# Patient Record
Sex: FEMALE | Race: BLACK OR AFRICAN AMERICAN | NOT HISPANIC OR LATINO | Employment: OTHER | URBAN - METROPOLITAN AREA
[De-identification: names, ages, dates, MRNs, and addresses within clinical notes are randomized per-mention and may not be internally consistent; named-entity substitution may affect disease eponyms.]

---

## 2017-01-03 ENCOUNTER — HOSPITAL ENCOUNTER (OUTPATIENT)
Dept: PREADMISSION TESTING | Facility: HOSPITAL | Age: 72
Discharge: HOME | End: 2017-01-03
Attending: SURGERY
Payer: MEDICARE

## 2017-01-03 VITALS
TEMPERATURE: 100 F | RESPIRATION RATE: 18 BRPM | SYSTOLIC BLOOD PRESSURE: 177 MMHG | WEIGHT: 155 LBS | HEART RATE: 71 BPM | DIASTOLIC BLOOD PRESSURE: 86 MMHG | OXYGEN SATURATION: 97 % | HEIGHT: 62 IN | BODY MASS INDEX: 28.52 KG/M2

## 2017-01-03 NOTE — PRE ADMISSION SCREENING
Called Dr. Squires in regards to pt's pre op EKG done at her PCP's office. While he was here I was doing V/S. Pt's temp 100.2. BBS very coarse expiratory and  with inspiratory wheezes auscultated throughout all lung fields. Pt states she has had cough productive of clear sputum x 4 days and states to Dr. Squires that her exercise tolerance is worse right now than 4 days ago.  Dr. Squires called and spoke to Dr. Muhammad. They decided to postpone surgery until respiratory status is improved. Patient understands. She knows to call Dr. Muhammad's office in am to arrange follow up.

## 2017-01-19 DIAGNOSIS — E11.59 HYPERTENSION ASSOCIATED WITH DIABETES: ICD-10-CM

## 2017-01-19 DIAGNOSIS — I15.2 HYPERTENSION ASSOCIATED WITH DIABETES: ICD-10-CM

## 2017-01-19 RX ORDER — METOPROLOL SUCCINATE 25 MG/1
TABLET, EXTENDED RELEASE ORAL
Qty: 90 TABLET | Refills: 0 | Status: SHIPPED | OUTPATIENT
Start: 2017-01-19 | End: 2017-10-11

## 2017-01-20 NOTE — TELEPHONE ENCOUNTER
Patient is currently out of town - per family member. Left message for patient to contact clinic.       Please schedule follow up.

## 2017-01-30 DIAGNOSIS — J45.20 REACTIVE AIRWAY DISEASE, MILD INTERMITTENT, UNCOMPLICATED: ICD-10-CM

## 2017-01-30 RX ORDER — IPRATROPIUM BROMIDE AND ALBUTEROL 20; 100 UG/1; UG/1
SPRAY, METERED RESPIRATORY (INHALATION)
Qty: 4 G | Refills: 1 | Status: SHIPPED | OUTPATIENT
Start: 2017-01-30 | End: 2017-03-31 | Stop reason: SDUPTHER

## 2017-02-26 ENCOUNTER — HOSPITAL ENCOUNTER (EMERGENCY)
Facility: HOSPITAL | Age: 72
Discharge: HOME | End: 2017-02-26
Attending: EMERGENCY MEDICINE
Payer: MEDICARE

## 2017-02-26 VITALS
DIASTOLIC BLOOD PRESSURE: 74 MMHG | OXYGEN SATURATION: 99 % | WEIGHT: 170 LBS | HEIGHT: 65 IN | TEMPERATURE: 99 F | SYSTOLIC BLOOD PRESSURE: 169 MMHG | HEART RATE: 72 BPM | BODY MASS INDEX: 28.32 KG/M2 | RESPIRATION RATE: 20 BRPM

## 2017-02-26 DIAGNOSIS — R51.9 NONINTRACTABLE HEADACHE, UNSPECIFIED CHRONICITY PATTERN, UNSPECIFIED HEADACHE TYPE: Primary | ICD-10-CM

## 2017-02-26 LAB
ABO + RH BLD: NORMAL
ALBUMIN SERPL BCP-MCNC: 3.9 G/DL
ALP SERPL-CCNC: 57 U/L
ALT SERPL W/O P-5'-P-CCNC: 10 U/L
ANION GAP SERPL CALC-SCNC: 14 MMOL/L
AST SERPL-CCNC: 15 U/L
BASOPHILS # BLD AUTO: 0.01 K/UL
BASOPHILS NFR BLD: 0.2 %
BILIRUB SERPL-MCNC: 0.7 MG/DL
BLD GP AB SCN CELLS X3 SERPL QL: NORMAL
BNP SERPL-MCNC: 3789 PG/ML
BUN SERPL-MCNC: 13 MG/DL
CALCIUM SERPL-MCNC: 10.6 MG/DL
CHLORIDE SERPL-SCNC: 98 MMOL/L
CO2 SERPL-SCNC: 28 MMOL/L
CREAT SERPL-MCNC: 6.5 MG/DL
DIFFERENTIAL METHOD: ABNORMAL
EOSINOPHIL # BLD AUTO: 0.1 K/UL
EOSINOPHIL NFR BLD: 1.9 %
ERYTHROCYTE [DISTWIDTH] IN BLOOD BY AUTOMATED COUNT: 16.5 %
EST. GFR  (AFRICAN AMERICAN): 7 ML/MIN/1.73 M^2
EST. GFR  (NON AFRICAN AMERICAN): 6 ML/MIN/1.73 M^2
GLUCOSE SERPL-MCNC: 101 MG/DL
HCT VFR BLD AUTO: 36.7 %
HGB BLD-MCNC: 11.5 G/DL
INR PPP: 1.1
LYMPHOCYTES # BLD AUTO: 0.9 K/UL
LYMPHOCYTES NFR BLD: 22.2 %
MAGNESIUM SERPL-MCNC: 1.9 MG/DL
MCH RBC QN AUTO: 29.6 PG
MCHC RBC AUTO-ENTMCNC: 31.3 %
MCV RBC AUTO: 95 FL
MONOCYTES # BLD AUTO: 0.5 K/UL
MONOCYTES NFR BLD: 10.7 %
NEUTROPHILS # BLD AUTO: 2.7 K/UL
NEUTROPHILS NFR BLD: 65 %
PHOSPHATE SERPL-MCNC: 4.7 MG/DL
PLATELET # BLD AUTO: 200 K/UL
PMV BLD AUTO: 9.5 FL
POCT GLUCOSE: 80 MG/DL (ref 70–110)
POTASSIUM SERPL-SCNC: 3.3 MMOL/L
PROT SERPL-MCNC: 7.5 G/DL
PROTHROMBIN TIME: 12 SEC
RBC # BLD AUTO: 3.88 M/UL
SODIUM SERPL-SCNC: 140 MMOL/L
TROPONIN I SERPL DL<=0.01 NG/ML-MCNC: 0.05 NG/ML
TSH SERPL DL<=0.005 MIU/L-ACNC: 2.39 UIU/ML
WBC # BLD AUTO: 4.19 K/UL

## 2017-02-26 PROCEDURE — 93005 ELECTROCARDIOGRAM TRACING: CPT

## 2017-02-26 PROCEDURE — 84443 ASSAY THYROID STIM HORMONE: CPT

## 2017-02-26 PROCEDURE — 84484 ASSAY OF TROPONIN QUANT: CPT

## 2017-02-26 PROCEDURE — 86900 BLOOD TYPING SEROLOGIC ABO: CPT

## 2017-02-26 PROCEDURE — 99285 EMERGENCY DEPT VISIT HI MDM: CPT | Mod: 25

## 2017-02-26 PROCEDURE — 85610 PROTHROMBIN TIME: CPT

## 2017-02-26 PROCEDURE — 83880 ASSAY OF NATRIURETIC PEPTIDE: CPT

## 2017-02-26 PROCEDURE — 84100 ASSAY OF PHOSPHORUS: CPT

## 2017-02-26 PROCEDURE — 86850 RBC ANTIBODY SCREEN: CPT

## 2017-02-26 PROCEDURE — 85025 COMPLETE CBC W/AUTO DIFF WBC: CPT

## 2017-02-26 PROCEDURE — 82962 GLUCOSE BLOOD TEST: CPT

## 2017-02-26 PROCEDURE — 83735 ASSAY OF MAGNESIUM: CPT

## 2017-02-26 PROCEDURE — 80053 COMPREHEN METABOLIC PANEL: CPT

## 2017-02-26 RX ORDER — OMEPRAZOLE 20 MG/1
20 CAPSULE, DELAYED RELEASE ORAL DAILY
COMMUNITY
End: 2017-10-11

## 2017-02-26 RX ORDER — MONTELUKAST SODIUM 10 MG/1
10 TABLET ORAL NIGHTLY
Status: ON HOLD | COMMUNITY
End: 2017-07-12 | Stop reason: ALTCHOICE

## 2017-02-26 NOTE — ED AVS SNAPSHOT
[facility]  [place] [address]               Eli Vaz   2017  2:26 PM   ED    Description:  Female : 1945   Department:  [facility]           Your Care was Coordinated By:     Provider Role From To    Subhash Landin MD Attending Provider 17 1432 --      Reason for Visit     Aphasia           Diagnoses this Visit        Comments    Nonintractable headache, unspecified chronicity pattern, unspecified headache type    -  Primary       ED Disposition     None           To Do List           Follow-up Information     Follow up with Isrrael Conway MD.    Specialty:  Family Medicine    Contact information:    [address] [facility]  [facility]  [facility] [address] [phone]          Follow up with [facility].    Specialty:  Emergency Medicine    Why:  As needed    Contact information:    [place] [id number] [phone]      [facility]     [facility] Nurse Care [facility] -  Assistance  Registered nurses in the [facility] provide clinical advisement, health education, appointment booking, and other advisory services.  Call for this free service at [phone].             Medications           Message regarding Medications     Verify the changes and/or additions to your medication regime listed below are the same as discussed with your clinician today.  If any of these changes or additions are incorrect, please notify your healthcare provider.             Verify that the below list of medications is an accurate representation of the medications you are currently taking.  If none reported, the list may be blank. If incorrect, please contact your healthcare provider. Carry this list with you in case of emergency.           Current Medications     amlodipine (NORVASC) 5 MG tablet Take by mouth once daily.     aspirin 325 MG tablet Take 1  tablet (325 mg total) by mouth once daily.    COMBIVENT RESPIMAT  mcg/actuation inhaler INHALE 2 PUFF(S) INTO THE LUNGS 3 TIMES A DAY    FOLIC ACID/VITAMIN B COMP W-C (RENAL CAPS ORAL) Take by mouth Daily.    metoprolol succinate (TOPROL-XL) 25 MG 24 hr tablet TAKE 1 TABLET BY MOUTH ONCE DAILY    montelukast (SINGULAIR) 10 mg tablet Take 10 mg by mouth every evening.    omeprazole (PRILOSEC) 20 MG capsule Take 20 mg by mouth once daily.    paroxetine (PAXIL) 20 MG tablet Take 20 mg by mouth once daily.     SENSIPAR 60 mg Tab Take 30 mg by mouth daily with breakfast.     sevelamer carbonate (RENVELA) 800 mg Tab Take 800 mg by mouth 3 (three) times daily with meals.    ADVAIR DISKUS 250-50 mcg/dose diskus inhaler INHALE 1 PUFF INTO THE LUNGS TWICE A DAY    atorvastatin (LIPITOR) 10 MG tablet Take 1 tablet (10 mg total) by mouth once daily.    fluticasone (FLONASE) 50 mcg/actuation nasal spray 1 spray by Each Nare route 2 (two) times daily.    meloxicam (MOBIC) 15 MG tablet Take 15 mg by mouth once daily.     mirtazapine (REMERON) 7.5 MG Tab Take 7.5 mg by mouth nightly.     tramadol (ULTRAM) 50 mg tablet Take 1 tablet (50 mg total) by mouth 2 (two) times daily as needed.           Clinical Reference Information           Your Vitals Were     BP                   181/77           Allergies as of 2/26/2017     No Known Allergies      Immunizations Administered on Date of Encounter - 2/26/2017     None      ED Micro, Lab, POCT     Start Ordered       Status Ordering Provider    02/26/17 1532 02/26/17 1532  POCT glucose  Once      Final result     02/26/17 1435 02/26/17 1434  Magnesium  STAT      Final result     02/26/17 1435 02/26/17 1434  Phosphorus  STAT      Final result     02/26/17 1433 02/26/17 1433  CBC W/ AUTO DIFFERENTIAL  Once      Final result     02/26/17 1433 02/26/17 1433  Comprehensive metabolic panel  Once      Final result     02/26/17 1433 02/26/17 1433  Protime-INR  Once      Final result      02/26/17 1433 02/26/17 1433  TSH  Once      Final result     02/26/17 1433 02/26/17 1433  Troponin I  Once      Final result     02/26/17 1433 02/26/17 1433  B-Type natriuretic peptide  Once      Final result     02/26/17 1433 02/26/17 1433  POCT glucose  Once      Acknowledged       ED Imaging Orders     Start Ordered       Status Ordering Provider    02/26/17 1433 02/26/17 1433  CT Head Without Contrast  1 time imaging      Final result     02/26/17 1433 02/26/17 1433  X-Ray Chest AP Portable  1 time imaging      Final result       Discharge References/Attachments     HEADACHES, SELF-CARE FOR (ENGLISH)    HEADACHE, UNSPECIFIED (ENGLISH)      [facility] Sign-Up     Activating your [facility] account is as easy as 1-2-3!     1) Visit [email or web address], select Sign Up Now, enter this activation code and your date of birth, then select Next.  [email or web address]  Expires: 4/12/2017  4:42 PM      2) Create a username and password to use when you visit [facility] in the future and select a security question in case you lose your password and select Next.    3) Enter your e-mail address and click Sign Up!    Additional Information  If you have questions, please e-mail [email or web address] or call [phone] to talk to our [facility] staff. Remember, [facility] is NOT to be used for urgent needs. For medical emergencies, dial 911.         Smoking Cessation     If you would like to quit smoking:   You may be eligible for free services if you are a [place] resident and started smoking cigarettes before September 1, 1988.  Call the Smoking Cessation Trust ([employer]) toll free at (804) 320-7365 or (144) 458-5612.   Call 4-800-QUIT-NOW if you do not meet the above criteria.             [facility] complies with applicable Federal civil rights laws and does not discriminate on the basis of race, color, national origin, age, disability, or sex.        Language Assistance Services     ATTENTION: Language assistance  services are available, free of charge. Please call [phone].      ATENCIÓN: Si habla español, tiene a nevarez disposición servicios gratuitos de asistencia lingüística. Llame al [phone].     CHÚ Ý: N?u b?n nói Ti?ng Vi?t, có các d?ch v? h? tr? ngôn ng? mi?n phí dành cho b?n. G?i s? [phone].

## 2017-02-26 NOTE — ED PROVIDER NOTES
"Encounter Date: 2/26/2017    SCRIBE #1 NOTE: I, Melissa Houston, am scribing for, and in the presence of,  Subhash Landin MD. I have scribed the following portions of the note - Other sections scribed: HPI and ROS.       History     Chief Complaint   Patient presents with    Aphasia     arrived via [facility], c/o " talking out of her head" EMS reports sudden onset of aphasia, difficulty walking, symptoms since 1pm, c/o HA, pt reported taking 4 tablets of daily HTN meds hydralizine 50mg po at 11am     Review of patient's allergies indicates:  No Known Allergies  HPI Comments: CC: Aphasia    HPI: This 71 y.o. Female who has COPD, ESRD-HD, HTN and DM type 2 presents to the ED for an evaluation for acute onset speech difficulty with associated difficulty walking that began at 13:00.  Pt's family reports pt suddenly began having difficulty speaking as well as calling family members by incorrect names.  Pt's family reports states pt's symptoms began approximately 1 hour after taking her newly prescribed HTN medication.  Pt reports starting her hydralazine this morning.  Pt's family reports her taking the incorrect dosage and reports her taking 4 tablets this morning. Pt reports she can recall having some difficulty speaking, but states she remembered having a generalized headache prior to the incident.  Pt and family members reports her symptoms have resolved since arriving to the ED.  Pt denies any recent illness and reports having a TIA in the past.  Pt denies chest pain, palpitations, abdominal pain, nausea, emesis, diarrhea, back pain, shortness of breath, or any other associated symptoms.  No prior tx.  No alleviating factors.    The history is provided by the patient. No  was used.     Past Medical History:   Diagnosis Date    COPD (chronic obstructive pulmonary disease)     Diabetes mellitus type II     Dialysis patient     ESRD (end stage renal disease)      Past Surgical History:   Procedure " Laterality Date    AV FISTULA PLACEMENT      TUBAL LIGATION       Family History   Problem Relation Age of Onset    Heart attack Sister     Heart attack Sister     Heart attack Mother      Social History   Substance Use Topics    Smoking status: Former Smoker    Smokeless tobacco: None    Alcohol use No     Review of Systems   Constitutional: Negative for chills and fever.   HENT: Negative for ear pain and sore throat.    Eyes: Negative for pain.   Respiratory: Negative for cough and shortness of breath.    Cardiovascular: Negative for chest pain, palpitations and leg swelling.   Gastrointestinal: Negative for abdominal pain, diarrhea, nausea and vomiting.   Genitourinary: Negative for dysuria.   Musculoskeletal: Negative for back pain.   Skin: Negative for rash.   Neurological: Positive for speech difficulty and headaches.       Physical Exam   Initial Vitals   BP Pulse Resp Temp SpO2   02/26/17 1429 02/26/17 1429 02/26/17 1429 -- 02/26/17 1429   177/72 76 20  98 %     Physical Exam    Nursing note and vitals reviewed.  Constitutional: She appears well-developed and well-nourished. No distress.   HENT:   Head: Normocephalic and atraumatic.   Mouth/Throat: Oropharynx is clear and moist.   Eyes: EOM are normal. Pupils are equal, round, and reactive to light.   Neck: Normal range of motion. Neck supple. No thyromegaly present. No JVD present.   Cardiovascular: Normal rate, regular rhythm, normal heart sounds and intact distal pulses. Exam reveals no gallop and no friction rub.    No murmur heard.  Pulmonary/Chest: Breath sounds normal. No respiratory distress.   Abdominal: Soft. Bowel sounds are normal. There is no tenderness.   Musculoskeletal: Normal range of motion. She exhibits no edema or tenderness.   Neurological: She is alert and oriented to person, place, and time. She has normal strength and normal reflexes. She displays normal reflexes. No cranial nerve deficit.   Skin: Skin is warm and dry.          ED Course   Procedures  Labs Reviewed   CBC W/ AUTO DIFFERENTIAL - Abnormal; Notable for the following:        Result Value    RBC 3.88 (*)     Hemoglobin 11.5 (*)     Hematocrit 36.7 (*)     MCHC 31.3 (*)     RDW 16.5 (*)     Lymph # 0.9 (*)     All other components within normal limits   COMPREHENSIVE METABOLIC PANEL - Abnormal; Notable for the following:     Potassium 3.3 (*)     Creatinine 6.5 (*)     Calcium 10.6 (*)     eGFR if  7 (*)     eGFR if non  6 (*)     All other components within normal limits   TROPONIN I - Abnormal; Notable for the following:     Troponin I 0.046 (*)     All other components within normal limits   B-TYPE NATRIURETIC PEPTIDE - Abnormal; Notable for the following:     BNP 3789 (*)     All other components within normal limits   PHOSPHORUS - Abnormal; Notable for the following:     Phosphorus 4.7 (*)     All other components within normal limits   PROTIME-INR   TSH   MAGNESIUM   TYPE & SCREEN   POCT GLUCOSE     EKG Readings: (Independently Interpreted)   Initial Reading: No STEMI. Rhythm: Normal Sinus Rhythm. Heart Rate: 90. Ectopy: No Ectopy. ST Segments: Normal ST Segments. T Waves: Normal.       X-Rays:   Independently Interpreted Readings:   Chest X-Ray: Normal heart size.  No infiltrates.  No acute abnormalities.   Head CT: Right frontal meningioma that is unchanged form prior,  REmote infarcts new from 2014.          Patient presents with slurred speech. Now resolved. Started one hour after taking accidentally more of a new Blood pressure medication then prescribed. Patient's BNP and troponin elevated. Had elevations in 2014 although both higher today. No respiratory symptoms or chest pain. No new EKG findings. Discussed bringing into the hospital for further evaluation of TIA/stroke. Patient refusing. Family wants to take the patient home. Will discharge but recommend close immediate follow up with PCP or [facility]. Also discuss  hydralazine at follow up.        Scribe Attestation:   Scribe #1: I performed the above scribed service and the documentation accurately describes the services I performed. I attest to the accuracy of the note.    Attending Attestation:           Physician Attestation for Scribe:  Physician Attestation Statement for Scribe #1: I, Subhash Landin MD, reviewed documentation, as scribed by Melissa Houston in my presence, and it is both accurate and complete.                 ED Course     Clinical Impression:   The encounter diagnosis was Nonintractable headache, unspecified chronicity pattern, unspecified headache type.  Transient ischemic attack vs side effects from accidental medication overdose.           Subhsah Landin MD  03/27/17 2898

## 2017-02-26 NOTE — ED TRIAGE NOTES
Pt arrives via  EMS. EMS reports sudden onset of aphasia and difficulty walking which started at 1pm. Pt complains of 9/10 headache.  reports pt took 4 hydralazine pills this morning accidentally. She is supposed to take 1 pill 4 x a day.

## 2017-03-07 ENCOUNTER — HOSPITAL ENCOUNTER (EMERGENCY)
Facility: HOSPITAL | Age: 72
Discharge: HOME | End: 2017-03-07
Attending: EMERGENCY MEDICINE
Payer: MEDICARE

## 2017-03-07 VITALS
RESPIRATION RATE: 16 BRPM | TEMPERATURE: 99 F | WEIGHT: 145 LBS | DIASTOLIC BLOOD PRESSURE: 69 MMHG | HEART RATE: 70 BPM | SYSTOLIC BLOOD PRESSURE: 151 MMHG | OXYGEN SATURATION: 95 % | HEIGHT: 64 IN | BODY MASS INDEX: 24.75 KG/M2

## 2017-03-07 DIAGNOSIS — S09.90XA CLOSED HEAD INJURY: ICD-10-CM

## 2017-03-07 DIAGNOSIS — S00.83XA FOREHEAD CONTUSION, INITIAL ENCOUNTER: Primary | ICD-10-CM

## 2017-03-07 PROCEDURE — 99284 EMERGENCY DEPT VISIT MOD MDM: CPT

## 2017-03-07 PROCEDURE — 25000003 PHARM REV CODE 250: Performed by: EMERGENCY MEDICINE

## 2017-03-07 RX ORDER — CLONIDINE HYDROCHLORIDE 0.1 MG/1
0.1 TABLET ORAL
Status: COMPLETED | OUTPATIENT
Start: 2017-03-07 | End: 2017-03-07

## 2017-03-07 RX ADMIN — CLONIDINE HYDROCHLORIDE 0.1 MG: 0.1 TABLET ORAL at 03:03

## 2017-03-07 NOTE — ED AVS SNAPSHOT
[facility]  [address] [place] [address]               Eli Vaz   3/7/2017  2:43 AM   ED    Description:  Female : 1945   Department:  [facility]           Your Care was Coordinated By:     Provider Role From To    Huseyin Valero MD Attending Provider 17 0245 --      Reason for Visit     Head Injury           Diagnoses this Visit        Comments    Forehead contusion, initial encounter    -  Primary     Closed head injury           ED Disposition     None           To Do List           Follow-up Information     Follow up with Isrrael Conway MD In 3 days.    Specialty:  Family Medicine    Contact information:    [address] [facility]  [facility]  [facility] [address]  [phone]        [facility]     [facility] Care [facility] -  Assistance  Registered nurses in the [facility] provide clinical advisement, health education, appointment booking, and other advisory services.  Call for this free service at [phone].             Medications           Message regarding Medications     Verify the changes and/or additions to your medication regime listed below are the same as discussed with your clinician today.  If any of these changes or additions are incorrect, please notify your healthcare provider.        These medications were administered today        Dose Freq    cloNIDine tablet 0.1 mg 0.1 mg ED 1 Time    Sig: Take 1 tablet (0.1 mg total) by mouth ED 1 Time.    Class: Normal    Route: Oral           Verify that the below list of medications is an accurate representation of the medications you are currently taking.  If none reported, the list may be blank. If incorrect, please contact your healthcare provider. Carry this list with you in case of emergency.           Current Medications     ADVAIR DISKUS 250-50 mcg/dose diskus inhaler INHALE 1 PUFF INTO THE LUNGS TWICE A DAY    amlodipine  "(NORVASC) 5 MG tablet Take by mouth once daily.     aspirin 325 MG tablet Take 1 tablet (325 mg total) by mouth once daily.    atorvastatin (LIPITOR) 10 MG tablet Take 1 tablet (10 mg total) by mouth once daily.    COMBIVENT RESPIMAT  mcg/actuation inhaler INHALE 2 PUFF(S) INTO THE LUNGS 3 TIMES A DAY    fluticasone (FLONASE) 50 mcg/actuation nasal spray 1 spray by Each Nare route 2 (two) times daily.    FOLIC ACID/VITAMIN B COMP W-C (RENAL CAPS ORAL) Take by mouth Daily.    meloxicam (MOBIC) 15 MG tablet Take 15 mg by mouth once daily.     metoprolol succinate (TOPROL-XL) 25 MG 24 hr tablet TAKE 1 TABLET BY MOUTH ONCE DAILY    mirtazapine (REMERON) 7.5 MG Tab Take 7.5 mg by mouth nightly.     montelukast (SINGULAIR) 10 mg tablet Take 10 mg by mouth every evening.    omeprazole (PRILOSEC) 20 MG capsule Take 20 mg by mouth once daily.    paroxetine (PAXIL) 20 MG tablet Take 20 mg by mouth once daily.     SENSIPAR 60 mg Tab Take 30 mg by mouth daily with breakfast.     sevelamer carbonate (RENVELA) 800 mg Tab Take 800 mg by mouth 3 (three) times daily with meals.    tramadol (ULTRAM) 50 mg tablet Take 1 tablet (50 mg total) by mouth 2 (two) times daily as needed.           Clinical Reference Information           Your Vitals Were     BP Pulse Temp Resp Height Weight    190/81 77 98.8 °F (37.1 °C) (Oral) 16 5' 4" (1.626 m) 65.8 kg (145 lb)    SpO2 BMI             94% 24.89 kg/m2         Allergies as of 3/7/2017     No Known Allergies      Immunizations Administered on Date of Encounter - 3/7/2017     None      ED Micro, Lab, POCT     None      ED Imaging Orders     Start Ordered       Status Ordering Provider    03/07/17 0256 03/07/17 0256  CT Head Without Contrast  1 time imaging      Final result         Discharge Instructions       Tylenol for pain.  Please return immediately if you get worse or if new problems develop.  Rest.  You may apply ice for 24 hours.    [facility] Sign-Up     Activating your [facility] " account is as easy as 1-2-3!     1) Visit [email or web address], select Sign Up Now, enter this activation code and your date of birth, then select Next.  [email or web address]  Expires: 4/12/2017  4:42 PM      2) Create a username and password to use when you visit [facility] in the future and select a security question in case you lose your password and select Next.    3) Enter your e-mail address and click Sign Up!    Additional Information  If you have questions, please e-mail [email or web address] or call [phone] to talk to our [facility] staff. Remember, [facility] is NOT to be used for urgent needs. For medical emergencies, dial 911.         Smoking Cessation     If you would like to quit smoking:   You may be eligible for free services if you are a [place] resident and started smoking cigarettes before September 1, 1988.  Call the Smoking Cessation Trust ([facility] [place]) toll free at (854) 618-8532 or (119) 040-9882.   Call 7-387-QUIT-NOW if you do not meet the above criteria.             [facility] complies with applicable Federal civil rights laws and does not discriminate on the basis of race, color, national origin, age, disability, or sex.        Language Assistance Services     ATTENTION: Language assistance services are available, free of charge. Please call [phone].      ATENCIÓN: Si habla español, tiene a nevarez disposición servicios gratuitos de asistencia lingüística. Llame al [phone].     CHÚ Ý: N?u b?n nói Ti?ng Vi?t, có các d?ch v? h? tr? ngôn ng? mi?n phí dành cho b?n. G?i s? [phone].

## 2017-03-07 NOTE — DISCHARGE INSTRUCTIONS
Tylenol for pain.  Please return immediately if you get worse or if new problems develop.  Rest.  You may apply ice for 24 hours.

## 2017-03-07 NOTE — ED PROVIDER NOTES
Encounter Date: 3/7/2017    SCRIBE #1 NOTE: I, Alyssa Guerra, am scribing for, and in the presence of,  Huseyin Valero MD. I have scribed the following portions of the note - Other sections scribed: HPI/ROS.       History     Chief Complaint   Patient presents with    Head Injury     swelling noted to pts right side of head after falling off the sofa bed while asleep tonight.     Review of patient's allergies indicates:  No Known Allergies  HPI Comments: CC: Head Injury    HPI: 71 y.o. F with COPD, DM type II, HTN, hypercholesteremia, ESRD, and dialysis patient presents to the ED c/o a head injury with swelling to the R side of the forehead after the pt accidentally fell off her sofa bed while sleeping about 1 hour PTA. Pain is severe. No prior tx. Pt denies LOC, HA, visual disturbance, extremity pain, and any other symptoms.     The history is provided by the patient.     Past Medical History:   Diagnosis Date    COPD (chronic obstructive pulmonary disease)     Diabetes mellitus type II     Dialysis patient     ESRD (end stage renal disease)      Past Surgical History:   Procedure Laterality Date    AV FISTULA PLACEMENT      TUBAL LIGATION       Family History   Problem Relation Age of Onset    Heart attack Sister     Heart attack Sister     Heart attack Mother      Social History   Substance Use Topics    Smoking status: Former Smoker    Smokeless tobacco: None    Alcohol use No     Review of Systems   Constitutional: Negative for fever.   HENT: Negative for nosebleeds.         (+) swelling to R forehead   Eyes: Negative for pain and visual disturbance.   Respiratory: Negative for shortness of breath.    Cardiovascular: Negative for chest pain.   Gastrointestinal: Negative for abdominal pain, nausea and vomiting.   Genitourinary: Negative for flank pain.   Musculoskeletal: Negative for back pain.        (-) extremity pain   Skin: Negative for rash and wound.   Neurological: Negative for headaches.        Physical Exam   Initial Vitals   BP Pulse Resp Temp SpO2   03/07/17 0241 03/07/17 0241 03/07/17 0241 03/07/17 0241 03/07/17 0241   215/83 77 16 98.8 °F (37.1 °C) 95 %     Physical Exam  The patient was examined specifically for the following:   General:No significant distress, Good color, Warm and dry. Head and neck:Scalp atraumatic, Neck supple. Neurological:Appropriate conversation, Gross motor deficits. Eyes:Conjugate gaze, Clear corneas. ENT: No epistaxis. Cardiac: Regular rate and rhythm, Grossly normal heart tones. Pulmonary: Wheezing, Rales. Gastrointestinal: Abdominal tenderness, Abdominal distention. Musculoskeletal: Extremity deformity, Apparent pain with range of motion of the joints. Skin: Rash.   The findings on examination were normal except for the following: The patient's blood pressure is 215/83.  The patient has a contusion on the right side of forehead.  The entire spine, including the cervical spine is completely nontender.  Mental status examination, cranial nerves, motor and sensory examination are normal.  The lungs are clear and free wheezing rales of the rhonchi.  Heart tones are normal.  The patient has regular rate and rhythm.  The abdomen is soft.  She has a 3/6 systolic murmur.  ED Course   Procedures  Labs Reviewed - No data to display       X-Rays:   Independently Interpreted Readings:   Other Readings:  CT of the head fails to reveal significant abnormalities.    Medical decision-making: Given the above this patient presents to emergency room after falling out of bed hitting her head.  There is questionable loss of consciousness.  CT the head failed to reveal intracranial bleeding.  There is no evidence of spinal trauma no other trauma to the chest abdomen pelvis or extremities is noted.  I will discharge this patient outpatient evaluation and treatment.              Scribe Attestation:   Scribe #1: I performed the above scribed service and the documentation accurately describes  the services I performed. I attest to the accuracy of the note.    Attending Attestation:           Physician Attestation for Scribe:  Physician Attestation Statement for Scribe #1: I, Huseyin Valero MD, reviewed documentation, as scribed by Alyssa Guerra in my presence, and it is both accurate and complete.                 ED Course     Clinical Impression:   The primary encounter diagnosis was Forehead contusion, initial encounter. A diagnosis of Closed head injury was also pertinent to this visit.          Huseyin Valero MD  03/09/17 0548

## 2017-03-07 NOTE — ED TRIAGE NOTES
71 year old female arrives to the Ed via personal transportation due to head injury 1 hr PTA. Pt reports laying in the recliner bed and fell off the bed and hit her head on the floor. Pt has a bump on the right side of forehead. Pain 8/10. Pt denies taking blood thinner. Pt of HTN. Family at the bedside.

## 2017-03-31 DIAGNOSIS — J45.20 REACTIVE AIRWAY DISEASE, MILD INTERMITTENT, UNCOMPLICATED: ICD-10-CM

## 2017-06-02 ENCOUNTER — TELEPHONE (OUTPATIENT)
Dept: FAMILY MEDICINE | Facility: CLINIC | Age: 72
End: 2017-06-02

## 2017-06-02 NOTE — TELEPHONE ENCOUNTER
Spoke with patient. States that she has changed care to Dr. Avelar    Left voicemail to the pharmacy re: above

## 2017-06-02 NOTE — TELEPHONE ENCOUNTER
----- Message from Delisa Murrieta sent at 6/2/2017 11:44 AM CDT -----  Contact: [facility] is requesting a refill for metoprolol succinate (TOPROL-XL) 25 MG 24 hr tablet. [phone]

## 2017-07-11 ENCOUNTER — HOSPITAL ENCOUNTER (OUTPATIENT)
Facility: HOSPITAL | Age: 72
Discharge: HOME | End: 2017-07-13
Attending: EMERGENCY MEDICINE | Admitting: EMERGENCY MEDICINE
Payer: MEDICARE

## 2017-07-11 DIAGNOSIS — E78.2 COMBINED HYPERLIPIDEMIA ASSOCIATED WITH TYPE 2 DIABETES MELLITUS: ICD-10-CM

## 2017-07-11 DIAGNOSIS — N18.6 DIABETES MELLITUS WITH ESRD (END-STAGE RENAL DISEASE): Chronic | ICD-10-CM

## 2017-07-11 DIAGNOSIS — I51.89 DIASTOLIC DYSFUNCTION: ICD-10-CM

## 2017-07-11 DIAGNOSIS — J44.9 COPD (CHRONIC OBSTRUCTIVE PULMONARY DISEASE): ICD-10-CM

## 2017-07-11 DIAGNOSIS — E11.59 HYPERTENSION ASSOCIATED WITH DIABETES: Chronic | ICD-10-CM

## 2017-07-11 DIAGNOSIS — R41.3 MEMORY LOSS: ICD-10-CM

## 2017-07-11 DIAGNOSIS — N18.6 ESRD ON DIALYSIS: ICD-10-CM

## 2017-07-11 DIAGNOSIS — Z99.2 ESRD ON DIALYSIS: ICD-10-CM

## 2017-07-11 DIAGNOSIS — R41.82 ALTERED MENTAL STATUS: ICD-10-CM

## 2017-07-11 DIAGNOSIS — F41.9 ANXIETY: ICD-10-CM

## 2017-07-11 DIAGNOSIS — I15.2 HYPERTENSION ASSOCIATED WITH DIABETES: Chronic | ICD-10-CM

## 2017-07-11 DIAGNOSIS — G93.40 ACUTE ENCEPHALOPATHY: Primary | ICD-10-CM

## 2017-07-11 DIAGNOSIS — E11.22 DIABETES MELLITUS WITH ESRD (END-STAGE RENAL DISEASE): Chronic | ICD-10-CM

## 2017-07-11 DIAGNOSIS — E11.69 COMBINED HYPERLIPIDEMIA ASSOCIATED WITH TYPE 2 DIABETES MELLITUS: ICD-10-CM

## 2017-07-11 DIAGNOSIS — D63.8 ANEMIA OF CHRONIC DISEASE: ICD-10-CM

## 2017-07-11 PROCEDURE — 96374 THER/PROPH/DIAG INJ IV PUSH: CPT

## 2017-07-11 PROCEDURE — P9612 CATHETERIZE FOR URINE SPEC: HCPCS

## 2017-07-11 PROCEDURE — 99285 EMERGENCY DEPT VISIT HI MDM: CPT

## 2017-07-12 PROBLEM — R41.82 ALTERED MENTAL STATUS: Status: ACTIVE | Noted: 2017-07-12

## 2017-07-12 LAB
ALBUMIN SERPL BCP-MCNC: 3.5 G/DL
ALP SERPL-CCNC: 59 U/L
ALT SERPL W/O P-5'-P-CCNC: 29 U/L
ANION GAP SERPL CALC-SCNC: 12 MMOL/L
ANION GAP SERPL CALC-SCNC: 13 MMOL/L
AST SERPL-CCNC: 27 U/L
BACTERIA #/AREA URNS HPF: ABNORMAL /HPF
BASOPHILS # BLD AUTO: 0.01 K/UL
BASOPHILS # BLD AUTO: 0.03 K/UL
BASOPHILS NFR BLD: 0.3 %
BASOPHILS NFR BLD: 0.6 %
BILIRUB SERPL-MCNC: 0.7 MG/DL
BILIRUB UR QL STRIP: NEGATIVE
BNP SERPL-MCNC: >4900 PG/ML
BUN SERPL-MCNC: 29 MG/DL
BUN SERPL-MCNC: 34 MG/DL
CALCIUM SERPL-MCNC: 10.1 MG/DL
CALCIUM SERPL-MCNC: 10.1 MG/DL
CHLORIDE SERPL-SCNC: 102 MMOL/L
CHLORIDE SERPL-SCNC: 105 MMOL/L
CLARITY UR: ABNORMAL
CO2 SERPL-SCNC: 21 MMOL/L
CO2 SERPL-SCNC: 24 MMOL/L
COLOR UR: ABNORMAL
CREAT SERPL-MCNC: 7.7 MG/DL
CREAT SERPL-MCNC: 8.2 MG/DL
DIFFERENTIAL METHOD: ABNORMAL
DIFFERENTIAL METHOD: ABNORMAL
EOSINOPHIL # BLD AUTO: 0.1 K/UL
EOSINOPHIL # BLD AUTO: 0.1 K/UL
EOSINOPHIL NFR BLD: 2.1 %
EOSINOPHIL NFR BLD: 2.8 %
ERYTHROCYTE [DISTWIDTH] IN BLOOD BY AUTOMATED COUNT: 17.4 %
ERYTHROCYTE [DISTWIDTH] IN BLOOD BY AUTOMATED COUNT: 17.5 %
EST. GFR  (AFRICAN AMERICAN): 5 ML/MIN/1.73 M^2
EST. GFR  (AFRICAN AMERICAN): 6 ML/MIN/1.73 M^2
EST. GFR  (NON AFRICAN AMERICAN): 4 ML/MIN/1.73 M^2
EST. GFR  (NON AFRICAN AMERICAN): 5 ML/MIN/1.73 M^2
GLUCOSE SERPL-MCNC: 93 MG/DL
GLUCOSE SERPL-MCNC: 99 MG/DL
GLUCOSE UR QL STRIP: ABNORMAL
HCT VFR BLD AUTO: 32.9 %
HCT VFR BLD AUTO: 34.8 %
HGB BLD-MCNC: 10.7 G/DL
HGB BLD-MCNC: 10.9 G/DL
HGB UR QL STRIP: ABNORMAL
HYALINE CASTS #/AREA URNS LPF: 0 /LPF
KETONES UR QL STRIP: NEGATIVE
LACTATE SERPL-SCNC: 1.1 MMOL/L
LEUKOCYTE ESTERASE UR QL STRIP: ABNORMAL
LIPASE SERPL-CCNC: 37 U/L
LYMPHOCYTES # BLD AUTO: 0.9 K/UL
LYMPHOCYTES # BLD AUTO: 1.1 K/UL
LYMPHOCYTES NFR BLD: 18.8 %
LYMPHOCYTES NFR BLD: 31.3 %
MAGNESIUM SERPL-MCNC: 1.8 MG/DL
MCH RBC QN AUTO: 31.3 PG
MCH RBC QN AUTO: 31.4 PG
MCHC RBC AUTO-ENTMCNC: 31.3 %
MCHC RBC AUTO-ENTMCNC: 32.5 %
MCV RBC AUTO: 100 FL
MCV RBC AUTO: 97 FL
MICROSCOPIC COMMENT: ABNORMAL
MONOCYTES # BLD AUTO: 0.7 K/UL
MONOCYTES # BLD AUTO: 0.9 K/UL
MONOCYTES NFR BLD: 18.8 %
MONOCYTES NFR BLD: 19.1 %
NEUTROPHILS # BLD AUTO: 1.7 K/UL
NEUTROPHILS # BLD AUTO: 2.8 K/UL
NEUTROPHILS NFR BLD: 46.5 %
NEUTROPHILS NFR BLD: 59.7 %
NITRITE UR QL STRIP: POSITIVE
PH UR STRIP: 8 [PH] (ref 5–8)
PHOSPHATE SERPL-MCNC: 5 MG/DL
PLATELET # BLD AUTO: 129 K/UL
PLATELET # BLD AUTO: 141 K/UL
PMV BLD AUTO: 10 FL
PMV BLD AUTO: 9.4 FL
POTASSIUM SERPL-SCNC: 4.5 MMOL/L
POTASSIUM SERPL-SCNC: 4.8 MMOL/L
PROT SERPL-MCNC: 6.6 G/DL
PROT UR QL STRIP: ABNORMAL
RBC # BLD AUTO: 3.41 M/UL
RBC # BLD AUTO: 3.48 M/UL
RBC #/AREA URNS HPF: >100 /HPF (ref 0–4)
SODIUM SERPL-SCNC: 138 MMOL/L
SODIUM SERPL-SCNC: 139 MMOL/L
SP GR UR STRIP: 1 (ref 1–1.03)
SQUAMOUS #/AREA URNS HPF: 4 /HPF
TROPONIN I SERPL DL<=0.01 NG/ML-MCNC: 0.07 NG/ML
TROPONIN I SERPL DL<=0.01 NG/ML-MCNC: 0.09 NG/ML
TROPONIN I SERPL DL<=0.01 NG/ML-MCNC: 0.09 NG/ML
URN SPEC COLLECT METH UR: ABNORMAL
UROBILINOGEN UR STRIP-ACNC: NEGATIVE EU/DL
WBC # BLD AUTO: 3.61 K/UL
WBC # BLD AUTO: 4.67 K/UL
WBC #/AREA URNS HPF: 0 /HPF (ref 0–5)

## 2017-07-12 PROCEDURE — 94761 N-INVAS EAR/PLS OXIMETRY MLT: CPT

## 2017-07-12 PROCEDURE — 25000003 PHARM REV CODE 250: Performed by: EMERGENCY MEDICINE

## 2017-07-12 PROCEDURE — G0378 HOSPITAL OBSERVATION PER HR: HCPCS

## 2017-07-12 PROCEDURE — 25000003 PHARM REV CODE 250: Performed by: NURSE PRACTITIONER

## 2017-07-12 PROCEDURE — 87088 URINE BACTERIA CULTURE: CPT

## 2017-07-12 PROCEDURE — 81000 URINALYSIS NONAUTO W/SCOPE: CPT

## 2017-07-12 PROCEDURE — 93005 ELECTROCARDIOGRAM TRACING: CPT

## 2017-07-12 PROCEDURE — 36415 COLL VENOUS BLD VENIPUNCTURE: CPT

## 2017-07-12 PROCEDURE — 63600175 PHARM REV CODE 636 W HCPCS: Performed by: EMERGENCY MEDICINE

## 2017-07-12 PROCEDURE — 80053 COMPREHEN METABOLIC PANEL: CPT

## 2017-07-12 PROCEDURE — 83605 ASSAY OF LACTIC ACID: CPT

## 2017-07-12 PROCEDURE — 85025 COMPLETE CBC W/AUTO DIFF WBC: CPT | Mod: 91

## 2017-07-12 PROCEDURE — 87086 URINE CULTURE/COLONY COUNT: CPT

## 2017-07-12 PROCEDURE — 84484 ASSAY OF TROPONIN QUANT: CPT

## 2017-07-12 PROCEDURE — 80048 BASIC METABOLIC PNL TOTAL CA: CPT

## 2017-07-12 PROCEDURE — 83690 ASSAY OF LIPASE: CPT

## 2017-07-12 PROCEDURE — 99204 OFFICE O/P NEW MOD 45 MIN: CPT | Mod: ,,, | Performed by: PSYCHIATRY & NEUROLOGY

## 2017-07-12 PROCEDURE — 84100 ASSAY OF PHOSPHORUS: CPT

## 2017-07-12 PROCEDURE — 83880 ASSAY OF NATRIURETIC PEPTIDE: CPT

## 2017-07-12 PROCEDURE — 83735 ASSAY OF MAGNESIUM: CPT

## 2017-07-12 RX ORDER — HYDRALAZINE HYDROCHLORIDE 10 MG/1
10 TABLET, FILM COATED ORAL 4 TIMES DAILY
Status: DISCONTINUED | OUTPATIENT
Start: 2017-07-12 | End: 2017-07-13 | Stop reason: HOSPADM

## 2017-07-12 RX ORDER — METOPROLOL SUCCINATE 25 MG/1
25 TABLET, EXTENDED RELEASE ORAL DAILY
Status: DISCONTINUED | OUTPATIENT
Start: 2017-07-12 | End: 2017-07-13 | Stop reason: HOSPADM

## 2017-07-12 RX ORDER — PANTOPRAZOLE SODIUM 40 MG/1
40 TABLET, DELAYED RELEASE ORAL DAILY
Status: DISCONTINUED | OUTPATIENT
Start: 2017-07-12 | End: 2017-07-13 | Stop reason: HOSPADM

## 2017-07-12 RX ORDER — OLANZAPINE 10 MG/2ML
5 INJECTION, POWDER, FOR SOLUTION INTRAMUSCULAR ONCE
Status: COMPLETED | OUTPATIENT
Start: 2017-07-12 | End: 2017-07-12

## 2017-07-12 RX ORDER — SODIUM CHLORIDE 9 MG/ML
INJECTION, SOLUTION INTRAVENOUS
Status: DISCONTINUED | OUTPATIENT
Start: 2017-07-12 | End: 2017-07-13 | Stop reason: HOSPADM

## 2017-07-12 RX ORDER — ACETAMINOPHEN 325 MG/1
650 TABLET ORAL EVERY 8 HOURS PRN
Status: DISCONTINUED | OUTPATIENT
Start: 2017-07-12 | End: 2017-07-13 | Stop reason: HOSPADM

## 2017-07-12 RX ORDER — MONTELUKAST SODIUM 10 MG/1
10 TABLET ORAL NIGHTLY
Status: DISCONTINUED | OUTPATIENT
Start: 2017-07-12 | End: 2017-07-13 | Stop reason: HOSPADM

## 2017-07-12 RX ORDER — IPRATROPIUM BROMIDE AND ALBUTEROL SULFATE 2.5; .5 MG/3ML; MG/3ML
3 SOLUTION RESPIRATORY (INHALATION) EVERY 4 HOURS PRN
Status: DISCONTINUED | OUTPATIENT
Start: 2017-07-12 | End: 2017-07-13 | Stop reason: HOSPADM

## 2017-07-12 RX ORDER — AMLODIPINE BESYLATE 5 MG/1
10 TABLET ORAL DAILY
Status: DISCONTINUED | OUTPATIENT
Start: 2017-07-12 | End: 2017-07-13 | Stop reason: HOSPADM

## 2017-07-12 RX ORDER — HYDRALAZINE HYDROCHLORIDE 20 MG/ML
10 INJECTION INTRAMUSCULAR; INTRAVENOUS EVERY 8 HOURS PRN
Status: DISCONTINUED | OUTPATIENT
Start: 2017-07-12 | End: 2017-07-13 | Stop reason: HOSPADM

## 2017-07-12 RX ORDER — RAMELTEON 8 MG/1
8 TABLET ORAL ONCE
Status: COMPLETED | OUTPATIENT
Start: 2017-07-12 | End: 2017-07-12

## 2017-07-12 RX ORDER — DIAZEPAM 2 MG/1
2 TABLET ORAL
Status: COMPLETED | OUTPATIENT
Start: 2017-07-12 | End: 2017-07-12

## 2017-07-12 RX ADMIN — RAMELTEON 8 MG: 8 TABLET, FILM COATED ORAL at 03:07

## 2017-07-12 RX ADMIN — AMLODIPINE BESYLATE 10 MG: 5 TABLET ORAL at 08:07

## 2017-07-12 RX ADMIN — OLANZAPINE 5 MG: 10 INJECTION, POWDER, LYOPHILIZED, FOR SOLUTION INTRAMUSCULAR at 04:07

## 2017-07-12 RX ADMIN — HYDRALAZINE HYDROCHLORIDE 10 MG: 10 TABLET, FILM COATED ORAL at 11:07

## 2017-07-12 RX ADMIN — MONTELUKAST SODIUM 10 MG: 10 TABLET, FILM COATED ORAL at 03:07

## 2017-07-12 RX ADMIN — ACETAMINOPHEN 650 MG: 325 TABLET, FILM COATED ORAL at 11:07

## 2017-07-12 RX ADMIN — PANTOPRAZOLE SODIUM 40 MG: 40 TABLET, DELAYED RELEASE ORAL at 08:07

## 2017-07-12 RX ADMIN — OLANZAPINE 5 MG: 10 INJECTION, POWDER, FOR SOLUTION INTRAMUSCULAR at 05:07

## 2017-07-12 RX ADMIN — HYDRALAZINE HYDROCHLORIDE 10 MG: 20 INJECTION INTRAMUSCULAR; INTRAVENOUS at 02:07

## 2017-07-12 RX ADMIN — METOPROLOL SUCCINATE 25 MG: 25 TABLET, EXTENDED RELEASE ORAL at 08:07

## 2017-07-12 RX ADMIN — DIAZEPAM 2 MG: 2 TABLET ORAL at 03:07

## 2017-07-12 NOTE — NURSING
Patient arrived to unit via wheelchair accompanied by transport personnel and family. Pt. Is oriented to self only. Patient noted to be very restless. Pt. Vital signs stable and found in flow sheets with complete patient assessment. Pt. Skin dry and intact with AV fistula noted on MISTI. Pt. Has no complaints of pain and no signs of respiratory distress noted. Pt. Stable. Will continue to monitor.

## 2017-07-12 NOTE — ED PROVIDER NOTES
"Encounter Date: 7/11/2017    SCRIBE #1 NOTE: I, Leena Israel, am scribing for, and in the presence of,  Joe Adair MD. I have scribed the following portions of the note - Other sections scribed: HPI, ROS.       History     Chief Complaint   Patient presents with    Altered Mental Status     x 4 days getting worse, family report increased confusion and state last time she was like this she had an infection, dialysis pt, t, th, s, last full dialysis was today      CC: Altered Mental Status    HPI: 71 year old female with a medical hx of COPD, ESRD on HD (Tues, Thurs, Sat), and DM type II presents to the ED via EMS accompanied by daughter for an evaluation of a mental status change for the past few days. Daughter reports pt has been having frequent episodes of confusion lately. Daughter states while pt was on the stretcher, pt "thought she was eating something but it was her blanket." Daughter states she usually administers pt's medications, however, pt's son accidentally administered pt a second dose of her HTN and sensipar medications yesterday. Daughter reports pt had a similar episode in 2016 and was diagnosed with an infection. Daughter states pt is usually more oriented than her current mental state.    Pt is c/o generalized abdominal pain. Daughter reports pt does have gallstones. Per Epic, pt was evaluated on 12/1/16 for upper abdominal pain and had an ultrasound performed which revealed cholelithiasis. Pt had a laparoscopic cholecystectomy procedure scheduled on 1/4/17 but the procedure was cancelled.    Pt did complete dialysis today. She usually does not make urine. Pt lives with family members.    Hx otherwise limited secondary to mental status change.      The history is provided by the patient and a relative. No  was used.     Review of patient's allergies indicates:  No Known Allergies  Past Medical History:   Diagnosis Date    COPD (chronic obstructive pulmonary disease)  "    Diabetes mellitus type II     Dialysis patient     ESRD (end stage renal disease)      Past Surgical History:   Procedure Laterality Date    AV FISTULA PLACEMENT      TUBAL LIGATION       Family History   Problem Relation Age of Onset    Heart attack Sister     Heart attack Sister     Heart attack Mother      Social History   Substance Use Topics    Smoking status: Former Smoker    Smokeless tobacco: Never Used    Alcohol use No     Review of Systems   Unable to perform ROS: Mental status change   Gastrointestinal: Positive for abdominal pain.   Genitourinary: Positive for difficulty urinating (on HD).   Psychiatric/Behavioral: Positive for confusion.       Physical Exam     Initial Vitals [07/11/17 2339]   BP Pulse Resp Temp SpO2   (!) 172/82 72 18 98 °F (36.7 °C) 100 %      MAP       112         Physical Exam    Vitals reviewed.  Constitutional: She appears well-developed and well-nourished.   HENT:   Head: Normocephalic and atraumatic.   Nose: Nose normal.   Mouth/Throat: No oropharyngeal exudate.   Eyes: EOM are normal. Pupils are equal, round, and reactive to light.   Neck: Normal range of motion. Neck supple. No JVD present.   Cardiovascular: Regular rhythm and normal heart sounds. Exam reveals no gallop and no friction rub.    No murmur heard.  Pulmonary/Chest: Breath sounds normal. No stridor. No respiratory distress. She has no wheezes. She has no rhonchi. She has no rales. She exhibits no tenderness.   Abdominal: Soft. Bowel sounds are normal. She exhibits no distension and no mass. There is no tenderness. There is no rebound and no guarding.   Musculoskeletal: Normal range of motion. She exhibits no edema or tenderness.   Neurological: She is alert and oriented to person, place, and time. She has normal strength. No sensory deficit.   Skin: Skin is warm and dry.   Psychiatric: Her speech is delayed. She is withdrawn and actively hallucinating. Thought content is delusional. She is  noncommunicative. She is inattentive.         ED Course   Procedures  Labs Reviewed   TROPONIN I - Abnormal; Notable for the following:        Result Value    Troponin I 0.071 (*)     All other components within normal limits   COMPREHENSIVE METABOLIC PANEL - Abnormal; Notable for the following:     BUN, Bld 29 (*)     Creatinine 7.7 (*)     eGFR if  6 (*)     eGFR if non  5 (*)     All other components within normal limits   CBC W/ AUTO DIFFERENTIAL - Abnormal; Notable for the following:     WBC 3.61 (*)     RBC 3.48 (*)     Hemoglobin 10.9 (*)     Hematocrit 34.8 (*)      (*)     MCH 31.3 (*)     MCHC 31.3 (*)     RDW 17.4 (*)     Platelets 129 (*)     Gran # 1.7 (*)     Mono% 19.1 (*)     All other components within normal limits   B-TYPE NATRIURETIC PEPTIDE - Abnormal; Notable for the following:     BNP >4,900 (*)     All other components within normal limits   PHOSPHORUS - Abnormal; Notable for the following:     Phosphorus 5.0 (*)     All other components within normal limits   MAGNESIUM   LIPASE   LACTIC ACID, PLASMA   DRUG SCREEN PANEL, URINE EMERGENCY   URINALYSIS             Medical Decision Making:   History:   Old Medical Records: I decided to obtain old medical records.  Initial Assessment:   Medical decision-making:    The patient received a medical screening exam. If performed, the EKG was independently evaluated by me and is pending final cardiology evaluation.  If performed, all radiographic studies were independently evaluated by me and are pending final radiology evaluation. If labs were ordered, they were reviewed. Vital signs are independently assessed by me.  If performed, the pulse oximetry was independently evaluated by me.  I decided to obtain the patient's past medical record.  If available, I reviewed the patient's past medical record, including most recent labs and radiology reports.        This was an emergent evaluation of an elderly person with  altered mental status.  I was immediately concerned about stroke, MI, infection, metabolic derangement, uremia, medication side effect, encephalopathy and hypoglycemia.      I decided to obtain and review the old medical record.    The patient's vital signs are within normal limits.  The patient is afebrile.  EKG is negative for acute ST elevation MI.  Troponin is positive. However, it is always positive. Will trend. Pt does not complain of chest pain.   I do not think the patient is having acute coronary syndrome.  The chest x-ray is negative for pneumonia.  CT is negative for stroke or intracranial mass.  UA pending. Pt rarely makes urine. Dialysis pt.   There are no electrolyte abnormalities.  The patient is not anemic.  Medications were reviewed.  There are no new medications.  I do not think that the medications are a factor.    The patient was treated with:   Medications   hydrALAZINE injection 10 mg (not administered)       The patient's altered mental status has not improved.  The etiology of the patient's altered mental status is unclear.  This patient should be admitted for further evaluation and observation.    I discussed the case with:  Temporary admission orders will be placed into the system.      DAVID Adair M.D. 1:56 AM 7/12/2017              Scribe Attestation:   Scribe #1: I performed the above scribed service and the documentation accurately describes the services I performed. I attest to the accuracy of the note.    Attending Attestation:           Physician Attestation for Scribe:  Physician Attestation Statement for Scribe #1: I, Joe Adair MD, reviewed documentation, as scribed by Leena Israel in my presence, and it is both accurate and complete.                 ED Course     Clinical Impression:   The encounter diagnosis was Altered mental status.                           Joe Adair MD  07/12/17 0159

## 2017-07-12 NOTE — CONSULTS
Renal Consult    Date of Admission:  7/11/2017 11:40 PM    HPI:   70 y/o AAF with Hx. ESRD on Hemodialysis (TTS) last HD yesterday. Pte. Brought to ED by daughter for an evaluation of mental status change for the past few days. Daughter reports pt has been having frequent episodes of confusion lately.     Past Medical History:   Diagnosis Date    Arthritis     COPD (chronic obstructive pulmonary disease)     Diabetes mellitus type II     Dialysis patient     Encounter for blood transfusion     ESRD (end stage renal disease)        Past Surgical History:   Procedure Laterality Date    AV FISTULA PLACEMENT      TUBAL LIGATION         Review of patient's allergies indicates:  No Known Allergies    No current facility-administered medications on file prior to encounter.      Current Outpatient Prescriptions on File Prior to Encounter   Medication Sig Dispense Refill    ADVAIR DISKUS 250-50 mcg/dose diskus inhaler INHALE 1 PUFF INTO THE LUNGS TWICE A DAY 60 each 2    amlodipine (NORVASC) 5 MG tablet Take by mouth once daily.       aspirin 325 MG tablet Take 1 tablet (325 mg total) by mouth once daily. 30 tablet 0    atorvastatin (LIPITOR) 10 MG tablet Take 1 tablet (10 mg total) by mouth once daily. 90 tablet 1    busPIRone (BUSPAR) 10 MG tablet Take 10 mg by mouth 2 (two) times daily.  6    fluticasone (FLONASE) 50 mcg/actuation nasal spray 1 spray by Each Nare route 2 (two) times daily. 1 Bottle 1    FOLIC ACID/VITAMIN B COMP W-C (RENAL CAPS ORAL) Take by mouth Daily.      hydrALAZINE (APRESOLINE) 25 MG tablet Take 25 mg by mouth once daily at 6am.   3    losartan (COZAAR) 25 MG tablet Take 1 tablet by mouth once daily at 6am.  6    metoprolol succinate (TOPROL-XL) 25 MG 24 hr tablet TAKE 1 TABLET BY MOUTH ONCE DAILY 90 tablet 0    mirtazapine (REMERON) 7.5 MG Tab Take 7.5 mg by mouth nightly.       omeprazole (PRILOSEC) 20 MG capsule Take 20 mg by mouth once  daily.      paroxetine (PAXIL) 20 MG tablet Take 20 mg by mouth once daily.       SENSIPAR 60 mg Tab Take 30 mg by mouth daily with breakfast.       sevelamer carbonate (RENVELA) 800 mg Tab Take 800 mg by mouth 3 (three) times daily with meals.      hydrochlorothiazide (HYDRODIURIL) 12.5 MG Tab Take 1 tablet by mouth once daily at 6am.  3    ipratropium-albuterol (COMBIVENT RESPIMAT)  mcg/actuation inhaler INHALE 2 PUFF(S) INTO THE LUNGS 3 TIMES A DAY 4 g 1    levocetirizine (XYZAL) 5 MG tablet Take 1 tablet by mouth once daily at 6am.  6    meloxicam (MOBIC) 15 MG tablet Take 15 mg by mouth once daily.       [DISCONTINUED] hydrALAZINE (APRESOLINE) 10 MG tablet Take 10 mg by mouth 4 (four) times daily.  3    [DISCONTINUED] montelukast (SINGULAIR) 10 mg tablet Take 10 mg by mouth every evening.         Family History   Problem Relation Age of Onset    Heart attack Sister     Heart attack Sister     Heart attack Mother        Social History     Social History    Marital status:      Spouse name: N/A    Number of children: N/A    Years of education: N/A     Occupational History    Not on file.     Social History Main Topics    Smoking status: Former Smoker     Start date: 1/12/1991    Smokeless tobacco: Never Used    Alcohol use No    Drug use: No    Sexual activity: Not on file     Other Topics Concern    Not on file     Social History Narrative    No narrative on file       ROS:  Unable to obtain due to confusion    Physical exam:    Vitals:    07/12/17 1200   BP: (!) 140/74   Pulse: 66   Resp: 18   Temp: 97.7 °F (36.5 °C)       No intake/output data recorded.  No intake/output data recorded.      Elderly female, restless in bed and confused  Neck: supple  Heart: distant s1-S2  Lung: CTA  Abdomen: n/a  Limbs: no edema      Laboratories:      Recent Labs  Lab 07/12/17  0733   WBC 4.67   RBC 3.41*   HGB 10.7*   HCT 32.9*   *   MCV 97   MCH 31.4*   MCHC 32.5       Recent  Labs  Lab 07/12/17  0110 07/12/17  0733   CALCIUM 10.1 10.1   PROT 6.6  --     138   K 4.5 4.8   CO2 24 21*    105   BUN 29* 34*   CREATININE 7.7* 8.2*   ALKPHOS 59  --    ALT 29  --    AST 27  --    BILITOT 0.7  --      Phosphorus                   5.0  Phosphorus     Magnesium                   1.8  Magnesium     BNP                   >4,900  BNP     Diagnostic Tests:  CT Head Without Contrast [[id number]] Resulted: 07/12/17 0128   Order Status: Completed      The extracranial structures are within normal limits.  Calvarium is intact.  Visualized paranasal sinuses are clear.  Mastoid air cells are clear.   Impression:         1.  No acute intracranial findings identified. Further evaluation/followup as warranted.    2.  Grossly stable chronic ischemic changes as detailed above.    3. Stable large calcified extra-axial mass overlying the right frontal lobe most consistent with a meningioma.      Electronically signed by: MADELEINE RANDLE MD, MD  Date: 07/12/17  Time: 01:28      X-Ray Chest 1 View [[id number]] Resulted: 07/12/17 0108   Order Status: Completed Updated: 07/12/17 0109   Narrative:     COMPARISON: Chest radiograph 2/26/17    FINDINGS: AP portable semiupright view of the chest. Monitoring leads overlie the chest. Patient is somewhat rotated. Left axillary and upper extending surgical clips and vascular stents again noted. The bilateral lungs are well expanded without large consolidationor new focal opacity.  No large pleural effusion or pneumothorax.  Cardiac silhouette remains enlarged similar prior, without convincing evidence of failure.Grossly stable atherosclerosis with tortuosity of the thoracic aorta.  No acute osseous process seen.   PA and lateral views can be obtained.   Impression:         No radiographic acute intrathoracic process seen on this single view.      Electronically signed by: MADELEINE RANDLE MD, MD  Date: 07/12/17  Time: 01:08        Assessment:    70 y/o AAF admitted with new  onset confusion; so far no obvious cause as per Neurology, need to r/o UTI since daughter reports that the last time her mother had one she looked like this, also could be related to poly-pharmacy.    Plan:    - Resume Dialysis in a.m.  - Epogen w/ HD  - Obtain catheterized urine for UA and C&S  - Neurology w/u in progress  - Consider adjusting Pte.'s Paxil and Remeron  - Stop home HCTZ if pte. Still taking it

## 2017-07-12 NOTE — HPI
"71 female with significant history for COPD, remote smoker, DMT2, memory lost since Jan 2017, hypertension, CVA 2014, and ESRD on HD TTS via MISTI fistula who presents to hospital for acute change in mental status occurring yesterday after coming home from dialysis.  reports patient "weren't fully right" -flushing the toilet without use, "didnt want to walk, " had visual hallucinations -picking up chips and talking to people that were not present.  reports patient had similar episode 2 year ago when patient had a stroke. Denies headaches, dizziness, chest pain, shortness of breath, palpitations, diaphoresis, orthopnea, PND, abdominal pain, nausea, vomiting, or extremities weakness/numbness. Patient use a walker at baseline x 6 years. No new physical limitation in the past month.  reports does not take ASA every day?     In ED, CT head showed stable bifrontal, right parietal and left temporal encephalomalacia consistent with remote infarcts, chronic small vessel ischemic disease, and stable large extra-axial mass overlying the right frontal lobe consistent with a meningioma. Labs reviewed. Unable to obtain UA at this time and no urine with in and out catheterization. MISTI fistula no signs of infection.     "

## 2017-07-12 NOTE — ED NOTES
"Unsuccessful attempt at urinary cath specimen collection. Pt clinches legs stating "take it out". MD made aware. Will re-attempt to collect urine specimen.  "

## 2017-07-12 NOTE — PROGRESS NOTES
Attempted MRI three different times.  Pt would not lay flat on her back even with meds and she kept trying to get off the the bed and the MRI table.   No scan.

## 2017-07-12 NOTE — ED NOTES
Pt becoming anxious, attempting to move from bed. Daughter states that she thinks the pt needs something to relax. MD made aware.

## 2017-07-12 NOTE — ED NOTES
Urine specimen was not enough. MD aware. Reports floor can send sample to lab when another is given.

## 2017-07-12 NOTE — ASSESSMENT & PLAN NOTE
See HPI and CT head above. Unclear etiology. Patient does have history of memory lost/early dementia?? Since Jan 2017. No obvious sign of infection thus far. No urine output with in and out cath.  reports similar episode when had CVA 2 years ago. Lactic acid 1.1. Obtain MRI brain, MRA brain, US carotids. Neurology following.

## 2017-07-12 NOTE — PLAN OF CARE
Problem: Fall Risk (Adult)  Goal: Identify Related Risk Factors and Signs and Symptoms  Related risk factors and signs and symptoms are identified upon initiation of Human Response Clinical Practice Guideline (CPG)   Outcome: Ongoing (interventions implemented as appropriate)   07/12/17 1610   Fall Risk   Related Risk Factors (Fall Risk) confusion/agitation;age-related changes;gait/mobility problems;polypharmacy;environment unfamiliar;slipper/uneven surfaces   Signs and Symptoms (Fall Risk) presence of risk factors     Goal: Absence of Falls  Patient will demonstrate the desired outcomes by discharge/transition of care.   Outcome: Ongoing (interventions implemented as appropriate)   07/12/17 1610   Fall Risk (Adult)   Absence of Falls making progress toward outcome       Problem: Patient Care Overview  Goal: Plan of Care Review  Outcome: Ongoing (interventions implemented as appropriate)   07/12/17 1610   Coping/Psychosocial   Plan Of Care Reviewed With patient;family       Problem: Confusion, Acute (Adult)  Goal: Identify Related Risk Factors and Signs and Symptoms  Related risk factors and signs and symptoms are identified upon initiation of Human Response Clinical Practice Guideline (CPG)   Outcome: Ongoing (interventions implemented as appropriate)   07/12/17 1610   Confusion, Acute   Related Risk Factors (Acute Confusion) advanced age;cognitive impairment;polypharmacy   Signs and Symptoms (Acute Confusion) communication disturbed;disorientation;thought process diminished/disorganized;understanding disturbed     Goal: Cognitive/Functional Impairments Minimized  Patient will demonstrate the desired outcomes by discharge/transition of care.   Outcome: Ongoing (interventions implemented as appropriate)   07/12/17 1610   Confusion, Acute (Adult)   Cognitive/Functional Impairments Minimized making progress toward outcome     Goal: Safety  Patient will demonstrate the desired outcomes by discharge/transition of care.    Outcome: Ongoing (interventions implemented as appropriate)   07/12/17 1610   Confusion, Acute (Adult)   Safety making progress toward outcome

## 2017-07-12 NOTE — ED TRIAGE NOTES
Pt presents to ED via EMS with family. Family states that has been confused and has decreased energy x 3 days. Daughter states that pt has not been resting/sleeping. EMS states that pt had a similar episode in 2016 and was diagnosed with an infection. EMS states VS were stable throughout transport, CBG 98 and afebrile. Sister states that pt has a cyst on her brain. Pt is a dialysis pt and was last dialyzed today. Pt is AAO to self.

## 2017-07-12 NOTE — H&P
"[facility] Hospital Medicine  History & Physical    Patient Name: Eli Vaz  MRN: [id number]  Admission Date: 7/11/2017  Attending Physician: Dr. Lucero  Primary Care Provider: Isrrael Conway MD         Patient information was obtained from patient and ER records.     Subjective:     Principal Problem:Acute encephalopathy    Chief Complaint:   Chief Complaint   Patient presents with    Altered Mental Status     x 4 days getting worse, family report increased confusion and state last time she was like this she had an infection, dialysis pt, t, th, s, last full dialysis was today         HPI: 71 female with significant history for COPD, remote smoker, DMT2, memory lost since Jan 2017, hypertension, CVA 2014, and ESRD on HD TTS via MISTI fistula who presents to hospital for acute change in mental status occurring yesterday after coming home from dialysis.  reports patient "weren't fully right" -flushing the toilet without use, "didnt want to walk, " had visual hallucinations -picking up chips and talking to people that were not present.  reports patient had similar episode 2 year ago when patient had a stroke. Denies headaches, dizziness, chest pain, shortness of breath, palpitations, diaphoresis, orthopnea, PND, abdominal pain, nausea, vomiting, or extremities weakness/numbness. Patient use a walker at baseline x 6 years. No new physical limitation in the past month.  reports does not take ASA every day?     In ED, CT head showed stable bifrontal, right parietal and left temporal encephalomalacia consistent with remote infarcts, chronic small vessel ischemic disease, and stable large extra-axial mass overlying the right frontal lobe consistent with a meningioma. Labs reviewed. Unable to obtain UA at this time and no urine with in and out catheterization. MISTI fistula no signs of infection.       Past Medical History:   Diagnosis Date    Arthritis     COPD (chronic " obstructive pulmonary disease)     Diabetes mellitus type II     Dialysis patient     Encounter for blood transfusion     ESRD (end stage renal disease)        Past Surgical History:   Procedure Laterality Date    AV FISTULA PLACEMENT      TUBAL LIGATION         Review of patient's allergies indicates:  No Known Allergies    No current facility-administered medications on file prior to encounter.      Current Outpatient Prescriptions on File Prior to Encounter   Medication Sig    ADVAIR DISKUS 250-50 mcg/dose diskus inhaler INHALE 1 PUFF INTO THE LUNGS TWICE A DAY    amlodipine (NORVASC) 5 MG tablet Take by mouth once daily.     aspirin 325 MG tablet Take 1 tablet (325 mg total) by mouth once daily.    atorvastatin (LIPITOR) 10 MG tablet Take 1 tablet (10 mg total) by mouth once daily.    busPIRone (BUSPAR) 10 MG tablet Take 10 mg by mouth 2 (two) times daily.    fluticasone (FLONASE) 50 mcg/actuation nasal spray 1 spray by Each Nare route 2 (two) times daily.    FOLIC ACID/VITAMIN B COMP W-C (RENAL CAPS ORAL) Take by mouth Daily.    hydrALAZINE (APRESOLINE) 25 MG tablet Take 25 mg by mouth once daily at 6am.     losartan (COZAAR) 25 MG tablet Take 1 tablet by mouth once daily at 6am.    metoprolol succinate (TOPROL-XL) 25 MG 24 hr tablet TAKE 1 TABLET BY MOUTH ONCE DAILY    mirtazapine (REMERON) 7.5 MG Tab Take 7.5 mg by mouth nightly.     omeprazole (PRILOSEC) 20 MG capsule Take 20 mg by mouth once daily.    paroxetine (PAXIL) 20 MG tablet Take 20 mg by mouth once daily.     SENSIPAR 60 mg Tab Take 30 mg by mouth daily with breakfast.     sevelamer carbonate (RENVELA) 800 mg Tab Take 800 mg by mouth 3 (three) times daily with meals.    hydrochlorothiazide (HYDRODIURIL) 12.5 MG Tab Take 1 tablet by mouth once daily at 6am.    ipratropium-albuterol (COMBIVENT RESPIMAT)  mcg/actuation inhaler INHALE 2 PUFF(S) INTO THE LUNGS 3 TIMES A DAY    levocetirizine (XYZAL) 5 MG tablet Take 1  tablet by mouth once daily at 6am.    meloxicam (MOBIC) 15 MG tablet Take 15 mg by mouth once daily.     [DISCONTINUED] hydrALAZINE (APRESOLINE) 10 MG tablet Take 10 mg by mouth 4 (four) times daily.    [DISCONTINUED] montelukast (SINGULAIR) 10 mg tablet Take 10 mg by mouth every evening.     Family History     Problem Relation (Age of Onset)    Heart attack Sister, Sister, Mother        Social History Main Topics    Smoking status: Former Smoker     Start date: 1/12/1991    Smokeless tobacco: Never Used    Alcohol use No    Drug use: No    Sexual activity: Not on file     Review of Systems   Unable to perform ROS: Mental status change     Objective:     Vital Signs (Most Recent):  Temp: 97.7 °F (36.5 °C) (07/12/17 1200)  Pulse: 66 (07/12/17 1200)  Resp: 18 (07/12/17 1200)  BP: (!) 140/74 (07/12/17 1200)  SpO2: 97 % (07/12/17 1200) Vital Signs (24h Range):  Temp:  [97.7 °F (36.5 °C)-99.1 °F (37.3 °C)] 97.7 °F (36.5 °C)  Pulse:  [66-78] 66  Resp:  [16-18] 18  SpO2:  [96 %-100 %] 97 %  BP: (138-198)/(65-98) 140/74     Weight: 60.6 kg (133 lb 9.6 oz)  Body mass index is 22.93 kg/m².    Physical Exam   Constitutional: She appears well-developed and well-nourished. No distress.   HENT:   Head: Normocephalic and atraumatic.   Eyes: Left eye exhibits no discharge.   Difficult to exam as patient not cooperating.    Neck: Normal range of motion. Neck supple.   Cardiovascular: Normal rate and regular rhythm.    No murmur heard.  Pulmonary/Chest: Effort normal and breath sounds normal.   Abdominal: Soft. Bowel sounds are normal.   Musculoskeletal: Normal range of motion. She exhibits no edema, tenderness or deformity.   Neurological: She is alert. No cranial nerve deficit.   Patient unable to follow command.    Skin: Skin is warm and dry.   Psychiatric:   confuse        Significant Labs: All pertinent labs within the past 24 hours have been reviewed.    Significant Imaging: I have reviewed and interpreted all pertinent  imaging results/findings within the past 24 hours.    Assessment/Plan:     * Acute encephalopathy    See HPI and CT head above. Unclear etiology. Patient does have history of memory lost/early dementia?? Since Jan 2017. No obvious sign of infection thus far. No urine output with in and out cath.  reports similar episode when had CVA 2 years ago. Lactic acid 1.1. Obtain MRI brain, MRA brain, US carotids. Neurology following.             Anxiety    Home paxil and buspar? Will need to clarify with daughter if taking both.           Combined hyperlipidemia associated with type 2 diabetes mellitus    Lab Results   Component Value Date    LDLCALC 128.0 12/01/2015   Obtain lipid panel.             Diabetes mellitus with ESRD (end-stage renal disease)    Lab Results   Component Value Date    HGBA1C 4.1 (L) 12/01/2015   -Renal diet.             Hypertension associated with diabetes    Uncontrolled. Continue home norvasc, hydralazine?, losartan, metoprolol. Hydralazine PRN.         Anemia of chronic disease    Hgb similar to previous. Epo and venofer per dialysis protocol. Obtain vitamin b 12 .           Memory loss    Acute worsening mental status. See above #1.           ESRD on dialysis    Labs reviewed. Nephrology following.MISTI fistula good bruit and thrill. HD TTS.           Diastolic dysfunction    No acute issue.           COPD (chronic obstructive pulmonary disease)    Remote smoker. No acute issues. Albuterol PRN.             VTE Risk Mitigation         Ordered     Medium Risk of VTE  Once      07/12/17 7036        Aaliyah Manzano NP  Department of Hospital Medicine   [facility]

## 2017-07-12 NOTE — SUBJECTIVE & OBJECTIVE
Past Medical History:   Diagnosis Date    Arthritis     COPD (chronic obstructive pulmonary disease)     Diabetes mellitus type II     Dialysis patient     Encounter for blood transfusion     ESRD (end stage renal disease)        Past Surgical History:   Procedure Laterality Date    AV FISTULA PLACEMENT      TUBAL LIGATION         Review of patient's allergies indicates:  No Known Allergies    No current facility-administered medications on file prior to encounter.      Current Outpatient Prescriptions on File Prior to Encounter   Medication Sig    ADVAIR DISKUS 250-50 mcg/dose diskus inhaler INHALE 1 PUFF INTO THE LUNGS TWICE A DAY    amlodipine (NORVASC) 5 MG tablet Take by mouth once daily.     aspirin 325 MG tablet Take 1 tablet (325 mg total) by mouth once daily.    atorvastatin (LIPITOR) 10 MG tablet Take 1 tablet (10 mg total) by mouth once daily.    busPIRone (BUSPAR) 10 MG tablet Take 10 mg by mouth 2 (two) times daily.    fluticasone (FLONASE) 50 mcg/actuation nasal spray 1 spray by Each Nare route 2 (two) times daily.    FOLIC ACID/VITAMIN B COMP W-C (RENAL CAPS ORAL) Take by mouth Daily.    hydrALAZINE (APRESOLINE) 25 MG tablet Take 25 mg by mouth once daily at 6am.     losartan (COZAAR) 25 MG tablet Take 1 tablet by mouth once daily at 6am.    metoprolol succinate (TOPROL-XL) 25 MG 24 hr tablet TAKE 1 TABLET BY MOUTH ONCE DAILY    mirtazapine (REMERON) 7.5 MG Tab Take 7.5 mg by mouth nightly.     omeprazole (PRILOSEC) 20 MG capsule Take 20 mg by mouth once daily.    paroxetine (PAXIL) 20 MG tablet Take 20 mg by mouth once daily.     SENSIPAR 60 mg Tab Take 30 mg by mouth daily with breakfast.     sevelamer carbonate (RENVELA) 800 mg Tab Take 800 mg by mouth 3 (three) times daily with meals.    hydrochlorothiazide (HYDRODIURIL) 12.5 MG Tab Take 1 tablet by mouth once daily at 6am.    ipratropium-albuterol (COMBIVENT RESPIMAT)  mcg/actuation inhaler INHALE 2 PUFF(S) INTO THE  LUNGS 3 TIMES A DAY    levocetirizine (XYZAL) 5 MG tablet Take 1 tablet by mouth once daily at 6am.    meloxicam (MOBIC) 15 MG tablet Take 15 mg by mouth once daily.     [DISCONTINUED] hydrALAZINE (APRESOLINE) 10 MG tablet Take 10 mg by mouth 4 (four) times daily.    [DISCONTINUED] montelukast (SINGULAIR) 10 mg tablet Take 10 mg by mouth every evening.     Family History     Problem Relation (Age of Onset)    Heart attack Sister, Sister, Mother        Social History Main Topics    Smoking status: Former Smoker     Start date: 1/12/1991    Smokeless tobacco: Never Used    Alcohol use No    Drug use: No    Sexual activity: Not on file     Review of Systems   Unable to perform ROS: Mental status change     Objective:     Vital Signs (Most Recent):  Temp: 97.7 °F (36.5 °C) (07/12/17 1200)  Pulse: 66 (07/12/17 1200)  Resp: 18 (07/12/17 1200)  BP: (!) 140/74 (07/12/17 1200)  SpO2: 97 % (07/12/17 1200) Vital Signs (24h Range):  Temp:  [97.7 °F (36.5 °C)-99.1 °F (37.3 °C)] 97.7 °F (36.5 °C)  Pulse:  [66-78] 66  Resp:  [16-18] 18  SpO2:  [96 %-100 %] 97 %  BP: (138-198)/(65-98) 140/74     Weight: 60.6 kg (133 lb 9.6 oz)  Body mass index is 22.93 kg/m².    Physical Exam   Constitutional: She appears well-developed and well-nourished. No distress.   HENT:   Head: Normocephalic and atraumatic.   Eyes: Left eye exhibits no discharge.   Difficult to exam as patient not cooperating.    Neck: Normal range of motion. Neck supple.   Cardiovascular: Normal rate and regular rhythm.    No murmur heard.  Pulmonary/Chest: Effort normal and breath sounds normal.   Abdominal: Soft. Bowel sounds are normal.   Musculoskeletal: Normal range of motion. She exhibits no edema, tenderness or deformity.   Neurological: She is alert. No cranial nerve deficit.   Patient unable to follow command.    Skin: Skin is warm and dry.   Psychiatric:   confuse        Significant Labs: All pertinent labs within the past 24 hours have been  reviewed.    Significant Imaging: I have reviewed and interpreted all pertinent imaging results/findings within the past 24 hours.

## 2017-07-12 NOTE — PLAN OF CARE
Problem: Patient Care Overview  Goal: Plan of Care Review   17   Coping/Psychosocial   Plan Of Care Reviewed With patient;daughter       Problem: Confusion, Acute (Adult)  Intervention: Monitor/Assist with Self Care   17 [id number]   Functional Level Current   Ambulation 2 - assistive person --    Transferring 2 - assistive person --    Toileting 2 - assistive person --    Bathing 2 - assistive person --    Dressing 2 - assistive person --    Eating 2 - assistive person --    Communication 2 - difficulty understanding (not related to language barrier) --    Swallowing 0 - swallows foods/liquids without difficulty --    Daily Care Interventions   Self-Care Promotion independence encouraged --    Activity   Activity Assistance Provided --  assistance, 2 people     Intervention: Reduce Risk/Promote Restraint Free Environment   17   Safety Interventions   Environmental Safety Modification clutter free environment maintained;lighting adjusted;room near unit station;room organization consistent   Prevent [place] Drop/Fall   Safety/Security Measures bed alarm set;family to remain at bedside     Intervention: Evaluate Medications/Identify Contributors to Confusion   17   Safety Interventions   Medication Review/Management medications reviewed     Intervention: Optimize Communication   17   Cognitive Interventions   Communication Enhancement Strategies call light answered in person;communication board used;family involved in communication plan;family/caregiver assisted with communication;verbal communication attempts encouraged     Intervention: Provide Frequent Orientation/Reorientation   17   Cognitive Interventions   Reorientation Measures calendar in view;clock in view;reorientation provided   Sensory Stimulation Regulation care clustered;lighting decreased       Goal: Identify Related Risk Factors and Signs and Symptoms  Related risk factors and signs  and symptoms are identified upon initiation of Human Response Clinical Practice Guideline (CPG)   07/12/17 0728   Confusion, Acute   Related Risk Factors (Acute Confusion) cognitive impairment;infection;mobility decreased   Signs and Symptoms (Acute Confusion) acute onset of symptoms;communication disturbed;disorientation;understanding disturbed     Goal: Cognitive/Functional Impairments Minimized  Patient will demonstrate the desired outcomes by discharge/transition of care.   07/12/17 0728   Confusion, Acute (Adult)   Cognitive/Functional Impairments Minimized making progress toward outcome     Goal: Safety  Patient will demonstrate the desired outcomes by discharge/transition of care.   07/12/17 0728   Confusion, Acute (Adult)   Safety making progress toward outcome

## 2017-07-12 NOTE — CONSULTS
"[facility]  Neurology  Consult Note    Patient Name: Eli Vaz  MRN: [id number]  Admission Date: 7/11/2017  Hospital Length of Stay: 0 days  Code Status: Full Code   Attending Provider: Carmelita Candelaria MD   Consulting Provider: Cesar Morse MD  Primary Care Physician: Isrrael Conway MD  Principal Problem:Altered mental status    Inpatient consult to Neurology  Consult performed by: CESAR MORSE  Consult ordered by: SCOTTY LOPEZ        Subjective:     Chief Complaint/Reason for consult: AMS    HPI: 72 y/o female with medical Hx as listed below who was brought to ED for change in mental status. Her  states that for the last 3-4 days pt has not been sleeping, talking to a nephew that is not present, an exhibiting strange behavior (pt would go to the bathroom and touch the toilet with her hands and walk out of the bathroom without using it. He  says: "she is talking out of he head. Last year she had a similar episode and was treated for a UTI according to him.    Past Medical History:   Diagnosis Date    Arthritis     COPD (chronic obstructive pulmonary disease)     Diabetes mellitus type II     Dialysis patient     Encounter for blood transfusion     ESRD (end stage renal disease)        Past Surgical History:   Procedure Laterality Date    AV FISTULA PLACEMENT      TUBAL LIGATION         Review of patient's allergies indicates:  No Known Allergies    Current Neurological Medications:     No current facility-administered medications on file prior to encounter.      Current Outpatient Prescriptions on File Prior to Encounter   Medication Sig    ADVAIR DISKUS 250-50 mcg/dose diskus inhaler INHALE 1 PUFF INTO THE LUNGS TWICE A DAY    amlodipine (NORVASC) 5 MG tablet Take by mouth once daily.     aspirin 325 MG tablet Take 1 tablet (325 mg total) by mouth once daily.    atorvastatin (LIPITOR) 10 MG tablet Take 1 tablet (10 mg total) by mouth once daily. "    busPIRone (BUSPAR) 10 MG tablet Take 10 mg by mouth 2 (two) times daily.    fluticasone (FLONASE) 50 mcg/actuation nasal spray 1 spray by Each Nare route 2 (two) times daily.    FOLIC ACID/VITAMIN B COMP W-C (RENAL CAPS ORAL) Take by mouth Daily.    hydrALAZINE (APRESOLINE) 25 MG tablet Take 25 mg by mouth once daily at 6am.     losartan (COZAAR) 25 MG tablet Take 1 tablet by mouth once daily at 6am.    metoprolol succinate (TOPROL-XL) 25 MG 24 hr tablet TAKE 1 TABLET BY MOUTH ONCE DAILY    mirtazapine (REMERON) 7.5 MG Tab Take 7.5 mg by mouth nightly.     omeprazole (PRILOSEC) 20 MG capsule Take 20 mg by mouth once daily.    paroxetine (PAXIL) 20 MG tablet Take 20 mg by mouth once daily.     SENSIPAR 60 mg Tab Take 30 mg by mouth daily with breakfast.     sevelamer carbonate (RENVELA) 800 mg Tab Take 800 mg by mouth 3 (three) times daily with meals.    hydrochlorothiazide (HYDRODIURIL) 12.5 MG Tab Take 1 tablet by mouth once daily at 6am.    ipratropium-albuterol (COMBIVENT RESPIMAT)  mcg/actuation inhaler INHALE 2 PUFF(S) INTO THE LUNGS 3 TIMES A DAY    levocetirizine (XYZAL) 5 MG tablet Take 1 tablet by mouth once daily at 6am.    meloxicam (MOBIC) 15 MG tablet Take 15 mg by mouth once daily.     [DISCONTINUED] hydrALAZINE (APRESOLINE) 10 MG tablet Take 10 mg by mouth 4 (four) times daily.    [DISCONTINUED] montelukast (SINGULAIR) 10 mg tablet Take 10 mg by mouth every evening.      Family History     Problem Relation (Age of Onset)    Heart attack Sister, Sister, Mother        Social History Main Topics    Smoking status: Former Smoker     Start date: 1/12/1991    Smokeless tobacco: Never Used    Alcohol use No    Drug use: No    Sexual activity: Not on file     Review of Systems   Unable to obtain due to AMS    Objective:     Vital Signs (Most Recent):  Temp: 97.7 °F (36.5 °C) (07/12/17 1200)  Pulse: 66 (07/12/17 1200)  Resp: 18 (07/12/17 1200)  BP: (!) 140/74 (07/12/17  1200)  SpO2: 97 % (07/12/17 1200) Vital Signs (24h Range):  Temp:  [97.7 °F (36.5 °C)-99.1 °F (37.3 °C)] 97.7 °F (36.5 °C)  Pulse:  [66-78] 66  Resp:  [16-18] 18  SpO2:  [96 %-100 %] 97 %  BP: (138-198)/(65-98) 140/74     Weight: 60.6 kg (133 lb 9.6 oz)  Body mass index is 22.93 kg/m².    Physical Exam  Constitutional: well-developed  Head: normocephalic  Eyes: conjunctivae/corneas clear.  Nose: no discharge, no epistaxis  Heart: irregular rate and rhythm.  Abdomen: no pain to palpation  Extremities: no edema  Neurologic: Mental status: alert but disoriented  Cranial nerves: pupils are round at 4 mm and reactive to light; EOMI; no nystagmus; no facial asymmetry; tongue protrudes midline  Strength: Moves four extremities against gravity but not following commands adequatly         Significant Labs:   CBC:   Recent Labs  Lab 07/12/17  0110 07/12/17  0733   WBC 3.61* 4.67   HGB 10.9* 10.7*   HCT 34.8* 32.9*   * 141*     CMP:   Recent Labs  Lab 07/12/17  0110 07/12/17  0733   GLU 99 93    138   K 4.5 4.8    105   CO2 24 21*   BUN 29* 34*   CREATININE 7.7* 8.2*   CALCIUM 10.1 10.1   MG 1.8  --    PROT 6.6  --    ALBUMIN 3.5  --    BILITOT 0.7  --    ALKPHOS 59  --    AST 27  --    ALT 29  --    ANIONGAP 13 12   EGFRNONAA 5* 4*       Significant Imaging:  Impression         1.  No acute intracranial findings identified. Further evaluation/followup as warranted.    2.  Grossly stable chronic ischemic changes as detailed above.    3. Stable large calcified extra-axial mass overlying the right frontal lobe most consistent with a meningioma.      Electronically signed by: MADELEINE RANDLE MD, MD  Date: 07/12/17  Time: 01:28          Assessment and Plan:     70 y/o female consulted for AMS    1. AMS: pt seems to be in a delirium but uncertain the cause. She has been attending her HD sessions, no other metabolic disturbances. No fever or leukocytosis.   Pt has not been sleeping which makes matter worse. There may  be some underlying cognitive dysfunction as well. Head CT shows no acute abnormalities.   -Trial of olanzapine 5 mg or mirtazapine.   -In a previous admission pt came with AMS having bilateral strokes. MRI of brain.    Active Diagnoses:    Diagnosis Date Noted POA    PRINCIPAL PROBLEM:  Altered mental status [R41.82] 07/12/2017 Yes    Anxiety [F41.9] 12/01/2015 Yes    Combined hyperlipidemia associated with type 2 diabetes mellitus [E11.69, E78.2] 12/01/2015 Yes    Diabetes mellitus with ESRD (end-stage renal disease) [E11.22, N18.6] 12/01/2015 Yes     Chronic    Hypertension associated with diabetes [E11.59, I10] 10/23/2015 Yes     Chronic    Anemia of chronic disease [D63.8] 03/11/2014 Yes    Memory loss [R41.3] 03/10/2014 Yes    ESRD on dialysis [N18.6, Z99.2] 12/20/2013 Not Applicable    COPD (chronic obstructive pulmonary disease) [J44.9] 11/20/2013 Yes    Diastolic dysfunction [I51.9] 11/20/2013 Yes      Problems Resolved During this Admission:    Diagnosis Date Noted Date Resolved POA       VTE Risk Mitigation         Ordered     Medium Risk of VTE  Once      07/12/17 5252          Thank you for your consult. I will follow-up with patient. Please contact us if you have any additional questions.    Cesar Triplett MD  Neurology  [facility]

## 2017-07-13 VITALS
BODY MASS INDEX: 22.82 KG/M2 | DIASTOLIC BLOOD PRESSURE: 75 MMHG | HEIGHT: 64 IN | RESPIRATION RATE: 18 BRPM | HEART RATE: 62 BPM | TEMPERATURE: 99 F | SYSTOLIC BLOOD PRESSURE: 182 MMHG | WEIGHT: 133.69 LBS | OXYGEN SATURATION: 96 %

## 2017-07-13 LAB
ANION GAP SERPL CALC-SCNC: 12 MMOL/L
BASOPHILS # BLD AUTO: 0.02 K/UL
BASOPHILS NFR BLD: 0.8 %
BUN SERPL-MCNC: 42 MG/DL
CALCIUM SERPL-MCNC: 10.4 MG/DL
CHLORIDE SERPL-SCNC: 101 MMOL/L
CHOLEST/HDLC SERPL: 2.7 {RATIO}
CO2 SERPL-SCNC: 25 MMOL/L
CREAT SERPL-MCNC: 10.2 MG/DL
DIFFERENTIAL METHOD: ABNORMAL
EOSINOPHIL # BLD AUTO: 0.1 K/UL
EOSINOPHIL NFR BLD: 2.7 %
ERYTHROCYTE [DISTWIDTH] IN BLOOD BY AUTOMATED COUNT: 16.8 %
EST. GFR  (AFRICAN AMERICAN): 4 ML/MIN/1.73 M^2
EST. GFR  (NON AFRICAN AMERICAN): 3 ML/MIN/1.73 M^2
FOLATE SERPL-MCNC: 8.6 NG/ML
GLUCOSE SERPL-MCNC: 71 MG/DL
HCT VFR BLD AUTO: 35.8 %
HDL/CHOLESTEROL RATIO: 36.5 %
HDLC SERPL-MCNC: 208 MG/DL
HDLC SERPL-MCNC: 76 MG/DL
HGB BLD-MCNC: 11.1 G/DL
LDLC SERPL CALC-MCNC: 117.4 MG/DL
LYMPHOCYTES # BLD AUTO: 0.7 K/UL
LYMPHOCYTES NFR BLD: 28.8 %
MCH RBC QN AUTO: 31 PG
MCHC RBC AUTO-ENTMCNC: 31 %
MCV RBC AUTO: 100 FL
MONOCYTES # BLD AUTO: 0.4 K/UL
MONOCYTES NFR BLD: 15.6 %
NEUTROPHILS # BLD AUTO: 1.3 K/UL
NEUTROPHILS NFR BLD: 52.1 %
NONHDLC SERPL-MCNC: 132 MG/DL
PLATELET # BLD AUTO: 142 K/UL
PMV BLD AUTO: 9.2 FL
POTASSIUM SERPL-SCNC: 4.9 MMOL/L
RBC # BLD AUTO: 3.58 M/UL
SODIUM SERPL-SCNC: 138 MMOL/L
TRIGL SERPL-MCNC: 73 MG/DL
VIT B12 SERPL-MCNC: 361 PG/ML
WBC # BLD AUTO: 2.57 K/UL

## 2017-07-13 PROCEDURE — 96375 TX/PRO/DX INJ NEW DRUG ADDON: CPT

## 2017-07-13 PROCEDURE — G0257 UNSCHED DIALYSIS ESRD PT HOS: HCPCS

## 2017-07-13 PROCEDURE — 63600175 PHARM REV CODE 636 W HCPCS: Mod: EC | Performed by: INTERNAL MEDICINE

## 2017-07-13 PROCEDURE — 80061 LIPID PANEL: CPT

## 2017-07-13 PROCEDURE — 99213 OFFICE O/P EST LOW 20 MIN: CPT | Mod: ,,, | Performed by: PSYCHIATRY & NEUROLOGY

## 2017-07-13 PROCEDURE — 63600175 PHARM REV CODE 636 W HCPCS: Performed by: NURSE PRACTITIONER

## 2017-07-13 PROCEDURE — 82746 ASSAY OF FOLIC ACID SERUM: CPT

## 2017-07-13 PROCEDURE — 36415 COLL VENOUS BLD VENIPUNCTURE: CPT

## 2017-07-13 PROCEDURE — 82607 VITAMIN B-12: CPT

## 2017-07-13 PROCEDURE — G0378 HOSPITAL OBSERVATION PER HR: HCPCS

## 2017-07-13 PROCEDURE — 25000003 PHARM REV CODE 250: Performed by: EMERGENCY MEDICINE

## 2017-07-13 PROCEDURE — 94761 N-INVAS EAR/PLS OXIMETRY MLT: CPT

## 2017-07-13 PROCEDURE — 80048 BASIC METABOLIC PNL TOTAL CA: CPT

## 2017-07-13 PROCEDURE — 85025 COMPLETE CBC W/AUTO DIFF WBC: CPT

## 2017-07-13 PROCEDURE — 96365 THER/PROPH/DIAG IV INF INIT: CPT

## 2017-07-13 RX ORDER — MONTELUKAST SODIUM 10 MG/1
10 TABLET ORAL NIGHTLY
Qty: 30 TABLET | Refills: 0 | Status: SHIPPED | OUTPATIENT
Start: 2017-07-13 | End: 2017-08-12

## 2017-07-13 RX ORDER — AMOXICILLIN AND CLAVULANATE POTASSIUM 500; 125 MG/1; MG/1
1 TABLET, FILM COATED ORAL 2 TIMES DAILY
Qty: 10 TABLET | Refills: 0 | Status: SHIPPED | OUTPATIENT
Start: 2017-07-13 | End: 2017-07-18

## 2017-07-13 RX ADMIN — ERYTHROPOIETIN 4000 UNITS: 4000 INJECTION, SOLUTION INTRAVENOUS; SUBCUTANEOUS at 02:07

## 2017-07-13 RX ADMIN — CEFTRIAXONE 1 G: 1 INJECTION, SOLUTION INTRAVENOUS at 02:07

## 2017-07-13 NOTE — PROGRESS NOTES
Patient to scheduled dialysis as ordered. Tele monitoring in progress. Patient awake and alert. No apparent distress noted.

## 2017-07-13 NOTE — UM SECONDARY REVIEW
Medical Affairs    Level of Care Issue     Hx: ESRD, HD, COPD, DM, CVA    72 yo w/ AMS/ hallucinations after HD on 7/11. pt had similar presentation in past w/ UTI. Pt dos not make urine. I guess cath was done, UA Leuks, nitrates, blood, protein, glucose. Rocephin IV. . CT head negative. Pt could not lay still for MRI, was not done. Pt more alert today and  states she looks a lot better. HD today     Neuro note:  AMS: pt seems to be in a delirium but uncertain the cause. She has been attending her HD sessions, no other metabolic disturbances. No fever or leukocytosis but possible UTI; being treated with ceftriaxone with improvement.             -Will monitor for now    {[facility]  Review Outcome:[id number]}

## 2017-07-13 NOTE — CONSULTS
"                                   Renal Progress Note    Date of Admission:  2017 11:40 PM      ROS:  More alert today, reportedly feeling better    Physical exam:    Vitals:    17 0751   BP: (!) 168/84   Pulse: 65   Resp: 18   Temp: 98.3 °F (36.8 °C)       I/O last 3 completed shifts:  In: -   Out: 15 [Urine:15]  No intake/output data recorded.      Elderly female NAD  Neck: supple  Heart: distant s1-S2  Lung: CTA  Abdomen: n/a  Limbs: no edema  More alert - oriented      Laboratories:      Recent Labs  Lab 17  0559   WBC 2.57*   RBC 3.58*   HGB 11.1*   HCT 35.8*   *   *   MCH 31.0   MCHC 31.0*       Recent Labs  Lab 17  0559   CALCIUM 10.4      K 4.9   CO2 25      BUN 42*   CREATININE 10.2*     Phosphorus  5.0 Phosphorus     Magnesium   1.8  Magnesium     BNP                   >4,900  BNP     Microbiology Results (last 7 days)     Procedure Component Value Units Date/Time    Urine culture [[id number]] Collected:  17 1513    Order Status:  Completed Specimen:  Urine from Urine, Catheterized Updated:  17 1055     Urine Culture, Routine Insufficient incubation, culture in progress    Narrative:       In and out cath.    Urine culture [[id number]]     Order Status:  Canceled Specimen:  Urine from Urine, Clean Catch         Specimen UA  Urine...   Specimen UA     Color, UA   Ani  Color, UA    pH, UA   8.0  pH, UA    Specific [place], UA   1.005  Specific [place], UA    Appearance, UA   Cloudy  Appearance, UA    Protein, UA   2+  Protein, UA    Glucose, UA   1+  Glucose, UA    Ketones, UA   Negat...  Ketones, UA    Occult Blood UA   2+  Occult Blood UA    Nitrite, UA   Posit...  Nitrite, UA    Urobilinogen, UA   <2.0 EU/dL Lab: [facility]" style="paddinpx 1px 0px 0px; text-align: right;">Negat...<2.0 EU/dL Lab: [facility]" style="border: currentColor; border-image: none; width: 5px; height: " "10px;" src="[email or web address]">  Urobilinogen, UA    Bilirubin (UA)   Negat...  Bilirubin (UA)    Leukocytes, UA   1+  Leukocytes, UA    RBC, UA   >100  RBC, UA    WBC, UA   0  WBC, UA    Bacteria, UA   None  Bacteria, UA    Squam Epithel, UA   4  Squam Epithel, UA    Hyaline Casts, UA   0  Hyaline Casts, UA    Microscopic Comment   SEE C...               Diagnostic Tests:  CT Head Without Contrast [[id number]] Resulted: 07/12/17 0128   Order Status: Completed      The extracranial structures are within normal limits.  Calvarium is intact.  Visualized paranasal sinuses are clear.  Mastoid air cells are clear.   Impression:         1.  No acute intracranial findings identified. Further evaluation/followup as warranted.    2.  Grossly stable chronic ischemic changes as detailed above.    3. Stable large calcified extra-axial mass overlying the right frontal lobe most consistent with a meningioma.      Electronically signed by: MADELEINE RANDLE MD, MD  Date: 07/12/17  Time: 01:28      X-Ray Chest 1 View [[id number]] Resulted: 07/12/17 0108   Order Status: Completed Updated: 07/12/17 0109   Narrative:     COMPARISON: Chest radiograph 2/26/17    FINDINGS: AP portable semiupright view of the chest. Monitoring leads overlie the chest. Patient is somewhat rotated. Left axillary and upper extending surgical clips and vascular stents again noted. The bilateral lungs are well expanded without large consolidationor new focal opacity.  No large pleural effusion or pneumothorax.  Cardiac silhouette remains enlarged similar prior, without convincing evidence of failure.Grossly stable atherosclerosis with tortuosity of the thoracic aorta.  No acute osseous process seen.   PA and lateral views can be obtained.   Impression:         No radiographic acute intrathoracic process seen on this single view.      Electronically signed by: MADELEINE RANDLE   " MD WANG  Date: 07/12/17  Time: 01:08        Assessment:    70 y/o AAF admitted with new onset confusion; so far no obvious cause as per Neurology, could be  related to poly-pharmacy.  UA had RBCs and + nitrites but no WBCs or bacteria, C&S pending    Plan:    - Dialysis today  - Epogen w/ HD  - Neurology w/u in progress  - Consider adjusting Pte.'s Paxil and Remeron  - f/u urine C&S

## 2017-07-13 NOTE — NURSING
Attempt to administer patient scheduled Singulair 10 mg; patient is refusing medication after 10 minutes of trying to get patient to take medication patients daughter tried to get patient to take pill. No success; after 15 minutes patient continues to refuse to take medication.

## 2017-07-13 NOTE — PROGRESS NOTES
"HPI: 71 year old female with a medical hx of COPD, ESRD on HD (Tues, Thurs, Sat), and DM type II presents to the ED via EMS accompanied by daughter for an evaluation of a mental status change for the past few days. Daughter reports pt has been having frequent episodes of confusion lately. Daughter states while pt was on the stretcher, pt "thought she was eating something but it was her blanket." Daughter states she usually administers pt's medications, however, pt's son accidentally administered pt a second dose of her HTN and sensipar medications yesterday. Daughter reports pt had a similar episode in 2016 and was diagnosed with an infection. Daughter states pt is usually more oriented than her current mental state.    Today pt is scheduled for hemodialysis as per Dr. Cárdenas, 3.5 hour hd tx F160 dialyzer, /, UF 0-2L as tolerated.   BATH: 3K, 2.5 Ca++, Na+140, HCO3-- 35. Pt accessed via left ue AVF using hemodialysis angio's x 2, 15ga angio's. Good blood return, easily flushed, all connections attached and secured. 3.5 hour hd tx iniitated. Will continue to monitor for changes and trends.   "

## 2017-07-13 NOTE — PLAN OF CARE
Problem: Hemodialysis (Adult)  Goal: Signs and Symptoms of Listed Potential Problems Will be Absent, Minimized or Managed (Hemodialysis)  Signs and symptoms of listed potential problems will be absent, minimized or managed by discharge/transition of care (reference Hemodialysis (Adult) CPG).    07/13/17 1245   Hemodialysis   Problems Assessed (Hemodialysis) all   Problems Present (Hemodialysis) cardiovascular complications;electrolyte imbalance;fluid imbalance;hematologic complications;neurologic complications;situational response   3.5 hour hd tx in progress as ordered.

## 2017-07-13 NOTE — NURSING
Evaluated general patient appearance/condition. Patient resting quietly,  is present at bedside. Shift assessment initiated. Plan of care for the evening is reviewed with patient and .No apparent distress noted at this time.

## 2017-07-13 NOTE — PROGRESS NOTES
"[facility]  Neurology  Progress Note    Patient Name: Eli Vaz  MRN: [id number]  Admission Date: 7/11/2017  Hospital Length of Stay: 0 days  Code Status: Full Code   Attending Provider: Carmelita Candelaria MD  Primary Care Physician: Isrrael Conway MD   Principal Problem:Acute encephalopathy    Subjective:     Interval History: 72 y/o female with medical Hx as listed below who was brought to ED for change in mental status. Her  states that for the last 3-4 days pt has not been sleeping, talking to a nephew that is not present, an exhibiting strange behavior (pt would go to the bathroom and touch the toilet with her hands and walk out of the bathroom without using it. He  says: "she is talking out of he head. Last year she had a similar episode and was treated for a UTI according to him.    -7/13/17: Pt is more responsive today.  states that she "looks a lot better".     Current Neurological Medications:    Current Facility-Administered Medications   Medication Dose Route Frequency Provider Last Rate Last Dose    0.9%  NaCl infusion   Intravenous PRN Armand Cárdenas MD        acetaminophen tablet 650 mg  650 mg Oral Q8H PRN LACIE Hare   650 mg at 07/12/17 1105    albuterol-ipratropium 2.5mg-0.5mg/3mL nebulizer solution 3 mL  3 mL Nebulization Q4H PRN Carmelita Candelaria MD        amlodipine tablet 10 mg  10 mg Oral Daily Joe Adair MD   10 mg at 07/12/17 0840    cefTRIAXone (ROCEPHIN) 1 g in dextrose 5 % 50 mL IVPB  1 g Intravenous Q24H LACIE Hare        epoetin cat injection 4,000 Units  4,000 Units Intravenous Every Tues, Thurs, Sat Armand Cárdenas MD        hydrALAZINE injection 10 mg  10 mg Intravenous Q8H PRN Joe Adair MD   10 mg at 07/12/17 0219    hydrALAZINE tablet 10 mg  10 mg Oral QID Joe Adair MD   Stopped at 07/12/17 1800    metoprolol succinate (TOPROL-XL) 24 hr tablet 25 " mg  25 mg Oral Daily Joe Adair MD   25 mg at 07/12/17 0840    montelukast tablet 10 mg  10 mg Oral QHS Joe Adair MD   10 mg at 07/12/17 0349    pantoprazole EC tablet 40 mg  40 mg Oral Daily Joe Adair MD   40 mg at 07/12/17 0840       Review of Systems   Constitutional: Negative for fever and unexpected weight change.   HENT: Negative for trouble swallowing and voice change.    Respiratory: Negative for chest tightness and shortness of breath.    Cardiovascular: Negative for chest pain.   Gastrointestinal: Negative for abdominal pain.   Musculoskeletal: Negative for back pain.   Neurological: Negative for dizziness, speech difficulty and headaches.   Psychiatric/Behavioral: Negative for hallucinations.          Objective:     Vital Signs (Most Recent):  Temp: 98.3 °F (36.8 °C) (07/13/17 0751)  Pulse: 65 (07/13/17 0751)  Resp: 18 (07/13/17 0751)  BP: (!) 168/84 (07/13/17 0751)  SpO2: 98 % (07/13/17 0751) Vital Signs (24h Range):  Temp:  [97.5 °F (36.4 °C)-98.4 °F (36.9 °C)] 98.3 °F (36.8 °C)  Pulse:  [62-70] 65  Resp:  [18] 18  SpO2:  [97 %-98 %] 98 %  BP: (124-174)/(72-84) 168/84     Weight: 60.6 kg (133 lb 11.2 oz)  Body mass index is 22.95 kg/m².    Physical Exam  Constitutional: well-developed  Head: normocephalic  Eyes: conjunctivae/corneas clear.  Nose: no discharge, no epistaxis  Heart: regular rate and rhythm.  Abdomen: no pain to palpation  Extremities: no edema  Neurologic: Mental status: somnolent; oriented to self  Cranial nerves: pupils are round at 4 mm and reactive to light; EOMI; no nystagmus; no facial asymmetry; tongue protrudes midline  Strength: 5/5 in UE/LE's.  No dysmetria         Significant Labs:   CBC:   Recent Labs  Lab 07/12/17  0110 07/12/17  0733 07/13/17  0559   WBC 3.61* 4.67 2.57*   HGB 10.9* 10.7* 11.1*   HCT 34.8* 32.9* 35.8*   * 141* 142*     CMP:   Recent Labs  Lab 07/12/17  0110 07/12/17  0733 07/13/17  0559   GLU 99 93 71    138 138    K 4.5 4.8 4.9    105 101   CO2 24 21* 25   BUN 29* 34* 42*   CREATININE 7.7* 8.2* 10.2*   CALCIUM 10.1 10.1 10.4   MG 1.8  --   --    PROT 6.6  --   --    ALBUMIN 3.5  --   --    BILITOT 0.7  --   --    ALKPHOS 59  --   --    AST 27  --   --    ALT 29  --   --    ANIONGAP 13 12 12   EGFRNONAA 5* 4* 3*         Assessment and Plan:     72 y/o female consulted for AMS     1. AMS: pt seems to be in a delirium but uncertain the cause. She has been attending her HD sessions, no other metabolic disturbances. No fever or leukocytosis but possible UTI; being treated with ceftriaxone with improvement.   -Will monitor for now.           Active Diagnoses:    Diagnosis Date Noted POA    PRINCIPAL PROBLEM:  Acute encephalopathy [G93.40]  Unknown    Anxiety [F41.9] 12/01/2015 Yes    Combined hyperlipidemia associated with type 2 diabetes mellitus [E11.69, E78.2] 12/01/2015 Yes    Diabetes mellitus with ESRD (end-stage renal disease) [E11.22, N18.6] 12/01/2015 Yes     Chronic    Hypertension associated with diabetes [E11.59, I10] 10/23/2015 Yes     Chronic    Anemia of chronic disease [D63.8] 03/11/2014 Yes    Memory loss [R41.3] 03/10/2014 Yes    ESRD on dialysis [N18.6, Z99.2] 12/20/2013 Not Applicable    COPD (chronic obstructive pulmonary disease) [J44.9] 11/20/2013 Yes    Diastolic dysfunction [I51.9] 11/20/2013 Yes      Problems Resolved During this Admission:    Diagnosis Date Noted Date Resolved POA       VTE Risk Mitigation         Ordered     Medium Risk of VTE  Once      07/12/17 8925          Cesar Triplett MD  Neurology  [facility]

## 2017-07-14 LAB — BACTERIA UR CULT: NORMAL

## 2017-07-14 NOTE — PLAN OF CARE
Problem: Fall Risk (Adult)  Goal: Identify Related Risk Factors and Signs and Symptoms  Related risk factors and signs and symptoms are identified upon initiation of Human Response Clinical Practice Guideline (CPG)   Outcome: Ongoing (interventions implemented as appropriate)   07/13/17 1903   Fall Risk   Related Risk Factors (Fall Risk) age-related changes;fatigue/slow reaction;environment unfamiliar;polypharmacy   Signs and Symptoms (Fall Risk) presence of risk factors     Goal: Absence of Falls  Patient will demonstrate the desired outcomes by discharge/transition of care.   Outcome: Ongoing (interventions implemented as appropriate)   07/13/17 1903   Fall Risk (Adult)   Absence of Falls making progress toward outcome       Problem: Confusion, Acute (Adult)  Goal: Identify Related Risk Factors and Signs and Symptoms  Related risk factors and signs and symptoms are identified upon initiation of Human Response Clinical Practice Guideline (CPG)   Outcome: Ongoing (interventions implemented as appropriate)   07/13/17 1903   Confusion, Acute   Related Risk Factors (Acute Confusion) advanced age;cognitive impairment;polypharmacy   Signs and Symptoms (Acute Confusion) disorientation     Goal: Cognitive/Functional Impairments Minimized  Patient will demonstrate the desired outcomes by discharge/transition of care.   Outcome: Ongoing (interventions implemented as appropriate)   07/13/17 1903   Confusion, Acute (Adult)   Cognitive/Functional Impairments Minimized making progress toward outcome

## 2017-07-14 NOTE — NURSING
Received report from morning nurse; patient is off of the unit at scheduled MRI.  is present at bedside. Awaiting patients arrival back to the unit.

## 2017-07-14 NOTE — NURSING
Discharge education and paperwork reviewed with patient and .Medications reviewed. Notified to  prescription from listed pharmacy.Patient escorted to family vehicle for discharge home. Patient accompanied by spouse. No apparent distress noted.

## 2017-07-14 NOTE — NURSING
Patient returned to room from scheduled MRI procedure. Patient awake, alert, oriented without c/o discomfort. Tele monitoring reapplied.  is present at bedside. Plan of care for the evening is reviewed with patient and . No apparent distress noted at this time.

## 2017-07-24 NOTE — HOSPITAL COURSE
Patient with ESRD (compliant with scheduled dialysis) admitted with acute encephalopathy likely 2/2 nitrate positive UTI. Started on IV abx. CT head completed and without acute findings. MRI completed and without acute findings.  US carotid incomplete; test terminated per patient request.  Neurology consulted and evaluation cleared patient from a neurological standpoint; as patient's mentation now back to baseline after receiving IV abx. Though laboratory not concerning for uremia; Nephrology consulted for inpatient HD.  Patient dialyzed with 2.5L net fluid removed. Family reports symptoms improved and patient now at baseline mentation, she is AAO and responds appropriately to questions.  She is stable and will discharge to home with instructions to continue oral abx for UTI.  Resume current medication regimen; reports takes ASA 81 mg daily, does not take  mg; discontinued off medication profile. Resume current HD scheduled. Resume renal/cardiac diet. Activity as tolerated.

## 2017-07-24 NOTE — DISCHARGE SUMMARY
"[facility] Hospital Medicine  Discharge Summary      Patient Name: Eli Vaz  MRN: [id number]  Admission Date: 7/11/2017  Hospital Length of Stay: 0 days  Discharge Date and Time: 7/13/2017  9:15 PM  Attending Physician: No att. providers found   Discharging Provider: LACIE Ravi  Primary Care Provider: Isrrael Conway MD      HPI:   71 female with significant history for COPD, remote smoker, DMT2, memory lost since Jan 2017, hypertension, CVA 2014, and ESRD on HD TTS via MISTI fistula who presents to hospital for acute change in mental status occurring yesterday after coming home from dialysis.  reports patient "weren't fully right" -flushing the toilet without use, "didnt want to walk, " had visual hallucinations -picking up chips and talking to people that were not present.  reports patient had similar episode 2 year ago when patient had a stroke. Denies headaches, dizziness, chest pain, shortness of breath, palpitations, diaphoresis, orthopnea, PND, abdominal pain, nausea, vomiting, or extremities weakness/numbness. Patient use a walker at baseline x 6 years. No new physical limitation in the past month.  reports does not take ASA every day?     In ED, CT head showed stable bifrontal, right parietal and left temporal encephalomalacia consistent with remote infarcts, chronic small vessel ischemic disease, and stable large extra-axial mass overlying the right frontal lobe consistent with a meningioma. Labs reviewed. Unable to obtain UA at this time and no urine with in and out catheterization. MISTI fistula no signs of infection.       * No surgery found *      Indwelling Lines/Drains at time of discharge:   Lines/Drains/Airways     Central Venous Catheter Line                 Hemodialysis Catheter [id number] days              Hospital Course:   Patient with ESRD (compliant with scheduled dialysis) admitted with acute encephalopathy likely 2/2 nitrate positive " UTI. Started on IV abx. CT head completed and without acute findings. MRI completed and without acute findings.  US carotid incomplete; test terminated per patient request.  Neurology consulted and evaluation cleared patient from a neurological standpoint; as patient's mentation now back to baseline after receiving IV abx. Though laboratory not concerning for uremia; Nephrology consulted for inpatient HD.  Patient dialyzed with 2.5L net fluid removed. Family reports symptoms improved and patient now at baseline mentation, she is AAO and responds appropriately to questions.  She is stable and will discharge to home with instructions to continue oral abx for UTI.  Resume current medication regimen; reports takes ASA 81 mg daily, does not take  mg; discontinued off medication profile. Resume current HD scheduled. Resume renal/cardiac diet. Activity as tolerated.          Consults:   Consults         Status Ordering Provider     Inpatient consult to Neurology  Once     Provider:  Cesar Triplett MD    Completed SCOTTY LOPEZ          Significant Diagnostic Studies: Labs: All labs within the past 24 hours have been reviewed    Pending Diagnostic Studies:     None        Final Active Diagnoses:    Diagnosis Date Noted POA    PRINCIPAL PROBLEM:  Acute encephalopathy [G93.40]  Yes    Anxiety [F41.9] 12/01/2015 Yes    Combined hyperlipidemia associated with type 2 diabetes mellitus [E11.69, E78.2] 12/01/2015 Yes    Diabetes mellitus with ESRD (end-stage renal disease) [E11.22, N18.6] 12/01/2015 Yes     Chronic    Hypertension associated with diabetes [E11.59, I10] 10/23/2015 Yes     Chronic    Anemia of chronic disease [D63.8] 03/11/2014 Yes    Memory loss [R41.3] 03/10/2014 Yes    ESRD on dialysis [N18.6, Z99.2] 12/20/2013 Not Applicable    COPD (chronic obstructive pulmonary disease) [J44.9] 11/20/2013 Yes    Diastolic dysfunction [I51.9] 11/20/2013 Yes      Problems Resolved During this Admission:     Diagnosis Date Noted Date Resolved POA      * Acute encephalopathy    See HPI and CT head above. Unclear etiology. Patient does have history of memory lost/early dementia?? Since Jan 2017. No obvious sign of infection thus far. No urine output with in and out cath.  reports similar episode when had CVA 2 years ago. Lactic acid 1.1. Obtain MRI brain, MRA brain, US carotids. Neurology following.                 Discharged Condition: good    Disposition: Home or Self Care    Follow Up:  Follow-up Information     Isrrael Conway MD. Schedule an appointment as soon as possible for a visit in 1 day.    Specialty:  Family Medicine  Why:  Please call PCP office on tomorow to schedule follow-up post hospitalization  Contact information:  [address] [facility] [address] [phone]                 Patient Instructions:     Diet general   Order Specific Question Answer Comments   Additional restrictions: Renal      Activity as tolerated     Call MD for:  persistent dizziness, light-headedness, or visual disturbances     Call MD for:  increased confusion or weakness     Call MD for:  severe persistent headache     Call MD for:  difficulty breathing or increased cough     Call MD for:  redness, tenderness, or signs of infection (pain, swelling, redness, odor or green/yellow discharge around incision site)     Call MD for:  severe uncontrolled pain     Call MD for:  persistent nausea and vomiting or diarrhea     Call MD for:  temperature >100.4       Medications:  Reconciled Home Medications:   Discharge Medication List as of 7/13/2017  8:41 PM      START taking these medications    Details   amoxicillin-clavulanate 500-125mg (AUGMENTIN) 500-125 mg Tab Take 1 tablet (500 mg total) by mouth 2 (two) times daily., Starting u 7/13/2017, Until Tue 7/18/2017, Normal         CONTINUE these medications which have CHANGED    Details   montelukast (SINGULAIR) 10 mg tablet Take 1 tablet (10 mg  total) by mouth every evening., Starting u 7/13/2017, Until Sat 8/12/2017, Normal         CONTINUE these medications which have NOT CHANGED    Details   ADVAIR DISKUS 250-50 mcg/dose diskus inhaler INHALE 1 PUFF INTO THE LUNGS TWICE A DAY, Normal      amlodipine (NORVASC) 5 MG tablet Take by mouth once daily. , Starting 11/26/2016, Until Discontinued, Historical Med      atorvastatin (LIPITOR) 10 MG tablet Take 1 tablet (10 mg total) by mouth once daily., Starting 5/2/2016, Until Discontinued, Normal      busPIRone (BUSPAR) 10 MG tablet Take 10 mg by mouth 2 (two) times daily., Starting Wed 5/31/2017, Historical Med      fluticasone (FLONASE) 50 mcg/actuation nasal spray 1 spray by Each Nare route 2 (two) times daily., Starting 5/18/2016, Until Discontinued, Normal      FOLIC ACID/VITAMIN B COMP W-C (RENAL CAPS ORAL) Take by mouth Daily., Until Discontinued, Historical Med      hydrALAZINE (APRESOLINE) 25 MG tablet Take 25 mg by mouth once daily at 6am. , Starting Wed 5/31/2017, Historical Med      hydrochlorothiazide (HYDRODIURIL) 12.5 MG Tab Take 1 tablet by mouth once daily at 6am., Starting Wed 5/24/2017, Historical Med      ipratropium-albuterol (COMBIVENT RESPIMAT)  mcg/actuation inhaler INHALE 2 PUFF(S) INTO THE LUNGS 3 TIMES A DAY, Normal      levocetirizine (XYZAL) 5 MG tablet Take 1 tablet by mouth once daily at 6am., Starting Wed 6/7/2017, Historical Med      losartan (COZAAR) 25 MG tablet Take 1 tablet by mouth once daily at 6am., Starting Wed 5/31/2017, Historical Med      metoprolol succinate (TOPROL-XL) 25 MG 24 hr tablet TAKE 1 TABLET BY MOUTH ONCE DAILY, Normal      mirtazapine (REMERON) 7.5 MG Tab Take 7.5 mg by mouth nightly. , Starting 10/24/2016, Until Discontinued, Historical Med      omeprazole (PRILOSEC) 20 MG capsule Take 20 mg by mouth once daily., Until Discontinued, Historical Med      paroxetine (PAXIL) 20 MG tablet Take 20 mg by mouth once daily. , Starting 10/24/2016, Until  Discontinued, Historical Med      SENSIPAR 60 mg Tab Take 30 mg by mouth daily with breakfast. , Starting 10/6/2016, Until Discontinued, Historical Med      sevelamer carbonate (RENVELA) 800 mg Tab Take 800 mg by mouth 3 (three) times daily with meals., Until Discontinued, Historical Med         STOP taking these medications       aspirin 325 MG tablet Comments:   Reason for Stopping:         meloxicam (MOBIC) 15 MG tablet Comments:   Reason for Stopping:             Time spent on the discharge of patient: 30 minutes    LACIE Ravi  Department of Hospital Medicine  [facility]

## 2017-10-11 ENCOUNTER — OFFICE VISIT (OUTPATIENT)
Dept: FAMILY MEDICINE | Facility: CLINIC | Age: 72
End: 2017-10-11
Payer: MEDICARE

## 2017-10-11 VITALS
HEART RATE: 74 BPM | OXYGEN SATURATION: 99 % | SYSTOLIC BLOOD PRESSURE: 164 MMHG | RESPIRATION RATE: 16 BRPM | TEMPERATURE: 99 F | WEIGHT: 134.5 LBS | HEIGHT: 64 IN | DIASTOLIC BLOOD PRESSURE: 60 MMHG | BODY MASS INDEX: 22.96 KG/M2

## 2017-10-11 DIAGNOSIS — E11.59 HYPERTENSION ASSOCIATED WITH DIABETES: Primary | Chronic | ICD-10-CM

## 2017-10-11 DIAGNOSIS — D72.819 LEUKOPENIA, UNSPECIFIED TYPE: ICD-10-CM

## 2017-10-11 DIAGNOSIS — N18.6 DIABETES MELLITUS WITH ESRD (END-STAGE RENAL DISEASE): Chronic | ICD-10-CM

## 2017-10-11 DIAGNOSIS — D69.6 THROMBOCYTOPENIA: ICD-10-CM

## 2017-10-11 DIAGNOSIS — E11.22 DIABETES MELLITUS WITH ESRD (END-STAGE RENAL DISEASE): Chronic | ICD-10-CM

## 2017-10-11 DIAGNOSIS — I70.0 ATHEROSCLEROSIS OF AORTA: ICD-10-CM

## 2017-10-11 DIAGNOSIS — Z12.31 ENCOUNTER FOR SCREENING MAMMOGRAM FOR BREAST CANCER: ICD-10-CM

## 2017-10-11 DIAGNOSIS — Z23 NEED FOR PNEUMOCOCCAL VACCINATION: ICD-10-CM

## 2017-10-11 DIAGNOSIS — N25.81 SECONDARY HYPERPARATHYROIDISM: ICD-10-CM

## 2017-10-11 DIAGNOSIS — I15.2 HYPERTENSION ASSOCIATED WITH DIABETES: Primary | Chronic | ICD-10-CM

## 2017-10-11 PROBLEM — R41.82 ALTERED MENTAL STATUS: Status: RESOLVED | Noted: 2017-07-12 | Resolved: 2017-10-11

## 2017-10-11 PROCEDURE — 99214 OFFICE O/P EST MOD 30 MIN: CPT | Mod: S$GLB,,, | Performed by: INTERNAL MEDICINE

## 2017-10-11 PROCEDURE — G0009 ADMIN PNEUMOCOCCAL VACCINE: HCPCS | Mod: S$GLB,,, | Performed by: INTERNAL MEDICINE

## 2017-10-11 PROCEDURE — 90670 PCV13 VACCINE IM: CPT | Mod: S$GLB,,, | Performed by: INTERNAL MEDICINE

## 2017-10-11 PROCEDURE — 99999 PR PBB SHADOW E&M-EST. PATIENT-LVL III: CPT | Mod: PBBFAC,,, | Performed by: INTERNAL MEDICINE

## 2017-10-11 RX ORDER — MONTELUKAST SODIUM 10 MG/1
10 TABLET ORAL NIGHTLY
Refills: 3 | COMMUNITY
Start: 2017-09-16 | End: 2018-06-08 | Stop reason: SDUPTHER

## 2017-10-11 RX ORDER — HYDRALAZINE HYDROCHLORIDE 25 MG/1
25 TABLET, FILM COATED ORAL EVERY 12 HOURS
Qty: 180 TABLET | Refills: 0 | Status: ON HOLD | OUTPATIENT
Start: 2017-10-11 | End: 2019-03-25 | Stop reason: HOSPADM

## 2017-10-11 RX ORDER — DEXTROAMPHETAMINE SACCHARATE, AMPHETAMINE ASPARTATE MONOHYDRATE, DEXTROAMPHETAMINE SULFATE AND AMPHETAMINE SULFATE 5; 5; 5; 5 MG/1; MG/1; MG/1; MG/1
20 CAPSULE, EXTENDED RELEASE ORAL EVERY MORNING
COMMUNITY
End: 2017-11-27 | Stop reason: ALTCHOICE

## 2017-10-11 RX ORDER — AMLODIPINE BESYLATE 10 MG/1
10 TABLET ORAL DAILY
Qty: 90 TABLET | Refills: 1 | Status: SHIPPED | OUTPATIENT
Start: 2017-10-11 | End: 2018-04-06 | Stop reason: SDUPTHER

## 2017-10-11 RX ORDER — TRAZODONE HYDROCHLORIDE 50 MG/1
TABLET ORAL
Refills: 3 | COMMUNITY
Start: 2017-08-24 | End: 2017-10-11

## 2017-10-11 NOTE — PROGRESS NOTES
Patient received Pneumococcal  13 vaccine and tolerated it well. Patient received VIS information.  Advise 15 min wait for any possible reactions.

## 2017-10-11 NOTE — PROGRESS NOTES
Subjective:       Patient ID: Eli Vaz is a 72 y.o. female.    Chief Complaint: Hypertension    She presents to [facility].  Her primary concern today is her blood pressure.  She reports that it has not been controlled for some time.  She is currently on amlodipine 5 mg and hydralazine 25 mg which she is only taking once daily.  She reports compliance with her medications though she has not had them today.  Otherwise she has felt well.  She is dialyzing with no problems.  She does note that her white blood cell count was low recently.  She was referred to hematology but did not schedule.  She would rather not pursue this further.  She reports that her brother and her sister have similar problems with her white blood cell counts.      Review of Systems   Constitutional: Positive for unexpected weight change.   Respiratory: Negative for shortness of breath.    Cardiovascular: Negative for chest pain and leg swelling.       Objective:      Physical Exam   Constitutional: She is oriented to person, place, and time. She appears well-developed and well-nourished. No distress.   HENT:   Head: Normocephalic and atraumatic.   Right Ear: Tympanic membrane, external ear and ear canal normal.   Left Ear: Tympanic membrane, external ear and ear canal normal.   Nose: No mucosal edema.   Mouth/Throat: Oropharynx is clear and moist. No oropharyngeal exudate.   Eyes: Conjunctivae and EOM are normal. Pupils are equal, round, and reactive to light. No scleral icterus.   Cardiovascular: Normal rate, regular rhythm and normal heart sounds.  Exam reveals no gallop and no friction rub.    No murmur heard.  Pulmonary/Chest: Effort normal and breath sounds normal. No respiratory distress. She has no wheezes. She has no rales.   Abdominal: Soft. Bowel sounds are normal. She exhibits no distension and no mass. There is no tenderness. There is no rebound and no guarding.   Musculoskeletal: She exhibits no edema or tenderness.    Neurological: She is alert and oriented to person, place, and time. No cranial nerve deficit.   Skin: Skin is warm and dry. No rash noted.   Psychiatric: She has a normal mood and affect.   Vitals reviewed.      Assessment:       1. Hypertension associated with diabetes    2. Encounter for screening mammogram for breast cancer    3. Need for pneumococcal vaccination    4. Diabetes mellitus with ESRD (end-stage renal disease)    5. Atherosclerosis of aorta    6. Secondary hyperparathyroidism    7. Thrombocytopenia    8. Leukopenia, unspecified type        Plan:       Eli was seen today for hypertension.  She presents to re-establish care    Diagnoses and all orders for this visit:    Hypertension associated with diabetes - bp elevated increasing amlodipine to 10 mg, hydralazine twice daily.  Reassess next visit  -     amlodipine (NORVASC) 10 MG tablet; Take 1 tablet (10 mg total) by mouth once daily.  -     hydrALAZINE (APRESOLINE) 25 MG tablet; Take 1 tablet (25 mg total) by mouth every 12 (twelve) hours.    Encounter for screening mammogram for breast cancer    Need for pneumococcal vaccination  -     Pneumococcal Conjugate Vaccine (13 Valent) (IM)    Diabetes mellitus with ESRD (end-stage renal disease) - I have reviewed her most recent labs via [facility] portal (8/31/2017)  A1c well below goal at 4.4.      Atherosclerosis of aorta    Secondary hyperparathyroidism - managed by Nephrology.  Dr. Ankush Grant    Thrombocytopenia - repeat CBC next visit    Leukopenia, unspecified type - Pt reports she would referred to hematology but would rather not pursue further work up.  Will repeat CBC next visit and readdress at that time.     F/u 3 weeks.

## 2017-11-01 ENCOUNTER — HOSPITAL ENCOUNTER (OUTPATIENT)
Dept: RADIOLOGY | Facility: HOSPITAL | Age: 72
Discharge: HOME | End: 2017-11-01
Attending: INTERNAL MEDICINE
Payer: MEDICARE

## 2017-11-01 ENCOUNTER — OFFICE VISIT (OUTPATIENT)
Dept: FAMILY MEDICINE | Facility: CLINIC | Age: 72
End: 2017-11-01
Payer: MEDICARE

## 2017-11-01 VITALS
BODY MASS INDEX: 22.96 KG/M2 | SYSTOLIC BLOOD PRESSURE: 140 MMHG | RESPIRATION RATE: 16 BRPM | WEIGHT: 134.5 LBS | HEIGHT: 64 IN | HEART RATE: 72 BPM | HEIGHT: 64 IN | BODY MASS INDEX: 22.89 KG/M2 | TEMPERATURE: 98 F | WEIGHT: 134.06 LBS | DIASTOLIC BLOOD PRESSURE: 66 MMHG | OXYGEN SATURATION: 97 %

## 2017-11-01 DIAGNOSIS — D72.819 LEUKOPENIA, UNSPECIFIED TYPE: ICD-10-CM

## 2017-11-01 DIAGNOSIS — Z12.31 ENCOUNTER FOR SCREENING MAMMOGRAM FOR BREAST CANCER: ICD-10-CM

## 2017-11-01 DIAGNOSIS — D64.9 ANEMIA, UNSPECIFIED TYPE: ICD-10-CM

## 2017-11-01 DIAGNOSIS — E11.59 HYPERTENSION ASSOCIATED WITH DIABETES: Primary | Chronic | ICD-10-CM

## 2017-11-01 DIAGNOSIS — I15.2 HYPERTENSION ASSOCIATED WITH DIABETES: Primary | Chronic | ICD-10-CM

## 2017-11-01 PROCEDURE — 77067 SCR MAMMO BI INCL CAD: CPT | Mod: 26,,, | Performed by: RADIOLOGY

## 2017-11-01 PROCEDURE — 77067 SCR MAMMO BI INCL CAD: CPT | Mod: TC

## 2017-11-01 PROCEDURE — 77063 BREAST TOMOSYNTHESIS BI: CPT | Mod: 26,,, | Performed by: RADIOLOGY

## 2017-11-01 PROCEDURE — 99214 OFFICE O/P EST MOD 30 MIN: CPT | Mod: S$GLB,,, | Performed by: INTERNAL MEDICINE

## 2017-11-01 PROCEDURE — 99999 PR PBB SHADOW E&M-EST. PATIENT-LVL III: CPT | Mod: PBBFAC,,, | Performed by: INTERNAL MEDICINE

## 2017-11-01 NOTE — PROGRESS NOTES
"Subjective:       Patient ID: Eli Vaz is a 72 y.o. female.    Chief Complaint: Hypertension and Follow-up    She presents for follow up of her bp.  It has been improved at home since adjusting her medications.  She is tolerating the dose increases.  She does note that she was informed by her nephrologist that her iron level is low. She is not receiving "iron shots" during dialysis yet.       Review of Systems   Musculoskeletal: Positive for arthralgias.        Left shoulder       Objective:      Physical Exam   Constitutional: She is oriented to person, place, and time. She appears well-developed and well-nourished. No distress.   HENT:   Head: Normocephalic and atraumatic.   Eyes: Conjunctivae are normal. No scleral icterus.   Musculoskeletal:        Left shoulder: She exhibits normal range of motion.   Neurological: She is alert and oriented to person, place, and time.   Skin: Skin is warm and dry.   Psychiatric: She has a normal mood and affect.   Vitals reviewed.      Assessment:       1. Hypertension associated with diabetes    2. Leukopenia, unspecified type    3. Anemia, unspecified type        Plan:       Eli was seen today for hypertension and follow-up.    Diagnoses and all orders for this visit:    Hypertension associated with diabetes - BP improved after increasing Amlodipine and hydralazine.  Still slightly above goal.  Repeat in 3 months.    Leukopenia, unspecified type - She has declined referral and further work up at this time but is agreeable to monitoring her counts.     Anemia, unspecified type  -     Ferritin; Future  -     Iron and TIBC; Future       f/u 3 months.   "

## 2017-11-06 ENCOUNTER — TELEPHONE (OUTPATIENT)
Dept: FAMILY MEDICINE | Facility: CLINIC | Age: 72
End: 2017-11-06

## 2017-11-06 NOTE — TELEPHONE ENCOUNTER
Notify the patient that her white blood cell counts is still low but stable.  This number needs to be monitored.  She should reconsider seeing a specialist for this.

## 2017-11-06 NOTE — TELEPHONE ENCOUNTER
Spoke to pt and pt's daughter Dilshad informed them of  results and MD recommendations. Pt's daughter states pt will consider going to a specialist.

## 2017-11-11 ENCOUNTER — TELEPHONE (OUTPATIENT)
Dept: FAMILY MEDICINE | Facility: CLINIC | Age: 72
End: 2017-11-11

## 2017-11-11 DIAGNOSIS — J44.9 CHRONIC OBSTRUCTIVE PULMONARY DISEASE, UNSPECIFIED COPD TYPE: Primary | ICD-10-CM

## 2017-11-11 NOTE — TELEPHONE ENCOUNTER
----- Message from Chaparrita Cormier sent at 11/10/2017  1:56 PM CST -----  Contact: [facility]   [phone]  Pt needs a script for a nebulizer machine. Pls send to [facility] [phone]. Thanks.....Lisa

## 2017-11-27 ENCOUNTER — OFFICE VISIT (OUTPATIENT)
Dept: FAMILY MEDICINE | Facility: CLINIC | Age: 72
End: 2017-11-27
Payer: MEDICARE

## 2017-11-27 VITALS
HEIGHT: 64 IN | DIASTOLIC BLOOD PRESSURE: 76 MMHG | BODY MASS INDEX: 23.14 KG/M2 | RESPIRATION RATE: 16 BRPM | OXYGEN SATURATION: 98 % | WEIGHT: 135.56 LBS | SYSTOLIC BLOOD PRESSURE: 124 MMHG | HEART RATE: 77 BPM | TEMPERATURE: 98 F

## 2017-11-27 DIAGNOSIS — J44.9 CHRONIC OBSTRUCTIVE PULMONARY DISEASE, UNSPECIFIED COPD TYPE: ICD-10-CM

## 2017-11-27 DIAGNOSIS — Z99.2 ESRD ON DIALYSIS: ICD-10-CM

## 2017-11-27 DIAGNOSIS — N18.6 ESRD ON DIALYSIS: ICD-10-CM

## 2017-11-27 DIAGNOSIS — J44.1 ACUTE EXACERBATION OF CHRONIC OBSTRUCTIVE PULMONARY DISEASE (COPD): Primary | ICD-10-CM

## 2017-11-27 PROCEDURE — 99214 OFFICE O/P EST MOD 30 MIN: CPT | Mod: S$GLB,,, | Performed by: FAMILY MEDICINE

## 2017-11-27 PROCEDURE — 99999 PR PBB SHADOW E&M-EST. PATIENT-LVL IV: CPT | Mod: PBBFAC,,, | Performed by: FAMILY MEDICINE

## 2017-11-27 RX ORDER — BUSPIRONE HYDROCHLORIDE 10 MG/1
10 TABLET ORAL 2 TIMES DAILY
Refills: 6 | COMMUNITY
Start: 2017-10-31 | End: 2018-02-26

## 2017-11-27 RX ORDER — LOSARTAN POTASSIUM 25 MG/1
TABLET ORAL
Refills: 6 | COMMUNITY
Start: 2017-10-31 | End: 2018-07-09 | Stop reason: SINTOL

## 2017-11-27 RX ORDER — PREDNISONE 10 MG/1
TABLET ORAL
Qty: 14 TABLET | Refills: 0 | Status: SHIPPED | OUTPATIENT
Start: 2017-11-27 | End: 2018-06-08

## 2017-11-27 RX ORDER — DOXYCYCLINE 100 MG/1
100 CAPSULE ORAL EVERY 12 HOURS
Qty: 20 CAPSULE | Refills: 0 | Status: SHIPPED | OUTPATIENT
Start: 2017-11-27 | End: 2017-12-07

## 2017-11-27 NOTE — PROGRESS NOTES
Chief Complaint   Patient presents with    Cough    Chest Congestion       HPI    Eli Vaz is 72 y.o. female. The primary encounter diagnosis was Acute exacerbation of chronic obstructive pulmonary disease (COPD). Diagnoses of Chronic obstructive pulmonary disease, unspecified COPD type and ESRD on dialysis were also pertinent to this visit.    72 year old female with COPD and ESRD comes to clinic with complaint of cough for 3 weeks.  She reports the cough is getting worse and is accompanied by back pain.  She admits to increased use of her Albuterol nebulizer and Combivent inhaler. However, she denies shortness of breath.  She has used Mucinex and Robitussin DM with no significant improvement.  She notes multiple sick contacts in dialysis clinic.    Review of Systems   Constitutional: Negative for appetite change, chills, diaphoresis, fatigue and fever.   HENT: Positive for congestion. Negative for ear discharge, ear pain, rhinorrhea, sinus pain, sinus pressure and sore throat.    Respiratory: Positive for cough, chest tightness and shortness of breath. Negative for wheezing.    Cardiovascular: Negative for chest pain and leg swelling.   Musculoskeletal: Negative for arthralgias, back pain, gait problem and joint swelling.           Current Outpatient Prescriptions:     ADVAIR DISKUS 250-50 mcg/dose diskus inhaler, INHALE 1 PUFF INTO THE LUNGS TWICE A DAY, Disp: 60 each, Rfl: 2    amlodipine (NORVASC) 10 MG tablet, Take 1 tablet (10 mg total) by mouth once daily., Disp: 90 tablet, Rfl: 1    atorvastatin (LIPITOR) 10 MG tablet, Take 1 tablet (10 mg total) by mouth once daily., Disp: 90 tablet, Rfl: 1    busPIRone (BUSPAR) 10 MG tablet, Take 10 mg by mouth 2 (two) times daily., Disp: , Rfl: 6    fluticasone (FLONASE) 50 mcg/actuation nasal spray, 1 spray by Each Nare route 2 (two) times daily., Disp: 1 Bottle, Rfl: 1    losartan (COZAAR) 25 MG tablet, 1 TABLET ONCE A DAY ORALLY 30 DAY(S), Disp: , Rfl:  "6    mirtazapine (REMERON) 7.5 MG Tab, Take 7.5 mg by mouth nightly. , Disp: , Rfl:     montelukast (SINGULAIR) 10 mg tablet, Take 10 mg by mouth every evening., Disp: , Rfl: 3    paroxetine (PAXIL) 20 MG tablet, Take 20 mg by mouth once daily. , Disp: , Rfl:     SENSIPAR 60 mg Tab, Take 30 mg by mouth daily with breakfast. , Disp: , Rfl:     sevelamer carbonate (RENVELA) 800 mg Tab, Take 800 mg by mouth 3 (three) times daily with meals., Disp: , Rfl:     doxycycline (VIBRAMYCIN) 100 MG Cap, Take 1 capsule (100 mg total) by mouth every 12 (twelve) hours., Disp: 20 capsule, Rfl: 0    FOLIC ACID/VITAMIN B COMP W-C (RENAL CAPS ORAL), Take by mouth Daily., Disp: , Rfl:     hydrALAZINE (APRESOLINE) 25 MG tablet, Take 1 tablet (25 mg total) by mouth every 12 (twelve) hours., Disp: 180 tablet, Rfl: 0    ipratropium-albuterol (COMBIVENT RESPIMAT)  mcg/actuation inhaler, INHALE 2 PUFF(S) INTO THE LUNGS 3 TIMES A DAY, Disp: 4 g, Rfl: 1    levocetirizine (XYZAL) 5 MG tablet, Take 1 tablet by mouth once daily at 6am., Disp: , Rfl: 6    predniSONE (DELTASONE) 10 MG tablet, 2 tab po qday x 4 days, then 1 tab po qday x 4 days, then 1/2 tab po qday x 4 days, Disp: 14 tablet, Rfl: 0      Blood pressure 124/76, pulse 77, temperature 98.3 °F (36.8 °C), temperature source Oral, resp. rate 16, height 5' 4" (1.626 m), weight 61.5 kg (135 lb 9.3 oz), SpO2 98 %.    Physical Exam   Constitutional: Vital signs are normal. She appears well-developed.   HENT:   Mouth/Throat: Normal dentition.   Neck: Trachea normal. No thyromegaly present.   Cardiovascular: Normal rate, regular rhythm and intact distal pulses.    No murmur heard.  Pulmonary/Chest: Effort normal. She has no decreased breath sounds. She has no wheezes. She exhibits no deformity.   Musculoskeletal:   Normal gait. No decreased range of motion of major joints.   Neurological: She is not disoriented.   Skin: Skin is intact. Capillary refill takes less than 2 " seconds.   Psychiatric: Her speech is normal and behavior is normal. Her mood appears not anxious. She does not exhibit a depressed mood.       Lab Visit on 11/01/2017   Component Date Value Ref Range Status    WBC 11/03/2017 2.50* 3.90 - 12.70 K/uL Final    RBC 11/03/2017 3.83* 4.00 - 5.40 M/uL Final    Hemoglobin 11/03/2017 11.9* 12.0 - 16.0 g/dL Final    Hematocrit 11/03/2017 38.0  37.0 - 48.5 % Final    MCV 11/03/2017 99* 82 - 98 fL Final    MCH 11/03/2017 31.1* 27.0 - 31.0 pg Final    MCHC 11/03/2017 31.3* 32.0 - 36.0 g/dL Final    RDW 11/03/2017 15.8* 11.5 - 14.5 % Final    Platelets 11/03/2017 159  150 - 350 K/uL Final    MPV 11/03/2017 8.7* 9.2 - 12.9 fL Final    Gran # 11/03/2017 1.1* 1.8 - 7.7 K/uL Final    Lymph # 11/03/2017 0.9* 1.0 - 4.8 K/uL Final    Mono # 11/03/2017 0.3  0.3 - 1.0 K/uL Final    Eos # 11/03/2017 0.1  0.0 - 0.5 K/uL Final    Baso # 11/03/2017 0.02  0.00 - 0.20 K/uL Final    Gran% 11/03/2017 44.8  38.0 - 73.0 % Final    Lymph% 11/03/2017 37.2  18.0 - 48.0 % Final    Mono% 11/03/2017 12.8  4.0 - 15.0 % Final    Eosinophil% 11/03/2017 4.0  0.0 - 8.0 % Final    Basophil% 11/03/2017 0.8  0.0 - 1.9 % Final    Differential Method 11/03/2017 Automated   Final    Ferritin 11/03/2017 895* 20.0 - 300.0 ng/mL Final    Iron 11/03/2017 53  30 - 160 ug/dL Final    Transferrin 11/03/2017 165* 200 - 375 mg/dL Final    TIBC 11/03/2017 244* 250 - 450 ug/dL Final    Saturated Iron 11/03/2017 22  20 - 50 % Final   ]    Assessment:    1. Acute exacerbation of chronic obstructive pulmonary disease (COPD)    2. Chronic obstructive pulmonary disease, unspecified COPD type    3. ESRD on dialysis          Eli was seen today for cough and chest congestion.    Diagnoses and all orders for this visit:    Acute exacerbation of chronic obstructive pulmonary disease (COPD)  -     doxycycline (VIBRAMYCIN) 100 MG Cap; Take 1 capsule (100 mg total) by mouth every 12 (twelve) hours.  -      predniSONE (DELTASONE) 10 MG tablet; 2 tab po qday x 4 days, then 1 tab po qday x 4 days, then 1/2 tab po qday x 4 days  - New problem.  Exacerbation treated with antibiotics and steroids.    Chronic obstructive pulmonary disease, unspecified COPD type  -     Complete PFT with bronchodilator; Future  -     Ambulatory Referral to Pulmonology  - Worsening.  Obtain PFT and referral to pulmonology. Adjust inhalers    ESRD on dialysis   -Stable.  Patient counseled on ways to avoid potential infection in closed space, including wearing mask.        FOLLOW UP: Return in about 1 week (around 12/4/2017), or if symptoms worsen or fail to improve.

## 2017-11-27 NOTE — PATIENT INSTRUCTIONS
Chronic Lung Disease: Preventing Lung Infections  Chronic lung diseases include chronic obstructive pulmonary disease (COPD), which includes chronic bronchitis and emphysema. Other chronic lung diseases include pulmonary fibrosis, sarcoidosis, and other conditions. When you have chronic lung diseases, it's very important to protect yourself from respiratory infections, like colds, the flu, and lung infections. Infections may cause your lung condition to worsen. Although you can't completely avoid them, there are things you can do to lessen the chance of infections.    Take precautions  Taking the following precautions can help you avoid illness:  · Remember to keep your hands away from your nose and mouth. Germs on your hands get into your respiratory system this way.  · Wash your hands often. When you wash them:  ¨ Use soap and warm water.  ¨ Rub your hands together well for at least 20 seconds.  ¨ Make sure to rinse them well.  ¨ Dry your hands on clean towels or air-dry them.  · Use hand  containing alcohol, if you are unable to wash your hands. Use the  after touching doorknobs, handles, and supermarket carts, for example, since lots of people touch them. Then wash your hands as soon as you can.  · To help prevent the flu, get a flu vaccination every year. This may be given at your healthcare provider's office, a drugstore, or pharmacy, or at work. Get your flu shot as soon as the vaccines are available in your area. This is usually around September each year.  · To help prevent pneumococcal pneumonia, get pneumonia vaccinations. Talk with your healthcare provider about which pneumococcal vaccinations you need.  · Try to stay away from people with respiratory infections, such as colds or the flu. Stay away from crowded places, like shopping centers or movie theatres during cold and flu season.  · If you smoke, think about quitting. In addition to causing or worsening many lung conditions, the  lung damage from smoking increases your risk of infections. Stay away from others who smoke, too. This is also harmful and increases your chance of infections.  Date Last Reviewed: 4/14/2016  © 9543-3876 [employer]. [address] [place] [address], [place] [address]. All rights reserved. This information is not intended as a substitute for professional medical care. Always follow your healthcare professional's instructions.

## 2018-01-09 ENCOUNTER — HOSPITAL ENCOUNTER (EMERGENCY)
Facility: HOSPITAL | Age: 73
Discharge: HOME | End: 2018-01-10
Attending: EMERGENCY MEDICINE
Payer: MEDICARE

## 2018-01-09 DIAGNOSIS — R42 VERTIGO: Primary | ICD-10-CM

## 2018-01-09 DIAGNOSIS — R42 DIZZINESS: ICD-10-CM

## 2018-01-09 LAB
ALBUMIN SERPL BCP-MCNC: 3.6 G/DL
ALP SERPL-CCNC: 101 U/L
ALT SERPL W/O P-5'-P-CCNC: 7 U/L
ANION GAP SERPL CALC-SCNC: 10 MMOL/L
AST SERPL-CCNC: 18 U/L
BASOPHILS # BLD AUTO: 0.02 K/UL
BASOPHILS NFR BLD: 0.5 %
BILIRUB SERPL-MCNC: 0.8 MG/DL
BNP SERPL-MCNC: 4023 PG/ML
BUN SERPL-MCNC: 20 MG/DL
CALCIUM SERPL-MCNC: 9.5 MG/DL
CHLORIDE SERPL-SCNC: 100 MMOL/L
CO2 SERPL-SCNC: 28 MMOL/L
CREAT SERPL-MCNC: 5 MG/DL
DIFFERENTIAL METHOD: ABNORMAL
EOSINOPHIL # BLD AUTO: 0.1 K/UL
EOSINOPHIL NFR BLD: 2.4 %
ERYTHROCYTE [DISTWIDTH] IN BLOOD BY AUTOMATED COUNT: 15.9 %
EST. GFR  (AFRICAN AMERICAN): 9 ML/MIN/1.73 M^2
EST. GFR  (NON AFRICAN AMERICAN): 8 ML/MIN/1.73 M^2
GLUCOSE SERPL-MCNC: 87 MG/DL
HCT VFR BLD AUTO: 29.5 %
HGB BLD-MCNC: 9 G/DL
INR PPP: 1.1
LYMPHOCYTES # BLD AUTO: 0.5 K/UL
LYMPHOCYTES NFR BLD: 13.8 %
MCH RBC QN AUTO: 29.5 PG
MCHC RBC AUTO-ENTMCNC: 30.5 G/DL
MCV RBC AUTO: 97 FL
MONOCYTES # BLD AUTO: 0.6 K/UL
MONOCYTES NFR BLD: 17 %
NEUTROPHILS # BLD AUTO: 2.5 K/UL
NEUTROPHILS NFR BLD: 66 %
PLATELET # BLD AUTO: 201 K/UL
PMV BLD AUTO: 8.7 FL
POTASSIUM SERPL-SCNC: 3.8 MMOL/L
PROT SERPL-MCNC: 7.1 G/DL
PROTHROMBIN TIME: 11.7 SEC
RBC # BLD AUTO: 3.05 M/UL
SODIUM SERPL-SCNC: 138 MMOL/L
TROPONIN I SERPL DL<=0.01 NG/ML-MCNC: 0.04 NG/ML
WBC # BLD AUTO: 3.76 K/UL

## 2018-01-09 PROCEDURE — 85025 COMPLETE CBC W/AUTO DIFF WBC: CPT

## 2018-01-09 PROCEDURE — 99284 EMERGENCY DEPT VISIT MOD MDM: CPT

## 2018-01-09 PROCEDURE — 85610 PROTHROMBIN TIME: CPT

## 2018-01-09 PROCEDURE — 80053 COMPREHEN METABOLIC PANEL: CPT

## 2018-01-09 PROCEDURE — 83880 ASSAY OF NATRIURETIC PEPTIDE: CPT

## 2018-01-09 PROCEDURE — 93005 ELECTROCARDIOGRAM TRACING: CPT

## 2018-01-09 PROCEDURE — 84484 ASSAY OF TROPONIN QUANT: CPT

## 2018-01-09 PROCEDURE — 93010 ELECTROCARDIOGRAM REPORT: CPT | Mod: ,,, | Performed by: INTERNAL MEDICINE

## 2018-01-10 VITALS
HEART RATE: 78 BPM | BODY MASS INDEX: 23.74 KG/M2 | DIASTOLIC BLOOD PRESSURE: 77 MMHG | TEMPERATURE: 98 F | OXYGEN SATURATION: 98 % | HEIGHT: 63 IN | SYSTOLIC BLOOD PRESSURE: 172 MMHG | WEIGHT: 134 LBS | RESPIRATION RATE: 18 BRPM

## 2018-01-10 PROCEDURE — 25000003 PHARM REV CODE 250: Performed by: EMERGENCY MEDICINE

## 2018-01-10 RX ORDER — MECLIZINE HYDROCHLORIDE 25 MG/1
25 TABLET ORAL 3 TIMES DAILY PRN
Qty: 20 TABLET | Refills: 0 | Status: SHIPPED | OUTPATIENT
Start: 2018-01-10 | End: 2019-04-05

## 2018-01-10 RX ORDER — DIAZEPAM 2 MG/1
2 TABLET ORAL
Status: COMPLETED | OUTPATIENT
Start: 2018-01-10 | End: 2018-01-10

## 2018-01-10 RX ORDER — MECLIZINE HYDROCHLORIDE 25 MG/1
25 TABLET ORAL
Status: COMPLETED | OUTPATIENT
Start: 2018-01-10 | End: 2018-01-10

## 2018-01-10 RX ADMIN — DIAZEPAM 2 MG: 2 TABLET ORAL at 01:01

## 2018-01-10 RX ADMIN — MECLIZINE 25 MG: 25 TABLET ORAL at 01:01

## 2018-01-10 NOTE — ED PROVIDER NOTES
"Encounter Date: 1/9/2018    SCRIBE #1 NOTE: I, Jerry Osorio, am scribing for, and in the presence of,  Subhash Landin MD. I have scribed the following portions of the note - Other sections scribed: HPI, ROS.       History     Chief Complaint   Patient presents with    Dizziness     has dialysis today and finished around 9am; when she got home, started with dizziness and SOB; did not get better; , denies fever     CC: Dizziness    HPI: This 72 y.o. Female with COPD, diabetes mellitus type 2 and dialysis patient presents to the ED c/o constant dizziness while laying down which began at 2:30am this morning when she woke up. Pt reports she feels like the "room is spinning." She complies with BP medications and inhaler because of intermittent SOB and asthma. Pt denies nausea. Pt has no hx of dizziness. She went to dialysis today and it went well. Pt has heart murmur.    PCP: Dr. Chiara Nelson      The history is provided by the patient. No  was used.     Review of patient's allergies indicates:  No Known Allergies  Past Medical History:   Diagnosis Date    Arthritis     COPD (chronic obstructive pulmonary disease)     Diabetes mellitus type II     Dialysis patient     Encounter for blood transfusion     ESRD (end stage renal disease)      Past Surgical History:   Procedure Laterality Date    AV FISTULA PLACEMENT      TUBAL LIGATION       Family History   Problem Relation Age of Onset    Heart attack Sister     Heart attack Sister     Heart attack Mother      Social History   Substance Use Topics    Smoking status: Former Smoker     Start date: 1/12/1991    Smokeless tobacco: Never Used    Alcohol use No     Review of Systems   Constitutional: Negative for chills and fever.   HENT: Negative for ear pain, rhinorrhea and sore throat.    Eyes: Negative for redness.   Respiratory: Negative for shortness of breath.    Cardiovascular: Negative for chest pain.   Gastrointestinal: " Negative for abdominal pain, diarrhea, nausea and vomiting.   Genitourinary: Negative for dysuria and hematuria.   Musculoskeletal: Negative for back pain and neck pain.   Skin: Negative for rash.   Neurological: Positive for dizziness. Negative for weakness, numbness and headaches.   Hematological: Does not bruise/bleed easily.   Psychiatric/Behavioral: The patient is not nervous/anxious.        Physical Exam     Initial Vitals [01/09/18 2011]   BP Pulse Resp Temp SpO2   (!) 207/88 81 18 98.5 °F (36.9 °C) 98 %      MAP       127.67         Physical Exam    Nursing note and vitals reviewed.  Constitutional: She appears well-developed and well-nourished.   Eyes: EOM are normal. Pupils are equal, round, and reactive to light.   Neck: Normal range of motion. Neck supple. No thyromegaly present. No JVD present.   Cardiovascular: Normal rate, regular rhythm, normal heart sounds and intact distal pulses. Exam reveals no gallop and no friction rub.    No murmur heard.  Pulmonary/Chest: Breath sounds normal. No respiratory distress.   Abdominal: Soft. Bowel sounds are normal.   Musculoskeletal: Normal range of motion. She exhibits no edema or tenderness.   Neurological: She is alert and oriented to person, place, and time. She has normal strength. She displays normal reflexes. No cranial nerve deficit or sensory deficit.   Skin: Skin is warm and dry.         ED Course   Procedures  Labs Reviewed   CBC W/ AUTO DIFFERENTIAL - Abnormal; Notable for the following:        Result Value    WBC 3.76 (*)     RBC 3.05 (*)     Hemoglobin 9.0 (*)     Hematocrit 29.5 (*)     MCHC 30.5 (*)     RDW 15.9 (*)     MPV 8.7 (*)     Lymph # 0.5 (*)     Lymph% 13.8 (*)     Mono% 17.0 (*)     All other components within normal limits   COMPREHENSIVE METABOLIC PANEL - Abnormal; Notable for the following:     Creatinine 5.0 (*)     ALT 7 (*)     eGFR if  9 (*)     eGFR if non  8 (*)     All other components within  normal limits   B-TYPE NATRIURETIC PEPTIDE - Abnormal; Notable for the following:     BNP 4,023 (*)     All other components within normal limits   TROPONIN I - Abnormal; Notable for the following:     Troponin I 0.045 (*)     All other components within normal limits   PROTIME-INR          X-Rays:   Independently Interpreted Readings:   Chest X-Ray: No acute abnormalities. Cardiomegaly present.   Head CT: No hemorrhage.  No acute stroke. Remote infarct present.     Dizziness improved. Patient has had these symptoms before. Not orthostatic. CT head does not suggest acute process. No other focal neurologic complaint or deficit on exam. Patient comfortable going home. Neuro follow up.           Scribe Attestation:   Scribe #1: I performed the above scribed service and the documentation accurately describes the services I performed. I attest to the accuracy of the note.    Attending Attestation:           Physician Attestation for Scribe:  Physician Attestation Statement for Scribe #1: I, Subhash Landin MD, reviewed documentation, as scribed by Jerry Osorio in my presence, and it is both accurate and complete.                 ED Course      Clinical Impression:   The primary encounter diagnosis was Vertigo. A diagnosis of Dizziness was also pertinent to this visit.                           Subhash Landin MD  02/11/18 [id number]

## 2018-01-10 NOTE — ED NOTES
Verbal and written instructions given to pt. Pt verbalizes understanding of discharge instructions. Family at bedside. Pt alert and oriented x 4, stable.

## 2018-02-26 ENCOUNTER — OFFICE VISIT (OUTPATIENT)
Dept: FAMILY MEDICINE | Facility: CLINIC | Age: 73
End: 2018-02-26
Payer: MEDICARE

## 2018-02-26 VITALS
DIASTOLIC BLOOD PRESSURE: 80 MMHG | WEIGHT: 139.13 LBS | SYSTOLIC BLOOD PRESSURE: 140 MMHG | HEIGHT: 63 IN | HEART RATE: 73 BPM | OXYGEN SATURATION: 99 % | BODY MASS INDEX: 24.65 KG/M2 | TEMPERATURE: 99 F

## 2018-02-26 DIAGNOSIS — F43.23 ADJUSTMENT DISORDER WITH MIXED ANXIETY AND DEPRESSED MOOD: Primary | ICD-10-CM

## 2018-02-26 DIAGNOSIS — J45.20 REACTIVE AIRWAY DISEASE, MILD INTERMITTENT, UNCOMPLICATED: ICD-10-CM

## 2018-02-26 PROCEDURE — 99999 PR PBB SHADOW E&M-EST. PATIENT-LVL III: CPT | Mod: PBBFAC,,, | Performed by: FAMILY MEDICINE

## 2018-02-26 PROCEDURE — 99499 UNLISTED E&M SERVICE: CPT | Mod: S$GLB,,, | Performed by: FAMILY MEDICINE

## 2018-02-26 PROCEDURE — 99214 OFFICE O/P EST MOD 30 MIN: CPT | Mod: S$GLB,,, | Performed by: FAMILY MEDICINE

## 2018-02-26 RX ORDER — HYDROXYZINE PAMOATE 25 MG/1
25 CAPSULE ORAL EVERY 8 HOURS PRN
Qty: 30 CAPSULE | Refills: 1 | Status: SHIPPED | OUTPATIENT
Start: 2018-02-26 | End: 2019-04-01 | Stop reason: SDUPTHER

## 2018-02-26 RX ORDER — TRAZODONE HYDROCHLORIDE 50 MG/1
50 TABLET ORAL NIGHTLY PRN
Qty: 30 TABLET | Refills: 3 | Status: SHIPPED | OUTPATIENT
Start: 2018-02-26 | End: 2019-04-05

## 2018-02-26 NOTE — PROGRESS NOTES
Chief Complaint   Patient presents with    Insomnia    Medication Refill       HPI    Eli Vaz is 72 y.o. female. The primary encounter diagnosis was Adjustment disorder with mixed anxiety and depressed mood. A diagnosis of Reactive airway disease, mild intermittent, uncomplicated was also pertinent to this visit.    72 year old female with RAD comes to clinic for medication refills and report of insomnia.  Patient admits that her lack of sleep has been associated with other symptoms as well.  Patient reports that she has had irritability that has increased over the last 6-7 months.  She attributes this to anxiety related to her household contacts.  She reports going to dialysis regularly.  She admits to  herself when she becomes very anxious.  She notes crying episodes associated with this stress.    Review of Systems   Constitutional: Negative for activity change.   Respiratory: Negative for shortness of breath.    Cardiovascular: Negative for chest pain.   Musculoskeletal: Negative for gait problem.   Psychiatric/Behavioral: Positive for agitation, decreased concentration, dysphoric mood and sleep disturbance. Negative for suicidal ideas. The patient is nervous/anxious.            Current Outpatient Prescriptions:     amlodipine (NORVASC) 10 MG tablet, Take 1 tablet (10 mg total) by mouth once daily., Disp: 90 tablet, Rfl: 1    atorvastatin (LIPITOR) 10 MG tablet, Take 1 tablet (10 mg total) by mouth once daily., Disp: 90 tablet, Rfl: 1    fluticasone (FLONASE) 50 mcg/actuation nasal spray, 1 spray by Each Nare route 2 (two) times daily., Disp: 1 Bottle, Rfl: 1    FOLIC ACID/VITAMIN B COMP W-C (RENAL CAPS ORAL), Take by mouth Daily., Disp: , Rfl:     hydrALAZINE (APRESOLINE) 25 MG tablet, Take 1 tablet (25 mg total) by mouth every 12 (twelve) hours., Disp: 180 tablet, Rfl: 0    losartan (COZAAR) 25 MG tablet, 1 TABLET ONCE A DAY ORALLY 30 DAY(S), Disp: , Rfl: 6    ADVAIR DISKUS 250-50  "mcg/dose diskus inhaler, INHALE 1 PUFF INTO THE LUNGS TWICE A DAY, Disp: 60 each, Rfl: 2    hydrOXYzine pamoate (VISTARIL) 25 MG Cap, Take 1 capsule (25 mg total) by mouth every 8 (eight) hours as needed (panic attack)., Disp: 30 capsule, Rfl: 1    ipratropium-albuterol (COMBIVENT RESPIMAT)  mcg/actuation inhaler, INHALE 2 PUFF(S) INTO THE LUNGS 3 TIMES A DAY, Disp: 4 g, Rfl: 3    levocetirizine (XYZAL) 5 MG tablet, Take 1 tablet by mouth once daily at 6am., Disp: , Rfl: 6    meclizine (ANTIVERT) 25 mg tablet, Take 1 tablet (25 mg total) by mouth 3 (three) times daily as needed., Disp: 20 tablet, Rfl: 0    mirtazapine (REMERON) 7.5 MG Tab, Take 7.5 mg by mouth nightly. , Disp: , Rfl:     montelukast (SINGULAIR) 10 mg tablet, Take 10 mg by mouth every evening., Disp: , Rfl: 3    paroxetine (PAXIL) 20 MG tablet, Take 20 mg by mouth once daily. , Disp: , Rfl:     predniSONE (DELTASONE) 10 MG tablet, 2 tab po qday x 4 days, then 1 tab po qday x 4 days, then 1/2 tab po qday x 4 days, Disp: 14 tablet, Rfl: 0    SENSIPAR 60 mg Tab, Take 30 mg by mouth daily with breakfast. , Disp: , Rfl:     sevelamer carbonate (RENVELA) 800 mg Tab, Take 800 mg by mouth 3 (three) times daily with meals., Disp: , Rfl:     traZODone (DESYREL) 50 MG tablet, Take 1 tablet (50 mg total) by mouth nightly as needed for Insomnia., Disp: 30 tablet, Rfl: 3      Blood pressure (!) 140/80, pulse 73, temperature 98.5 °F (36.9 °C), temperature source Oral, height 5' 3" (1.6 m), weight 63.1 kg (139 lb 1.8 oz), SpO2 99 %.    Physical Exam   Constitutional: Vital signs are normal. She appears well-developed and well-nourished. She does not appear ill. No distress.   Psychiatric: She has a normal mood and affect. Her speech is normal and behavior is normal. Thought content normal. Cognition and memory are normal. She expresses impulsivity.       Admission on 01/09/2018, Discharged on 01/10/2018   Component Date Value Ref Range Status    " WBC 01/09/2018 3.76* 3.90 - 12.70 K/uL Final    RBC 01/09/2018 3.05* 4.00 - 5.40 M/uL Final    Hemoglobin 01/09/2018 9.0* 12.0 - 16.0 g/dL Final    Hematocrit 01/09/2018 29.5* 37.0 - 48.5 % Final    MCV 01/09/2018 97  82 - 98 fL Final    MCH 01/09/2018 29.5  27.0 - 31.0 pg Final    MCHC 01/09/2018 30.5* 32.0 - 36.0 g/dL Final    RDW 01/09/2018 15.9* 11.5 - 14.5 % Final    Platelets 01/09/2018 201  150 - 350 K/uL Final    MPV 01/09/2018 8.7* 9.2 - 12.9 fL Final    Gran # (ANC) 01/09/2018 2.5  1.8 - 7.7 K/uL Final    Lymph # 01/09/2018 0.5* 1.0 - 4.8 K/uL Final    Mono # 01/09/2018 0.6  0.3 - 1.0 K/uL Final    Eos # 01/09/2018 0.1  0.0 - 0.5 K/uL Final    Baso # 01/09/2018 0.02  0.00 - 0.20 K/uL Final    Gran% 01/09/2018 66.0  38.0 - 73.0 % Final    Lymph% 01/09/2018 13.8* 18.0 - 48.0 % Final    Mono% 01/09/2018 17.0* 4.0 - 15.0 % Final    Eosinophil% 01/09/2018 2.4  0.0 - 8.0 % Final    Basophil% 01/09/2018 0.5  0.0 - 1.9 % Final    Differential Method 01/09/2018 Automated   Final    Sodium 01/09/2018 138  136 - 145 mmol/L Final    Potassium 01/09/2018 3.8  3.5 - 5.1 mmol/L Final    Chloride 01/09/2018 100  95 - 110 mmol/L Final    CO2 01/09/2018 28  23 - 29 mmol/L Final    Glucose 01/09/2018 87  70 - 110 mg/dL Final    BUN, Bld 01/09/2018 20  8 - 23 mg/dL Final    Creatinine 01/09/2018 5.0* 0.5 - 1.4 mg/dL Final    Calcium 01/09/2018 9.5  8.7 - 10.5 mg/dL Final    Total Protein 01/09/2018 7.1  6.0 - 8.4 g/dL Final    Albumin 01/09/2018 3.6  3.5 - 5.2 g/dL Final    Total Bilirubin 01/09/2018 0.8  0.1 - 1.0 mg/dL Final    Alkaline Phosphatase 01/09/2018 101  55 - 135 U/L Final    AST 01/09/2018 18  10 - 40 U/L Final    ALT 01/09/2018 7* 10 - 44 U/L Final    Anion Gap 01/09/2018 10  8 - 16 mmol/L Final    eGFR if  01/09/2018 9* >60 mL/min/1.73 m^2 Final    eGFR if non African American 01/09/2018 8* >60 mL/min/1.73 m^2 Final    BNP 01/09/2018 4023* 0 - 99 pg/mL Final     Troponin I 01/09/2018 0.045* 0.000 - 0.026 ng/mL Final    Prothrombin Time 01/09/2018 11.7  9.0 - 12.5 sec Final    INR 01/09/2018 1.1  0.8 - 1.2 Final   ]    Assessment:    1. Adjustment disorder with mixed anxiety and depressed mood    2. Reactive airway disease, mild intermittent, uncomplicated          Eli was seen today for insomnia and medication refill.    Diagnoses and all orders for this visit:    Adjustment disorder with mixed anxiety and depressed mood  -     hydrOXYzine pamoate (VISTARIL) 25 MG Cap; Take 1 capsule (25 mg total) by mouth every 8 (eight) hours as needed (panic attack).  -     traZODone (DESYREL) 50 MG tablet; Take 1 tablet (50 mg total) by mouth nightly as needed for Insomnia.  - New problem. Medication for sleep prescribed. Vistaril to use as needed for anxiety.    Reactive airway disease, mild intermittent, uncomplicated  -     ipratropium-albuterol (COMBIVENT RESPIMAT)  mcg/actuation inhaler; INHALE 2 PUFF(S) INTO THE LUNGS 3 TIMES A DAY  - Stable. Medication refilled per patient request.          FOLLOW UP: Follow-up in about 4 weeks (around 3/26/2018) for Call if no improvement.

## 2018-03-06 NOTE — PROGRESS NOTES
Patient Eli Vaz, MRN [id number], was dependent on dialysis (ICD10 Z99.2) at the time of this visit on 2/26/18. This addendum is made to the medical record on 03/06/2018.

## 2018-04-06 DIAGNOSIS — E11.59 HYPERTENSION ASSOCIATED WITH DIABETES: Chronic | ICD-10-CM

## 2018-04-06 DIAGNOSIS — I15.2 HYPERTENSION ASSOCIATED WITH DIABETES: Chronic | ICD-10-CM

## 2018-04-06 RX ORDER — AMLODIPINE BESYLATE 10 MG/1
10 TABLET ORAL DAILY
Qty: 90 TABLET | Refills: 1 | Status: SHIPPED | OUTPATIENT
Start: 2018-04-06 | End: 2018-10-03 | Stop reason: SDUPTHER

## 2018-06-08 ENCOUNTER — OFFICE VISIT (OUTPATIENT)
Dept: FAMILY MEDICINE | Facility: CLINIC | Age: 73
End: 2018-06-08
Payer: MEDICARE

## 2018-06-08 VITALS
DIASTOLIC BLOOD PRESSURE: 72 MMHG | WEIGHT: 136 LBS | BODY MASS INDEX: 24.1 KG/M2 | OXYGEN SATURATION: 98 % | HEIGHT: 63 IN | SYSTOLIC BLOOD PRESSURE: 166 MMHG | TEMPERATURE: 99 F | HEART RATE: 81 BPM

## 2018-06-08 DIAGNOSIS — D63.8 ANEMIA OF CHRONIC DISEASE: ICD-10-CM

## 2018-06-08 DIAGNOSIS — Z99.2 ESRD ON DIALYSIS: ICD-10-CM

## 2018-06-08 DIAGNOSIS — I51.89 DIASTOLIC DYSFUNCTION: ICD-10-CM

## 2018-06-08 DIAGNOSIS — I70.0 ATHEROSCLEROSIS OF AORTA: ICD-10-CM

## 2018-06-08 DIAGNOSIS — J44.9 CHRONIC OBSTRUCTIVE PULMONARY DISEASE, UNSPECIFIED COPD TYPE: ICD-10-CM

## 2018-06-08 DIAGNOSIS — E11.59 HYPERTENSION ASSOCIATED WITH DIABETES: Chronic | ICD-10-CM

## 2018-06-08 DIAGNOSIS — I15.2 HYPERTENSION ASSOCIATED WITH DIABETES: Chronic | ICD-10-CM

## 2018-06-08 DIAGNOSIS — E11.69 COMBINED HYPERLIPIDEMIA ASSOCIATED WITH TYPE 2 DIABETES MELLITUS: ICD-10-CM

## 2018-06-08 DIAGNOSIS — N18.6 ESRD ON DIALYSIS: ICD-10-CM

## 2018-06-08 DIAGNOSIS — N25.81 SECONDARY HYPERPARATHYROIDISM: ICD-10-CM

## 2018-06-08 DIAGNOSIS — E11.22 DIABETES MELLITUS WITH ESRD (END-STAGE RENAL DISEASE): Chronic | ICD-10-CM

## 2018-06-08 DIAGNOSIS — J45.21 MILD INTERMITTENT REACTIVE AIRWAY DISEASE WITH ACUTE EXACERBATION: Primary | ICD-10-CM

## 2018-06-08 DIAGNOSIS — E78.2 COMBINED HYPERLIPIDEMIA ASSOCIATED WITH TYPE 2 DIABETES MELLITUS: ICD-10-CM

## 2018-06-08 DIAGNOSIS — N18.6 DIABETES MELLITUS WITH ESRD (END-STAGE RENAL DISEASE): Chronic | ICD-10-CM

## 2018-06-08 PROCEDURE — 99214 OFFICE O/P EST MOD 30 MIN: CPT | Mod: 25,S$GLB,, | Performed by: NURSE PRACTITIONER

## 2018-06-08 PROCEDURE — 96372 THER/PROPH/DIAG INJ SC/IM: CPT | Mod: S$GLB,,, | Performed by: NURSE PRACTITIONER

## 2018-06-08 PROCEDURE — 94640 AIRWAY INHALATION TREATMENT: CPT | Mod: 59,S$GLB,, | Performed by: NURSE PRACTITIONER

## 2018-06-08 PROCEDURE — 99499 UNLISTED E&M SERVICE: CPT | Mod: S$GLB,,, | Performed by: NURSE PRACTITIONER

## 2018-06-08 PROCEDURE — 99999 PR PBB SHADOW E&M-EST. PATIENT-LVL V: CPT | Mod: PBBFAC,,, | Performed by: NURSE PRACTITIONER

## 2018-06-08 RX ORDER — IPRATROPIUM BROMIDE AND ALBUTEROL SULFATE 2.5; .5 MG/3ML; MG/3ML
3 SOLUTION RESPIRATORY (INHALATION)
Status: COMPLETED | OUTPATIENT
Start: 2018-06-08 | End: 2018-06-08

## 2018-06-08 RX ORDER — BUSPIRONE HYDROCHLORIDE 10 MG/1
10 TABLET ORAL 2 TIMES DAILY
Refills: 6 | COMMUNITY
Start: 2018-05-11 | End: 2019-03-12

## 2018-06-08 RX ORDER — MONTELUKAST SODIUM 10 MG/1
10 TABLET ORAL NIGHTLY
Qty: 30 TABLET | Refills: 0 | Status: SHIPPED | OUTPATIENT
Start: 2018-06-08 | End: 2018-07-04 | Stop reason: SDUPTHER

## 2018-06-08 RX ORDER — METHYLPREDNISOLONE 4 MG/1
TABLET ORAL
Qty: 1 PACKAGE | Refills: 0 | Status: SHIPPED | OUTPATIENT
Start: 2018-06-08 | End: 2018-06-29

## 2018-06-08 RX ORDER — PROMETHAZINE HYDROCHLORIDE AND DEXTROMETHORPHAN HYDROBROMIDE 6.25; 15 MG/5ML; MG/5ML
5 SYRUP ORAL NIGHTLY
Qty: 180 ML | Refills: 0 | Status: SHIPPED | OUTPATIENT
Start: 2018-06-08 | End: 2018-06-18

## 2018-06-08 RX ORDER — PAROXETINE 30 MG/1
TABLET, FILM COATED ORAL
Refills: 3 | COMMUNITY
Start: 2018-05-07 | End: 2018-07-09 | Stop reason: SDUPTHER

## 2018-06-08 RX ADMIN — IPRATROPIUM BROMIDE AND ALBUTEROL SULFATE 3 ML: 2.5; .5 SOLUTION RESPIRATORY (INHALATION) at 06:06

## 2018-06-08 NOTE — PATIENT INSTRUCTIONS
"  Asthma (Adult)  Asthma is a disease where the medium and  small air passages within the lung go into spasm and restrict the flow of air. Inflammation and swelling of the airways cause further restriction. During an acute asthma attack, these factors cause difficulty breathing, wheezing, cough and chest tightness.    An asthma attack can be triggered by many things. Common triggers include infections such as the common cold, bronchitis, pneumonia. Irritants such as smoke or pollutants in the air, emotional upset, and exercise can also trigger an attack. In many adults with asthma, allergies to dust, mold, pollen and animal dander can cause an asthma attack. Skipping doses of daily asthma medicine can also bring on an asthma attack.  Asthma can be controlled using the proper medicines prescribed by your healthcare provider and avoiding exposure to known triggers including allergens and irritants.  Home care  · Take prescribed medicine exactly at the times advised. If you need medicine such as from a hand held inhaler or aerosol breathing machine more than every 4 hours, contact your healthcare provider or seek immediate medical attention. If prescribed an antibiotic or prednisone, take all of the medicine as prescribed, even if you are feeling better after a few days.  · Do not smoke. Avoid being exposed to the smoke of others.  · Some people with asthma have worsening of their symptoms when they take aspirin and non-steroidal or fever-reducing medicines like ibuprofen and naproxen. Talk to your healthcare provider if you think this may apply to you.  Follow-up care  Follow up with your healthcare provider, or as advised. Always bring all of your current medicines to any appointments with your healthcare provider. Also bring a complete list of medications even those not taken for asthma. If you do not already have one, talk to your healthcare provider about developing a personalized "Asthma Action Plan."  A " pneumococcal (pneumonia) vaccine and yearly flu shot (every fall) are recommended. Ask your doctor about this.  When to seek medical advice  Call your healthcare provider right away if any of these occur:   · Increased wheezing or shortness of breath  · Need to use your inhalers more often than usual without relief  · Fever of 100.4ºF (38ºC) or higher, or as directed by your healthcare provider  · Coughing up lots of dark-colored or bloody sputum (mucus)  · Chest pain with each breath  · If you use a peak flow meter as part of an Asthma Action Plan, and you are still in the yellow zone (50% to 80%) 15 minutes after using inhaler medicine.  Call 911  Call 911 if any of the following occur  · Trouble walking or talking because of shortness of breath  · If you use a peak flow meter as part of an Asthma Action Plan and you are still in the red zone (less than 50%) 15 minutes after using inhaler medicine  · Lips or fingernails turning gray or blue  Date Last Reviewed: 12/2/2015  © 9800-6561 [employer]. [address] [place] [address], [place] [address]. All rights reserved. This information is not intended as a substitute for professional medical care. Always follow your healthcare professional's instructions.

## 2018-06-08 NOTE — PROGRESS NOTES
Breathing Tx and Injection given. Tolerated well, instructed to wait 15 min for observation. No reaction noted at discharge.

## 2018-06-08 NOTE — PROGRESS NOTES
"History of Present Illness   Eli Vaz is a 72 y.o. woman with medical history as listed below who presents today for evaluation of URI symptoms, asthma flare for >1 week. She reports chest congestion with wheezing and productive cough. She also has shortness of breath on exertion and with coughing spells. She denies fever or chills. She has no nasal congestion, post nasal drip, ear pain, sinus pain, or sore throat. She has been using her nebulizer treatments and rescue inhaler with Mucinex and Robitussin DM with no relief. She has no additional complaints and is otherwise healthy on today's visit.      Past Medical History:   Diagnosis Date    Arthritis     COPD (chronic obstructive pulmonary disease)     Diabetes mellitus type II     Dialysis patient     Encounter for blood transfusion     ESRD (end stage renal disease)        Past Surgical History:   Procedure Laterality Date    AV FISTULA PLACEMENT      TUBAL LIGATION         Social History     Social History    Marital status:      Spouse name: N/A    Number of children: N/A    Years of education: N/A     Social History Main Topics    Smoking status: Former Smoker     Start date: 1/12/1991    Smokeless tobacco: Never Used    Alcohol use No    Drug use: No    Sexual activity: Not Asked     Other Topics Concern    None     Social History Narrative    None       Family History   Problem Relation Age of Onset    Heart attack Sister     Heart attack Sister     Heart attack Mother        Review of Systems  Review of Systems   Respiratory: Positive for cough, sputum production, shortness of breath and wheezing.    Cardiovascular: Negative for chest pain, palpitations, orthopnea and leg swelling.     A complete review of systems was otherwise negative.    Physical Exam  BP (!) 166/72 (BP Location: Right arm, Patient Position: Sitting, BP Method: Medium (Manual))   Pulse 81   Temp 98.8 °F (37.1 °C) (Oral)   Ht 5' 3" (1.6 m)   Wt 61.7 " kg (136 lb)   SpO2 98%   BMI 24.09 kg/m²   General appearance: alert, appears stated age, cooperative and no distress  Eyes: negative findings: lids and lashes normal and conjunctivae and sclerae normal  Ears: normal TM's and external ear canals both ears  Nose: Nares normal. Septum midline. Mucosa normal. No drainage or sinus tenderness.  Throat: lips, mucosa, and tongue normal; teeth and gums normal and moderate oropharyngeal erythema  Lungs: minimal bilateral expiratory wheeze with no rales or rhonchi   Post nebulizer treatment: Improvement in wheezing with decreased effort and rate  Heart: regular rate and rhythm, S1, S2 normal, no murmur, click, rub or gallop  Extremities: extremities normal, atraumatic, no cyanosis or edema  Pulses: 2+ and symmetric  Skin: Skin color, texture, turgor normal. No rashes or lesions  Neurologic: Grossly normal    Assessment/Plan  Mild intermittent reactive airway disease with acute exacerbation  One time Duo Neb and IM Solumedrol provided in clinic today.  Treat acute flare with steroid taper and PRN cough medication.  Resume the Singulair daily.  Refill on Combivent inhaler.  May continue the Mucinex as needed.  Rest and drink plenty of fluids.  Contact me Monday if no improvement or worsening and we will add Doxycycline.  ER precautions discussed.  -     albuterol-ipratropium 2.5 mg-0.5 mg/3 mL nebulizer solution 3 mL; Take 3 mLs by nebulization one time.  -     methylPREDNISolone sod suc(PF) injection 125 mg; Inject 125 mg into the muscle one time.  -     montelukast (SINGULAIR) 10 mg tablet; Take 1 tablet (10 mg total) by mouth every evening.  Dispense: 30 tablet; Refill: 0  -     methylPREDNISolone (MEDROL DOSEPACK) 4 mg tablet; use as directed  Dispense: 1 Package; Refill: 0  -     promethazine-dextromethorphan (PROMETHAZINE-DM) 6.25-15 mg/5 mL Syrp; Take 5 mLs by mouth every evening.  Dispense: 180 mL; Refill: 0  -     ipratropium-albuterol (COMBIVENT RESPIMAT)   mcg/actuation inhaler; INHALE 2 PUFF(S) INTO THE LUNGS 3 TIMES A DAY  Dispense: 4 g; Refill: 3    Chronic obstructive pulmonary disease, unspecified COPD type  The current medical regimen is effective;  continue present plan and medications.    Diastolic dysfunction  The current medical regimen is effective;  continue present plan and medications.    Hypertension associated with diabetes  The current medical regimen is effective;  continue present plan and medications.    Combined hyperlipidemia associated with type 2 diabetes mellitus  The current medical regimen is effective;  continue present plan and medications.    Atherosclerosis of aorta  Stable. Statin therapy.    ESRD on dialysis  T/TH/S, due tomorrow.  No evidence of fluid overload.     Anemia of chronic disease  The current medical regimen is effective;  continue present plan and medications.    Diabetes mellitus with ESRD (end-stage renal disease)  The current medical regimen is effective;  continue present plan and medications.  Monitor CBG closely while on steroid taper and take all medications as prescribed.    Secondary hyperparathyroidism  The current medical regimen is effective;  continue present plan and medications.    She has verbalized understanding and is in agreement with plan of care.    Follow-up if symptoms worsen or fail to improve.

## 2018-07-04 DIAGNOSIS — J45.21 MILD INTERMITTENT REACTIVE AIRWAY DISEASE WITH ACUTE EXACERBATION: ICD-10-CM

## 2018-07-05 RX ORDER — MONTELUKAST SODIUM 10 MG/1
TABLET ORAL
Qty: 30 TABLET | Refills: 2 | Status: SHIPPED | OUTPATIENT
Start: 2018-07-05 | End: 2018-07-09 | Stop reason: SDUPTHER

## 2018-07-09 ENCOUNTER — OFFICE VISIT (OUTPATIENT)
Dept: FAMILY MEDICINE | Facility: CLINIC | Age: 73
End: 2018-07-09
Payer: MEDICARE

## 2018-07-09 VITALS
TEMPERATURE: 98 F | DIASTOLIC BLOOD PRESSURE: 80 MMHG | RESPIRATION RATE: 16 BRPM | OXYGEN SATURATION: 98 % | HEART RATE: 78 BPM | SYSTOLIC BLOOD PRESSURE: 142 MMHG | BODY MASS INDEX: 23.82 KG/M2 | WEIGHT: 134.5 LBS

## 2018-07-09 DIAGNOSIS — J44.9 CHRONIC OBSTRUCTIVE PULMONARY DISEASE, UNSPECIFIED COPD TYPE: Primary | ICD-10-CM

## 2018-07-09 PROCEDURE — 99999 PR PBB SHADOW E&M-EST. PATIENT-LVL III: CPT | Mod: PBBFAC,,, | Performed by: FAMILY MEDICINE

## 2018-07-09 PROCEDURE — 99214 OFFICE O/P EST MOD 30 MIN: CPT | Mod: S$GLB,,, | Performed by: FAMILY MEDICINE

## 2018-07-09 RX ORDER — ALBUTEROL SULFATE 0.83 MG/ML
2.5 SOLUTION RESPIRATORY (INHALATION) EVERY 4 HOURS PRN
Qty: 1 BOX | Refills: 0 | Status: SHIPPED | OUTPATIENT
Start: 2018-07-09 | End: 2019-02-27 | Stop reason: ALTCHOICE

## 2018-07-09 RX ORDER — LEVOCETIRIZINE DIHYDROCHLORIDE 5 MG/1
5 TABLET, FILM COATED ORAL NIGHTLY
Qty: 30 TABLET | Refills: 2 | Status: SHIPPED | OUTPATIENT
Start: 2018-07-09 | End: 2019-04-05

## 2018-07-09 RX ORDER — ALBUTEROL SULFATE 90 UG/1
2 AEROSOL, METERED RESPIRATORY (INHALATION) EVERY 4 HOURS PRN
Qty: 1 INHALER | Refills: 2 | Status: SHIPPED | OUTPATIENT
Start: 2018-07-09 | End: 2018-11-06 | Stop reason: SDUPTHER

## 2018-07-09 RX ORDER — BENZONATATE 200 MG/1
200 CAPSULE ORAL 3 TIMES DAILY PRN
Qty: 30 CAPSULE | Refills: 0 | Status: SHIPPED | OUTPATIENT
Start: 2018-07-09 | End: 2018-07-19

## 2018-07-09 RX ORDER — MONTELUKAST SODIUM 10 MG/1
10 TABLET ORAL NIGHTLY
Qty: 30 TABLET | Refills: 2 | Status: SHIPPED | OUTPATIENT
Start: 2018-07-09 | End: 2019-03-18 | Stop reason: SDUPTHER

## 2018-07-09 NOTE — PROGRESS NOTES
Routine Office Visit    Patient Name: Eli Vaz    : 1945  MRN: [id number]    Subjective:  Eli is a 72 y.o. female who presents today for:   Chief Complaint   Patient presents with    Cough     non productive       72-year-old female with COPD comes in with her daughter, for evaluation of a cough that has lasted for over a month.  She states that is nonproductive.  She occasionally has shortness of breath.  She denies any wheezing.  She states that the cough is during the day and at night.  She denies any nasal congestion, nosebleeds, postnasal drip, or runny nose.  She denies any itchy eyes.  She denies any fevers or chills.  She states that she feels she has congestion in her chest that she can't get up.    On questioning she has been using just her Combivent.  She has been out of her Xyzal and her montelukast.  She does not have a rescue inhaler.    Past Medical History  Past Medical History:   Diagnosis Date    Arthritis     COPD (chronic obstructive pulmonary disease)     Diabetes mellitus type II     Dialysis patient     Encounter for blood transfusion     ESRD (end stage renal disease)        Past Surgical History  Past Surgical History:   Procedure Laterality Date    AV FISTULA PLACEMENT      TUBAL LIGATION          Family History  Family History   Problem Relation Age of Onset    Heart attack Sister     Heart attack Sister     Heart attack Mother        Social History  Social History     Social History    Marital status:      Spouse name: N/A    Number of children: N/A    Years of education: N/A     Occupational History    Not on file.     Social History Main Topics    Smoking status: Former Smoker     Start date: 1991    Smokeless tobacco: Never Used    Alcohol use No    Drug use: No    Sexual activity: Not on file     Other Topics Concern    Not on file     Social History Narrative    No narrative on file       Current Medications  Current Outpatient  Prescriptions on File Prior to Visit   Medication Sig Dispense Refill    ADVAIR DISKUS 250-50 mcg/dose diskus inhaler INHALE 1 PUFF INTO THE LUNGS TWICE A DAY 60 each 2    amLODIPine (NORVASC) 10 MG tablet Take 1 tablet (10 mg total) by mouth once daily. 90 tablet 1    atorvastatin (LIPITOR) 10 MG tablet Take 1 tablet (10 mg total) by mouth once daily. 90 tablet 1    busPIRone (BUSPAR) 10 MG tablet Take 10 mg by mouth 2 (two) times daily.  6    fluticasone (FLONASE) 50 mcg/actuation nasal spray 1 spray by Each Nare route 2 (two) times daily. 1 Bottle 1    FOLIC ACID/VITAMIN B COMP W-C (RENAL CAPS ORAL) Take by mouth Daily.      hydrOXYzine pamoate (VISTARIL) 25 MG Cap Take 1 capsule (25 mg total) by mouth every 8 (eight) hours as needed (panic attack). 30 capsule 1    ipratropium-albuterol (COMBIVENT RESPIMAT)  mcg/actuation inhaler INHALE 2 PUFF(S) INTO THE LUNGS 3 TIMES A DAY 4 g 3    SENSIPAR 60 mg Tab Take 30 mg by mouth daily with breakfast.       sevelamer carbonate (RENVELA) 800 mg Tab Take 800 mg by mouth 3 (three) times daily with meals.      [DISCONTINUED] levocetirizine (XYZAL) 5 MG tablet Take 1 tablet by mouth once daily at 6am.  6    [DISCONTINUED] losartan (COZAAR) 25 MG tablet 1 TABLET ONCE A DAY ORALLY 30 DAY(S)  6    [DISCONTINUED] montelukast (SINGULAIR) 10 mg tablet TAKE 1 TABLET BY MOUTH EVERY EVENING 30 tablet 2    [DISCONTINUED] paroxetine (PAXIL) 20 MG tablet Take 20 mg by mouth once daily.       hydrALAZINE (APRESOLINE) 25 MG tablet Take 1 tablet (25 mg total) by mouth every 12 (twelve) hours. 180 tablet 0    meclizine (ANTIVERT) 25 mg tablet Take 1 tablet (25 mg total) by mouth 3 (three) times daily as needed. 20 tablet 0    mirtazapine (REMERON) 7.5 MG Tab Take 7.5 mg by mouth nightly.       traZODone (DESYREL) 50 MG tablet Take 1 tablet (50 mg total) by mouth nightly as needed for Insomnia. 30 tablet 3    [DISCONTINUED] paroxetine (PAXIL) 30 MG tablet 1 TABLET IN  THE MORNING ONCE A DAY ORALLY 90  3     No current facility-administered medications on file prior to visit.        Allergies   Review of patient's allergies indicates:  No Known Allergies    Review of Systems   Constitutional: Negative for chills and fever.   HENT: Negative for congestion, nosebleeds, postnasal drip, rhinorrhea and sneezing.    Eyes: Negative for itching.   Respiratory: Positive for cough and shortness of breath. Negative for wheezing.    Cardiovascular: Negative for chest pain and palpitations.       BP (!) 142/80 (BP Location: Right arm, Patient Position: Sitting, BP Method: Large (Manual))   Pulse 78   Temp 98.1 °F (36.7 °C) (Oral)   Resp 16   Wt 61 kg (134 lb 7.7 oz)   SpO2 98%   BMI 23.82 kg/m²     Physical Exam   Constitutional: She appears well-developed and well-nourished.   HENT:   Head: Normocephalic and atraumatic.   Right Ear: External ear normal.   Left Ear: External ear normal.   Nose: Nose normal.   Mouth/Throat: Oropharynx is clear and moist. No oropharyngeal exudate.   Eyes: Conjunctivae and EOM are normal. Pupils are equal, round, and reactive to light. Right eye exhibits no discharge. Left eye exhibits no discharge.   Neck: Normal range of motion. Neck supple. No tracheal deviation present.   Cardiovascular: Normal rate, regular rhythm, normal heart sounds and intact distal pulses.    No murmur heard.  Pulmonary/Chest: Effort normal. She has wheezes (scattered). She has no rales.   Abdominal: Soft. Bowel sounds are normal. She exhibits no mass. There is no tenderness.   Lymphadenopathy:     She has no cervical adenopathy.   Vitals reviewed.      Assessment/Plan:  Eli was seen today for cough.    Diagnoses and all orders for this visit:    Chronic obstructive pulmonary disease, unspecified COPD type  -     levocetirizine (XYZAL) 5 MG tablet; Take 1 tablet (5 mg total) by mouth every evening.  -     montelukast (SINGULAIR) 10 mg tablet; Take 1 tablet (10 mg total) by mouth  every evening.  -     benzonatate (TESSALON) 200 MG capsule; Take 1 capsule (200 mg total) by mouth 3 (three) times daily as needed.  -     albuterol 90 mcg/actuation inhaler; Inhale 2 puffs into the lungs every 4 (four) hours as needed for Wheezing or Shortness of Breath (cough). Rescue  -     albuterol (PROVENTIL) 2.5 mg /3 mL (0.083 %) nebulizer solution; Take 3 mLs (2.5 mg total) by nebulization every 4 (four) hours as needed for Wheezing. Rescue     Discussed with patient and daughter that symptoms seem to be suboptimally controlled COPD related rather than infectious.  Will restart the patient's Singulair and Xyzal.  Patient shown how to correctly use her Combivent Respimat.  She may use albuterol in between rest B met doses if needed.  She was advised not to use both the inhaler and the nebulizer as this would be the same medication.  Patient to return to the office next week for re-evaluation.  If still symptomatic, will check a chest x-ray.  Also prescribed Tessalon for symptom relief.

## 2018-09-17 ENCOUNTER — OFFICE VISIT (OUTPATIENT)
Dept: FAMILY MEDICINE | Facility: CLINIC | Age: 73
End: 2018-09-17
Payer: MEDICARE

## 2018-09-17 VITALS
BODY MASS INDEX: 24.5 KG/M2 | SYSTOLIC BLOOD PRESSURE: 138 MMHG | HEIGHT: 63 IN | TEMPERATURE: 98 F | WEIGHT: 138.25 LBS | DIASTOLIC BLOOD PRESSURE: 80 MMHG | RESPIRATION RATE: 20 BRPM | OXYGEN SATURATION: 99 % | HEART RATE: 78 BPM

## 2018-09-17 DIAGNOSIS — G89.29 CHRONIC LEFT SHOULDER PAIN: Primary | ICD-10-CM

## 2018-09-17 DIAGNOSIS — M25.619 LIMITED RANGE OF MOTION OF SHOULDER: ICD-10-CM

## 2018-09-17 DIAGNOSIS — M25.512 CHRONIC LEFT SHOULDER PAIN: Primary | ICD-10-CM

## 2018-09-17 PROCEDURE — 99214 OFFICE O/P EST MOD 30 MIN: CPT | Mod: S$PBB,,, | Performed by: FAMILY MEDICINE

## 2018-09-17 PROCEDURE — 99999 PR PBB SHADOW E&M-EST. PATIENT-LVL IV: CPT | Mod: PBBFAC,,, | Performed by: FAMILY MEDICINE

## 2018-09-17 PROCEDURE — 1101F PT FALLS ASSESS-DOCD LE1/YR: CPT | Mod: CPTII,,, | Performed by: FAMILY MEDICINE

## 2018-09-17 PROCEDURE — 99214 OFFICE O/P EST MOD 30 MIN: CPT | Mod: PBBFAC,25,PN | Performed by: FAMILY MEDICINE

## 2018-09-18 NOTE — PROGRESS NOTES
Routine Office Visit    Patient Name: Eli Vaz    : 1945  MRN: [id number]    Subjective:  Eli is a 72 y.o. female who presents today for:   Chief Complaint   Patient presents with    Shoulder Pain     72-year-old female with end-stage renal disease, on dialysis, comes in with daughter for evaluation of a acute on chronic left shoulder pain, with recent difficulty in mobilizing the shoulder.  The patient has over a year history of left shoulder pain which has worsened over the last few months.  The however in the last few weeks it has become even worse.  In addition, the patient states that it is extremely painful to even try to move the shoulder in any direction and as such she does not even attempt to move the shoulder.  She has been to an outside orthopedist multiple times who has injected the shoulder with steroids at least on 2 or 3 occasions.  The daughter brought her in, because she wanted another opinion for management options.  The patient herself is extremely hesitant to even attempt physical therapy.  She reports that this would hurt her to much.  In addition, the patient reports that she has been taking Tylenol arthritis every 6 hr for the pain. In addition, the orthopedist prescribed her tramadol 50 mg tablets to use for severe pain. However she has not been using this because the daughter is extremely concerned with that medication.    Past Medical History  Past Medical History:   Diagnosis Date    Arthritis     COPD (chronic obstructive pulmonary disease)     Diabetes mellitus type II     Dialysis patient     Encounter for blood transfusion     ESRD (end stage renal disease)        Past Surgical History  Past Surgical History:   Procedure Laterality Date    AV FISTULA PLACEMENT      TUBAL LIGATION          Family History  Family History   Problem Relation Age of Onset    Heart attack Sister     Heart attack Sister     Heart attack Mother        Social History  Social History      Socioeconomic History    Marital status:      Spouse name: Not on file    Number of children: Not on file    Years of education: Not on file    Highest education level: Not on file   Social Needs    Financial resource strain: Not on file    Food insecurity - worry: Not on file    Food insecurity - inability: Not on file    Transportation needs - medical: Not on file    Transportation needs - non-medical: Not on file   Occupational History    Not on file   Tobacco Use    Smoking status: Former Smoker     Start date: 1/12/1991    Smokeless tobacco: Never Used   Substance and Sexual Activity    Alcohol use: No    Drug use: No    Sexual activity: Not on file   Other Topics Concern    Not on file   Social History Narrative    Not on file       Current Medications  Current Outpatient Medications on File Prior to Visit   Medication Sig Dispense Refill    ADVAIR DISKUS 250-50 mcg/dose diskus inhaler INHALE 1 PUFF INTO THE LUNGS TWICE A DAY 60 each 2    albuterol (PROVENTIL) 2.5 mg /3 mL (0.083 %) nebulizer solution Take 3 mLs (2.5 mg total) by nebulization every 4 (four) hours as needed for Wheezing. Rescue 1 Box 0    albuterol 90 mcg/actuation inhaler Inhale 2 puffs into the lungs every 4 (four) hours as needed for Wheezing or Shortness of Breath (cough). Rescue 1 Inhaler 2    amLODIPine (NORVASC) 10 MG tablet Take 1 tablet (10 mg total) by mouth once daily. 90 tablet 1    atorvastatin (LIPITOR) 10 MG tablet Take 1 tablet (10 mg total) by mouth once daily. 90 tablet 1    busPIRone (BUSPAR) 10 MG tablet Take 10 mg by mouth 2 (two) times daily.  6    fluticasone (FLONASE) 50 mcg/actuation nasal spray 1 spray by Each Nare route 2 (two) times daily. 1 Bottle 1    FOLIC ACID/VITAMIN B COMP W-C (RENAL CAPS ORAL) Take by mouth Daily.      hydrALAZINE (APRESOLINE) 25 MG tablet Take 1 tablet (25 mg total) by mouth every 12 (twelve) hours. 180 tablet 0    hydrOXYzine pamoate (VISTARIL) 25 MG Cap  "Take 1 capsule (25 mg total) by mouth every 8 (eight) hours as needed (panic attack). 30 capsule 1    ipratropium-albuterol (COMBIVENT RESPIMAT)  mcg/actuation inhaler INHALE 2 PUFF(S) INTO THE LUNGS 3 TIMES A DAY 4 g 3    levocetirizine (XYZAL) 5 MG tablet Take 1 tablet (5 mg total) by mouth every evening. 30 tablet 2    meclizine (ANTIVERT) 25 mg tablet Take 1 tablet (25 mg total) by mouth 3 (three) times daily as needed. 20 tablet 0    mirtazapine (REMERON) 7.5 MG Tab Take 7.5 mg by mouth nightly.       montelukast (SINGULAIR) 10 mg tablet Take 1 tablet (10 mg total) by mouth every evening. 30 tablet 2    SENSIPAR 60 mg Tab Take 30 mg by mouth daily with breakfast.       sevelamer carbonate (RENVELA) 800 mg Tab Take 800 mg by mouth 3 (three) times daily with meals.      traZODone (DESYREL) 50 MG tablet Take 1 tablet (50 mg total) by mouth nightly as needed for Insomnia. 30 tablet 3     No current facility-administered medications on file prior to visit.        Allergies   Review of patient's allergies indicates:  No Known Allergies    Review of Systems   Constitutional: Negative for chills and fever.   Respiratory: Negative for shortness of breath and wheezing.    Cardiovascular: Negative for chest pain and palpitations.   Musculoskeletal: Positive for arthralgias. Negative for neck pain.         /80 (BP Location: Right arm, Patient Position: Sitting, BP Method: Medium (Manual))   Pulse 78   Temp 98.1 °F (36.7 °C) (Oral)   Resp 20   Ht 5' 3" (1.6 m)   Wt 62.7 kg (138 lb 3.7 oz)   SpO2 99%   BMI 24.49 kg/m²     Physical Exam   Constitutional: She appears well-developed and well-nourished.   HENT:   Head: Normocephalic and atraumatic.   Right Ear: External ear normal.   Left Ear: External ear normal.   Nose: Nose normal.   Mouth/Throat: Oropharynx is clear and moist. No oropharyngeal exudate.   Eyes: Conjunctivae and EOM are normal. Pupils are equal, round, and reactive to light. Right " eye exhibits no discharge. Left eye exhibits no discharge.   Neck: Normal range of motion. Neck supple. No tracheal deviation present.   Cardiovascular: Normal rate, regular rhythm, normal heart sounds and intact distal pulses.   No murmur heard.  Pulses:       Radial pulses are 2+ on the right side, and 2+ on the left side.   Pulmonary/Chest: Effort normal. She has no wheezes. She has no rales.   Abdominal: Soft. Bowel sounds are normal. She exhibits no mass. There is no tenderness.   Musculoskeletal:        Left shoulder: She exhibits decreased range of motion (patient will not allow passive movement due to pain ), tenderness and pain. She exhibits no swelling, no deformity and normal pulse.        Arms:  Lymphadenopathy:     She has no cervical adenopathy.   Vitals reviewed.      Assessment/Plan:  Eli was seen today for shoulder pain.    Diagnoses and all orders for this visit:    Chronic left shoulder pain  -     X-Ray Shoulder 2 or More Views Left; Future  -     Ambulatory Referral to Physical/Occupational Therapy  -     Ambulatory referral to Orthopedics    Limited range of motion of shoulder  -     X-Ray Shoulder 2 or More Views Left; Future  -     Ambulatory Referral to Physical/Occupational Therapy  -     Ambulatory referral to Orthopedics     Discussed with patient and daughter, my concern for advanced arthritis and or calcification in the joint and tendons in the joint.  Also discussed risks of repeated joint injections.  I agreed with the daughter that patient should have a 2nd opinion in regards to management options and we will make an appointment for Orthopedics.  I did encourage the patient to use icy Hot or Stepan-Thomas, and to use Tylenol arthritis every 8 hours, because of her kidney function, an attempt to mobilize the shoulder to help break up any calcification.   Will get an x-ray today, and will call her tomorrow with the results.      This office note has been dictated.  This dictation has been  generated using M-Modal Fluency Direct dictation; some phonetic errors may occur.

## 2018-09-19 ENCOUNTER — OFFICE VISIT (OUTPATIENT)
Dept: ORTHOPEDICS | Facility: CLINIC | Age: 73
End: 2018-09-19
Payer: MEDICARE

## 2018-09-19 VITALS
DIASTOLIC BLOOD PRESSURE: 60 MMHG | WEIGHT: 135.5 LBS | SYSTOLIC BLOOD PRESSURE: 130 MMHG | BODY MASS INDEX: 24.01 KG/M2 | HEIGHT: 63 IN | HEART RATE: 80 BPM

## 2018-09-19 DIAGNOSIS — M12.812 LEFT ROTATOR CUFF TEAR ARTHROPATHY: Primary | ICD-10-CM

## 2018-09-19 DIAGNOSIS — M75.102 LEFT ROTATOR CUFF TEAR ARTHROPATHY: Primary | ICD-10-CM

## 2018-09-19 PROCEDURE — 99499 UNLISTED E&M SERVICE: CPT | Mod: S$GLB,,, | Performed by: ORTHOPAEDIC SURGERY

## 2018-09-19 PROCEDURE — 99203 OFFICE O/P NEW LOW 30 MIN: CPT | Mod: S$PBB,,, | Performed by: ORTHOPAEDIC SURGERY

## 2018-09-19 PROCEDURE — 1101F PT FALLS ASSESS-DOCD LE1/YR: CPT | Mod: CPTII,,, | Performed by: ORTHOPAEDIC SURGERY

## 2018-09-19 PROCEDURE — 99214 OFFICE O/P EST MOD 30 MIN: CPT | Mod: PBBFAC,PN | Performed by: ORTHOPAEDIC SURGERY

## 2018-09-19 PROCEDURE — 99999 PR PBB SHADOW E&M-EST. PATIENT-LVL IV: CPT | Mod: PBBFAC,,, | Performed by: ORTHOPAEDIC SURGERY

## 2018-09-19 NOTE — LETTER
September 19, 2018      Charles Moody Jr., MD  [address]  [place] [address]           [facility]  [address]  [place] [address]  Phone: [phone]          Patient: Eli Vaz   MR Number: [id number]   YOB: 1945   Date of Visit: 9/19/2018       Dear Dr. Charles Moody Jr.:    Thank you for referring Eli Vaz to me for evaluation. Attached you will find relevant portions of my assessment and plan of care.    If you have questions, please do not hesitate to call me. I look forward to following Eli Vaz along with you.    Sincerely,    Erica Zurita MD    Enclosure  CC:  No Recipients    If you would like to receive this communication electronically, please contact [email or web address] or [phone] to request more information on [employer] access.    For providers and/or their staff who would like to refer a patient to [facility], please contact us through our one-stop-shop provider referral line, [facility], at [phone].    If you feel you have received this communication in error or would no longer like to receive these types of communications, please e-mail [email or web address]

## 2018-09-19 NOTE — H&P
CC: left shoulder pain      This patient was seen in consultation at the request of Chiara Nelson MD     HPI: Eli Vaz is a 72 y.o. female who presents today complaining of left shoulder pain for several years. Onset of pain was not associated with injury or new activities.  It has worsened over the last few months.   The pain is constant, 8/10, oliver snot radiate and is not associated with numbness, paresthesias or neck pain .  Aggravating factors include any motion of the arm and relieving factors include keeping the arm still.    She has been treated in the past with steroid injection  which did give here relief however her last injection 2 weeks ago did not relieve her pain at all.   The pain does interfere with activities of daily living  including personal hygiene, cooking and grooming. She has CKD stage III and is on HD for this.  Because of this she is unable to take NSAIDs. She does get some relief from tramadol and tylenol which were prescribed by her PCP. She has not tried PT and is not interested in doing so.    This is the extent of the patient's complaints at this time.     Hand dominance: Right     Review of Systems   Constitutional: Negative.    HENT: Negative.    Eyes: Negative.    Respiratory: Positive for cough and shortness of breath.         Has COPD   Cardiovascular: Negative.    Gastrointestinal: Negative.    Genitourinary: Negative.    Musculoskeletal: Positive for joint pain. Negative for myalgias and neck pain.   Skin: Negative.    Neurological: Negative.    Endo/Heme/Allergies: Bruises/bleeds easily.   Psychiatric/Behavioral: Negative.          Review of patient's allergies indicates:  No Known Allergies    Current Outpatient Medications:     ADVAIR DISKUS 250-50 mcg/dose diskus inhaler, INHALE 1 PUFF INTO THE LUNGS TWICE A DAY, Disp: 60 each, Rfl: 2    albuterol (PROVENTIL) 2.5 mg /3 mL (0.083 %) nebulizer solution, Take 3 mLs (2.5 mg total) by nebulization every 4 (four) hours as  needed for Wheezing. Rescue, Disp: 1 Box, Rfl: 0    albuterol 90 mcg/actuation inhaler, Inhale 2 puffs into the lungs every 4 (four) hours as needed for Wheezing or Shortness of Breath (cough). Rescue, Disp: 1 Inhaler, Rfl: 2    amLODIPine (NORVASC) 10 MG tablet, Take 1 tablet (10 mg total) by mouth once daily., Disp: 90 tablet, Rfl: 1    atorvastatin (LIPITOR) 10 MG tablet, Take 1 tablet (10 mg total) by mouth once daily., Disp: 90 tablet, Rfl: 1    busPIRone (BUSPAR) 10 MG tablet, Take 10 mg by mouth 2 (two) times daily., Disp: , Rfl: 6    fluticasone (FLONASE) 50 mcg/actuation nasal spray, 1 spray by Each Nare route 2 (two) times daily., Disp: 1 Bottle, Rfl: 1    FOLIC ACID/VITAMIN B COMP W-C (RENAL CAPS ORAL), Take by mouth Daily., Disp: , Rfl:     hydrALAZINE (APRESOLINE) 25 MG tablet, Take 1 tablet (25 mg total) by mouth every 12 (twelve) hours., Disp: 180 tablet, Rfl: 0    hydrOXYzine pamoate (VISTARIL) 25 MG Cap, Take 1 capsule (25 mg total) by mouth every 8 (eight) hours as needed (panic attack)., Disp: 30 capsule, Rfl: 1    ipratropium-albuterol (COMBIVENT RESPIMAT)  mcg/actuation inhaler, INHALE 2 PUFF(S) INTO THE LUNGS 3 TIMES A DAY, Disp: 4 g, Rfl: 3    levocetirizine (XYZAL) 5 MG tablet, Take 1 tablet (5 mg total) by mouth every evening., Disp: 30 tablet, Rfl: 2    meclizine (ANTIVERT) 25 mg tablet, Take 1 tablet (25 mg total) by mouth 3 (three) times daily as needed., Disp: 20 tablet, Rfl: 0    mirtazapine (REMERON) 7.5 MG Tab, Take 7.5 mg by mouth nightly. , Disp: , Rfl:     montelukast (SINGULAIR) 10 mg tablet, Take 1 tablet (10 mg total) by mouth every evening., Disp: 30 tablet, Rfl: 2    SENSIPAR 60 mg Tab, Take 30 mg by mouth daily with breakfast. , Disp: , Rfl:     sevelamer carbonate (RENVELA) 800 mg Tab, Take 800 mg by mouth 3 (three) times daily with meals., Disp: , Rfl:     traZODone (DESYREL) 50 MG tablet, Take 1 tablet (50 mg total) by mouth nightly as needed for  Insomnia., Disp: 30 tablet, Rfl: 3  Past Medical History:   Diagnosis Date    Arthritis     COPD (chronic obstructive pulmonary disease)     Diabetes mellitus type II     Dialysis patient     Encounter for blood transfusion     ESRD (end stage renal disease)      Social History     Tobacco Use    Smoking status: Former Smoker     Start date: 1/12/1991    Smokeless tobacco: Never Used   Substance Use Topics    Alcohol use: No    Drug use: No     Family History   Problem Relation Age of Onset    Heart attack Sister     Heart attack Sister     Heart attack Mother        Physical Exam:     Vitals:    09/19/18 1114   BP: 130/60   Pulse: 80           General: Weight: 61.5 kg (135 lb 8 oz) Body mass index is 24 kg/m².  Patient is alert, awake and oriented to time, place and person. Mood and affect are appropriate.  Patient does not appear to be in any distress, denies any constitutional symptoms and appears stated age.   HEENT: Pupils are equal and round, sclera are not injected. External examination of ears and nose reveals no abnormalities. Cranial nerves II-X are grossly intact  Neck: examination demonstrates limited painless active range of motion. Spurling's sign is negative  Skin: no rashes, abrasions or open wounds on the affected extremity   Resp: No respiratory distress or audible wheezing   CV: 2+  pulses, all extremities warm and well perfused   Left Shoulder   AROM:  FE 10, ER 0, IR flank. PROM: FE 20 ER 0  Unable to assess cuff due to signficant limitations in ROM   All three heads of the deltoid fire   + TTP over the lateral shoulder   + fistula L arm   ltsi m/u/r  2+ RP  + epl, io, fds, fdp      Imaging: 3 views of the left shoulder show advanced rotator cuff arthropathy. There is complete loss of glenohumeral joint space. There is elevation of the humeral head indicative of rotator cuff dysfunction. There is no AP view to assess posterior subluxation or wear.  Several vascular stents are  visible as well.      Assessment: 72 y.o. female with left rotator cuff arthropathy     Plan:   - I discussed treatment options with the patient and he daughter.  She is unable to take NSADIs due to CKD. Steroid injections have stopped being effective. PT likely would worsen her pain and give limited benefit.  She is also not a good surgical candidate due to medical comorbidities.   Continue tramadol and tylenol. I offered viscosupplementation given the lack of other treatment options and she and her daughter would like to give it a try  - We will call once we hear back from the insurance company regarding the synvisc.  - If Synvisc is not successful, will consider referral to pain management     All questions were answered in detail. The patient is in full agreement with the treatment plan and will proceed accordingly.

## 2018-09-26 ENCOUNTER — OFFICE VISIT (OUTPATIENT)
Dept: FAMILY MEDICINE | Facility: CLINIC | Age: 73
End: 2018-09-26
Payer: MEDICARE

## 2018-09-26 VITALS
HEIGHT: 63 IN | WEIGHT: 138.25 LBS | DIASTOLIC BLOOD PRESSURE: 70 MMHG | RESPIRATION RATE: 20 BRPM | HEART RATE: 73 BPM | SYSTOLIC BLOOD PRESSURE: 130 MMHG | BODY MASS INDEX: 24.5 KG/M2 | OXYGEN SATURATION: 99 % | TEMPERATURE: 98 F

## 2018-09-26 DIAGNOSIS — N18.6 ESRD ON DIALYSIS: ICD-10-CM

## 2018-09-26 DIAGNOSIS — Z12.11 SCREEN FOR COLON CANCER: ICD-10-CM

## 2018-09-26 DIAGNOSIS — Z99.2 ESRD ON DIALYSIS: ICD-10-CM

## 2018-09-26 DIAGNOSIS — N18.6 DIABETES MELLITUS WITH ESRD (END-STAGE RENAL DISEASE): Primary | Chronic | ICD-10-CM

## 2018-09-26 DIAGNOSIS — D63.8 ANEMIA OF CHRONIC DISEASE: ICD-10-CM

## 2018-09-26 DIAGNOSIS — E11.22 DIABETES MELLITUS WITH ESRD (END-STAGE RENAL DISEASE): Primary | Chronic | ICD-10-CM

## 2018-09-26 DIAGNOSIS — D69.6 THROMBOCYTOPENIA: ICD-10-CM

## 2018-09-26 DIAGNOSIS — I10 ESSENTIAL HYPERTENSION: ICD-10-CM

## 2018-09-26 PROCEDURE — 99499 UNLISTED E&M SERVICE: CPT | Mod: S$GLB,,, | Performed by: FAMILY MEDICINE

## 2018-09-26 PROCEDURE — 99214 OFFICE O/P EST MOD 30 MIN: CPT | Mod: S$PBB,,, | Performed by: FAMILY MEDICINE

## 2018-09-26 PROCEDURE — 99999 PR PBB SHADOW E&M-EST. PATIENT-LVL III: CPT | Mod: PBBFAC,,, | Performed by: FAMILY MEDICINE

## 2018-09-26 PROCEDURE — 99213 OFFICE O/P EST LOW 20 MIN: CPT | Mod: PBBFAC,PN | Performed by: FAMILY MEDICINE

## 2018-09-26 PROCEDURE — 1101F PT FALLS ASSESS-DOCD LE1/YR: CPT | Mod: CPTII,,, | Performed by: FAMILY MEDICINE

## 2018-09-26 NOTE — PROGRESS NOTES
..FitKit was given to patient on 9/26/2018 2:41 PM     Pt given fit kit and verbalized understanding instructions

## 2018-09-28 ENCOUNTER — CLINICAL SUPPORT (OUTPATIENT)
Dept: OPHTHALMOLOGY | Facility: CLINIC | Age: 73
End: 2018-09-28
Attending: FAMILY MEDICINE
Payer: MEDICARE

## 2018-09-28 ENCOUNTER — LAB VISIT (OUTPATIENT)
Dept: LAB | Facility: HOSPITAL | Age: 73
End: 2018-09-28
Attending: FAMILY MEDICINE
Payer: MEDICARE

## 2018-09-28 DIAGNOSIS — D69.6 THROMBOCYTOPENIA: ICD-10-CM

## 2018-09-28 DIAGNOSIS — H35.89 RETINAL NERVE FIBER BUNDLE DEFECTS: Primary | ICD-10-CM

## 2018-09-28 DIAGNOSIS — I10 ESSENTIAL HYPERTENSION: ICD-10-CM

## 2018-09-28 DIAGNOSIS — N18.6 DIABETES MELLITUS WITH ESRD (END-STAGE RENAL DISEASE): Chronic | ICD-10-CM

## 2018-09-28 DIAGNOSIS — E11.22 DIABETES MELLITUS WITH ESRD (END-STAGE RENAL DISEASE): Chronic | ICD-10-CM

## 2018-09-28 DIAGNOSIS — E11.22 DIABETES MELLITUS WITH ESRD (END-STAGE RENAL DISEASE): ICD-10-CM

## 2018-09-28 DIAGNOSIS — N18.6 DIABETES MELLITUS WITH ESRD (END-STAGE RENAL DISEASE): ICD-10-CM

## 2018-09-28 LAB
ALBUMIN SERPL BCP-MCNC: 3.6 G/DL
ALP SERPL-CCNC: 129 U/L
ALT SERPL W/O P-5'-P-CCNC: 10 U/L
ANION GAP SERPL CALC-SCNC: 12 MMOL/L
AST SERPL-CCNC: 25 U/L
BASOPHILS # BLD AUTO: 0.03 K/UL
BASOPHILS NFR BLD: 1 %
BILIRUB SERPL-MCNC: 0.6 MG/DL
BUN SERPL-MCNC: 38 MG/DL
CALCIUM SERPL-MCNC: 9.5 MG/DL
CHLORIDE SERPL-SCNC: 98 MMOL/L
CHOLEST SERPL-MCNC: 215 MG/DL
CHOLEST/HDLC SERPL: 2 {RATIO}
CO2 SERPL-SCNC: 30 MMOL/L
CREAT SERPL-MCNC: 7.1 MG/DL
DIFFERENTIAL METHOD: ABNORMAL
EOSINOPHIL # BLD AUTO: 0.2 K/UL
EOSINOPHIL NFR BLD: 5.9 %
ERYTHROCYTE [DISTWIDTH] IN BLOOD BY AUTOMATED COUNT: 13.2 %
EST. GFR  (AFRICAN AMERICAN): 6.1 ML/MIN/1.73 M^2
EST. GFR  (NON AFRICAN AMERICAN): 5.3 ML/MIN/1.73 M^2
ESTIMATED AVG GLUCOSE: 85 MG/DL
GLUCOSE SERPL-MCNC: 82 MG/DL
HBA1C MFR BLD HPLC: 4.6 %
HCT VFR BLD AUTO: 32.1 %
HDLC SERPL-MCNC: 109 MG/DL
HDLC SERPL: 50.7 %
HGB BLD-MCNC: 10.2 G/DL
IMM GRANULOCYTES # BLD AUTO: 0.01 K/UL
IMM GRANULOCYTES NFR BLD AUTO: 0.3 %
LDLC SERPL CALC-MCNC: 96.2 MG/DL
LYMPHOCYTES # BLD AUTO: 1.2 K/UL
LYMPHOCYTES NFR BLD: 37.8 %
MCH RBC QN AUTO: 31.3 PG
MCHC RBC AUTO-ENTMCNC: 31.8 G/DL
MCV RBC AUTO: 99 FL
MONOCYTES # BLD AUTO: 0.5 K/UL
MONOCYTES NFR BLD: 14.8 %
NEUTROPHILS # BLD AUTO: 1.2 K/UL
NEUTROPHILS NFR BLD: 40.2 %
NONHDLC SERPL-MCNC: 106 MG/DL
NRBC BLD-RTO: 0 /100 WBC
PLATELET # BLD AUTO: 158 K/UL
PMV BLD AUTO: 11.4 FL
POTASSIUM SERPL-SCNC: 5.2 MMOL/L
PROT SERPL-MCNC: 7.3 G/DL
RBC # BLD AUTO: 3.26 M/UL
SODIUM SERPL-SCNC: 140 MMOL/L
TRIGL SERPL-MCNC: 49 MG/DL
WBC # BLD AUTO: 3.04 K/UL

## 2018-09-28 PROCEDURE — 99999 PR PBB SHADOW E&M-EST. PATIENT-LVL I: CPT | Mod: PBBFAC,,,

## 2018-09-28 PROCEDURE — 80053 COMPREHEN METABOLIC PANEL: CPT

## 2018-09-28 PROCEDURE — 92250 FUNDUS PHOTOGRAPHY W/I&R: CPT | Mod: 26,S$PBB,, | Performed by: OPHTHALMOLOGY

## 2018-09-28 PROCEDURE — 80061 LIPID PANEL: CPT

## 2018-09-28 PROCEDURE — 99211 OFF/OP EST MAY X REQ PHY/QHP: CPT | Mod: PBBFAC,PO,25

## 2018-09-28 PROCEDURE — 36415 COLL VENOUS BLD VENIPUNCTURE: CPT | Mod: PO

## 2018-09-28 PROCEDURE — 83036 HEMOGLOBIN GLYCOSYLATED A1C: CPT

## 2018-09-28 PROCEDURE — 85025 COMPLETE CBC W/AUTO DIFF WBC: CPT

## 2018-09-28 PROCEDURE — 92250 FUNDUS PHOTOGRAPHY W/I&R: CPT | Mod: PBBFAC,PO

## 2018-09-28 NOTE — PROGRESS NOTES
HPI     71 y/o here for screening for diabetic retinopathy with non-dilated   fundus photos per dr Moody. Photos taken by Adilene Morales.     Last edited by Suzie Samuels on 9/28/2018 10:54 AM. (History)            Assessment /Plan     For exam results, see Encounter Report.    Diabetes mellitus with ESRD (end-stage renal disease)  -     Diabetic Eye Screening Photo      Please see dr. Rojo progress note for interpretation.

## 2018-10-02 ENCOUNTER — TELEPHONE (OUTPATIENT)
Dept: ORTHOPEDICS | Facility: CLINIC | Age: 73
End: 2018-10-02

## 2018-10-02 DIAGNOSIS — J45.21 MILD INTERMITTENT REACTIVE AIRWAY DISEASE WITH ACUTE EXACERBATION: ICD-10-CM

## 2018-10-02 DIAGNOSIS — J44.9 CHRONIC OBSTRUCTIVE PULMONARY DISEASE, UNSPECIFIED COPD TYPE: ICD-10-CM

## 2018-10-02 RX ORDER — IPRATROPIUM BROMIDE AND ALBUTEROL 20; 100 UG/1; UG/1
SPRAY, METERED RESPIRATORY (INHALATION)
Qty: 4 G | Refills: 3 | OUTPATIENT
Start: 2018-10-02

## 2018-10-02 NOTE — TELEPHONE ENCOUNTER
Spoken with the patient daughter about her mother injection for her shoulder still waiting for the authorization. Will call once it been approval.

## 2018-10-03 DIAGNOSIS — I15.2 HYPERTENSION ASSOCIATED WITH DIABETES: Chronic | ICD-10-CM

## 2018-10-03 DIAGNOSIS — E11.59 HYPERTENSION ASSOCIATED WITH DIABETES: Chronic | ICD-10-CM

## 2018-10-03 RX ORDER — LEVOCETIRIZINE DIHYDROCHLORIDE 5 MG/1
TABLET, FILM COATED ORAL
Qty: 30 TABLET | Refills: 2 | OUTPATIENT
Start: 2018-10-03

## 2018-10-03 RX ORDER — AMLODIPINE BESYLATE 10 MG/1
10 TABLET ORAL DAILY
Qty: 90 TABLET | Refills: 1 | Status: ON HOLD | OUTPATIENT
Start: 2018-10-03 | End: 2019-03-25 | Stop reason: HOSPADM

## 2018-10-03 NOTE — TELEPHONE ENCOUNTER
Contacted patient to inform her that the refill request for Xyzal was denied due to it being contraindicated by her being on dialysis at this time. Singulair is ok to take. Patient verbalized understanding.     Contacted pharmacy to inform them of reason for medication denial.

## 2018-10-03 NOTE — TELEPHONE ENCOUNTER
Please let patient know that since she is on dialysis, Xyzal is contraindicated and cannot be refilled, also let pharmacy know.   Can continue Singulair

## 2018-10-10 ENCOUNTER — TELEPHONE (OUTPATIENT)
Dept: ORTHOPEDICS | Facility: CLINIC | Age: 73
End: 2018-10-10

## 2018-10-10 ENCOUNTER — TELEPHONE (OUTPATIENT)
Dept: FAMILY MEDICINE | Facility: CLINIC | Age: 73
End: 2018-10-10

## 2018-10-10 NOTE — TELEPHONE ENCOUNTER
Spoken with the daughter of Mrs. Vaz to let her know that are schedule with appt on 10/11/18 for injection ( Synvisc ) to the shoulder.

## 2018-10-10 NOTE — TELEPHONE ENCOUNTER
----- Message from Chaparrita Cormier sent at 10/9/2018 12:27 PM CDT -----  Contact: Sukhdev  sister  [phone]  Pt sister is requesting to speak to you regarding shoulder pain, he pain meds is not working. Pls call pt sister  [phone]. Thanks.....HANNA

## 2018-10-10 NOTE — TELEPHONE ENCOUNTER
Spoken with the patient daughter about Mrs. Vaz pain in her shoulder, Dr. Zurita is in surgery and that I will send a message for her to call Mrs. Vaz to see what will the next treatment for her.

## 2018-10-12 ENCOUNTER — TELEPHONE (OUTPATIENT)
Dept: FAMILY MEDICINE | Facility: CLINIC | Age: 73
End: 2018-10-12

## 2018-10-12 NOTE — TELEPHONE ENCOUNTER
PETER for patient's daughter cell. Re: questions she had about her mother's injection appt scheduled with Dr Zurita.     Daughter needs to contact Dr. Zurita's office with questions.

## 2018-10-12 NOTE — TELEPHONE ENCOUNTER
----- Message from Mira Green sent at 10/12/2018 11:21 AM CDT -----  Contact: pt's daughter huy [phone]            Name of Who is Calling: pt's daughter      What is the request in detail: pt's daughter following up on injection that was ordered. Call huy      Can the clinic reply by [facility]: no      What Number to Call Back if not in [facility]: [phone]

## 2018-10-15 ENCOUNTER — OFFICE VISIT (OUTPATIENT)
Dept: ORTHOPEDICS | Facility: CLINIC | Age: 73
End: 2018-10-15
Payer: MEDICARE

## 2018-10-15 VITALS
HEART RATE: 77 BPM | BODY MASS INDEX: 24.22 KG/M2 | HEIGHT: 63 IN | DIASTOLIC BLOOD PRESSURE: 60 MMHG | SYSTOLIC BLOOD PRESSURE: 150 MMHG | WEIGHT: 136.69 LBS

## 2018-10-15 DIAGNOSIS — M12.812 LEFT ROTATOR CUFF TEAR ARTHROPATHY: Primary | ICD-10-CM

## 2018-10-15 DIAGNOSIS — J45.21 MILD INTERMITTENT REACTIVE AIRWAY DISEASE WITH ACUTE EXACERBATION: ICD-10-CM

## 2018-10-15 DIAGNOSIS — M75.102 LEFT ROTATOR CUFF TEAR ARTHROPATHY: Primary | ICD-10-CM

## 2018-10-15 PROCEDURE — 99999 PR PBB SHADOW E&M-EST. PATIENT-LVL III: CPT | Mod: PBBFAC,,, | Performed by: ORTHOPAEDIC SURGERY

## 2018-10-15 PROCEDURE — 99499 UNLISTED E&M SERVICE: CPT | Mod: S$PBB,,, | Performed by: ORTHOPAEDIC SURGERY

## 2018-10-15 PROCEDURE — 20610 DRAIN/INJ JOINT/BURSA W/O US: CPT | Mod: PBBFAC,PN | Performed by: ORTHOPAEDIC SURGERY

## 2018-10-15 PROCEDURE — 99213 OFFICE O/P EST LOW 20 MIN: CPT | Mod: PBBFAC,PN,25 | Performed by: ORTHOPAEDIC SURGERY

## 2018-10-15 PROCEDURE — 20611 DRAIN/INJ JOINT/BURSA W/US: CPT | Mod: PBBFAC,PN | Performed by: ORTHOPAEDIC SURGERY

## 2018-10-15 RX ORDER — IPRATROPIUM BROMIDE AND ALBUTEROL 20; 100 UG/1; UG/1
SPRAY, METERED RESPIRATORY (INHALATION)
Qty: 4 G | Refills: 3 | OUTPATIENT
Start: 2018-10-15

## 2018-10-15 RX ADMIN — Medication 48 MG: at 11:10

## 2018-10-15 NOTE — PROCEDURES
Large Joint Aspiration/Injection: L glenohumeral  Date/Time: 10/15/2018 11:44 AM  Performed by: Erica Zurita MD  Authorized by: Erica Zurita MD     Consent Done?:  Yes (Verbal)  Indications:  Pain    Location:  Shoulder  Site:  L glenohumeral  Prep: Patient was prepped and draped in usual sterile fashion    Ultrasonic Guidance for needle placement: Yes  Images are saved and documented.  Needle size:  18 G  Approach:  Posterior  Medication group details: synvisc one.  Patient tolerance:  Patient tolerated the procedure well with no immediate complications

## 2018-11-06 DIAGNOSIS — J44.9 CHRONIC OBSTRUCTIVE PULMONARY DISEASE, UNSPECIFIED COPD TYPE: ICD-10-CM

## 2018-11-07 RX ORDER — ALBUTEROL SULFATE 90 UG/1
AEROSOL, METERED RESPIRATORY (INHALATION)
Qty: 18 INHALER | Refills: 2 | Status: SHIPPED | OUTPATIENT
Start: 2018-11-07 | End: 2019-04-05 | Stop reason: SDUPTHER

## 2018-11-15 ENCOUNTER — OFFICE VISIT (OUTPATIENT)
Dept: ORTHOPEDICS | Facility: CLINIC | Age: 73
End: 2018-11-15
Payer: MEDICARE

## 2018-11-15 VITALS
HEIGHT: 63 IN | SYSTOLIC BLOOD PRESSURE: 160 MMHG | HEART RATE: 101 BPM | BODY MASS INDEX: 24.3 KG/M2 | WEIGHT: 137.13 LBS | DIASTOLIC BLOOD PRESSURE: 60 MMHG

## 2018-11-15 DIAGNOSIS — M12.812 LEFT ROTATOR CUFF TEAR ARTHROPATHY: Primary | ICD-10-CM

## 2018-11-15 DIAGNOSIS — M75.102 LEFT ROTATOR CUFF TEAR ARTHROPATHY: Primary | ICD-10-CM

## 2018-11-15 PROCEDURE — 99213 OFFICE O/P EST LOW 20 MIN: CPT | Mod: S$GLB,,, | Performed by: ORTHOPAEDIC SURGERY

## 2018-11-15 PROCEDURE — 99999 PR PBB SHADOW E&M-EST. PATIENT-LVL III: CPT | Mod: PBBFAC,,, | Performed by: ORTHOPAEDIC SURGERY

## 2018-11-15 PROCEDURE — 1101F PT FALLS ASSESS-DOCD LE1/YR: CPT | Mod: CPTII,S$GLB,, | Performed by: ORTHOPAEDIC SURGERY

## 2018-11-15 NOTE — PROGRESS NOTES
Follow up visit    History of Present Illness:   Eli comes to the office for follow up evaluation of left shoulder. She had a synvisc one shot about a month ago.  She did get good relief with the shot in terms of pain relief. She did not get improvement in motion.  Her pain has started to return and she is here to discuss further treatment options     ROS: unremarkable and no change since last visit    Physical Examination:    NAD  Left Shoulder   No evidence of cellulitis, infection  AROM:  FE 10, ER 0, IR flank. PROM: FE 20 ER 0  + TTP over the lateral shoulder   + fistula L arm   ltsi m/u/r  2+ RP  + epl, io, fds, fdp     Radiographic imaging: no new imaging     Assessment/Plan:  Left rotator cuff arthropathy   ESRD on HD    We discussed the etiology of persistent pain and  treatment options.  1. I do not have further treatment to offer her at the moment -- RSA in a patient on HD carries high risk of complications. She is unable to take NSAIDs due to ESRD and I do not believe that PT would be helpful.   2. We can try another synvisc injection in 2 months -- if she would like to proceed with this injection she should call the office and we will get it set up for her.   3. Continue ultram and OTC tylenol     All questions were answered in detail. The patient  verbalized the understanding of the treatment plan and is in full agreement with the treatment plan.

## 2018-11-19 ENCOUNTER — TELEPHONE (OUTPATIENT)
Dept: ORTHOPEDICS | Facility: CLINIC | Age: 73
End: 2018-11-19

## 2018-11-19 DIAGNOSIS — M75.102 LEFT ROTATOR CUFF TEAR ARTHROPATHY: Primary | ICD-10-CM

## 2018-11-19 DIAGNOSIS — M12.812 LEFT ROTATOR CUFF TEAR ARTHROPATHY: Primary | ICD-10-CM

## 2018-12-10 ENCOUNTER — PATIENT OUTREACH (OUTPATIENT)
Dept: ADMINISTRATIVE | Facility: HOSPITAL | Age: 73
End: 2018-12-10

## 2019-01-15 ENCOUNTER — OFFICE VISIT (OUTPATIENT)
Dept: ORTHOPEDICS | Facility: CLINIC | Age: 74
End: 2019-01-15
Payer: MEDICARE

## 2019-01-15 ENCOUNTER — TELEPHONE (OUTPATIENT)
Dept: FAMILY MEDICINE | Facility: CLINIC | Age: 74
End: 2019-01-15

## 2019-01-15 VITALS
BODY MASS INDEX: 23.99 KG/M2 | SYSTOLIC BLOOD PRESSURE: 120 MMHG | HEART RATE: 66 BPM | WEIGHT: 135.38 LBS | HEIGHT: 63 IN | DIASTOLIC BLOOD PRESSURE: 60 MMHG

## 2019-01-15 DIAGNOSIS — Z12.31 ENCOUNTER FOR SCREENING MAMMOGRAM FOR BREAST CANCER: Primary | ICD-10-CM

## 2019-01-15 DIAGNOSIS — M12.812 LEFT ROTATOR CUFF TEAR ARTHROPATHY: Primary | ICD-10-CM

## 2019-01-15 DIAGNOSIS — M75.102 LEFT ROTATOR CUFF TEAR ARTHROPATHY: Primary | ICD-10-CM

## 2019-01-15 PROCEDURE — 99499 UNLISTED E&M SERVICE: CPT | Mod: S$GLB,,, | Performed by: ORTHOPAEDIC SURGERY

## 2019-01-15 PROCEDURE — 99999 PR PBB SHADOW E&M-EST. PATIENT-LVL IV: CPT | Mod: PBBFAC,,, | Performed by: ORTHOPAEDIC SURGERY

## 2019-01-15 PROCEDURE — 99212 PR OFFICE/OUTPT VISIT, EST, LEVL II, 10-19 MIN: ICD-10-PCS | Mod: S$GLB,,, | Performed by: ORTHOPAEDIC SURGERY

## 2019-01-15 PROCEDURE — 99212 OFFICE O/P EST SF 10 MIN: CPT | Mod: S$GLB,,, | Performed by: ORTHOPAEDIC SURGERY

## 2019-01-15 PROCEDURE — 1101F PT FALLS ASSESS-DOCD LE1/YR: CPT | Mod: CPTII,S$GLB,, | Performed by: ORTHOPAEDIC SURGERY

## 2019-01-15 PROCEDURE — 99999 PR PBB SHADOW E&M-EST. PATIENT-LVL IV: ICD-10-PCS | Mod: PBBFAC,,, | Performed by: ORTHOPAEDIC SURGERY

## 2019-01-15 PROCEDURE — 99499 RISK ADDL DX/OHS AUDIT: ICD-10-PCS | Mod: S$GLB,,, | Performed by: ORTHOPAEDIC SURGERY

## 2019-01-15 PROCEDURE — 1101F PR PT FALLS ASSESS DOC 0-1 FALLS W/OUT INJ PAST YR: ICD-10-PCS | Mod: CPTII,S$GLB,, | Performed by: ORTHOPAEDIC SURGERY

## 2019-01-15 NOTE — TELEPHONE ENCOUNTER
----- Message from Ann Williamson sent at 1/15/2019 12:09 PM CST -----  Contact: Pt in person  Pt would like to have a mammogram done. Please call [phone] once order is in.

## 2019-01-16 NOTE — PROGRESS NOTES
Patient Eli Vaz, MRN [id number], was dependent on dialysis (ICD10 Z99.2) at the time of this visit on 1/15/19. This addendum is made to the medical record on 01/16/2019.

## 2019-01-16 NOTE — PROGRESS NOTES
Follow up visit    History of Present Illness:   Eli comes to the office for follow up evaluation of her left shoulder.  She reports that she got about 2 weeks of relief from the synvisc shot. Her daughter disagrees and states that it did not seem to be helpful. She never was scheduled with Dr. Barton     ROS: unremarkable and no change since last visit    Physical Examination:    NAD  No change in exam    Radiographic imaging: no new imaging     Assessment/Plan:  1. Left rotator cuff arthropathy   2. CKD on HD    We discussed the etiology of persistent pain and further treatment options.  1. She did not get much relief with the synvisc injection so I did not recommend repeating it   2. We will assist with getting her a pain management appt before she leaves today     All questions were answered in detail. The patient  verbalized the understanding of the treatment plan and is in full agreement with the treatment plan.

## 2019-01-18 ENCOUNTER — TELEPHONE (OUTPATIENT)
Dept: PAIN MEDICINE | Facility: CLINIC | Age: 74
End: 2019-01-18

## 2019-01-18 NOTE — TELEPHONE ENCOUNTER
Message left reminding patient of Pain Management appointment scheduled for Monday at 9a with Dr. Barton.  Location information also included.

## 2019-01-21 ENCOUNTER — OFFICE VISIT (OUTPATIENT)
Dept: PAIN MEDICINE | Facility: CLINIC | Age: 74
End: 2019-01-21
Payer: MEDICARE

## 2019-01-21 ENCOUNTER — LAB VISIT (OUTPATIENT)
Dept: LAB | Facility: HOSPITAL | Age: 74
End: 2019-01-21
Attending: PAIN MEDICINE
Payer: MEDICARE

## 2019-01-21 VITALS
WEIGHT: 140.19 LBS | BODY MASS INDEX: 24.84 KG/M2 | HEART RATE: 65 BPM | OXYGEN SATURATION: 94 % | SYSTOLIC BLOOD PRESSURE: 192 MMHG | DIASTOLIC BLOOD PRESSURE: 76 MMHG | RESPIRATION RATE: 17 BRPM | HEIGHT: 63 IN

## 2019-01-21 DIAGNOSIS — G89.4 CHRONIC PAIN DISORDER: ICD-10-CM

## 2019-01-21 DIAGNOSIS — Z99.2 ESRD ON DIALYSIS: Primary | ICD-10-CM

## 2019-01-21 DIAGNOSIS — M75.102 LEFT ROTATOR CUFF TEAR ARTHROPATHY: ICD-10-CM

## 2019-01-21 DIAGNOSIS — M12.812 LEFT ROTATOR CUFF TEAR ARTHROPATHY: ICD-10-CM

## 2019-01-21 DIAGNOSIS — N18.6 ESRD ON DIALYSIS: Primary | ICD-10-CM

## 2019-01-21 DIAGNOSIS — N18.6 ESRD ON DIALYSIS: ICD-10-CM

## 2019-01-21 DIAGNOSIS — Z99.2 ESRD ON DIALYSIS: ICD-10-CM

## 2019-01-21 PROCEDURE — 99999 PR PBB SHADOW E&M-EST. PATIENT-LVL III: ICD-10-PCS | Mod: PBBFAC,,, | Performed by: PAIN MEDICINE

## 2019-01-21 PROCEDURE — 1101F PR PT FALLS ASSESS DOC 0-1 FALLS W/OUT INJ PAST YR: ICD-10-PCS | Mod: CPTII,S$GLB,, | Performed by: PAIN MEDICINE

## 2019-01-21 PROCEDURE — 99204 PR OFFICE/OUTPT VISIT, NEW, LEVL IV, 45-59 MIN: ICD-10-PCS | Mod: S$GLB,,, | Performed by: PAIN MEDICINE

## 2019-01-21 PROCEDURE — 99999 PR PBB SHADOW E&M-EST. PATIENT-LVL III: CPT | Mod: PBBFAC,,, | Performed by: PAIN MEDICINE

## 2019-01-21 PROCEDURE — 80307 DRUG TEST PRSMV CHEM ANLYZR: CPT

## 2019-01-21 PROCEDURE — 1101F PT FALLS ASSESS-DOCD LE1/YR: CPT | Mod: CPTII,S$GLB,, | Performed by: PAIN MEDICINE

## 2019-01-21 PROCEDURE — 36415 COLL VENOUS BLD VENIPUNCTURE: CPT | Mod: PN

## 2019-01-21 PROCEDURE — 99204 OFFICE O/P NEW MOD 45 MIN: CPT | Mod: S$GLB,,, | Performed by: PAIN MEDICINE

## 2019-01-21 RX ORDER — HYDROCODONE BITARTRATE AND ACETAMINOPHEN 7.5; 325 MG/1; MG/1
1 TABLET ORAL EVERY 6 HOURS PRN
Qty: 60 TABLET | Refills: 0 | Status: SHIPPED | OUTPATIENT
Start: 2019-01-21 | End: 2019-02-05

## 2019-01-21 NOTE — LETTER
January 21, 2019      Erica Zurita MD  [address]  [place] [address]           [facility]  [address] [place] [address]  Phone: [phone]  Fax: [phone]          Patient: Eli Vaz   MR Number: [id number]   YOB: 1945   Date of Visit: 1/21/2019       Dear Dr. Erica Zurita:    Thank you for referring Eli Vaz to me for evaluation. Attached you will find relevant portions of my assessment and plan of care.    If you have questions, please do not hesitate to call me. I look forward to following Eli Vaz along with you.    Sincerely,    Isrrael Barton Jr., MD    Enclosure  CC:  No Recipients    If you would like to receive this communication electronically, please contact [email or web address] or [phone] to request more information on [employer] access.    For providers and/or their staff who would like to refer a patient to [facility], please contact us through our one-stop-shop provider referral line, [facility], at [phone].    If you feel you have received this communication in error or would no longer like to receive these types of communications, please e-mail [email or web address]

## 2019-01-21 NOTE — PROGRESS NOTES
Subjective:     Patient ID: Eli Vaz is a 73 y.o. female    Chief Complaint: Shoulder Pain (Left)      Referred by: Erica Zurita MD      HPI:    Initial Encounter (1/21/19):  Eli Vaz is a 73 y.o. female who presents today with chronic left shoulder pain. Patient was referred by Dr. Zurita.  She has known left shoulder arthritis and rotator cuff pathology.  Limited surgical and interventional options given medical comorbidities patient has end-stage renal disease on dialysis.  She has failed shoulder injections including Synvisc.  She states that her left shoulder pain is very severe and limits her activities.  She wants somebody to cut it off.    This pain is described in detail below.    Physical Therapy:  Unlikely to be of benefit    Non-pharmacologic Treatment:  Nothing helps         · TENS?  No    Pain Medications:         · Currently taking:  Tylenol    · Has tried in the past:  Tramadol, Percocet, cannot take NSAIDs due to renal disease    · Has not tried: NSAIDs, Muscle relaxants, TCAs, SNRIs, anticonvulsants, topical creams    Blood thinners:  None    Interventional Therapies:  Previous shoulder injections including Synvisc.    Relevant Surgeries:  None    Affecting sleep?  Yes    Affecting daily activities? yes    Depressive symptoms? yes          · SI/HI? No    Work status: Unemployed    Pain Scores:    Best:       7/10  Worst:     10/10  Usually:   8/10  Today:    9/10    Review of Systems   Constitutional: Negative for activity change, appetite change, chills, fatigue, fever and unexpected weight change.   HENT: Negative for hearing loss.    Eyes: Negative for visual disturbance.   Respiratory: Negative for chest tightness and shortness of breath.    Cardiovascular: Negative for chest pain.   Gastrointestinal: Negative for abdominal pain, constipation, diarrhea, nausea and vomiting.   Genitourinary: Negative for difficulty urinating.   Musculoskeletal: Positive for arthralgias and  myalgias. Negative for back pain, gait problem and neck pain.   Skin: Negative for rash.   Neurological: Positive for weakness. Negative for dizziness, light-headedness, numbness and headaches.   Psychiatric/Behavioral: Positive for sleep disturbance. Negative for hallucinations and suicidal ideas. The patient is not nervous/anxious.        Past Medical History:   Diagnosis Date    Arthritis     COPD (chronic obstructive pulmonary disease)     Diabetes mellitus type II     Dialysis patient     Encounter for blood transfusion     ESRD (end stage renal disease)        Past Surgical History:   Procedure Laterality Date    AV FISTULA PLACEMENT      TUBAL LIGATION         Social History     Socioeconomic History    Marital status:      Spouse name: Not on file    Number of children: Not on file    Years of education: Not on file    Highest education level: Not on file   Social Needs    Financial resource strain: Not on file    Food insecurity - worry: Not on file    Food insecurity - inability: Not on file    Transportation needs - medical: Not on file    Transportation needs - non-medical: Not on file   Occupational History    Not on file   Tobacco Use    Smoking status: Former Smoker     Start date: 1/12/1991    Smokeless tobacco: Never Used   Substance and Sexual Activity    Alcohol use: No    Drug use: No    Sexual activity: Not on file   Other Topics Concern    Not on file   Social History Narrative    Not on file       Review of patient's allergies indicates:  No Known Allergies    Current Outpatient Medications on File Prior to Visit   Medication Sig Dispense Refill    albuterol (PROVENTIL) 2.5 mg /3 mL (0.083 %) nebulizer solution Take 3 mLs (2.5 mg total) by nebulization every 4 (four) hours as needed for Wheezing. Rescue 1 Box 0    amLODIPine (NORVASC) 10 MG tablet TAKE 1 TABLET (10 MG TOTAL) BY MOUTH ONCE DAILY. 90 tablet 1    FOLIC ACID/VITAMIN B COMP W-C (RENAL CAPS ORAL)  "Take by mouth Daily.      hydrALAZINE (APRESOLINE) 25 MG tablet Take 1 tablet (25 mg total) by mouth every 12 (twelve) hours. 180 tablet 0    ipratropium-albuterol (COMBIVENT RESPIMAT)  mcg/actuation inhaler INHALE 2 PUFF(S) INTO THE LUNGS 3 TIMES A DAY 4 g 3    montelukast (SINGULAIR) 10 mg tablet Take 1 tablet (10 mg total) by mouth every evening. 30 tablet 2    sevelamer carbonate (RENVELA) 800 mg Tab Take 800 mg by mouth 3 (three) times daily with meals.      VENTOLIN HFA 90 mcg/actuation inhaler INHALE 2 PUFFS INTO LUNGS EVERY 4 HOURS AS NEEDED FOR SHORTNESS OF BREATH OR WHEEZING. RESCUE 18 Inhaler 2    atorvastatin (LIPITOR) 10 MG tablet Take 1 tablet (10 mg total) by mouth once daily. 90 tablet 1    busPIRone (BUSPAR) 10 MG tablet Take 10 mg by mouth 2 (two) times daily.  6    fluticasone (FLONASE) 50 mcg/actuation nasal spray 1 spray by Each Nare route 2 (two) times daily. 1 Bottle 1    hydrOXYzine pamoate (VISTARIL) 25 MG Cap Take 1 capsule (25 mg total) by mouth every 8 (eight) hours as needed (panic attack). 30 capsule 1    levocetirizine (XYZAL) 5 MG tablet Take 1 tablet (5 mg total) by mouth every evening. 30 tablet 2    meclizine (ANTIVERT) 25 mg tablet Take 1 tablet (25 mg total) by mouth 3 (three) times daily as needed. 20 tablet 0    mirtazapine (REMERON) 7.5 MG Tab Take 7.5 mg by mouth nightly.       SENSIPAR 60 mg Tab Take 30 mg by mouth daily with breakfast.       traZODone (DESYREL) 50 MG tablet Take 1 tablet (50 mg total) by mouth nightly as needed for Insomnia. 30 tablet 3     No current facility-administered medications on file prior to visit.        Objective:      BP (!) 192/76 (BP Location: Right arm, Patient Position: Sitting)   Pulse 65   Resp 17   Ht 5' 3" (1.6 m)   Wt 63.6 kg (140 lb 3.4 oz)   SpO2 (!) 94%   BMI 24.84 kg/m²     Exam:  GEN:  Well developed, well nourished.  No acute distress.   HEENT:  No trauma.  Mucous membranes moist.  Nares patent " bilaterally.  PSYCH: Normal affect. Thought content appropriate.  CHEST:  Breathing symmetric.  No audible wheezing.  ABD: Soft, non-distended.  SKIN:  Warm, pink, dry.  No rash on exposed areas.    EXT:  No cyanosis, clubbing, or edema.  No color change or changes in nail or hair growth.  NEURO/MUSCULOSKELETAL:  Fully alert, oriented, and appropriate. Speech normal merrill. No cranial nerve deficits.   Gait:  Normal.  No focal motor deficits.     Left shoulder active range of motion limited    Imaging:  Narrative     EXAMINATION:  XR SHOULDER COMPLETE 2 OR MORE VIEWS LEFT    CLINICAL HISTORY:  Pain in left shoulder    TECHNIQUE:  Two or three views of the left shoulder were performed.    COMPARISON:  None    FINDINGS:  The bones are intact.  There is no evidence for acute fracture or bone destruction.  There are advanced degenerative changes of the left glenohumeral joint with marked joint space narrowing with subchondral sclerosis and osteophyte formation.  There is cortical irregularity at the articular surfaces of the glenohumeral joint.  Multiple vascular stents project over the left upper arm and left subclavian region with multiple surgical clips also present.  Atherosclerotic calcification is present within the thoracic aorta as well as within the carotid arteries.   Impression       Advanced degenerative changes of the left glenohumeral joint.    No evidence for acute fracture, bone destruction, or dislocation.    Surgical changes with multiple vascular stents.    Extensive atherosclerosis.      Electronically signed by: Tone Pereyra MD  Date: 09/18/2018  Time: 07:58         Assessment:       Encounter Diagnoses   Name Primary?    ESRD on dialysis Yes    Left rotator cuff tear arthropathy     Chronic pain disorder          Plan:       Eli was seen today for shoulder pain.    Diagnoses and all orders for this visit:    ESRD on dialysis  -     HYDROcodone-acetaminophen (NORCO) 7.5-325 mg per tablet;  Take 1 tablet by mouth every 6 (six) hours as needed for Pain.  -     DRUGS OF ABUSE SCREEN, BLOOD; Future    Left rotator cuff tear arthropathy  -     HYDROcodone-acetaminophen (NORCO) 7.5-325 mg per tablet; Take 1 tablet by mouth every 6 (six) hours as needed for Pain.  -     DRUGS OF ABUSE SCREEN, BLOOD; Future    Chronic pain disorder  -     HYDROcodone-acetaminophen (NORCO) 7.5-325 mg per tablet; Take 1 tablet by mouth every 6 (six) hours as needed for Pain.  -     DRUGS OF ABUSE SCREEN, BLOOD; Future        Eli Vaz is a 73 y.o. female with chronic left shoulder pain secondary to rotator cuff and glenohumeral pathology.  No further interventions per orthopedics.  Treatment options for pain and underlying pathology are very limited by medical comorbidities.    1. Pertinent imaging studies reviewed by me. Imaging results were discussed with patient.  2.  Start Norco 7.5-325 mg q.6 hours p.r.n. Patient was provided with a 2 week supply of 60 pills.  Given complex medical history and complex left shoulder pathology/pain and limited treatment options is appropriate to utilize chronic opioid medications for the management of her pain. We discussed potential adverse effects of these medicines.  This medication and dose have a relatively low risk of adverse events.   3.  Pain contract signed today.  4.  [place] prescription monitoring program review today.  No inconsistencies were noted.  5.  Serum toxicology screen was ordered today.  6.  Return to clinic in 2 weeks.  At that time we will discuss efficacy of medications and make any necessary changes.

## 2019-01-23 ENCOUNTER — HOSPITAL ENCOUNTER (OUTPATIENT)
Dept: RADIOLOGY | Facility: HOSPITAL | Age: 74
Discharge: HOME | End: 2019-01-23
Attending: FAMILY MEDICINE
Payer: MEDICARE

## 2019-01-23 ENCOUNTER — TELEPHONE (OUTPATIENT)
Dept: ORTHOPEDICS | Facility: CLINIC | Age: 74
End: 2019-01-23

## 2019-01-23 ENCOUNTER — TELEPHONE (OUTPATIENT)
Dept: FAMILY MEDICINE | Facility: CLINIC | Age: 74
End: 2019-01-23

## 2019-01-23 VITALS — WEIGHT: 140 LBS | BODY MASS INDEX: 24.8 KG/M2 | HEIGHT: 63 IN

## 2019-01-23 DIAGNOSIS — Z12.31 ENCOUNTER FOR SCREENING MAMMOGRAM FOR BREAST CANCER: ICD-10-CM

## 2019-01-23 PROCEDURE — 77067 SCR MAMMO BI INCL CAD: CPT | Mod: TC

## 2019-01-23 PROCEDURE — 77063 MAMMO DIGITAL SCREENING BILAT WITH TOMOSYNTHESIS_CAD: ICD-10-PCS | Mod: 26,,, | Performed by: RADIOLOGY

## 2019-01-23 PROCEDURE — 77067 SCR MAMMO BI INCL CAD: CPT | Mod: 26,,, | Performed by: RADIOLOGY

## 2019-01-23 PROCEDURE — 77067 MAMMO DIGITAL SCREENING BILAT WITH TOMOSYNTHESIS_CAD: ICD-10-PCS | Mod: 26,,, | Performed by: RADIOLOGY

## 2019-01-23 PROCEDURE — 77063 BREAST TOMOSYNTHESIS BI: CPT | Mod: 26,,, | Performed by: RADIOLOGY

## 2019-01-23 NOTE — TELEPHONE ENCOUNTER
----- Message from Chiara Nelson MD sent at 1/23/2019  3:25 PM CST -----  Please contact patient and inform that her mammogram was normal.  She will repeat her screening mammogram in 1 year.

## 2019-01-23 NOTE — TELEPHONE ENCOUNTER
Contacted and informed pt that mammogram results came back normal . Instructed to pt repeat screening 1 year later.

## 2019-01-23 NOTE — TELEPHONE ENCOUNTER
Called her daughter to discuss possibility of RSA to treat her shoulder pain. They will consider this and come to clinic to discuss further if they have questions.  She will need medical clearance with possible cardiac and pulmonary clearance prior to any procedure.

## 2019-01-24 LAB
AMPHETAMINES SERPL QL: NEGATIVE
BARBITURATES SERPL QL SCN: NEGATIVE
BENZODIAZ SERPL QL SCN: NEGATIVE
BZE SERPL QL: NEGATIVE
CARBOXYTHC SERPL QL SCN: NEGATIVE
ETHANOL SERPL QL SCN: NEGATIVE
METHADONE SERPL QL SCN: NEGATIVE
OPIATES SERPL QL SCN: NEGATIVE
PCP SERPL QL SCN: NEGATIVE
PROPOXYPH SERPL QL: NEGATIVE

## 2019-02-01 ENCOUNTER — OFFICE VISIT (OUTPATIENT)
Dept: FAMILY MEDICINE | Facility: CLINIC | Age: 74
End: 2019-02-01
Payer: MEDICARE

## 2019-02-01 ENCOUNTER — TELEPHONE (OUTPATIENT)
Dept: PAIN MEDICINE | Facility: CLINIC | Age: 74
End: 2019-02-01

## 2019-02-01 ENCOUNTER — TELEPHONE (OUTPATIENT)
Dept: FAMILY MEDICINE | Facility: CLINIC | Age: 74
End: 2019-02-01

## 2019-02-01 VITALS
DIASTOLIC BLOOD PRESSURE: 71 MMHG | HEIGHT: 63 IN | OXYGEN SATURATION: 95 % | TEMPERATURE: 100 F | WEIGHT: 139.56 LBS | SYSTOLIC BLOOD PRESSURE: 156 MMHG | BODY MASS INDEX: 24.73 KG/M2 | HEART RATE: 92 BPM

## 2019-02-01 DIAGNOSIS — N18.6 ESRD ON DIALYSIS: ICD-10-CM

## 2019-02-01 DIAGNOSIS — E11.22 DIABETES MELLITUS WITH ESRD (END-STAGE RENAL DISEASE): Chronic | ICD-10-CM

## 2019-02-01 DIAGNOSIS — E11.59 HYPERTENSION ASSOCIATED WITH DIABETES: Chronic | ICD-10-CM

## 2019-02-01 DIAGNOSIS — I15.2 HYPERTENSION ASSOCIATED WITH DIABETES: Chronic | ICD-10-CM

## 2019-02-01 DIAGNOSIS — N18.6 DIABETES MELLITUS WITH ESRD (END-STAGE RENAL DISEASE): Chronic | ICD-10-CM

## 2019-02-01 DIAGNOSIS — J44.1 COPD WITH ACUTE EXACERBATION: Primary | ICD-10-CM

## 2019-02-01 DIAGNOSIS — Z99.2 ESRD ON DIALYSIS: ICD-10-CM

## 2019-02-01 DIAGNOSIS — J44.9 CHRONIC OBSTRUCTIVE PULMONARY DISEASE, UNSPECIFIED COPD TYPE: ICD-10-CM

## 2019-02-01 DIAGNOSIS — Z01.818 PREOPERATIVE CLEARANCE: ICD-10-CM

## 2019-02-01 PROCEDURE — 3044F PR MOST RECENT HEMOGLOBIN A1C LEVEL <7.0%: ICD-10-PCS | Mod: CPTII,S$GLB,, | Performed by: FAMILY MEDICINE

## 2019-02-01 PROCEDURE — 99214 OFFICE O/P EST MOD 30 MIN: CPT | Mod: S$GLB,,, | Performed by: FAMILY MEDICINE

## 2019-02-01 PROCEDURE — 1101F PR PT FALLS ASSESS DOC 0-1 FALLS W/OUT INJ PAST YR: ICD-10-PCS | Mod: CPTII,S$GLB,, | Performed by: FAMILY MEDICINE

## 2019-02-01 PROCEDURE — 99214 PR OFFICE/OUTPT VISIT, EST, LEVL IV, 30-39 MIN: ICD-10-PCS | Mod: S$GLB,,, | Performed by: FAMILY MEDICINE

## 2019-02-01 PROCEDURE — 1101F PT FALLS ASSESS-DOCD LE1/YR: CPT | Mod: CPTII,S$GLB,, | Performed by: FAMILY MEDICINE

## 2019-02-01 PROCEDURE — 99999 PR PBB SHADOW E&M-EST. PATIENT-LVL IV: CPT | Mod: PBBFAC,,, | Performed by: FAMILY MEDICINE

## 2019-02-01 PROCEDURE — 99499 UNLISTED E&M SERVICE: CPT | Mod: S$GLB,,, | Performed by: FAMILY MEDICINE

## 2019-02-01 PROCEDURE — 3044F HG A1C LEVEL LT 7.0%: CPT | Mod: CPTII,S$GLB,, | Performed by: FAMILY MEDICINE

## 2019-02-01 PROCEDURE — 99499 RISK ADDL DX/OHS AUDIT: ICD-10-PCS | Mod: S$GLB,,, | Performed by: FAMILY MEDICINE

## 2019-02-01 PROCEDURE — 99999 PR PBB SHADOW E&M-EST. PATIENT-LVL IV: ICD-10-PCS | Mod: PBBFAC,,, | Performed by: FAMILY MEDICINE

## 2019-02-01 RX ORDER — GENTAMICIN SULFATE 10 MG/ML
INJECTION, SOLUTION, CONCENTRATE INTRAVENOUS
Status: ON HOLD | COMMUNITY
End: 2019-03-25 | Stop reason: HOSPADM

## 2019-02-01 RX ORDER — PROMETHAZINE HYDROCHLORIDE 12.5 MG/1
12.5 TABLET ORAL EVERY 6 HOURS PRN
COMMUNITY
Start: 2014-06-04 | End: 2019-04-05

## 2019-02-01 RX ORDER — PANTOPRAZOLE SODIUM 40 MG/1
40 TABLET, DELAYED RELEASE ORAL DAILY PRN
COMMUNITY
End: 2019-04-24

## 2019-02-01 RX ORDER — DOXYCYCLINE 100 MG/1
100 CAPSULE ORAL EVERY 12 HOURS
Qty: 20 CAPSULE | Refills: 0 | Status: SHIPPED | OUTPATIENT
Start: 2019-02-01 | End: 2019-02-11

## 2019-02-01 RX ORDER — ATORVASTATIN CALCIUM 10 MG/1
10 TABLET, FILM COATED ORAL
COMMUNITY
Start: 2014-09-03 | End: 2019-02-11 | Stop reason: SDUPTHER

## 2019-02-01 RX ORDER — OXYCODONE AND ACETAMINOPHEN 5; 325 MG/1; MG/1
TABLET ORAL
Refills: 0 | COMMUNITY
Start: 2018-12-01 | End: 2019-02-11

## 2019-02-01 RX ORDER — PREDNISONE 10 MG/1
10 TABLET ORAL
Status: ON HOLD | COMMUNITY
Start: 2014-08-08 | End: 2019-02-03 | Stop reason: HOSPADM

## 2019-02-01 RX ORDER — PAROXETINE 30 MG/1
TABLET, FILM COATED ORAL
Refills: 3 | COMMUNITY
Start: 2018-11-06 | End: 2019-04-05

## 2019-02-01 RX ORDER — DICLOFENAC SODIUM 10 MG/G
4 GEL TOPICAL
Status: ON HOLD | COMMUNITY
Start: 2014-07-09 | End: 2019-03-25 | Stop reason: HOSPADM

## 2019-02-01 RX ORDER — METOPROLOL SUCCINATE 25 MG/1
25 TABLET, EXTENDED RELEASE ORAL
COMMUNITY
End: 2019-04-05

## 2019-02-01 RX ORDER — ALBUTEROL SULFATE 1.25 MG/3ML
1.25 SOLUTION RESPIRATORY (INHALATION) EVERY 6 HOURS PRN
COMMUNITY
Start: 2014-06-04 | End: 2019-02-11 | Stop reason: SDUPTHER

## 2019-02-01 RX ORDER — CLONIDINE HYDROCHLORIDE 0.2 MG/1
0.2 TABLET ORAL
Status: ON HOLD | COMMUNITY
End: 2019-03-25 | Stop reason: HOSPADM

## 2019-02-01 RX ORDER — TRAMADOL HYDROCHLORIDE 50 MG/1
50 TABLET ORAL EVERY 12 HOURS PRN
Refills: 2 | COMMUNITY
Start: 2019-01-04 | End: 2019-02-11 | Stop reason: DRUGHIGH

## 2019-02-01 NOTE — TELEPHONE ENCOUNTER
----- Message from Chiara Nelson MD sent at 2/1/2019 11:03 AM CST -----  Please contact patient and inform there was no pneumonia or signs of lower respiratory tract infection on her xray. Please follow the instructions given to her at her office visit today.

## 2019-02-01 NOTE — TELEPHONE ENCOUNTER
Reminded patient of Pain Management appointment scheduled for Monday at 10.45a with Dr. Barton- verbal confirmation received.  Location information also provided.

## 2019-02-01 NOTE — PATIENT INSTRUCTIONS
What Is Acute Bronchitis?  Acute bronchitis is when the airways in your lungs (bronchial tubes) become red and swollen (inflamed). It is usually caused by a viral infection. But it can also occur because of a bacteria or allergen. Symptoms include a cough that produces yellow or greenish mucus and can last for days or sometimes weeks.  Inside healthy lungs    Air travels in and out of the lungs through the airways. The linings of these airways produce sticky mucus. This mucus traps particles that enter the lungs. Tiny structures called cilia then sweep the particles out of the airways.     Healthy airway: Airways are normally open. Air moves in and out easily.      Healthy cilia: Tiny, hairlike cilia sweep mucus and particles up and out of the airways.   Lungs with bronchitis  Bronchitis often occurs with a cold or the flu virus. The airways become inflamed (red and swollen). There is a deep hacking cough from the extra mucus. Other symptoms may include:  · Wheezing  · Chest discomfort  · Shortness of breath  · Mild fever  A second infection, this time due to bacteria, may then occur. And airways irritated by allergens or smoke are more likely to get infected.        Inflamed airway: Inflammation and extra mucus narrow the airway, causing shortness of breath.      Impaired cilia: Extra mucus impairs cilia, causing congestion and wheezing. Smoking makes the problem worse.   Making a diagnosis  A physical exam, health history, and certain tests help your healthcare provider make the diagnosis.  Health history  Your healthcare provider will ask you about your symptoms.  The exam  Your provider listens to your chest for signs of congestion. He or she may also check your ears, nose, and throat.  Possible tests  · A sputum test for bacteria. This requires a sample of mucus from your lungs.  · A nasal or throat swab. This tests to see if you have a bacterial infection.  · A chest X-ray. This is done if your healthcare  provider thinks you have pneumonia.  · Tests to check for an underlying condition. Other tests may be done to check for things such as allergies, asthma, or COPD (chronic obstructive pulmonary disease). You may need to see a specialist for more lung function testing.  Treating a cough  The main treatment for bronchitis is easing symptoms. Avoiding smoke, allergens, and other things that trigger coughing can often help. If the infection is bacterial, you may be given antibiotics. During the illness, it's important to get plenty of sleep. To ease symptoms:  · Dont smoke. Also avoid secondhand smoke.  · Use a humidifier. Or try breathing in steam from a hot shower. This may help loosen mucus.  · Drink a lot of water and juice. They can soothe the throat and may help thin mucus.  · Sit up or use extra pillows when in bed. This helps to lessen coughing and congestion.  · Ask your provider about using medicine. Ask about using cough medicine, pain and fever medicine, or a decongestant.  Antibiotics  Most cases of bronchitis are caused by cold or flu viruses. They dont need antibiotics to treat them, even if your mucus is thick and green or yellow. Antibiotics dont treat viral illness and antibiotics have not been shown to have any benefit in cases of acute bronchitis. Taking antibiotics when they are not needed increases your risk of getting an infection later that is antibiotic-resistant. Antibiotics can also cause severe cases of diarrhea that require other antibiotics to treat.  It is important that you accept your healthcare provider's opinion to not use antibiotics. Your provider will prescribe antibiotics if the infection is caused by bacteria. If they are prescribed:  · Take all of the medicine. Take the medicine until it is used up, even if symptoms have improved. If you dont, the bronchitis may come back.  · Take the medicines as directed. For instance, some medicines should be taken with food.  · Ask about  side effects. Ask your provider or pharmacist what side effects are common, and what to do about them.  Follow-up care  You should see your provider again in 2 to 3 weeks. By this time, symptoms should have improved. An infection that lasts longer may mean you have a more serious problem.  Prevention  · Avoid tobacco smoke. If you smoke, quit. Stay away from smoky places. Ask friends and family not to smoke around you, or in your home or car.  · Get checked for allergies.  · Ask your provider about getting a yearly flu shot. Also ask about pneumococcal or pneumonia shots.  · Wash your hands often. This helps reduce the chance of picking up viruses that cause colds and flu.  Call your healthcare provider if:  · Symptoms worsen, or you have new symptoms  · Breathing problems worsen or  become severe  · Symptoms dont get better within a week, or within 3 days of taking antibiotics   Date Last Reviewed: 2/1/2017  © 0864-0893 [employer]. [address] [place] [address], [place] [address]. All rights reserved. This information is not intended as a substitute for professional medical care. Always follow your healthcare professional's instructions.

## 2019-02-01 NOTE — PROGRESS NOTES
Chief Complaint   Patient presents with    Cough       HPI    Eli Vaz is 73 y.o. female. The primary encounter diagnosis was COPD with acute exacerbation. Diagnoses of Preoperative clearance, Chronic obstructive pulmonary disease, unspecified COPD type, Diabetes mellitus with ESRD (end-stage renal disease), Hypertension associated with diabetes, and ESRD on dialysis were also pertinent to this visit.    73 year old female with OCPD, Diabetes, HTN, and ESRD comes to clinic to discuss medical clearance for shoulder surgery.  Patient reports that she plans to have surgery on her left shoulder. Patient reports that she plans to follow up with Orthopedics to discuss a potential surgery date, she is unsure of type of anesthesia that the surgery may require.  She denies history of reaction to anesthesia.  She has had no recent chest pain or shortness of breath.    Patient does admit to cough that has been present for about 1 week.  She reports using Mucinex and Tessalon perles without improvement.  She reports productive of clear mucous only.  She admits to rhinorrhea, nasal congestion, and sore throat.      Review of Systems   Constitutional: Positive for diaphoresis and fatigue. Negative for activity change, chills and fever.   HENT: Positive for congestion, postnasal drip, rhinorrhea and sore throat. Negative for ear discharge, ear pain, sinus pressure, sinus pain and tinnitus.    Respiratory: Positive for cough and chest tightness. Negative for shortness of breath and wheezing.    Cardiovascular: Negative for chest pain.   Musculoskeletal: Negative for gait problem.   Psychiatric/Behavioral: Negative for suicidal ideas.           Current Outpatient Medications:     albuterol (ACCUNEB) 1.25 mg/3 mL Nebu, Inhale 1.25 mg into the lungs every 6 (six) hours as needed., Disp: , Rfl:     albuterol (PROVENTIL) 2.5 mg /3 mL (0.083 %) nebulizer solution, Take 3 mLs (2.5 mg total) by nebulization every 4 (four) hours as  needed for Wheezing. Rescue, Disp: 1 Box, Rfl: 0    amLODIPine (NORVASC) 10 MG tablet, TAKE 1 TABLET (10 MG TOTAL) BY MOUTH ONCE DAILY., Disp: 90 tablet, Rfl: 1    atorvastatin (LIPITOR) 10 MG tablet, Take 1 tablet (10 mg total) by mouth once daily., Disp: 90 tablet, Rfl: 1    busPIRone (BUSPAR) 10 MG tablet, Take 10 mg by mouth 2 (two) times daily., Disp: , Rfl: 6    fluticasone (FLONASE) 50 mcg/actuation nasal spray, 1 spray by Each Nare route 2 (two) times daily., Disp: 1 Bottle, Rfl: 1    FOLIC ACID/VITAMIN B COMP W-C (RENAL CAPS ORAL), Take by mouth Daily., Disp: , Rfl:     gentamicin sulfate, PF, 60 mg/6 mL Soln, Inject into the vein., Disp: , Rfl:     hydrALAZINE (APRESOLINE) 25 MG tablet, Take 1 tablet (25 mg total) by mouth every 12 (twelve) hours., Disp: 180 tablet, Rfl: 0    HYDROcodone-acetaminophen (NORCO) 7.5-325 mg per tablet, Take 1 tablet by mouth every 6 (six) hours as needed for Pain., Disp: 60 tablet, Rfl: 0    ipratropium-albuterol (COMBIVENT RESPIMAT)  mcg/actuation inhaler, INHALE 2 PUFF(S) INTO THE LUNGS 3 TIMES A DAY, Disp: 4 g, Rfl: 3    metoprolol succinate (TOPROL-XL) 25 MG 24 hr tablet, Take 25 mg by mouth., Disp: , Rfl:     montelukast (SINGULAIR) 10 mg tablet, Take 1 tablet (10 mg total) by mouth every evening., Disp: 30 tablet, Rfl: 2    oxyCODONE-acetaminophen (PERCOCET) 5-325 mg per tablet, TAKE 1 TABLET BY MOUTH EVERY 6 HOURS AS NEEDED FOR PAIN FOR UP TO 5 DAYS. MAX 4 TABLETS A DAY., Disp: , Rfl: 0    pantoprazole (PROTONIX) 40 MG tablet, Take 40 mg by mouth., Disp: , Rfl:     predniSONE (DELTASONE) 10 MG tablet, Take 10 mg by mouth., Disp: , Rfl:     promethazine (PHENERGAN) 12.5 MG Tab, Take 12.5 mg by mouth every 6 (six) hours as needed., Disp: , Rfl:     sevelamer carbonate (RENVELA) 800 mg Tab, Take 800 mg by mouth 3 (three) times daily with meals., Disp: , Rfl:     SODIUM BICARBONATE ORAL, Take 650 mg by mouth., Disp: , Rfl:     vit B,C-iron  "fum-FA-D3-zinc ox 8 mg iron-800 mcg-1,000 unit Tab, Take by mouth., Disp: , Rfl:     vitamin renal formula, B-complex-vitamin c-folic acid, (NEPHROCAP) 1 mg Cap, Take by mouth., Disp: , Rfl:     atorvastatin (LIPITOR) 10 MG tablet, Take 10 mg by mouth., Disp: , Rfl:     cloNIDine (CATAPRES) 0.2 MG tablet, Take 0.2 mg by mouth., Disp: , Rfl:     diclofenac sodium (VOLTAREN) 1 % Gel, Apply 4 g topically., Disp: , Rfl:     doxycycline (VIBRAMYCIN) 100 MG Cap, Take 1 capsule (100 mg total) by mouth every 12 (twelve) hours. for 10 days, Disp: 20 capsule, Rfl: 0    epoetin cat (PROCRIT) 3,000 unit/mL injection, Inject 3,000 Units into the skin., Disp: , Rfl:     hydrOXYzine pamoate (VISTARIL) 25 MG Cap, Take 1 capsule (25 mg total) by mouth every 8 (eight) hours as needed (panic attack)., Disp: 30 capsule, Rfl: 1    levocetirizine (XYZAL) 5 MG tablet, Take 1 tablet (5 mg total) by mouth every evening., Disp: 30 tablet, Rfl: 2    meclizine (ANTIVERT) 25 mg tablet, Take 1 tablet (25 mg total) by mouth 3 (three) times daily as needed., Disp: 20 tablet, Rfl: 0    mirtazapine (REMERON) 7.5 MG Tab, Take 7.5 mg by mouth nightly. , Disp: , Rfl:     paroxetine (PAXIL) 30 MG tablet, 1 TABLET IN THE MORNING ONCE A DAY ORALLY 90, Disp: , Rfl: 3    SENSIPAR 60 mg Tab, Take 30 mg by mouth daily with breakfast. , Disp: , Rfl:     traMADol (ULTRAM) 50 mg tablet, Take 50 mg by mouth every 12 (twelve) hours as needed., Disp: , Rfl: 2    traZODone (DESYREL) 50 MG tablet, Take 1 tablet (50 mg total) by mouth nightly as needed for Insomnia., Disp: 30 tablet, Rfl: 3    VENTOLIN HFA 90 mcg/actuation inhaler, INHALE 2 PUFFS INTO LUNGS EVERY 4 HOURS AS NEEDED FOR SHORTNESS OF BREATH OR WHEEZING. RESCUE, Disp: 18 Inhaler, Rfl: 2    vortioxetine (BRINTELLIX) 5 mg Tab, Take 1 tablet by mouth., Disp: , Rfl:       Blood pressure (!) 156/71, pulse 92, temperature 100.3 °F (37.9 °C), temperature source Oral, height 5' 3" (1.6 m), weight " 63.3 kg (139 lb 8.8 oz), SpO2 95 %.    Physical Exam   Constitutional: Vital signs are normal. She appears well-developed and well-nourished. She does not appear ill. No distress.   Cardiovascular: Normal heart sounds.   No murmur heard.  Pulmonary/Chest: Effort normal. She has no decreased breath sounds. She has no wheezes. She has rhonchi in the right upper field and the left upper field. She has no rales.   Psychiatric: She has a normal mood and affect. Her behavior is normal.       Lab Visit on 01/21/2019   Component Date Value Ref Range Status    Amphetamine Scrn 01/21/2019 Negative   Final    Barbiturate Scrn 01/21/2019 Negative   Final    Benzodiazepines 01/21/2019 Negative   Final    Cocaine Lvl 01/21/2019 Negative   Final    Alcohol Scrn 01/21/2019 Negative   Final    Methadone (Dolophine), Serum 01/21/2019 Negative   Final    Opiates, Blood 01/21/2019 Negative   Final    Phencyclidine, Blood 01/21/2019 Negative   Final    Propoxyphene 01/21/2019 Negative   Final    THC (Marijuana) Metabolite, bl 01/21/2019 Negative   Final   ]    Assessment:    1. COPD with acute exacerbation    2. Preoperative clearance    3. Chronic obstructive pulmonary disease, unspecified COPD type    4. Diabetes mellitus with ESRD (end-stage renal disease)    5. Hypertension associated with diabetes    6. ESRD on dialysis          Eli was seen today for cough.    Diagnoses and all orders for this visit:    COPD with acute exacerbation  -     X-Ray Chest PA And Lateral; Future  -     doxycycline (VIBRAMYCIN) 100 MG Cap; Take 1 capsule (100 mg total) by mouth every 12 (twelve) hours. for 10 days  - New problem. Lung exam abnormal and worrisome for COPD exacerbation.  Obtain xray.  - Patient instructed to use inhalers regularly and continue Mucinex. If no improvement in the next 2-4 days she should start antibiotic if worsening or no improvement.    Preoperative clearance  -     CBC auto differential; Future  -      Comprehensive metabolic panel; Future  -     Hemoglobin A1c; Future  -     Urinalysis  -     Protime-INR; Future  -     APTT; Future  -     X-Ray Chest PA And Lateral; Future  - New problem. Labs ordered. Patient to schedule follow up with Cardiology for cardiac clearance.    Chronic obstructive pulmonary disease, unspecified COPD type   -Unstable. See plan above.  Follow up for clearance. Consider PFT if general anesthesia needed.    Diabetes mellitus with ESRD (end-stage renal disease)  -     Comprehensive metabolic panel; Future  -     Hemoglobin A1c; Future    Hypertension associated with diabetes  -     Comprehensive metabolic panel; Future  -     Ambulatory referral to Cardiology  - Stable. Obtain labs as above. Refer to Cardiology for clearance due to risk factors.    ESRD on dialysis  -     Protime-INR; Future  -     APTT; Future  -     Ambulatory referral to Cardiology  - Stable. Obtain labs as above. Refer to Cardiology for clearance due to risk factors.          FOLLOW UP: Follow-up in about 2 weeks (around 2/15/2019) for Preop Clearance.

## 2019-02-03 ENCOUNTER — HOSPITAL ENCOUNTER (OUTPATIENT)
Facility: HOSPITAL | Age: 74
Discharge: HOME | End: 2019-02-04
Attending: EMERGENCY MEDICINE | Admitting: HOSPITALIST
Payer: MEDICARE

## 2019-02-03 DIAGNOSIS — R07.9 CHEST PAIN, UNSPECIFIED TYPE: Primary | ICD-10-CM

## 2019-02-03 DIAGNOSIS — R07.9 CHEST PAIN: ICD-10-CM

## 2019-02-03 DIAGNOSIS — N18.6 ESRD ON DIALYSIS: ICD-10-CM

## 2019-02-03 DIAGNOSIS — Z86.73 HISTORY OF CVA (CEREBROVASCULAR ACCIDENT): ICD-10-CM

## 2019-02-03 DIAGNOSIS — I15.2 HYPERTENSION ASSOCIATED WITH DIABETES: Chronic | ICD-10-CM

## 2019-02-03 DIAGNOSIS — Z99.2 ESRD ON DIALYSIS: ICD-10-CM

## 2019-02-03 DIAGNOSIS — E11.59 HYPERTENSION ASSOCIATED WITH DIABETES: Chronic | ICD-10-CM

## 2019-02-03 DIAGNOSIS — N18.6 DIABETES MELLITUS WITH ESRD (END-STAGE RENAL DISEASE): Chronic | ICD-10-CM

## 2019-02-03 DIAGNOSIS — E11.22 DIABETES MELLITUS WITH ESRD (END-STAGE RENAL DISEASE): Chronic | ICD-10-CM

## 2019-02-03 DIAGNOSIS — R06.02 SHORTNESS OF BREATH: ICD-10-CM

## 2019-02-03 DIAGNOSIS — J30.9 ALLERGIC RHINITIS, UNSPECIFIED SEASONALITY, UNSPECIFIED TRIGGER: ICD-10-CM

## 2019-02-03 DIAGNOSIS — F41.9 ANXIETY: ICD-10-CM

## 2019-02-03 DIAGNOSIS — J44.9 CHRONIC OBSTRUCTIVE PULMONARY DISEASE, UNSPECIFIED COPD TYPE: ICD-10-CM

## 2019-02-03 LAB
ALBUMIN SERPL-MCNC: 3.6 G/DL (ref 3.3–5.5)
ALP SERPL-CCNC: 93 U/L (ref 42–141)
BILIRUB SERPL-MCNC: 0.4 MG/DL (ref 0.2–1.6)
BUN SERPL-MCNC: 36 MG/DL (ref 7–22)
CALCIUM SERPL-MCNC: 10 MG/DL (ref 8–10.3)
CHLORIDE SERPL-SCNC: 95 MMOL/L (ref 98–108)
CREAT SERPL-MCNC: 7.9 MG/DL (ref 0.6–1.2)
GLUCOSE SERPL-MCNC: 85 MG/DL (ref 73–118)
POC ALT (SGPT): 12 U/L (ref 10–47)
POC AST (SGOT): 24 U/L (ref 11–38)
POC B-TYPE NATRIURETIC PEPTIDE: 706 PG/ML (ref 0–100)
POC CARDIAC TROPONIN I: 0 NG/ML
POC D-DI: 993 NG/ML (ref 0–450)
POC PTINR: 1.1 (ref 0.9–1.2)
POC PTWBT: 12.6 SEC (ref 9.7–14.3)
POC TCO2: 28 MMOL/L (ref 18–33)
POTASSIUM BLD-SCNC: 4.8 MMOL/L (ref 3.6–5.1)
PROTEIN, POC: 8.2 G/DL (ref 6.4–8.1)
SAMPLE: NORMAL
SAMPLE: NORMAL
SODIUM BLD-SCNC: 147 MMOL/L (ref 128–145)
TROPONIN I SERPL DL<=0.01 NG/ML-MCNC: 0.02 NG/ML
TROPONIN I SERPL DL<=0.01 NG/ML-MCNC: 0.04 NG/ML

## 2019-02-03 PROCEDURE — 85025 COMPLETE CBC W/AUTO DIFF WBC: CPT | Mod: ER

## 2019-02-03 PROCEDURE — 85610 PROTHROMBIN TIME: CPT | Mod: ER

## 2019-02-03 PROCEDURE — 85379 FIBRIN DEGRADATION QUANT: CPT | Mod: ER

## 2019-02-03 PROCEDURE — 96374 THER/PROPH/DIAG INJ IV PUSH: CPT | Mod: ER,59

## 2019-02-03 PROCEDURE — 93010 EKG 12-LEAD: ICD-10-PCS | Mod: ,,, | Performed by: INTERNAL MEDICINE

## 2019-02-03 PROCEDURE — 93005 ELECTROCARDIOGRAM TRACING: CPT | Mod: ER

## 2019-02-03 PROCEDURE — 36415 COLL VENOUS BLD VENIPUNCTURE: CPT

## 2019-02-03 PROCEDURE — 96372 THER/PROPH/DIAG INJ SC/IM: CPT

## 2019-02-03 PROCEDURE — G0378 HOSPITAL OBSERVATION PER HR: HCPCS

## 2019-02-03 PROCEDURE — 96372 THER/PROPH/DIAG INJ SC/IM: CPT | Mod: ER,59

## 2019-02-03 PROCEDURE — 93010 ELECTROCARDIOGRAM REPORT: CPT | Mod: ,,, | Performed by: INTERNAL MEDICINE

## 2019-02-03 PROCEDURE — 25000003 PHARM REV CODE 250: Performed by: NURSE PRACTITIONER

## 2019-02-03 PROCEDURE — 96375 TX/PRO/DX INJ NEW DRUG ADDON: CPT | Mod: ER,59

## 2019-02-03 PROCEDURE — 84484 ASSAY OF TROPONIN QUANT: CPT | Mod: ER

## 2019-02-03 PROCEDURE — 63600175 PHARM REV CODE 636 W HCPCS: Mod: ER | Performed by: EMERGENCY MEDICINE

## 2019-02-03 PROCEDURE — 99285 EMERGENCY DEPT VISIT HI MDM: CPT | Mod: 25,ER

## 2019-02-03 PROCEDURE — 63600175 PHARM REV CODE 636 W HCPCS: Performed by: HOSPITALIST

## 2019-02-03 PROCEDURE — 63600175 PHARM REV CODE 636 W HCPCS: Performed by: NURSE PRACTITIONER

## 2019-02-03 PROCEDURE — 84484 ASSAY OF TROPONIN QUANT: CPT

## 2019-02-03 PROCEDURE — 80053 COMPREHEN METABOLIC PANEL: CPT | Mod: ER

## 2019-02-03 PROCEDURE — 83880 ASSAY OF NATRIURETIC PEPTIDE: CPT | Mod: ER

## 2019-02-03 PROCEDURE — 63600175 PHARM REV CODE 636 W HCPCS: Mod: ER | Performed by: INTERNAL MEDICINE

## 2019-02-03 RX ORDER — MORPHINE SULFATE 8 MG/ML
2 INJECTION INTRAMUSCULAR; INTRAVENOUS; SUBCUTANEOUS EVERY 4 HOURS PRN
Status: DISCONTINUED | OUTPATIENT
Start: 2019-02-03 | End: 2019-02-03

## 2019-02-03 RX ORDER — KETOROLAC TROMETHAMINE 30 MG/ML
15 INJECTION, SOLUTION INTRAMUSCULAR; INTRAVENOUS
Status: COMPLETED | OUTPATIENT
Start: 2019-02-03 | End: 2019-02-03

## 2019-02-03 RX ORDER — HEPARIN SODIUM 5000 [USP'U]/ML
5000 INJECTION, SOLUTION INTRAVENOUS; SUBCUTANEOUS EVERY 8 HOURS
Status: DISCONTINUED | OUTPATIENT
Start: 2019-02-03 | End: 2019-02-04 | Stop reason: HOSPADM

## 2019-02-03 RX ORDER — ACETAMINOPHEN 325 MG/1
650 TABLET ORAL EVERY 6 HOURS PRN
Status: DISCONTINUED | OUTPATIENT
Start: 2019-02-03 | End: 2019-02-04 | Stop reason: HOSPADM

## 2019-02-03 RX ORDER — SODIUM CHLORIDE 0.9 % (FLUSH) 0.9 %
5 SYRINGE (ML) INJECTION
Status: DISCONTINUED | OUTPATIENT
Start: 2019-02-03 | End: 2019-02-04 | Stop reason: HOSPADM

## 2019-02-03 RX ORDER — MORPHINE SULFATE 10 MG/ML
2 INJECTION INTRAMUSCULAR; INTRAVENOUS; SUBCUTANEOUS EVERY 4 HOURS PRN
Status: DISCONTINUED | OUTPATIENT
Start: 2019-02-03 | End: 2019-02-03

## 2019-02-03 RX ORDER — PREDNISONE 20 MG/1
40 TABLET ORAL DAILY
Qty: 5 TABLET | Refills: 0 | Status: SHIPPED | OUTPATIENT
Start: 2019-02-03 | End: 2019-02-08

## 2019-02-03 RX ORDER — NITROGLYCERIN 0.4 MG/1
0.4 TABLET SUBLINGUAL
Status: COMPLETED | OUTPATIENT
Start: 2019-02-03 | End: 2019-02-03

## 2019-02-03 RX ORDER — AZITHROMYCIN 250 MG/1
500 TABLET, FILM COATED ORAL ONCE
Status: DISCONTINUED | OUTPATIENT
Start: 2019-02-03 | End: 2019-02-03

## 2019-02-03 RX ORDER — ONDANSETRON 2 MG/ML
4 INJECTION INTRAMUSCULAR; INTRAVENOUS EVERY 6 HOURS PRN
Status: DISCONTINUED | OUTPATIENT
Start: 2019-02-03 | End: 2019-02-04 | Stop reason: HOSPADM

## 2019-02-03 RX ORDER — DOXYCYCLINE HYCLATE 100 MG
100 TABLET ORAL EVERY 12 HOURS
Status: DISCONTINUED | OUTPATIENT
Start: 2019-02-03 | End: 2019-02-04 | Stop reason: HOSPADM

## 2019-02-03 RX ORDER — MORPHINE SULFATE 8 MG/ML
2 INJECTION INTRAMUSCULAR; INTRAVENOUS; SUBCUTANEOUS
Status: COMPLETED | OUTPATIENT
Start: 2019-02-03 | End: 2019-02-03

## 2019-02-03 RX ORDER — RAMELTEON 8 MG/1
8 TABLET ORAL NIGHTLY PRN
Status: DISCONTINUED | OUTPATIENT
Start: 2019-02-03 | End: 2019-02-04 | Stop reason: HOSPADM

## 2019-02-03 RX ORDER — ENOXAPARIN SODIUM 100 MG/ML
1 INJECTION SUBCUTANEOUS
Status: COMPLETED | OUTPATIENT
Start: 2019-02-03 | End: 2019-02-03

## 2019-02-03 RX ORDER — ASPIRIN 325 MG
325 TABLET ORAL
Status: DISPENSED | OUTPATIENT
Start: 2019-02-03 | End: 2019-02-03

## 2019-02-03 RX ORDER — NITROGLYCERIN 0.4 MG/1
0.4 TABLET SUBLINGUAL EVERY 5 MIN PRN
Status: DISCONTINUED | OUTPATIENT
Start: 2019-02-03 | End: 2019-02-04 | Stop reason: HOSPADM

## 2019-02-03 RX ADMIN — KETOROLAC TROMETHAMINE 15 MG: 30 INJECTION INTRAMUSCULAR; INTRAVENOUS at 09:02

## 2019-02-03 RX ADMIN — NITROGLYCERIN 0.4 MG: 0.4 TABLET SUBLINGUAL at 08:02

## 2019-02-03 RX ADMIN — ENOXAPARIN SODIUM 60 MG: 100 INJECTION SUBCUTANEOUS at 10:02

## 2019-02-03 RX ADMIN — NITROGLYCERIN 0.4 MG: 0.4 TABLET, ORALLY DISINTEGRATING SUBLINGUAL at 06:02

## 2019-02-03 RX ADMIN — DOXYCYCLINE HYCLATE 100 MG: 100 TABLET, COATED ORAL at 04:02

## 2019-02-03 RX ADMIN — MORPHINE SULFATE 2 MG: 8 INJECTION, SOLUTION INTRAMUSCULAR; INTRAVENOUS at 07:02

## 2019-02-03 RX ADMIN — HEPARIN SODIUM 5000 UNITS: 5000 INJECTION, SOLUTION INTRAVENOUS; SUBCUTANEOUS at 09:02

## 2019-02-03 NOTE — ASSESSMENT & PLAN NOTE
Patient is end-stage renal disease she has dialysis on Tuesdays Thursdays Saturdays, she reports been dialyzed yesterday without issue.  Patient reports she was able to tolerate her entire dialysis yesterday.  She denies being in over diuresed are under diurese.  Tells me she has been compliant with her diet.  There is no evidence of fluid volume overload.  Patient does have a nonspecific intermittent nonproductive cough and low-grade fever 99.  Will treat with doxy as ordered by her primary care 2 days ago in clinic.

## 2019-02-03 NOTE — ASSESSMENT & PLAN NOTE
Atypical nonradiating chest pain, accompanied by intermittent nonproductive cough, recently seen by Dr. raymond in [place] in the clinic who prescribed doxy for upper respiratory infection on 02/01/2019.  Her troponins have been negative thus far EKG unchanged since previous  -continue to trend troponin's  -last 2D echo on file show preserved LVEF 70%  -no evidence of fluid volume overload lower extremity edema negative, no shortness of breath noted during examination she was fluent and able to speak in complete sentences, her oxygen sat remained above 98% off oxygen during interview  -she can follow up with Cardiology outpatient as needed

## 2019-02-03 NOTE — H&P
[facility] Medicine  History & Physical    Patient Name: Eli Vaz  MRN: [id number]  Admission Date: 2/3/2019  Attending Physician: Susan Araujo MD   Primary Care Provider: Chiara Nelson MD         Patient information was obtained from patient and ER records.     Subjective:     Principal Problem:Chest pain    Chief Complaint:   Chief Complaint   Patient presents with    Chest Pain     pt c/o midsternal and left side chest wall pain starting approx 3 am, describes as pressure, pain 8 of 10, dose of two asprin approx 1 hr pta, reports sob with hx of copd, sob not releived by inhaler        HPI: Eli Vaz is a 73 y.o. AAF with PMHx  including end-stage renal disease (TTS), diabetes mellitus, COPD.  Per patient she was awakened in the early morning with acute onset shortness of breath plus mild midsternal diffuse to left and right-sided chest pain.  This felt like pressure.  She reports she took 2 aspirin at home and presented to the [facility] emergency room, where she was administered 2 nitroglycerin sublingual tab that did not relieve her chest pain. She reports she received morphine after that that improved but did not return completely relieve her chest pain. Patient denies headache, blurry vision, history of long trips, recent trauma, changes to bowel functions.  Important to mention is that patient recently was seen by Dr. raymond him but lower on 02/01/2019 and diagnosed with upper respiratory infection and prescribed antibiotic started on 02/01/2019.    [place] the ED her D-dimer was found to be elevated and therefore emergency room physicians requested observation placement to rule out pulmonary embolus.  Patient has end-stage renal and therefore not a candidate for CTA with contrast.  Will obtain a stat V/Q perfusion scan, and trend troponins which have been negative thus for.  Her vitals are grossly stable blood pressure 121/55 and sat 95%.  She does  exhibit low-grade temp of 99° and nonproductive cough    Past Medical History:   Diagnosis Date    Arthritis     COPD (chronic obstructive pulmonary disease)     Diabetes mellitus type II     Dialysis patient     Encounter for blood transfusion     ESRD (end stage renal disease)        Past Surgical History:   Procedure Laterality Date    AV FISTULA PLACEMENT      TUBAL LIGATION         Review of patient's allergies indicates:  No Known Allergies    No current facility-administered medications on file prior to encounter.      Current Outpatient Medications on File Prior to Encounter   Medication Sig    albuterol (ACCUNEB) 1.25 mg/3 mL Nebu Inhale 1.25 mg into the lungs every 6 (six) hours as needed.    albuterol (PROVENTIL) 2.5 mg /3 mL (0.083 %) nebulizer solution Take 3 mLs (2.5 mg total) by nebulization every 4 (four) hours as needed for Wheezing. Rescue    amLODIPine (NORVASC) 10 MG tablet TAKE 1 TABLET (10 MG TOTAL) BY MOUTH ONCE DAILY.    atorvastatin (LIPITOR) 10 MG tablet Take 1 tablet (10 mg total) by mouth once daily.    atorvastatin (LIPITOR) 10 MG tablet Take 10 mg by mouth.    busPIRone (BUSPAR) 10 MG tablet Take 10 mg by mouth 2 (two) times daily.    cloNIDine (CATAPRES) 0.2 MG tablet Take 0.2 mg by mouth.    diclofenac sodium (VOLTAREN) 1 % Gel Apply 4 g topically.    doxycycline (VIBRAMYCIN) 100 MG Cap Take 1 capsule (100 mg total) by mouth every 12 (twelve) hours. for 10 days    epoetin act (PROCRIT) 3,000 unit/mL injection Inject 3,000 Units into the skin.    fluticasone (FLONASE) 50 mcg/actuation nasal spray 1 spray by Each Nare route 2 (two) times daily.    FOLIC ACID/VITAMIN B COMP W-C (RENAL CAPS ORAL) Take by mouth Daily.    gentamicin sulfate, PF, 60 mg/6 mL Soln Inject into the vein.    hydrALAZINE (APRESOLINE) 25 MG tablet Take 1 tablet (25 mg total) by mouth every 12 (twelve) hours.    HYDROcodone-acetaminophen (NORCO) 7.5-325 mg per tablet Take 1 tablet by mouth  every 6 (six) hours as needed for Pain.    hydrOXYzine pamoate (VISTARIL) 25 MG Cap Take 1 capsule (25 mg total) by mouth every 8 (eight) hours as needed (panic attack).    ipratropium-albuterol (COMBIVENT RESPIMAT)  mcg/actuation inhaler INHALE 2 PUFF(S) INTO THE LUNGS 3 TIMES A DAY    levocetirizine (XYZAL) 5 MG tablet Take 1 tablet (5 mg total) by mouth every evening.    meclizine (ANTIVERT) 25 mg tablet Take 1 tablet (25 mg total) by mouth 3 (three) times daily as needed.    metoprolol succinate (TOPROL-XL) 25 MG 24 hr tablet Take 25 mg by mouth.    mirtazapine (REMERON) 7.5 MG Tab Take 7.5 mg by mouth nightly.     montelukast (SINGULAIR) 10 mg tablet Take 1 tablet (10 mg total) by mouth every evening.    oxyCODONE-acetaminophen (PERCOCET) 5-325 mg per tablet TAKE 1 TABLET BY MOUTH EVERY 6 HOURS AS NEEDED FOR PAIN FOR UP TO 5 DAYS. MAX 4 TABLETS A DAY.    pantoprazole (PROTONIX) 40 MG tablet Take 40 mg by mouth.    paroxetine (PAXIL) 30 MG tablet 1 TABLET IN THE MORNING ONCE A DAY ORALLY 90    predniSONE (DELTASONE) 10 MG tablet Take 10 mg by mouth.    promethazine (PHENERGAN) 12.5 MG Tab Take 12.5 mg by mouth every 6 (six) hours as needed.    SENSIPAR 60 mg Tab Take 30 mg by mouth daily with breakfast.     sevelamer carbonate (RENVELA) 800 mg Tab Take 800 mg by mouth 3 (three) times daily with meals.    SODIUM BICARBONATE ORAL Take 650 mg by mouth.    traMADol (ULTRAM) 50 mg tablet Take 50 mg by mouth every 12 (twelve) hours as needed.    traZODone (DESYREL) 50 MG tablet Take 1 tablet (50 mg total) by mouth nightly as needed for Insomnia.    VENTOLIN HFA 90 mcg/actuation inhaler INHALE 2 PUFFS INTO LUNGS EVERY 4 HOURS AS NEEDED FOR SHORTNESS OF BREATH OR WHEEZING. RESCUE    vit B,C-iron fum-FA-D3-zinc ox 8 mg iron-800 mcg-1,000 unit Tab Take by mouth.    vitamin renal formula, B-complex-vitamin c-folic acid, (NEPHROCAP) 1 mg Cap Take by mouth.    vortioxetine (BRINTELLIX) 5 mg Tab  Take 1 tablet by mouth.     Family History     Problem Relation (Age of Onset)    Heart attack Sister, Sister, Mother        Tobacco Use    Smoking status: Former Smoker     Start date: 1/12/1991    Smokeless tobacco: Never Used   Substance and Sexual Activity    Alcohol use: No    Drug use: No    Sexual activity: Not on file     Review of Systems   Constitutional: Negative for appetite change, chills, diaphoresis and fever.   HENT: Negative for congestion, hearing loss, sore throat, tinnitus and trouble swallowing.    Eyes: Negative for photophobia, discharge, itching and visual disturbance.   Respiratory: Positive for cough and shortness of breath. Negative for apnea, wheezing and stridor.    Cardiovascular: Positive for chest pain. Negative for palpitations and leg swelling.   Gastrointestinal: Negative for abdominal distention, abdominal pain, blood in stool, constipation, diarrhea and nausea.   Endocrine: Negative for polydipsia, polyphagia and polyuria.   Genitourinary: Negative for difficulty urinating, dysuria, flank pain and frequency.   Musculoskeletal: Negative for arthralgias, joint swelling and neck stiffness.   Skin: Negative for color change, rash and wound.   Neurological: Negative for dizziness, tremors, seizures, light-headedness, numbness and headaches.   Hematological: Negative for adenopathy.   Psychiatric/Behavioral: Negative for hallucinations and self-injury.     Objective:     Vital Signs (Most Recent):  Temp: 99.2 °F (37.3 °C) (02/03/19 1125)  Pulse: 82 (02/03/19 1125)  Resp: 18 (02/03/19 1125)  BP: (!) 121/55 (02/03/19 1125)  SpO2: 95 % (02/03/19 1125) Vital Signs (24h Range):  Temp:  [98.7 °F (37.1 °C)-99.2 °F (37.3 °C)] 99.2 °F (37.3 °C)  Pulse:  [82-89] 82  Resp:  [16-19] 18  SpO2:  [95 %-100 %] 95 %  BP: (121-197)/(55-77) 121/55     Weight: 61.2 kg (135 lb)  Body mass index is 23.91 kg/m².    Physical Exam   Constitutional: She is oriented to person, place, and time. She appears  well-developed and well-nourished. She is cooperative.   HENT:   Head: Normocephalic and atraumatic.   Eyes: Conjunctivae, EOM and lids are normal. Pupils are equal, round, and reactive to light.   Neck: Normal range of motion and full passive range of motion without pain. Neck supple. No JVD present. No edema present. No thyroid mass present.   Cardiovascular: Normal rate, S1 normal, S2 normal and intact distal pulses.   No murmur heard.  Pulmonary/Chest: Effort normal.   Non productive cough   Abdominal: Soft. Bowel sounds are normal. She exhibits no distension and no abdominal bruit. There is no splenomegaly or hepatomegaly. There is no tenderness. There is no CVA tenderness.   Lymphadenopathy:     She has no cervical adenopathy.     She has no axillary adenopathy.   Neurological: She is alert and oriented to person, place, and time. She has normal reflexes. She displays no tremor. She displays no seizure activity.   Skin: Skin is warm, dry and intact.   Psychiatric: She has a normal mood and affect. Her speech is normal. Thought content normal. Cognition and memory are normal.         CRANIAL NERVES     CN III, IV, VI   Pupils are equal, round, and reactive to light.  Extraocular motions are normal.        Significant Labs:   CBC: No results for input(s): WBC, HGB, HCT, PLT in the last 48 hours.  CMP: No results for input(s): NA, K, CL, CO2, GLU, BUN, CREATININE, CALCIUM, PROT, ALBUMIN, BILITOT, ALKPHOS, AST, ALT, ANIONGAP, EGFRNONAA in the last 48 hours.    Invalid input(s): ESTGFAFRICA  Cardiac Markers: No results for input(s): CKMB, MYOGLOBIN, BNP, TROPISTAT in the last 48 hours.  Lactic Acid: No results for input(s): LACTATE in the last 48 hours.  Lipase: No results for input(s): LIPASE in the last 48 hours.  Lipid Panel: No results for input(s): CHOL, HDL, LDLCALC, TRIG, CHOLHDL in the last 48 hours.  Troponin:   Recent Labs   Lab 02/03/19  1219   TROPONINI 0.020     Significant Imaging:   Imaging Results           X-Ray Chest PA And Lateral (Final result)  Result time 02/03/19 06:16:03    Final result by David Armstrong MD (02/03/19 06:16:03)                 Impression:      Stable chronic interstitial findings without evidence of new confluent airspace consolidation.      Electronically signed by: David Armstrong MD  Date:    02/03/2019  Time:    06:16             Narrative:    EXAMINATION:  XR CHEST PA AND LATERAL    CLINICAL HISTORY:  Chest Pain;    TECHNIQUE:  PA and lateral views of the chest were performed.    COMPARISON:  Chest radiograph 02/01/2019, 01/09/2018    FINDINGS:  Cardiomediastinal silhouette is mildly enlarged, stable from prior examination.  There is prominent atherosclerotic calcification of the thoracic aorta.  The lungs again demonstrate coarse interstitial markings bilaterally, similar to prior examination.  There is stable slight blunting of the bilateral costophrenic angles.  No new large confluent airspace consolidation or pneumothorax identified.  Vascular stents project over the left axillary region and left upper extremity.  Visualized osseous structures demonstrate stable degenerative changes.                                  Assessment/Plan:     * Chest pain    Atypical nonradiating chest pain, accompanied by intermittent nonproductive cough, recently seen by Dr. raymond in [place] in the clinic who prescribed doxy for upper respiratory infection on 02/01/2019.  Her troponins have been negative thus far EKG unchanged since previous  -continue to trend troponin's  -last 2D echo on file show preserved LVEF 70%  -no evidence of fluid volume overload lower extremity edema negative, no shortness of breath noted during examination she was fluent and able to speak in complete sentences, her oxygen sat remained above 98% off oxygen during interview  -she can follow up with Cardiology outpatient as needed       Shortness of breath    Placed in observation for PE rule out, her oxygen sat has been  stable from % since presentation, she appears comfortable on room air; awaiting V/Q perfusion scan to rule out PE,   -likely viral in nature low-grade temp 99° with nonproductive cough; was seen recently by Dr. raymond in [place]; who prescribed antibiotics for upper respiratory infection; restart doxy prescribed to her by Dr. beto gayle on February 1st medication should her run through February 11th  --no evidence of fluid volume overload lower extremity edema negative, no shortness of breath noted during examination she was fluent and able to speak in complete sentences, her oxygen sat remained above 98% off oxygen during interview  -if V/Q perfusion scan negative will discharge to home     Anxiety    Has chronic history of anxiety she takes BuSpar 10 mg at home restart     ESRD on dialysis    Patient is end-stage renal disease she has dialysis on Tuesdays Thursdays Saturdays, she reports been dialyzed yesterday without issue.  Patient reports she was able to tolerate her entire dialysis yesterday.  She denies being in over diuresed are under diurese.  Tells me she has been compliant with her diet.  There is no evidence of fluid volume overload.  Patient does have a nonspecific intermittent nonproductive cough and low-grade fever 99.  Will treat with doxy as ordered by her primary care 2 days ago in clinic.         VTE Risk Mitigation (From admission, onward)        Ordered     heparin (porcine) injection 5,000 Units  Every 8 hours      02/03/19 6919             VIN Cabrera, FNP-C  Hospitalist - Department of Hospital Medicine  [address], [place] [address]  Office [phone]; Pager [phone]

## 2019-02-03 NOTE — SUBJECTIVE & OBJECTIVE
Past Medical History:   Diagnosis Date    Arthritis     COPD (chronic obstructive pulmonary disease)     Diabetes mellitus type II     Dialysis patient     Encounter for blood transfusion     ESRD (end stage renal disease)        Past Surgical History:   Procedure Laterality Date    AV FISTULA PLACEMENT      TUBAL LIGATION         Review of patient's allergies indicates:  No Known Allergies    No current facility-administered medications on file prior to encounter.      Current Outpatient Medications on File Prior to Encounter   Medication Sig    albuterol (ACCUNEB) 1.25 mg/3 mL Nebu Inhale 1.25 mg into the lungs every 6 (six) hours as needed.    albuterol (PROVENTIL) 2.5 mg /3 mL (0.083 %) nebulizer solution Take 3 mLs (2.5 mg total) by nebulization every 4 (four) hours as needed for Wheezing. Rescue    amLODIPine (NORVASC) 10 MG tablet TAKE 1 TABLET (10 MG TOTAL) BY MOUTH ONCE DAILY.    atorvastatin (LIPITOR) 10 MG tablet Take 1 tablet (10 mg total) by mouth once daily.    atorvastatin (LIPITOR) 10 MG tablet Take 10 mg by mouth.    busPIRone (BUSPAR) 10 MG tablet Take 10 mg by mouth 2 (two) times daily.    cloNIDine (CATAPRES) 0.2 MG tablet Take 0.2 mg by mouth.    diclofenac sodium (VOLTAREN) 1 % Gel Apply 4 g topically.    doxycycline (VIBRAMYCIN) 100 MG Cap Take 1 capsule (100 mg total) by mouth every 12 (twelve) hours. for 10 days    epoetin cat (PROCRIT) 3,000 unit/mL injection Inject 3,000 Units into the skin.    fluticasone (FLONASE) 50 mcg/actuation nasal spray 1 spray by Each Nare route 2 (two) times daily.    FOLIC ACID/VITAMIN B COMP W-C (RENAL CAPS ORAL) Take by mouth Daily.    gentamicin sulfate, PF, 60 mg/6 mL Soln Inject into the vein.    hydrALAZINE (APRESOLINE) 25 MG tablet Take 1 tablet (25 mg total) by mouth every 12 (twelve) hours.    HYDROcodone-acetaminophen (NORCO) 7.5-325 mg per tablet Take 1 tablet by mouth every 6 (six) hours as needed for Pain.    hydrOXYzine  pamoate (VISTARIL) 25 MG Cap Take 1 capsule (25 mg total) by mouth every 8 (eight) hours as needed (panic attack).    ipratropium-albuterol (COMBIVENT RESPIMAT)  mcg/actuation inhaler INHALE 2 PUFF(S) INTO THE LUNGS 3 TIMES A DAY    levocetirizine (XYZAL) 5 MG tablet Take 1 tablet (5 mg total) by mouth every evening.    meclizine (ANTIVERT) 25 mg tablet Take 1 tablet (25 mg total) by mouth 3 (three) times daily as needed.    metoprolol succinate (TOPROL-XL) 25 MG 24 hr tablet Take 25 mg by mouth.    mirtazapine (REMERON) 7.5 MG Tab Take 7.5 mg by mouth nightly.     montelukast (SINGULAIR) 10 mg tablet Take 1 tablet (10 mg total) by mouth every evening.    oxyCODONE-acetaminophen (PERCOCET) 5-325 mg per tablet TAKE 1 TABLET BY MOUTH EVERY 6 HOURS AS NEEDED FOR PAIN FOR UP TO 5 DAYS. MAX 4 TABLETS A DAY.    pantoprazole (PROTONIX) 40 MG tablet Take 40 mg by mouth.    paroxetine (PAXIL) 30 MG tablet 1 TABLET IN THE MORNING ONCE A DAY ORALLY 90    predniSONE (DELTASONE) 10 MG tablet Take 10 mg by mouth.    promethazine (PHENERGAN) 12.5 MG Tab Take 12.5 mg by mouth every 6 (six) hours as needed.    SENSIPAR 60 mg Tab Take 30 mg by mouth daily with breakfast.     sevelamer carbonate (RENVELA) 800 mg Tab Take 800 mg by mouth 3 (three) times daily with meals.    SODIUM BICARBONATE ORAL Take 650 mg by mouth.    traMADol (ULTRAM) 50 mg tablet Take 50 mg by mouth every 12 (twelve) hours as needed.    traZODone (DESYREL) 50 MG tablet Take 1 tablet (50 mg total) by mouth nightly as needed for Insomnia.    VENTOLIN HFA 90 mcg/actuation inhaler INHALE 2 PUFFS INTO LUNGS EVERY 4 HOURS AS NEEDED FOR SHORTNESS OF BREATH OR WHEEZING. RESCUE    vit B,C-iron fum-FA-D3-zinc ox 8 mg iron-800 mcg-1,000 unit Tab Take by mouth.    vitamin renal formula, B-complex-vitamin c-folic acid, (NEPHROCAP) 1 mg Cap Take by mouth.    vortioxetine (BRINTELLIX) 5 mg Tab Take 1 tablet by mouth.     Family History     Problem  Relation (Age of Onset)    Heart attack Sister, Sister, Mother        Tobacco Use    Smoking status: Former Smoker     Start date: 1/12/1991    Smokeless tobacco: Never Used   Substance and Sexual Activity    Alcohol use: No    Drug use: No    Sexual activity: Not on file     Review of Systems   Constitutional: Negative for appetite change, chills, diaphoresis and fever.   HENT: Negative for congestion, hearing loss, sore throat, tinnitus and trouble swallowing.    Eyes: Negative for photophobia, discharge, itching and visual disturbance.   Respiratory: Positive for cough and shortness of breath. Negative for apnea, wheezing and stridor.    Cardiovascular: Positive for chest pain. Negative for palpitations and leg swelling.   Gastrointestinal: Negative for abdominal distention, abdominal pain, blood in stool, constipation, diarrhea and nausea.   Endocrine: Negative for polydipsia, polyphagia and polyuria.   Genitourinary: Negative for difficulty urinating, dysuria, flank pain and frequency.   Musculoskeletal: Negative for arthralgias, joint swelling and neck stiffness.   Skin: Negative for color change, rash and wound.   Neurological: Negative for dizziness, tremors, seizures, light-headedness, numbness and headaches.   Hematological: Negative for adenopathy.   Psychiatric/Behavioral: Negative for hallucinations and self-injury.     Objective:     Vital Signs (Most Recent):  Temp: 99.2 °F (37.3 °C) (02/03/19 1125)  Pulse: 82 (02/03/19 1125)  Resp: 18 (02/03/19 1125)  BP: (!) 121/55 (02/03/19 1125)  SpO2: 95 % (02/03/19 1125) Vital Signs (24h Range):  Temp:  [98.7 °F (37.1 °C)-99.2 °F (37.3 °C)] 99.2 °F (37.3 °C)  Pulse:  [82-89] 82  Resp:  [16-19] 18  SpO2:  [95 %-100 %] 95 %  BP: (121-197)/(55-77) 121/55     Weight: 61.2 kg (135 lb)  Body mass index is 23.91 kg/m².    Physical Exam   Constitutional: She is oriented to person, place, and time. She appears well-developed and well-nourished. She is cooperative.    HENT:   Head: Normocephalic and atraumatic.   Eyes: Conjunctivae, EOM and lids are normal. Pupils are equal, round, and reactive to light.   Neck: Normal range of motion and full passive range of motion without pain. Neck supple. No JVD present. No edema present. No thyroid mass present.   Cardiovascular: Normal rate, S1 normal, S2 normal and intact distal pulses.   No murmur heard.  Pulmonary/Chest: Effort normal.   Non productive cough   Abdominal: Soft. Bowel sounds are normal. She exhibits no distension and no abdominal bruit. There is no splenomegaly or hepatomegaly. There is no tenderness. There is no CVA tenderness.   Lymphadenopathy:     She has no cervical adenopathy.     She has no axillary adenopathy.   Neurological: She is alert and oriented to person, place, and time. She has normal reflexes. She displays no tremor. She displays no seizure activity.   Skin: Skin is warm, dry and intact.   Psychiatric: She has a normal mood and affect. Her speech is normal. Thought content normal. Cognition and memory are normal.         CRANIAL NERVES     CN III, IV, VI   Pupils are equal, round, and reactive to light.  Extraocular motions are normal.        Significant Labs:   CBC: No results for input(s): WBC, HGB, HCT, PLT in the last 48 hours.  CMP: No results for input(s): NA, K, CL, CO2, GLU, BUN, CREATININE, CALCIUM, PROT, ALBUMIN, BILITOT, ALKPHOS, AST, ALT, ANIONGAP, EGFRNONAA in the last 48 hours.    Invalid input(s): ESTGFAFRICA  Cardiac Markers: No results for input(s): CKMB, MYOGLOBIN, BNP, TROPISTAT in the last 48 hours.  Lactic Acid: No results for input(s): LACTATE in the last 48 hours.  Lipase: No results for input(s): LIPASE in the last 48 hours.  Lipid Panel: No results for input(s): CHOL, HDL, LDLCALC, TRIG, CHOLHDL in the last 48 hours.  Troponin:   Recent Labs   Lab 02/03/19  1219   TROPONINI 0.020     Significant Imaging:   Imaging Results          X-Ray Chest PA And Lateral (Final result)   Result time 02/03/19 06:16:03    Final result by David Armstrong MD (02/03/19 06:16:03)                 Impression:      Stable chronic interstitial findings without evidence of new confluent airspace consolidation.      Electronically signed by: David Armstrong MD  Date:    02/03/2019  Time:    06:16             Narrative:    EXAMINATION:  XR CHEST PA AND LATERAL    CLINICAL HISTORY:  Chest Pain;    TECHNIQUE:  PA and lateral views of the chest were performed.    COMPARISON:  Chest radiograph 02/01/2019, 01/09/2018    FINDINGS:  Cardiomediastinal silhouette is mildly enlarged, stable from prior examination.  There is prominent atherosclerotic calcification of the thoracic aorta.  The lungs again demonstrate coarse interstitial markings bilaterally, similar to prior examination.  There is stable slight blunting of the bilateral costophrenic angles.  No new large confluent airspace consolidation or pneumothorax identified.  Vascular stents project over the left axillary region and left upper extremity.  Visualized osseous structures demonstrate stable degenerative changes.

## 2019-02-03 NOTE — HOSPITAL COURSE
Patient ruled out ACS with serial negative troponins.  Discussed case with Cardiology initially due to her atypical presentation no need to consult.  She had chest pressure diffuse across her chest that was nonradiating to the face or shoulders unrelieved by nitroglycerin or morphine.  Patient presented with a nonproductive intermittent cough.  Noted in patient's records she had doctor appointment with another MD on 02/01/2019 was ordered doxycycline for upper respiratory infection.  Noted to have a low-grade fever 99 that was nonsustained.  She was set for discharged after V/Q stand scan showed low probability; she denies leg or calf pain numbness or weakness in any extremity, recent long trips or trauma.  Her symptoms thought to likely be related to upper respiratory infection although viral due to comorbidities will continue her doxycycline and treatment as ordered by Dr. Nelson in the clinic on the 1st, she can follow up with the primary care doctor in the clinic when she completes her antibiotics.  Will add short course of steroids.  Vital stable discharged to home in stable condition.    At the time of discharge the patient complained of chest pain again. She was kept overnight and NST performed today. The NST that showed a mild infarct defect in the lateral apical all, however, was negative for myocardial ischemia. LVEF 72%. Close follow up with cardiology in clinic. Appointment arranged by CLARITA. Niru for discharge.

## 2019-02-03 NOTE — HPI
Eli Vaz is a 73 y.o. AAF with PMHx  including end-stage renal disease (TTS), diabetes mellitus, COPD.  Per patient she was awakened in the early morning with acute onset shortness of breath plus mild midsternal diffuse to left and right-sided chest pain.  This felt like pressure.  She reports she took 2 aspirin at home and presented to the [facility] emergency room, where she was administered 2 nitroglycerin sublingual tab that did not relieve her chest pain. She reports she received morphine after that that improved but did not return completely relieve her chest pain. Patient denies headache, blurry vision, history of long trips, recent trauma, changes to bowel functions.  Important to mention is that patient recently was seen by Dr. raymond him but lower on 02/01/2019 and diagnosed with upper respiratory infection and prescribed antibiotic started on 02/01/2019.    [place] the ED her D-dimer was found to be elevated and therefore emergency room physicians requested observation placement to rule out pulmonary embolus.  Patient has end-stage renal and therefore not a candidate for CTA with contrast.  Will obtain a stat V/Q perfusion scan, and trend troponins which have been negative thus for.  Her vitals are grossly stable blood pressure 121/55 and sat 95%.  She does exhibit low-grade temp of 99° and nonproductive cough

## 2019-02-03 NOTE — DISCHARGE SUMMARY
[facility] Hospital Medicine  Discharge Summary      Patient Name: Eli Vaz  MRN: [id number]  Admission Date: 2/3/2019  Hospital Length of Stay: 0 days  Discharge Date and Time:  02/04/2019 3:01 PM  Attending Physician: Susan Araujo MD   Discharging Provider: LACIE Huttno  Primary Care Provider: Chiara Nelson MD      HPI:   Eli Vaz is a 73 y.o. AAF with PMHx  including end-stage renal disease (TTS), diabetes mellitus, COPD.  Per patient she was awakened in the early morning with acute onset shortness of breath plus mild midsternal diffuse to left and right-sided chest pain.  This felt like pressure.  She reports she took 2 aspirin at home and presented to the [facility] emergency room, where she was administered 2 nitroglycerin sublingual tab that did not relieve her chest pain. She reports she received morphine after that that improved but did not return completely relieve her chest pain. Patient denies headache, blurry vision, history of long trips, recent trauma, changes to bowel functions.  Important to mention is that patient recently was seen by Dr. raymond him but lower on 02/01/2019 and diagnosed with upper respiratory infection and prescribed antibiotic started on 02/01/2019.    [place] the ED her D-dimer was found to be elevated and therefore emergency room physicians requested observation placement to rule out pulmonary embolus.  Patient has end-stage renal and therefore not a candidate for CTA with contrast.  Will obtain a stat V/Q perfusion scan, and trend troponins which have been negative thus for.  Her vitals are grossly stable blood pressure 121/55 and sat 95%.  She does exhibit low-grade temp of 99° and nonproductive cough    * No surgery found *      Hospital Course:   Patient ruled out ACS with serial negative troponins.  Discussed case with Cardiology initially due to her atypical presentation no need to consult.  She had chest pressure  diffuse across her chest that was nonradiating to the face or shoulders unrelieved by nitroglycerin or morphine.  Patient presented with a nonproductive intermittent cough.  Noted in patient's records she had doctor appointment with another MD on 02/01/2019 was ordered doxycycline for upper respiratory infection.  Noted to have a low-grade fever 99 that was nonsustained.  She was set for discharged after V/Q stand scan showed low probability; she denies leg or calf pain numbness or weakness in any extremity, recent long trips or trauma.  Her symptoms thought to likely be related to upper respiratory infection although viral due to comorbidities will continue her doxycycline and treatment as ordered by Dr. Nelson in the clinic on the 1st, she can follow up with the primary care doctor in the clinic when she completes her antibiotics.  Will add short course of steroids.  Vital stable discharged to home in stable condition.    At the time of discharge the patient complained of chest pain again. She was kept overnight and NST performed today. The NST that showed a mild infarct defect in the lateral apical all, however, was negative for myocardial ischemia. LVEF 72%. Close follow up with cardiology in clinic. Appointment arranged by MARTELL Marrufo for discharge.       Consults:     Final Active Diagnoses:    Diagnosis Date Noted POA    PRINCIPAL PROBLEM:  Chest pain [R07.9] 02/03/2019 Yes    Shortness of breath [R06.02] 02/03/2019 Yes    Anxiety [F41.9] 12/01/2015 Yes    ESRD on dialysis [N18.6, Z99.2] 12/20/2013 Not Applicable      Problems Resolved During this Admission:       Discharged Condition: stable    Disposition: Home or Self Care    Follow Up:  Follow-up Information     Chiara Nelson MD In 1 week.    Specialty:  Family Medicine  Why:  Call the office to schedule appointment, For outpatient follow-up/post hospitalizaton  Contact information:  [address]  [place] [address] [phone]                  Patient Instructions:      Diet renal     Activity as tolerated       Significant Diagnostic Studies: Labs:   CMP No results for input(s): NA, K, CL, CO2, GLU, BUN, CREATININE, CALCIUM, PROT, ALBUMIN, BILITOT, ALKPHOS, AST, ALT, ANIONGAP, ESTGFRAFRICA, EGFRNONAA in the last 48 hours., CBC No results for input(s): WBC, HGB, HCT, PLT in the last 48 hours., INR   Lab Results   Component Value Date    INR 1.1 01/09/2018    INR 1.1 02/26/2017    INR 1.0 05/30/2011   , Lipid Panel   Lab Results   Component Value Date    CHOL 215 (H) 09/28/2018     (H) 09/28/2018    LDLCALC 96.2 09/28/2018    TRIG 49 09/28/2018    CHOLHDL 50.7 (H) 09/28/2018   , Troponin   Recent Labs   Lab 02/03/19  1219   TROPONINI 0.020    and A1C:   Recent Labs   Lab 09/28/18  1010   HGBA1C 4.6       Pending Diagnostic Studies:     Procedure Component Value Units Date/Time    NM Lung Ventilation Perfusion Imaging [[id number]]     Order Status:  Sent Lab Status:  No result          Medications:  Reconciled Home Medications:      Medication List      CHANGE how you take these medications    predniSONE 20 MG tablet  Commonly known as:  DELTASONE  Take 2 tablets (40 mg total) by mouth once daily. for 5 days  What changed:    · medication strength  · how much to take  · when to take this        CONTINUE taking these medications    * albuterol 1.25 mg/3 mL Nebu  Commonly known as:  ACCUNEB  Inhale 1.25 mg into the lungs every 6 (six) hours as needed.     * albuterol 2.5 mg /3 mL (0.083 %) nebulizer solution  Commonly known as:  PROVENTIL  Take 3 mLs (2.5 mg total) by nebulization every 4 (four) hours as needed for Wheezing. Rescue     * VENTOLIN HFA 90 mcg/actuation inhaler  Generic drug:  albuterol  INHALE 2 PUFFS INTO LUNGS EVERY 4 HOURS AS NEEDED FOR SHORTNESS OF BREATH OR WHEEZING. RESCUE     amLODIPine 10 MG tablet  Commonly known as:  NORVASC  TAKE 1 TABLET (10 MG TOTAL) BY MOUTH ONCE DAILY.     * atorvastatin 10 MG tablet  Commonly known as:   LIPITOR  Take 10 mg by mouth.     * atorvastatin 10 MG tablet  Commonly known as:  LIPITOR  Take 1 tablet (10 mg total) by mouth once daily.     BRINTELLIX 5 mg Tab  Generic drug:  vortioxetine  Take 1 tablet by mouth.     busPIRone 10 MG tablet  Commonly known as:  BUSPAR  Take 10 mg by mouth 2 (two) times daily.     cloNIDine 0.2 MG tablet  Commonly known as:  CATAPRES  Take 0.2 mg by mouth.     diclofenac sodium 1 % Gel  Commonly known as:  VOLTAREN  Apply 4 g topically.     doxycycline 100 MG Cap  Commonly known as:  VIBRAMYCIN  Take 1 capsule (100 mg total) by mouth every 12 (twelve) hours. for 10 days     epoetin cat 3,000 unit/mL injection  Commonly known as:  PROCRIT  Inject 3,000 Units into the skin.     fluticasone 50 mcg/actuation nasal spray  Commonly known as:  FLONASE  1 spray by Each Nare route 2 (two) times daily.     gentamicin sulfate (PF) 60 mg/6 mL Soln  Inject into the vein.     hydrALAZINE 25 MG tablet  Commonly known as:  APRESOLINE  Take 1 tablet (25 mg total) by mouth every 12 (twelve) hours.     HYDROcodone-acetaminophen 7.5-325 mg per tablet  Commonly known as:  NORCO  Take 1 tablet by mouth every 6 (six) hours as needed for Pain.     hydrOXYzine pamoate 25 MG Cap  Commonly known as:  VISTARIL  Take 1 capsule (25 mg total) by mouth every 8 (eight) hours as needed (panic attack).     ipratropium-albuterol  mcg/actuation inhaler  Commonly known as:  COMBIVENT RESPIMAT  INHALE 2 PUFF(S) INTO THE LUNGS 3 TIMES A DAY     levocetirizine 5 MG tablet  Commonly known as:  XYZAL  Take 1 tablet (5 mg total) by mouth every evening.     meclizine 25 mg tablet  Commonly known as:  ANTIVERT  Take 1 tablet (25 mg total) by mouth 3 (three) times daily as needed.     metoprolol succinate 25 MG 24 hr tablet  Commonly known as:  TOPROL-XL  Take 25 mg by mouth.     mirtazapine 7.5 MG Tab  Commonly known as:  REMERON  Take 7.5 mg by mouth nightly.     montelukast 10 mg tablet  Commonly known as:   SINGULAIR  Take 1 tablet (10 mg total) by mouth every evening.     oxyCODONE-acetaminophen 5-325 mg per tablet  Commonly known as:  PERCOCET  TAKE 1 TABLET BY MOUTH EVERY 6 HOURS AS NEEDED FOR PAIN FOR UP TO 5 DAYS. MAX 4 TABLETS A DAY.     pantoprazole 40 MG tablet  Commonly known as:  PROTONIX  Take 40 mg by mouth.     paroxetine 30 MG tablet  Commonly known as:  PAXIL  1 TABLET IN THE MORNING ONCE A DAY ORALLY 90     promethazine 12.5 MG Tab  Commonly known as:  PHENERGAN  Take 12.5 mg by mouth every 6 (six) hours as needed.     RENAL CAPS ORAL  Take by mouth Daily.     SENSIPAR 60 MG Tab  Generic drug:  cinacalcet  Take 30 mg by mouth daily with breakfast.     sevelamer carbonate 800 mg Tab  Commonly known as:  RENVELA  Take 800 mg by mouth 3 (three) times daily with meals.     SODIUM BICARBONATE ORAL  Take 650 mg by mouth.     traMADol 50 mg tablet  Commonly known as:  ULTRAM  Take 50 mg by mouth every 12 (twelve) hours as needed.     traZODone 50 MG tablet  Commonly known as:  DESYREL  Take 1 tablet (50 mg total) by mouth nightly as needed for Insomnia.     vit B,C-iron fum-FA-D3-zinc ox 8 mg iron-800 mcg-1,000 unit Tab  Take by mouth.     vitamin renal formula (B-complex-vitamin c-folic acid) 1 mg Cap  Commonly known as:  NEPHROCAP  Take by mouth.         * This list has 5 medication(s) that are the same as other medications prescribed for you. Read the directions carefully, and ask your doctor or other care provider to review them with you.                Indwelling Lines/Drains at time of discharge:   Lines/Drains/Airways     Central Venous Catheter Line                 Hemodialysis Catheter -- days                Time spent on the discharge of patient: 35 minutes  Patient was seen and examined on the date of discharge and determined to be suitable for discharge.         VIN Cabrera, FNP-C  Hospitalist - Department of Hospital Medicine  [address] [place]  [address]  Office [phone]; Pager [phone]

## 2019-02-03 NOTE — ASSESSMENT & PLAN NOTE
Placed in observation for PE rule out, her oxygen sat has been stable from % since presentation, she appears comfortable on room air; awaiting V/Q perfusion scan to rule out PE,   -likely viral in nature low-grade temp 99° with nonproductive cough; was seen recently by Dr. raymond in [place]; who prescribed antibiotics for upper respiratory infection; restart doxy prescribed to her by Dr. beto gayle on February 1st medication should her run through February 11th  --no evidence of fluid volume overload lower extremity edema negative, no shortness of breath noted during examination she was fluent and able to speak in complete sentences, her oxygen sat remained above 98% off oxygen during interview  -if V/Q perfusion scan negative will discharge to home

## 2019-02-04 VITALS
BODY MASS INDEX: 24.8 KG/M2 | TEMPERATURE: 99 F | SYSTOLIC BLOOD PRESSURE: 149 MMHG | DIASTOLIC BLOOD PRESSURE: 75 MMHG | HEART RATE: 69 BPM | RESPIRATION RATE: 18 BRPM | OXYGEN SATURATION: 94 % | HEIGHT: 63 IN | WEIGHT: 140 LBS

## 2019-02-04 LAB
AORTIC ROOT ANNULUS: 3.4 CM
AORTIC VALVE CUSP SEPERATION: 1.84 CM
ASCENDING AORTA: 3.14 CM
AV INDEX (PROSTH): 1.03
AV MEAN GRADIENT: 7 MMHG
AV PEAK GRADIENT: 13.1 MMHG
AV VALVE AREA: 3.58 CM2
AV VELOCITY RATIO: 0.9
BSA FOR ECHO PROCEDURE: 1.68 M2
CV ECHO LV RWT: 0.6 CM
CV STRESS BASE HR: 77 BPM
DIASTOLIC BLOOD PRESSURE: 80 MMHG
DOP CALC AO PEAK VEL: 1.81 M/S
DOP CALC AO VTI: 34.64 CM
DOP CALC LVOT AREA: 3.46 CM2
DOP CALC LVOT DIAMETER: 2.1 CM
DOP CALC LVOT PEAK VEL: 1.64 M/S
DOP CALC LVOT STROKE VOLUME: 124.07 CM3
DOP CALCLVOT PEAK VEL VTI: 35.84 CM
E WAVE DECELERATION TIME: 232 MSEC
E/A RATIO: 1.07
E/E' RATIO: 21.54
ECHO LV POSTERIOR WALL: 1.3 CM (ref 0.6–1.1)
FRACTIONAL SHORTENING: 40 % (ref 28–44)
INTERVENTRICULAR SEPTUM: 1.29 CM (ref 0.6–1.1)
IVRT: 0.08 MSEC
LA MAJOR: 5.64 CM
LA MINOR: 5.24 CM
LA WIDTH: 3.97 CM
LEFT ATRIUM SIZE: 4.18 CM
LEFT ATRIUM VOLUME INDEX: 46.1 ML/M2
LEFT ATRIUM VOLUME: 76.63 CM3
LEFT INTERNAL DIMENSION IN SYSTOLE: 2.59 CM (ref 2.1–4)
LEFT VENTRICLE DIASTOLIC VOLUME INDEX: 50.42 ML/M2
LEFT VENTRICLE DIASTOLIC VOLUME: 83.78 ML
LEFT VENTRICLE MASS INDEX: 125.2 G/M2
LEFT VENTRICLE SYSTOLIC VOLUME INDEX: 14.6 ML/M2
LEFT VENTRICLE SYSTOLIC VOLUME: 24.31 ML
LEFT VENTRICULAR INTERNAL DIMENSION IN DIASTOLE: 4.32 CM (ref 3.5–6)
LEFT VENTRICULAR MASS: 208.03 G
LV LATERAL E/E' RATIO: 23.33
LV SEPTAL E/E' RATIO: 20
MV PEAK A VEL: 1.31 M/S
MV PEAK E VEL: 1.4 M/S
NUC REST EJECTION FRACTION: 72
OHS CV CPX 85 PERCENT MAX PREDICTED HEART RATE MALE: 121
OHS CV CPX MAX PREDICTED HEART RATE: 143
OHS CV CPX PATIENT IS FEMALE: 1
OHS CV CPX PATIENT IS MALE: 0
OHS CV CPX PEAK DIASTOLIC BLOOD PRESSURE: 69 MMHG
OHS CV CPX PEAK HEAR RATE: 85 BPM
OHS CV CPX PEAK RATE PRESSURE PRODUCT: NORMAL
OHS CV CPX PEAK SYSTOLIC BLOOD PRESSURE: 154 MMHG
OHS CV CPX PERCENT MAX PREDICTED HEART RATE ACHIEVED: 60
OHS CV CPX RATE PRESSURE PRODUCT PRESENTING: NORMAL
PISA TR MAX VEL: 3.2 M/S
PV PEAK VELOCITY: 1.15 CM/S
RA MAJOR: 4.78 CM
RA PRESSURE: 3 MMHG
RA WIDTH: 3.88 CM
RIGHT VENTRICULAR END-DIASTOLIC DIMENSION: 4.5 CM
RV TISSUE DOPPLER FREE WALL SYSTOLIC VELOCITY 1 (APICAL 4 CHAMBER VIEW): 13.78 M/S
SINUS: 3.02 CM
STJ: 2.41 CM
SYSTOLIC BLOOD PRESSURE: 165 MMHG
TDI LATERAL: 0.06
TDI SEPTAL: 0.07
TDI: 0.07
TR MAX PG: 40.96 MMHG
TRICUSPID ANNULAR PLANE SYSTOLIC EXCURSION: 1.95 CM
TV REST PULMONARY ARTERY PRESSURE: 44 MMHG

## 2019-02-04 PROCEDURE — G0378 HOSPITAL OBSERVATION PER HR: HCPCS

## 2019-02-04 PROCEDURE — 25000003 PHARM REV CODE 250: Performed by: NURSE PRACTITIONER

## 2019-02-04 PROCEDURE — 94761 N-INVAS EAR/PLS OXIMETRY MLT: CPT

## 2019-02-04 PROCEDURE — 99900035 HC TECH TIME PER 15 MIN (STAT)

## 2019-02-04 PROCEDURE — 96372 THER/PROPH/DIAG INJ SC/IM: CPT | Mod: 59

## 2019-02-04 PROCEDURE — 63600175 PHARM REV CODE 636 W HCPCS: Performed by: NURSE PRACTITIONER

## 2019-02-04 PROCEDURE — 63600175 PHARM REV CODE 636 W HCPCS: Performed by: INTERNAL MEDICINE

## 2019-02-04 RX ORDER — REGADENOSON 0.08 MG/ML
0.4 INJECTION, SOLUTION INTRAVENOUS ONCE
Status: COMPLETED | OUTPATIENT
Start: 2019-02-04 | End: 2019-02-04

## 2019-02-04 RX ADMIN — HEPARIN SODIUM 5000 UNITS: 5000 INJECTION, SOLUTION INTRAVENOUS; SUBCUTANEOUS at 06:02

## 2019-02-04 RX ADMIN — HEPARIN SODIUM 5000 UNITS: 5000 INJECTION, SOLUTION INTRAVENOUS; SUBCUTANEOUS at 04:02

## 2019-02-04 RX ADMIN — DOXYCYCLINE HYCLATE 100 MG: 100 TABLET, COATED ORAL at 09:02

## 2019-02-04 RX ADMIN — REGADENOSON 0.4 MG: 0.08 INJECTION, SOLUTION INTRAVENOUS at 08:02

## 2019-02-04 NOTE — PROGRESS NOTES
[facility]    WRITTEN HEALTHCARE AND DISCHARGE INFORMATION  Follow-up Information     Chiara Nelson MD On 2/11/2019.    Specialty:  Family Medicine  Why:  @2:20pm, for Hospital Follow up  Contact information:  [address] [place] [place] [facility]  [phone]             Elgin Garcia MD On 2/13/2019.    Specialty:  Cardiology  Why:  @1:00pm, for Cardiology follow up  Contact information:  [address]  [address]  [place] [facility]  [phone]                       Help at Home           [phone]  After discharge for assistance [facility] On Call Nurse Care Line 24/7  Assistance     Things You are responsible For To Manage Your Care At Home:  1.    Getting your prescriptions filled   2.    Taking your medications as directed, DO NOT MISS ANY DOSES!  3.    Going to your follow-up doctor appointment. This is important because it  allow the doctor to monitor your progress and determine if  any changes need to made to your treatment plan.     Thank you for choosing [facility] for your care.  Please answer any calls you may receive from [facility] we want to continue to support you as you manage your healthcare needs. [facility] is happy to have the opportunity to serve you.      Sincerely,  Your [facility] Team,  KARTHIKEYAN Martinez, ACM-RN; [facility]  [phone]

## 2019-02-04 NOTE — NURSING
Patient to Cardiology via wheelchair by transport. Telemetry monitoring in progress. Patient in NAD and without complaints.

## 2019-02-04 NOTE — PLAN OF CARE
To patient's room to discuss patient managing her care at home.      TN Role Explained.  Patient identified by using 2 identifiers:  Name and date of birth    Patient stated that her  and daughter WILL HELP AT HOME WITH her RECOVERY.      TN name and contact info placed on the communication board    Preferred Pharmacy:  [id number] [place] - [address]  [address] [place] [address]  Phone: [phone] Fax: [phone]       02/04/19 1225   Discharge Assessment   Assessment Type Discharge Planning Assessment   Confirmed/corrected address and phone number on facesheet? Yes   Assessment information obtained from? Patient   Expected Length of Stay (days) 2   Communicated expected length of stay with patient/caregiver yes   Prior to hospitilization cognitive status: Alert/Oriented   Prior to hospitalization functional status: Independent   Current cognitive status: Alert/Oriented   Current Functional Status: Independent   Lives With spouse   Able to Return to Prior Arrangements yes   Is patient able to care for self after discharge? Yes   Patient's perception of discharge disposition home or selfcare   Readmission Within the Last 30 Days no previous admission in last 30 days   Patient currently being followed by outpatient case management? No   Patient currently receives any other outside agency services? No   Equipment Currently Used at Home none   Do you have any problems affording any of your prescribed medications? No   Is the patient taking medications as prescribed? yes   Does the patient have transportation home? Yes   Transportation Anticipated family or friend will provide   Dialysis Name and Scheduled days [facility]   Does the patient receive services at the [facility]madin Clinic? No   Discharge Plan A Home with family   DME Needed Upon Discharge  none   Patient/Family in Agreement with Plan yes

## 2019-02-04 NOTE — NURSING
Patient returned from Cardiology via wheelchair by transport.  Patient in NAD and without complaints. Telemetry monitoring in progress.

## 2019-02-04 NOTE — NURSING
Reviewed all discharge instructions with patient including, new medications, continued medications, follow-up appointments and signs/symptoms to report to PCP or seek emergency medical care. Patient verbalized understanding to all instructions and education. Patient denies any complaints or concerns at this time. Awaiting transport.

## 2019-02-04 NOTE — H&P
[facility] Medicine  History & Physical    Patient Name: Eli Vaz  MRN: [id number]  Admission Date: 2/3/2019  Attending Physician: Susan Araujo MD   Primary Care Provider: Chiara Nelson MD         Patient information was obtained from patient and ER records.     Subjective:     Principal Problem:Chest pain    Chief Complaint:   Chief Complaint   Patient presents with    Chest Pain     pt c/o midsternal and left side chest wall pain starting approx 3 am, describes as pressure, pain 8 of 10, dose of two asprin approx 1 hr pta, reports sob with hx of copd, sob not releived by inhaler        HPI: Eli Vaz is a 73 y.o. AAF with PMHx  including end-stage renal disease (TTS), diabetes mellitus, COPD.  Per patient she was awakened in the early morning with acute onset shortness of breath plus mild midsternal diffuse to left and right-sided chest pain.  This felt like pressure.  She reports she took 2 aspirin at home and presented to the [facility] emergency room, where she was administered 2 nitroglycerin sublingual tab that did not relieve her chest pain. She reports she received morphine after that that improved but did not return completely relieve her chest pain. Patient denies headache, blurry vision, history of long trips, recent trauma, changes to bowel functions.  Important to mention is that patient recently was seen by Dr. raymond him but lower on 02/01/2019 and diagnosed with upper respiratory infection and prescribed antibiotic started on 02/01/2019.    [place] the ED her D-dimer was found to be elevated and therefore emergency room physicians requested observation placement to rule out pulmonary embolus.  Patient has end-stage renal and therefore not a candidate for CTA with contrast.  Will obtain a stat V/Q perfusion scan, and trend troponins which have been negative thus for.  Her vitals are grossly stable blood pressure 121/55 and sat 95%.  She does  exhibit low-grade temp of 99° and nonproductive cough    No new subjective & objective note has been filed under this hospital service since the last note was generated.    Assessment/Plan:     * Chest pain    Atypical nonradiating chest pain, accompanied by intermittent nonproductive cough, recently seen by Dr. segundo Nelson in the clinic who prescribed doxy for upper respiratory infection on 02/01/2019.  Her troponins have been negative thus far EKG unchanged since previous  -continue to trend troponin's  -last 2D echo on file show preserved LVEF 70%  -no evidence of fluid volume overload lower extremity edema negative, no shortness of breath noted during examination she was fluent and able to speak in complete sentences, her oxygen sat remained above 98% off oxygen during interview  -she can follow up with Cardiology outpatient as needed       Shortness of breath    Placed in observation for PE rule out, her oxygen sat has been stable from % since presentation, she appears comfortable on room air; awaiting V/Q perfusion scan to rule out PE,   -likely viral in nature low-grade temp 99° with nonproductive cough; was seen recently by Dr. raymond in [place]; who prescribed antibiotics for upper respiratory infection; restart doxy prescribed to her by Dr. beto gayle on February 1st medication should her run through February 11th  --no evidence of fluid volume overload lower extremity edema negative, no shortness of breath noted during examination she was fluent and able to speak in complete sentences, her oxygen sat remained above 98% off oxygen during interview  -if V/Q perfusion scan negative will discharge to home     Anxiety    Has chronic history of anxiety she takes BuSpar 10 mg at home restart     ESRD on dialysis    Patient is end-stage renal disease she has dialysis on Tuesdays Thursdays Saturdays, she reports been dialyzed yesterday without issue.  Patient reports she was able to tolerate her entire  dialysis yesterday.  She denies being in over diuresed are under diurese.  Tells me she has been compliant with her diet.  There is no evidence of fluid volume overload.  Patient does have a nonspecific intermittent nonproductive cough and low-grade fever 99.  Will treat with doxy as ordered by her primary care 2 days ago in clinic.         VTE Risk Mitigation (From admission, onward)        Ordered     heparin (porcine) injection 5,000 Units  Every 8 hours      02/03/19 7857           VIN Cabrera, FNP-C  Hospitalist - Department of Hospital Medicine  [address] [place] [address]  Office [phone]; Pager [phone]

## 2019-02-04 NOTE — PLAN OF CARE
"   02/04/19 1656   Final Note   Assessment Type Final Discharge Note   Anticipated Discharge Disposition Home   What phone number can be called within the next 1-3 days to see how you are doing after discharge? ([phone])   Hospital Follow Up  Appt(s) scheduled? Yes   Discharge plans and expectations educations in teach back method with documentation complete? Yes   Right Care Referral Info   Post Acute Recommendation No Care   EDUCATION:  Ms Vaz provided with educational information on CP.  Information reviewed and placed in :My Healthcare Packet" to be brought home for her to use as resource after discharge.  Information included:  signs and symptoms to look for and call the doctor if experiencing, and symptoms that may indicate a medical emergency: CALL 911.      All questions answered.  Teach back method used.    Patient stated, "Pains in my arms, stomach or back, SOB or squeezing in the chest call 911".    NurseJen aware that all CM needs are            "

## 2019-02-06 ENCOUNTER — OFFICE VISIT (OUTPATIENT)
Dept: ORTHOPEDICS | Facility: CLINIC | Age: 74
End: 2019-02-06
Payer: MEDICARE

## 2019-02-06 VITALS
SYSTOLIC BLOOD PRESSURE: 160 MMHG | WEIGHT: 137.56 LBS | HEIGHT: 63 IN | BODY MASS INDEX: 24.38 KG/M2 | DIASTOLIC BLOOD PRESSURE: 60 MMHG | HEART RATE: 73 BPM

## 2019-02-06 DIAGNOSIS — M75.102 LEFT ROTATOR CUFF TEAR ARTHROPATHY: Primary | ICD-10-CM

## 2019-02-06 DIAGNOSIS — M12.812 LEFT ROTATOR CUFF TEAR ARTHROPATHY: Primary | ICD-10-CM

## 2019-02-06 PROCEDURE — 1101F PT FALLS ASSESS-DOCD LE1/YR: CPT | Mod: CPTII,S$GLB,, | Performed by: ORTHOPAEDIC SURGERY

## 2019-02-06 PROCEDURE — 99999 PR PBB SHADOW E&M-EST. PATIENT-LVL IV: ICD-10-PCS | Mod: PBBFAC,,, | Performed by: ORTHOPAEDIC SURGERY

## 2019-02-06 PROCEDURE — 99212 OFFICE O/P EST SF 10 MIN: CPT | Mod: S$GLB,,, | Performed by: ORTHOPAEDIC SURGERY

## 2019-02-06 PROCEDURE — 1101F PR PT FALLS ASSESS DOC 0-1 FALLS W/OUT INJ PAST YR: ICD-10-PCS | Mod: CPTII,S$GLB,, | Performed by: ORTHOPAEDIC SURGERY

## 2019-02-06 PROCEDURE — 99212 PR OFFICE/OUTPT VISIT, EST, LEVL II, 10-19 MIN: ICD-10-PCS | Mod: S$GLB,,, | Performed by: ORTHOPAEDIC SURGERY

## 2019-02-06 PROCEDURE — 99999 PR PBB SHADOW E&M-EST. PATIENT-LVL IV: CPT | Mod: PBBFAC,,, | Performed by: ORTHOPAEDIC SURGERY

## 2019-02-06 NOTE — PROGRESS NOTES
Ms. Vaz returns today to discuss options for her left shoulder arthritis  She was seen by Dr. Barton with management and and on Norco 7.5 to manage her pain.    NAD   The patient is alert and oriented  There are no noted rashes, abrasions, lacerations  No change in exam of her left shoulder    Assessment 73 year female with shoulder arthritis on hemodialysis  We discussed the a reverse shoulder arthroplasty to treat her pain.  Based on recent literature does not seem that shoulder arthroplasty significantly raises her risk for medical complications compared to patients that do not undergo surgery. She is still at high risk for complications following any surgery including medical complications infection.  We discussed reverse shoulder arthroplasty including what the procedure would entail and expected recovery.  She was given copy recent article from [facility] about shoulder arthroplasty in dialysis patients.  She will discuss with her family and to return if she would like to proceed with surgery. Prior to surgery she would need clearance from her primary care as well as from her vascular surgeon.  Her left arm is her access arm for dialysis so it is possible that shoulder arthroplasty may not be an option due to risk of disrupting her access.

## 2019-02-08 ENCOUNTER — TELEPHONE (OUTPATIENT)
Dept: PAIN MEDICINE | Facility: CLINIC | Age: 74
End: 2019-02-08

## 2019-02-08 NOTE — TELEPHONE ENCOUNTER
Reminded patient of Pain Management appointment scheduled for Monday at 8.45a with Dr. Barton- verbal confirmation received.  Location information also provided.

## 2019-02-11 ENCOUNTER — OFFICE VISIT (OUTPATIENT)
Dept: PAIN MEDICINE | Facility: CLINIC | Age: 74
End: 2019-02-11
Payer: MEDICARE

## 2019-02-11 ENCOUNTER — OFFICE VISIT (OUTPATIENT)
Dept: FAMILY MEDICINE | Facility: CLINIC | Age: 74
End: 2019-02-11
Payer: MEDICARE

## 2019-02-11 VITALS
BODY MASS INDEX: 24.39 KG/M2 | SYSTOLIC BLOOD PRESSURE: 183 MMHG | OXYGEN SATURATION: 99 % | WEIGHT: 137.63 LBS | RESPIRATION RATE: 18 BRPM | DIASTOLIC BLOOD PRESSURE: 74 MMHG | HEART RATE: 78 BPM | HEIGHT: 63 IN

## 2019-02-11 VITALS
TEMPERATURE: 99 F | RESPIRATION RATE: 16 BRPM | HEART RATE: 74 BPM | SYSTOLIC BLOOD PRESSURE: 160 MMHG | WEIGHT: 138.44 LBS | DIASTOLIC BLOOD PRESSURE: 63 MMHG | HEIGHT: 63 IN | BODY MASS INDEX: 24.53 KG/M2

## 2019-02-11 DIAGNOSIS — M25.512 CHRONIC LEFT SHOULDER PAIN: ICD-10-CM

## 2019-02-11 DIAGNOSIS — E78.2 COMBINED HYPERLIPIDEMIA ASSOCIATED WITH TYPE 2 DIABETES MELLITUS: ICD-10-CM

## 2019-02-11 DIAGNOSIS — G89.29 CHRONIC LEFT SHOULDER PAIN: ICD-10-CM

## 2019-02-11 DIAGNOSIS — J44.1 CHRONIC OBSTRUCTIVE PULMONARY DISEASE WITH ACUTE EXACERBATION: ICD-10-CM

## 2019-02-11 DIAGNOSIS — M75.102 LEFT ROTATOR CUFF TEAR ARTHROPATHY: Primary | ICD-10-CM

## 2019-02-11 DIAGNOSIS — E11.69 COMBINED HYPERLIPIDEMIA ASSOCIATED WITH TYPE 2 DIABETES MELLITUS: ICD-10-CM

## 2019-02-11 DIAGNOSIS — M12.812 LEFT ROTATOR CUFF TEAR ARTHROPATHY: Primary | ICD-10-CM

## 2019-02-11 DIAGNOSIS — Z09 HOSPITAL DISCHARGE FOLLOW-UP: Primary | ICD-10-CM

## 2019-02-11 PROCEDURE — 99214 OFFICE O/P EST MOD 30 MIN: CPT | Mod: S$GLB,,, | Performed by: PAIN MEDICINE

## 2019-02-11 PROCEDURE — 1101F PT FALLS ASSESS-DOCD LE1/YR: CPT | Mod: CPTII,S$GLB,, | Performed by: PAIN MEDICINE

## 2019-02-11 PROCEDURE — 3044F PR MOST RECENT HEMOGLOBIN A1C LEVEL <7.0%: ICD-10-PCS | Mod: CPTII,S$GLB,, | Performed by: FAMILY MEDICINE

## 2019-02-11 PROCEDURE — 99999 PR PBB SHADOW E&M-EST. PATIENT-LVL III: ICD-10-PCS | Mod: PBBFAC,,, | Performed by: PAIN MEDICINE

## 2019-02-11 PROCEDURE — 99999 PR PBB SHADOW E&M-EST. PATIENT-LVL III: CPT | Mod: PBBFAC,,, | Performed by: PAIN MEDICINE

## 2019-02-11 PROCEDURE — 99999 PR PBB SHADOW E&M-EST. PATIENT-LVL III: CPT | Mod: PBBFAC,,, | Performed by: FAMILY MEDICINE

## 2019-02-11 PROCEDURE — 99214 PR OFFICE/OUTPT VISIT, EST, LEVL IV, 30-39 MIN: ICD-10-PCS | Mod: S$GLB,,, | Performed by: FAMILY MEDICINE

## 2019-02-11 PROCEDURE — 3044F HG A1C LEVEL LT 7.0%: CPT | Mod: CPTII,S$GLB,, | Performed by: FAMILY MEDICINE

## 2019-02-11 PROCEDURE — 99214 OFFICE O/P EST MOD 30 MIN: CPT | Mod: S$GLB,,, | Performed by: FAMILY MEDICINE

## 2019-02-11 PROCEDURE — 1101F PR PT FALLS ASSESS DOC 0-1 FALLS W/OUT INJ PAST YR: ICD-10-PCS | Mod: CPTII,S$GLB,, | Performed by: PAIN MEDICINE

## 2019-02-11 PROCEDURE — 99499 UNLISTED E&M SERVICE: CPT | Mod: S$GLB,,, | Performed by: FAMILY MEDICINE

## 2019-02-11 PROCEDURE — 99999 PR PBB SHADOW E&M-EST. PATIENT-LVL III: ICD-10-PCS | Mod: PBBFAC,,, | Performed by: FAMILY MEDICINE

## 2019-02-11 PROCEDURE — 99214 PR OFFICE/OUTPT VISIT, EST, LEVL IV, 30-39 MIN: ICD-10-PCS | Mod: S$GLB,,, | Performed by: PAIN MEDICINE

## 2019-02-11 PROCEDURE — 99499 RISK ADDL DX/OHS AUDIT: ICD-10-PCS | Mod: S$GLB,,, | Performed by: FAMILY MEDICINE

## 2019-02-11 PROCEDURE — 1101F PT FALLS ASSESS-DOCD LE1/YR: CPT | Mod: CPTII,S$GLB,, | Performed by: FAMILY MEDICINE

## 2019-02-11 PROCEDURE — 1101F PR PT FALLS ASSESS DOC 0-1 FALLS W/OUT INJ PAST YR: ICD-10-PCS | Mod: CPTII,S$GLB,, | Performed by: FAMILY MEDICINE

## 2019-02-11 RX ORDER — ATORVASTATIN CALCIUM 10 MG/1
10 TABLET, FILM COATED ORAL DAILY
Qty: 90 TABLET | Refills: 1 | Status: SHIPPED | OUTPATIENT
Start: 2019-02-11 | End: 2019-05-03

## 2019-02-11 RX ORDER — HYDROCODONE BITARTRATE AND ACETAMINOPHEN 7.5; 325 MG/1; MG/1
1 TABLET ORAL EVERY 8 HOURS PRN
Qty: 90 TABLET | Refills: 0 | Status: ON HOLD | OUTPATIENT
Start: 2019-03-14 | End: 2019-03-25 | Stop reason: HOSPADM

## 2019-02-11 RX ORDER — HYDROCODONE BITARTRATE AND ACETAMINOPHEN 7.5; 325 MG/1; MG/1
1 TABLET ORAL EVERY 8 HOURS PRN
Qty: 90 TABLET | Refills: 0 | Status: ON HOLD | OUTPATIENT
Start: 2019-04-13 | End: 2019-03-25 | Stop reason: HOSPADM

## 2019-02-11 RX ORDER — ALBUTEROL SULFATE 1.25 MG/3ML
1.25 SOLUTION RESPIRATORY (INHALATION) EVERY 6 HOURS PRN
Qty: 1 BOX | Refills: 2 | Status: SHIPPED | OUTPATIENT
Start: 2019-02-11 | End: 2019-04-05

## 2019-02-11 RX ORDER — HYDROCODONE BITARTRATE AND ACETAMINOPHEN 7.5; 325 MG/1; MG/1
1 TABLET ORAL EVERY 8 HOURS PRN
Qty: 90 TABLET | Refills: 0 | Status: SHIPPED | OUTPATIENT
Start: 2019-02-12 | End: 2019-03-14

## 2019-02-11 NOTE — PATIENT INSTRUCTIONS
COPD Flare    You have had a flare-up of your COPD.  COPD, or chronic obstructive pulmonary disease, is a common lung disease. It causes your airways to become irritated and narrower. This makes it harder for you to breathe. Emphysema and chronic bronchitis are both types of COPD. This is a chronic condition, which means you always have it. Sometimes it gets worse. When this happens, it is called a flare-up.  Symptoms of COPD  People with COPD may have symptoms most of the time. In a flare-up, your symptoms get worse. These symptoms may mean you are having a flare-up:  · Shortness of breath, shallow or rapid breathing, or wheezing that gets worse  · Lung infection  · Cough that gets worse  · More mucus, thicker mucus or mucus of a different color  · Tiredness, decreased energy, or trouble doing your usual activities  · Fever  · Chest tightness  · Your symptoms dont get better even when you use your usual medicines, inhalers, and nebulizer  · Trouble talking  · You feel confused  Causes of flare-ups  Unfortunately, a flare-up can happen even though you did everything right, and you followed your doctors instructions. Some causes of flare-ups are:  · Smoking or secondhand smoke  · Colds, the flu, or respiratory infections  · Air pollution  · Sudden change in the weather  · Dust, irritating chemicals, or strong fumes  · Not taking your medicines as prescribed  Home care  Here are some things you can do at home to treat a flare-up:  · Try not to panic. This makes it harder to breathe, and keeps you from doing the right things.  · Dont smoke or be around others who are smoking.  · Try to drink more fluids than usual during a flare-up, unless your doctor has told you not to because of heart and kidney problems. More fluids can help loosen the mucus.  · Use your inhalers and nebulizer, if you have one, as you have been told to.  · If you were given antibiotics, take them until they are used up or your doctor tells you  to stop. Its important to finish the antibiotics, even though you feel better. This will make sure the infection has cleared.  · If you were given prednisone or another steroid, finish it even if you feel better.  Preventing a flare-up  Even though flare-ups happen, the best way to treat one is to prevent it before it starts. Here are some pointers:  · Dont smoke or be around others who are smoking.  · Take your medicines as you have been told.  · Talk with your doctor about getting a flu shot every year. Also find out if you need a pneumonia shot.  · If there is a weather advisory warning to stay indoors, try to stay inside when possible.  · Try to eat healthy and get plenty of sleep.  · Try to avoid things that usually set you off, like dust, chemical fumes, hairsprays, or strong perfumes.  Follow-up care  Follow up with your healthcare provider, or as advised.  If a culture was done, you will be told if your treatment needs to be changed. You can call as directed for the results.  If X-rays were done, you will be notified of any new findings that may affect your care.  Call 911  Call 911 if any of these occur:  · You have trouble breathing  · You feel confused or its difficult to wake you up  · You faint or lose consciousness  · You have a rapid heart rate  · You have new pain in your chest, arm, shoulder, neck or upper back  When to seek medical advice  Call your healthcare provider right away if any of these occur:  · Wheezing or shortness of breath gets worse  · You need to use your inhalers more often than usual without relief  · Fever of 100.4°F (38ºC) or higher, or as directed by your healthcare provider  · Coughing up lots of dark-colored or bloody mucus (sputum)  · Chest pain with each breath  · You do not start to get better within 24 hours  · Swelling of your ankles gets worse  · Dizziness or weakness  Date Last Reviewed: 9/1/2016  © 4735-7107 [employer]. [address],  [place] [address]. All rights reserved. This information is not intended as a substitute for professional medical care. Always follow your healthcare professional's instructions.

## 2019-02-11 NOTE — PROGRESS NOTES
Chief Complaint   Patient presents with    Hospital Follow Up       HPI    Eli Vaz is 73 y.o. female. The primary encounter diagnosis was Hospital discharge follow-up. Diagnoses of Chronic obstructive pulmonary disease with acute exacerbation and Combined hyperlipidemia associated with type 2 diabetes mellitus were also pertinent to this visit.    73 year old female with COPD and HLD comes to clinic for hospital follow up visit.  Patient reports several days after her previous office visit she suddenly began to experience substernal chest pain that woke her out of her sleep.  She reports taking ASA and being evaluated in the ED.    Patient reports that she was informed that her work up was negative and no cardiac damage was noted. She was prescribed steroids to take in addition to the antibiotics previously prescribed. Since being home she reports slight cough that has improved. She reports only single use of Albuterol inhaler. She continues to take all of her medications.    Review of Systems   Constitutional: Negative for activity change.   Respiratory: Positive for cough. Negative for chest tightness, shortness of breath and wheezing.    Cardiovascular: Negative for chest pain, palpitations and leg swelling.   Musculoskeletal: Negative for gait problem.   Psychiatric/Behavioral: Negative for suicidal ideas.           Current Outpatient Medications:     albuterol (ACCUNEB) 1.25 mg/3 mL Nebu, Take 3 mLs (1.25 mg total) by nebulization every 6 (six) hours as needed., Disp: 1 Box, Rfl: 2    amLODIPine (NORVASC) 10 MG tablet, TAKE 1 TABLET (10 MG TOTAL) BY MOUTH ONCE DAILY., Disp: 90 tablet, Rfl: 1    atorvastatin (LIPITOR) 10 MG tablet, Take 1 tablet (10 mg total) by mouth once daily., Disp: 90 tablet, Rfl: 1    albuterol (PROVENTIL) 2.5 mg /3 mL (0.083 %) nebulizer solution, Take 3 mLs (2.5 mg total) by nebulization every 4 (four) hours as needed for Wheezing. Rescue, Disp: 1 Box, Rfl: 0    busPIRone  (BUSPAR) 10 MG tablet, Take 10 mg by mouth 2 (two) times daily., Disp: , Rfl: 6    cloNIDine (CATAPRES) 0.2 MG tablet, Take 0.2 mg by mouth., Disp: , Rfl:     diclofenac sodium (VOLTAREN) 1 % Gel, Apply 4 g topically., Disp: , Rfl:     epoetin cat (PROCRIT) 3,000 unit/mL injection, Inject 3,000 Units into the skin., Disp: , Rfl:     fluticasone (FLONASE) 50 mcg/actuation nasal spray, 1 spray by Each Nare route 2 (two) times daily., Disp: 1 Bottle, Rfl: 1    FOLIC ACID/VITAMIN B COMP W-C (RENAL CAPS ORAL), Take by mouth Daily., Disp: , Rfl:     gentamicin sulfate, PF, 60 mg/6 mL Soln, Inject into the vein., Disp: , Rfl:     hydrALAZINE (APRESOLINE) 25 MG tablet, Take 1 tablet (25 mg total) by mouth every 12 (twelve) hours., Disp: 180 tablet, Rfl: 0    HYDROcodone-acetaminophen (NORCO) 7.5-325 mg per tablet, Take 1 tablet by mouth every 8 (eight) hours as needed for Pain., Disp: 90 tablet, Rfl: 0    [START ON 3/14/2019] HYDROcodone-acetaminophen (NORCO) 7.5-325 mg per tablet, Take 1 tablet by mouth every 8 (eight) hours as needed for Pain., Disp: 90 tablet, Rfl: 0    [START ON 4/13/2019] HYDROcodone-acetaminophen (NORCO) 7.5-325 mg per tablet, Take 1 tablet by mouth every 8 (eight) hours as needed for Pain., Disp: 90 tablet, Rfl: 0    hydrOXYzine pamoate (VISTARIL) 25 MG Cap, Take 1 capsule (25 mg total) by mouth every 8 (eight) hours as needed (panic attack)., Disp: 30 capsule, Rfl: 1    ipratropium-albuterol (COMBIVENT RESPIMAT)  mcg/actuation inhaler, INHALE 2 PUFF(S) INTO THE LUNGS 3 TIMES A DAY, Disp: 4 g, Rfl: 3    levocetirizine (XYZAL) 5 MG tablet, Take 1 tablet (5 mg total) by mouth every evening., Disp: 30 tablet, Rfl: 2    meclizine (ANTIVERT) 25 mg tablet, Take 1 tablet (25 mg total) by mouth 3 (three) times daily as needed., Disp: 20 tablet, Rfl: 0    metoprolol succinate (TOPROL-XL) 25 MG 24 hr tablet, Take 25 mg by mouth., Disp: , Rfl:     mirtazapine (REMERON) 7.5 MG Tab, Take  "7.5 mg by mouth nightly. , Disp: , Rfl:     montelukast (SINGULAIR) 10 mg tablet, Take 1 tablet (10 mg total) by mouth every evening., Disp: 30 tablet, Rfl: 2    pantoprazole (PROTONIX) 40 MG tablet, Take 40 mg by mouth., Disp: , Rfl:     paroxetine (PAXIL) 30 MG tablet, 1 TABLET IN THE MORNING ONCE A DAY ORALLY 90, Disp: , Rfl: 3    promethazine (PHENERGAN) 12.5 MG Tab, Take 12.5 mg by mouth every 6 (six) hours as needed., Disp: , Rfl:     SENSIPAR 60 mg Tab, Take 30 mg by mouth daily with breakfast. , Disp: , Rfl:     sevelamer carbonate (RENVELA) 800 mg Tab, Take 800 mg by mouth 3 (three) times daily with meals., Disp: , Rfl:     SODIUM BICARBONATE ORAL, Take 650 mg by mouth., Disp: , Rfl:     traZODone (DESYREL) 50 MG tablet, Take 1 tablet (50 mg total) by mouth nightly as needed for Insomnia., Disp: 30 tablet, Rfl: 3    VENTOLIN HFA 90 mcg/actuation inhaler, INHALE 2 PUFFS INTO LUNGS EVERY 4 HOURS AS NEEDED FOR SHORTNESS OF BREATH OR WHEEZING. RESCUE, Disp: 18 Inhaler, Rfl: 2    vit B,C-iron fum-FA-D3-zinc ox 8 mg iron-800 mcg-1,000 unit Tab, Take by mouth., Disp: , Rfl:     vitamin renal formula, B-complex-vitamin c-folic acid, (NEPHROCAP) 1 mg Cap, Take by mouth., Disp: , Rfl:     vortioxetine (BRINTELLIX) 5 mg Tab, Take 1 tablet by mouth., Disp: , Rfl:       Blood pressure (!) 160/63, pulse 74, temperature 98.6 °F (37 °C), temperature source Oral, resp. rate 16, height 5' 3" (1.6 m), weight 62.8 kg (138 lb 7.2 oz).    Physical Exam   Constitutional: Vital signs are normal. She appears well-developed.   Cardiovascular: Normal heart sounds.   No murmur heard.  Pulmonary/Chest: Effort normal and breath sounds normal. She has no decreased breath sounds. She has no wheezes. She has no rhonchi. She has no rales.   Psychiatric: She has a normal mood and affect. Her behavior is normal.       Admission on 02/03/2019, Discharged on 02/04/2019   Component Date Value Ref Range Status    POC Cardiac Troponin " I 02/03/2019 0.00  <0.09 ng/mL Final    Sample 02/03/2019 UNK   Final    POC PTWBT 02/03/2019 12.6  9.7 - 14.3 sec Final    POC PTINR 02/03/2019 1.1  0.9 - 1.2 Final    Sample 02/03/2019 UNK   Final    POC B-Type Natriuretic Peptide 02/03/2019 706* 0.0 - 100.0 pg/mL Final    Albumin, POC 02/03/2019 3.6  3.3 - 5.5 g/dL Final    Alkaline Phosphatase, POC 02/03/2019 93  42 - 141 U/L Final    ALT (SGPT), POC 02/03/2019 12  10 - 47 U/L Final    AST (SGOT), POC 02/03/2019 24  11 - 38 U/L Final    POC BUN 02/03/2019 36* 7 - 22 mg/dL Final    Calcium, POC 02/03/2019 10.0  8.0 - 10.3 mg/dL Final    POC Chloride 02/03/2019 95* 98 - 108 mmol/L Final    POC Creatinine 02/03/2019 7.9* 0.6 - 1.2 mg/dL Final    POC Glucose 02/03/2019 85  73 - 118 mg/dL Final    POC Potassium 02/03/2019 4.8  3.6 - 5.1 mmol/L Final    POC Sodium 02/03/2019 147* 128 - 145 mmol/L Final    Bilirubin 02/03/2019 0.4  0.2 - 1.6 mg/dL Final    POC TCO2 02/03/2019 28  18 - 33 mmol/L Final    Protein 02/03/2019 8.2* 6.4 - 8.1 g/dL Final    POC D-DI 02/03/2019 993* 0.0 - 450.0 ng/mL Final    Troponin I 02/03/2019 0.020  0.000 - 0.026 ng/mL Final    Troponin I 02/03/2019 0.036* 0.000 - 0.026 ng/mL Final    BSA 02/04/2019 1.68  m2 Final    TDI SEPTAL 02/04/2019 0.07   Final    LV LATERAL E/E' RATIO 02/04/2019 23.33   Final    LV SEPTAL E/E' RATIO 02/04/2019 20.00   Final    LA WIDTH 02/04/2019 3.97  cm Final    AORTIC VALVE CUSP SEPERATION 02/04/2019 1.84  cm Final    TDI LATERAL 02/04/2019 0.06   Final    PV PEAK VELOCITY 02/04/2019 1.15  cm/s Final    LVIDD 02/04/2019 4.32  3.5 - 6.0 cm Final    IVS 02/04/2019 1.29* 0.6 - 1.1 cm Final    PW 02/04/2019 1.30* 0.6 - 1.1 cm Final    Ao root annulus 02/04/2019 3.40  cm Final    LVIDS 02/04/2019 2.59  2.1 - 4.0 cm Final    FS 02/04/2019 40  28 - 44 % Final    LA volume 02/04/2019 76.63  cm3 Final    Sinus 02/04/2019 3.02  cm Final    STJ 02/04/2019 2.41  cm Final    Ascending  aorta 02/04/2019 3.14  cm Final    LV mass 02/04/2019 208.03  g Final    LA size 02/04/2019 4.18  cm Final    RVDD 02/04/2019 4.50  cm Final    TAPSE 02/04/2019 1.95  cm Final    RV S' 02/04/2019 13.78  m/s Final    Left Ventricle Relative Wall Thick* 02/04/2019 0.60  cm Final    AV mean gradient 02/04/2019 7.00  mmHg Final    AV valve area 02/04/2019 3.58  cm2 Final    AV Velocity Ratio 02/04/2019 0.90   Final    AV index (prosthetic) 02/04/2019 1.03   Final    E/A ratio 02/04/2019 1.07   Final    Mean e' 02/04/2019 0.07   Final    E wave decelartion time 02/04/2019 232.00  msec Final    IVRT 02/04/2019 0.08  msec Final    LVOT diameter 02/04/2019 2.10  cm Final    LVOT area 02/04/2019 3.46  cm2 Final    LVOT peak salvador 02/04/2019 [id number]  m/s Final    LVOT peak VTI 02/04/2019 35.84  cm Final    Ao peak salvador 02/04/2019 1.81  m/s Final    Ao VTI 02/04/2019 34.64  cm Final    LVOT stroke volume 02/04/2019 124.07  cm3 Final    AV peak gradient 02/04/2019 13.10  mmHg Final    E/E' ratio 02/04/2019 21.54   Final    MV Peak E Salvador 02/04/2019 1.40  m/s Final    TR Max Salvador 02/04/2019 3.20  m/s Final    MV Peak A Salvador 02/04/2019 1.31  m/s Final    LV Systolic Volume 02/04/2019 24.31  mL Final    LV Systolic Volume Index 02/04/2019 14.6  mL/m2 Final    LV Diastolic Volume 02/04/2019 83.78  mL Final    LV Diastolic Volume Index 02/04/2019 50.42  mL/m2 Final    LA Volume Index 02/04/2019 46.1  mL/m2 Final    LV Mass Index 02/04/2019 125.2  g/m2 Final    RA Major Axis 02/04/2019 4.78  cm Final    Left Atrium Minor Axis 02/04/2019 5.24  cm Final    Left Atrium Major Axis 02/04/2019 5.64  cm Final    Triscuspid Valve Regurgitation Pea* 02/04/2019 40.96  mmHg Final    RA Width 02/04/2019 3.88  cm Final    Right Atrial Pressure (from IVC) 02/04/2019 3  mmHg Final    TV rest pulmonary artery pressure 02/04/2019 44  mmHg Final    Systolic blood pressure 02/04/2019 165  mmHg Final    Diastolic  blood pressure 02/04/2019 80  mmHg Final    HR at rest 02/04/2019 77  bpm Final    RPP 02/04/2019 12,705   Final    Peak HR 02/04/2019 85  bpm Final    Peak Systolic BP 02/04/2019 154  mmHg Final    Peak Diatolic BP 02/04/2019 69  mmHg Final    Peak RPP 02/04/2019 13,090   Final    Max Predicted HR 02/04/2019 143   Final    85% Max Predicted HR 02/04/2019 121   Final    % Max HR Achieved 02/04/2019 60   Final    OHS CV CPX PATIENT IS MALE 02/04/2019 0   Final    OHS CV CPX PATIENT IS FEMALE 02/04/2019 1   Final    Nuc Rest EF 02/04/2019 72   Final   Lab Visit on 01/21/2019   Component Date Value Ref Range Status    Amphetamine Scrn 01/21/2019 Negative   Final    Barbiturate Scrn 01/21/2019 Negative   Final    Benzodiazepines 01/21/2019 Negative   Final    Cocaine Lvl 01/21/2019 Negative   Final    Alcohol Scrn 01/21/2019 Negative   Final    Methadone (Dolophine), Serum 01/21/2019 Negative   Final    Opiates, Blood 01/21/2019 Negative   Final    Phencyclidine, Blood 01/21/2019 Negative   Final    Propoxyphene 01/21/2019 Negative   Final    THC (Marijuana) Metabolite, bl 01/21/2019 Negative   Final   ]    Assessment:    1. Hospital discharge follow-up    2. Chronic obstructive pulmonary disease with acute exacerbation    3. Combined hyperlipidemia associated with type 2 diabetes mellitus          Eli was seen today for hospital follow up.    Diagnoses and all orders for this visit:    Hospital discharge follow-up   -Discharge summary, imaging, and labs from inpatient hospitalization reviewed with patient and patient's daughter.   -Patient aware of follow up.     Chronic obstructive pulmonary disease with acute exacerbation  -     albuterol (ACCUNEB) 1.25 mg/3 mL Nebu; Take 3 mLs (1.25 mg total) by nebulization every 6 (six) hours as needed.  -     Complete PFT with bronchodilator; Future  - New problem. Cause of symptoms most likely due to exacerbation. Complete antibiotics and steroids.  Recommend addition of Mucinex.  - Nebulizer refilled per patient's daughter request.  - Complete PFT 1 week prior to follow up and determine if referral to Pulmonology prior to surgery is needed.    Combined hyperlipidemia associated with type 2 diabetes mellitus  -     atorvastatin (LIPITOR) 10 MG tablet; Take 1 tablet (10 mg total) by mouth once daily.  - Stable. Medication refilled per patient request.          FOLLOW UP: Follow-up in about 4 weeks (around 3/11/2019) for PFT, review results..

## 2019-02-11 NOTE — PROGRESS NOTES
Subjective:     Patient ID: Eli Vaz is a 73 y.o. female    Chief Complaint: Follow-up (2 week f/u)      Referred by: No ref. provider found      HPI:    Interval History (2/11/19):  She returns today for follow up.  She reports that Norco 7.5-325 half pill q.4 hours has been helpful for the left shoulder pain. Patient states that this provides about 50% relief of her pain.  She no longer has pain while at rest and does note slightly improved range of motion of her left shoulder with minimal to no pain. She still has significantly limited range of motion and function of her left upper extremity as result.  She has discussed potential arthroplasty with Dr. Zurita.  She plans to discuss this option with the rest of her treatment team to see if it is something she wants to proceed with.  She denies any adverse effects from taking her pain medication.  Specifically she denies any sedation or constipation.  She does not feels the higher doses are needed at this time as she is worried about adverse effects and feels as though her current dosage provides adequate relief.      Initial Encounter (1/21/19):  Eli Vaz is a 73 y.o. female who presents today with chronic left shoulder pain. Patient was referred by Dr. Zurita.  She has known left shoulder arthritis and rotator cuff pathology.  Limited surgical and interventional options given medical comorbidities patient has end-stage renal disease on dialysis.  She has failed shoulder injections including Synvisc.  She states that her left shoulder pain is very severe and limits her activities.  She wants somebody to cut it off.    This pain is described in detail below.    Physical Therapy:  Unlikely to be of benefit    Non-pharmacologic Treatment:  Nothing helps         · TENS?  No    Pain Medications:         · Currently taking:  Norco 5-325 half pill q.4 hours p.r.n.    · Has tried in the past:  Tramadol, Percocet, cannot take NSAIDs due to renal  disease    · Has not tried: NSAIDs, Muscle relaxants, TCAs, SNRIs, anticonvulsants, topical creams    Blood thinners:  None    Interventional Therapies:  Previous shoulder injections including Synvisc.    Relevant Surgeries:  None    Affecting sleep?  Yes    Affecting daily activities? yes    Depressive symptoms? yes          · SI/HI? No    Work status: Unemployed    Pain Scores:    Best:       5/10  Worst:     10/10  Usually:   8/10  Today:    5/10    Review of Systems   Constitutional: Negative for activity change, appetite change, chills, fatigue, fever and unexpected weight change.   HENT: Negative for hearing loss.    Eyes: Negative for visual disturbance.   Respiratory: Negative for chest tightness and shortness of breath.    Cardiovascular: Negative for chest pain.   Gastrointestinal: Negative for abdominal pain, constipation, diarrhea, nausea and vomiting.   Genitourinary: Negative for difficulty urinating.   Musculoskeletal: Positive for arthralgias and myalgias. Negative for back pain, gait problem and neck pain.   Skin: Negative for rash.   Neurological: Positive for weakness. Negative for dizziness, light-headedness, numbness and headaches.   Psychiatric/Behavioral: Positive for sleep disturbance. Negative for hallucinations and suicidal ideas. The patient is not nervous/anxious.        Past Medical History:   Diagnosis Date    Arthritis     COPD (chronic obstructive pulmonary disease)     Diabetes mellitus type II     Dialysis patient     Encounter for blood transfusion     ESRD (end stage renal disease)        Past Surgical History:   Procedure Laterality Date    AV FISTULA PLACEMENT      TUBAL LIGATION         Social History     Socioeconomic History    Marital status:      Spouse name: Not on file    Number of children: Not on file    Years of education: Not on file    Highest education level: Not on file   Social Needs    Financial resource strain: Not on file    Food insecurity  - worry: Not on file    Food insecurity - inability: Not on file    Transportation needs - medical: Not on file    Transportation needs - non-medical: Not on file   Occupational History    Not on file   Tobacco Use    Smoking status: Former Smoker     Start date: 1/12/1991    Smokeless tobacco: Never Used   Substance and Sexual Activity    Alcohol use: No    Drug use: No    Sexual activity: Not on file   Other Topics Concern    Not on file   Social History Narrative    Not on file       Review of patient's allergies indicates:  No Known Allergies    Current Outpatient Medications on File Prior to Visit   Medication Sig Dispense Refill    albuterol (ACCUNEB) 1.25 mg/3 mL Nebu Inhale 1.25 mg into the lungs every 6 (six) hours as needed.      albuterol (PROVENTIL) 2.5 mg /3 mL (0.083 %) nebulizer solution Take 3 mLs (2.5 mg total) by nebulization every 4 (four) hours as needed for Wheezing. Rescue 1 Box 0    amLODIPine (NORVASC) 10 MG tablet TAKE 1 TABLET (10 MG TOTAL) BY MOUTH ONCE DAILY. 90 tablet 1    atorvastatin (LIPITOR) 10 MG tablet Take 1 tablet (10 mg total) by mouth once daily. 90 tablet 1    busPIRone (BUSPAR) 10 MG tablet Take 10 mg by mouth 2 (two) times daily.  6    cloNIDine (CATAPRES) 0.2 MG tablet Take 0.2 mg by mouth.      diclofenac sodium (VOLTAREN) 1 % Gel Apply 4 g topically.      doxycycline (VIBRAMYCIN) 100 MG Cap Take 1 capsule (100 mg total) by mouth every 12 (twelve) hours. for 10 days 20 capsule 0    epoetin cat (PROCRIT) 3,000 unit/mL injection Inject 3,000 Units into the skin.      fluticasone (FLONASE) 50 mcg/actuation nasal spray 1 spray by Each Nare route 2 (two) times daily. 1 Bottle 1    FOLIC ACID/VITAMIN B COMP W-C (RENAL CAPS ORAL) Take by mouth Daily.      gentamicin sulfate, PF, 60 mg/6 mL Soln Inject into the vein.      hydrALAZINE (APRESOLINE) 25 MG tablet Take 1 tablet (25 mg total) by mouth every 12 (twelve) hours. 180 tablet 0    hydrOXYzine pamoate  (VISTARIL) 25 MG Cap Take 1 capsule (25 mg total) by mouth every 8 (eight) hours as needed (panic attack). 30 capsule 1    ipratropium-albuterol (COMBIVENT RESPIMAT)  mcg/actuation inhaler INHALE 2 PUFF(S) INTO THE LUNGS 3 TIMES A DAY 4 g 3    levocetirizine (XYZAL) 5 MG tablet Take 1 tablet (5 mg total) by mouth every evening. 30 tablet 2    meclizine (ANTIVERT) 25 mg tablet Take 1 tablet (25 mg total) by mouth 3 (three) times daily as needed. 20 tablet 0    metoprolol succinate (TOPROL-XL) 25 MG 24 hr tablet Take 25 mg by mouth.      mirtazapine (REMERON) 7.5 MG Tab Take 7.5 mg by mouth nightly.       montelukast (SINGULAIR) 10 mg tablet Take 1 tablet (10 mg total) by mouth every evening. 30 tablet 2    pantoprazole (PROTONIX) 40 MG tablet Take 40 mg by mouth.      paroxetine (PAXIL) 30 MG tablet 1 TABLET IN THE MORNING ONCE A DAY ORALLY 90  3    promethazine (PHENERGAN) 12.5 MG Tab Take 12.5 mg by mouth every 6 (six) hours as needed.      SENSIPAR 60 mg Tab Take 30 mg by mouth daily with breakfast.       sevelamer carbonate (RENVELA) 800 mg Tab Take 800 mg by mouth 3 (three) times daily with meals.      SODIUM BICARBONATE ORAL Take 650 mg by mouth.      traZODone (DESYREL) 50 MG tablet Take 1 tablet (50 mg total) by mouth nightly as needed for Insomnia. 30 tablet 3    VENTOLIN HFA 90 mcg/actuation inhaler INHALE 2 PUFFS INTO LUNGS EVERY 4 HOURS AS NEEDED FOR SHORTNESS OF BREATH OR WHEEZING. RESCUE 18 Inhaler 2    vit B,C-iron fum-FA-D3-zinc ox 8 mg iron-800 mcg-1,000 unit Tab Take by mouth.      vitamin renal formula, B-complex-vitamin c-folic acid, (NEPHROCAP) 1 mg Cap Take by mouth.      vortioxetine (BRINTELLIX) 5 mg Tab Take 1 tablet by mouth.      [DISCONTINUED] atorvastatin (LIPITOR) 10 MG tablet Take 10 mg by mouth.      [DISCONTINUED] oxyCODONE-acetaminophen (PERCOCET) 5-325 mg per tablet TAKE 1 TABLET BY MOUTH EVERY 6 HOURS AS NEEDED FOR PAIN FOR UP TO 5 DAYS. MAX 4 TABLETS A  "DAY.  0    [DISCONTINUED] traMADol (ULTRAM) 50 mg tablet Take 50 mg by mouth every 12 (twelve) hours as needed.  2     No current facility-administered medications on file prior to visit.        Objective:      BP (!) 183/74   Pulse 78   Resp 18   Ht 5' 3" (1.6 m)   Wt 62.4 kg (137 lb 9.6 oz)   SpO2 99%   BMI 24.37 kg/m²     Exam:  GEN:  Well developed, well nourished.  No acute distress.   HEENT:  No trauma.  Mucous membranes moist.  Nares patent bilaterally.  PSYCH: Normal affect. Thought content appropriate.  CHEST:  Breathing symmetric.  No audible wheezing.  ABD: Soft, non-distended.  SKIN:  Warm, pink, dry.  No rash on exposed areas.    EXT:  No cyanosis, clubbing, or edema.  No color change or changes in nail or hair growth.  NEURO/MUSCULOSKELETAL:  Fully alert, oriented, and appropriate. Speech normal merrill. No cranial nerve deficits.   Gait:  Normal.  No focal motor deficits.     Left shoulder active range of motion limited    Imaging:  Narrative     EXAMINATION:  XR SHOULDER COMPLETE 2 OR MORE VIEWS LEFT    CLINICAL HISTORY:  Pain in left shoulder    TECHNIQUE:  Two or three views of the left shoulder were performed.    COMPARISON:  None    FINDINGS:  The bones are intact.  There is no evidence for acute fracture or bone destruction.  There are advanced degenerative changes of the left glenohumeral joint with marked joint space narrowing with subchondral sclerosis and osteophyte formation.  There is cortical irregularity at the articular surfaces of the glenohumeral joint.  Multiple vascular stents project over the left upper arm and left subclavian region with multiple surgical clips also present.  Atherosclerotic calcification is present within the thoracic aorta as well as within the carotid arteries.   Impression       Advanced degenerative changes of the left glenohumeral joint.    No evidence for acute fracture, bone destruction, or dislocation.    Surgical changes with multiple vascular " stents.    Extensive atherosclerosis.      Electronically signed by: Tone Pereyra MD  Date: 09/18/2018  Time: 07:58         Assessment:       Encounter Diagnoses   Name Primary?    Left rotator cuff tear arthropathy Yes    Chronic left shoulder pain          Plan:       Eli was seen today for follow-up.    Diagnoses and all orders for this visit:    Left rotator cuff tear arthropathy  -     HYDROcodone-acetaminophen (NORCO) 7.5-325 mg per tablet; Take 1 tablet by mouth every 8 (eight) hours as needed for Pain.  -     HYDROcodone-acetaminophen (NORCO) 7.5-325 mg per tablet; Take 1 tablet by mouth every 8 (eight) hours as needed for Pain.  -     HYDROcodone-acetaminophen (NORCO) 7.5-325 mg per tablet; Take 1 tablet by mouth every 8 (eight) hours as needed for Pain.    Chronic left shoulder pain  -     HYDROcodone-acetaminophen (NORCO) 7.5-325 mg per tablet; Take 1 tablet by mouth every 8 (eight) hours as needed for Pain.  -     HYDROcodone-acetaminophen (NORCO) 7.5-325 mg per tablet; Take 1 tablet by mouth every 8 (eight) hours as needed for Pain.  -     HYDROcodone-acetaminophen (NORCO) 7.5-325 mg per tablet; Take 1 tablet by mouth every 8 (eight) hours as needed for Pain.        Eli Vaz is a 73 y.o. female with chronic left shoulder pain secondary to rotator cuff and glenohumeral pathology.  May be a candidate for left shoulder arthroplast.  Treatment options for pain and underlying pathology are very limited by medical comorbidities.    1.  Continue Norco 5-325 half pill q.4 hours p.r.n..  Three 1-month prescriptions were provided today.  This is providing adequate relief without adverse effects.  This medication doses very unlikely to lead to any significant adverse effects.  2.  Reason prescription monitoring program was reviewed today and no inconsistencies were noted.  3.  Return to clinic in 3 months.  If she continues to get good relief from this dose without any adverse effects then may consider  having her primary care doctor continue this medication the future.

## 2019-02-13 ENCOUNTER — OFFICE VISIT (OUTPATIENT)
Dept: CARDIOLOGY | Facility: CLINIC | Age: 74
End: 2019-02-13
Payer: MEDICARE

## 2019-02-13 VITALS
HEIGHT: 63 IN | SYSTOLIC BLOOD PRESSURE: 166 MMHG | BODY MASS INDEX: 24.22 KG/M2 | OXYGEN SATURATION: 97 % | HEART RATE: 81 BPM | DIASTOLIC BLOOD PRESSURE: 67 MMHG | WEIGHT: 136.69 LBS

## 2019-02-13 DIAGNOSIS — R07.9 CHEST PAIN, UNSPECIFIED TYPE: ICD-10-CM

## 2019-02-13 DIAGNOSIS — N18.6 DIABETES MELLITUS WITH ESRD (END-STAGE RENAL DISEASE): Chronic | ICD-10-CM

## 2019-02-13 DIAGNOSIS — Z86.73 HISTORY OF CVA (CEREBROVASCULAR ACCIDENT): Primary | ICD-10-CM

## 2019-02-13 DIAGNOSIS — I51.89 DIASTOLIC DYSFUNCTION: ICD-10-CM

## 2019-02-13 DIAGNOSIS — R06.02 SHORTNESS OF BREATH: ICD-10-CM

## 2019-02-13 DIAGNOSIS — I15.2 HYPERTENSION ASSOCIATED WITH DIABETES: Chronic | ICD-10-CM

## 2019-02-13 DIAGNOSIS — E11.59 HYPERTENSION ASSOCIATED WITH DIABETES: Chronic | ICD-10-CM

## 2019-02-13 DIAGNOSIS — J44.1 CHRONIC OBSTRUCTIVE PULMONARY DISEASE WITH ACUTE EXACERBATION: ICD-10-CM

## 2019-02-13 DIAGNOSIS — E11.22 DIABETES MELLITUS WITH ESRD (END-STAGE RENAL DISEASE): Chronic | ICD-10-CM

## 2019-02-13 PROCEDURE — 1101F PT FALLS ASSESS-DOCD LE1/YR: CPT | Mod: CPTII,S$GLB,, | Performed by: INTERNAL MEDICINE

## 2019-02-13 PROCEDURE — 99999 PR PBB SHADOW E&M-EST. PATIENT-LVL V: ICD-10-PCS | Mod: PBBFAC,,, | Performed by: INTERNAL MEDICINE

## 2019-02-13 PROCEDURE — 3044F PR MOST RECENT HEMOGLOBIN A1C LEVEL <7.0%: ICD-10-PCS | Mod: CPTII,S$GLB,, | Performed by: INTERNAL MEDICINE

## 2019-02-13 PROCEDURE — 1101F PR PT FALLS ASSESS DOC 0-1 FALLS W/OUT INJ PAST YR: ICD-10-PCS | Mod: CPTII,S$GLB,, | Performed by: INTERNAL MEDICINE

## 2019-02-13 PROCEDURE — 99204 OFFICE O/P NEW MOD 45 MIN: CPT | Mod: S$GLB,,, | Performed by: INTERNAL MEDICINE

## 2019-02-13 PROCEDURE — 99999 PR PBB SHADOW E&M-EST. PATIENT-LVL V: CPT | Mod: PBBFAC,,, | Performed by: INTERNAL MEDICINE

## 2019-02-13 PROCEDURE — 3044F HG A1C LEVEL LT 7.0%: CPT | Mod: CPTII,S$GLB,, | Performed by: INTERNAL MEDICINE

## 2019-02-13 PROCEDURE — 99204 PR OFFICE/OUTPT VISIT, NEW, LEVL IV, 45-59 MIN: ICD-10-PCS | Mod: S$GLB,,, | Performed by: INTERNAL MEDICINE

## 2019-02-13 NOTE — LETTER
February 13, 2019      Chiara Nelson MD  [address] [facility] [address]           [facility]  [address] [facility] [address]  [facility] [address]  Phone: [phone]          Patient: Eli Vaz   MR Number: [id number]   YOB: 1945   Date of Visit: 2/13/2019       Dear Dr. Chiara Nelson:    Thank you for referring Eli Vaz to me for evaluation. Attached you will find relevant portions of my assessment and plan of care.    If you have questions, please do not hesitate to call me. I look forward to following Eli Vaz along with you.    Sincerely,    Elgin Garcia MD    Enclosure  CC:  No Recipients    If you would like to receive this communication electronically, please contact [email or web address] or [phone] to request more information on [employer] Link access.    For providers and/or their staff who would like to refer a patient to [facility], please contact us through our one-stop-shop provider referral line, [facility] , at [phone].    If you feel you have received this communication in error or would no longer like to receive these types of communications, please e-mail [email or web address]

## 2019-02-13 NOTE — PROGRESS NOTES
Subjective:    Patient ID:  Eli Vaz is a 73 y.o. female who presents for follow-up of Hypertension      HPI     ESRD, MR - mod-severe, diastolic CHF, HTN, DM, COPD, CVA    Admitted 2/3/19  Eli Vaz is a 73 y.o. AAF with PMHx  including end-stage renal disease (TTS), diabetes mellitus, COPD.  Per patient she was awakened in the early morning with acute onset shortness of breath plus mild midsternal diffuse to left and right-sided chest pain.  This felt like pressure.  She reports she took 2 aspirin at home and presented to the [facility] emergency room, where she was administered 2 nitroglycerin sublingual tab that did not relieve her chest pain. She reports she received morphine after that that improved but did not return completely relieve her chest pain. Patient denies headache, blurry vision, history of long trips, recent trauma, changes to bowel functions.  Important to mention is that patient recently was seen by Dr. raymond him but lower on 02/01/2019 and diagnosed with upper respiratory infection and prescribed antibiotic started on 02/01/2019.     [place] the ED her D-dimer was found to be elevated and therefore emergency room physicians requested observation placement to rule out pulmonary embolus.  Patient has end-stage renal and therefore not a candidate for CTA with contrast.  Will obtain a stat V/Q perfusion scan, and trend troponins which have been negative thus for.  Her vitals are grossly stable blood pressure 121/55 and sat 95%.  She does exhibit low-grade temp of 99° and nonproductive cough    Patient ruled out ACS with serial negative troponins.  Discussed case with Cardiology initially due to her atypical presentation no need to consult.  She had chest pressure diffuse across her chest that was nonradiating to the face or shoulders unrelieved by nitroglycerin or morphine.  Patient presented with a nonproductive intermittent cough.  Noted in patient's records she had doctor  appointment with another MD on 02/01/2019 was ordered doxycycline for upper respiratory infection.  Noted to have a low-grade fever 99 that was nonsustained.  She was set for discharged after V/Q stand scan showed low probability; she denies leg or calf pain numbness or weakness in any extremity, recent long trips or trauma.  Her symptoms thought to likely be related to upper respiratory infection although viral due to comorbidities will continue her doxycycline and treatment as ordered by Dr. Nelson in the clinic on the 1st, she can follow up with the primary care doctor in the clinic when she completes her antibiotics.  Will add short course of steroids.  Vital stable discharged to home in stable condition.     At the time of discharge the patient complained of chest pain again. She was kept overnight and NST performed today. The NST that showed a mild infarct defect in the lateral apical all, however, was negative for myocardial ischemia. LVEF 72%. Close follow up with cardiology in clinic. Appointment arranged by CM. Stable for discharge.    Echo 2/4/19  · Normal left ventricular systolic function. The estimated ejection fraction is 70%  · Concentric left ventricular hypertrophy.  · Grade II (moderate) left ventricular diastolic dysfunction consistent with pseudonormalization.  · Moderate left atrial enlargement.  · Normal right ventricular systolic function.  · Moderate-to-severe mitral regurgitation.  · Mild tricuspid regurgitation.  · The estimated PA systolic pressure is 44 mm Hg  · Pulmonary hypertension present.     Stress test 2/4/19  · There is a mild, infarct defect(s) in the lateral apical wall(s),  · An ejection fraction of 72 % at rest  · The EKG portion of this study is negative for myocardial ischemia.     CP has resolved  Mild stable SINCLAIR      Review of Systems   Constitution: Negative for decreased appetite.   HENT: Negative for ear discharge.    Eyes: Negative for blurred vision.   Respiratory:  Negative for hemoptysis.    Endocrine: Negative for polyphagia.   Hematologic/Lymphatic: Negative for adenopathy.   Skin: Negative for color change.   Musculoskeletal: Negative for joint swelling.   Neurological: Negative for brief paralysis.   Psychiatric/Behavioral: Negative for hallucinations.   Allergic/Immunologic: Negative for hives.        Objective:    Physical Exam   Constitutional: She is oriented to person, place, and time. She appears well-developed and well-nourished.   HENT:   Head: Normocephalic and atraumatic.   Eyes: Conjunctivae are normal. Pupils are equal, round, and reactive to light.   Neck: Normal range of motion. Neck supple.   Cardiovascular: Normal rate, normal heart sounds and intact distal pulses.   Pulmonary/Chest: Effort normal and breath sounds normal.   Abdominal: Soft. Bowel sounds are normal.   Musculoskeletal: Normal range of motion.   Neurological: She is alert and oriented to person, place, and time.   Skin: Skin is warm and dry.         Assessment:       1. History of CVA (cerebrovascular accident)    2. Chronic obstructive pulmonary disease with acute exacerbation    3. Hypertension associated with diabetes    4. Diastolic dysfunction    5. Diabetes mellitus with ESRD (end-stage renal disease)    6. Chest pain, unspecified type    7. Shortness of breath         Plan:       Continue with Rx  May need surgical evaluation for MVR if MR worsens  May have upcoming shoulder surgery - cleared at moderate cardiac risk  OV 3 months  Repeat echo 6 months

## 2019-02-20 ENCOUNTER — HOSPITAL ENCOUNTER (OUTPATIENT)
Dept: RESPIRATORY THERAPY | Facility: HOSPITAL | Age: 74
Discharge: HOME | End: 2019-02-20
Attending: FAMILY MEDICINE
Payer: MEDICARE

## 2019-02-20 VITALS — OXYGEN SATURATION: 91 % | HEART RATE: 62 BPM | RESPIRATION RATE: 18 BRPM

## 2019-02-20 DIAGNOSIS — J44.1 CHRONIC OBSTRUCTIVE PULMONARY DISEASE WITH ACUTE EXACERBATION: ICD-10-CM

## 2019-02-20 PROCEDURE — 94060 EVALUATION OF WHEEZING: CPT

## 2019-02-20 PROCEDURE — 25000242 PHARM REV CODE 250 ALT 637 W/ HCPCS: Performed by: FAMILY MEDICINE

## 2019-02-20 RX ORDER — ALBUTEROL SULFATE 2.5 MG/.5ML
2.5 SOLUTION RESPIRATORY (INHALATION) ONCE
Status: COMPLETED | OUTPATIENT
Start: 2019-02-20 | End: 2019-02-20

## 2019-02-20 RX ADMIN — ALBUTEROL SULFATE 2.5 MG: 2.5 SOLUTION RESPIRATORY (INHALATION) at 10:02

## 2019-02-25 ENCOUNTER — PATIENT OUTREACH (OUTPATIENT)
Dept: ADMINISTRATIVE | Facility: HOSPITAL | Age: 74
End: 2019-02-25

## 2019-02-25 ENCOUNTER — HOSPITAL ENCOUNTER (EMERGENCY)
Facility: HOSPITAL | Age: 74
Discharge: HOME | End: 2019-02-25
Attending: INTERNAL MEDICINE
Payer: MEDICARE

## 2019-02-25 ENCOUNTER — PATIENT MESSAGE (OUTPATIENT)
Dept: ADMINISTRATIVE | Facility: HOSPITAL | Age: 74
End: 2019-02-25

## 2019-02-25 VITALS
HEART RATE: 83 BPM | HEIGHT: 64 IN | SYSTOLIC BLOOD PRESSURE: 156 MMHG | BODY MASS INDEX: 22.88 KG/M2 | WEIGHT: 134 LBS | DIASTOLIC BLOOD PRESSURE: 75 MMHG | OXYGEN SATURATION: 94 % | TEMPERATURE: 98 F | RESPIRATION RATE: 20 BRPM

## 2019-02-25 DIAGNOSIS — R07.9 CHEST PAIN: ICD-10-CM

## 2019-02-25 LAB
ALBUMIN SERPL-MCNC: 3.7 G/DL (ref 3.3–5.5)
ALP SERPL-CCNC: 78 U/L (ref 42–141)
BILIRUB SERPL-MCNC: 0.3 MG/DL (ref 0.2–1.6)
BUN SERPL-MCNC: 37 MG/DL (ref 7–22)
CALCIUM SERPL-MCNC: 10.4 MG/DL (ref 8–10.3)
CHLORIDE SERPL-SCNC: 97 MMOL/L (ref 98–108)
CREAT SERPL-MCNC: 8.9 MG/DL (ref 0.6–1.2)
GLUCOSE SERPL-MCNC: 124 MG/DL (ref 73–118)
POC ALT (SGPT): 11 U/L (ref 10–47)
POC AST (SGOT): 36 U/L (ref 11–38)
POC B-TYPE NATRIURETIC PEPTIDE: 467 PG/ML (ref 0–100)
POC CARDIAC TROPONIN I: 0.01 NG/ML
POC CARDIAC TROPONIN I: 0.01 NG/ML
POC TCO2: 29 MMOL/L (ref 18–33)
POTASSIUM BLD-SCNC: 5 MMOL/L (ref 3.6–5.1)
PROTEIN, POC: 7.7 G/DL (ref 6.4–8.1)
SAMPLE: NORMAL
SAMPLE: NORMAL
SODIUM BLD-SCNC: 148 MMOL/L (ref 128–145)

## 2019-02-25 PROCEDURE — 63600175 PHARM REV CODE 636 W HCPCS: Mod: ER | Performed by: INTERNAL MEDICINE

## 2019-02-25 PROCEDURE — 80053 COMPREHEN METABOLIC PANEL: CPT | Mod: ER

## 2019-02-25 PROCEDURE — 85025 COMPLETE CBC W/AUTO DIFF WBC: CPT | Mod: ER

## 2019-02-25 PROCEDURE — 94760 N-INVAS EAR/PLS OXIMETRY 1: CPT | Mod: ER

## 2019-02-25 PROCEDURE — 93005 ELECTROCARDIOGRAM TRACING: CPT | Mod: ER

## 2019-02-25 PROCEDURE — 94640 AIRWAY INHALATION TREATMENT: CPT | Mod: ER

## 2019-02-25 PROCEDURE — 93010 ELECTROCARDIOGRAM REPORT: CPT | Mod: ,,, | Performed by: INTERNAL MEDICINE

## 2019-02-25 PROCEDURE — 83880 ASSAY OF NATRIURETIC PEPTIDE: CPT | Mod: ER

## 2019-02-25 PROCEDURE — 93010 EKG 12-LEAD: ICD-10-PCS | Mod: ,,, | Performed by: INTERNAL MEDICINE

## 2019-02-25 PROCEDURE — 84484 ASSAY OF TROPONIN QUANT: CPT | Mod: ER

## 2019-02-25 PROCEDURE — 96374 THER/PROPH/DIAG INJ IV PUSH: CPT | Mod: ER

## 2019-02-25 PROCEDURE — 25000242 PHARM REV CODE 250 ALT 637 W/ HCPCS: Mod: ER | Performed by: INTERNAL MEDICINE

## 2019-02-25 PROCEDURE — 25000003 PHARM REV CODE 250: Mod: ER | Performed by: INTERNAL MEDICINE

## 2019-02-25 PROCEDURE — 99285 EMERGENCY DEPT VISIT HI MDM: CPT | Mod: 25,ER

## 2019-02-25 RX ORDER — ASPIRIN 325 MG
325 TABLET ORAL
Status: DISCONTINUED | OUTPATIENT
Start: 2019-02-25 | End: 2019-02-25

## 2019-02-25 RX ORDER — HYDROCODONE BITARTRATE AND ACETAMINOPHEN 7.5; 325 MG/1; MG/1
1 TABLET ORAL
Status: COMPLETED | OUTPATIENT
Start: 2019-02-25 | End: 2019-02-25

## 2019-02-25 RX ORDER — KETOROLAC TROMETHAMINE 30 MG/ML
15 INJECTION, SOLUTION INTRAMUSCULAR; INTRAVENOUS
Status: COMPLETED | OUTPATIENT
Start: 2019-02-25 | End: 2019-02-25

## 2019-02-25 RX ORDER — MELOXICAM 15 MG/1
15 TABLET ORAL DAILY
Qty: 30 TABLET | Refills: 0 | Status: SHIPPED | OUTPATIENT
Start: 2019-02-25 | End: 2019-02-25 | Stop reason: SDUPTHER

## 2019-02-25 RX ORDER — IPRATROPIUM BROMIDE 0.5 MG/2.5ML
0.5 SOLUTION RESPIRATORY (INHALATION) ONCE
Status: COMPLETED | OUTPATIENT
Start: 2019-02-25 | End: 2019-02-25

## 2019-02-25 RX ORDER — ONDANSETRON 4 MG/1
8 TABLET, ORALLY DISINTEGRATING ORAL
Status: COMPLETED | OUTPATIENT
Start: 2019-02-25 | End: 2019-02-25

## 2019-02-25 RX ORDER — LEVALBUTEROL 1.25 MG/.5ML
1.25 SOLUTION, CONCENTRATE RESPIRATORY (INHALATION) ONCE
Status: COMPLETED | OUTPATIENT
Start: 2019-02-25 | End: 2019-02-25

## 2019-02-25 RX ADMIN — LEVALBUTEROL HYDROCHLORIDE 1.25 MG: 1.25 SOLUTION, CONCENTRATE RESPIRATORY (INHALATION) at 03:02

## 2019-02-25 RX ADMIN — KETOROLAC TROMETHAMINE 15 MG: 30 INJECTION, SOLUTION INTRAMUSCULAR at 04:02

## 2019-02-25 RX ADMIN — HYDROCODONE BITARTRATE AND ACETAMINOPHEN 1 TABLET: 7.5; 325 TABLET ORAL at 05:02

## 2019-02-25 RX ADMIN — IPRATROPIUM BROMIDE 0.5 MG: 0.5 SOLUTION RESPIRATORY (INHALATION) at 03:02

## 2019-02-25 RX ADMIN — ONDANSETRON 8 MG: 4 TABLET, ORALLY DISINTEGRATING ORAL at 05:02

## 2019-02-25 RX ADMIN — NITROGLYCERIN 1 INCH: 20 OINTMENT TOPICAL at 02:02

## 2019-02-25 NOTE — ED PROVIDER NOTES
Encounter Date: 2/25/2019       History     Chief Complaint   Patient presents with    Chest Pain     PT REPORTS WOKE FROM SLEEP APPROX 1 HOUR AGO WITH CP AND SOB, STATES TOOK 2 REGUALR STRENGTH ASPIRIN, PT REPORTS FEELS LIKE A PRESSURE    Shortness of Breath     73-year-old female presents to the emergency department complaining of chest pain x2 hours.  She states pain awoke her from sleep and is located in the left chest and shoulder.  Patient has a history of chronic left shoulder pain. She denies nausea/vomiting, fever/chills.      The history is provided by the patient. No  was used.   Chest Pain   The current episode started 1 to 2 hours ago. Chest pain occurs constantly. The chest pain is unchanged. At its most intense, the chest pain is at 6/10. Primary symptoms include shortness of breath. Pertinent negatives for primary symptoms include no fever, no nausea and no vomiting.     Review of patient's allergies indicates:  No Known Allergies  Past Medical History:   Diagnosis Date    Arthritis     COPD (chronic obstructive pulmonary disease)     Diabetes mellitus type II     Dialysis patient     Encounter for blood transfusion     ESRD (end stage renal disease)      Past Surgical History:   Procedure Laterality Date    AV FISTULA PLACEMENT      TUBAL LIGATION       Family History   Problem Relation Age of Onset    Heart attack Sister     Heart attack Sister     Heart attack Mother      Social History     Tobacco Use    Smoking status: Former Smoker     Start date: 1/12/1991    Smokeless tobacco: Never Used   Substance Use Topics    Alcohol use: No    Drug use: No     Review of Systems   Constitutional: Negative for fever.   Respiratory: Positive for shortness of breath.    Cardiovascular: Positive for chest pain.   Gastrointestinal: Negative for nausea and vomiting.   Musculoskeletal: Positive for arthralgias (Left shoulder).   All other systems reviewed and are  negative.      Physical Exam     Initial Vitals   BP Pulse Resp Temp SpO2   02/25/19 0237 02/25/19 0235 02/25/19 0235 -- 02/25/19 0237   (!) 183/84 94 (!) 28  (!) 94 %      MAP       --                Physical Exam    Nursing note and vitals reviewed.  Constitutional: She appears well-developed and well-nourished. No distress.   HENT:   Head: Normocephalic and atraumatic.   Right Ear: External ear normal.   Left Ear: External ear normal.   Eyes: EOM are normal.   Neck: Normal range of motion.   Cardiovascular: Normal rate and regular rhythm.   Pulmonary/Chest: Breath sounds normal. No respiratory distress. She has no wheezes.   Abdominal: Soft. Bowel sounds are normal.   Musculoskeletal: Normal range of motion.   Left shoulder pain upon movement without gross deformity.   Neurological: She is alert. She has normal strength.   Skin: Skin is warm and dry.   Psychiatric: She has a normal mood and affect. Thought content normal.         ED Course   Procedures  Labs Reviewed   POCT CMP - Abnormal; Notable for the following components:       Result Value    POC BUN 37 (*)     Calcium, POC 10.4 (*)     POC Chloride 97 (*)     POC Creatinine 8.9 (*)     POC Glucose 124 (*)     POC Sodium 148 (*)     All other components within normal limits   POCT B-TYPE NATRIURETIC PEPTIDE (BNP) - Abnormal; Notable for the following components:    POC B-Type Natriuretic Peptide 467 (*)     All other components within normal limits   TROPONIN ISTAT   TROPONIN ISTAT   POCT CBC   POCT CMP   POCT TROPONIN   POCT B-TYPE NATRIURETIC PEPTIDE (BNP)   POCT TROPONIN     EKG Readings: (Independently Interpreted)   Initial Reading: No STEMI. Rhythm: Normal Sinus Rhythm. Heart Rate: 93. ST Segments: Normal ST Segments. T Waves Flipped: V1 and V3.       Imaging Results          X-Ray Chest AP Portable (Final result)  Result time 02/25/19 02:58:34    Final result by Yoana Chong MD (02/25/19 02:58:34)                 Narrative:    EXAMINATION:  XR  CHEST AP PORTABLE    CLINICAL HISTORY:  Chest Pain;    TECHNIQUE:  Single frontal view of the chest was performed.    COMPARISON:  02/03/2019    FINDINGS:  There is no significant change in the appearance of prominent up interstitial markings bilaterally.  There is stable mild blunting of the costophrenic angles.  There is no pneumothorax.    The cardiac silhouette is enlarged.  The hilar and mediastinal contours are unremarkable.  There are atherosclerotic calcifications seen at the thoracic aorta.    There are left-sided vascular stents projected over the left axillary region and medial upper arm.      No significant change when compared to the prior exam.      Electronically signed by: Yoana Chong  Date:    02/25/2019  Time:    02:58                               Medical Decision Making:   Initial Assessment:   73-year-old female presents to the emergency department complaining of chest pain x2 hours.  She states pain awoke her from sleep and is located in the left chest and shoulder.  Patient has a history of chronic left shoulder pain. She denies nausea/vomiting, fever/chills.  Clinical Tests:   Lab Tests: Ordered and Reviewed  Radiological Study: Ordered and Reviewed  ED Management:  Cardiac workup reveals no acute evidence of cardiac injury. Patient's pain level improved after anti-inflammatory pain medicine was given in the emergency department.  She was advised to follow up with her primary care physician within the next 2 days for re-evaluation and return to the emergency department if condition worsens.  Instructions for noncardiac chest pain were given.                      Clinical Impression:       ICD-10-CM ICD-9-CM   1. Chest pain R07.9 786.50         Disposition:   Disposition: Discharged  Condition: Stable                        Trey Hernandez MD  03/01/19 0202

## 2019-02-27 ENCOUNTER — OFFICE VISIT (OUTPATIENT)
Dept: FAMILY MEDICINE | Facility: CLINIC | Age: 74
End: 2019-02-27
Payer: MEDICARE

## 2019-02-27 VITALS
WEIGHT: 141.75 LBS | BODY MASS INDEX: 24.2 KG/M2 | SYSTOLIC BLOOD PRESSURE: 184 MMHG | DIASTOLIC BLOOD PRESSURE: 82 MMHG | HEIGHT: 64 IN | TEMPERATURE: 97 F | HEART RATE: 103 BPM | OXYGEN SATURATION: 97 % | RESPIRATION RATE: 16 BRPM

## 2019-02-27 DIAGNOSIS — I15.0 RENOVASCULAR HYPERTENSION: ICD-10-CM

## 2019-02-27 DIAGNOSIS — J44.89 CHRONIC BRONCHITIS WITH COPD (CHRONIC OBSTRUCTIVE PULMONARY DISEASE): Primary | ICD-10-CM

## 2019-02-27 DIAGNOSIS — Z99.2 ESRD ON DIALYSIS: ICD-10-CM

## 2019-02-27 DIAGNOSIS — N18.6 ESRD ON DIALYSIS: ICD-10-CM

## 2019-02-27 PROCEDURE — 96372 PR INJECTION,THERAP/PROPH/DIAG2ST, IM OR SUBCUT: ICD-10-PCS | Mod: S$GLB,,, | Performed by: FAMILY MEDICINE

## 2019-02-27 PROCEDURE — 99999 PR PBB SHADOW E&M-EST. PATIENT-LVL III: CPT | Mod: PBBFAC,,, | Performed by: FAMILY MEDICINE

## 2019-02-27 PROCEDURE — 99214 OFFICE O/P EST MOD 30 MIN: CPT | Mod: 25,S$GLB,, | Performed by: FAMILY MEDICINE

## 2019-02-27 PROCEDURE — 99214 PR OFFICE/OUTPT VISIT, EST, LEVL IV, 30-39 MIN: ICD-10-PCS | Mod: 25,S$GLB,, | Performed by: FAMILY MEDICINE

## 2019-02-27 PROCEDURE — 99499 UNLISTED E&M SERVICE: CPT | Mod: S$GLB,,, | Performed by: FAMILY MEDICINE

## 2019-02-27 PROCEDURE — 99999 PR PBB SHADOW E&M-EST. PATIENT-LVL III: ICD-10-PCS | Mod: PBBFAC,,, | Performed by: FAMILY MEDICINE

## 2019-02-27 PROCEDURE — 96372 THER/PROPH/DIAG INJ SC/IM: CPT | Mod: S$GLB,,, | Performed by: FAMILY MEDICINE

## 2019-02-27 PROCEDURE — 1101F PT FALLS ASSESS-DOCD LE1/YR: CPT | Mod: CPTII,S$GLB,, | Performed by: FAMILY MEDICINE

## 2019-02-27 PROCEDURE — 99499 RISK ADDL DX/OHS AUDIT: ICD-10-PCS | Mod: S$GLB,,, | Performed by: FAMILY MEDICINE

## 2019-02-27 PROCEDURE — 1101F PR PT FALLS ASSESS DOC 0-1 FALLS W/OUT INJ PAST YR: ICD-10-PCS | Mod: CPTII,S$GLB,, | Performed by: FAMILY MEDICINE

## 2019-02-27 RX ORDER — CODEINE PHOSPHATE AND GUAIFENESIN 10; 100 MG/5ML; MG/5ML
5 SOLUTION ORAL EVERY 12 HOURS PRN
Qty: 150 ML | Refills: 0 | Status: SHIPPED | OUTPATIENT
Start: 2019-02-27 | End: 2019-03-13

## 2019-02-27 RX ORDER — IPRATROPIUM BROMIDE AND ALBUTEROL SULFATE 2.5; .5 MG/3ML; MG/3ML
3 SOLUTION RESPIRATORY (INHALATION) EVERY 6 HOURS PRN
Qty: 1 BOX | Refills: 2 | Status: SHIPPED | OUTPATIENT
Start: 2019-02-27 | End: 2019-04-05

## 2019-02-27 RX ORDER — PREDNISONE 10 MG/1
TABLET ORAL
Qty: 14 TABLET | Refills: 0 | Status: ON HOLD | OUTPATIENT
Start: 2019-02-27 | End: 2019-03-25 | Stop reason: HOSPADM

## 2019-02-27 RX ORDER — GUAIFENESIN 1200 MG/1
1 TABLET, EXTENDED RELEASE ORAL 2 TIMES DAILY
COMMUNITY
Start: 2019-02-27 | End: 2019-03-13

## 2019-02-27 RX ORDER — DOXYCYCLINE 100 MG/1
100 CAPSULE ORAL EVERY 12 HOURS
Qty: 20 CAPSULE | Refills: 0 | Status: ON HOLD | OUTPATIENT
Start: 2019-02-27 | End: 2019-03-11 | Stop reason: HOSPADM

## 2019-02-27 NOTE — PATIENT INSTRUCTIONS
Bronchitis with Wheezing (Viral or Bacterial: Adult)    Bronchitis is an infection of the air passages. It often occurs during a cold and is usually caused by a virus. Symptoms include cough with mucus (phlegm) and low-grade fever. This illness is contagious during the first few days and is spread through the air by coughing and sneezing, or by direct contact (touching the sick person and then touching your own eyes, nose, or mouth).  If there is a lot of inflammation, air flow is restricted. The air passages may also go into spasm, especially if you have asthma. This causes wheezing and difficulty breathing even in people who do not have asthma.  Bronchitis usually lasts 7 to 14 days. The wheezing should improve with treatment during the first week. An inhaler is often prescribed to relax the air passages and stop wheezing. Antibiotics will be prescribed if your doctor thinks there is also a secondary bacterial infection.  Home care  · If symptoms are severe, rest at home for the first 2 to 3 days. When you go back to your usual activities, don't let yourself get too tired.  · Do not smoke. Also avoid being exposed to secondhand smoke.  · You may use over-the-counter medicine to control fever or pain, unless another medicine was prescribed. Note: If you have chronic liver or kidney disease or have ever had a stomach ulcer or gastrointestinal bleeding, talk with your healthcare provider before using these medicines. Also talk to your provider if you are taking medicine to prevent blood clots.) Aspirin should never be given to anyone younger than 18 years of age who is ill with a viral infection or fever. It may cause severe liver or brain damage.  · Your appetite may be poor, so a light diet is fine. Avoid dehydration by drinking 6 to 8 glasses of fluids per day (such as water, soft drinks, sports drinks, juices, tea, or soup). Extra fluids will help loosen secretions in the nose and lungs.  · Over-the-counter  cough, cold, and sore-throat medicines will not shorten the length of the illness, but they may be helpful to reduce symptoms. (Note: Do not use decongestants if you have high blood pressure.)  · If you were given an inhaler, use it exactly as directed. If you need to use it more often than prescribed, your condition may be worsening. If this happens, contact your healthcare provider.  · If prescribed, finish all antibiotic medicine, even if you are feeling better after only a few days.  Follow-up care  Follow up with your healthcare provider, or as advised. If you had an X-ray or ECG (electrocardiogram), a specialist will review it. You will be notified of any new findings that may affect your care.  Note: If you are age 65 or older, or if you have a chronic lung disease or condition that affects your immune system, or you smoke, talk to your healthcare provider about having a pneumococcal vaccinations and a yearly influenza vaccination (flu shot).  When to seek medical advice  Call your healthcare provider right away if any of these occur:  · Fever of 100.4°F (38°C) or higher  · Coughing up increasing amounts of colored sputum  · Weakness, drowsiness, headache, facial pain, ear pain, or a stiff neck  Call 911, or get immediate medical care  Contact emergency services right away if any of these occur.  · Coughing up blood  · Worsening weakness, drowsiness, headache, or stiff neck  · Increased wheezing not helped with medication, shortness of breath, or pain with breathing  Date Last Reviewed: 9/13/2015  © 5079-1492 [employer] [address] [place] [address], [place] [address]. All rights reserved. This information is not intended as a substitute for professional medical care. Always follow your healthcare professional's instructions.

## 2019-02-27 NOTE — PROGRESS NOTES
Chief Complaint   Patient presents with    URI     about 2 - 3 wks     Cough       HPI    Eli Vaz is 73 y.o. female. The primary encounter diagnosis was Chronic bronchitis with COPD (chronic obstructive pulmonary disease). Diagnoses of Renovascular hypertension and ESRD on dialysis were also pertinent to this visit.    73 year old female comes to clinic for ER follow up.  Patient reports that she returned to the ED due to worsening cough after diagnosis of bronchitis during the week prior.    URI - she reports continued runny nose, shortness of breath, and cough.  She reports nebulizer treatments every 2 hours to relieve her shortness of breath.  Patient's daughter also reports that patient appears fatigue and this is due to difficulty obtaining sleep because of severe cough.  HTN - patient reports skipping dialysis the previous day due to feeling ill.  She attributes this to her elevated blood pressure.  She reports that she is scheduled to see Cardiology.      Review of Systems   Constitutional: Positive for fatigue. Negative for activity change, chills, diaphoresis and fever.   HENT: Positive for congestion and sore throat. Negative for ear discharge, ear pain, postnasal drip, rhinorrhea, sinus pressure and sinus pain.    Respiratory: Positive for cough and shortness of breath. Negative for chest tightness and wheezing.    Cardiovascular: Negative for chest pain.   Musculoskeletal: Negative for gait problem.   Psychiatric/Behavioral: Negative for suicidal ideas.           Current Outpatient Medications:     albuterol (ACCUNEB) 1.25 mg/3 mL Nebu, Take 3 mLs (1.25 mg total) by nebulization every 6 (six) hours as needed., Disp: 1 Box, Rfl: 2    albuterol (PROVENTIL) 2.5 mg /3 mL (0.083 %) nebulizer solution, Inhale 2.5 mg into the lungs., Disp: , Rfl:     albuterol (PROVENTIL/VENTOLIN HFA) 90 mcg/actuation inhaler, Inhale 2 puffs into the lungs., Disp: , Rfl:     albuterol-ipratropium (DUO-NEB) 2.5 mg-0.5  mg/3 mL nebulizer solution, Take 3 mLs by nebulization every 6 (six) hours as needed for Wheezing or Shortness of Breath. Rescue, Disp: 1 Box, Rfl: 2    [START ON 4/7/2019] amiodarone (PACERONE) 200 MG Tab, Take 1 tablet (200 mg total) by mouth 2 (two) times daily. for 14 days, Disp: 28 tablet, Rfl: 0    [START ON 4/22/2019] amiodarone (PACERONE) 200 MG Tab, Take 1 tablet (200 mg total) by mouth once daily., Disp: 30 tablet, Rfl: 5    amiodarone (PACERONE) 400 MG tablet, Take 1 tablet (400 mg total) by mouth 2 (two) times daily. for 14 days, Disp: 28 tablet, Rfl: 0    apixaban (ELIQUIS) 2.5 mg Tab, Take 1 tablet (2.5 mg total) by mouth 2 (two) times daily., Disp: 60 tablet, Rfl: 5    atorvastatin (LIPITOR) 10 MG tablet, Take 1 tablet (10 mg total) by mouth once daily., Disp: 90 tablet, Rfl: 1    atorvastatin (LIPITOR) 10 MG tablet, Take 10 mg by mouth., Disp: , Rfl:     B complex w-C no.20/folic acid (B COMPLEX & C NO.20-FOLIC ACID ORAL), Take 1 capsule by mouth., Disp: , Rfl:     busPIRone (BUSPAR) 10 MG tablet, Take 10 mg by mouth., Disp: , Rfl:     cinacalcet (SENSIPAR) 60 MG Tab, Take 30 mg by mouth., Disp: , Rfl:     epoetin cat (PROCRIT) 3,000 unit/mL injection, Inject 3,000 Units into the skin., Disp: , Rfl:     fluticasone (FLONASE) 50 mcg/actuation nasal spray, 1 spray by Each Nare route 2 (two) times daily., Disp: 1 Bottle, Rfl: 1    fluticasone (FLONASE) 50 mcg/actuation nasal spray, 1 spray by Nasal route., Disp: , Rfl:     FOLIC ACID/VITAMIN B COMP W-C (RENAL CAPS ORAL), Take by mouth Daily., Disp: , Rfl:     hydrOXYzine pamoate (VISTARIL) 25 MG Cap, Take 1 capsule (25 mg total) by mouth every 8 (eight) hours as needed (panic attack)., Disp: 30 capsule, Rfl: 1    hydrOXYzine pamoate (VISTARIL) 25 MG Cap, Take 25 mg by mouth., Disp: , Rfl:     levocetirizine (XYZAL) 5 MG tablet, Take 1 tablet (5 mg total) by mouth every evening., Disp: 30 tablet, Rfl: 2    levocetirizine (XYZAL) 5 MG  tablet, Take 5 mg by mouth., Disp: , Rfl:     lidocaine (LMX) 4 % cream, Apply topically as needed. To affected area up to 5 times a day., Disp: 30 g, Rfl: 0    loperamide (IMODIUM) 1 mg/5 mL solution, Take 10 mLs (2 mg total) by mouth 4 (four) times daily as needed for Diarrhea., Disp: , Rfl: 0    meclizine (ANTIVERT) 25 mg tablet, Take 1 tablet (25 mg total) by mouth 3 (three) times daily as needed., Disp: 20 tablet, Rfl: 0    meclizine (ANTIVERT) 25 mg tablet, Take 25 mg by mouth., Disp: , Rfl:     metoprolol succinate (TOPROL-XL) 25 MG 24 hr tablet, Take 25 mg by mouth., Disp: , Rfl:     mirtazapine (REMERON) 7.5 MG Tab, Take 7.5 mg by mouth nightly. , Disp: , Rfl:     mirtazapine (REMERON) 7.5 MG Tab, Take 7.5 mg by mouth., Disp: , Rfl:     montelukast (SINGULAIR) 10 mg tablet, Take 10 mg by mouth., Disp: , Rfl:     montelukast (SINGULAIR) 10 mg tablet, TAKE 1 TABLET BY MOUTH EVERY DAY IN THE EVENING, Disp: 30 tablet, Rfl: 2    nut.tx.imp.renal fxn,lac-reduc (NEPRO CARB STEADY) 0.08 gram-1.8 kcal/mL Liqd, Take 1 Can by mouth 3 (three) times daily with meals., Disp: 30 Can, Rfl: 11    pantoprazole (PROTONIX) 40 MG tablet, Take 40 mg by mouth., Disp: , Rfl:     paroxetine (PAXIL) 30 MG tablet, 1 TABLET IN THE MORNING ONCE A DAY ORALLY 90, Disp: , Rfl: 3    promethazine (PHENERGAN) 12.5 MG Tab, Take 12.5 mg by mouth every 6 (six) hours as needed., Disp: , Rfl:     SENSIPAR 60 mg Tab, Take 30 mg by mouth daily with breakfast. , Disp: , Rfl:     sevelamer carbonate (RENVELA) 800 mg Tab, Take 800 mg by mouth 3 (three) times daily with meals., Disp: , Rfl:     sevelamer carbonate (RENVELA) 800 mg Tab, Take 800 mg by mouth., Disp: , Rfl:     SODIUM BICARBONATE ORAL, Take 650 mg by mouth., Disp: , Rfl:     traMADol (ULTRAM) 50 mg tablet, Take 1 tablet by mouth., Disp: , Rfl:     traZODone (DESYREL) 50 MG tablet, Take 1 tablet (50 mg total) by mouth nightly as needed for Insomnia., Disp: 30 tablet,  "Rfl: 3    VENTOLIN HFA 90 mcg/actuation inhaler, INHALE 2 PUFFS INTO LUNGS EVERY 4 HOURS AS NEEDED FOR SHORTNESS OF BREATH OR WHEEZING. RESCUE, Disp: 18 Inhaler, Rfl: 2    vit B,C-iron fum-FA-D3-zinc ox 8 mg iron-800 mcg-1,000 unit Tab, Take by mouth., Disp: , Rfl:     vitamin renal formula, B-complex-vitamin c-folic acid, (NEPHROCAP) 1 mg Cap, Take by mouth., Disp: , Rfl:     vortioxetine (BRINTELLIX) 5 mg Tab, Take 1 tablet by mouth., Disp: , Rfl:       Blood pressure (!) 184/82, pulse 103, temperature 97.2 °F (36.2 °C), temperature source Oral, resp. rate 16, height 5' 4" (1.626 m), weight 64.3 kg (141 lb 12.1 oz), SpO2 97 %.    Physical Exam   Constitutional: Vital signs are normal. She appears well-developed and well-nourished. She does not appear ill. No distress.   Cardiovascular: Normal heart sounds.   No murmur heard.  Pulmonary/Chest: Effort normal and breath sounds normal. No accessory muscle usage. No tachypnea and no bradypnea. No respiratory distress.   Psychiatric: She has a normal mood and affect. Her behavior is normal.       Admission on 02/25/2019, Discharged on 02/25/2019   Component Date Value Ref Range Status    POC Cardiac Troponin I 02/25/2019 0.01  <0.09 ng/mL Final    Sample 02/25/2019 UNK   Final    Albumin, POC 02/25/2019 3.7  3.3 - 5.5 g/dL Final    Alkaline Phosphatase, POC 02/25/2019 78  42 - 141 U/L Final    ALT (SGPT), POC 02/25/2019 11  10 - 47 U/L Final    AST (SGOT), POC 02/25/2019 36  11 - 38 U/L Final    POC BUN 02/25/2019 37* 7 - 22 mg/dL Final    Calcium, POC 02/25/2019 10.4* 8.0 - 10.3 mg/dL Final    POC Chloride 02/25/2019 97* 98 - 108 mmol/L Final    POC Creatinine 02/25/2019 8.9* 0.6 - 1.2 mg/dL Final    POC Glucose 02/25/2019 124* 73 - 118 mg/dL Final    POC Potassium 02/25/2019 5.0  3.6 - 5.1 mmol/L Final    POC Sodium 02/25/2019 148* 128 - 145 mmol/L Final    Bilirubin 02/25/2019 0.3  0.2 - 1.6 mg/dL Final    POC TCO2 02/25/2019 29  18 - 33 mmol/L " Final    Protein 02/25/2019 7.7  6.4 - 8.1 g/dL Final    POC B-Type Natriuretic Peptide 02/25/2019 467* 0.0 - 100.0 pg/mL Final    POC Cardiac Troponin I 02/25/2019 0.01  <0.09 ng/mL Final    Sample 02/25/2019 UNK   Final   Admission on 02/03/2019, Discharged on 02/04/2019   Component Date Value Ref Range Status    POC Cardiac Troponin I 02/03/2019 0.00  <0.09 ng/mL Final    Sample 02/03/2019 UNK   Final    POC PTWBT 02/03/2019 12.6  9.7 - 14.3 sec Final    POC PTINR 02/03/2019 1.1  0.9 - 1.2 Final    Sample 02/03/2019 UNK   Final    POC B-Type Natriuretic Peptide 02/03/2019 706* 0.0 - 100.0 pg/mL Final    Albumin, POC 02/03/2019 3.6  3.3 - 5.5 g/dL Final    Alkaline Phosphatase, POC 02/03/2019 93  42 - 141 U/L Final    ALT (SGPT), POC 02/03/2019 12  10 - 47 U/L Final    AST (SGOT), POC 02/03/2019 24  11 - 38 U/L Final    POC BUN 02/03/2019 36* 7 - 22 mg/dL Final    Calcium, POC 02/03/2019 10.0  8.0 - 10.3 mg/dL Final    POC Chloride 02/03/2019 95* 98 - 108 mmol/L Final    POC Creatinine 02/03/2019 7.9* 0.6 - 1.2 mg/dL Final    POC Glucose 02/03/2019 85  73 - 118 mg/dL Final    POC Potassium 02/03/2019 4.8  3.6 - 5.1 mmol/L Final    POC Sodium 02/03/2019 147* 128 - 145 mmol/L Final    Bilirubin 02/03/2019 0.4  0.2 - 1.6 mg/dL Final    POC TCO2 02/03/2019 28  18 - 33 mmol/L Final    Protein 02/03/2019 8.2* 6.4 - 8.1 g/dL Final    POC D-DI 02/03/2019 993* 0.0 - 450.0 ng/mL Final    Troponin I 02/03/2019 0.020  0.000 - 0.026 ng/mL Final    Troponin I 02/03/2019 0.036* 0.000 - 0.026 ng/mL Final    BSA 02/04/2019 1.68  m2 Final    TDI SEPTAL 02/04/2019 0.07   Final    LV LATERAL E/E' RATIO 02/04/2019 23.33   Final    LV SEPTAL E/E' RATIO 02/04/2019 20.00   Final    LA WIDTH 02/04/2019 3.97  cm Final    AORTIC VALVE CUSP SEPERATION 02/04/2019 1.84  cm Final    TDI LATERAL 02/04/2019 0.06   Final    PV PEAK VELOCITY 02/04/2019 1.15  cm/s Final    LVIDD 02/04/2019 4.32  3.5 - 6.0 cm Final     IVS 02/04/2019 1.29* 0.6 - 1.1 cm Final    PW 02/04/2019 1.30* 0.6 - 1.1 cm Final    Ao root annulus 02/04/2019 3.40  cm Final    LVIDS 02/04/2019 2.59  2.1 - 4.0 cm Final    FS 02/04/2019 40  28 - 44 % Final    LA volume 02/04/2019 76.63  cm3 Final    Sinus 02/04/2019 3.02  cm Final    STJ 02/04/2019 2.41  cm Final    Ascending aorta 02/04/2019 3.14  cm Final    LV mass 02/04/2019 208.03  g Final    LA size 02/04/2019 4.18  cm Final    RVDD 02/04/2019 4.50  cm Final    TAPSE 02/04/2019 1.95  cm Final    RV S' 02/04/2019 13.78  m/s Final    Left Ventricle Relative Wall Thick* 02/04/2019 0.60  cm Final    AV mean gradient 02/04/2019 7.00  mmHg Final    AV valve area 02/04/2019 3.58  cm2 Final    AV Velocity Ratio 02/04/2019 0.90   Final    AV index (prosthetic) 02/04/2019 1.03   Final    E/A ratio 02/04/2019 1.07   Final    Mean e' 02/04/2019 0.07   Final    E wave decelartion time 02/04/2019 232.00  msec Final    IVRT 02/04/2019 0.08  msec Final    LVOT diameter 02/04/2019 2.10  cm Final    LVOT area 02/04/2019 3.46  cm2 Final    LVOT peak salvador 02/04/2019 [id number]  m/s Final    LVOT peak VTI 02/04/2019 35.84  cm Final    Ao peak salvador 02/04/2019 1.81  m/s Final    Ao VTI 02/04/2019 34.64  cm Final    LVOT stroke volume 02/04/2019 124.07  cm3 Final    AV peak gradient 02/04/2019 13.10  mmHg Final    E/E' ratio 02/04/2019 21.54   Final    MV Peak E Salvador 02/04/2019 1.40  m/s Final    TR Max Salvador 02/04/2019 3.20  m/s Final    MV Peak A Salvador 02/04/2019 1.31  m/s Final    LV Systolic Volume 02/04/2019 24.31  mL Final    LV Systolic Volume Index 02/04/2019 14.6  mL/m2 Final    LV Diastolic Volume 02/04/2019 83.78  mL Final    LV Diastolic Volume Index 02/04/2019 50.42  mL/m2 Final    LA Volume Index 02/04/2019 46.1  mL/m2 Final    LV Mass Index 02/04/2019 125.2  g/m2 Final    RA Major Axis 02/04/2019 4.78  cm Final    Left Atrium Minor Axis 02/04/2019 5.24  cm Final    Left  Atrium Major Axis 02/04/2019 5.64  cm Final    Triscuspid Valve Regurgitation Pea* 02/04/2019 40.96  mmHg Final    RA Width 02/04/2019 3.88  cm Final    Right Atrial Pressure (from IVC) 02/04/2019 3  mmHg Final    TV rest pulmonary artery pressure 02/04/2019 44  mmHg Final    Systolic blood pressure 02/04/2019 165  mmHg Final    Diastolic blood pressure 02/04/2019 80  mmHg Final    HR at rest 02/04/2019 77  bpm Final    RPP 02/04/2019 12,705   Final    Peak HR 02/04/2019 85  bpm Final    Peak Systolic BP 02/04/2019 154  mmHg Final    Peak Diatolic BP 02/04/2019 69  mmHg Final    Peak RPP 02/04/2019 13,090   Final    Max Predicted HR 02/04/2019 143   Final    85% Max Predicted HR 02/04/2019 121   Final    % Max HR Achieved 02/04/2019 60   Final    OHS CV CPX PATIENT IS MALE 02/04/2019 0   Final    OHS CV CPX PATIENT IS FEMALE 02/04/2019 1   Final    Nuc Rest EF 02/04/2019 72   Final   Lab Visit on 01/21/2019   Component Date Value Ref Range Status    Amphetamine Scrn 01/21/2019 Negative   Final    Barbiturate Scrn 01/21/2019 Negative   Final    Benzodiazepines 01/21/2019 Negative   Final    Cocaine Lvl 01/21/2019 Negative   Final    Alcohol Scrn 01/21/2019 Negative   Final    Methadone (Dolophine), Serum 01/21/2019 Negative   Final    Opiates, Blood 01/21/2019 Negative   Final    Phencyclidine, Blood 01/21/2019 Negative   Final    Propoxyphene 01/21/2019 Negative   Final    THC (Marijuana) Metabolite, bl 01/21/2019 Negative   Final   ]    Assessment:    1. Chronic bronchitis with COPD (chronic obstructive pulmonary disease)    2. Renovascular hypertension    3. ESRD on dialysis          Eli was seen today for uri and cough.    Diagnoses and all orders for this visit:    Chronic bronchitis with COPD (chronic obstructive pulmonary disease)  -     methylPREDNISolone sod suc(PF) injection 125 mg  -     Discontinue: doxycycline (VIBRAMYCIN) 100 MG Cap; Take 1 capsule (100 mg total) by mouth  every 12 (twelve) hours. for 10 days  -     Discontinue: predniSONE (DELTASONE) 10 MG tablet; 2 tab po qday x 4 days, then 1 tab po qday x 4 days, then 1/2 tab po qday x 4 days  -     albuterol-ipratropium (DUO-NEB) 2.5 mg-0.5 mg/3 mL nebulizer solution; Take 3 mLs by nebulization every 6 (six) hours as needed for Wheezing or Shortness of Breath. Rescue  -     guaiFENesin (MUCINEX) 1,200 mg Ta12; Take 1 tablet by mouth 2 (two) times daily. for 14 days  -     guaifenesin-codeine 100-10 mg/5 ml (CHERATUSSIN AC)  mg/5 mL syrup; Take 5 mLs by mouth every 12 (twelve) hours as needed for Cough or Congestion.  - New problem. Previously treated for COPD exacerbation. Previous chest xray reviewed with patient and family.  - Treat as above with Doxycycline.    - Steroid and Duoneb added to regimen due to shortness of breath.      Renovascular hypertension   -Unstable. Continue current medication regimen.  Patient scheduled to attend dialysis on following day.    ESRD on dialysis   -Stable. No recent changes in dialysis or session time. Attend dialysis as scheduled for fluid removal. Repeat chest xray after dialysis.          FOLLOW UP: Follow-up in about 2 weeks (around 3/13/2019), or if symptoms worsen or fail to improve.

## 2019-03-06 ENCOUNTER — HOSPITAL ENCOUNTER (INPATIENT)
Facility: HOSPITAL | Age: 74
LOS: 5 days | Discharge: HOME HEALTH | DRG: 308 | End: 2019-03-11
Attending: EMERGENCY MEDICINE | Admitting: INTERNAL MEDICINE
Payer: MEDICARE

## 2019-03-06 DIAGNOSIS — R07.9 CHEST PAIN: ICD-10-CM

## 2019-03-06 DIAGNOSIS — I10 HYPERTENSION: ICD-10-CM

## 2019-03-06 DIAGNOSIS — Z99.2 ESRD ON DIALYSIS: ICD-10-CM

## 2019-03-06 DIAGNOSIS — I34.0 NON-RHEUMATIC MITRAL REGURGITATION: ICD-10-CM

## 2019-03-06 DIAGNOSIS — N18.6 ESRD ON DIALYSIS: ICD-10-CM

## 2019-03-06 DIAGNOSIS — E11.69 COMBINED HYPERLIPIDEMIA ASSOCIATED WITH TYPE 2 DIABETES MELLITUS: ICD-10-CM

## 2019-03-06 DIAGNOSIS — N18.6 DIABETES MELLITUS WITH ESRD (END-STAGE RENAL DISEASE): Chronic | ICD-10-CM

## 2019-03-06 DIAGNOSIS — J44.89 CHRONIC BRONCHITIS WITH COPD (CHRONIC OBSTRUCTIVE PULMONARY DISEASE): ICD-10-CM

## 2019-03-06 DIAGNOSIS — R06.02 SHORTNESS OF BREATH: ICD-10-CM

## 2019-03-06 DIAGNOSIS — I48.91 ATRIAL FIBRILLATION WITH RVR: Primary | ICD-10-CM

## 2019-03-06 DIAGNOSIS — E78.2 COMBINED HYPERLIPIDEMIA ASSOCIATED WITH TYPE 2 DIABETES MELLITUS: ICD-10-CM

## 2019-03-06 DIAGNOSIS — E11.22 DIABETES MELLITUS WITH ESRD (END-STAGE RENAL DISEASE): Chronic | ICD-10-CM

## 2019-03-06 DIAGNOSIS — J44.9 COPD (CHRONIC OBSTRUCTIVE PULMONARY DISEASE): ICD-10-CM

## 2019-03-06 DIAGNOSIS — D63.8 ANEMIA OF CHRONIC DISEASE: ICD-10-CM

## 2019-03-06 LAB
ALBUMIN SERPL BCP-MCNC: 3.2 G/DL
ALP SERPL-CCNC: 75 U/L
ALT SERPL W/O P-5'-P-CCNC: 7 U/L
ANION GAP SERPL CALC-SCNC: 18 MMOL/L
AST SERPL-CCNC: 14 U/L
BASOPHILS # BLD AUTO: 0.02 K/UL
BASOPHILS NFR BLD: 0.2 %
BILIRUB SERPL-MCNC: 0.5 MG/DL
BNP SERPL-MCNC: 636 PG/ML
BUN SERPL-MCNC: 56 MG/DL
CALCIUM SERPL-MCNC: 10.6 MG/DL
CHLORIDE SERPL-SCNC: 96 MMOL/L
CO2 SERPL-SCNC: 25 MMOL/L
CREAT SERPL-MCNC: 10.2 MG/DL
DIFFERENTIAL METHOD: ABNORMAL
EOSINOPHIL # BLD AUTO: 0.1 K/UL
EOSINOPHIL NFR BLD: 0.9 %
ERYTHROCYTE [DISTWIDTH] IN BLOOD BY AUTOMATED COUNT: 14.1 %
EST. GFR  (AFRICAN AMERICAN): 4 ML/MIN/1.73 M^2
EST. GFR  (NON AFRICAN AMERICAN): 3 ML/MIN/1.73 M^2
GLUCOSE SERPL-MCNC: 117 MG/DL
GLUCOSE SERPL-MCNC: 131 MG/DL (ref 70–110)
HCT VFR BLD AUTO: 32.6 %
HGB BLD-MCNC: 9.9 G/DL
LYMPHOCYTES # BLD AUTO: 1.5 K/UL
LYMPHOCYTES NFR BLD: 12.7 %
MAGNESIUM SERPL-MCNC: 2 MG/DL
MCH RBC QN AUTO: 30.7 PG
MCHC RBC AUTO-ENTMCNC: 30.4 G/DL
MCV RBC AUTO: 101 FL
MONOCYTES # BLD AUTO: 1.5 K/UL
MONOCYTES NFR BLD: 13.1 %
NEUTROPHILS # BLD AUTO: 8.5 K/UL
NEUTROPHILS NFR BLD: 73.1 %
PHOSPHATE SERPL-MCNC: 7.5 MG/DL
PLATELET # BLD AUTO: 348 K/UL
PMV BLD AUTO: 9.8 FL
POCT GLUCOSE: 108 MG/DL (ref 70–110)
POTASSIUM SERPL-SCNC: 5.3 MMOL/L
PROT SERPL-MCNC: 7.6 G/DL
RBC # BLD AUTO: 3.23 M/UL
SODIUM SERPL-SCNC: 139 MMOL/L
TROPONIN I SERPL DL<=0.01 NG/ML-MCNC: 0.44 NG/ML
TROPONIN I SERPL DL<=0.01 NG/ML-MCNC: 0.47 NG/ML
TSH SERPL DL<=0.005 MIU/L-ACNC: 3.71 UIU/ML
WBC # BLD AUTO: 11.61 K/UL

## 2019-03-06 PROCEDURE — 96366 THER/PROPH/DIAG IV INF ADDON: CPT

## 2019-03-06 PROCEDURE — 83036 HEMOGLOBIN GLYCOSYLATED A1C: CPT

## 2019-03-06 PROCEDURE — 84100 ASSAY OF PHOSPHORUS: CPT

## 2019-03-06 PROCEDURE — 63600175 PHARM REV CODE 636 W HCPCS: Performed by: EMERGENCY MEDICINE

## 2019-03-06 PROCEDURE — 36410 VNPNXR 3YR/> PHY/QHP DX/THER: CPT

## 2019-03-06 PROCEDURE — 83735 ASSAY OF MAGNESIUM: CPT

## 2019-03-06 PROCEDURE — 20000000 HC ICU ROOM

## 2019-03-06 PROCEDURE — 84443 ASSAY THYROID STIM HORMONE: CPT

## 2019-03-06 PROCEDURE — 84484 ASSAY OF TROPONIN QUANT: CPT | Mod: 91

## 2019-03-06 PROCEDURE — 63600175 PHARM REV CODE 636 W HCPCS: Performed by: HOSPITALIST

## 2019-03-06 PROCEDURE — 25000003 PHARM REV CODE 250: Performed by: INTERNAL MEDICINE

## 2019-03-06 PROCEDURE — 25000003 PHARM REV CODE 250: Performed by: HOSPITALIST

## 2019-03-06 PROCEDURE — 96365 THER/PROPH/DIAG IV INF INIT: CPT

## 2019-03-06 PROCEDURE — 25000003 PHARM REV CODE 250: Performed by: EMERGENCY MEDICINE

## 2019-03-06 PROCEDURE — 80053 COMPREHEN METABOLIC PANEL: CPT

## 2019-03-06 PROCEDURE — 25000242 PHARM REV CODE 250 ALT 637 W/ HCPCS: Performed by: HOSPITALIST

## 2019-03-06 PROCEDURE — 84484 ASSAY OF TROPONIN QUANT: CPT

## 2019-03-06 PROCEDURE — 85025 COMPLETE CBC W/AUTO DIFF WBC: CPT

## 2019-03-06 PROCEDURE — 82962 GLUCOSE BLOOD TEST: CPT

## 2019-03-06 PROCEDURE — 93010 ELECTROCARDIOGRAM REPORT: CPT | Mod: ,,, | Performed by: INTERNAL MEDICINE

## 2019-03-06 PROCEDURE — 99291 CRITICAL CARE FIRST HOUR: CPT | Mod: 25

## 2019-03-06 PROCEDURE — 83880 ASSAY OF NATRIURETIC PEPTIDE: CPT

## 2019-03-06 PROCEDURE — 36415 COLL VENOUS BLD VENIPUNCTURE: CPT

## 2019-03-06 PROCEDURE — 99223 PR INITIAL HOSPITAL CARE,LEVL III: ICD-10-PCS | Mod: 25,,, | Performed by: INTERNAL MEDICINE

## 2019-03-06 PROCEDURE — 93005 ELECTROCARDIOGRAM TRACING: CPT

## 2019-03-06 PROCEDURE — 99223 1ST HOSP IP/OBS HIGH 75: CPT | Mod: 25,,, | Performed by: INTERNAL MEDICINE

## 2019-03-06 PROCEDURE — 93010 EKG 12-LEAD: ICD-10-PCS | Mod: ,,, | Performed by: INTERNAL MEDICINE

## 2019-03-06 RX ORDER — PROMETHAZINE HYDROCHLORIDE AND CODEINE PHOSPHATE 6.25; 1 MG/5ML; MG/5ML
5 SOLUTION ORAL EVERY 4 HOURS PRN
Status: DISCONTINUED | OUTPATIENT
Start: 2019-03-06 | End: 2019-03-11 | Stop reason: HOSPADM

## 2019-03-06 RX ORDER — DOXYCYCLINE HYCLATE 100 MG
100 TABLET ORAL EVERY 12 HOURS
Status: DISCONTINUED | OUTPATIENT
Start: 2019-03-06 | End: 2019-03-11 | Stop reason: HOSPADM

## 2019-03-06 RX ORDER — BUSPIRONE HYDROCHLORIDE 10 MG/1
10 TABLET ORAL 2 TIMES DAILY
Status: DISCONTINUED | OUTPATIENT
Start: 2019-03-06 | End: 2019-03-06

## 2019-03-06 RX ORDER — AMLODIPINE BESYLATE 5 MG/1
10 TABLET ORAL DAILY
Status: DISCONTINUED | OUTPATIENT
Start: 2019-03-07 | End: 2019-03-07

## 2019-03-06 RX ORDER — ACETAMINOPHEN 325 MG/1
650 TABLET ORAL EVERY 4 HOURS PRN
Status: DISCONTINUED | OUTPATIENT
Start: 2019-03-06 | End: 2019-03-11 | Stop reason: HOSPADM

## 2019-03-06 RX ORDER — METOPROLOL SUCCINATE 25 MG/1
25 TABLET, EXTENDED RELEASE ORAL DAILY
Status: DISCONTINUED | OUTPATIENT
Start: 2019-03-07 | End: 2019-03-08

## 2019-03-06 RX ORDER — SODIUM CHLORIDE 0.9 % (FLUSH) 0.9 %
5 SYRINGE (ML) INJECTION
Status: DISCONTINUED | OUTPATIENT
Start: 2019-03-06 | End: 2019-03-11 | Stop reason: HOSPADM

## 2019-03-06 RX ORDER — INSULIN ASPART 100 [IU]/ML
1-10 INJECTION, SOLUTION INTRAVENOUS; SUBCUTANEOUS
Status: DISCONTINUED | OUTPATIENT
Start: 2019-03-06 | End: 2019-03-09

## 2019-03-06 RX ORDER — FLUTICASONE PROPIONATE 50 MCG
1 SPRAY, SUSPENSION (ML) NASAL 2 TIMES DAILY
Status: DISCONTINUED | OUTPATIENT
Start: 2019-03-06 | End: 2019-03-11 | Stop reason: HOSPADM

## 2019-03-06 RX ORDER — ATORVASTATIN CALCIUM 10 MG/1
10 TABLET, FILM COATED ORAL DAILY
Status: DISCONTINUED | OUTPATIENT
Start: 2019-03-07 | End: 2019-03-11 | Stop reason: HOSPADM

## 2019-03-06 RX ORDER — AMIODARONE HYDROCHLORIDE 200 MG/1
400 TABLET ORAL 2 TIMES DAILY
Status: DISCONTINUED | OUTPATIENT
Start: 2019-03-06 | End: 2019-03-09

## 2019-03-06 RX ORDER — CLONIDINE HYDROCHLORIDE 0.1 MG/1
0.2 TABLET ORAL EVERY 8 HOURS PRN
Status: DISCONTINUED | OUTPATIENT
Start: 2019-03-06 | End: 2019-03-11 | Stop reason: HOSPADM

## 2019-03-06 RX ORDER — MONTELUKAST SODIUM 10 MG/1
10 TABLET ORAL NIGHTLY
Status: DISCONTINUED | OUTPATIENT
Start: 2019-03-06 | End: 2019-03-11 | Stop reason: HOSPADM

## 2019-03-06 RX ORDER — IBUPROFEN 200 MG
16 TABLET ORAL
Status: DISCONTINUED | OUTPATIENT
Start: 2019-03-06 | End: 2019-03-11 | Stop reason: HOSPADM

## 2019-03-06 RX ORDER — HYDRALAZINE HYDROCHLORIDE 25 MG/1
25 TABLET, FILM COATED ORAL EVERY 12 HOURS
Status: DISCONTINUED | OUTPATIENT
Start: 2019-03-06 | End: 2019-03-08

## 2019-03-06 RX ORDER — GLUCAGON 1 MG
1 KIT INJECTION
Status: DISCONTINUED | OUTPATIENT
Start: 2019-03-06 | End: 2019-03-11 | Stop reason: HOSPADM

## 2019-03-06 RX ORDER — LEVALBUTEROL INHALATION SOLUTION 0.63 MG/3ML
0.63 SOLUTION RESPIRATORY (INHALATION) EVERY 8 HOURS
Status: DISCONTINUED | OUTPATIENT
Start: 2019-03-07 | End: 2019-03-07

## 2019-03-06 RX ORDER — MIRTAZAPINE 7.5 MG/1
7.5 TABLET, FILM COATED ORAL NIGHTLY
Status: DISCONTINUED | OUTPATIENT
Start: 2019-03-06 | End: 2019-03-11 | Stop reason: HOSPADM

## 2019-03-06 RX ORDER — ACETAMINOPHEN 325 MG/1
650 TABLET ORAL
Status: COMPLETED | OUTPATIENT
Start: 2019-03-06 | End: 2019-03-06

## 2019-03-06 RX ORDER — CETIRIZINE HYDROCHLORIDE 10 MG/1
10 TABLET ORAL DAILY
Status: DISCONTINUED | OUTPATIENT
Start: 2019-03-07 | End: 2019-03-11 | Stop reason: HOSPADM

## 2019-03-06 RX ORDER — ONDANSETRON 8 MG/1
8 TABLET, ORALLY DISINTEGRATING ORAL EVERY 8 HOURS PRN
Status: DISCONTINUED | OUTPATIENT
Start: 2019-03-06 | End: 2019-03-06

## 2019-03-06 RX ORDER — BUSPIRONE HYDROCHLORIDE 10 MG/1
10 TABLET ORAL 2 TIMES DAILY
Status: DISCONTINUED | OUTPATIENT
Start: 2019-03-06 | End: 2019-03-11 | Stop reason: HOSPADM

## 2019-03-06 RX ORDER — PANTOPRAZOLE SODIUM 40 MG/1
40 TABLET, DELAYED RELEASE ORAL DAILY
Status: DISCONTINUED | OUTPATIENT
Start: 2019-03-07 | End: 2019-03-11 | Stop reason: HOSPADM

## 2019-03-06 RX ORDER — IBUPROFEN 200 MG
24 TABLET ORAL
Status: DISCONTINUED | OUTPATIENT
Start: 2019-03-06 | End: 2019-03-11 | Stop reason: HOSPADM

## 2019-03-06 RX ORDER — ACETAMINOPHEN 325 MG/1
650 TABLET ORAL EVERY 8 HOURS PRN
Status: DISCONTINUED | OUTPATIENT
Start: 2019-03-06 | End: 2019-03-06

## 2019-03-06 RX ORDER — ONDANSETRON 2 MG/ML
8 INJECTION INTRAMUSCULAR; INTRAVENOUS EVERY 6 HOURS PRN
Status: DISCONTINUED | OUTPATIENT
Start: 2019-03-06 | End: 2019-03-11 | Stop reason: HOSPADM

## 2019-03-06 RX ORDER — ASPIRIN 325 MG
325 TABLET ORAL
Status: DISPENSED | OUTPATIENT
Start: 2019-03-06 | End: 2019-03-07

## 2019-03-06 RX ORDER — SEVELAMER CARBONATE 800 MG/1
800 TABLET, FILM COATED ORAL
Status: DISCONTINUED | OUTPATIENT
Start: 2019-03-06 | End: 2019-03-11 | Stop reason: HOSPADM

## 2019-03-06 RX ADMIN — AMIODARONE HYDROCHLORIDE 1 MG/MIN: 1.8 INJECTION, SOLUTION INTRAVENOUS at 01:03

## 2019-03-06 RX ADMIN — ACETAMINOPHEN 650 MG: 325 TABLET, FILM COATED ORAL at 04:03

## 2019-03-06 RX ADMIN — FLUTICASONE PROPIONATE 50 MCG: 50 SPRAY, METERED NASAL at 09:03

## 2019-03-06 RX ADMIN — AMIODARONE HYDROCHLORIDE 150 MG: 1.5 INJECTION, SOLUTION INTRAVENOUS at 01:03

## 2019-03-06 RX ADMIN — SODIUM CHLORIDE 250 ML: 0.9 INJECTION, SOLUTION INTRAVENOUS at 01:03

## 2019-03-06 RX ADMIN — SEVELAMER CARBONATE 800 MG: 800 TABLET, FILM COATED ORAL at 06:03

## 2019-03-06 RX ADMIN — MONTELUKAST SODIUM 10 MG: 10 TABLET, FILM COATED ORAL at 09:03

## 2019-03-06 RX ADMIN — BUSPIRONE HYDROCHLORIDE 10 MG: 10 TABLET ORAL at 08:03

## 2019-03-06 RX ADMIN — AMIODARONE HYDROCHLORIDE 400 MG: 200 TABLET ORAL at 09:03

## 2019-03-06 RX ADMIN — MIRTAZAPINE 7.5 MG: 7.5 TABLET ORAL at 09:03

## 2019-03-06 RX ADMIN — DOXYCYCLINE HYCLATE 100 MG: 100 TABLET, COATED ORAL at 09:03

## 2019-03-06 RX ADMIN — APIXABAN 2.5 MG: 2.5 TABLET, FILM COATED ORAL at 09:03

## 2019-03-06 RX ADMIN — AMIODARONE HYDROCHLORIDE 0.5 MG/MIN: 1.8 INJECTION, SOLUTION INTRAVENOUS at 06:03

## 2019-03-06 RX ADMIN — Medication 1 CAPSULE: at 06:03

## 2019-03-06 RX ADMIN — SODIUM CHLORIDE 250 ML: 0.9 INJECTION, SOLUTION INTRAVENOUS at 02:03

## 2019-03-06 NOTE — ASSESSMENT & PLAN NOTE
No acute exacerbation as no wheezing on exam.  Does complain of congestion.  Will provide Xopenex nebs.  Monitor.

## 2019-03-06 NOTE — ED TRIAGE NOTES
Pt arrived to ED with c/o chest pain x 6 am. dialysis pt, last went 2 days ago. Pt HR in 180s. MD at bedside, and placed on pads. Pt connected to continuous cardiac monitor, bp cuff, and pulse ox. Call light within reach.

## 2019-03-06 NOTE — SUBJECTIVE & OBJECTIVE
Past Medical History:   Diagnosis Date    Arthritis     COPD (chronic obstructive pulmonary disease)     Diabetes mellitus type II     Dialysis patient     Encounter for blood transfusion     ESRD (end stage renal disease)        Past Surgical History:   Procedure Laterality Date    AV FISTULA PLACEMENT      TUBAL LIGATION         Review of patient's allergies indicates:  No Known Allergies    No current facility-administered medications on file prior to encounter.      Current Outpatient Medications on File Prior to Encounter   Medication Sig    albuterol (ACCUNEB) 1.25 mg/3 mL Nebu Take 3 mLs (1.25 mg total) by nebulization every 6 (six) hours as needed.    albuterol-ipratropium (DUO-NEB) 2.5 mg-0.5 mg/3 mL nebulizer solution Take 3 mLs by nebulization every 6 (six) hours as needed for Wheezing or Shortness of Breath. Rescue    amLODIPine (NORVASC) 10 MG tablet TAKE 1 TABLET (10 MG TOTAL) BY MOUTH ONCE DAILY.    atorvastatin (LIPITOR) 10 MG tablet Take 1 tablet (10 mg total) by mouth once daily.    busPIRone (BUSPAR) 10 MG tablet Take 10 mg by mouth 2 (two) times daily.    cloNIDine (CATAPRES) 0.2 MG tablet Take 0.2 mg by mouth.    diclofenac sodium (VOLTAREN) 1 % Gel Apply 4 g topically.    doxycycline (VIBRAMYCIN) 100 MG Cap Take 1 capsule (100 mg total) by mouth every 12 (twelve) hours. for 10 days    epoetin cat (PROCRIT) 3,000 unit/mL injection Inject 3,000 Units into the skin.    fluticasone (FLONASE) 50 mcg/actuation nasal spray 1 spray by Each Nare route 2 (two) times daily.    FOLIC ACID/VITAMIN B COMP W-C (RENAL CAPS ORAL) Take by mouth Daily.    gentamicin sulfate, PF, 60 mg/6 mL Soln Inject into the vein.    guaiFENesin (MUCINEX) 1,200 mg Ta12 Take 1 tablet by mouth 2 (two) times daily. for 14 days    guaifenesin-codeine 100-10 mg/5 ml (CHERATUSSIN AC)  mg/5 mL syrup Take 5 mLs by mouth every 12 (twelve) hours as needed for Cough or Congestion.    hydrALAZINE (APRESOLINE)  25 MG tablet Take 1 tablet (25 mg total) by mouth every 12 (twelve) hours.    HYDROcodone-acetaminophen (NORCO) 7.5-325 mg per tablet Take 1 tablet by mouth every 8 (eight) hours as needed for Pain.    [START ON 3/14/2019] HYDROcodone-acetaminophen (NORCO) 7.5-325 mg per tablet Take 1 tablet by mouth every 8 (eight) hours as needed for Pain.    [START ON 4/13/2019] HYDROcodone-acetaminophen (NORCO) 7.5-325 mg per tablet Take 1 tablet by mouth every 8 (eight) hours as needed for Pain.    hydrOXYzine pamoate (VISTARIL) 25 MG Cap Take 1 capsule (25 mg total) by mouth every 8 (eight) hours as needed (panic attack).    levocetirizine (XYZAL) 5 MG tablet Take 1 tablet (5 mg total) by mouth every evening.    meclizine (ANTIVERT) 25 mg tablet Take 1 tablet (25 mg total) by mouth 3 (three) times daily as needed.    metoprolol succinate (TOPROL-XL) 25 MG 24 hr tablet Take 25 mg by mouth.    mirtazapine (REMERON) 7.5 MG Tab Take 7.5 mg by mouth nightly.     montelukast (SINGULAIR) 10 mg tablet Take 1 tablet (10 mg total) by mouth every evening.    pantoprazole (PROTONIX) 40 MG tablet Take 40 mg by mouth.    paroxetine (PAXIL) 30 MG tablet 1 TABLET IN THE MORNING ONCE A DAY ORALLY 90    predniSONE (DELTASONE) 10 MG tablet 2 tab po qday x 4 days, then 1 tab po qday x 4 days, then 1/2 tab po qday x 4 days    promethazine (PHENERGAN) 12.5 MG Tab Take 12.5 mg by mouth every 6 (six) hours as needed.    SENSIPAR 60 mg Tab Take 30 mg by mouth daily with breakfast.     sevelamer carbonate (RENVELA) 800 mg Tab Take 800 mg by mouth 3 (three) times daily with meals.    SODIUM BICARBONATE ORAL Take 650 mg by mouth.    traZODone (DESYREL) 50 MG tablet Take 1 tablet (50 mg total) by mouth nightly as needed for Insomnia.    VENTOLIN HFA 90 mcg/actuation inhaler INHALE 2 PUFFS INTO LUNGS EVERY 4 HOURS AS NEEDED FOR SHORTNESS OF BREATH OR WHEEZING. RESCUE    vit B,C-iron fum-FA-D3-zinc ox 8 mg iron-800 mcg-1,000 unit Tab Take  by mouth.    vitamin renal formula, B-complex-vitamin c-folic acid, (NEPHROCAP) 1 mg Cap Take by mouth.    vortioxetine (BRINTELLIX) 5 mg Tab Take 1 tablet by mouth.     Family History     Problem Relation (Age of Onset)    Heart attack Sister, Sister, Mother        Tobacco Use    Smoking status: Former Smoker     Start date: 1/12/1991    Smokeless tobacco: Never Used   Substance and Sexual Activity    Alcohol use: No    Drug use: No    Sexual activity: Not on file     Review of Systems   Constitutional: Negative for chills and fever.   HENT: Negative for ear discharge and ear pain.    Eyes: Negative for pain and itching.   Respiratory: Positive for cough and shortness of breath.    Cardiovascular: Positive for chest pain. Negative for palpitations.   Gastrointestinal: Negative for abdominal distention and abdominal pain.   Endocrine: Negative for polydipsia and polyphagia.   Genitourinary: Negative for flank pain and pelvic pain.   Musculoskeletal: Negative for neck pain and neck stiffness.   Skin: Negative for rash and wound.   Neurological: Negative for seizures and syncope.   Psychiatric/Behavioral: Negative for agitation and hallucinations.     Objective:     Vital Signs (Most Recent):  Temp: 97.7 °F (36.5 °C) (03/06/19 1547)  Pulse: 80 (03/06/19 1632)  Resp: 16 (03/06/19 1341)  BP: 137/65 (03/06/19 1632)  SpO2: 97 % (03/06/19 1621) Vital Signs (24h Range):  Temp:  [97.7 °F (36.5 °C)-97.9 °F (36.6 °C)] 97.7 °F (36.5 °C)  Pulse:  [] 80  Resp:  [16-24] 16  SpO2:  [97 %] 97 %  BP: ()/(49-65) 137/65     Weight: 60.8 kg (134 lb)  Body mass index is 23 kg/m².    Physical Exam   Constitutional: She is oriented to person, place, and time. No distress.   HENT:   Head: Normocephalic and atraumatic.   Eyes: Conjunctivae are normal. Right eye exhibits no discharge. Left eye exhibits no discharge.   Neck: Neck supple. No tracheal deviation present.   Cardiovascular: Normal rate, regular rhythm and normal  heart sounds.   Pulmonary/Chest: Effort normal. No respiratory distress. She has no wheezes.   Abdominal: Soft. Bowel sounds are normal.   Musculoskeletal: She exhibits no deformity.   Neurological: She is alert and oriented to person, place, and time.   Skin: Skin is warm and dry. She is not diaphoretic.           Significant Labs:   BMP:   Recent Labs   Lab 03/06/19  1325   *      K 5.3*   CL 96   CO2 25   BUN 56*   CREATININE 10.2*   CALCIUM 10.6*   MG 2.0     CBC:   Recent Labs   Lab 03/06/19  1325   WBC 11.61   HGB 9.9*   HCT 32.6*          Significant Imaging: I have reviewed all pertinent imaging results/findings within the past 24 hours.

## 2019-03-06 NOTE — H&P
[facility] Medicine  History & Physical    Patient Name: Eli Vaz  MRN: [id number]  Admission Date: 3/6/2019  Attending Physician: Ai Perez MD   Primary Care Provider: Chiara Nelson MD         Patient information was obtained from patient and ER records.     Subjective:     Principal Problem:Atrial fibrillation with RVR    Chief Complaint:   Chief Complaint   Patient presents with    Chest Pain     since this morning also short of breath last dialysis 2 d ago. 1240 EKG done on arrival no STEMI, SVT rate 170's        HPI: 74 y/o female with history of ESRD and COPD presents with shortness of breath.  She states that symptoms started around 6:00 a.m. this morning.  She also complains of left-sided chest pressure.  Pain is not associated with shortness of breath and has been presented for a while.  Patient also complains of chest congestion.  States she has been having a dry cough for several days.  Chest pain is worse with cough and deep breathing.  Chest pain is left-sided, nonradiating.  No recent fever.  No abdominal pain, nausea or vomiting. No previous episodes of abnormal heart rhythms, no palpitations.  Last dialysis was 2 days ago via left upper extremity AV fistula.  She presented to ER where she was noted to be in rapid AFib.  Patient was placed on Amiodarone with conversion to NSR.  No hx of Afib.  No other complaints.        Past Medical History:   Diagnosis Date    Arthritis     COPD (chronic obstructive pulmonary disease)     Diabetes mellitus type II     Dialysis patient     Encounter for blood transfusion     ESRD (end stage renal disease)        Past Surgical History:   Procedure Laterality Date    AV FISTULA PLACEMENT      TUBAL LIGATION         Review of patient's allergies indicates:  No Known Allergies    No current facility-administered medications on file prior to encounter.      Current Outpatient Medications on File Prior to Encounter    Medication Sig    albuterol (ACCUNEB) 1.25 mg/3 mL Nebu Take 3 mLs (1.25 mg total) by nebulization every 6 (six) hours as needed.    albuterol-ipratropium (DUO-NEB) 2.5 mg-0.5 mg/3 mL nebulizer solution Take 3 mLs by nebulization every 6 (six) hours as needed for Wheezing or Shortness of Breath. Rescue    amLODIPine (NORVASC) 10 MG tablet TAKE 1 TABLET (10 MG TOTAL) BY MOUTH ONCE DAILY.    atorvastatin (LIPITOR) 10 MG tablet Take 1 tablet (10 mg total) by mouth once daily.    busPIRone (BUSPAR) 10 MG tablet Take 10 mg by mouth 2 (two) times daily.    cloNIDine (CATAPRES) 0.2 MG tablet Take 0.2 mg by mouth.    diclofenac sodium (VOLTAREN) 1 % Gel Apply 4 g topically.    doxycycline (VIBRAMYCIN) 100 MG Cap Take 1 capsule (100 mg total) by mouth every 12 (twelve) hours. for 10 days    epoetin cat (PROCRIT) 3,000 unit/mL injection Inject 3,000 Units into the skin.    fluticasone (FLONASE) 50 mcg/actuation nasal spray 1 spray by Each Nare route 2 (two) times daily.    FOLIC ACID/VITAMIN B COMP W-C (RENAL CAPS ORAL) Take by mouth Daily.    gentamicin sulfate, PF, 60 mg/6 mL Soln Inject into the vein.    guaiFENesin (MUCINEX) 1,200 mg Ta12 Take 1 tablet by mouth 2 (two) times daily. for 14 days    guaifenesin-codeine 100-10 mg/5 ml (CHERATUSSIN AC)  mg/5 mL syrup Take 5 mLs by mouth every 12 (twelve) hours as needed for Cough or Congestion.    hydrALAZINE (APRESOLINE) 25 MG tablet Take 1 tablet (25 mg total) by mouth every 12 (twelve) hours.    HYDROcodone-acetaminophen (NORCO) 7.5-325 mg per tablet Take 1 tablet by mouth every 8 (eight) hours as needed for Pain.    [START ON 3/14/2019] HYDROcodone-acetaminophen (NORCO) 7.5-325 mg per tablet Take 1 tablet by mouth every 8 (eight) hours as needed for Pain.    [START ON 4/13/2019] HYDROcodone-acetaminophen (NORCO) 7.5-325 mg per tablet Take 1 tablet by mouth every 8 (eight) hours as needed for Pain.    hydrOXYzine pamoate (VISTARIL) 25 MG Cap Take  1 capsule (25 mg total) by mouth every 8 (eight) hours as needed (panic attack).    levocetirizine (XYZAL) 5 MG tablet Take 1 tablet (5 mg total) by mouth every evening.    meclizine (ANTIVERT) 25 mg tablet Take 1 tablet (25 mg total) by mouth 3 (three) times daily as needed.    metoprolol succinate (TOPROL-XL) 25 MG 24 hr tablet Take 25 mg by mouth.    mirtazapine (REMERON) 7.5 MG Tab Take 7.5 mg by mouth nightly.     montelukast (SINGULAIR) 10 mg tablet Take 1 tablet (10 mg total) by mouth every evening.    pantoprazole (PROTONIX) 40 MG tablet Take 40 mg by mouth.    paroxetine (PAXIL) 30 MG tablet 1 TABLET IN THE MORNING ONCE A DAY ORALLY 90    predniSONE (DELTASONE) 10 MG tablet 2 tab po qday x 4 days, then 1 tab po qday x 4 days, then 1/2 tab po qday x 4 days    promethazine (PHENERGAN) 12.5 MG Tab Take 12.5 mg by mouth every 6 (six) hours as needed.    SENSIPAR 60 mg Tab Take 30 mg by mouth daily with breakfast.     sevelamer carbonate (RENVELA) 800 mg Tab Take 800 mg by mouth 3 (three) times daily with meals.    SODIUM BICARBONATE ORAL Take 650 mg by mouth.    traZODone (DESYREL) 50 MG tablet Take 1 tablet (50 mg total) by mouth nightly as needed for Insomnia.    VENTOLIN HFA 90 mcg/actuation inhaler INHALE 2 PUFFS INTO LUNGS EVERY 4 HOURS AS NEEDED FOR SHORTNESS OF BREATH OR WHEEZING. RESCUE    vit B,C-iron fum-FA-D3-zinc ox 8 mg iron-800 mcg-1,000 unit Tab Take by mouth.    vitamin renal formula, B-complex-vitamin c-folic acid, (NEPHROCAP) 1 mg Cap Take by mouth.    vortioxetine (BRINTELLIX) 5 mg Tab Take 1 tablet by mouth.     Family History     Problem Relation (Age of Onset)    Heart attack Sister, Sister, Mother        Tobacco Use    Smoking status: Former Smoker     Start date: 1/12/1991    Smokeless tobacco: Never Used   Substance and Sexual Activity    Alcohol use: No    Drug use: No    Sexual activity: Not on file     Review of Systems   Constitutional: Negative for chills and  fever.   HENT: Negative for ear discharge and ear pain.    Eyes: Negative for pain and itching.   Respiratory: Positive for cough and shortness of breath.    Cardiovascular: Positive for chest pain. Negative for palpitations.   Gastrointestinal: Negative for abdominal distention and abdominal pain.   Endocrine: Negative for polydipsia and polyphagia.   Genitourinary: Negative for flank pain and pelvic pain.   Musculoskeletal: Negative for neck pain and neck stiffness.   Skin: Negative for rash and wound.   Neurological: Negative for seizures and syncope.   Psychiatric/Behavioral: Negative for agitation and hallucinations.     Objective:     Vital Signs (Most Recent):  Temp: 97.7 °F (36.5 °C) (03/06/19 1547)  Pulse: 80 (03/06/19 1632)  Resp: 16 (03/06/19 1341)  BP: 137/65 (03/06/19 1632)  SpO2: 97 % (03/06/19 1621) Vital Signs (24h Range):  Temp:  [97.7 °F (36.5 °C)-97.9 °F (36.6 °C)] 97.7 °F (36.5 °C)  Pulse:  [] 80  Resp:  [16-24] 16  SpO2:  [97 %] 97 %  BP: ()/(49-65) 137/65     Weight: 60.8 kg (134 lb)  Body mass index is 23 kg/m².    Physical Exam   Constitutional: She is oriented to person, place, and time. No distress.   HENT:   Head: Normocephalic and atraumatic.   Eyes: Conjunctivae are normal. Right eye exhibits no discharge. Left eye exhibits no discharge.   Neck: Neck supple. No tracheal deviation present.   Cardiovascular: Normal rate, regular rhythm and normal heart sounds.   Pulmonary/Chest: Effort normal. No respiratory distress. She has no wheezes.   Abdominal: Soft. Bowel sounds are normal.   Musculoskeletal: She exhibits no deformity.   Neurological: She is alert and oriented to person, place, and time.   Skin: Skin is warm and dry. She is not diaphoretic.           Significant Labs:   BMP:   Recent Labs   Lab 03/06/19  1325   *      K 5.3*   CL 96   CO2 25   BUN 56*   CREATININE 10.2*   CALCIUM 10.6*   MG 2.0     CBC:   Recent Labs   Lab 03/06/19  1325   WBC 11.61   HGB  9.9*   HCT 32.6*          Significant Imaging: I have reviewed all pertinent imaging results/findings within the past 24 hours.    Assessment/Plan:     * Atrial fibrillation with RVR    Patient presented to ER with rapid AFib.  No hx of known AFib.  Probably PAF.  Started on Amiodarone gtt.  Now converted to NSR.  Being admitted to ICU on Amiodarone gtt and Cardiology consult.  Defer anticoagulation and further recommendations to Cards.       Chest pain    Seems more pleuritic and musculoskeletal.  Secondary to persistent cough.  Trend Troponin.       Hypertension    Essential  Continue home medications and monitor.       Combined hyperlipidemia associated with type 2 diabetes mellitus    Continue Statin.       Diabetes mellitus with ESRD (end-stage renal disease)    Diabetic diet and insulin sliding scale.       Anemia of chronic disease    H/H similar to previous labs.  No evidence of bleeding.       ESRD on dialysis    Due for HD today.  No need for emergent HD.  Consult Nephrology for HD.       COPD (chronic obstructive pulmonary disease)    No acute exacerbation as no wheezing on exam.  Does complain of congestion.  Will provide Xopenex nebs.  Monitor.         VTE Risk Mitigation (From admission, onward)        Ordered     IP VTE HIGH RISK PATIENT  Once      03/06/19 1635     Place sequential compression device  Until discontinued      03/06/19 1635             Mark Huerta MD  Department of Hospital Medicine   [facility]

## 2019-03-06 NOTE — HPI
74 y/o female with history of ESRD and COPD presents with shortness of breath.  She states that symptoms started around 6:00 a.m. this morning.  She also complains of left-sided chest pressure.  Pain is not associated with shortness of breath and has been presented for a while.  Patient also complains of chest congestion.  States she has been having a dry cough for several days.  Chest pain is worse with cough and deep breathing.  Chest pain is left-sided, nonradiating.  No recent fever.  No abdominal pain, nausea or vomiting. No previous episodes of abnormal heart rhythms, no palpitations.  Last dialysis was 2 days ago via left upper extremity AV fistula.  She presented to ER where she was noted to be in rapid AFib.  Patient was placed on Amiodarone with conversion to NSR.  No hx of Afib.  No other complaints.

## 2019-03-06 NOTE — ED PROVIDER NOTES
Encounter Date: 3/6/2019       History     Chief Complaint   Patient presents with    Chest Pain     since this morning also short of breath last dialysis 2 d ago. 1240 EKG done on arrival no STEMI, SVT rate 170's     73-year-old female with history of end-stage renal disease, COPD, diabetes presents with shortness of breath.  She states that symptoms started around 6:00 a.m. this morning.  She also complains of left-sided chest pressure she states that this is not associated with the shortness of breath, she states that she has been evaluated in multiple ERs before for this chest pressure.  It is left-sided, nonradiating.  No recent fever but she does reports cough and congestion.  No abdominal pain, nausea or vomiting. No previous episodes of abnormal heart rhythms, no palpitations.  Last dialysis was 2 days ago via left upper extremity AV fistula.    Recent work up with cardiology:  Echo 2/4/19  · Normal left ventricular systolic function. The estimated ejection fraction is 70%  · Concentric left ventricular hypertrophy.  · Grade II (moderate) left ventricular diastolic dysfunction consistent with pseudonormalization.  · Moderate left atrial enlargement.  · Normal right ventricular systolic function.  · Moderate-to-severe mitral regurgitation.  · Mild tricuspid regurgitation.  · The estimated PA systolic pressure is 44 mm Hg  · Pulmonary hypertension present.     Stress test 2/4/19  · There is a mild, infarct defect(s) in the lateral apical wall(s),  · An ejection fraction of 72 % at rest  · The EKG portion of this study is negative for myocardial ischemia.              Review of patient's allergies indicates:  No Known Allergies  Past Medical History:   Diagnosis Date    Arthritis     COPD (chronic obstructive pulmonary disease)     Diabetes mellitus type II     Dialysis patient     Encounter for blood transfusion     ESRD (end stage renal disease)      Past Surgical History:   Procedure Laterality Date     AV FISTULA PLACEMENT      TUBAL LIGATION       Family History   Problem Relation Age of Onset    Heart attack Sister     Heart attack Sister     Heart attack Mother      Social History     Tobacco Use    Smoking status: Former Smoker     Start date: 1/12/1991    Smokeless tobacco: Never Used   Substance Use Topics    Alcohol use: No    Drug use: No     Review of Systems   Constitutional: Negative for chills and fever.   HENT: Positive for congestion.    Eyes: Negative for visual disturbance.   Respiratory: Positive for cough and shortness of breath.    Cardiovascular: Positive for chest pain. Negative for palpitations.   Gastrointestinal: Negative for abdominal pain, nausea and vomiting.   Genitourinary: Positive for difficulty urinating.   Skin: Negative for rash.   Allergic/Immunologic: Positive for immunocompromised state.   Neurological: Negative for weakness and numbness.       Physical Exam     Initial Vitals   BP Pulse Resp Temp SpO2   03/06/19 1329 03/06/19 1238 03/06/19 1238 03/06/19 1238 03/06/19 1555   (!) 105/51 (!) 175 (!) 24 97.8 °F (36.6 °C) 97 %      MAP       --                Physical Exam    Constitutional: She appears well-developed and well-nourished. She is not diaphoretic.   HENT:   White thick discharge noted to tongue, patient has upper dentures in place   Eyes: Conjunctivae are normal.   Neck: Neck supple.   Cardiovascular: An irregularly irregular rhythm present.  Tachycardia present.    Left upper extremity AV fistula with palpable thrill   Pulmonary/Chest: No respiratory distress. She has rales (Bibasilar rales).   Abdominal: Soft. She exhibits no distension. There is no tenderness.   Musculoskeletal: She exhibits no edema.   No peripheral edema, no calf tenderness or swelling   Neurological: She is alert and oriented to person, place, and time.   Skin: Skin is warm and dry.   Psychiatric: She has a normal mood and affect.         ED Course   Critical Care  Date/Time:  3/6/2019 5:10 PM  Performed by: Ai Perez MD  Authorized by: Ai Perez MD   Direct patient critical care time: 30 minutes  Additional history critical care time: 5 minutes  Ordering / reviewing critical care time: 10 minutes  Documentation critical care time: 10 minutes  Consulting other physicians critical care time: 10 minutes  Total critical care time (exclusive of procedural time) : 65 minutes  Critical care was necessary to treat or prevent imminent or life-threatening deterioration of the following conditions: cardiac failure and circulatory failure.  Critical care was time spent personally by me on the following activities: blood draw for specimens, discussions with consultants, evaluation of patient's response to treatment, examination of patient, obtaining history from patient or surrogate, ordering and performing treatments and interventions, ordering and review of laboratory studies, ordering and review of radiographic studies, re-evaluation of patient's condition and vascular access procedures.    External Jugular IV  Date/Time: 3/6/2019 5:11 PM  Performed by: Ai Perez MD  Authorized by: Ai Perez MD   Consent Done: Emergent Situation  Location (Ext Jugular): Left (initiual attempt on R side).  Area Prepped With: Chlorohexidine.  Catheter Size: 20 ga.  Catheter Type: Jelco.  Number of attempts: 2  Fixation/Dressing: Tegaderm.  Patient tolerance: Patient tolerated the procedure well with no immediate complications        Labs Reviewed   CBC W/ AUTO DIFFERENTIAL - Abnormal; Notable for the following components:       Result Value    RBC 3.23 (*)     Hemoglobin 9.9 (*)     Hematocrit 32.6 (*)      (*)     MCHC 30.4 (*)     Gran # (ANC) 8.5 (*)     Mono # 1.5 (*)     Gran% 73.1 (*)     Lymph% 12.7 (*)     All other components within normal limits   COMPREHENSIVE METABOLIC PANEL - Abnormal; Notable for the following components:    Potassium 5.3 (*)     Glucose 117  (*)     BUN, Bld 56 (*)     Creatinine 10.2 (*)     Calcium 10.6 (*)     Albumin 3.2 (*)     ALT 7 (*)     Anion Gap 18 (*)     eGFR if  4 (*)     eGFR if non  3 (*)     All other components within normal limits   TROPONIN I - Abnormal; Notable for the following components:    Troponin I 0.443 (*)     All other components within normal limits   B-TYPE NATRIURETIC PEPTIDE - Abnormal; Notable for the following components:     (*)     All other components within normal limits   PHOSPHORUS - Abnormal; Notable for the following components:    Phosphorus 7.5 (*)     All other components within normal limits   TSH   MAGNESIUM   TROPONIN I   HEMOGLOBIN A1C   POCT GLUCOSE MONITORING CONTINUOUS        ECG Results          EKG 12-lead (In process)  Result time 03/06/19 14:46:34    In process by Interface, Lab In [facility] (03/06/19 14:46:34)                 Narrative:    Test Reason : R07.9,    Vent. Rate : 095 BPM     Atrial Rate : 095 BPM     P-R Int : 134 ms          QRS Dur : 076 ms      QT Int : 334 ms       P-R-T Axes : 051 017 037 degrees     QTc Int : 419 ms    Normal sinus rhythm  Low voltage QRS  Cannot rule out Anterior infarct ,age undetermined  Lateral injury pattern  ** ** ACUTE MI / STEMI ** **  Abnormal ECG  When compared with ECG of 06-MAR-2019 12:35,  Significant changes have occurred    Referred By: [email or web address]   SELF           Confirmed By:                   In process by Interface, Lab In [facility] (03/06/19 14:41:10)                 Narrative:    Test Reason : R07.9,    Vent. Rate : 095 BPM     Atrial Rate : 095 BPM     P-R Int : 134 ms          QRS Dur : 076 ms      QT Int : 334 ms       P-R-T Axes : 051 017 037 degrees     QTc Int : 419 ms    Normal sinus rhythm  Low voltage QRS  Cannot rule out Anterior infarct (cited on or before 25-FEB-2019)  Lateral injury pattern  ** ** ACUTE MI / STEMI ** **  Abnormal ECG  When compared with ECG of 25-FEB-2019 02:34,  Significant  changes have occurred    Referred By: [email or web address]   SELF           Confirmed By:                              EKG 12-lead (In process)  Result time 03/06/19 14:46:34    In process by Interface, Lab In [facility] (03/06/19 14:46:34)                 Narrative:    Test Reason : CHEST PAIN    Vent. Rate : 175 BPM     Atrial Rate : 163 BPM     P-R Int : 000 ms          QRS Dur : 080 ms      QT Int : 276 ms       P-R-T Axes : 000 016 074 degrees     QTc Int : 471 ms    Atrial fibrillation with rapid ventricular response  Low voltage QRS  Abnormal ECG  When compared with ECG of 25-FEB-2019 02:34,  Significant changes have occurred    Referred By: [email or web address]   SELF           Confirmed By:                   In process by Interface, Lab In [facility] (03/06/19 14:45:33)                 Narrative:    Test Reason : CHEST PAIN    Vent. Rate : 175 BPM     Atrial Rate : 163 BPM     P-R Int : 000 ms          QRS Dur : 080 ms      QT Int : 276 ms       P-R-T Axes : 000 016 074 degrees     QTc Int : 471 ms    Atrial fibrillation with rapid ventricular response  Low voltage QRS  Abnormal ECG  When compared with ECG of 06-MAR-2019 12:33,  No significant change was found    Referred By: [email or web address]   SELF           Confirmed By:                             Imaging Results          X-Ray Chest AP Portable (Final result)  Result time 03/06/19 13:46:18    Final result by Alyssa Simon MD (03/06/19 13:46:18)                 Impression:      Abnormal chest radiograph.      Electronically signed by: Alyssa Simon MD  Date:    03/06/2019  Time:    13:46             Narrative:    EXAMINATION:  XR CHEST AP PORTABLE    CLINICAL HISTORY:  Chest Pain;    TECHNIQUE:  Single frontal view of the chest was performed.    COMPARISON:  02/25/2019..    FINDINGS:  Vascular stents overlie the left axillary region and medial left upper arm as seen on 02/25/2019.    Today's examination was obtained with the patient in right posterior oblique  position.    Mediastinal structures are midline.  Cardiac silhouette appears enlarged but stable.    There is a small amount of dependent fluid in each pleural space.  Patchy heterogeneous opacity in the right mid and lower lung zones may be due to atelectasis although aspiration or pneumonia could present in a similar fashion.    I detect no convincing evidence of ranjan pulmonary edema and no focal left pulmonary disease.    I detect no pneumothorax, pneumomediastinum, pneumoperitoneum or significant osseous abnormality.                                 Medical Decision Making:   Initial Assessment:   73-year-old female presents with shortness of breath, left-sided chest pain.  Symptoms started this morning.  Chest pain is not new but intermittent.  Workup initiated with labs, EKG, chest x-ray.  EKG notable for atrial fibrillation with rapid ventricular response with a rate 175 beats per minute. I suspect this is causing her to feel short of breath. I will start her on amiodarone for rate control- using this in setting of hypotension..  With regards to the left-sided chest pain, she had a recent evaluation with Cardiology including a stress test as well as an echocardiogram.                   ED Course as of Mar 06 1708   Wed Mar 06, 2019   1341 Heart rate has improved, currently between 96 and 120.  Patient states that she feels improved, her chest pain is improved.  Her blood pressure unfortunately dropped and is currently 88/49.  She is not feeling lightheaded or like she is going to pass out.  I will give her a small bolus and continue the amiodarone drip.  At this time she is pending labs.  [LH]   0841 Patient converted to normal sinus rhythm at approximately 2:15 p.m..  Repeat EKG shows normal sinus rhythm, although the computer read as acute MI, I do not appreciate any ST elevation in this EKG.  I do not appreciate any findings concerning for reciprocal changes.  She does have some T-wave inversions in V2.  I  did discuss this case with Dr. Weiss.  I also discussed this case with Dr. Huerta for admission to the hospital.  [LH]      ED Course User Index  [LH] Ai Perez MD     Clinical Impression:       ICD-10-CM ICD-9-CM   1. Atrial fibrillation with RVR I48.91 427.31   2. Chest pain R07.9 786.50   3. Shortness of breath R06.02 786.05                                Ai Perez MD  03/06/19 1708       Ai Perez MD  03/06/19 1713

## 2019-03-06 NOTE — ASSESSMENT & PLAN NOTE
Patient presented to ER with rapid AFib.  No hx of known AFib.  Probably PAF.  Started on Amiodarone gtt.  Now converted to NSR.  Being admitted to ICU on Amiodarone gtt and Cardiology consult.  Defer anticoagulation and further recommendations to Cards.

## 2019-03-07 LAB
ALLENS TEST: ABNORMAL
ANION GAP SERPL CALC-SCNC: 18 MMOL/L
BASOPHILS # BLD AUTO: 0.02 K/UL
BASOPHILS NFR BLD: 0.2 %
BUN SERPL-MCNC: 69 MG/DL
CALCIUM SERPL-MCNC: 10.7 MG/DL
CHLORIDE SERPL-SCNC: 94 MMOL/L
CO2 SERPL-SCNC: 26 MMOL/L
CREAT SERPL-MCNC: 11 MG/DL
DELSYS: ABNORMAL
DIFFERENTIAL METHOD: ABNORMAL
EOSINOPHIL # BLD AUTO: 0.1 K/UL
EOSINOPHIL NFR BLD: 0.9 %
ERYTHROCYTE [DISTWIDTH] IN BLOOD BY AUTOMATED COUNT: 14.5 %
ERYTHROCYTE [SEDIMENTATION RATE] IN BLOOD BY WESTERGREN METHOD: 3 MM/H
EST. GFR  (AFRICAN AMERICAN): 4 ML/MIN/1.73 M^2
EST. GFR  (NON AFRICAN AMERICAN): 3 ML/MIN/1.73 M^2
ESTIMATED AVG GLUCOSE: 105 MG/DL
GLUCOSE SERPL-MCNC: 93 MG/DL
HBA1C MFR BLD HPLC: 5.3 %
HCO3 UR-SCNC: 21.2 MMOL/L (ref 24–28)
HCT VFR BLD AUTO: 30.8 %
HGB BLD-MCNC: 9.3 G/DL
LYMPHOCYTES # BLD AUTO: 1 K/UL
LYMPHOCYTES NFR BLD: 11.3 %
MCH RBC QN AUTO: 30.2 PG
MCHC RBC AUTO-ENTMCNC: 30.2 G/DL
MCV RBC AUTO: 100 FL
MODE: ABNORMAL
MONOCYTES # BLD AUTO: 1 K/UL
MONOCYTES NFR BLD: 11.8 %
NEUTROPHILS # BLD AUTO: 6.6 K/UL
NEUTROPHILS NFR BLD: 75.8 %
PCO2 BLDA: 39.4 MMHG (ref 35–45)
PH SMN: 7.34 [PH] (ref 7.35–7.45)
PLATELET # BLD AUTO: 265 K/UL
PMV BLD AUTO: 9.7 FL
PO2 BLDA: 134 MMHG (ref 80–100)
POC BE: -4 MMOL/L
POC SATURATED O2: 99 % (ref 95–100)
POC TCO2: 22 MMOL/L (ref 23–27)
POCT GLUCOSE: 100 MG/DL (ref 70–110)
POCT GLUCOSE: 37 MG/DL (ref 70–110)
POCT GLUCOSE: 93 MG/DL (ref 70–110)
POCT GLUCOSE: 93 MG/DL (ref 70–110)
POCT GLUCOSE: 97 MG/DL (ref 70–110)
POTASSIUM SERPL-SCNC: 6 MMOL/L
RBC # BLD AUTO: 3.08 M/UL
SAMPLE: ABNORMAL
SITE: ABNORMAL
SODIUM SERPL-SCNC: 138 MMOL/L
TROPONIN I SERPL DL<=0.01 NG/ML-MCNC: 0.6 NG/ML
TROPONIN I SERPL DL<=0.01 NG/ML-MCNC: 0.66 NG/ML
WBC # BLD AUTO: 8.65 K/UL

## 2019-03-07 PROCEDURE — 36600 WITHDRAWAL OF ARTERIAL BLOOD: CPT

## 2019-03-07 PROCEDURE — 36415 COLL VENOUS BLD VENIPUNCTURE: CPT

## 2019-03-07 PROCEDURE — 25000003 PHARM REV CODE 250: Performed by: EMERGENCY MEDICINE

## 2019-03-07 PROCEDURE — 99900035 HC TECH TIME PER 15 MIN (STAT)

## 2019-03-07 PROCEDURE — 94761 N-INVAS EAR/PLS OXIMETRY MLT: CPT

## 2019-03-07 PROCEDURE — 20000000 HC ICU ROOM

## 2019-03-07 PROCEDURE — 25000003 PHARM REV CODE 250: Performed by: INTERNAL MEDICINE

## 2019-03-07 PROCEDURE — 84484 ASSAY OF TROPONIN QUANT: CPT

## 2019-03-07 PROCEDURE — 25000242 PHARM REV CODE 250 ALT 637 W/ HCPCS: Performed by: INTERNAL MEDICINE

## 2019-03-07 PROCEDURE — 63600175 PHARM REV CODE 636 W HCPCS: Performed by: INTERNAL MEDICINE

## 2019-03-07 PROCEDURE — 86706 HEP B SURFACE ANTIBODY: CPT

## 2019-03-07 PROCEDURE — 63600175 PHARM REV CODE 636 W HCPCS: Performed by: HOSPITALIST

## 2019-03-07 PROCEDURE — 25000003 PHARM REV CODE 250: Performed by: HOSPITALIST

## 2019-03-07 PROCEDURE — 80100014 HC HEMODIALYSIS 1:1

## 2019-03-07 PROCEDURE — 82803 BLOOD GASES ANY COMBINATION: CPT

## 2019-03-07 PROCEDURE — 25000242 PHARM REV CODE 250 ALT 637 W/ HCPCS: Performed by: HOSPITALIST

## 2019-03-07 PROCEDURE — 99233 SBSQ HOSP IP/OBS HIGH 50: CPT | Mod: ,,, | Performed by: INTERNAL MEDICINE

## 2019-03-07 PROCEDURE — 94640 AIRWAY INHALATION TREATMENT: CPT

## 2019-03-07 PROCEDURE — 85025 COMPLETE CBC W/AUTO DIFF WBC: CPT

## 2019-03-07 PROCEDURE — 87340 HEPATITIS B SURFACE AG IA: CPT

## 2019-03-07 PROCEDURE — 80048 BASIC METABOLIC PNL TOTAL CA: CPT

## 2019-03-07 PROCEDURE — 99233 PR SUBSEQUENT HOSPITAL CARE,LEVL III: ICD-10-PCS | Mod: ,,, | Performed by: INTERNAL MEDICINE

## 2019-03-07 RX ORDER — DILTIAZEM HYDROCHLORIDE 5 MG/ML
10 INJECTION INTRAVENOUS ONCE
Status: COMPLETED | OUTPATIENT
Start: 2019-03-07 | End: 2019-03-07

## 2019-03-07 RX ORDER — IPRATROPIUM BROMIDE AND ALBUTEROL SULFATE 2.5; .5 MG/3ML; MG/3ML
3 SOLUTION RESPIRATORY (INHALATION) EVERY 6 HOURS PRN
Status: DISCONTINUED | OUTPATIENT
Start: 2019-03-07 | End: 2019-03-11 | Stop reason: HOSPADM

## 2019-03-07 RX ADMIN — AMIODARONE HYDROCHLORIDE 0.5 MG/MIN: 1.8 INJECTION, SOLUTION INTRAVENOUS at 06:03

## 2019-03-07 RX ADMIN — BUSPIRONE HYDROCHLORIDE 10 MG: 10 TABLET ORAL at 09:03

## 2019-03-07 RX ADMIN — Medication 1 CAPSULE: at 05:03

## 2019-03-07 RX ADMIN — HYDRALAZINE HYDROCHLORIDE 25 MG: 25 TABLET ORAL at 09:03

## 2019-03-07 RX ADMIN — MONTELUKAST SODIUM 10 MG: 10 TABLET, FILM COATED ORAL at 09:03

## 2019-03-07 RX ADMIN — AMIODARONE HYDROCHLORIDE 0.5 MG/MIN: 1.8 INJECTION, SOLUTION INTRAVENOUS at 07:03

## 2019-03-07 RX ADMIN — MIRTAZAPINE 7.5 MG: 7.5 TABLET ORAL at 09:03

## 2019-03-07 RX ADMIN — FLUTICASONE PROPIONATE 50 MCG: 50 SPRAY, METERED NASAL at 10:03

## 2019-03-07 RX ADMIN — LEVALBUTEROL HYDROCHLORIDE 0.63 MG: 0.63 SOLUTION RESPIRATORY (INHALATION) at 01:03

## 2019-03-07 RX ADMIN — FLUTICASONE PROPIONATE 50 MCG: 50 SPRAY, METERED NASAL at 09:03

## 2019-03-07 RX ADMIN — DOXYCYCLINE HYCLATE 100 MG: 100 TABLET, COATED ORAL at 09:03

## 2019-03-07 RX ADMIN — PANTOPRAZOLE SODIUM 40 MG: 40 TABLET, DELAYED RELEASE ORAL at 10:03

## 2019-03-07 RX ADMIN — DILTIAZEM HYDROCHLORIDE 10 MG: 5 INJECTION INTRAVENOUS at 10:03

## 2019-03-07 RX ADMIN — ACETAMINOPHEN 650 MG: 325 TABLET, FILM COATED ORAL at 10:03

## 2019-03-07 RX ADMIN — LEVALBUTEROL HYDROCHLORIDE 0.63 MG: 0.63 SOLUTION RESPIRATORY (INHALATION) at 12:03

## 2019-03-07 RX ADMIN — APIXABAN 2.5 MG: 2.5 TABLET, FILM COATED ORAL at 09:03

## 2019-03-07 RX ADMIN — ONDANSETRON 8 MG: 2 INJECTION INTRAMUSCULAR; INTRAVENOUS at 09:03

## 2019-03-07 RX ADMIN — SEVELAMER CARBONATE 800 MG: 800 TABLET, FILM COATED ORAL at 05:03

## 2019-03-07 RX ADMIN — METOPROLOL SUCCINATE 25 MG: 25 TABLET, EXTENDED RELEASE ORAL at 07:03

## 2019-03-07 RX ADMIN — AMIODARONE HYDROCHLORIDE 400 MG: 200 TABLET ORAL at 09:03

## 2019-03-07 RX ADMIN — IPRATROPIUM BROMIDE AND ALBUTEROL SULFATE 3 ML: .5; 3 SOLUTION RESPIRATORY (INHALATION) at 08:03

## 2019-03-07 RX ADMIN — CETIRIZINE HYDROCHLORIDE 10 MG: 10 TABLET, FILM COATED ORAL at 10:03

## 2019-03-07 RX ADMIN — AMIODARONE HYDROCHLORIDE 150 MG: 1.5 INJECTION, SOLUTION INTRAVENOUS at 08:03

## 2019-03-07 RX ADMIN — APIXABAN 2.5 MG: 2.5 TABLET, FILM COATED ORAL at 10:03

## 2019-03-07 RX ADMIN — AMLODIPINE BESYLATE 10 MG: 5 TABLET ORAL at 07:03

## 2019-03-07 RX ADMIN — ATORVASTATIN CALCIUM 10 MG: 10 TABLET, FILM COATED ORAL at 10:03

## 2019-03-07 RX ADMIN — HYDRALAZINE HYDROCHLORIDE 25 MG: 25 TABLET ORAL at 07:03

## 2019-03-07 RX ADMIN — BUSPIRONE HYDROCHLORIDE 10 MG: 10 TABLET ORAL at 10:03

## 2019-03-07 RX ADMIN — DOXYCYCLINE HYCLATE 100 MG: 100 TABLET, COATED ORAL at 10:03

## 2019-03-07 RX ADMIN — AMIODARONE HYDROCHLORIDE 400 MG: 200 TABLET ORAL at 07:03

## 2019-03-07 NOTE — PLAN OF CARE
Problem: Adult Inpatient Plan of Care  Goal: Plan of Care Review  Outcome: Ongoing (interventions implemented as appropriate)  Patient admitted to ICU with Amio infusing at 0.5 mg/min. Pt belongings sent home with pt . Pt on room air sats 96%. Left upper arm Fistula with bruit and thrill present. Pt has been NS on monitor HR in 70s and afebrile. No complaints of chest pain or SOB. SCDs in place. No falls, injuries, or skin breakdowns.

## 2019-03-07 NOTE — CONSULTS
73 y o AAF with hx of HTN induced ESRD admitted with sudden onset of dyspnea and Afib/RVR     Renal consult requested for dialysis support    Pt had been suffering from URI treated by her Fam Doc with routine rx    Awake alert oriented NAD    Denies CNS ENT GI  RHEUM OR DERM SX  Past Medical History:   Diagnosis Date    Arthritis     COPD (chronic obstructive pulmonary disease)     Diabetes mellitus type II     Dialysis patient     Encounter for blood transfusion     ESRD (end stage renal disease)      Social History     Socioeconomic History    Marital status:      Spouse name: Not on file    Number of children: Not on file    Years of education: Not on file    Highest education level: Not on file   Social Needs    Financial resource strain: Not on file    Food insecurity - worry: Not on file    Food insecurity - inability: Not on file    Transportation needs - medical: Not on file    Transportation needs - non-medical: Not on file   Occupational History    Not on file   Tobacco Use    Smoking status: Former Smoker     Start date: 1/12/1991    Smokeless tobacco: Never Used   Substance and Sexual Activity    Alcohol use: No    Drug use: No    Sexual activity: Not on file   Other Topics Concern    Not on file   Social History Narrative    Not on file     Past Surgical History:   Procedure Laterality Date    AV FISTULA PLACEMENT      TUBAL LIGATION       Family History   Problem Relation Age of Onset    Heart attack Sister     Heart attack Sister     Heart attack Mother        Review of patient's allergies indicates:  No Known Allergies    Current Facility-Administered Medications   Medication    acetaminophen tablet 650 mg    amiodarone 360 mg/200 mL (1.8 mg/mL) infusion    amiodarone tablet 400 mg    apixaban tablet 2.5 mg    atorvastatin tablet 10 mg    busPIRone tablet 10 mg    cetirizine tablet 10 mg    cloNIDine tablet 0.2 mg    dextrose 50% injection 12.5 g     dextrose 50% injection 25 g    doxycycline tablet 100 mg    fluticasone 50 mcg/actuation nasal spray 50 mcg    glucagon (human recombinant) injection 1 mg    glucose chewable tablet 16 g    glucose chewable tablet 24 g    hydrALAZINE tablet 25 mg    insulin aspart U-100 pen 1-10 Units    levalbuterol nebulizer solution 0.63 mg    metoprolol succinate (TOPROL-XL) 24 hr tablet 25 mg    mirtazapine tablet 7.5 mg    montelukast tablet 10 mg    ondansetron injection 8 mg    pantoprazole EC tablet 40 mg    promethazine-codeine 6.25-10 mg/5 ml syrup 5 mL    sevelamer carbonate tablet 800 mg    sodium chloride 0.9% flush 5 mL    vitamin renal formula (B-complex-vitamin c-folic acid) 1 mg per capsule 1 capsule       LABS    Recent Results (from the past 24 hour(s))   CBC auto differential    Collection Time: 03/06/19  1:25 PM   Result Value Ref Range    WBC 11.61 3.90 - 12.70 K/uL    RBC 3.23 (L) 4.00 - 5.40 M/uL    Hemoglobin 9.9 (L) 12.0 - 16.0 g/dL    Hematocrit 32.6 (L) 37.0 - 48.5 %     (H) 82 - 98 fL    MCH 30.7 27.0 - 31.0 pg    MCHC 30.4 (L) 32.0 - 36.0 g/dL    RDW 14.1 11.5 - 14.5 %    Platelets 348 150 - 350 K/uL    MPV 9.8 9.2 - 12.9 fL    Gran # (ANC) 8.5 (H) 1.8 - 7.7 K/uL    Lymph # 1.5 1.0 - 4.8 K/uL    Mono # 1.5 (H) 0.3 - 1.0 K/uL    Eos # 0.1 0.0 - 0.5 K/uL    Baso # 0.02 0.00 - 0.20 K/uL    Gran% 73.1 (H) 38.0 - 73.0 %    Lymph% 12.7 (L) 18.0 - 48.0 %    Mono% 13.1 4.0 - 15.0 %    Eosinophil% 0.9 0.0 - 8.0 %    Basophil% 0.2 0.0 - 1.9 %    Differential Method Automated    Comprehensive metabolic panel    Collection Time: 03/06/19  1:25 PM   Result Value Ref Range    Sodium 139 136 - 145 mmol/L    Potassium 5.3 (H) 3.5 - 5.1 mmol/L    Chloride 96 95 - 110 mmol/L    CO2 25 23 - 29 mmol/L    Glucose 117 (H) 70 - 110 mg/dL    BUN, Bld 56 (H) 8 - 23 mg/dL    Creatinine 10.2 (H) 0.5 - 1.4 mg/dL    Calcium 10.6 (H) 8.7 - 10.5 mg/dL    Total Protein 7.6 6.0 - 8.4 g/dL    Albumin 3.2 (L) 3.5  - 5.2 g/dL    Total Bilirubin 0.5 0.1 - 1.0 mg/dL    Alkaline Phosphatase 75 55 - 135 U/L    AST 14 10 - 40 U/L    ALT 7 (L) 10 - 44 U/L    Anion Gap 18 (H) 8 - 16 mmol/L    eGFR if African American 4 (A) >60 mL/min/1.73 m^2    eGFR if non African American 3 (A) >60 mL/min/1.73 m^2   Troponin I #1    Collection Time: 03/06/19  1:25 PM   Result Value Ref Range    Troponin I 0.443 (H) 0.000 - 0.026 ng/mL   B-Type natriuretic peptide (BNP)    Collection Time: 03/06/19  1:25 PM   Result Value Ref Range     (H) 0 - 99 pg/mL   TSH    Collection Time: 03/06/19  1:25 PM   Result Value Ref Range    TSH 3.708 0.400 - 4.000 uIU/mL   Magnesium    Collection Time: 03/06/19  1:25 PM   Result Value Ref Range    Magnesium 2.0 1.6 - 2.6 mg/dL   Phosphorus    Collection Time: 03/06/19  1:25 PM   Result Value Ref Range    Phosphorus 7.5 (H) 2.7 - 4.5 mg/dL   POCT Glucose, Hand-Held Device    Collection Time: 03/06/19  5:33 PM   Result Value Ref Range    POC Glucose 131 (A) 70 - 110 MG/DL   Troponin I    Collection Time: 03/06/19  5:59 PM   Result Value Ref Range    Troponin I 0.473 (H) 0.000 - 0.026 ng/mL   Hemoglobin A1c if not done in past 3 months    Collection Time: 03/06/19  5:59 PM   Result Value Ref Range    Hemoglobin A1C 5.3 4.0 - 5.6 %    Estimated Avg Glucose 105 68 - 131 mg/dL   POCT glucose    Collection Time: 03/06/19  9:30 PM   Result Value Ref Range    POCT Glucose 108 70 - 110 mg/dL   Troponin I    Collection Time: 03/06/19 11:44 PM   Result Value Ref Range    Troponin I 0.596 (H) 0.000 - 0.026 ng/mL   CBC auto differential    Collection Time: 03/07/19  6:47 AM   Result Value Ref Range    WBC 8.65 3.90 - 12.70 K/uL    RBC 3.08 (L) 4.00 - 5.40 M/uL    Hemoglobin 9.3 (L) 12.0 - 16.0 g/dL    Hematocrit 30.8 (L) 37.0 - 48.5 %     (H) 82 - 98 fL    MCH 30.2 27.0 - 31.0 pg    MCHC 30.2 (L) 32.0 - 36.0 g/dL    RDW 14.5 11.5 - 14.5 %    Platelets 265 150 - 350 K/uL    MPV 9.7 9.2 - 12.9 fL    Gran # (ANC) 6.6  1.8 - 7.7 K/uL    Lymph # 1.0 1.0 - 4.8 K/uL    Mono # 1.0 0.3 - 1.0 K/uL    Eos # 0.1 0.0 - 0.5 K/uL    Baso # 0.02 0.00 - 0.20 K/uL    Gran% 75.8 (H) 38.0 - 73.0 %    Lymph% 11.3 (L) 18.0 - 48.0 %    Mono% 11.8 4.0 - 15.0 %    Eosinophil% 0.9 0.0 - 8.0 %    Basophil% 0.2 0.0 - 1.9 %    Differential Method Automated    Basic metabolic panel    Collection Time: 03/07/19  6:47 AM   Result Value Ref Range    Sodium 138 136 - 145 mmol/L    Potassium 6.0 (H) 3.5 - 5.1 mmol/L    Chloride 94 (L) 95 - 110 mmol/L    CO2 26 23 - 29 mmol/L    Glucose 93 70 - 110 mg/dL    BUN, Bld 69 (H) 8 - 23 mg/dL    Creatinine 11.0 (H) 0.5 - 1.4 mg/dL    Calcium 10.7 (H) 8.7 - 10.5 mg/dL    Anion Gap 18 (H) 8 - 16 mmol/L    eGFR if African American 4 (A) >60 mL/min/1.73 m^2    eGFR if non African American 3 (A) >60 mL/min/1.73 m^2   Troponin I    Collection Time: 03/07/19  6:47 AM   Result Value Ref Range    Troponin I 0.655 (H) 0.000 - 0.026 ng/mL   POCT glucose    Collection Time: 03/07/19  7:10 AM   Result Value Ref Range    POCT Glucose 93 70 - 110 mg/dL   ]    I/O last 3 completed shifts:  In: 956.9 [I.V.:356.9; IV Piggyback:600]  Out: -     Vitals:    03/07/19 0630 03/07/19 0645 03/07/19 0700 03/07/19 0800   BP: (!) 169/74  (!) 184/77    Pulse: 74 75 76 (!) 138   Resp: 20 20 20    Temp:   98 °F (36.7 °C)    TempSrc:   Oral    SpO2: 95% 95% 95%    Weight:       Height:           No Jvd, Thyromegaly or Lymphadenopathy  Lungs: Fairly clear anteriorly and laterally  Cor: RRR no G or rubs  Abd: Soft benign good bowel sounds non tender  Ext: No E C C L arm avf with good P-B-T    A)  ESRD 2nd DM/HTN  Hx of hypertension now hypotension  ? PNA R base  Afib RVR  Anemia of ckd  2nd hypeprth  COPD  DM    P)    Renal diet when able  Protect access  Short HD  Antibiotx to cover CAP  Consider  Maintain hydration  Avoid nephrotoxic medications  Adjust all medications to the degree of renal function  Hold bp meds

## 2019-03-07 NOTE — SUBJECTIVE & OBJECTIVE
Past Medical History:   Diagnosis Date    Arthritis     COPD (chronic obstructive pulmonary disease)     Diabetes mellitus type II     Dialysis patient     Encounter for blood transfusion     ESRD (end stage renal disease)        Past Surgical History:   Procedure Laterality Date    AV FISTULA PLACEMENT      TUBAL LIGATION         Review of patient's allergies indicates:  No Known Allergies    No current facility-administered medications on file prior to encounter.      Current Outpatient Medications on File Prior to Encounter   Medication Sig    albuterol (ACCUNEB) 1.25 mg/3 mL Nebu Take 3 mLs (1.25 mg total) by nebulization every 6 (six) hours as needed.    albuterol-ipratropium (DUO-NEB) 2.5 mg-0.5 mg/3 mL nebulizer solution Take 3 mLs by nebulization every 6 (six) hours as needed for Wheezing or Shortness of Breath. Rescue    amLODIPine (NORVASC) 10 MG tablet TAKE 1 TABLET (10 MG TOTAL) BY MOUTH ONCE DAILY.    atorvastatin (LIPITOR) 10 MG tablet Take 1 tablet (10 mg total) by mouth once daily.    busPIRone (BUSPAR) 10 MG tablet Take 10 mg by mouth 2 (two) times daily.    cloNIDine (CATAPRES) 0.2 MG tablet Take 0.2 mg by mouth.    diclofenac sodium (VOLTAREN) 1 % Gel Apply 4 g topically.    doxycycline (VIBRAMYCIN) 100 MG Cap Take 1 capsule (100 mg total) by mouth every 12 (twelve) hours. for 10 days    epoetin cat (PROCRIT) 3,000 unit/mL injection Inject 3,000 Units into the skin.    fluticasone (FLONASE) 50 mcg/actuation nasal spray 1 spray by Each Nare route 2 (two) times daily.    FOLIC ACID/VITAMIN B COMP W-C (RENAL CAPS ORAL) Take by mouth Daily.    gentamicin sulfate, PF, 60 mg/6 mL Soln Inject into the vein.    guaiFENesin (MUCINEX) 1,200 mg Ta12 Take 1 tablet by mouth 2 (two) times daily. for 14 days    guaifenesin-codeine 100-10 mg/5 ml (CHERATUSSIN AC)  mg/5 mL syrup Take 5 mLs by mouth every 12 (twelve) hours as needed for Cough or Congestion.    hydrALAZINE (APRESOLINE)  25 MG tablet Take 1 tablet (25 mg total) by mouth every 12 (twelve) hours.    HYDROcodone-acetaminophen (NORCO) 7.5-325 mg per tablet Take 1 tablet by mouth every 8 (eight) hours as needed for Pain.    [START ON 3/14/2019] HYDROcodone-acetaminophen (NORCO) 7.5-325 mg per tablet Take 1 tablet by mouth every 8 (eight) hours as needed for Pain.    [START ON 4/13/2019] HYDROcodone-acetaminophen (NORCO) 7.5-325 mg per tablet Take 1 tablet by mouth every 8 (eight) hours as needed for Pain.    hydrOXYzine pamoate (VISTARIL) 25 MG Cap Take 1 capsule (25 mg total) by mouth every 8 (eight) hours as needed (panic attack).    levocetirizine (XYZAL) 5 MG tablet Take 1 tablet (5 mg total) by mouth every evening.    meclizine (ANTIVERT) 25 mg tablet Take 1 tablet (25 mg total) by mouth 3 (three) times daily as needed.    metoprolol succinate (TOPROL-XL) 25 MG 24 hr tablet Take 25 mg by mouth.    mirtazapine (REMERON) 7.5 MG Tab Take 7.5 mg by mouth nightly.     montelukast (SINGULAIR) 10 mg tablet Take 1 tablet (10 mg total) by mouth every evening.    pantoprazole (PROTONIX) 40 MG tablet Take 40 mg by mouth.    paroxetine (PAXIL) 30 MG tablet 1 TABLET IN THE MORNING ONCE A DAY ORALLY 90    predniSONE (DELTASONE) 10 MG tablet 2 tab po qday x 4 days, then 1 tab po qday x 4 days, then 1/2 tab po qday x 4 days    promethazine (PHENERGAN) 12.5 MG Tab Take 12.5 mg by mouth every 6 (six) hours as needed.    SENSIPAR 60 mg Tab Take 30 mg by mouth daily with breakfast.     sevelamer carbonate (RENVELA) 800 mg Tab Take 800 mg by mouth 3 (three) times daily with meals.    SODIUM BICARBONATE ORAL Take 650 mg by mouth.    traZODone (DESYREL) 50 MG tablet Take 1 tablet (50 mg total) by mouth nightly as needed for Insomnia.    VENTOLIN HFA 90 mcg/actuation inhaler INHALE 2 PUFFS INTO LUNGS EVERY 4 HOURS AS NEEDED FOR SHORTNESS OF BREATH OR WHEEZING. RESCUE    vit B,C-iron fum-FA-D3-zinc ox 8 mg iron-800 mcg-1,000 unit Tab Take  by mouth.    vitamin renal formula, B-complex-vitamin c-folic acid, (NEPHROCAP) 1 mg Cap Take by mouth.    vortioxetine (BRINTELLIX) 5 mg Tab Take 1 tablet by mouth.     Family History     Problem Relation (Age of Onset)    Heart attack Sister, Sister, Mother        Tobacco Use    Smoking status: Former Smoker     Start date: 1/12/1991    Smokeless tobacco: Never Used   Substance and Sexual Activity    Alcohol use: No    Drug use: No    Sexual activity: Not on file     Review of Systems   Gastrointestinal: Negative for melena.   Genitourinary: Negative for hematuria.     Objective:     Vital Signs (Most Recent):  Temp: 98.2 °F (36.8 °C) (03/07/19 1100)  Pulse: 66 (03/07/19 1200)  Resp: (!) 22 (03/07/19 1200)  BP: (!) 149/67 (03/07/19 1200)  SpO2: 97 % (03/07/19 1200) Vital Signs (24h Range):  Temp:  [97.5 °F (36.4 °C)-98.5 °F (36.9 °C)] 98.2 °F (36.8 °C)  Pulse:  [] 66  Resp:  [16-26] 22  SpO2:  [90 %-100 %] 97 %  BP: ()/(49-87) 149/67     Weight: 60.9 kg (134 lb 4.2 oz)  Body mass index is 23.05 kg/m².    SpO2: 97 %  O2 Device (Oxygen Therapy): nasal cannula      Intake/Output Summary (Last 24 hours) at 3/7/2019 1225  Last data filed at 3/7/2019 1200  Gross per 24 hour   Intake 1154.36 ml   Output --   Net 1154.36 ml       Lines/Drains/Airways     Central Venous Catheter Line                 Hemodialysis Catheter -- days          Drain                 Hemodialysis AV Fistula 03/06/19 2339 Left upper arm less than 1 day          Peripheral Intravenous Line                 External Jugular IV 03/06/19 1300 less than 1 day         Peripheral IV - Single Lumen 03/06/19 1544 Right Hand less than 1 day              Exam essentially unchanged vs 3/6/19  Physical Exam   Constitutional: She is oriented to person, place, and time. She appears well-developed and well-nourished. No distress.   HENT:   Head: Normocephalic and atraumatic.   Mouth/Throat: No oropharyngeal exudate.   Eyes: Conjunctivae and EOM  are normal. Pupils are equal, round, and reactive to light. No scleral icterus.   Neck: Normal range of motion. Neck supple. No JVD present. No tracheal deviation present. No thyromegaly present.   Cardiovascular: Normal rate, regular rhythm, S1 normal and S2 normal.  Occasional extrasystoles are present. Exam reveals no gallop and no friction rub.   Murmur heard.   Systolic murmur is present with a grade of 2/6.  Pulmonary/Chest: Effort normal. No respiratory distress. She has no wheezes. She has rales. She exhibits no tenderness.   Abdominal: Soft. She exhibits no distension.   Musculoskeletal: Normal range of motion. She exhibits no edema.   Neurological: She is alert and oriented to person, place, and time. No cranial nerve deficit.   Skin: Skin is warm and dry. She is not diaphoretic.   Psychiatric: She has a normal mood and affect. Her behavior is normal. Judgment normal.       Current Medications:   amiodarone  400 mg Oral BID    apixaban  2.5 mg Oral BID    atorvastatin  10 mg Oral Daily    busPIRone  10 mg Oral BID    cetirizine  10 mg Oral Daily    doxycycline  100 mg Oral Q12H    fluticasone  1 spray Each Nare BID    hydrALAZINE  25 mg Oral Q12H    levalbuterol  0.63 mg Nebulization Q8H    metoprolol succinate  25 mg Oral Daily    mirtazapine  7.5 mg Oral Nightly    montelukast  10 mg Oral QHS    pantoprazole  40 mg Oral Daily    sevelamer carbonate  800 mg Oral TID WM    vitamin renal formula (B-complex-vitamin c-folic acid)  1 capsule Oral Daily      amiodarone in dextrose 5% 0.5 mg/min (03/07/19 1200)     acetaminophen, cloNIDine, dextrose 50%, dextrose 50%, glucagon (human recombinant), glucose, glucose, insulin aspart U-100, ondansetron, promethazine-codeine 6.25-10 mg/5 ml, sodium chloride 0.9%    Laboratory (all labs reviewed):  CBC:  Recent Labs   Lab 11/03/17  1115 01/09/18  2020 09/28/18  1010 03/06/19  1325 03/07/19  0647   WHITE BLOOD CELL COUNT 2.50 L 3.76 L 3.04 L 11.61 8.65    HEMOGLOBIN 11.9 L 9.0 L 10.2 L 9.9 L 9.3 L   HEMATOCRIT 38.0 29.5 L 32.1 L 32.6 L 30.8 L   PLATELETS 159 201 158 348 265       CHEMISTRIES:  Recent Labs   Lab 02/26/17  1508 07/12/17  0110  07/13/17  0559 01/09/18 2020 09/28/18  1010 03/06/19  1325 03/07/19  0647   GLUCOSE 101 99   < > 71 87 82 117 H 93   SODIUM 140 139   < > 138 138 140 139 138   POTASSIUM 3.3 L 4.5   < > 4.9 3.8 5.2 H 5.3 H 6.0 H   BUN BLD 13 29 H   < > 42 H 20 38 H 56 H 69 H   CREATININE 6.5 H 7.7 H   < > 10.2 H 5.0 H 7.1 H 10.2 H 11.0 H   EGFR IF  7 A 6 A   < > 4 A 9 A 6.1 A 4 A 4 A   EGFR IF NON- 6 A 5 A   < > 3 A 8 A 5.3 A 3 A 3 A   CALCIUM 10.6 H 10.1   < > 10.4 9.5 9.5 10.6 H 10.7 H   MAGNESIUM 1.9 1.8  --   --   --   --  2.0  --     < > = values in this interval not displayed.       CARDIAC BIOMARKERS:  Recent Labs   Lab 02/03/19  1842 03/06/19  1325 03/06/19  1759 03/06/19  2344 03/07/19  0647   TROPONIN I 0.036 H 0.443 H 0.473 H 0.596 H 0.655 H       COAGS:  Recent Labs   Lab 02/26/17  1508 01/09/18 2020   INR 1.1 1.1       LIPIDS/LFTS:  Recent Labs   Lab 02/26/17  1508 07/12/17  0110 07/13/17  0559 01/09/18 2020 09/28/18  1010 03/06/19  1325   CHOLESTEROL  --   --  208 H  --  215 H  --    TRIGLYCERIDES  --   --  73  --  49  --    HDL  --   --  76 H  --  109 H  --    LDL CHOLESTEROL  --   --  117.4  --  96.2  --    NON-HDL CHOLESTEROL  --   --  132  --  106  --    AST 15 27  --  18 25 14   ALT 10 29  --  7 L 10 7 L       BNP:  Recent Labs   Lab 02/26/17  1508 07/12/17  0110 01/09/18 2020 03/06/19  1325   BNP 3,789 H >4,900 H 4,023 H 636 H       TSH:  Recent Labs   Lab 02/26/17  1508 03/06/19  1325   TSH 2.386 3.708       Free T4:        Diagnostic Results:  ECG (personally reviewed and interpreted tracing(s)):  3/6/19 1235   3/6/19 1417 SR 95, PRWP/low volt, NSSTTW changes, similar to 2/25/19    Chest X-Ray (personally reviewed and interpreted image(s)): 3/6/19 ?mild CHF, similar to  2/25/19    Echo: 2/4/19  · Normal left ventricular systolic function. The estimated ejection fraction is 70%  · Concentric left ventricular hypertrophy.  · Grade II (moderate) left ventricular diastolic dysfunction consistent with pseudonormalization.  · Moderate left atrial enlargement.  · Normal right ventricular systolic function.  · Moderate-to-severe mitral regurgitation.  · Mild tricuspid regurgitation.  · The estimated PA systolic pressure is 44 mm Hg  · Pulmonary hypertension present.    Stress Test: L MPI 2/4/19  · There is a mild, infarct defect(s) in the lateral apical wall(s),  · An ejection fraction of 72 % at rest  · The EKG portion of this study is negative for myocardial ischemia.

## 2019-03-07 NOTE — CONSULTS
[facility]  Cardiology  Consult Note    Patient Name: Eli Vaz  MRN: [id number]  Admission Date: 3/6/2019  Hospital Length of Stay: 0 days  Code Status: Full Code   Attending Provider: Ai Perez MD   Consulting Provider: Isrrael Weiss MD  Primary Care Physician: Chiara Nelson MD  Principal Problem:Atrial fibrillation with RVR    Patient information was obtained from patient and ER records.     Inpatient consult to Cardiology  Consult performed by: Isrrael Weiss MD  Consult ordered by: Ai Perez MD  Reason for consult: AF/RVR        Subjective:     Chief Complaint:  SOB/palps/CP     HPI:   73-year-old female with history of end-stage renal disease, COPD, diabetes presents with shortness of breath.  She states that symptoms started around 6:00 a.m. this morning.  She also complains of left-sided chest pressure she states that this is not associated with the shortness of breath, she states that she has been evaluated in multiple ERs before for this chest pressure.  It is left-sided, nonradiating.  No recent fever but she does reports cough and congestion.  No abdominal pain, nausea or vomiting. No previous episodes of abnormal heart rhythms, no palpitations.  Last dialysis was 2 days ago via left upper extremity AV fistula.    Pt follows with Dr. Garcia, last seen 2/13/19.  Recent MPI with small infarct and echo with preserved LVEF with mod-sev MR.    The patient is a very pleasant 73-year-old  woman who is seen with her  in the emergency room.  She presents to the emergency room with complaints of reproducible left inframammary pain, as well as dyspnea and palpitations.  Symptoms appear to have begun approximately 24 hr ago.  She reports a prior history of atrial fibrillation in the distant past, but was not on any anticoagulants.  She does have a prior history of stroke.  She on arrival to the emergency room, she is noted to be in atrial  fibrillation with a rapid ventricular response.  She was initiated on amiodarone with apparent subsequent conversion to sinus rhythm, but then recurrence of the atrial fibrillation.  She was in atrial fibrillation during my examination, with note made that her amiodarone infusion appear to be occluded for the alarm on the pump.  When the pump was restarted, she subsequently converted back to sinus rhythm.  In regards to the chest discomfort, this appears to be associated with cough without fever.  It was very reproducible on my examination.  She otherwise denies syncope, PND, orthopnea, or lower extremity edema.  There has been no melena, hematuria, or claudicant symptoms.    Past Medical History:   Diagnosis Date    Arthritis     COPD (chronic obstructive pulmonary disease)     Diabetes mellitus type II     Dialysis patient     Encounter for blood transfusion     ESRD (end stage renal disease)        Past Surgical History:   Procedure Laterality Date    AV FISTULA PLACEMENT      TUBAL LIGATION         Review of patient's allergies indicates:  No Known Allergies    No current facility-administered medications on file prior to encounter.      Current Outpatient Medications on File Prior to Encounter   Medication Sig    albuterol (ACCUNEB) 1.25 mg/3 mL Nebu Take 3 mLs (1.25 mg total) by nebulization every 6 (six) hours as needed.    albuterol-ipratropium (DUO-NEB) 2.5 mg-0.5 mg/3 mL nebulizer solution Take 3 mLs by nebulization every 6 (six) hours as needed for Wheezing or Shortness of Breath. Rescue    amLODIPine (NORVASC) 10 MG tablet TAKE 1 TABLET (10 MG TOTAL) BY MOUTH ONCE DAILY.    atorvastatin (LIPITOR) 10 MG tablet Take 1 tablet (10 mg total) by mouth once daily.    busPIRone (BUSPAR) 10 MG tablet Take 10 mg by mouth 2 (two) times daily.    cloNIDine (CATAPRES) 0.2 MG tablet Take 0.2 mg by mouth.    diclofenac sodium (VOLTAREN) 1 % Gel Apply 4 g topically.    doxycycline (VIBRAMYCIN) 100 MG Cap  Take 1 capsule (100 mg total) by mouth every 12 (twelve) hours. for 10 days    epoetin cat (PROCRIT) 3,000 unit/mL injection Inject 3,000 Units into the skin.    fluticasone (FLONASE) 50 mcg/actuation nasal spray 1 spray by Each Nare route 2 (two) times daily.    FOLIC ACID/VITAMIN B COMP W-C (RENAL CAPS ORAL) Take by mouth Daily.    gentamicin sulfate, PF, 60 mg/6 mL Soln Inject into the vein.    guaiFENesin (MUCINEX) 1,200 mg Ta12 Take 1 tablet by mouth 2 (two) times daily. for 14 days    guaifenesin-codeine 100-10 mg/5 ml (CHERATUSSIN AC)  mg/5 mL syrup Take 5 mLs by mouth every 12 (twelve) hours as needed for Cough or Congestion.    hydrALAZINE (APRESOLINE) 25 MG tablet Take 1 tablet (25 mg total) by mouth every 12 (twelve) hours.    HYDROcodone-acetaminophen (NORCO) 7.5-325 mg per tablet Take 1 tablet by mouth every 8 (eight) hours as needed for Pain.    [START ON 3/14/2019] HYDROcodone-acetaminophen (NORCO) 7.5-325 mg per tablet Take 1 tablet by mouth every 8 (eight) hours as needed for Pain.    [START ON 4/13/2019] HYDROcodone-acetaminophen (NORCO) 7.5-325 mg per tablet Take 1 tablet by mouth every 8 (eight) hours as needed for Pain.    hydrOXYzine pamoate (VISTARIL) 25 MG Cap Take 1 capsule (25 mg total) by mouth every 8 (eight) hours as needed (panic attack).    levocetirizine (XYZAL) 5 MG tablet Take 1 tablet (5 mg total) by mouth every evening.    meclizine (ANTIVERT) 25 mg tablet Take 1 tablet (25 mg total) by mouth 3 (three) times daily as needed.    metoprolol succinate (TOPROL-XL) 25 MG 24 hr tablet Take 25 mg by mouth.    mirtazapine (REMERON) 7.5 MG Tab Take 7.5 mg by mouth nightly.     montelukast (SINGULAIR) 10 mg tablet Take 1 tablet (10 mg total) by mouth every evening.    pantoprazole (PROTONIX) 40 MG tablet Take 40 mg by mouth.    paroxetine (PAXIL) 30 MG tablet 1 TABLET IN THE MORNING ONCE A DAY ORALLY 90    predniSONE (DELTASONE) 10 MG tablet 2 tab po qday x 4 days,  then 1 tab po qday x 4 days, then 1/2 tab po qday x 4 days    promethazine (PHENERGAN) 12.5 MG Tab Take 12.5 mg by mouth every 6 (six) hours as needed.    SENSIPAR 60 mg Tab Take 30 mg by mouth daily with breakfast.     sevelamer carbonate (RENVELA) 800 mg Tab Take 800 mg by mouth 3 (three) times daily with meals.    SODIUM BICARBONATE ORAL Take 650 mg by mouth.    traZODone (DESYREL) 50 MG tablet Take 1 tablet (50 mg total) by mouth nightly as needed for Insomnia.    VENTOLIN HFA 90 mcg/actuation inhaler INHALE 2 PUFFS INTO LUNGS EVERY 4 HOURS AS NEEDED FOR SHORTNESS OF BREATH OR WHEEZING. RESCUE    vit B,C-iron fum-FA-D3-zinc ox 8 mg iron-800 mcg-1,000 unit Tab Take by mouth.    vitamin renal formula, B-complex-vitamin c-folic acid, (NEPHROCAP) 1 mg Cap Take by mouth.    vortioxetine (BRINTELLIX) 5 mg Tab Take 1 tablet by mouth.     Family History     Problem Relation (Age of Onset)    Heart attack Sister, Sister, Mother        Tobacco Use    Smoking status: Former Smoker     Start date: 1/12/1991    Smokeless tobacco: Never Used   Substance and Sexual Activity    Alcohol use: No    Drug use: No    Sexual activity: Not on file     Review of Systems   Constitution: Negative for chills, diaphoresis, fever, weakness and malaise/fatigue.   HENT: Negative for nosebleeds.    Eyes: Negative for blurred vision and double vision.   Cardiovascular: Positive for chest pain (reproducible) and palpitations. Negative for claudication, cyanosis, dyspnea on exertion, leg swelling, orthopnea, paroxysmal nocturnal dyspnea and syncope.   Respiratory: Positive for cough and shortness of breath. Negative for wheezing.    Skin: Negative for dry skin and poor wound healing.   Musculoskeletal: Negative for back pain, joint swelling and myalgias.   Gastrointestinal: Negative for abdominal pain, nausea and vomiting.   Genitourinary: Negative for hematuria.   Neurological: Negative for dizziness, headaches, numbness and  seizures.   Psychiatric/Behavioral: Negative for altered mental status and depression.     Objective:     Vital Signs (Most Recent):  Temp: 98 °F (36.7 °C) (03/06/19 1737)  Pulse: 68 (03/06/19 1902)  Resp: 18 (03/06/19 1737)  BP: (!) 109/53 (03/06/19 1902)  SpO2: 96 % (03/06/19 1902) Vital Signs (24h Range):  Temp:  [97.7 °F (36.5 °C)-98 °F (36.7 °C)] 98 °F (36.7 °C)  Pulse:  [] 68  Resp:  [16-24] 18  SpO2:  [96 %-98 %] 96 %  BP: ()/(49-65) 109/53     Weight: 60.8 kg (134 lb)  Body mass index is 23 kg/m².    SpO2: 96 %  O2 Device (Oxygen Therapy): room air      Intake/Output Summary (Last 24 hours) at 3/6/2019 2013  Last data filed at 3/6/2019 1834  Gross per 24 hour   Intake 766 ml   Output --   Net 766 ml       Lines/Drains/Airways     Central Venous Catheter Line                 Hemodialysis Catheter -- days          Peripheral Intravenous Line                 External Jugular IV 03/06/19 1300 less than 1 day         Peripheral IV - Single Lumen 03/06/19 1544 Right Hand less than 1 day                Physical Exam   Constitutional: She is oriented to person, place, and time. She appears well-developed and well-nourished. No distress.   HENT:   Head: Normocephalic and atraumatic.   Mouth/Throat: No oropharyngeal exudate.   Eyes: Conjunctivae and EOM are normal. Pupils are equal, round, and reactive to light. No scleral icterus.   Neck: Normal range of motion. Neck supple. No JVD present. No tracheal deviation present. No thyromegaly present.   Cardiovascular: Normal rate, regular rhythm, S1 normal and S2 normal. Exam reveals no gallop and no friction rub.   Murmur heard.   Systolic murmur is present with a grade of 2/6.  Pulmonary/Chest: Effort normal. No respiratory distress. She has no wheezes. She has rales. She exhibits no tenderness.   Abdominal: Soft. She exhibits no distension.   Musculoskeletal: Normal range of motion. She exhibits no edema.   Neurological: She is alert and oriented to person,  place, and time. No cranial nerve deficit.   Skin: Skin is warm and dry. She is not diaphoretic.   Psychiatric: She has a normal mood and affect. Her behavior is normal. Judgment normal.       Current Medications:   [START ON 3/7/2019] amLODIPine  10 mg Oral Daily    aspirin  325 mg Oral ED 1 Time    [START ON 3/7/2019] atorvastatin  10 mg Oral Daily    busPIRone  10 mg Oral BID    [START ON 3/7/2019] cetirizine  10 mg Oral Daily    doxycycline  100 mg Oral Q12H    fluticasone  1 spray Each Nare BID    hydrALAZINE  25 mg Oral Q12H    [START ON 3/7/2019] levalbuterol  0.63 mg Nebulization Q8H    [START ON 3/7/2019] metoprolol succinate  25 mg Oral Daily    mirtazapine  7.5 mg Oral Nightly    montelukast  10 mg Oral QHS    [START ON 3/7/2019] pantoprazole  40 mg Oral Daily    sevelamer carbonate  800 mg Oral TID WM    vitamin renal formula (B-complex-vitamin c-folic acid)  1 capsule Oral Daily      amiodarone in dextrose 5% 0.5 mg/min (03/06/19 1834)     acetaminophen, cloNIDine, dextrose 50%, dextrose 50%, glucagon (human recombinant), glucose, glucose, insulin aspart U-100, ondansetron, promethazine-codeine 6.25-10 mg/5 ml, sodium chloride 0.9%    Laboratory (all labs reviewed):  CBC:  Recent Labs   Lab 07/13/17  0559 11/03/17  1115 01/09/18 2020 09/28/18  1010 03/06/19  1325   WHITE BLOOD CELL COUNT 2.57 L 2.50 L 3.76 L 3.04 L 11.61   HEMOGLOBIN 11.1 L 11.9 L 9.0 L 10.2 L 9.9 L   HEMATOCRIT 35.8 L 38.0 29.5 L 32.1 L 32.6 L   PLATELETS 142 L 159 201 158 348       CHEMISTRIES:  Recent Labs   Lab 02/26/17  1508 07/12/17  0110 07/12/17  0733 07/13/17  0559 01/09/18 2020 09/28/18  1010 03/06/19  1325   GLUCOSE 101 99 93 71 87 82 117 H   SODIUM 140 139 138 138 138 140 139   POTASSIUM 3.3 L 4.5 4.8 4.9 3.8 5.2 H 5.3 H   BUN BLD 13 29 H 34 H 42 H 20 38 H 56 H   CREATININE 6.5 H 7.7 H 8.2 H 10.2 H 5.0 H 7.1 H 10.2 H   EGFR IF  7 A 6 A 5 A 4 A 9 A 6.1 A 4 A   EGFR IF NON- 6 A  5 A 4 A 3 A 8 A 5.3 A 3 A   CALCIUM 10.6 H 10.1 10.1 10.4 9.5 9.5 10.6 H   MAGNESIUM 1.9 1.8  --   --   --   --  2.0       CARDIAC BIOMARKERS:  Recent Labs   Lab 01/09/18 2020 02/03/19  1219 02/03/19  1842 03/06/19  1325 03/06/19  1759   TROPONIN I 0.045 H 0.020 0.036 H 0.443 H 0.473 H       COAGS:  Recent Labs   Lab 02/26/17  1508 01/09/18 2020   INR 1.1 1.1       LIPIDS/LFTS:  Recent Labs   Lab 02/26/17  1508 07/12/17  0110 07/13/17  0559 01/09/18 2020 09/28/18  1010 03/06/19  1325   CHOLESTEROL  --   --  208 H  --  215 H  --    TRIGLYCERIDES  --   --  73  --  49  --    HDL  --   --  76 H  --  109 H  --    LDL CHOLESTEROL  --   --  117.4  --  96.2  --    NON-HDL CHOLESTEROL  --   --  132  --  106  --    AST 15 27  --  18 25 14   ALT 10 29  --  7 L 10 7 L       BNP:  Recent Labs   Lab 02/26/17  1508 07/12/17  0110 01/09/18 2020 03/06/19  1325   BNP 3,789 H >4,900 H 4,023 H 636 H       TSH:  Recent Labs   Lab 02/26/17  1508 03/06/19  1325   TSH 2.386 3.708       Free T4:        Diagnostic Results:  ECG (personally reviewed and interpreted tracing(s)):  3/6/19 1235   3/6/19 1417 SR 95, PRWP/low volt, NSSTTW changes, similar to 2/25/19    Chest X-Ray (personally reviewed and interpreted image(s)): 3/6/19 ?mild CHF, similar to 2/25/19    Echo: 2/4/19  · Normal left ventricular systolic function. The estimated ejection fraction is 70%  · Concentric left ventricular hypertrophy.  · Grade II (moderate) left ventricular diastolic dysfunction consistent with pseudonormalization.  · Moderate left atrial enlargement.  · Normal right ventricular systolic function.  · Moderate-to-severe mitral regurgitation.  · Mild tricuspid regurgitation.  · The estimated PA systolic pressure is 44 mm Hg  · Pulmonary hypertension present.    Stress Test: L MPI 2/4/19  · There is a mild, infarct defect(s) in the lateral apical wall(s),  · An ejection fraction of 72 % at rest  · The EKG portion of this study is negative for myocardial  ischemia.    Assessment and Plan:     * Atrial fibrillation with RVR    In and out of AF/RVR in ER on amio gtt, currently in SR  CHADSVASC at least 5  Cont amio gtt, transition to PO in am  Start eliquis 2.5mg bid (borderline for 5mg bid given weight 61kg)  Keep NPO in case DCCV required for recurrent PAF (if pt in SR at 7am on 3/7/19, OK to order breakfast).  No need to repeat echo or plan for isch eval.     Chest pain    Reproducible on my exam  Minimal trop in setting of CKD/ESRD and AF/RVR, doubt ACS     ESRD on dialysis    Per IM/neph     Diabetes mellitus with ESRD (end-stage renal disease)    Per IM     Combined hyperlipidemia associated with type 2 diabetes mellitus    Cont statin     Hypertension    Cont med rx     Non-rheumatic mitral regurgitation    Mod-severe by echo 2/2019  LVEF preserved         VTE Risk Mitigation (From admission, onward)        Ordered     IP VTE HIGH RISK PATIENT  Once      03/06/19 1635     Place sequential compression device  Until discontinued      03/06/19 1635          Thank you for your consult. I will follow-up with patient. Please contact us if you have any additional questions.    Isrrael Weiss MD  Cardiology   [facility]

## 2019-03-07 NOTE — SUBJECTIVE & OBJECTIVE
Past Medical History:   Diagnosis Date    Arthritis     COPD (chronic obstructive pulmonary disease)     Diabetes mellitus type II     Dialysis patient     Encounter for blood transfusion     ESRD (end stage renal disease)        Past Surgical History:   Procedure Laterality Date    AV FISTULA PLACEMENT      TUBAL LIGATION         Review of patient's allergies indicates:  No Known Allergies    No current facility-administered medications on file prior to encounter.      Current Outpatient Medications on File Prior to Encounter   Medication Sig    albuterol (ACCUNEB) 1.25 mg/3 mL Nebu Take 3 mLs (1.25 mg total) by nebulization every 6 (six) hours as needed.    albuterol-ipratropium (DUO-NEB) 2.5 mg-0.5 mg/3 mL nebulizer solution Take 3 mLs by nebulization every 6 (six) hours as needed for Wheezing or Shortness of Breath. Rescue    amLODIPine (NORVASC) 10 MG tablet TAKE 1 TABLET (10 MG TOTAL) BY MOUTH ONCE DAILY.    atorvastatin (LIPITOR) 10 MG tablet Take 1 tablet (10 mg total) by mouth once daily.    busPIRone (BUSPAR) 10 MG tablet Take 10 mg by mouth 2 (two) times daily.    cloNIDine (CATAPRES) 0.2 MG tablet Take 0.2 mg by mouth.    diclofenac sodium (VOLTAREN) 1 % Gel Apply 4 g topically.    doxycycline (VIBRAMYCIN) 100 MG Cap Take 1 capsule (100 mg total) by mouth every 12 (twelve) hours. for 10 days    epoetin cat (PROCRIT) 3,000 unit/mL injection Inject 3,000 Units into the skin.    fluticasone (FLONASE) 50 mcg/actuation nasal spray 1 spray by Each Nare route 2 (two) times daily.    FOLIC ACID/VITAMIN B COMP W-C (RENAL CAPS ORAL) Take by mouth Daily.    gentamicin sulfate, PF, 60 mg/6 mL Soln Inject into the vein.    guaiFENesin (MUCINEX) 1,200 mg Ta12 Take 1 tablet by mouth 2 (two) times daily. for 14 days    guaifenesin-codeine 100-10 mg/5 ml (CHERATUSSIN AC)  mg/5 mL syrup Take 5 mLs by mouth every 12 (twelve) hours as needed for Cough or Congestion.    hydrALAZINE (APRESOLINE)  25 MG tablet Take 1 tablet (25 mg total) by mouth every 12 (twelve) hours.    HYDROcodone-acetaminophen (NORCO) 7.5-325 mg per tablet Take 1 tablet by mouth every 8 (eight) hours as needed for Pain.    [START ON 3/14/2019] HYDROcodone-acetaminophen (NORCO) 7.5-325 mg per tablet Take 1 tablet by mouth every 8 (eight) hours as needed for Pain.    [START ON 4/13/2019] HYDROcodone-acetaminophen (NORCO) 7.5-325 mg per tablet Take 1 tablet by mouth every 8 (eight) hours as needed for Pain.    hydrOXYzine pamoate (VISTARIL) 25 MG Cap Take 1 capsule (25 mg total) by mouth every 8 (eight) hours as needed (panic attack).    levocetirizine (XYZAL) 5 MG tablet Take 1 tablet (5 mg total) by mouth every evening.    meclizine (ANTIVERT) 25 mg tablet Take 1 tablet (25 mg total) by mouth 3 (three) times daily as needed.    metoprolol succinate (TOPROL-XL) 25 MG 24 hr tablet Take 25 mg by mouth.    mirtazapine (REMERON) 7.5 MG Tab Take 7.5 mg by mouth nightly.     montelukast (SINGULAIR) 10 mg tablet Take 1 tablet (10 mg total) by mouth every evening.    pantoprazole (PROTONIX) 40 MG tablet Take 40 mg by mouth.    paroxetine (PAXIL) 30 MG tablet 1 TABLET IN THE MORNING ONCE A DAY ORALLY 90    predniSONE (DELTASONE) 10 MG tablet 2 tab po qday x 4 days, then 1 tab po qday x 4 days, then 1/2 tab po qday x 4 days    promethazine (PHENERGAN) 12.5 MG Tab Take 12.5 mg by mouth every 6 (six) hours as needed.    SENSIPAR 60 mg Tab Take 30 mg by mouth daily with breakfast.     sevelamer carbonate (RENVELA) 800 mg Tab Take 800 mg by mouth 3 (three) times daily with meals.    SODIUM BICARBONATE ORAL Take 650 mg by mouth.    traZODone (DESYREL) 50 MG tablet Take 1 tablet (50 mg total) by mouth nightly as needed for Insomnia.    VENTOLIN HFA 90 mcg/actuation inhaler INHALE 2 PUFFS INTO LUNGS EVERY 4 HOURS AS NEEDED FOR SHORTNESS OF BREATH OR WHEEZING. RESCUE    vit B,C-iron fum-FA-D3-zinc ox 8 mg iron-800 mcg-1,000 unit Tab Take  by mouth.    vitamin renal formula, B-complex-vitamin c-folic acid, (NEPHROCAP) 1 mg Cap Take by mouth.    vortioxetine (BRINTELLIX) 5 mg Tab Take 1 tablet by mouth.     Family History     Problem Relation (Age of Onset)    Heart attack Sister, Sister, Mother        Tobacco Use    Smoking status: Former Smoker     Start date: 1/12/1991    Smokeless tobacco: Never Used   Substance and Sexual Activity    Alcohol use: No    Drug use: No    Sexual activity: Not on file     Review of Systems   Constitution: Negative for chills, diaphoresis, fever, weakness and malaise/fatigue.   HENT: Negative for nosebleeds.    Eyes: Negative for blurred vision and double vision.   Cardiovascular: Positive for chest pain (reproducible) and palpitations. Negative for claudication, cyanosis, dyspnea on exertion, leg swelling, orthopnea, paroxysmal nocturnal dyspnea and syncope.   Respiratory: Positive for cough and shortness of breath. Negative for wheezing.    Skin: Negative for dry skin and poor wound healing.   Musculoskeletal: Negative for back pain, joint swelling and myalgias.   Gastrointestinal: Negative for abdominal pain, nausea and vomiting.   Genitourinary: Negative for hematuria.   Neurological: Negative for dizziness, headaches, numbness and seizures.   Psychiatric/Behavioral: Negative for altered mental status and depression.     Objective:     Vital Signs (Most Recent):  Temp: 98 °F (36.7 °C) (03/06/19 1737)  Pulse: 68 (03/06/19 1902)  Resp: 18 (03/06/19 1737)  BP: (!) 109/53 (03/06/19 1902)  SpO2: 96 % (03/06/19 1902) Vital Signs (24h Range):  Temp:  [97.7 °F (36.5 °C)-98 °F (36.7 °C)] 98 °F (36.7 °C)  Pulse:  [] 68  Resp:  [16-24] 18  SpO2:  [96 %-98 %] 96 %  BP: ()/(49-65) 109/53     Weight: 60.8 kg (134 lb)  Body mass index is 23 kg/m².    SpO2: 96 %  O2 Device (Oxygen Therapy): room air      Intake/Output Summary (Last 24 hours) at 3/6/2019 2013  Last data filed at 3/6/2019 1834  Gross per 24 hour    Intake 766 ml   Output --   Net 766 ml       Lines/Drains/Airways     Central Venous Catheter Line                 Hemodialysis Catheter -- days          Peripheral Intravenous Line                 External Jugular IV 03/06/19 1300 less than 1 day         Peripheral IV - Single Lumen 03/06/19 1544 Right Hand less than 1 day                Physical Exam   Constitutional: She is oriented to person, place, and time. She appears well-developed and well-nourished. No distress.   HENT:   Head: Normocephalic and atraumatic.   Mouth/Throat: No oropharyngeal exudate.   Eyes: Conjunctivae and EOM are normal. Pupils are equal, round, and reactive to light. No scleral icterus.   Neck: Normal range of motion. Neck supple. No JVD present. No tracheal deviation present. No thyromegaly present.   Cardiovascular: Normal rate, regular rhythm, S1 normal and S2 normal. Exam reveals no gallop and no friction rub.   Murmur heard.   Systolic murmur is present with a grade of 2/6.  Pulmonary/Chest: Effort normal. No respiratory distress. She has no wheezes. She has rales. She exhibits no tenderness.   Abdominal: Soft. She exhibits no distension.   Musculoskeletal: Normal range of motion. She exhibits no edema.   Neurological: She is alert and oriented to person, place, and time. No cranial nerve deficit.   Skin: Skin is warm and dry. She is not diaphoretic.   Psychiatric: She has a normal mood and affect. Her behavior is normal. Judgment normal.       Current Medications:   [START ON 3/7/2019] amLODIPine  10 mg Oral Daily    aspirin  325 mg Oral ED 1 Time    [START ON 3/7/2019] atorvastatin  10 mg Oral Daily    busPIRone  10 mg Oral BID    [START ON 3/7/2019] cetirizine  10 mg Oral Daily    doxycycline  100 mg Oral Q12H    fluticasone  1 spray Each Nare BID    hydrALAZINE  25 mg Oral Q12H    [START ON 3/7/2019] levalbuterol  0.63 mg Nebulization Q8H    [START ON 3/7/2019] metoprolol succinate  25 mg Oral Daily    mirtazapine   7.5 mg Oral Nightly    montelukast  10 mg Oral QHS    [START ON 3/7/2019] pantoprazole  40 mg Oral Daily    sevelamer carbonate  800 mg Oral TID WM    vitamin renal formula (B-complex-vitamin c-folic acid)  1 capsule Oral Daily      amiodarone in dextrose 5% 0.5 mg/min (03/06/19 1834)     acetaminophen, cloNIDine, dextrose 50%, dextrose 50%, glucagon (human recombinant), glucose, glucose, insulin aspart U-100, ondansetron, promethazine-codeine 6.25-10 mg/5 ml, sodium chloride 0.9%    Laboratory (all labs reviewed):  CBC:  Recent Labs   Lab 07/13/17  0559 11/03/17  1115 01/09/18 2020 09/28/18  1010 03/06/19  1325   WHITE BLOOD CELL COUNT 2.57 L 2.50 L 3.76 L 3.04 L 11.61   HEMOGLOBIN 11.1 L 11.9 L 9.0 L 10.2 L 9.9 L   HEMATOCRIT 35.8 L 38.0 29.5 L 32.1 L 32.6 L   PLATELETS 142 L 159 201 158 348       CHEMISTRIES:  Recent Labs   Lab 02/26/17  1508 07/12/17  0110 07/12/17  0733 07/13/17  0559 01/09/18 2020 09/28/18  1010 03/06/19  1325   GLUCOSE 101 99 93 71 87 82 117 H   SODIUM 140 139 138 138 138 140 139   POTASSIUM 3.3 L 4.5 4.8 4.9 3.8 5.2 H 5.3 H   BUN BLD 13 29 H 34 H 42 H 20 38 H 56 H   CREATININE 6.5 H 7.7 H 8.2 H 10.2 H 5.0 H 7.1 H 10.2 H   EGFR IF  7 A 6 A 5 A 4 A 9 A 6.1 A 4 A   EGFR IF NON- 6 A 5 A 4 A 3 A 8 A 5.3 A 3 A   CALCIUM 10.6 H 10.1 10.1 10.4 9.5 9.5 10.6 H   MAGNESIUM 1.9 1.8  --   --   --   --  2.0       CARDIAC BIOMARKERS:  Recent Labs   Lab 01/09/18 2020 02/03/19  1219 02/03/19  1842 03/06/19  1325 03/06/19  1759   TROPONIN I 0.045 H 0.020 0.036 H 0.443 H 0.473 H       COAGS:  Recent Labs   Lab 02/26/17  1508 01/09/18 2020   INR 1.1 1.1       LIPIDS/LFTS:  Recent Labs   Lab 02/26/17  1508 07/12/17  0110 07/13/17  0559 01/09/18 2020 09/28/18  1010 03/06/19  1325   CHOLESTEROL  --   --  208 H  --  215 H  --    TRIGLYCERIDES  --   --  73  --  49  --    HDL  --   --  76 H  --  109 H  --    LDL CHOLESTEROL  --   --  117.4  --  96.2  --    NON-HDL CHOLESTEROL   --   --  132  --  106  --    AST 15 27  --  18 25 14   ALT 10 29  --  7 L 10 7 L       BNP:  Recent Labs   Lab 02/26/17  1508 07/12/17  0110 01/09/18 2020 03/06/19  1325   BNP 3,789 H >4,900 H 4,023 H 636 H       TSH:  Recent Labs   Lab 02/26/17  1508 03/06/19  1325   TSH 2.386 3.708       Free T4:        Diagnostic Results:  ECG (personally reviewed and interpreted tracing(s)):  3/6/19 1235   3/6/19 1417 SR 95, PRWP/low volt, NSSTTW changes, similar to 2/25/19    Chest X-Ray (personally reviewed and interpreted image(s)): 3/6/19 ?mild CHF, similar to 2/25/19    Echo: 2/4/19  · Normal left ventricular systolic function. The estimated ejection fraction is 70%  · Concentric left ventricular hypertrophy.  · Grade II (moderate) left ventricular diastolic dysfunction consistent with pseudonormalization.  · Moderate left atrial enlargement.  · Normal right ventricular systolic function.  · Moderate-to-severe mitral regurgitation.  · Mild tricuspid regurgitation.  · The estimated PA systolic pressure is 44 mm Hg  · Pulmonary hypertension present.    Stress Test: L MPI 2/4/19  · There is a mild, infarct defect(s) in the lateral apical wall(s),  · An ejection fraction of 72 % at rest  · The EKG portion of this study is negative for myocardial ischemia.

## 2019-03-07 NOTE — ASSESSMENT & PLAN NOTE
In and out of AF/RVR in ER on amio gtt, currently in SR  CHADSVASC at least 5  Cont amio gtt, transition to PO in am  Start eliquis 2.5mg bid (borderline for 5mg bid given weight 61kg)  Keep NPO in case DCCV required for recurrent PAF (if pt in SR at 7am on 3/7/19, OK to order breakfast).  No need to repeat echo or plan for isch eval.

## 2019-03-07 NOTE — PLAN OF CARE
Problem: Adult Inpatient Plan of Care  Goal: Plan of Care Review  Outcome: Ongoing (interventions implemented as appropriate)  Pt remains in ICU on amiodarone drip. Pt became sob this am and went into afib with rvr. Dr Weiss and Dr Lucero both notified. Pt converted after amio bolus. She has remained in NSR with PAC's today.

## 2019-03-07 NOTE — PLAN OF CARE
03/07/19 1556   Discharge Assessment   Assessment Type Discharge Planning Assessment   Confirmed/corrected address and phone number on facesheet? Yes   Assessment information obtained from? Patient   Prior to hospitilization cognitive status: Alert/Oriented   Prior to hospitalization functional status: Independent   Current cognitive status: Unable to Assess   Facility Arrived From: home   Able to Return to Prior Arrangements yes   Is patient able to care for self after discharge? Unable to determine at this time (comments)   Who are your caregiver(s) and their phone number(s)? Veronica [phone]   Patient's perception of discharge disposition home or selfcare   Readmission Within the Last 30 Days no previous admission in last 30 days   Patient currently being followed by outpatient case management? No   Patient currently receives any other outside agency services? No   Equipment Currently Used at Home walker, rolling;nebulizer   Do you have any problems affording any of your prescribed medications? No   Is the patient taking medications as prescribed? yes   Does the patient have transportation home? Yes   Transportation Anticipated car, drives self;family or friend will provide   Dialysis Name and Scheduled days [facility] in [place] T/TH/S   Does the patient receive services at the [facility]? No   Discharge Plan A Home with family   Discharge Plan B Home with family;Home Health   DME Needed Upon Discharge  none   Patient/Family in Agreement with Plan yes       SW met with pt and pt's family at bedside. SW explained her role in Care Management. Pt voiced understanding. SW inquired about HELP AT HOME. Pt stated that he/she will have Kan at home to help for support. SW voiced understanding. SW inquired about responsibilities when it comes to  MANAGING HER/HIS HEALTH at home and what it entails. Pt inquired about details. SW informed pt of RESPONSIBILITIES of:    1. Follow up appointments  2.  "Getting Prescriptions filled  3. Taking medications as prescribed.     Pt voiced understanding.  SW explained "My Health Packet" blue folder and the pink and green tabs that are on the folder as well. Discharge Brochure given to pt with Care Team information. Pt voiced understanding.     Pt's pharmacy:   [facility] #[id number] - [place] - [id number]  [address] [place] [address]  Phone: [phone] Fax: [phone]      Pt's preference for appointments:      "

## 2019-03-07 NOTE — HOSPITAL COURSE
3/6/19: adm to ICU with PAF/RVR, in and out of AF overnight  3/7/19: still with PAF despite amio gtt and rebolus    Interval Hx: pt seen in ICU, case d/w Dr. Lucero.  Minimal sob, no cp.  No palps.   at bedside.    Tele: NSR 60 (personally reviewed and interpreted)

## 2019-03-07 NOTE — NURSING
@ 0715 pt c/o sob, 2L NC applied. Pt went back into afib. Morning meds given early with a sip of water. 12 lead EKG done and notified Dr Lucero, who is present on floor. Pt remains on amio drip.

## 2019-03-07 NOTE — NURSING
0753-notified Dr Weiss pt back in Afib. New orders received. L EJ iv is patent with blood return present.

## 2019-03-07 NOTE — PROGRESS NOTES
[facility]  Cardiology  Progress Note    Patient Name: Eli Vaz  MRN: [id number]  Admission Date: 3/6/2019  Hospital Length of Stay: 1 days  Code Status: Full Code   Attending Physician: Carmelita Candelaria MD   Primary Care Physician: Chiara Nelson MD  Expected Discharge Date:   Principal Problem:Atrial fibrillation with RVR    Subjective:     Hospital Course:   3/6/19: adm to ICU with PAF/RVR, in and out of AF overnight    Interval Hx: pt seen in ICU, case d/w RN.  Feeling better.  Still a little winded, no cp/palps/LH/dizziness.  HD to start shortly.    Tele: PAF->NSR with PACs (personally reviewed and interpreted)      Past Medical History:   Diagnosis Date    Arthritis     COPD (chronic obstructive pulmonary disease)     Diabetes mellitus type II     Dialysis patient     Encounter for blood transfusion     ESRD (end stage renal disease)        Past Surgical History:   Procedure Laterality Date    AV FISTULA PLACEMENT      TUBAL LIGATION         Review of patient's allergies indicates:  No Known Allergies    No current facility-administered medications on file prior to encounter.      Current Outpatient Medications on File Prior to Encounter   Medication Sig    albuterol (ACCUNEB) 1.25 mg/3 mL Nebu Take 3 mLs (1.25 mg total) by nebulization every 6 (six) hours as needed.    albuterol-ipratropium (DUO-NEB) 2.5 mg-0.5 mg/3 mL nebulizer solution Take 3 mLs by nebulization every 6 (six) hours as needed for Wheezing or Shortness of Breath. Rescue    amLODIPine (NORVASC) 10 MG tablet TAKE 1 TABLET (10 MG TOTAL) BY MOUTH ONCE DAILY.    atorvastatin (LIPITOR) 10 MG tablet Take 1 tablet (10 mg total) by mouth once daily.    busPIRone (BUSPAR) 10 MG tablet Take 10 mg by mouth 2 (two) times daily.    cloNIDine (CATAPRES) 0.2 MG tablet Take 0.2 mg by mouth.    diclofenac sodium (VOLTAREN) 1 % Gel Apply 4 g topically.    doxycycline (VIBRAMYCIN) 100 MG Cap Take 1 capsule (100 mg total) by  mouth every 12 (twelve) hours. for 10 days    epoetin cat (PROCRIT) 3,000 unit/mL injection Inject 3,000 Units into the skin.    fluticasone (FLONASE) 50 mcg/actuation nasal spray 1 spray by Each Nare route 2 (two) times daily.    FOLIC ACID/VITAMIN B COMP W-C (RENAL CAPS ORAL) Take by mouth Daily.    gentamicin sulfate, PF, 60 mg/6 mL Soln Inject into the vein.    guaiFENesin (MUCINEX) 1,200 mg Ta12 Take 1 tablet by mouth 2 (two) times daily. for 14 days    guaifenesin-codeine 100-10 mg/5 ml (CHERATUSSIN AC)  mg/5 mL syrup Take 5 mLs by mouth every 12 (twelve) hours as needed for Cough or Congestion.    hydrALAZINE (APRESOLINE) 25 MG tablet Take 1 tablet (25 mg total) by mouth every 12 (twelve) hours.    HYDROcodone-acetaminophen (NORCO) 7.5-325 mg per tablet Take 1 tablet by mouth every 8 (eight) hours as needed for Pain.    [START ON 3/14/2019] HYDROcodone-acetaminophen (NORCO) 7.5-325 mg per tablet Take 1 tablet by mouth every 8 (eight) hours as needed for Pain.    [START ON 4/13/2019] HYDROcodone-acetaminophen (NORCO) 7.5-325 mg per tablet Take 1 tablet by mouth every 8 (eight) hours as needed for Pain.    hydrOXYzine pamoate (VISTARIL) 25 MG Cap Take 1 capsule (25 mg total) by mouth every 8 (eight) hours as needed (panic attack).    levocetirizine (XYZAL) 5 MG tablet Take 1 tablet (5 mg total) by mouth every evening.    meclizine (ANTIVERT) 25 mg tablet Take 1 tablet (25 mg total) by mouth 3 (three) times daily as needed.    metoprolol succinate (TOPROL-XL) 25 MG 24 hr tablet Take 25 mg by mouth.    mirtazapine (REMERON) 7.5 MG Tab Take 7.5 mg by mouth nightly.     montelukast (SINGULAIR) 10 mg tablet Take 1 tablet (10 mg total) by mouth every evening.    pantoprazole (PROTONIX) 40 MG tablet Take 40 mg by mouth.    paroxetine (PAXIL) 30 MG tablet 1 TABLET IN THE MORNING ONCE A DAY ORALLY 90    predniSONE (DELTASONE) 10 MG tablet 2 tab po qday x 4 days, then 1 tab po qday x 4 days,  then 1/2 tab po qday x 4 days    promethazine (PHENERGAN) 12.5 MG Tab Take 12.5 mg by mouth every 6 (six) hours as needed.    SENSIPAR 60 mg Tab Take 30 mg by mouth daily with breakfast.     sevelamer carbonate (RENVELA) 800 mg Tab Take 800 mg by mouth 3 (three) times daily with meals.    SODIUM BICARBONATE ORAL Take 650 mg by mouth.    traZODone (DESYREL) 50 MG tablet Take 1 tablet (50 mg total) by mouth nightly as needed for Insomnia.    VENTOLIN HFA 90 mcg/actuation inhaler INHALE 2 PUFFS INTO LUNGS EVERY 4 HOURS AS NEEDED FOR SHORTNESS OF BREATH OR WHEEZING. RESCUE    vit B,C-iron fum-FA-D3-zinc ox 8 mg iron-800 mcg-1,000 unit Tab Take by mouth.    vitamin renal formula, B-complex-vitamin c-folic acid, (NEPHROCAP) 1 mg Cap Take by mouth.    vortioxetine (BRINTELLIX) 5 mg Tab Take 1 tablet by mouth.     Family History     Problem Relation (Age of Onset)    Heart attack Sister, Sister, Mother        Tobacco Use    Smoking status: Former Smoker     Start date: 1/12/1991    Smokeless tobacco: Never Used   Substance and Sexual Activity    Alcohol use: No    Drug use: No    Sexual activity: Not on file     Review of Systems   Gastrointestinal: Negative for melena.   Genitourinary: Negative for hematuria.     Objective:     Vital Signs (Most Recent):  Temp: 98.2 °F (36.8 °C) (03/07/19 1100)  Pulse: 66 (03/07/19 1200)  Resp: (!) 22 (03/07/19 1200)  BP: (!) 149/67 (03/07/19 1200)  SpO2: 97 % (03/07/19 1200) Vital Signs (24h Range):  Temp:  [97.5 °F (36.4 °C)-98.5 °F (36.9 °C)] 98.2 °F (36.8 °C)  Pulse:  [] 66  Resp:  [16-26] 22  SpO2:  [90 %-100 %] 97 %  BP: ()/(49-87) 149/67     Weight: 60.9 kg (134 lb 4.2 oz)  Body mass index is 23.05 kg/m².    SpO2: 97 %  O2 Device (Oxygen Therapy): nasal cannula      Intake/Output Summary (Last 24 hours) at 3/7/2019 1225  Last data filed at 3/7/2019 1200  Gross per 24 hour   Intake 1154.36 ml   Output --   Net 1154.36 ml       Lines/Drains/Airways      Central Venous Catheter Line                 Hemodialysis Catheter -- days          Drain                 Hemodialysis AV Fistula 03/06/19 2339 Left upper arm less than 1 day          Peripheral Intravenous Line                 External Jugular IV 03/06/19 1300 less than 1 day         Peripheral IV - Single Lumen 03/06/19 1544 Right Hand less than 1 day              Exam essentially unchanged vs 3/6/19  Physical Exam   Constitutional: She is oriented to person, place, and time. She appears well-developed and well-nourished. No distress.   HENT:   Head: Normocephalic and atraumatic.   Mouth/Throat: No oropharyngeal exudate.   Eyes: Conjunctivae and EOM are normal. Pupils are equal, round, and reactive to light. No scleral icterus.   Neck: Normal range of motion. Neck supple. No JVD present. No tracheal deviation present. No thyromegaly present.   Cardiovascular: Normal rate, regular rhythm, S1 normal and S2 normal.  Occasional extrasystoles are present. Exam reveals no gallop and no friction rub.   Murmur heard.   Systolic murmur is present with a grade of 2/6.  Pulmonary/Chest: Effort normal. No respiratory distress. She has no wheezes. She has rales. She exhibits no tenderness.   Abdominal: Soft. She exhibits no distension.   Musculoskeletal: Normal range of motion. She exhibits no edema.   Neurological: She is alert and oriented to person, place, and time. No cranial nerve deficit.   Skin: Skin is warm and dry. She is not diaphoretic.   Psychiatric: She has a normal mood and affect. Her behavior is normal. Judgment normal.       Current Medications:   amiodarone  400 mg Oral BID    apixaban  2.5 mg Oral BID    atorvastatin  10 mg Oral Daily    busPIRone  10 mg Oral BID    cetirizine  10 mg Oral Daily    doxycycline  100 mg Oral Q12H    fluticasone  1 spray Each Nare BID    hydrALAZINE  25 mg Oral Q12H    levalbuterol  0.63 mg Nebulization Q8H    metoprolol succinate  25 mg Oral Daily    mirtazapine  7.5  mg Oral Nightly    montelukast  10 mg Oral QHS    pantoprazole  40 mg Oral Daily    sevelamer carbonate  800 mg Oral TID WM    vitamin renal formula (B-complex-vitamin c-folic acid)  1 capsule Oral Daily      amiodarone in dextrose 5% 0.5 mg/min (03/07/19 1200)     acetaminophen, cloNIDine, dextrose 50%, dextrose 50%, glucagon (human recombinant), glucose, glucose, insulin aspart U-100, ondansetron, promethazine-codeine 6.25-10 mg/5 ml, sodium chloride 0.9%    Laboratory (all labs reviewed):  CBC:  Recent Labs   Lab 11/03/17  1115 01/09/18 2020 09/28/18  1010 03/06/19  1325 03/07/19  0647   WHITE BLOOD CELL COUNT 2.50 L 3.76 L 3.04 L 11.61 8.65   HEMOGLOBIN 11.9 L 9.0 L 10.2 L 9.9 L 9.3 L   HEMATOCRIT 38.0 29.5 L 32.1 L 32.6 L 30.8 L   PLATELETS 159 201 158 348 265       CHEMISTRIES:  Recent Labs   Lab 02/26/17  1508 07/12/17  0110  07/13/17  0559 01/09/18 2020 09/28/18  1010 03/06/19  1325 03/07/19  0647   GLUCOSE 101 99   < > 71 87 82 117 H 93   SODIUM 140 139   < > 138 138 140 139 138   POTASSIUM 3.3 L 4.5   < > 4.9 3.8 5.2 H 5.3 H 6.0 H   BUN BLD 13 29 H   < > 42 H 20 38 H 56 H 69 H   CREATININE 6.5 H 7.7 H   < > 10.2 H 5.0 H 7.1 H 10.2 H 11.0 H   EGFR IF  7 A 6 A   < > 4 A 9 A 6.1 A 4 A 4 A   EGFR IF NON- 6 A 5 A   < > 3 A 8 A 5.3 A 3 A 3 A   CALCIUM 10.6 H 10.1   < > 10.4 9.5 9.5 10.6 H 10.7 H   MAGNESIUM 1.9 1.8  --   --   --   --  2.0  --     < > = values in this interval not displayed.       CARDIAC BIOMARKERS:  Recent Labs   Lab 02/03/19  1842 03/06/19  1325 03/06/19  1759 03/06/19  2344 03/07/19  0647   TROPONIN I 0.036 H 0.443 H 0.473 H 0.596 H 0.655 H       COAGS:  Recent Labs   Lab 02/26/17  1508 01/09/18 2020   INR 1.1 1.1       LIPIDS/LFTS:  Recent Labs   Lab 02/26/17  1508 07/12/17  0110 07/13/17  0559 01/09/18 2020 09/28/18  1010 03/06/19  1325   CHOLESTEROL  --   --  208 H  --  215 H  --    TRIGLYCERIDES  --   --  73  --  49  --    HDL  --   --  76 H  --   109 H  --    LDL CHOLESTEROL  --   --  117.4  --  96.2  --    NON-HDL CHOLESTEROL  --   --  132  --  106  --    AST 15 27  --  18 25 14   ALT 10 29  --  7 L 10 7 L       BNP:  Recent Labs   Lab 02/26/17  1508 07/12/17  0110 01/09/18 2020 03/06/19  1325   BNP 3,789 H >4,900 H 4,023 H 636 H       TSH:  Recent Labs   Lab 02/26/17  1508 03/06/19  1325   TSH 2.386 3.708       Free T4:        Diagnostic Results:  ECG (personally reviewed and interpreted tracing(s)):  3/6/19 1235   3/6/19 1417 SR 95, PRWP/low volt, NSSTTW changes, similar to 2/25/19    Chest X-Ray (personally reviewed and interpreted image(s)): 3/6/19 ?mild CHF, similar to 2/25/19    Echo: 2/4/19  · Normal left ventricular systolic function. The estimated ejection fraction is 70%  · Concentric left ventricular hypertrophy.  · Grade II (moderate) left ventricular diastolic dysfunction consistent with pseudonormalization.  · Moderate left atrial enlargement.  · Normal right ventricular systolic function.  · Moderate-to-severe mitral regurgitation.  · Mild tricuspid regurgitation.  · The estimated PA systolic pressure is 44 mm Hg  · Pulmonary hypertension present.    Stress Test: L MPI 2/4/19  · There is a mild, infarct defect(s) in the lateral apical wall(s),  · An ejection fraction of 72 % at rest  · The EKG portion of this study is negative for myocardial ischemia.    Assessment and Plan:     * Atrial fibrillation with RVR    In and out of PAF in ICU overnight, currently in SR.  CHADSVASC at least 5  Cont amio gtt (rebolused this am) with concurrent PO  Cont eliquis 2.5mg bid (borderline for 5mg bid given weight 61kg)  No need to repeat echo or plan for isch eval.     Chest pain    Resolved  Minimal trop in setting of CKD/ESRD and AF/RVR, doubt ACS     ESRD on dialysis    Per IM/neph     Diabetes mellitus with ESRD (end-stage renal disease)    Per IM     Combined hyperlipidemia associated with type 2 diabetes mellitus    Cont statin     Hypertension     Cont med rx     Non-rheumatic mitral regurgitation    Mod-severe by echo 2/2019  LVEF preserved         VTE Risk Mitigation (From admission, onward)        Ordered     apixaban tablet 2.5 mg  2 times daily      03/06/19 2042     IP VTE HIGH RISK PATIENT  Once      03/06/19 1635     Place sequential compression device  Until discontinued      03/06/19 1635          Isrrael Weiss MD  Cardiology  [facility]

## 2019-03-07 NOTE — SUBJECTIVE & OBJECTIVE
Interval History: SOB and AFib with RVR this AM. Converted to NSR and no longer SOB. Has no complaints currently.     Review of Systems   Respiratory: Negative.    Cardiovascular: Negative.    Gastrointestinal: Negative.      Objective:     Vital Signs (Most Recent):  Temp: 97.6 °F (36.4 °C) (03/07/19 1500)  Pulse: 71 (03/07/19 1700)  Resp: 18 (03/07/19 1700)  BP: (!) 163/72 (03/07/19 1700)  SpO2: ( unable to obtain sats, pt in no distress, breathing unlabor) (03/07/19 1700) Vital Signs (24h Range):  Temp:  [97.5 °F (36.4 °C)-98.5 °F (36.9 °C)] 97.6 °F (36.4 °C)  Pulse:  [] 71  Resp:  [16-26] 18  SpO2:  [90 %-100 %] 95 %  BP: ()/(52-87) 163/72     Weight: 60.9 kg (134 lb 4.2 oz)  Body mass index is 23.05 kg/m².    Intake/Output Summary (Last 24 hours) at 3/7/2019 1759  Last data filed at 3/7/2019 1700  Gross per 24 hour   Intake 637.86 ml   Output --   Net 637.86 ml      Physical Exam   Constitutional: She is oriented to person, place, and time. She appears well-developed. No distress.   Eyes: Right eye exhibits no discharge. Left eye exhibits no discharge.   Cardiovascular: Normal rate and regular rhythm.   Pulmonary/Chest: Effort normal. No respiratory distress. She has no wheezes. She has no rales.   Abdominal: Soft. Bowel sounds are normal.   Musculoskeletal: She exhibits no deformity.   Neurological: She is alert and oriented to person, place, and time.   Skin: Skin is warm and dry. She is not diaphoretic.   Psychiatric: She has a normal mood and affect. Her behavior is normal. Judgment and thought content normal.   Nursing note and vitals reviewed.      Significant Labs: All pertinent labs within the past 24 hours have been reviewed.    Significant Imaging: I have reviewed all pertinent imaging results/findings within the past 24 hours.  I have reviewed and interpreted all pertinent imaging results/findings within the past 24 hours.

## 2019-03-07 NOTE — HPI
73-year-old female with history of end-stage renal disease, COPD, diabetes presents with shortness of breath.  She states that symptoms started around 6:00 a.m. this morning.  She also complains of left-sided chest pressure she states that this is not associated with the shortness of breath, she states that she has been evaluated in multiple ERs before for this chest pressure.  It is left-sided, nonradiating.  No recent fever but she does reports cough and congestion.  No abdominal pain, nausea or vomiting. No previous episodes of abnormal heart rhythms, no palpitations.  Last dialysis was 2 days ago via left upper extremity AV fistula.    Pt follows with Dr. Garcia, last seen 2/13/19.  Recent MPI with small infarct and echo with preserved LVEF with mod-sev MR.    The patient is a very pleasant 73-year-old  woman who is seen with her  in the emergency room.  She presents to the emergency room with complaints of reproducible left inframammary pain, as well as dyspnea and palpitations.  Symptoms appear to have begun approximately 24 hr ago.  She reports a prior history of atrial fibrillation in the distant past, but was not on any anticoagulants.  She does have a prior history of stroke.  She on arrival to the emergency room, she is noted to be in atrial fibrillation with a rapid ventricular response.  She was initiated on amiodarone with apparent subsequent conversion to sinus rhythm, but then recurrence of the atrial fibrillation.  She was in atrial fibrillation during my examination, with note made that her amiodarone infusion appear to be occluded for the alarm on the pump.  When the pump was restarted, she subsequently converted back to sinus rhythm.  In regards to the chest discomfort, this appears to be associated with cough without fever.  It was very reproducible on my examination.  She otherwise denies syncope, PND, orthopnea, or lower extremity edema.  There has been no melena,  hematuria, or claudicant symptoms.

## 2019-03-07 NOTE — ASSESSMENT & PLAN NOTE
In and out of PAF in ICU overnight, currently in SR.  CHADSVASC at least 5  Cont amio gtt (rebolused this am) with concurrent PO  Cont eliquis 2.5mg bid (borderline for 5mg bid given weight 61kg)  No need to repeat echo or plan for isch eval.

## 2019-03-08 LAB
ANION GAP SERPL CALC-SCNC: 19 MMOL/L
BASOPHILS # BLD AUTO: 0 K/UL
BASOPHILS NFR BLD: 0 %
BUN SERPL-MCNC: 54 MG/DL
CALCIUM SERPL-MCNC: 10.7 MG/DL
CHLORIDE SERPL-SCNC: 96 MMOL/L
CO2 SERPL-SCNC: 19 MMOL/L
CREAT SERPL-MCNC: 8.6 MG/DL
DIFFERENTIAL METHOD: ABNORMAL
EOSINOPHIL # BLD AUTO: 0.1 K/UL
EOSINOPHIL NFR BLD: 0.6 %
ERYTHROCYTE [DISTWIDTH] IN BLOOD BY AUTOMATED COUNT: 14.3 %
EST. GFR  (AFRICAN AMERICAN): 5 ML/MIN/1.73 M^2
EST. GFR  (NON AFRICAN AMERICAN): 4 ML/MIN/1.73 M^2
GLUCOSE SERPL-MCNC: 103 MG/DL
HBV SURFACE AB SER-ACNC: POSITIVE M[IU]/ML
HBV SURFACE AG SERPL QL IA: NEGATIVE
HCT VFR BLD AUTO: 31.8 %
HGB BLD-MCNC: 9.7 G/DL
LYMPHOCYTES # BLD AUTO: 0.5 K/UL
LYMPHOCYTES NFR BLD: 5.9 %
MCH RBC QN AUTO: 30 PG
MCHC RBC AUTO-ENTMCNC: 30.5 G/DL
MCV RBC AUTO: 99 FL
MONOCYTES # BLD AUTO: 1.1 K/UL
MONOCYTES NFR BLD: 12.4 %
NEUTROPHILS # BLD AUTO: 7 K/UL
NEUTROPHILS NFR BLD: 81.1 %
PLATELET # BLD AUTO: 313 K/UL
PMV BLD AUTO: 9.7 FL
POCT GLUCOSE: 90 MG/DL (ref 70–110)
POCT GLUCOSE: 93 MG/DL (ref 70–110)
POCT GLUCOSE: 96 MG/DL (ref 70–110)
POTASSIUM SERPL-SCNC: 5.8 MMOL/L
RBC # BLD AUTO: 3.23 M/UL
SODIUM SERPL-SCNC: 134 MMOL/L
WBC # BLD AUTO: 8.65 K/UL

## 2019-03-08 PROCEDURE — 94761 N-INVAS EAR/PLS OXIMETRY MLT: CPT

## 2019-03-08 PROCEDURE — 25000003 PHARM REV CODE 250: Performed by: EMERGENCY MEDICINE

## 2019-03-08 PROCEDURE — 99233 PR SUBSEQUENT HOSPITAL CARE,LEVL III: ICD-10-PCS | Mod: ,,, | Performed by: INTERNAL MEDICINE

## 2019-03-08 PROCEDURE — 20000000 HC ICU ROOM

## 2019-03-08 PROCEDURE — 36415 COLL VENOUS BLD VENIPUNCTURE: CPT

## 2019-03-08 PROCEDURE — 27000646 HC AEROBIKA DEVICE

## 2019-03-08 PROCEDURE — 99233 SBSQ HOSP IP/OBS HIGH 50: CPT | Mod: ,,, | Performed by: INTERNAL MEDICINE

## 2019-03-08 PROCEDURE — 25000003 PHARM REV CODE 250: Performed by: INTERNAL MEDICINE

## 2019-03-08 PROCEDURE — 63600175 PHARM REV CODE 636 W HCPCS: Performed by: HOSPITALIST

## 2019-03-08 PROCEDURE — 99900035 HC TECH TIME PER 15 MIN (STAT)

## 2019-03-08 PROCEDURE — 85025 COMPLETE CBC W/AUTO DIFF WBC: CPT

## 2019-03-08 PROCEDURE — 25000003 PHARM REV CODE 250: Performed by: HOSPITALIST

## 2019-03-08 PROCEDURE — 25000242 PHARM REV CODE 250 ALT 637 W/ HCPCS: Performed by: INTERNAL MEDICINE

## 2019-03-08 PROCEDURE — 80048 BASIC METABOLIC PNL TOTAL CA: CPT

## 2019-03-08 PROCEDURE — 94640 AIRWAY INHALATION TREATMENT: CPT

## 2019-03-08 PROCEDURE — 27000221 HC OXYGEN, UP TO 24 HOURS

## 2019-03-08 PROCEDURE — 94664 DEMO&/EVAL PT USE INHALER: CPT

## 2019-03-08 RX ORDER — METOPROLOL SUCCINATE 50 MG/1
50 TABLET, EXTENDED RELEASE ORAL DAILY
Status: DISCONTINUED | OUTPATIENT
Start: 2019-03-08 | End: 2019-03-09

## 2019-03-08 RX ORDER — SODIUM CHLORIDE 9 MG/ML
INJECTION, SOLUTION INTRAVENOUS
Status: DISCONTINUED | OUTPATIENT
Start: 2019-03-08 | End: 2019-03-11 | Stop reason: HOSPADM

## 2019-03-08 RX ORDER — SODIUM CHLORIDE 9 MG/ML
INJECTION, SOLUTION INTRAVENOUS ONCE
Status: DISCONTINUED | OUTPATIENT
Start: 2019-03-08 | End: 2019-03-11 | Stop reason: HOSPADM

## 2019-03-08 RX ADMIN — METOPROLOL SUCCINATE 50 MG: 50 TABLET, EXTENDED RELEASE ORAL at 09:03

## 2019-03-08 RX ADMIN — ATORVASTATIN CALCIUM 10 MG: 10 TABLET, FILM COATED ORAL at 09:03

## 2019-03-08 RX ADMIN — IPRATROPIUM BROMIDE AND ALBUTEROL SULFATE 3 ML: .5; 3 SOLUTION RESPIRATORY (INHALATION) at 03:03

## 2019-03-08 RX ADMIN — AMIODARONE HYDROCHLORIDE 0.5 MG/MIN: 1.8 INJECTION, SOLUTION INTRAVENOUS at 06:03

## 2019-03-08 RX ADMIN — FLUTICASONE PROPIONATE 50 MCG: 50 SPRAY, METERED NASAL at 09:03

## 2019-03-08 RX ADMIN — AMIODARONE HYDROCHLORIDE 400 MG: 200 TABLET ORAL at 09:03

## 2019-03-08 RX ADMIN — APIXABAN 2.5 MG: 2.5 TABLET, FILM COATED ORAL at 09:03

## 2019-03-08 RX ADMIN — MONTELUKAST SODIUM 10 MG: 10 TABLET, FILM COATED ORAL at 09:03

## 2019-03-08 RX ADMIN — IPRATROPIUM BROMIDE AND ALBUTEROL SULFATE 3 ML: .5; 3 SOLUTION RESPIRATORY (INHALATION) at 10:03

## 2019-03-08 RX ADMIN — BUSPIRONE HYDROCHLORIDE 10 MG: 10 TABLET ORAL at 09:03

## 2019-03-08 RX ADMIN — DOXYCYCLINE HYCLATE 100 MG: 100 TABLET, COATED ORAL at 09:03

## 2019-03-08 RX ADMIN — CETIRIZINE HYDROCHLORIDE 10 MG: 10 TABLET, FILM COATED ORAL at 09:03

## 2019-03-08 RX ADMIN — MIRTAZAPINE 7.5 MG: 7.5 TABLET ORAL at 09:03

## 2019-03-08 RX ADMIN — PANTOPRAZOLE SODIUM 40 MG: 40 TABLET, DELAYED RELEASE ORAL at 09:03

## 2019-03-08 RX ADMIN — IPRATROPIUM BROMIDE AND ALBUTEROL SULFATE 3 ML: .5; 3 SOLUTION RESPIRATORY (INHALATION) at 11:03

## 2019-03-08 NOTE — PROGRESS NOTES
[facility] Hospital Medicine  Progress Note    Patient Name: Eli Vaz  MRN: [id number]  Patient Class: IP- Inpatient   Admission Date: 3/6/2019  Length of Stay: 2 days  Attending Physician: Carmelita Candelaria MD  Primary Care Provider: Chiara Nelson MD        Subjective:     Principal Problem:Atrial fibrillation with RVR    HPI:  74 y/o female with history of ESRD and COPD presents with shortness of breath.  She states that symptoms started around 6:00 a.m. this morning.  She also complains of left-sided chest pressure.  Pain is not associated with shortness of breath and has been presented for a while.  Patient also complains of chest congestion.  States she has been having a dry cough for several days.  Chest pain is worse with cough and deep breathing.  Chest pain is left-sided, nonradiating.  No recent fever.  No abdominal pain, nausea or vomiting. No previous episodes of abnormal heart rhythms, no palpitations.  Last dialysis was 2 days ago via left upper extremity AV fistula.  She presented to ER where she was noted to be in rapid AFib.  Patient was placed on Amiodarone with conversion to NSR.  No hx of Afib.  No other complaints.        Hospital Course:  No notes on file    Interval History: feels better today. On PO amio and so far rate controlled.     Review of Systems   Respiratory: Negative.    Cardiovascular: Negative.    Gastrointestinal: Negative.      Objective:     Vital Signs (Most Recent):  Temp: 98.4 °F (36.9 °C) (03/08/19 1600)  Pulse: 66 (03/08/19 1545)  Resp: (!) 28 (03/08/19 1545)  BP: (!) 120/57 (03/08/19 1603)  SpO2: 95 % (03/08/19 1608) Vital Signs (24h Range):  Temp:  [97.6 °F (36.4 °C)-98.4 °F (36.9 °C)] 98.4 °F (36.9 °C)  Pulse:  [] 66  Resp:  [16-28] 28  SpO2:  [70 %-100 %] 95 %  BP: (106-171)/(53-78) 120/57     Weight: 60.9 kg (134 lb 4.2 oz)  Body mass index is 23.05 kg/m².    Intake/Output Summary (Last 24 hours) at 3/8/2019 5819  Last data filed at  3/8/2019 0909  Gross per 24 hour   Intake 286.4 ml   Output --   Net 286.4 ml      Physical Exam   Constitutional: She is oriented to person, place, and time. She appears well-developed. No distress.   Eyes: Right eye exhibits no discharge. Left eye exhibits no discharge.   Cardiovascular: Normal rate and regular rhythm.   Pulmonary/Chest: Effort normal. No respiratory distress. She has no wheezes. She has no rales.   Abdominal: Soft. Bowel sounds are normal.   Musculoskeletal: She exhibits no deformity.   Neurological: She is alert and oriented to person, place, and time.   Skin: Skin is warm and dry. She is not diaphoretic.   Psychiatric: She has a normal mood and affect. Her behavior is normal. Judgment and thought content normal.   Nursing note and vitals reviewed.      Significant Labs: All pertinent labs within the past 24 hours have been reviewed.    Significant Imaging: I have reviewed all pertinent imaging results/findings within the past 24 hours.  I have reviewed and interpreted all pertinent imaging results/findings within the past 24 hours.    Assessment/Plan:      * Atrial fibrillation with RVR    Patient presented to ER with rapid AFib.  No hx of known AFib.  Probably PAF.  With episodes breakthrough RVR, but none today thus far  Is on PO amio and thus far stable (mainly rate controlled)  Appreciate further cardiology recs       Chest pain    Seems more pleuritic and musculoskeletal.  Secondary to persistent cough.         Hypertension    Essential  Continue home medications and monitor.       Combined hyperlipidemia associated with type 2 diabetes mellitus    Continue Statin.       Diabetes mellitus with ESRD (end-stage renal disease)    Diabetic diet and insulin sliding scale.       Anemia of chronic disease    H/H similar to previous labs.  No evidence of bleeding.       ESRD on dialysis    Due for HD today.  No need for emergent HD.  Consult Nephrology for HD.       COPD (chronic obstructive  pulmonary disease)    No acute exacerbation as no wheezing on exam.  Does complain of congestion.  Will provide Xopenex nebs.  Monitor.         VTE Risk Mitigation (From admission, onward)        Ordered     apixaban tablet 2.5 mg  2 times daily      03/06/19 2042     IP VTE HIGH RISK PATIENT  Once      03/06/19 1635     Place sequential compression device  Until discontinued      03/06/19 1635          Critical care time spent on the evaluation and treatment of severe organ dysfunction, review of pertinent labs and imaging studies, discussions with consulting providers and discussions with patient/family: > 45 minutes.    Carmelita Lucero MD  Department of Hospital Medicine   [facility]

## 2019-03-08 NOTE — PLAN OF CARE
Problem: Adult Inpatient Plan of Care  Goal: Plan of Care Review  Outcome: Ongoing (interventions implemented as appropriate)  Patient had an episode of A-Fib RVR during the night. Total of 20 mg of cardizem given. Amio at 0.5 mg/min infusing. Pt currently NS on monitor. 12 Lead done. PT complained of shortness of breath during the night. ABG done. PRN breathing treatment given twice with relief. Pt on 2 L NC. Pt vomited Xs 1 PRN Zofran given. No falls, injuries, or skin breakdown this shift. Pt updated on plan of care. Education provided.

## 2019-03-08 NOTE — PROGRESS NOTES
[facility]  Cardiology  Progress Note    Patient Name: Eli Vaz  MRN: [id number]  Admission Date: 3/6/2019  Hospital Length of Stay: 2 days  Code Status: Full Code   Attending Physician: Carmelita Candelaria MD   Primary Care Physician: Chiara Nelson MD  Expected Discharge Date:   Principal Problem:Atrial fibrillation with RVR    Subjective:     Hospital Course:   3/6/19: adm to ICU with PAF/RVR, in and out of AF overnight  3/7/19: still with PAF despite amio gtt and rebolus    Interval Hx: pt seen in ICU, case d/w Dr. Lucero. Feeling better.  Has some respirophasic CP (only with deep breaths), and has productive cough.  No sob.  No palps, she was unaware of the AF/RVR last night.    Tele: PAF->NSR (personally reviewed and interpreted)      Past Medical History:   Diagnosis Date    Arthritis     COPD (chronic obstructive pulmonary disease)     Diabetes mellitus type II     Dialysis patient     Encounter for blood transfusion     ESRD (end stage renal disease)        Past Surgical History:   Procedure Laterality Date    AV FISTULA PLACEMENT      TUBAL LIGATION         Review of patient's allergies indicates:  No Known Allergies    No current facility-administered medications on file prior to encounter.      Current Outpatient Medications on File Prior to Encounter   Medication Sig    albuterol (ACCUNEB) 1.25 mg/3 mL Nebu Take 3 mLs (1.25 mg total) by nebulization every 6 (six) hours as needed.    albuterol-ipratropium (DUO-NEB) 2.5 mg-0.5 mg/3 mL nebulizer solution Take 3 mLs by nebulization every 6 (six) hours as needed for Wheezing or Shortness of Breath. Rescue    amLODIPine (NORVASC) 10 MG tablet TAKE 1 TABLET (10 MG TOTAL) BY MOUTH ONCE DAILY.    atorvastatin (LIPITOR) 10 MG tablet Take 1 tablet (10 mg total) by mouth once daily.    busPIRone (BUSPAR) 10 MG tablet Take 10 mg by mouth 2 (two) times daily.    cloNIDine (CATAPRES) 0.2 MG tablet Take 0.2 mg by mouth.    diclofenac  sodium (VOLTAREN) 1 % Gel Apply 4 g topically.    doxycycline (VIBRAMYCIN) 100 MG Cap Take 1 capsule (100 mg total) by mouth every 12 (twelve) hours. for 10 days    epoetin cat (PROCRIT) 3,000 unit/mL injection Inject 3,000 Units into the skin.    fluticasone (FLONASE) 50 mcg/actuation nasal spray 1 spray by Each Nare route 2 (two) times daily.    FOLIC ACID/VITAMIN B COMP W-C (RENAL CAPS ORAL) Take by mouth Daily.    gentamicin sulfate, PF, 60 mg/6 mL Soln Inject into the vein.    guaiFENesin (MUCINEX) 1,200 mg Ta12 Take 1 tablet by mouth 2 (two) times daily. for 14 days    guaifenesin-codeine 100-10 mg/5 ml (CHERATUSSIN AC)  mg/5 mL syrup Take 5 mLs by mouth every 12 (twelve) hours as needed for Cough or Congestion.    hydrALAZINE (APRESOLINE) 25 MG tablet Take 1 tablet (25 mg total) by mouth every 12 (twelve) hours.    HYDROcodone-acetaminophen (NORCO) 7.5-325 mg per tablet Take 1 tablet by mouth every 8 (eight) hours as needed for Pain.    [START ON 3/14/2019] HYDROcodone-acetaminophen (NORCO) 7.5-325 mg per tablet Take 1 tablet by mouth every 8 (eight) hours as needed for Pain.    [START ON 4/13/2019] HYDROcodone-acetaminophen (NORCO) 7.5-325 mg per tablet Take 1 tablet by mouth every 8 (eight) hours as needed for Pain.    hydrOXYzine pamoate (VISTARIL) 25 MG Cap Take 1 capsule (25 mg total) by mouth every 8 (eight) hours as needed (panic attack).    levocetirizine (XYZAL) 5 MG tablet Take 1 tablet (5 mg total) by mouth every evening.    meclizine (ANTIVERT) 25 mg tablet Take 1 tablet (25 mg total) by mouth 3 (three) times daily as needed.    metoprolol succinate (TOPROL-XL) 25 MG 24 hr tablet Take 25 mg by mouth.    mirtazapine (REMERON) 7.5 MG Tab Take 7.5 mg by mouth nightly.     montelukast (SINGULAIR) 10 mg tablet Take 1 tablet (10 mg total) by mouth every evening.    pantoprazole (PROTONIX) 40 MG tablet Take 40 mg by mouth.    paroxetine (PAXIL) 30 MG tablet 1 TABLET IN THE  MORNING ONCE A DAY ORALLY 90    predniSONE (DELTASONE) 10 MG tablet 2 tab po qday x 4 days, then 1 tab po qday x 4 days, then 1/2 tab po qday x 4 days    promethazine (PHENERGAN) 12.5 MG Tab Take 12.5 mg by mouth every 6 (six) hours as needed.    SENSIPAR 60 mg Tab Take 30 mg by mouth daily with breakfast.     sevelamer carbonate (RENVELA) 800 mg Tab Take 800 mg by mouth 3 (three) times daily with meals.    SODIUM BICARBONATE ORAL Take 650 mg by mouth.    traZODone (DESYREL) 50 MG tablet Take 1 tablet (50 mg total) by mouth nightly as needed for Insomnia.    VENTOLIN HFA 90 mcg/actuation inhaler INHALE 2 PUFFS INTO LUNGS EVERY 4 HOURS AS NEEDED FOR SHORTNESS OF BREATH OR WHEEZING. RESCUE    vit B,C-iron fum-FA-D3-zinc ox 8 mg iron-800 mcg-1,000 unit Tab Take by mouth.    vitamin renal formula, B-complex-vitamin c-folic acid, (NEPHROCAP) 1 mg Cap Take by mouth.    vortioxetine (BRINTELLIX) 5 mg Tab Take 1 tablet by mouth.     Family History     Problem Relation (Age of Onset)    Heart attack Sister, Sister, Mother        Tobacco Use    Smoking status: Former Smoker     Start date: 1/12/1991    Smokeless tobacco: Never Used   Substance and Sexual Activity    Alcohol use: No    Drug use: No    Sexual activity: Not on file     Review of Systems   Gastrointestinal: Negative for melena.   Genitourinary: Negative for hematuria.     Objective:     Vital Signs (Most Recent):  Temp: 97.6 °F (36.4 °C) (03/08/19 0305)  Pulse: 66 (03/08/19 0630)  Resp: 20 (03/08/19 0630)  BP: 133/61 (03/08/19 0630)  SpO2: 96 % (03/08/19 0630) Vital Signs (24h Range):  Temp:  [97.6 °F (36.4 °C)-98.4 °F (36.9 °C)] 97.6 °F (36.4 °C)  Pulse:  [] 66  Resp:  [16-28] 20  SpO2:  [70 %-100 %] 96 %  BP: (107-175)/(53-87) 133/61     Weight: 60.9 kg (134 lb 4.2 oz)  Body mass index is 23.05 kg/m².    SpO2: 96 %  O2 Device (Oxygen Therapy): nasal cannula      Intake/Output Summary (Last 24 hours) at 3/8/2019 4716  Last data filed at  3/8/2019 0600  Gross per 24 hour   Intake 998.02 ml   Output 500 ml   Net 498.02 ml       Lines/Drains/Airways     Central Venous Catheter Line                 Hemodialysis Catheter -- days          Drain                 Hemodialysis AV Fistula 03/06/19 2339 Left upper arm 1 day          Peripheral Intravenous Line                 External Jugular IV 03/06/19 1300 1 day         Peripheral IV - Single Lumen 03/06/19 1544 Right Hand 1 day                Physical Exam   Constitutional: She is oriented to person, place, and time. She appears well-developed and well-nourished. No distress.   HENT:   Head: Normocephalic and atraumatic.   Mouth/Throat: No oropharyngeal exudate.   Eyes: Conjunctivae and EOM are normal. Pupils are equal, round, and reactive to light. No scleral icterus.   Neck: Normal range of motion. Neck supple. No JVD present. No tracheal deviation present. No thyromegaly present.   Cardiovascular: Normal rate, regular rhythm, S1 normal and S2 normal.  No extrasystoles are present. Exam reveals no gallop and no friction rub.   Murmur heard.   Systolic murmur is present with a grade of 2/6.  Pulmonary/Chest: Effort normal. No respiratory distress. She has no wheezes. She has no rales. She exhibits no tenderness.   Dec BS L base   Abdominal: Soft. She exhibits no distension.   Musculoskeletal: Normal range of motion. She exhibits no edema.   Neurological: She is alert and oriented to person, place, and time. No cranial nerve deficit.   Skin: Skin is warm and dry. She is not diaphoretic.   Psychiatric: She has a normal mood and affect. Her behavior is normal. Judgment normal.       Current Medications:   amiodarone  400 mg Oral BID    apixaban  2.5 mg Oral BID    atorvastatin  10 mg Oral Daily    busPIRone  10 mg Oral BID    cetirizine  10 mg Oral Daily    doxycycline  100 mg Oral Q12H    fluticasone  1 spray Each Nare BID    hydrALAZINE  25 mg Oral Q12H    metoprolol succinate  25 mg Oral Daily     mirtazapine  7.5 mg Oral Nightly    montelukast  10 mg Oral QHS    pantoprazole  40 mg Oral Daily    sevelamer carbonate  800 mg Oral TID WM    vitamin renal formula (B-complex-vitamin c-folic acid)  1 capsule Oral Daily      amiodarone in dextrose 5% 0.5 mg/min (03/08/19 0625)     acetaminophen, albuterol-ipratropium, cloNIDine, dextrose 50%, dextrose 50%, glucagon (human recombinant), glucose, glucose, insulin aspart U-100, ondansetron, promethazine-codeine 6.25-10 mg/5 ml, sodium chloride 0.9%    Laboratory (all labs reviewed):  CBC:  Recent Labs   Lab 01/09/18 2020 09/28/18  1010 03/06/19  1325 03/07/19  0647 03/08/19  0150   WHITE BLOOD CELL COUNT 3.76 L 3.04 L 11.61 8.65 8.65   HEMOGLOBIN 9.0 L 10.2 L 9.9 L 9.3 L 9.7 L   HEMATOCRIT 29.5 L 32.1 L 32.6 L 30.8 L 31.8 L   PLATELETS 201 158 348 265 313       CHEMISTRIES:  Recent Labs   Lab 02/26/17  1508 07/12/17  0110  01/09/18 2020 09/28/18  1010 03/06/19  1325 03/07/19  0647 03/08/19  0150   GLUCOSE 101 99   < > 87 82 117 H 93 103   SODIUM 140 139   < > 138 140 139 138 134 L   POTASSIUM 3.3 L 4.5   < > 3.8 5.2 H 5.3 H 6.0 H 5.8 H   BUN BLD 13 29 H   < > 20 38 H 56 H 69 H 54 H   CREATININE 6.5 H 7.7 H   < > 5.0 H 7.1 H 10.2 H 11.0 H 8.6 H   EGFR IF  7 A 6 A   < > 9 A 6.1 A 4 A 4 A 5 A   EGFR IF NON- 6 A 5 A   < > 8 A 5.3 A 3 A 3 A 4 A   CALCIUM 10.6 H 10.1   < > 9.5 9.5 10.6 H 10.7 H 10.7 H   MAGNESIUM 1.9 1.8  --   --   --  2.0  --   --     < > = values in this interval not displayed.       CARDIAC BIOMARKERS:  Recent Labs   Lab 02/03/19  1842 03/06/19  1325 03/06/19  1759 03/06/19  2344 03/07/19  0647   TROPONIN I 0.036 H 0.443 H 0.473 H 0.596 H 0.655 H       COAGS:  Recent Labs   Lab 02/26/17  1508 01/09/18 2020   INR 1.1 1.1       LIPIDS/LFTS:  Recent Labs   Lab 02/26/17  1508 07/12/17  0110 07/13/17  0559 01/09/18 2020 09/28/18  1010 03/06/19  1325   CHOLESTEROL  --   --  208 H  --  215 H  --    TRIGLYCERIDES  --   --   73  --  49  --    HDL  --   --  76 H  --  109 H  --    LDL CHOLESTEROL  --   --  117.4  --  96.2  --    NON-HDL CHOLESTEROL  --   --  132  --  106  --    AST 15 27  --  18 25 14   ALT 10 29  --  7 L 10 7 L       BNP:  Recent Labs   Lab 02/26/17  1508 07/12/17  0110 01/09/18 2020 03/06/19  1325   BNP 3,789 H >4,900 H 4,023 H 636 H       TSH:  Recent Labs   Lab 02/26/17  1508 03/06/19  1325   TSH 2.386 3.708       Free T4:        Diagnostic Results:  ECG (personally reviewed and interpreted tracing(s)):  3/6/19 1235   3/6/19 1417 SR 95, PRWP/low volt, NSSTTW changes, similar to 2/25/19    Chest X-Ray (personally reviewed and interpreted image(s)): 3/6/19 ?mild CHF, similar to 2/25/19    Echo: 2/4/19  · Normal left ventricular systolic function. The estimated ejection fraction is 70%  · Concentric left ventricular hypertrophy.  · Grade II (moderate) left ventricular diastolic dysfunction consistent with pseudonormalization.  · Moderate left atrial enlargement.  · Normal right ventricular systolic function.  · Moderate-to-severe mitral regurgitation.  · Mild tricuspid regurgitation.  · The estimated PA systolic pressure is 44 mm Hg  · Pulmonary hypertension present.    Stress Test: L MPI 2/4/19  · There is a mild, infarct defect(s) in the lateral apical wall(s),  · An ejection fraction of 72 % at rest  · The EKG portion of this study is negative for myocardial ischemia.    Assessment and Plan:     Brief HPI  * Atrial fibrillation with RVR    In and out of PAF in ICU overnight, currently in SR.  CHADSVASC at least 5  Cont amio po, stop gtt today.  Titrate toprol  Cont eliquis 2.5mg bid (borderline for 5mg bid given weight 61kg)  No need to repeat echo or plan for isch eval.     Chest pain    Resolved  Minimal trop in setting of CKD/ESRD and AF/RVR, doubt ACS     ESRD on dialysis    Per IM/neph     Diabetes mellitus with ESRD (end-stage renal disease)    Per IM     Combined hyperlipidemia associated with type 2  diabetes mellitus    Cont statin     Hypertension    Cont med rx     Non-rheumatic mitral regurgitation    Mod-severe by echo 2/2019  LVEF preserved         VTE Risk Mitigation (From admission, onward)        Ordered     apixaban tablet 2.5 mg  2 times daily      03/06/19 2042     IP VTE HIGH RISK PATIENT  Once      03/06/19 1635     Place sequential compression device  Until discontinued      03/06/19 1635          Isrrael Weiss MD  Cardiology  [facility]

## 2019-03-08 NOTE — SUBJECTIVE & OBJECTIVE
Interval History: feels better today. On PO amio and so far rate controlled.     Review of Systems   Respiratory: Negative.    Cardiovascular: Negative.    Gastrointestinal: Negative.      Objective:     Vital Signs (Most Recent):  Temp: 98.4 °F (36.9 °C) (03/08/19 1600)  Pulse: 66 (03/08/19 1545)  Resp: (!) 28 (03/08/19 1545)  BP: (!) 120/57 (03/08/19 1603)  SpO2: 95 % (03/08/19 1608) Vital Signs (24h Range):  Temp:  [97.6 °F (36.4 °C)-98.4 °F (36.9 °C)] 98.4 °F (36.9 °C)  Pulse:  [] 66  Resp:  [16-28] 28  SpO2:  [70 %-100 %] 95 %  BP: (106-171)/(53-78) 120/57     Weight: 60.9 kg (134 lb 4.2 oz)  Body mass index is 23.05 kg/m².    Intake/Output Summary (Last 24 hours) at 3/8/2019 1643  Last data filed at 3/8/2019 0909  Gross per 24 hour   Intake 286.4 ml   Output --   Net 286.4 ml      Physical Exam   Constitutional: She is oriented to person, place, and time. She appears well-developed. No distress.   Eyes: Right eye exhibits no discharge. Left eye exhibits no discharge.   Cardiovascular: Normal rate and regular rhythm.   Pulmonary/Chest: Effort normal. No respiratory distress. She has no wheezes. She has no rales.   Abdominal: Soft. Bowel sounds are normal.   Musculoskeletal: She exhibits no deformity.   Neurological: She is alert and oriented to person, place, and time.   Skin: Skin is warm and dry. She is not diaphoretic.   Psychiatric: She has a normal mood and affect. Her behavior is normal. Judgment and thought content normal.   Nursing note and vitals reviewed.      Significant Labs: All pertinent labs within the past 24 hours have been reviewed.    Significant Imaging: I have reviewed all pertinent imaging results/findings within the past 24 hours.  I have reviewed and interpreted all pertinent imaging results/findings within the past 24 hours.

## 2019-03-08 NOTE — PROGRESS NOTES
Awake alert oriented NAD    RN's reported that last pm Mrs Vaz had episodes of dyspnea at rest. No chest pian or palpitations    Denies CNS ENT CP GI  RHEUM OR DERM SX  Past Medical History:   Diagnosis Date    Arthritis     COPD (chronic obstructive pulmonary disease)     Diabetes mellitus type II     Dialysis patient     Encounter for blood transfusion     ESRD (end stage renal disease)      Review of patient's allergies indicates:  No Known Allergies    Current Facility-Administered Medications   Medication    acetaminophen tablet 650 mg    albuterol-ipratropium 2.5 mg-0.5 mg/3 mL nebulizer solution 3 mL    amiodarone tablet 400 mg    apixaban tablet 2.5 mg    atorvastatin tablet 10 mg    busPIRone tablet 10 mg    cetirizine tablet 10 mg    cloNIDine tablet 0.2 mg    dextrose 50% injection 12.5 g    dextrose 50% injection 25 g    doxycycline tablet 100 mg    fluticasone 50 mcg/actuation nasal spray 50 mcg    glucagon (human recombinant) injection 1 mg    glucose chewable tablet 16 g    glucose chewable tablet 24 g    hydrALAZINE tablet 25 mg    insulin aspart U-100 pen 1-10 Units    metoprolol succinate (TOPROL-XL) 24 hr tablet 50 mg    mirtazapine tablet 7.5 mg    montelukast tablet 10 mg    ondansetron injection 8 mg    pantoprazole EC tablet 40 mg    promethazine-codeine 6.25-10 mg/5 ml syrup 5 mL    sevelamer carbonate tablet 800 mg    sodium chloride 0.9% flush 5 mL    vitamin renal formula (B-complex-vitamin c-folic acid) 1 mg per capsule 1 capsule       LABS    Recent Results (from the past 24 hour(s))   POCT glucose    Collection Time: 03/07/19 10:39 AM   Result Value Ref Range    POCT Glucose 100 70 - 110 mg/dL   POCT glucose    Collection Time: 03/07/19  4:51 PM   Result Value Ref Range    POCT Glucose 37 (LL) 70 - 110 mg/dL   POCT glucose    Collection Time: 03/07/19  4:54 PM   Result Value Ref Range    POCT Glucose 93 70 - 110 mg/dL   POCT glucose    Collection  Time: 03/07/19  9:13 PM   Result Value Ref Range    POCT Glucose 97 70 - 110 mg/dL   ISTAT PROCEDURE    Collection Time: 03/07/19 11:48 PM   Result Value Ref Range    POC PH 7.338 (L) 7.35 - 7.45    POC PCO2 39.4 35 - 45 mmHg    POC PO2 134 (H) 80 - 100 mmHg    POC HCO3 21.2 (L) 24 - 28 mmol/L    POC BE -4 -2 to 2 mmol/L    POC SATURATED O2 99 95 - 100 %    POC TCO2 22 (L) 23 - 27 mmol/L    Rate 3     Sample ARTERIAL     Site RR     Allens Test Pass     DelSys Nasal Can     Mode SPONT    CBC auto differential    Collection Time: 03/08/19  1:50 AM   Result Value Ref Range    WBC 8.65 3.90 - 12.70 K/uL    RBC 3.23 (L) 4.00 - 5.40 M/uL    Hemoglobin 9.7 (L) 12.0 - 16.0 g/dL    Hematocrit 31.8 (L) 37.0 - 48.5 %    MCV 99 (H) 82 - 98 fL    MCH 30.0 27.0 - 31.0 pg    MCHC 30.5 (L) 32.0 - 36.0 g/dL    RDW 14.3 11.5 - 14.5 %    Platelets 313 150 - 350 K/uL    MPV 9.7 9.2 - 12.9 fL    Gran # (ANC) 7.0 1.8 - 7.7 K/uL    Lymph # 0.5 (L) 1.0 - 4.8 K/uL    Mono # 1.1 (H) 0.3 - 1.0 K/uL    Eos # 0.1 0.0 - 0.5 K/uL    Baso # 0.00 0.00 - 0.20 K/uL    Gran% 81.1 (H) 38.0 - 73.0 %    Lymph% 5.9 (L) 18.0 - 48.0 %    Mono% 12.4 4.0 - 15.0 %    Eosinophil% 0.6 0.0 - 8.0 %    Basophil% 0.0 0.0 - 1.9 %    Differential Method Automated    Basic metabolic panel    Collection Time: 03/08/19  1:50 AM   Result Value Ref Range    Sodium 134 (L) 136 - 145 mmol/L    Potassium 5.8 (H) 3.5 - 5.1 mmol/L    Chloride 96 95 - 110 mmol/L    CO2 19 (L) 23 - 29 mmol/L    Glucose 103 70 - 110 mg/dL    BUN, Bld 54 (H) 8 - 23 mg/dL    Creatinine 8.6 (H) 0.5 - 1.4 mg/dL    Calcium 10.7 (H) 8.7 - 10.5 mg/dL    Anion Gap 19 (H) 8 - 16 mmol/L    eGFR if African American 5 (A) >60 mL/min/1.73 m^2    eGFR if non African American 4 (A) >60 mL/min/1.73 m^2   ]    I/O last 3 completed shifts:  In: 1181.7 [I.V.:581.7; Other:500; IV Piggyback:100]  Out: 500 [Other:500]    Vitals:    03/08/19 0500 03/08/19 0530 03/08/19 0600 03/08/19 0630   BP: (!) 137/58 (!) 140/62  133/60 133/61   Pulse: 65 65 65 66   Resp: (!) 25 (!) 28 20 20   Temp:       TempSrc:       SpO2: 100% 98% (!) 94% 96%   Weight:       Height:           No Jvd, Thyromegaly or Lymphadenopathy  Lungs: Fairly clear anteriorly and laterally but with some post ronchi  Cor: RRR no G or rubs  Abd: Soft benign good bowel sounds non tender  Ext: No E C C    A)  ESRD 2nd DM/HTN  Hx of hypertension now hypotension which is better this am  ? PNA R base  Afib RVR with elevated card markers, Cardiology following  Anemia of ckd  2nd hypeprth  COPD  DM  K up in spite of HD last pm, might be K shifting with acidosis     P)     Renal diet when able  Protect access  HD in  am  Antibiotx to cover CAP  Maintain hydration  Avoid nephrotoxic medications  Adjust all medications to the degree of renal function

## 2019-03-08 NOTE — PROGRESS NOTES
[facility] Hospital Medicine  Progress Note    Patient Name: Eli Vaz  MRN: [id number]  Patient Class: IP- Inpatient   Admission Date: 3/6/2019  Length of Stay: 1 days  Attending Physician: Carmelita Candelaria MD  Primary Care Provider: Chiara Nelson MD        Subjective:     Principal Problem:Atrial fibrillation with RVR    HPI:  74 y/o female with history of ESRD and COPD presents with shortness of breath.  She states that symptoms started around 6:00 a.m. this morning.  She also complains of left-sided chest pressure.  Pain is not associated with shortness of breath and has been presented for a while.  Patient also complains of chest congestion.  States she has been having a dry cough for several days.  Chest pain is worse with cough and deep breathing.  Chest pain is left-sided, nonradiating.  No recent fever.  No abdominal pain, nausea or vomiting. No previous episodes of abnormal heart rhythms, no palpitations.  Last dialysis was 2 days ago via left upper extremity AV fistula.  She presented to ER where she was noted to be in rapid AFib.  Patient was placed on Amiodarone with conversion to NSR.  No hx of Afib.  No other complaints.        Hospital Course:  No notes on file    Interval History: SOB and AFib with RVR this AM. Converted to NSR and no longer SOB. Has no complaints currently.     Review of Systems   Respiratory: Negative.    Cardiovascular: Negative.    Gastrointestinal: Negative.      Objective:     Vital Signs (Most Recent):  Temp: 97.6 °F (36.4 °C) (03/07/19 1500)  Pulse: 71 (03/07/19 1700)  Resp: 18 (03/07/19 1700)  BP: (!) 163/72 (03/07/19 1700)  SpO2: ( unable to obtain sats, pt in no distress, breathing unlabor) (03/07/19 1700) Vital Signs (24h Range):  Temp:  [97.5 °F (36.4 °C)-98.5 °F (36.9 °C)] 97.6 °F (36.4 °C)  Pulse:  [] 71  Resp:  [16-26] 18  SpO2:  [90 %-100 %] 95 %  BP: ()/(52-87) 163/72     Weight: 60.9 kg (134 lb 4.2 oz)  Body mass index is 23.05  kg/m².    Intake/Output Summary (Last 24 hours) at 3/7/2019 1759  Last data filed at 3/7/2019 1700  Gross per 24 hour   Intake 637.86 ml   Output --   Net 637.86 ml      Physical Exam   Constitutional: She is oriented to person, place, and time. She appears well-developed. No distress.   Eyes: Right eye exhibits no discharge. Left eye exhibits no discharge.   Cardiovascular: Normal rate and regular rhythm.   Pulmonary/Chest: Effort normal. No respiratory distress. She has no wheezes. She has no rales.   Abdominal: Soft. Bowel sounds are normal.   Musculoskeletal: She exhibits no deformity.   Neurological: She is alert and oriented to person, place, and time.   Skin: Skin is warm and dry. She is not diaphoretic.   Psychiatric: She has a normal mood and affect. Her behavior is normal. Judgment and thought content normal.   Nursing note and vitals reviewed.      Significant Labs: All pertinent labs within the past 24 hours have been reviewed.    Significant Imaging: I have reviewed all pertinent imaging results/findings within the past 24 hours.  I have reviewed and interpreted all pertinent imaging results/findings within the past 24 hours.    Assessment/Plan:      * Atrial fibrillation with RVR    Patient presented to ER with rapid AFib.  No hx of known AFib.  Probably PAF.  With episodes breakthrough RVR  On both PO and IV amiodarone  Got HD today. Patient feels well  Appreciate further cardiology recs       Chest pain    Seems more pleuritic and musculoskeletal.  Secondary to persistent cough.  Trend Troponin.       Hypertension    Essential  Continue home medications and monitor.       Combined hyperlipidemia associated with type 2 diabetes mellitus    Continue Statin.       Diabetes mellitus with ESRD (end-stage renal disease)    Diabetic diet and insulin sliding scale.       Anemia of chronic disease    H/H similar to previous labs.  No evidence of bleeding.       ESRD on dialysis    Due for HD today.  No need  for emergent HD.  Consult Nephrology for HD.       COPD (chronic obstructive pulmonary disease)    No acute exacerbation as no wheezing on exam.  Does complain of congestion.  Will provide Xopenex nebs.  Monitor.         VTE Risk Mitigation (From admission, onward)        Ordered     apixaban tablet 2.5 mg  2 times daily      03/06/19 2042     IP VTE HIGH RISK PATIENT  Once      03/06/19 1635     Place sequential compression device  Until discontinued      03/06/19 1635        Discussed with patient and  at bedside    Critical care time spent on the evaluation and treatment of severe organ dysfunction, review of pertinent labs and imaging studies, discussions with consulting providers and discussions with patient/family: > 45 minutes.    Carmelita Lucero MD  Department of Hospital Medicine   [facility]

## 2019-03-08 NOTE — ASSESSMENT & PLAN NOTE
Patient presented to ER with rapid AFib.  No hx of known AFib.  Probably PAF.  With episodes breakthrough RVR  On both PO and IV amiodarone  Got HD today. Patient feels well  Appreciate further cardiology recs

## 2019-03-08 NOTE — PROGRESS NOTES
"1920: RN at bedside having difficulty getting accurate O2 sats. Pt complaining of SOB and feeling "winded". Forehead prob applied sats 100% on 2 L NC. HR in the 70s. Reassurance provided    1945: Notified Dr. Marrero of patient complaint. New orders for PRN breathing treatment.     2010: Breathing treatment given. Pt with mild relief. No wheezing. Will continue to monitor.     2140: RN at bedside pt HR increased to 140s. 12 Lead confirmed A-Fib with RVR. Notified Dr. Marrero of the change. New orders for 10 mg of Cardizem IV push. HR decreased to 115s.     2234: New orders for repeat dose of Cardizem. HR remains 110s..     2340: PRN ABG done for pt still complaining of SOB.     0015: Patient converted to NS. HR 65.    "

## 2019-03-08 NOTE — SUBJECTIVE & OBJECTIVE
Past Medical History:   Diagnosis Date    Arthritis     COPD (chronic obstructive pulmonary disease)     Diabetes mellitus type II     Dialysis patient     Encounter for blood transfusion     ESRD (end stage renal disease)        Past Surgical History:   Procedure Laterality Date    AV FISTULA PLACEMENT      TUBAL LIGATION         Review of patient's allergies indicates:  No Known Allergies    No current facility-administered medications on file prior to encounter.      Current Outpatient Medications on File Prior to Encounter   Medication Sig    albuterol (ACCUNEB) 1.25 mg/3 mL Nebu Take 3 mLs (1.25 mg total) by nebulization every 6 (six) hours as needed.    albuterol-ipratropium (DUO-NEB) 2.5 mg-0.5 mg/3 mL nebulizer solution Take 3 mLs by nebulization every 6 (six) hours as needed for Wheezing or Shortness of Breath. Rescue    amLODIPine (NORVASC) 10 MG tablet TAKE 1 TABLET (10 MG TOTAL) BY MOUTH ONCE DAILY.    atorvastatin (LIPITOR) 10 MG tablet Take 1 tablet (10 mg total) by mouth once daily.    busPIRone (BUSPAR) 10 MG tablet Take 10 mg by mouth 2 (two) times daily.    cloNIDine (CATAPRES) 0.2 MG tablet Take 0.2 mg by mouth.    diclofenac sodium (VOLTAREN) 1 % Gel Apply 4 g topically.    doxycycline (VIBRAMYCIN) 100 MG Cap Take 1 capsule (100 mg total) by mouth every 12 (twelve) hours. for 10 days    epoetin cat (PROCRIT) 3,000 unit/mL injection Inject 3,000 Units into the skin.    fluticasone (FLONASE) 50 mcg/actuation nasal spray 1 spray by Each Nare route 2 (two) times daily.    FOLIC ACID/VITAMIN B COMP W-C (RENAL CAPS ORAL) Take by mouth Daily.    gentamicin sulfate, PF, 60 mg/6 mL Soln Inject into the vein.    guaiFENesin (MUCINEX) 1,200 mg Ta12 Take 1 tablet by mouth 2 (two) times daily. for 14 days    guaifenesin-codeine 100-10 mg/5 ml (CHERATUSSIN AC)  mg/5 mL syrup Take 5 mLs by mouth every 12 (twelve) hours as needed for Cough or Congestion.    hydrALAZINE (APRESOLINE)  25 MG tablet Take 1 tablet (25 mg total) by mouth every 12 (twelve) hours.    HYDROcodone-acetaminophen (NORCO) 7.5-325 mg per tablet Take 1 tablet by mouth every 8 (eight) hours as needed for Pain.    [START ON 3/14/2019] HYDROcodone-acetaminophen (NORCO) 7.5-325 mg per tablet Take 1 tablet by mouth every 8 (eight) hours as needed for Pain.    [START ON 4/13/2019] HYDROcodone-acetaminophen (NORCO) 7.5-325 mg per tablet Take 1 tablet by mouth every 8 (eight) hours as needed for Pain.    hydrOXYzine pamoate (VISTARIL) 25 MG Cap Take 1 capsule (25 mg total) by mouth every 8 (eight) hours as needed (panic attack).    levocetirizine (XYZAL) 5 MG tablet Take 1 tablet (5 mg total) by mouth every evening.    meclizine (ANTIVERT) 25 mg tablet Take 1 tablet (25 mg total) by mouth 3 (three) times daily as needed.    metoprolol succinate (TOPROL-XL) 25 MG 24 hr tablet Take 25 mg by mouth.    mirtazapine (REMERON) 7.5 MG Tab Take 7.5 mg by mouth nightly.     montelukast (SINGULAIR) 10 mg tablet Take 1 tablet (10 mg total) by mouth every evening.    pantoprazole (PROTONIX) 40 MG tablet Take 40 mg by mouth.    paroxetine (PAXIL) 30 MG tablet 1 TABLET IN THE MORNING ONCE A DAY ORALLY 90    predniSONE (DELTASONE) 10 MG tablet 2 tab po qday x 4 days, then 1 tab po qday x 4 days, then 1/2 tab po qday x 4 days    promethazine (PHENERGAN) 12.5 MG Tab Take 12.5 mg by mouth every 6 (six) hours as needed.    SENSIPAR 60 mg Tab Take 30 mg by mouth daily with breakfast.     sevelamer carbonate (RENVELA) 800 mg Tab Take 800 mg by mouth 3 (three) times daily with meals.    SODIUM BICARBONATE ORAL Take 650 mg by mouth.    traZODone (DESYREL) 50 MG tablet Take 1 tablet (50 mg total) by mouth nightly as needed for Insomnia.    VENTOLIN HFA 90 mcg/actuation inhaler INHALE 2 PUFFS INTO LUNGS EVERY 4 HOURS AS NEEDED FOR SHORTNESS OF BREATH OR WHEEZING. RESCUE    vit B,C-iron fum-FA-D3-zinc ox 8 mg iron-800 mcg-1,000 unit Tab Take  by mouth.    vitamin renal formula, B-complex-vitamin c-folic acid, (NEPHROCAP) 1 mg Cap Take by mouth.    vortioxetine (BRINTELLIX) 5 mg Tab Take 1 tablet by mouth.     Family History     Problem Relation (Age of Onset)    Heart attack Sister, Sister, Mother        Tobacco Use    Smoking status: Former Smoker     Start date: 1/12/1991    Smokeless tobacco: Never Used   Substance and Sexual Activity    Alcohol use: No    Drug use: No    Sexual activity: Not on file     Review of Systems   Gastrointestinal: Negative for melena.   Genitourinary: Negative for hematuria.     Objective:     Vital Signs (Most Recent):  Temp: 97.6 °F (36.4 °C) (03/08/19 0305)  Pulse: 66 (03/08/19 0630)  Resp: 20 (03/08/19 0630)  BP: 133/61 (03/08/19 0630)  SpO2: 96 % (03/08/19 0630) Vital Signs (24h Range):  Temp:  [97.6 °F (36.4 °C)-98.4 °F (36.9 °C)] 97.6 °F (36.4 °C)  Pulse:  [] 66  Resp:  [16-28] 20  SpO2:  [70 %-100 %] 96 %  BP: (107-175)/(53-87) 133/61     Weight: 60.9 kg (134 lb 4.2 oz)  Body mass index is 23.05 kg/m².    SpO2: 96 %  O2 Device (Oxygen Therapy): nasal cannula      Intake/Output Summary (Last 24 hours) at 3/8/2019 0745  Last data filed at 3/8/2019 0600  Gross per 24 hour   Intake 998.02 ml   Output 500 ml   Net 498.02 ml       Lines/Drains/Airways     Central Venous Catheter Line                 Hemodialysis Catheter -- days          Drain                 Hemodialysis AV Fistula 03/06/19 2339 Left upper arm 1 day          Peripheral Intravenous Line                 External Jugular IV 03/06/19 1300 1 day         Peripheral IV - Single Lumen 03/06/19 1544 Right Hand 1 day                Physical Exam   Constitutional: She is oriented to person, place, and time. She appears well-developed and well-nourished. No distress.   HENT:   Head: Normocephalic and atraumatic.   Mouth/Throat: No oropharyngeal exudate.   Eyes: Conjunctivae and EOM are normal. Pupils are equal, round, and reactive to light. No scleral  icterus.   Neck: Normal range of motion. Neck supple. No JVD present. No tracheal deviation present. No thyromegaly present.   Cardiovascular: Normal rate, regular rhythm, S1 normal and S2 normal.  No extrasystoles are present. Exam reveals no gallop and no friction rub.   Murmur heard.   Systolic murmur is present with a grade of 2/6.  Pulmonary/Chest: Effort normal. No respiratory distress. She has no wheezes. She has no rales. She exhibits no tenderness.   Dec BS L base   Abdominal: Soft. She exhibits no distension.   Musculoskeletal: Normal range of motion. She exhibits no edema.   Neurological: She is alert and oriented to person, place, and time. No cranial nerve deficit.   Skin: Skin is warm and dry. She is not diaphoretic.   Psychiatric: She has a normal mood and affect. Her behavior is normal. Judgment normal.       Current Medications:   amiodarone  400 mg Oral BID    apixaban  2.5 mg Oral BID    atorvastatin  10 mg Oral Daily    busPIRone  10 mg Oral BID    cetirizine  10 mg Oral Daily    doxycycline  100 mg Oral Q12H    fluticasone  1 spray Each Nare BID    hydrALAZINE  25 mg Oral Q12H    metoprolol succinate  25 mg Oral Daily    mirtazapine  7.5 mg Oral Nightly    montelukast  10 mg Oral QHS    pantoprazole  40 mg Oral Daily    sevelamer carbonate  800 mg Oral TID WM    vitamin renal formula (B-complex-vitamin c-folic acid)  1 capsule Oral Daily      amiodarone in dextrose 5% 0.5 mg/min (03/08/19 0625)     acetaminophen, albuterol-ipratropium, cloNIDine, dextrose 50%, dextrose 50%, glucagon (human recombinant), glucose, glucose, insulin aspart U-100, ondansetron, promethazine-codeine 6.25-10 mg/5 ml, sodium chloride 0.9%    Laboratory (all labs reviewed):  CBC:  Recent Labs   Lab 01/09/18  2020 09/28/18  1010 03/06/19  1325 03/07/19  0647 03/08/19  0150   WHITE BLOOD CELL COUNT 3.76 L 3.04 L 11.61 8.65 8.65   HEMOGLOBIN 9.0 L 10.2 L 9.9 L 9.3 L 9.7 L   HEMATOCRIT 29.5 L 32.1 L 32.6 L 30.8  L 31.8 L   PLATELETS 201 158 348 265 313       CHEMISTRIES:  Recent Labs   Lab 02/26/17  1508 07/12/17  0110  01/09/18 2020 09/28/18  1010 03/06/19  1325 03/07/19  0647 03/08/19  0150   GLUCOSE 101 99   < > 87 82 117 H 93 103   SODIUM 140 139   < > 138 140 139 138 134 L   POTASSIUM 3.3 L 4.5   < > 3.8 5.2 H 5.3 H 6.0 H 5.8 H   BUN BLD 13 29 H   < > 20 38 H 56 H 69 H 54 H   CREATININE 6.5 H 7.7 H   < > 5.0 H 7.1 H 10.2 H 11.0 H 8.6 H   EGFR IF  7 A 6 A   < > 9 A 6.1 A 4 A 4 A 5 A   EGFR IF NON- 6 A 5 A   < > 8 A 5.3 A 3 A 3 A 4 A   CALCIUM 10.6 H 10.1   < > 9.5 9.5 10.6 H 10.7 H 10.7 H   MAGNESIUM 1.9 1.8  --   --   --  2.0  --   --     < > = values in this interval not displayed.       CARDIAC BIOMARKERS:  Recent Labs   Lab 02/03/19  1842 03/06/19  1325 03/06/19  1759 03/06/19  2344 03/07/19  0647   TROPONIN I 0.036 H 0.443 H 0.473 H 0.596 H 0.655 H       COAGS:  Recent Labs   Lab 02/26/17  1508 01/09/18 2020   INR 1.1 1.1       LIPIDS/LFTS:  Recent Labs   Lab 02/26/17  1508 07/12/17  0110 07/13/17  0559 01/09/18 2020 09/28/18  1010 03/06/19  1325   CHOLESTEROL  --   --  208 H  --  215 H  --    TRIGLYCERIDES  --   --  73  --  49  --    HDL  --   --  76 H  --  109 H  --    LDL CHOLESTEROL  --   --  117.4  --  96.2  --    NON-HDL CHOLESTEROL  --   --  132  --  106  --    AST 15 27  --  18 25 14   ALT 10 29  --  7 L 10 7 L       BNP:  Recent Labs   Lab 02/26/17  1508 07/12/17  0110 01/09/18  2020 03/06/19  1325   BNP 3,789 H >4,900 H 4,023 H 636 H       TSH:  Recent Labs   Lab 02/26/17  1508 03/06/19  1325   TSH 2.386 3.708       Free T4:        Diagnostic Results:  ECG (personally reviewed and interpreted tracing(s)):  3/6/19 1235   3/6/19 1417 SR 95, PRWP/low volt, NSSTTW changes, similar to 2/25/19    Chest X-Ray (personally reviewed and interpreted image(s)): 3/6/19 ?mild CHF, similar to 2/25/19    Echo: 2/4/19  · Normal left ventricular systolic function. The estimated  ejection fraction is 70%  · Concentric left ventricular hypertrophy.  · Grade II (moderate) left ventricular diastolic dysfunction consistent with pseudonormalization.  · Moderate left atrial enlargement.  · Normal right ventricular systolic function.  · Moderate-to-severe mitral regurgitation.  · Mild tricuspid regurgitation.  · The estimated PA systolic pressure is 44 mm Hg  · Pulmonary hypertension present.    Stress Test: L MPI 2/4/19  · There is a mild, infarct defect(s) in the lateral apical wall(s),  · An ejection fraction of 72 % at rest  · The EKG portion of this study is negative for myocardial ischemia.

## 2019-03-08 NOTE — CONSULTS
Follow-up Information     Elgin Garcia MD On 3/21/2019.    Specialty:  Cardiology  Why:  Outpatient Services @2:00pm   Contact information:  [address] [place] [address]  [phone]             Chiara Nelson MD On 3/11/2019.    Specialty:  Family Medicine  Why:  Outpatient Services@ 10:00am   Contact information:  [address]  [place] [address]  [phone]

## 2019-03-08 NOTE — ASSESSMENT & PLAN NOTE
Patient presented to ER with rapid AFib.  No hx of known AFib.  Probably PAF.  With episodes breakthrough RVR, but none today thus far  Is on PO amio and thus far stable (mainly rate controlled)  Appreciate further cardiology recs

## 2019-03-08 NOTE — ASSESSMENT & PLAN NOTE
In and out of PAF in ICU overnight, currently in SR.  CHADSVASC at least 5  Cont amio po, stop gtt today.  Titrate toprol  Cont eliquis 2.5mg bid (borderline for 5mg bid given weight 61kg)  No need to repeat echo or plan for isch eval.

## 2019-03-09 LAB
ANION GAP SERPL CALC-SCNC: 17 MMOL/L
BASOPHILS # BLD AUTO: 0.01 K/UL
BASOPHILS NFR BLD: 0.1 %
BUN SERPL-MCNC: 77 MG/DL
CALCIUM SERPL-MCNC: 10.4 MG/DL
CHLORIDE SERPL-SCNC: 96 MMOL/L
CO2 SERPL-SCNC: 20 MMOL/L
CREAT SERPL-MCNC: 11 MG/DL
DIFFERENTIAL METHOD: ABNORMAL
EOSINOPHIL # BLD AUTO: 0.1 K/UL
EOSINOPHIL NFR BLD: 0.8 %
ERYTHROCYTE [DISTWIDTH] IN BLOOD BY AUTOMATED COUNT: 14.5 %
EST. GFR  (AFRICAN AMERICAN): 4 ML/MIN/1.73 M^2
EST. GFR  (NON AFRICAN AMERICAN): 3 ML/MIN/1.73 M^2
GLUCOSE SERPL-MCNC: 98 MG/DL
HCT VFR BLD AUTO: 29.7 %
HGB BLD-MCNC: 9.3 G/DL
LYMPHOCYTES # BLD AUTO: 0.6 K/UL
LYMPHOCYTES NFR BLD: 7.2 %
MCH RBC QN AUTO: 30.2 PG
MCHC RBC AUTO-ENTMCNC: 31.3 G/DL
MCV RBC AUTO: 96 FL
MONOCYTES # BLD AUTO: 1.3 K/UL
MONOCYTES NFR BLD: 14.7 %
NEUTROPHILS # BLD AUTO: 6.8 K/UL
NEUTROPHILS NFR BLD: 77.2 %
PLATELET # BLD AUTO: 291 K/UL
PMV BLD AUTO: 9.5 FL
POCT GLUCOSE: 102 MG/DL (ref 70–110)
POCT GLUCOSE: 105 MG/DL (ref 70–110)
POCT GLUCOSE: 129 MG/DL (ref 70–110)
POCT GLUCOSE: 131 MG/DL (ref 70–110)
POCT GLUCOSE: 54 MG/DL (ref 70–110)
POCT GLUCOSE: 68 MG/DL (ref 70–110)
POCT GLUCOSE: 95 MG/DL (ref 70–110)
POTASSIUM SERPL-SCNC: 6.1 MMOL/L
RBC # BLD AUTO: 3.08 M/UL
SODIUM SERPL-SCNC: 133 MMOL/L
WBC # BLD AUTO: 8.78 K/UL

## 2019-03-09 PROCEDURE — 27000221 HC OXYGEN, UP TO 24 HOURS

## 2019-03-09 PROCEDURE — 99233 PR SUBSEQUENT HOSPITAL CARE,LEVL III: ICD-10-PCS | Mod: ,,, | Performed by: INTERNAL MEDICINE

## 2019-03-09 PROCEDURE — 99233 SBSQ HOSP IP/OBS HIGH 50: CPT | Mod: ,,, | Performed by: INTERNAL MEDICINE

## 2019-03-09 PROCEDURE — 20000000 HC ICU ROOM

## 2019-03-09 PROCEDURE — 63600175 PHARM REV CODE 636 W HCPCS: Performed by: INTERNAL MEDICINE

## 2019-03-09 PROCEDURE — 94640 AIRWAY INHALATION TREATMENT: CPT

## 2019-03-09 PROCEDURE — 25000242 PHARM REV CODE 250 ALT 637 W/ HCPCS: Performed by: INTERNAL MEDICINE

## 2019-03-09 PROCEDURE — 80048 BASIC METABOLIC PNL TOTAL CA: CPT

## 2019-03-09 PROCEDURE — 36415 COLL VENOUS BLD VENIPUNCTURE: CPT

## 2019-03-09 PROCEDURE — 99900035 HC TECH TIME PER 15 MIN (STAT)

## 2019-03-09 PROCEDURE — 25000003 PHARM REV CODE 250: Performed by: INTERNAL MEDICINE

## 2019-03-09 PROCEDURE — 80100014 HC HEMODIALYSIS 1:1

## 2019-03-09 PROCEDURE — 94664 DEMO&/EVAL PT USE INHALER: CPT

## 2019-03-09 PROCEDURE — 25000003 PHARM REV CODE 250: Performed by: EMERGENCY MEDICINE

## 2019-03-09 PROCEDURE — 85025 COMPLETE CBC W/AUTO DIFF WBC: CPT

## 2019-03-09 PROCEDURE — 63600175 PHARM REV CODE 636 W HCPCS: Mod: JG | Performed by: INTERNAL MEDICINE

## 2019-03-09 PROCEDURE — 25000003 PHARM REV CODE 250: Performed by: HOSPITALIST

## 2019-03-09 RX ORDER — DOCUSATE SODIUM 100 MG/1
100 CAPSULE, LIQUID FILLED ORAL DAILY
Status: DISCONTINUED | OUTPATIENT
Start: 2019-03-10 | End: 2019-03-09

## 2019-03-09 RX ORDER — DILTIAZEM HYDROCHLORIDE 5 MG/ML
10 INJECTION INTRAVENOUS ONCE
Status: DISCONTINUED | OUTPATIENT
Start: 2019-03-09 | End: 2019-03-09

## 2019-03-09 RX ORDER — DOCUSATE SODIUM 100 MG/1
100 CAPSULE, LIQUID FILLED ORAL DAILY
Status: DISCONTINUED | OUTPATIENT
Start: 2019-03-09 | End: 2019-03-11 | Stop reason: HOSPADM

## 2019-03-09 RX ORDER — METOPROLOL SUCCINATE 50 MG/1
50 TABLET, EXTENDED RELEASE ORAL 2 TIMES DAILY
Status: DISCONTINUED | OUTPATIENT
Start: 2019-03-09 | End: 2019-03-11 | Stop reason: HOSPADM

## 2019-03-09 RX ORDER — AMIODARONE HYDROCHLORIDE 200 MG/1
400 TABLET ORAL 3 TIMES DAILY
Status: DISCONTINUED | OUTPATIENT
Start: 2019-03-09 | End: 2019-03-10

## 2019-03-09 RX ADMIN — FLUTICASONE PROPIONATE 50 MCG: 50 SPRAY, METERED NASAL at 10:03

## 2019-03-09 RX ADMIN — AMIODARONE HYDROCHLORIDE 400 MG: 200 TABLET ORAL at 10:03

## 2019-03-09 RX ADMIN — AMIODARONE HYDROCHLORIDE 400 MG: 200 TABLET ORAL at 08:03

## 2019-03-09 RX ADMIN — Medication 1 CAPSULE: at 05:03

## 2019-03-09 RX ADMIN — ERYTHROPOIETIN 10000 UNITS: 10000 INJECTION, SOLUTION INTRAVENOUS; SUBCUTANEOUS at 09:03

## 2019-03-09 RX ADMIN — AMIODARONE HYDROCHLORIDE 1 MG/MIN: 1.8 INJECTION, SOLUTION INTRAVENOUS at 07:03

## 2019-03-09 RX ADMIN — AMIODARONE HYDROCHLORIDE 1 MG/MIN: 1.8 INJECTION, SOLUTION INTRAVENOUS at 01:03

## 2019-03-09 RX ADMIN — CETIRIZINE HYDROCHLORIDE 10 MG: 10 TABLET, FILM COATED ORAL at 08:03

## 2019-03-09 RX ADMIN — DOCUSATE SODIUM 100 MG: 100 CAPSULE, LIQUID FILLED ORAL at 05:03

## 2019-03-09 RX ADMIN — METOPROLOL SUCCINATE 50 MG: 50 TABLET, EXTENDED RELEASE ORAL at 10:03

## 2019-03-09 RX ADMIN — METOPROLOL SUCCINATE 50 MG: 50 TABLET, EXTENDED RELEASE ORAL at 08:03

## 2019-03-09 RX ADMIN — DEXTROSE MONOHYDRATE 12.5 G: 25 INJECTION, SOLUTION INTRAVENOUS at 01:03

## 2019-03-09 RX ADMIN — SEVELAMER CARBONATE 800 MG: 800 TABLET, FILM COATED ORAL at 05:03

## 2019-03-09 RX ADMIN — DOXYCYCLINE HYCLATE 100 MG: 100 TABLET, COATED ORAL at 08:03

## 2019-03-09 RX ADMIN — SEVELAMER CARBONATE 800 MG: 800 TABLET, FILM COATED ORAL at 08:03

## 2019-03-09 RX ADMIN — IPRATROPIUM BROMIDE AND ALBUTEROL SULFATE 3 ML: .5; 3 SOLUTION RESPIRATORY (INHALATION) at 11:03

## 2019-03-09 RX ADMIN — MIRTAZAPINE 7.5 MG: 7.5 TABLET ORAL at 10:03

## 2019-03-09 RX ADMIN — AMIODARONE HYDROCHLORIDE 150 MG: 1.5 INJECTION, SOLUTION INTRAVENOUS at 01:03

## 2019-03-09 RX ADMIN — BUSPIRONE HYDROCHLORIDE 10 MG: 10 TABLET ORAL at 10:03

## 2019-03-09 RX ADMIN — AMIODARONE HYDROCHLORIDE 400 MG: 200 TABLET ORAL at 03:03

## 2019-03-09 RX ADMIN — SEVELAMER CARBONATE 800 MG: 800 TABLET, FILM COATED ORAL at 12:03

## 2019-03-09 RX ADMIN — ATORVASTATIN CALCIUM 10 MG: 10 TABLET, FILM COATED ORAL at 08:03

## 2019-03-09 RX ADMIN — APIXABAN 2.5 MG: 2.5 TABLET, FILM COATED ORAL at 10:03

## 2019-03-09 RX ADMIN — APIXABAN 2.5 MG: 2.5 TABLET, FILM COATED ORAL at 08:03

## 2019-03-09 RX ADMIN — BUSPIRONE HYDROCHLORIDE 10 MG: 10 TABLET ORAL at 08:03

## 2019-03-09 RX ADMIN — FLUTICASONE PROPIONATE 50 MCG: 50 SPRAY, METERED NASAL at 08:03

## 2019-03-09 RX ADMIN — MONTELUKAST SODIUM 10 MG: 10 TABLET, FILM COATED ORAL at 10:03

## 2019-03-09 RX ADMIN — PANTOPRAZOLE SODIUM 40 MG: 40 TABLET, DELAYED RELEASE ORAL at 08:03

## 2019-03-09 RX ADMIN — DOXYCYCLINE HYCLATE 100 MG: 100 TABLET, COATED ORAL at 10:03

## 2019-03-09 NOTE — SUBJECTIVE & OBJECTIVE
Interval History: hypoglycemic today. Resolved. Back in Afib with Rvr    Review of Systems   Respiratory: Negative.    Cardiovascular: Negative.    Gastrointestinal: Negative.      Objective:     Vital Signs (Most Recent):  Temp: 97.7 °F (36.5 °C) (03/09/19 1300)  Pulse: 62 (03/09/19 1700)  Resp: (!) 25 (03/09/19 1700)  BP: (!) 106/54 (03/09/19 1700)  SpO2: (!) 93 % (03/09/19 1700) Vital Signs (24h Range):  Temp:  [97.4 °F (36.3 °C)-98.8 °F (37.1 °C)] 97.7 °F (36.5 °C)  Pulse:  [] 62  Resp:  [20-41] 25  SpO2:  [51 %-100 %] 93 %  BP: ()/(46-73) 106/54     Weight: 62.3 kg (137 lb 5.6 oz)  Body mass index is 23.58 kg/m².    Intake/Output Summary (Last 24 hours) at 3/9/2019 1720  Last data filed at 3/9/2019 1700  Gross per 24 hour   Intake 1383.23 ml   Output 1902 ml   Net -518.77 ml      Physical Exam   Constitutional: She is oriented to person, place, and time. She appears well-developed. No distress.   Eyes: Right eye exhibits no discharge. Left eye exhibits no discharge.   Cardiovascular: Normal rate and regular rhythm.   Pulmonary/Chest: Effort normal. No respiratory distress. She has no wheezes. She has no rales.   Abdominal: Soft. Bowel sounds are normal.   Musculoskeletal: She exhibits no deformity.   Neurological: She is alert and oriented to person, place, and time.   Skin: Skin is warm and dry. She is not diaphoretic.   Psychiatric: She has a normal mood and affect. Her behavior is normal. Judgment and thought content normal.   Nursing note and vitals reviewed.      Significant Labs: All pertinent labs within the past 24 hours have been reviewed.    Significant Imaging: I have reviewed all pertinent imaging results/findings within the past 24 hours.  I have reviewed and interpreted all pertinent imaging results/findings within the past 24 hours.

## 2019-03-09 NOTE — PROGRESS NOTES
Eli Vaz is a 73 y.o. female patient.    Follow for ESRD, dialysis    Patient seen while on dialysis  No new c/o, feeling better      Scheduled Meds:   sodium chloride 0.9%   Intravenous Once    amiodarone  400 mg Oral BID    apixaban  2.5 mg Oral BID    atorvastatin  10 mg Oral Daily    busPIRone  10 mg Oral BID    cetirizine  10 mg Oral Daily    doxycycline  100 mg Oral Q12H    epoetin cat  10,000 Units Intravenous Every Tues, Thurs, Sat    fluticasone  1 spray Each Nare BID    metoprolol succinate  50 mg Oral Daily    mirtazapine  7.5 mg Oral Nightly    montelukast  10 mg Oral QHS    pantoprazole  40 mg Oral Daily    sevelamer carbonate  800 mg Oral TID WM    vitamin renal formula (B-complex-vitamin c-folic acid)  1 capsule Oral Daily       Review of patient's allergies indicates:  No Known Allergies      Vital Signs Range (Last 24H):  Temp:  [97.4 °F (36.3 °C)-98.8 °F (37.1 °C)]   Pulse:  []   Resp:  [22-33]   BP: ()/(46-73)   SpO2:  [51 %-100 %]     I & O (Last 24H):No intake or output data in the 24 hours ending 03/09/19 1324        Physical Exam:  General appearance: well developed, well nourished, no distress  Lungs:  clear to auscultation bilaterally and normal respiratory effort  Heart: irregular rhythm and rate  Abdomen: soft, non-tender non-distented; bowel sounds normal; no masses,  no organomegaly  Extremities: no cyanosis or edema, or clubbing    Laboratory:  CBC:   Recent Labs   Lab 03/09/19  0308   WBC 8.78   RBC 3.08*   HGB 9.3*   HCT 29.7*      MCV 96   MCH 30.2   MCHC 31.3*     CMP:   Recent Labs   Lab 03/06/19  1325  03/09/19  0445   *   < > 98   CALCIUM 10.6*   < > 10.4   ALBUMIN 3.2*  --   --    PROT 7.6  --   --       < > 133*   K 5.3*   < > 6.1*   CO2 25   < > 20*   CL 96   < > 96   BUN 56*   < > 77*   CREATININE 10.2*   < > 11.0*   ALKPHOS 75  --   --    ALT 7*  --   --    AST 14  --   --    BILITOT 0.5  --   --     < > = values in  this interval not displayed.     Imp/Plan    ESRD - on dialysis, tolerated well, stable  Pneumonia  HTN  DM type 2  Afib  Anemia of CKD    Continue present Rx  HD q TTS  We'll follow for dialysis    Tracarmen Grant  3/9/2019

## 2019-03-09 NOTE — PROGRESS NOTES
[facility] Hospital Medicine  Progress Note    Patient Name: Eli Vaz  MRN: [id number]  Patient Class: IP- Inpatient   Admission Date: 3/6/2019  Length of Stay: 3 days  Attending Physician: Carmelita Candelaria MD  Primary Care Provider: Chiara Nelson MD        Subjective:     Principal Problem:Atrial fibrillation with RVR    HPI:  74 y/o female with history of ESRD and COPD presents with shortness of breath.  She states that symptoms started around 6:00 a.m. this morning.  She also complains of left-sided chest pressure.  Pain is not associated with shortness of breath and has been presented for a while.  Patient also complains of chest congestion.  States she has been having a dry cough for several days.  Chest pain is worse with cough and deep breathing.  Chest pain is left-sided, nonradiating.  No recent fever.  No abdominal pain, nausea or vomiting. No previous episodes of abnormal heart rhythms, no palpitations.  Last dialysis was 2 days ago via left upper extremity AV fistula.  She presented to ER where she was noted to be in rapid AFib.  Patient was placed on Amiodarone with conversion to NSR.  No hx of Afib.  No other complaints.        Hospital Course:  No notes on file    Interval History: hypoglycemic today. Resolved. Back in Afib with Rvr    Review of Systems   Respiratory: Negative.    Cardiovascular: Negative.    Gastrointestinal: Negative.      Objective:     Vital Signs (Most Recent):  Temp: 97.7 °F (36.5 °C) (03/09/19 1300)  Pulse: 62 (03/09/19 1700)  Resp: (!) 25 (03/09/19 1700)  BP: (!) 106/54 (03/09/19 1700)  SpO2: (!) 93 % (03/09/19 1700) Vital Signs (24h Range):  Temp:  [97.4 °F (36.3 °C)-98.8 °F (37.1 °C)] 97.7 °F (36.5 °C)  Pulse:  [] 62  Resp:  [20-41] 25  SpO2:  [51 %-100 %] 93 %  BP: ()/(46-73) 106/54     Weight: 62.3 kg (137 lb 5.6 oz)  Body mass index is 23.58 kg/m².    Intake/Output Summary (Last 24 hours) at 3/9/2019 1720  Last data filed at 3/9/2019  1700  Gross per 24 hour   Intake 1383.23 ml   Output 1902 ml   Net -518.77 ml      Physical Exam   Constitutional: She is oriented to person, place, and time. She appears well-developed. No distress.   Eyes: Right eye exhibits no discharge. Left eye exhibits no discharge.   Cardiovascular: Normal rate and regular rhythm.   Pulmonary/Chest: Effort normal. No respiratory distress. She has no wheezes. She has no rales.   Abdominal: Soft. Bowel sounds are normal.   Musculoskeletal: She exhibits no deformity.   Neurological: She is alert and oriented to person, place, and time.   Skin: Skin is warm and dry. She is not diaphoretic.   Psychiatric: She has a normal mood and affect. Her behavior is normal. Judgment and thought content normal.   Nursing note and vitals reviewed.      Significant Labs: All pertinent labs within the past 24 hours have been reviewed.    Significant Imaging: I have reviewed all pertinent imaging results/findings within the past 24 hours.  I have reviewed and interpreted all pertinent imaging results/findings within the past 24 hours.    Assessment/Plan:      * Atrial fibrillation with RVR    Patient presented to ER with rapid AFib.  No hx of known AFib.  Probably PAF.  With breakthrough RVR again today. Back on amio infusion in addition to PO amio + metoprolol  Dialysis for hyperkalemia  Appreciate further cardiology recs       Chest pain    Seems more pleuritic and musculoskeletal.  Secondary to persistent cough.         Hypertension    Essential  Continue home medications and monitor.       Combined hyperlipidemia associated with type 2 diabetes mellitus    Continue Statin.       Diabetes mellitus with ESRD (end-stage renal disease)    Diabetic diet and insulin sliding scale.       Anemia of chronic disease    H/H similar to previous labs.  No evidence of bleeding.       ESRD on dialysis    Dialysis today       COPD (chronic obstructive pulmonary disease)    No acute exacerbation as no wheezing  on exam.  Does complain of congestion.  Will provide Xopenex nebs.  Monitor.         VTE Risk Mitigation (From admission, onward)        Ordered     apixaban tablet 2.5 mg  2 times daily      03/06/19 2042     IP VTE HIGH RISK PATIENT  Once      03/06/19 1635     Place sequential compression device  Until discontinued      03/06/19 1635          Critical care time spent on the evaluation and treatment of severe organ dysfunction, review of pertinent labs and imaging studies, discussions with consulting providers and discussions with patient/family: > 45 minutes.    Carmelita Lucero MD  Department of Hospital Medicine   [facility]

## 2019-03-09 NOTE — PLAN OF CARE
Problem: Adult Inpatient Plan of Care  Goal: Patient-Specific Goal (Individualization)  Outcome: Ongoing (interventions implemented as appropriate)  Pt continues in ICU on NC. No insulin coverage needed last night.  VSS. Afebrile. No new injury or fall noted. Pt shows no signs or symptoms of distress.

## 2019-03-09 NOTE — PLAN OF CARE
Problem: Device-Related Complication Risk (Hemodialysis)  Goal: Safe, Effective Therapy Delivery    Intervention: Optimize Device Care and Function   03/09/19 1000   Optimize Blood Flow   Circuit Management air detection alarms on;circuit line warming device in use;tubing repositioned;tubing/circuit/filter adjusted   Manage Acute Allergic Reaction   Medication Review/Management medications reviewed;high risk medications identified         Comments: Intermittent hemodialysis in progress as ordered.

## 2019-03-09 NOTE — ASSESSMENT & PLAN NOTE
Patient presented to ER with rapid AFib.  No hx of known AFib.  Probably PAF.  With breakthrough RVR again today. Back on amio infusion in addition to PO amio + metoprolol  Dialysis for hyperkalemia  Appreciate further cardiology recs

## 2019-03-09 NOTE — ASSESSMENT & PLAN NOTE
In and out of PAF in ICU overnight, currently in AF/RVR as of 1039 this am.  CHADSVASC at least 5  Cont amio po inc to tid 400mg, gtt restarted.  Titrate toprol 50mg bid  Cont eliquis 2.5mg bid (borderline for 5mg bid given weight 61kg)  No need to repeat echo or plan for isch eval.

## 2019-03-09 NOTE — NURSING
Pt with noted a-fib rate 120s-130s, notified Dr. Isela MD stated to order amio bolus and restart amio infusion.

## 2019-03-09 NOTE — SUBJECTIVE & OBJECTIVE
Past Medical History:   Diagnosis Date    Arthritis     COPD (chronic obstructive pulmonary disease)     Diabetes mellitus type II     Dialysis patient     Encounter for blood transfusion     ESRD (end stage renal disease)        Past Surgical History:   Procedure Laterality Date    AV FISTULA PLACEMENT      TUBAL LIGATION         Review of patient's allergies indicates:  No Known Allergies    No current facility-administered medications on file prior to encounter.      Current Outpatient Medications on File Prior to Encounter   Medication Sig    albuterol (ACCUNEB) 1.25 mg/3 mL Nebu Take 3 mLs (1.25 mg total) by nebulization every 6 (six) hours as needed.    albuterol-ipratropium (DUO-NEB) 2.5 mg-0.5 mg/3 mL nebulizer solution Take 3 mLs by nebulization every 6 (six) hours as needed for Wheezing or Shortness of Breath. Rescue    amLODIPine (NORVASC) 10 MG tablet TAKE 1 TABLET (10 MG TOTAL) BY MOUTH ONCE DAILY.    atorvastatin (LIPITOR) 10 MG tablet Take 1 tablet (10 mg total) by mouth once daily.    busPIRone (BUSPAR) 10 MG tablet Take 10 mg by mouth 2 (two) times daily.    cloNIDine (CATAPRES) 0.2 MG tablet Take 0.2 mg by mouth.    diclofenac sodium (VOLTAREN) 1 % Gel Apply 4 g topically.    doxycycline (VIBRAMYCIN) 100 MG Cap Take 1 capsule (100 mg total) by mouth every 12 (twelve) hours. for 10 days    epoetin cat (PROCRIT) 3,000 unit/mL injection Inject 3,000 Units into the skin.    fluticasone (FLONASE) 50 mcg/actuation nasal spray 1 spray by Each Nare route 2 (two) times daily.    FOLIC ACID/VITAMIN B COMP W-C (RENAL CAPS ORAL) Take by mouth Daily.    gentamicin sulfate, PF, 60 mg/6 mL Soln Inject into the vein.    guaiFENesin (MUCINEX) 1,200 mg Ta12 Take 1 tablet by mouth 2 (two) times daily. for 14 days    guaifenesin-codeine 100-10 mg/5 ml (CHERATUSSIN AC)  mg/5 mL syrup Take 5 mLs by mouth every 12 (twelve) hours as needed for Cough or Congestion.    hydrALAZINE (APRESOLINE)  25 MG tablet Take 1 tablet (25 mg total) by mouth every 12 (twelve) hours.    HYDROcodone-acetaminophen (NORCO) 7.5-325 mg per tablet Take 1 tablet by mouth every 8 (eight) hours as needed for Pain.    [START ON 3/14/2019] HYDROcodone-acetaminophen (NORCO) 7.5-325 mg per tablet Take 1 tablet by mouth every 8 (eight) hours as needed for Pain.    [START ON 4/13/2019] HYDROcodone-acetaminophen (NORCO) 7.5-325 mg per tablet Take 1 tablet by mouth every 8 (eight) hours as needed for Pain.    hydrOXYzine pamoate (VISTARIL) 25 MG Cap Take 1 capsule (25 mg total) by mouth every 8 (eight) hours as needed (panic attack).    levocetirizine (XYZAL) 5 MG tablet Take 1 tablet (5 mg total) by mouth every evening.    meclizine (ANTIVERT) 25 mg tablet Take 1 tablet (25 mg total) by mouth 3 (three) times daily as needed.    metoprolol succinate (TOPROL-XL) 25 MG 24 hr tablet Take 25 mg by mouth.    mirtazapine (REMERON) 7.5 MG Tab Take 7.5 mg by mouth nightly.     montelukast (SINGULAIR) 10 mg tablet Take 1 tablet (10 mg total) by mouth every evening.    pantoprazole (PROTONIX) 40 MG tablet Take 40 mg by mouth.    paroxetine (PAXIL) 30 MG tablet 1 TABLET IN THE MORNING ONCE A DAY ORALLY 90    predniSONE (DELTASONE) 10 MG tablet 2 tab po qday x 4 days, then 1 tab po qday x 4 days, then 1/2 tab po qday x 4 days    promethazine (PHENERGAN) 12.5 MG Tab Take 12.5 mg by mouth every 6 (six) hours as needed.    SENSIPAR 60 mg Tab Take 30 mg by mouth daily with breakfast.     sevelamer carbonate (RENVELA) 800 mg Tab Take 800 mg by mouth 3 (three) times daily with meals.    SODIUM BICARBONATE ORAL Take 650 mg by mouth.    traZODone (DESYREL) 50 MG tablet Take 1 tablet (50 mg total) by mouth nightly as needed for Insomnia.    VENTOLIN HFA 90 mcg/actuation inhaler INHALE 2 PUFFS INTO LUNGS EVERY 4 HOURS AS NEEDED FOR SHORTNESS OF BREATH OR WHEEZING. RESCUE    vit B,C-iron fum-FA-D3-zinc ox 8 mg iron-800 mcg-1,000 unit Tab Take  by mouth.    vitamin renal formula, B-complex-vitamin c-folic acid, (NEPHROCAP) 1 mg Cap Take by mouth.    vortioxetine (BRINTELLIX) 5 mg Tab Take 1 tablet by mouth.     Family History     Problem Relation (Age of Onset)    Heart attack Sister, Sister, Mother        Tobacco Use    Smoking status: Former Smoker     Start date: 1/12/1991    Smokeless tobacco: Never Used   Substance and Sexual Activity    Alcohol use: No    Drug use: No    Sexual activity: Not on file     Review of Systems   Gastrointestinal: Negative for melena.   Genitourinary: Negative for hematuria.     Objective:     Vital Signs (Most Recent):  Temp: 97.4 °F (36.3 °C) (03/09/19 0900)  Pulse: (!) 135 (03/09/19 1342)  Resp: (!) 37 (03/09/19 1342)  BP: (!) 104/57 (03/09/19 1245)  SpO2: (!) 79 % (03/09/19 1342) Vital Signs (24h Range):  Temp:  [97.4 °F (36.3 °C)-98.8 °F (37.1 °C)] 97.4 °F (36.3 °C)  Pulse:  [] 135  Resp:  [22-37] 37  SpO2:  [51 %-100 %] 79 %  BP: ()/(46-73) 104/57     Weight: 62.3 kg (137 lb 5.6 oz)  Body mass index is 23.58 kg/m².    SpO2: (!) 79 %  O2 Device (Oxygen Therapy): nasal cannula      Intake/Output Summary (Last 24 hours) at 3/9/2019 1404  Last data filed at 3/9/2019 1343  Gross per 24 hour   Intake 100 ml   Output --   Net 100 ml       Lines/Drains/Airways     Central Venous Catheter Line                 Hemodialysis Catheter -- days          Drain                 Hemodialysis AV Fistula 03/06/19 2339 Left upper arm 2 days          Peripheral Intravenous Line                 External Jugular IV 03/06/19 1300 3 days         Peripheral IV - Single Lumen 03/06/19 1544 Right Hand 2 days                Physical Exam   Constitutional: She is oriented to person, place, and time. She appears well-developed and well-nourished. No distress.   HENT:   Head: Normocephalic and atraumatic.   Mouth/Throat: No oropharyngeal exudate.   Eyes: Conjunctivae and EOM are normal. Pupils are equal, round, and reactive to  light. No scleral icterus.   Neck: Normal range of motion. Neck supple. No JVD present. No tracheal deviation present. No thyromegaly present.   Cardiovascular: S1 normal and S2 normal. An irregularly irregular rhythm present.  No extrasystoles are present. Tachycardia present. Exam reveals distant heart sounds. Exam reveals no gallop and no friction rub.   Murmur heard.   Systolic murmur is present with a grade of 2/6.  Pulmonary/Chest: Effort normal. No respiratory distress. She has no wheezes. She has no rales. She exhibits no tenderness.   Dec BS L base   Abdominal: Soft. She exhibits no distension.   Musculoskeletal: Normal range of motion. She exhibits no edema.   Neurological: She is alert and oriented to person, place, and time. No cranial nerve deficit.   Skin: Skin is warm and dry. She is not diaphoretic.   Psychiatric: She has a normal mood and affect. Her behavior is normal. Judgment normal.       Current Medications:   sodium chloride 0.9%   Intravenous Once    amiodarone  400 mg Oral BID    apixaban  2.5 mg Oral BID    atorvastatin  10 mg Oral Daily    busPIRone  10 mg Oral BID    cetirizine  10 mg Oral Daily    doxycycline  100 mg Oral Q12H    epoetin cat  10,000 Units Intravenous Every Tues, Thurs, Sat    fluticasone  1 spray Each Nare BID    metoprolol succinate  50 mg Oral Daily    mirtazapine  7.5 mg Oral Nightly    montelukast  10 mg Oral QHS    pantoprazole  40 mg Oral Daily    sevelamer carbonate  800 mg Oral TID WM    vitamin renal formula (B-complex-vitamin c-folic acid)  1 capsule Oral Daily      amiodarone in dextrose 5% 1 mg/min (03/09/19 1354)     sodium chloride 0.9%, acetaminophen, albuterol-ipratropium, cloNIDine, dextrose 50%, dextrose 50%, glucagon (human recombinant), glucose, glucose, ondansetron, promethazine-codeine 6.25-10 mg/5 ml, sodium chloride 0.9%    Laboratory (all labs reviewed):  CBC:  Recent Labs   Lab 09/28/18  1010 03/06/19  1325 03/07/19  0647  03/08/19  0150 03/09/19  0308   WHITE BLOOD CELL COUNT 3.04 L 11.61 8.65 8.65 8.78   HEMOGLOBIN 10.2 L 9.9 L 9.3 L 9.7 L 9.3 L   HEMATOCRIT 32.1 L 32.6 L 30.8 L 31.8 L 29.7 L   PLATELETS 158 348 265 313 291       CHEMISTRIES:  Recent Labs   Lab 02/26/17  1508 07/12/17  0110  09/28/18  1010 03/06/19  1325 03/07/19  0647 03/08/19  0150 03/09/19  0445   GLUCOSE 101 99   < > 82 117 H 93 103 98   SODIUM 140 139   < > 140 139 138 134 L 133 L   POTASSIUM 3.3 L 4.5   < > 5.2 H 5.3 H 6.0 H 5.8 H 6.1 H   BUN BLD 13 29 H   < > 38 H 56 H 69 H 54 H 77 H   CREATININE 6.5 H 7.7 H   < > 7.1 H 10.2 H 11.0 H 8.6 H 11.0 H   EGFR IF  7 A 6 A   < > 6.1 A 4 A 4 A 5 A 4 A   EGFR IF NON- 6 A 5 A   < > 5.3 A 3 A 3 A 4 A 3 A   CALCIUM 10.6 H 10.1   < > 9.5 10.6 H 10.7 H 10.7 H 10.4   MAGNESIUM 1.9 1.8  --   --  2.0  --   --   --     < > = values in this interval not displayed.       CARDIAC BIOMARKERS:  Recent Labs   Lab 02/03/19  1842 03/06/19  1325 03/06/19  1759 03/06/19  2344 03/07/19  0647   TROPONIN I 0.036 H 0.443 H 0.473 H 0.596 H 0.655 H       COAGS:  Recent Labs   Lab 02/26/17  1508 01/09/18  2020   INR 1.1 1.1       LIPIDS/LFTS:  Recent Labs   Lab 02/26/17  1508 07/12/17  0110 07/13/17  0559 01/09/18  2020 09/28/18  1010 03/06/19  1325   CHOLESTEROL  --   --  208 H  --  215 H  --    TRIGLYCERIDES  --   --  73  --  49  --    HDL  --   --  76 H  --  109 H  --    LDL CHOLESTEROL  --   --  117.4  --  96.2  --    NON-HDL CHOLESTEROL  --   --  132  --  106  --    AST 15 27  --  18 25 14   ALT 10 29  --  7 L 10 7 L       BNP:  Recent Labs   Lab 02/26/17  1508 07/12/17  0110 01/09/18 2020 03/06/19  1325   BNP 3,789 H >4,900 H 4,023 H 636 H       TSH:  Recent Labs   Lab 02/26/17  1508 03/06/19  1325   TSH 2.386 3.708       Free T4:        Diagnostic Results:  ECG (personally reviewed and interpreted tracing(s)):  3/6/19 1235   3/6/19 1417 SR 95, PRWP/low volt, NSSTTW changes, similar to  2/25/19    Chest X-Ray (personally reviewed and interpreted image(s)): 3/6/19 ?mild CHF, similar to 2/25/19    Echo: 2/4/19  · Normal left ventricular systolic function. The estimated ejection fraction is 70%  · Concentric left ventricular hypertrophy.  · Grade II (moderate) left ventricular diastolic dysfunction consistent with pseudonormalization.  · Moderate left atrial enlargement.  · Normal right ventricular systolic function.  · Moderate-to-severe mitral regurgitation.  · Mild tricuspid regurgitation.  · The estimated PA systolic pressure is 44 mm Hg  · Pulmonary hypertension present.    Stress Test: L MPI 2/4/19  · There is a mild, infarct defect(s) in the lateral apical wall(s),  · An ejection fraction of 72 % at rest  · The EKG portion of this study is negative for myocardial ischemia.

## 2019-03-09 NOTE — NURSING
Pt noted in moderate resp distress, leaning fwd, with audible crackles heard. States she feels SOB,  Showing controlled A-fib on monitor, prepared for dialysis tx. Planned UF = 2kg ; Labs reviewed, noted hyperkalemic with K+ 6.2. Continue with plan.

## 2019-03-09 NOTE — PROGRESS NOTES
[facility]  Cardiology  Progress Note    Patient Name: Eli Vaz  MRN: [id number]  Admission Date: 3/6/2019  Hospital Length of Stay: 3 days  Code Status: Full Code   Attending Physician: Carmelita Candelaria MD   Primary Care Physician: Chiara Nelson MD  Expected Discharge Date:   Principal Problem:Atrial fibrillation with RVR    Subjective:     Hospital Course:   3/6/19: adm to ICU with PAF/RVR, in and out of AF overnight  3/7/19: still with PAF despite amio gtt and rebolus    Interval Hx: pt seen in ICU, case d/w Dr. Lucero and RN.  No CP/SOB.  No palps, she is unaware of the AF/RVR from a symptomatic perspective.  Amio gtt restarted.    Tele: NSR-> AF/RVR 1039am 3/9/19 (personally reviewed and interpreted)      Past Medical History:   Diagnosis Date    Arthritis     COPD (chronic obstructive pulmonary disease)     Diabetes mellitus type II     Dialysis patient     Encounter for blood transfusion     ESRD (end stage renal disease)        Past Surgical History:   Procedure Laterality Date    AV FISTULA PLACEMENT      TUBAL LIGATION         Review of patient's allergies indicates:  No Known Allergies    No current facility-administered medications on file prior to encounter.      Current Outpatient Medications on File Prior to Encounter   Medication Sig    albuterol (ACCUNEB) 1.25 mg/3 mL Nebu Take 3 mLs (1.25 mg total) by nebulization every 6 (six) hours as needed.    albuterol-ipratropium (DUO-NEB) 2.5 mg-0.5 mg/3 mL nebulizer solution Take 3 mLs by nebulization every 6 (six) hours as needed for Wheezing or Shortness of Breath. Rescue    amLODIPine (NORVASC) 10 MG tablet TAKE 1 TABLET (10 MG TOTAL) BY MOUTH ONCE DAILY.    atorvastatin (LIPITOR) 10 MG tablet Take 1 tablet (10 mg total) by mouth once daily.    busPIRone (BUSPAR) 10 MG tablet Take 10 mg by mouth 2 (two) times daily.    cloNIDine (CATAPRES) 0.2 MG tablet Take 0.2 mg by mouth.    diclofenac sodium (VOLTAREN) 1 % Gel  Apply 4 g topically.    doxycycline (VIBRAMYCIN) 100 MG Cap Take 1 capsule (100 mg total) by mouth every 12 (twelve) hours. for 10 days    epoetin cat (PROCRIT) 3,000 unit/mL injection Inject 3,000 Units into the skin.    fluticasone (FLONASE) 50 mcg/actuation nasal spray 1 spray by Each Nare route 2 (two) times daily.    FOLIC ACID/VITAMIN B COMP W-C (RENAL CAPS ORAL) Take by mouth Daily.    gentamicin sulfate, PF, 60 mg/6 mL Soln Inject into the vein.    guaiFENesin (MUCINEX) 1,200 mg Ta12 Take 1 tablet by mouth 2 (two) times daily. for 14 days    guaifenesin-codeine 100-10 mg/5 ml (CHERATUSSIN AC)  mg/5 mL syrup Take 5 mLs by mouth every 12 (twelve) hours as needed for Cough or Congestion.    hydrALAZINE (APRESOLINE) 25 MG tablet Take 1 tablet (25 mg total) by mouth every 12 (twelve) hours.    HYDROcodone-acetaminophen (NORCO) 7.5-325 mg per tablet Take 1 tablet by mouth every 8 (eight) hours as needed for Pain.    [START ON 3/14/2019] HYDROcodone-acetaminophen (NORCO) 7.5-325 mg per tablet Take 1 tablet by mouth every 8 (eight) hours as needed for Pain.    [START ON 4/13/2019] HYDROcodone-acetaminophen (NORCO) 7.5-325 mg per tablet Take 1 tablet by mouth every 8 (eight) hours as needed for Pain.    hydrOXYzine pamoate (VISTARIL) 25 MG Cap Take 1 capsule (25 mg total) by mouth every 8 (eight) hours as needed (panic attack).    levocetirizine (XYZAL) 5 MG tablet Take 1 tablet (5 mg total) by mouth every evening.    meclizine (ANTIVERT) 25 mg tablet Take 1 tablet (25 mg total) by mouth 3 (three) times daily as needed.    metoprolol succinate (TOPROL-XL) 25 MG 24 hr tablet Take 25 mg by mouth.    mirtazapine (REMERON) 7.5 MG Tab Take 7.5 mg by mouth nightly.     montelukast (SINGULAIR) 10 mg tablet Take 1 tablet (10 mg total) by mouth every evening.    pantoprazole (PROTONIX) 40 MG tablet Take 40 mg by mouth.    paroxetine (PAXIL) 30 MG tablet 1 TABLET IN THE MORNING ONCE A DAY ORALLY 90     predniSONE (DELTASONE) 10 MG tablet 2 tab po qday x 4 days, then 1 tab po qday x 4 days, then 1/2 tab po qday x 4 days    promethazine (PHENERGAN) 12.5 MG Tab Take 12.5 mg by mouth every 6 (six) hours as needed.    SENSIPAR 60 mg Tab Take 30 mg by mouth daily with breakfast.     sevelamer carbonate (RENVELA) 800 mg Tab Take 800 mg by mouth 3 (three) times daily with meals.    SODIUM BICARBONATE ORAL Take 650 mg by mouth.    traZODone (DESYREL) 50 MG tablet Take 1 tablet (50 mg total) by mouth nightly as needed for Insomnia.    VENTOLIN HFA 90 mcg/actuation inhaler INHALE 2 PUFFS INTO LUNGS EVERY 4 HOURS AS NEEDED FOR SHORTNESS OF BREATH OR WHEEZING. RESCUE    vit B,C-iron fum-FA-D3-zinc ox 8 mg iron-800 mcg-1,000 unit Tab Take by mouth.    vitamin renal formula, B-complex-vitamin c-folic acid, (NEPHROCAP) 1 mg Cap Take by mouth.    vortioxetine (BRINTELLIX) 5 mg Tab Take 1 tablet by mouth.     Family History     Problem Relation (Age of Onset)    Heart attack Sister, Sister, Mother        Tobacco Use    Smoking status: Former Smoker     Start date: 1/12/1991    Smokeless tobacco: Never Used   Substance and Sexual Activity    Alcohol use: No    Drug use: No    Sexual activity: Not on file     Review of Systems   Gastrointestinal: Negative for melena.   Genitourinary: Negative for hematuria.     Objective:     Vital Signs (Most Recent):  Temp: 97.4 °F (36.3 °C) (03/09/19 0900)  Pulse: (!) 135 (03/09/19 1342)  Resp: (!) 37 (03/09/19 1342)  BP: (!) 104/57 (03/09/19 1245)  SpO2: (!) 79 % (03/09/19 1342) Vital Signs (24h Range):  Temp:  [97.4 °F (36.3 °C)-98.8 °F (37.1 °C)] 97.4 °F (36.3 °C)  Pulse:  [] 135  Resp:  [22-37] 37  SpO2:  [51 %-100 %] 79 %  BP: ()/(46-73) 104/57     Weight: 62.3 kg (137 lb 5.6 oz)  Body mass index is 23.58 kg/m².    SpO2: (!) 79 %  O2 Device (Oxygen Therapy): nasal cannula      Intake/Output Summary (Last 24 hours) at 3/9/2019 1406  Last data filed at 3/9/2019  1343  Gross per 24 hour   Intake 100 ml   Output --   Net 100 ml       Lines/Drains/Airways     Central Venous Catheter Line                 Hemodialysis Catheter -- days          Drain                 Hemodialysis AV Fistula 03/06/19 2339 Left upper arm 2 days          Peripheral Intravenous Line                 External Jugular IV 03/06/19 1300 3 days         Peripheral IV - Single Lumen 03/06/19 1544 Right Hand 2 days                Physical Exam   Constitutional: She is oriented to person, place, and time. She appears well-developed and well-nourished. No distress.   HENT:   Head: Normocephalic and atraumatic.   Mouth/Throat: No oropharyngeal exudate.   Eyes: Conjunctivae and EOM are normal. Pupils are equal, round, and reactive to light. No scleral icterus.   Neck: Normal range of motion. Neck supple. No JVD present. No tracheal deviation present. No thyromegaly present.   Cardiovascular: S1 normal and S2 normal. An irregularly irregular rhythm present.  No extrasystoles are present. Tachycardia present. Exam reveals distant heart sounds. Exam reveals no gallop and no friction rub.   Murmur heard.   Systolic murmur is present with a grade of 2/6.  Pulmonary/Chest: Effort normal. No respiratory distress. She has no wheezes. She has no rales. She exhibits no tenderness.   Dec BS L base   Abdominal: Soft. She exhibits no distension.   Musculoskeletal: Normal range of motion. She exhibits no edema.   Neurological: She is alert and oriented to person, place, and time. No cranial nerve deficit.   Skin: Skin is warm and dry. She is not diaphoretic.   Psychiatric: She has a normal mood and affect. Her behavior is normal. Judgment normal.       Current Medications:   sodium chloride 0.9%   Intravenous Once    amiodarone  400 mg Oral BID    apixaban  2.5 mg Oral BID    atorvastatin  10 mg Oral Daily    busPIRone  10 mg Oral BID    cetirizine  10 mg Oral Daily    doxycycline  100 mg Oral Q12H    epoetin cat   10,000 Units Intravenous Every Tues, Thurs, Sat    fluticasone  1 spray Each Nare BID    metoprolol succinate  50 mg Oral Daily    mirtazapine  7.5 mg Oral Nightly    montelukast  10 mg Oral QHS    pantoprazole  40 mg Oral Daily    sevelamer carbonate  800 mg Oral TID WM    vitamin renal formula (B-complex-vitamin c-folic acid)  1 capsule Oral Daily      amiodarone in dextrose 5% 1 mg/min (03/09/19 1354)     sodium chloride 0.9%, acetaminophen, albuterol-ipratropium, cloNIDine, dextrose 50%, dextrose 50%, glucagon (human recombinant), glucose, glucose, ondansetron, promethazine-codeine 6.25-10 mg/5 ml, sodium chloride 0.9%    Laboratory (all labs reviewed):  CBC:  Recent Labs   Lab 09/28/18  1010 03/06/19  1325 03/07/19  0647 03/08/19  0150 03/09/19  0308   WHITE BLOOD CELL COUNT 3.04 L 11.61 8.65 8.65 8.78   HEMOGLOBIN 10.2 L 9.9 L 9.3 L 9.7 L 9.3 L   HEMATOCRIT 32.1 L 32.6 L 30.8 L 31.8 L 29.7 L   PLATELETS 158 348 265 313 291       CHEMISTRIES:  Recent Labs   Lab 02/26/17  1508 07/12/17  0110  09/28/18  1010 03/06/19  1325 03/07/19  0647 03/08/19  0150 03/09/19  0445   GLUCOSE 101 99   < > 82 117 H 93 103 98   SODIUM 140 139   < > 140 139 138 134 L 133 L   POTASSIUM 3.3 L 4.5   < > 5.2 H 5.3 H 6.0 H 5.8 H 6.1 H   BUN BLD 13 29 H   < > 38 H 56 H 69 H 54 H 77 H   CREATININE 6.5 H 7.7 H   < > 7.1 H 10.2 H 11.0 H 8.6 H 11.0 H   EGFR IF  7 A 6 A   < > 6.1 A 4 A 4 A 5 A 4 A   EGFR IF NON- 6 A 5 A   < > 5.3 A 3 A 3 A 4 A 3 A   CALCIUM 10.6 H 10.1   < > 9.5 10.6 H 10.7 H 10.7 H 10.4   MAGNESIUM 1.9 1.8  --   --  2.0  --   --   --     < > = values in this interval not displayed.       CARDIAC BIOMARKERS:  Recent Labs   Lab 02/03/19  1842 03/06/19  1325 03/06/19  1759 03/06/19  2344 03/07/19  0647   TROPONIN I 0.036 H 0.443 H 0.473 H 0.596 H 0.655 H       COAGS:  Recent Labs   Lab 02/26/17  1508 01/09/18  2020   INR 1.1 1.1       LIPIDS/LFTS:  Recent Labs   Lab 02/26/17  1508  07/12/17  0110 07/13/17  0559 01/09/18 2020 09/28/18  1010 03/06/19  1325   CHOLESTEROL  --   --  208 H  --  215 H  --    TRIGLYCERIDES  --   --  73  --  49  --    HDL  --   --  76 H  --  109 H  --    LDL CHOLESTEROL  --   --  117.4  --  96.2  --    NON-HDL CHOLESTEROL  --   --  132  --  106  --    AST 15 27  --  18 25 14   ALT 10 29  --  7 L 10 7 L       BNP:  Recent Labs   Lab 02/26/17  1508 07/12/17  0110 01/09/18 2020 03/06/19  1325   BNP 3,789 H >4,900 H 4,023 H 636 H       TSH:  Recent Labs   Lab 02/26/17  1508 03/06/19  1325   TSH 2.386 3.708       Free T4:        Diagnostic Results:  ECG (personally reviewed and interpreted tracing(s)):  3/6/19 1235   3/6/19 1417 SR 95, PRWP/low volt, NSSTTW changes, similar to 2/25/19    Chest X-Ray (personally reviewed and interpreted image(s)): 3/6/19 ?mild CHF, similar to 2/25/19    Echo: 2/4/19  · Normal left ventricular systolic function. The estimated ejection fraction is 70%  · Concentric left ventricular hypertrophy.  · Grade II (moderate) left ventricular diastolic dysfunction consistent with pseudonormalization.  · Moderate left atrial enlargement.  · Normal right ventricular systolic function.  · Moderate-to-severe mitral regurgitation.  · Mild tricuspid regurgitation.  · The estimated PA systolic pressure is 44 mm Hg  · Pulmonary hypertension present.    Stress Test: L MPI 2/4/19  · There is a mild, infarct defect(s) in the lateral apical wall(s),  · An ejection fraction of 72 % at rest  · The EKG portion of this study is negative for myocardial ischemia.    Assessment and Plan:     * Atrial fibrillation with RVR    In and out of PAF in ICU overnight, currently in AF/RVR as of 1039 this am.  CHADSVASC at least 5  Cont amio po inc to tid 400mg, gtt restarted.  Titrate toprol 50mg bid  Cont eliquis 2.5mg bid (borderline for 5mg bid given weight 61kg)  No need to repeat echo or plan for isch eval.     Chest pain    Resolved  Minimal trop in setting of  CKD/ESRD and AF/RVR, doubt ACS     ESRD on dialysis    Per IM/neph  Hyperkalemia noted     Diabetes mellitus with ESRD (end-stage renal disease)    Per IM     Combined hyperlipidemia associated with type 2 diabetes mellitus    Cont statin     Hypertension    Cont med rx     Non-rheumatic mitral regurgitation    Mod-severe by echo 2/2019  LVEF preserved         VTE Risk Mitigation (From admission, onward)        Ordered     apixaban tablet 2.5 mg  2 times daily      03/06/19 2042     IP VTE HIGH RISK PATIENT  Once      03/06/19 1635     Place sequential compression device  Until discontinued      03/06/19 1635          Isrrael Weiss MD  Cardiology  [facility]

## 2019-03-09 NOTE — NURSING
"Pt BG 55, asymptomatic, not eating much, states "my tongue is sore", notified Dr. Lucero, 12.5g IV D50 administered, repeat .            "

## 2019-03-10 LAB
ANION GAP SERPL CALC-SCNC: 16 MMOL/L
BASOPHILS # BLD AUTO: 0.01 K/UL
BASOPHILS NFR BLD: 0.1 %
BUN SERPL-MCNC: 42 MG/DL
CALCIUM SERPL-MCNC: 10.3 MG/DL
CHLORIDE SERPL-SCNC: 95 MMOL/L
CO2 SERPL-SCNC: 25 MMOL/L
CREAT SERPL-MCNC: 6.8 MG/DL
DIFFERENTIAL METHOD: ABNORMAL
EOSINOPHIL # BLD AUTO: 0 K/UL
EOSINOPHIL NFR BLD: 0.3 %
ERYTHROCYTE [DISTWIDTH] IN BLOOD BY AUTOMATED COUNT: 14.4 %
EST. GFR  (AFRICAN AMERICAN): 6 ML/MIN/1.73 M^2
EST. GFR  (NON AFRICAN AMERICAN): 6 ML/MIN/1.73 M^2
GLUCOSE SERPL-MCNC: 133 MG/DL
HCT VFR BLD AUTO: 29 %
HGB BLD-MCNC: 8.9 G/DL
LYMPHOCYTES # BLD AUTO: 0.9 K/UL
LYMPHOCYTES NFR BLD: 11.8 %
MCH RBC QN AUTO: 30.3 PG
MCHC RBC AUTO-ENTMCNC: 30.7 G/DL
MCV RBC AUTO: 99 FL
MONOCYTES # BLD AUTO: 0.7 K/UL
MONOCYTES NFR BLD: 9.1 %
NEUTROPHILS # BLD AUTO: 6 K/UL
NEUTROPHILS NFR BLD: 78.7 %
PLATELET # BLD AUTO: 276 K/UL
PMV BLD AUTO: 8.8 FL
POCT GLUCOSE: 104 MG/DL (ref 70–110)
POCT GLUCOSE: 117 MG/DL (ref 70–110)
POCT GLUCOSE: 118 MG/DL (ref 70–110)
POCT GLUCOSE: 86 MG/DL (ref 70–110)
POTASSIUM SERPL-SCNC: 4.9 MMOL/L
RBC # BLD AUTO: 2.94 M/UL
SODIUM SERPL-SCNC: 136 MMOL/L
WBC # BLD AUTO: 7.57 K/UL

## 2019-03-10 PROCEDURE — 25000003 PHARM REV CODE 250: Performed by: HOSPITALIST

## 2019-03-10 PROCEDURE — 36415 COLL VENOUS BLD VENIPUNCTURE: CPT

## 2019-03-10 PROCEDURE — 85025 COMPLETE CBC W/AUTO DIFF WBC: CPT

## 2019-03-10 PROCEDURE — 25000003 PHARM REV CODE 250: Performed by: EMERGENCY MEDICINE

## 2019-03-10 PROCEDURE — 25000242 PHARM REV CODE 250 ALT 637 W/ HCPCS: Performed by: INTERNAL MEDICINE

## 2019-03-10 PROCEDURE — 99232 SBSQ HOSP IP/OBS MODERATE 35: CPT | Mod: ,,, | Performed by: INTERNAL MEDICINE

## 2019-03-10 PROCEDURE — 99232 PR SUBSEQUENT HOSPITAL CARE,LEVL II: ICD-10-PCS | Mod: ,,, | Performed by: INTERNAL MEDICINE

## 2019-03-10 PROCEDURE — 94664 DEMO&/EVAL PT USE INHALER: CPT

## 2019-03-10 PROCEDURE — 80048 BASIC METABOLIC PNL TOTAL CA: CPT

## 2019-03-10 PROCEDURE — 94640 AIRWAY INHALATION TREATMENT: CPT

## 2019-03-10 PROCEDURE — 25000003 PHARM REV CODE 250: Performed by: INTERNAL MEDICINE

## 2019-03-10 PROCEDURE — 94761 N-INVAS EAR/PLS OXIMETRY MLT: CPT

## 2019-03-10 PROCEDURE — 99900035 HC TECH TIME PER 15 MIN (STAT)

## 2019-03-10 PROCEDURE — 63600175 PHARM REV CODE 636 W HCPCS: Performed by: INTERNAL MEDICINE

## 2019-03-10 PROCEDURE — 21400001 HC TELEMETRY ROOM

## 2019-03-10 RX ORDER — AMIODARONE HYDROCHLORIDE 200 MG/1
400 TABLET ORAL 2 TIMES DAILY
Status: DISCONTINUED | OUTPATIENT
Start: 2019-03-24 | End: 2019-03-11 | Stop reason: HOSPADM

## 2019-03-10 RX ORDER — AMIODARONE HYDROCHLORIDE 200 MG/1
200 TABLET ORAL 2 TIMES DAILY
Status: DISCONTINUED | OUTPATIENT
Start: 2019-04-07 | End: 2019-03-11 | Stop reason: HOSPADM

## 2019-03-10 RX ORDER — AMIODARONE HYDROCHLORIDE 200 MG/1
200 TABLET ORAL DAILY
Status: DISCONTINUED | OUTPATIENT
Start: 2019-04-22 | End: 2019-03-11 | Stop reason: HOSPADM

## 2019-03-10 RX ORDER — AMIODARONE HYDROCHLORIDE 200 MG/1
400 TABLET ORAL 3 TIMES DAILY
Status: DISCONTINUED | OUTPATIENT
Start: 2019-03-10 | End: 2019-03-11 | Stop reason: HOSPADM

## 2019-03-10 RX ORDER — POLYETHYLENE GLYCOL 3350 17 G/17G
17 POWDER, FOR SOLUTION ORAL 2 TIMES DAILY
Status: DISCONTINUED | OUTPATIENT
Start: 2019-03-10 | End: 2019-03-11 | Stop reason: HOSPADM

## 2019-03-10 RX ADMIN — AMIODARONE HYDROCHLORIDE 1 MG/MIN: 1.8 INJECTION, SOLUTION INTRAVENOUS at 03:03

## 2019-03-10 RX ADMIN — SEVELAMER CARBONATE 800 MG: 800 TABLET, FILM COATED ORAL at 08:03

## 2019-03-10 RX ADMIN — DOXYCYCLINE HYCLATE 100 MG: 100 TABLET, COATED ORAL at 09:03

## 2019-03-10 RX ADMIN — METOPROLOL SUCCINATE 50 MG: 50 TABLET, EXTENDED RELEASE ORAL at 09:03

## 2019-03-10 RX ADMIN — PROMETHAZINE HYDROCHLORIDE AND CODEINE PHOSPHATE 5 ML: 10; 6.25 SOLUTION ORAL at 01:03

## 2019-03-10 RX ADMIN — DOCUSATE SODIUM 100 MG: 100 CAPSULE, LIQUID FILLED ORAL at 09:03

## 2019-03-10 RX ADMIN — MONTELUKAST SODIUM 10 MG: 10 TABLET, FILM COATED ORAL at 09:03

## 2019-03-10 RX ADMIN — AMIODARONE HYDROCHLORIDE 400 MG: 200 TABLET ORAL at 09:03

## 2019-03-10 RX ADMIN — SEVELAMER CARBONATE 800 MG: 800 TABLET, FILM COATED ORAL at 12:03

## 2019-03-10 RX ADMIN — Medication 1 CAPSULE: at 04:03

## 2019-03-10 RX ADMIN — APIXABAN 2.5 MG: 2.5 TABLET, FILM COATED ORAL at 09:03

## 2019-03-10 RX ADMIN — CETIRIZINE HYDROCHLORIDE 10 MG: 10 TABLET, FILM COATED ORAL at 09:03

## 2019-03-10 RX ADMIN — AMIODARONE HYDROCHLORIDE 400 MG: 200 TABLET ORAL at 04:03

## 2019-03-10 RX ADMIN — IPRATROPIUM BROMIDE AND ALBUTEROL SULFATE 3 ML: .5; 3 SOLUTION RESPIRATORY (INHALATION) at 08:03

## 2019-03-10 RX ADMIN — FLUTICASONE PROPIONATE 50 MCG: 50 SPRAY, METERED NASAL at 09:03

## 2019-03-10 RX ADMIN — AMIODARONE HYDROCHLORIDE 1 MG/MIN: 1.8 INJECTION, SOLUTION INTRAVENOUS at 09:03

## 2019-03-10 RX ADMIN — SEVELAMER CARBONATE 800 MG: 800 TABLET, FILM COATED ORAL at 04:03

## 2019-03-10 RX ADMIN — BUSPIRONE HYDROCHLORIDE 10 MG: 10 TABLET ORAL at 09:03

## 2019-03-10 RX ADMIN — ATORVASTATIN CALCIUM 10 MG: 10 TABLET, FILM COATED ORAL at 09:03

## 2019-03-10 RX ADMIN — IPRATROPIUM BROMIDE AND ALBUTEROL SULFATE 3 ML: .5; 3 SOLUTION RESPIRATORY (INHALATION) at 07:03

## 2019-03-10 RX ADMIN — IPRATROPIUM BROMIDE AND ALBUTEROL SULFATE 3 ML: .5; 3 SOLUTION RESPIRATORY (INHALATION) at 01:03

## 2019-03-10 RX ADMIN — MIRTAZAPINE 7.5 MG: 7.5 TABLET ORAL at 09:03

## 2019-03-10 RX ADMIN — IPRATROPIUM BROMIDE AND ALBUTEROL SULFATE 3 ML: .5; 3 SOLUTION RESPIRATORY (INHALATION) at 03:03

## 2019-03-10 RX ADMIN — PANTOPRAZOLE SODIUM 40 MG: 40 TABLET, DELAYED RELEASE ORAL at 09:03

## 2019-03-10 NOTE — PROGRESS NOTES
[facility]  Cardiology  Progress Note    Patient Name: Eli Vaz  MRN: [id number]  Admission Date: 3/6/2019  Hospital Length of Stay: 4 days  Code Status: Full Code   Attending Physician: Carmelita Candelaria MD   Primary Care Physician: Chiara Nelson MD  Expected Discharge Date:   Principal Problem:Atrial fibrillation with RVR    Subjective:     Hospital Course:   3/6/19: adm to ICU with PAF/RVR, in and out of AF overnight  3/7/19: still with PAF despite amio gtt and rebolus    Interval Hx: pt seen in ICU, case d/w Dr. Lucero.  Minimal sob, no cp.  No palps.   at bedside.    Tele: NSR 60 (personally reviewed and interpreted)      Past Medical History:   Diagnosis Date    Arthritis     COPD (chronic obstructive pulmonary disease)     Diabetes mellitus type II     Dialysis patient     Encounter for blood transfusion     ESRD (end stage renal disease)        Past Surgical History:   Procedure Laterality Date    AV FISTULA PLACEMENT      TUBAL LIGATION         Review of patient's allergies indicates:  No Known Allergies    No current facility-administered medications on file prior to encounter.      Current Outpatient Medications on File Prior to Encounter   Medication Sig    albuterol (ACCUNEB) 1.25 mg/3 mL Nebu Take 3 mLs (1.25 mg total) by nebulization every 6 (six) hours as needed.    albuterol-ipratropium (DUO-NEB) 2.5 mg-0.5 mg/3 mL nebulizer solution Take 3 mLs by nebulization every 6 (six) hours as needed for Wheezing or Shortness of Breath. Rescue    amLODIPine (NORVASC) 10 MG tablet TAKE 1 TABLET (10 MG TOTAL) BY MOUTH ONCE DAILY.    atorvastatin (LIPITOR) 10 MG tablet Take 1 tablet (10 mg total) by mouth once daily.    busPIRone (BUSPAR) 10 MG tablet Take 10 mg by mouth 2 (two) times daily.    cloNIDine (CATAPRES) 0.2 MG tablet Take 0.2 mg by mouth.    diclofenac sodium (VOLTAREN) 1 % Gel Apply 4 g topically.    [] doxycycline (VIBRAMYCIN) 100 MG Cap Take 1  capsule (100 mg total) by mouth every 12 (twelve) hours. for 10 days    epoetin cat (PROCRIT) 3,000 unit/mL injection Inject 3,000 Units into the skin.    fluticasone (FLONASE) 50 mcg/actuation nasal spray 1 spray by Each Nare route 2 (two) times daily.    FOLIC ACID/VITAMIN B COMP W-C (RENAL CAPS ORAL) Take by mouth Daily.    gentamicin sulfate, PF, 60 mg/6 mL Soln Inject into the vein.    guaiFENesin (MUCINEX) 1,200 mg Ta12 Take 1 tablet by mouth 2 (two) times daily. for 14 days    guaifenesin-codeine 100-10 mg/5 ml (CHERATUSSIN AC)  mg/5 mL syrup Take 5 mLs by mouth every 12 (twelve) hours as needed for Cough or Congestion.    hydrALAZINE (APRESOLINE) 25 MG tablet Take 1 tablet (25 mg total) by mouth every 12 (twelve) hours.    HYDROcodone-acetaminophen (NORCO) 7.5-325 mg per tablet Take 1 tablet by mouth every 8 (eight) hours as needed for Pain.    [START ON 3/14/2019] HYDROcodone-acetaminophen (NORCO) 7.5-325 mg per tablet Take 1 tablet by mouth every 8 (eight) hours as needed for Pain.    [START ON 4/13/2019] HYDROcodone-acetaminophen (NORCO) 7.5-325 mg per tablet Take 1 tablet by mouth every 8 (eight) hours as needed for Pain.    hydrOXYzine pamoate (VISTARIL) 25 MG Cap Take 1 capsule (25 mg total) by mouth every 8 (eight) hours as needed (panic attack).    levocetirizine (XYZAL) 5 MG tablet Take 1 tablet (5 mg total) by mouth every evening.    meclizine (ANTIVERT) 25 mg tablet Take 1 tablet (25 mg total) by mouth 3 (three) times daily as needed.    metoprolol succinate (TOPROL-XL) 25 MG 24 hr tablet Take 25 mg by mouth.    mirtazapine (REMERON) 7.5 MG Tab Take 7.5 mg by mouth nightly.     montelukast (SINGULAIR) 10 mg tablet Take 1 tablet (10 mg total) by mouth every evening.    pantoprazole (PROTONIX) 40 MG tablet Take 40 mg by mouth.    paroxetine (PAXIL) 30 MG tablet 1 TABLET IN THE MORNING ONCE A DAY ORALLY 90    predniSONE (DELTASONE) 10 MG tablet 2 tab po qday x 4 days, then 1  tab po qday x 4 days, then 1/2 tab po qday x 4 days    promethazine (PHENERGAN) 12.5 MG Tab Take 12.5 mg by mouth every 6 (six) hours as needed.    SENSIPAR 60 mg Tab Take 30 mg by mouth daily with breakfast.     sevelamer carbonate (RENVELA) 800 mg Tab Take 800 mg by mouth 3 (three) times daily with meals.    SODIUM BICARBONATE ORAL Take 650 mg by mouth.    traZODone (DESYREL) 50 MG tablet Take 1 tablet (50 mg total) by mouth nightly as needed for Insomnia.    VENTOLIN HFA 90 mcg/actuation inhaler INHALE 2 PUFFS INTO LUNGS EVERY 4 HOURS AS NEEDED FOR SHORTNESS OF BREATH OR WHEEZING. RESCUE    vit B,C-iron fum-FA-D3-zinc ox 8 mg iron-800 mcg-1,000 unit Tab Take by mouth.    vitamin renal formula, B-complex-vitamin c-folic acid, (NEPHROCAP) 1 mg Cap Take by mouth.    vortioxetine (BRINTELLIX) 5 mg Tab Take 1 tablet by mouth.     Family History     Problem Relation (Age of Onset)    Heart attack Sister, Sister, Mother        Tobacco Use    Smoking status: Former Smoker     Start date: 1/12/1991    Smokeless tobacco: Never Used   Substance and Sexual Activity    Alcohol use: No    Drug use: No    Sexual activity: Not on file     Review of Systems   Gastrointestinal: Negative for melena.   Genitourinary: Negative for hematuria.     Objective:     Vital Signs (Most Recent):  Temp: 98.7 °F (37.1 °C) (03/10/19 0330)  Pulse: 60 (03/10/19 0813)  Resp: (!) 26 (03/10/19 0813)  BP: (!) 127/56 (03/10/19 0600)  SpO2: 100 % (03/10/19 0813) Vital Signs (24h Range):  Temp:  [97.7 °F (36.5 °C)-98.7 °F (37.1 °C)] 98.7 °F (37.1 °C)  Pulse:  [] 60  Resp:  [10-41] 26  SpO2:  [87 %-100 %] 100 %  BP: ()/(46-70) 127/56     Weight: 62.3 kg (137 lb 5.6 oz)  Body mass index is 23.58 kg/m².    SpO2: 100 %  O2 Device (Oxygen Therapy): nasal cannula      Intake/Output Summary (Last 24 hours) at 3/10/2019 1037  Last data filed at 3/10/2019 0600  Gross per 24 hour   Intake 1846.14 ml   Output 1902 ml   Net -55.86 ml        Lines/Drains/Airways     Central Venous Catheter Line                 Hemodialysis Catheter -- days          Drain                 Hemodialysis AV Fistula 03/06/19 2339 Left upper arm 3 days          Peripheral Intravenous Line                 External Jugular IV 03/06/19 1300 3 days         Peripheral IV - Single Lumen 03/06/19 1544 Right Hand 3 days                Physical Exam   Constitutional: She is oriented to person, place, and time. She appears well-developed and well-nourished. No distress.   HENT:   Head: Normocephalic and atraumatic.   Mouth/Throat: No oropharyngeal exudate.   Eyes: Conjunctivae and EOM are normal. Pupils are equal, round, and reactive to light. No scleral icterus.   Neck: Normal range of motion. Neck supple. No JVD present. No tracheal deviation present. No thyromegaly present.   Cardiovascular: Normal rate, regular rhythm, S1 normal and S2 normal.  No extrasystoles are present. Exam reveals distant heart sounds. Exam reveals no gallop and no friction rub.   Murmur heard.   Systolic murmur is present with a grade of 2/6.  Pulmonary/Chest: Effort normal. No respiratory distress. She has no wheezes. She has no rales. She exhibits no tenderness.   Dec BS L base   Abdominal: Soft. She exhibits no distension.   Musculoskeletal: Normal range of motion. She exhibits no edema.   Neurological: She is alert and oriented to person, place, and time. No cranial nerve deficit.   Skin: Skin is warm and dry. She is not diaphoretic.   Psychiatric: She has a normal mood and affect. Her behavior is normal. Judgment normal.       Current Medications:   sodium chloride 0.9%   Intravenous Once    amiodarone  400 mg Oral TID    apixaban  2.5 mg Oral BID    atorvastatin  10 mg Oral Daily    busPIRone  10 mg Oral BID    cetirizine  10 mg Oral Daily    docusate sodium  100 mg Oral Daily    doxycycline  100 mg Oral Q12H    epoetin cat  10,000 Units Intravenous Every Tues, Thurs, Sat    fluticasone   1 spray Each Nare BID    metoprolol succinate  50 mg Oral BID    mirtazapine  7.5 mg Oral Nightly    montelukast  10 mg Oral QHS    pantoprazole  40 mg Oral Daily    sevelamer carbonate  800 mg Oral TID WM    vitamin renal formula (B-complex-vitamin c-folic acid)  1 capsule Oral Daily      amiodarone in dextrose 5% 1 mg/min (03/10/19 0951)     sodium chloride 0.9%, acetaminophen, albuterol-ipratropium, cloNIDine, dextrose 50%, dextrose 50%, glucagon (human recombinant), glucose, glucose, ondansetron, promethazine-codeine 6.25-10 mg/5 ml, sodium chloride 0.9%    Laboratory (all labs reviewed):  CBC:  Recent Labs   Lab 03/06/19  1325 03/07/19  0647 03/08/19  0150 03/09/19  0308 03/10/19  0342   WHITE BLOOD CELL COUNT 11.61 8.65 8.65 8.78 7.57   HEMOGLOBIN 9.9 L 9.3 L 9.7 L 9.3 L 8.9 L   HEMATOCRIT 32.6 L 30.8 L 31.8 L 29.7 L 29.0 L   PLATELETS 348 265 313 291 276       CHEMISTRIES:  Recent Labs   Lab 02/26/17  1508 07/12/17  0110  03/06/19  1325 03/07/19  0647 03/08/19  0150 03/09/19  0445 03/10/19  0342   GLUCOSE 101 99   < > 117 H 93 103 98 133 H   SODIUM 140 139   < > 139 138 134 L 133 L 136   POTASSIUM 3.3 L 4.5   < > 5.3 H 6.0 H 5.8 H 6.1 H 4.9   BUN BLD 13 29 H   < > 56 H 69 H 54 H 77 H 42 H   CREATININE 6.5 H 7.7 H   < > 10.2 H 11.0 H 8.6 H 11.0 H 6.8 H   EGFR IF  7 A 6 A   < > 4 A 4 A 5 A 4 A 6 A   EGFR IF NON- 6 A 5 A   < > 3 A 3 A 4 A 3 A 6 A   CALCIUM 10.6 H 10.1   < > 10.6 H 10.7 H 10.7 H 10.4 10.3   MAGNESIUM 1.9 1.8  --  2.0  --   --   --   --     < > = values in this interval not displayed.       CARDIAC BIOMARKERS:  Recent Labs   Lab 02/03/19  1842 03/06/19  1325 03/06/19  1759 03/06/19  2344 03/07/19  0647   TROPONIN I 0.036 H 0.443 H 0.473 H 0.596 H 0.655 H       COAGS:  Recent Labs   Lab 02/26/17  1508 01/09/18 2020   INR 1.1 1.1       LIPIDS/LFTS:  Recent Labs   Lab 02/26/17  1508 07/12/17  0110 07/13/17  0559 01/09/18 2020 09/28/18  1010 03/06/19  1325    CHOLESTEROL  --   --  208 H  --  215 H  --    TRIGLYCERIDES  --   --  73  --  49  --    HDL  --   --  76 H  --  109 H  --    LDL CHOLESTEROL  --   --  117.4  --  96.2  --    NON-HDL CHOLESTEROL  --   --  132  --  106  --    AST 15 27  --  18 25 14   ALT 10 29  --  7 L 10 7 L       BNP:  Recent Labs   Lab 02/26/17  1508 07/12/17  0110 01/09/18 2020 03/06/19  1325   BNP 3,789 H >4,900 H 4,023 H 636 H       TSH:  Recent Labs   Lab 02/26/17  1508 03/06/19  1325   TSH 2.386 3.708       Free T4:        Diagnostic Results:  ECG (personally reviewed and interpreted tracing(s)):  3/6/19 1235   3/6/19 1417 SR 95, PRWP/low volt, NSSTTW changes, similar to 2/25/19    Chest X-Ray (personally reviewed and interpreted image(s)): 3/6/19 ?mild CHF, similar to 2/25/19    Echo: 2/4/19  · Normal left ventricular systolic function. The estimated ejection fraction is 70%  · Concentric left ventricular hypertrophy.  · Grade II (moderate) left ventricular diastolic dysfunction consistent with pseudonormalization.  · Moderate left atrial enlargement.  · Normal right ventricular systolic function.  · Moderate-to-severe mitral regurgitation.  · Mild tricuspid regurgitation.  · The estimated PA systolic pressure is 44 mm Hg  · Pulmonary hypertension present.    Stress Test: L MPI 2/4/19  · There is a mild, infarct defect(s) in the lateral apical wall(s),  · An ejection fraction of 72 % at rest  · The EKG portion of this study is negative for myocardial ischemia.    Assessment and Plan:     * Atrial fibrillation with RVR    Now back in SR  CHADSVASC at least 5  Cont amio 400mg tid for 2 weeks, then 400mg bid for 2 weeks, then 200mg bid for 2 weeks, then 200mg qd  Stop amio gtt  Cont toprol 50mg bid  Cont eliquis 2.5mg bid (borderline for 5mg bid given weight 61kg)  No need to repeat echo or plan for isch eval.  OK for transfer to [facility]     Chest pain    Resolved  Minimal trop in setting of CKD/ESRD and AF/RVR, doubt ACS     ESRD on  dialysis    Per IM/neph  Hyperkalemia noted     Diabetes mellitus with ESRD (end-stage renal disease)    Per IM     Combined hyperlipidemia associated with type 2 diabetes mellitus    Cont statin     Hypertension    Cont med rx     Non-rheumatic mitral regurgitation    Mod-severe by echo 2/2019  LVEF preserved         VTE Risk Mitigation (From admission, onward)        Ordered     apixaban tablet 2.5 mg  2 times daily      03/06/19 2042     IP VTE HIGH RISK PATIENT  Once      03/06/19 1635     Place sequential compression device  Until discontinued      03/06/19 1635        Cardiology will sign off, pls call with questions.  Pt to follow up with Dr. Garcia after discharge.    Isrrael Weiss MD  Cardiology  [facility]

## 2019-03-10 NOTE — NURSING
Pt arrived to floor from ICU. Tele monitor initiated, normal sinus rhythm on monitor. Pt is aaox4, calm. Resp even and unlabored. Family at bedside. Denies pain. In no acute distress . Bed in lowest locked positon. Call light placed within reach. Will continue to monitor.     1440 pt has has multiple episode of 1.6 sec pauses since arriving to floor. She remains asymptomatic. VSS. Reported to FELECIA Candelaria md. Per md if pt becomes symptomatic report to VANITA Weiss Md. Will continue to monitor.

## 2019-03-10 NOTE — PROVIDER TRANSFER
ICU transfer note    72 y/o female with history of ESRD and COPD who presented with AFib with RVR. Started on amiodarone infusion, admitted to ICU and Cardiology consulted. Converted to NSR but did not last long. Was dialyzed while inpatient as routine but also to address moderate hyperkalemia and UF. PO amiodarone and PO metoprolol added to regimen. Finally converted to NSR and stayed that way on 3/10 (3-4 days after admission). Please see taper dosing in today's note from Cardiology. They have signed off. Needs to f/u with Dr Garcia in 2 weeks. Is on apixaban (CHADS VASC 5). Discussed risk of bleeding with patient. Thus far no issues. No PT/OT needs. Dialysis TTS. Home tomorrow if stable.

## 2019-03-10 NOTE — HOSPITAL COURSE
Ms Vaz presented with AFib with RVR. Started on amiodarone infusion, admitted to ICU and Cardiology consulted. Converted to NSR but did not last long. Was dialyzed while inpatient as routine but also to address moderate hyperkalemia and UF. PO amiodarone and PO metoprolol added to regimen. Finally converted to NSR and stayed that way on 3/10 (3-4 days after admission). Stepped down to telemetry floor on 3/10. Patient requesting discharge on 3/11. Will arrange H/H on discharge. Will follow up with Dr. Garcia in 1-2 weeks. Activity as tolerated. Diet- low NA ADA 1800 ramon diet.

## 2019-03-10 NOTE — PLAN OF CARE
"Problem: Fall Injury Risk  Goal: Absence of Fall and Fall-Related Injury  Outcome: Ongoing (interventions implemented as appropriate)  Fall risk screening completed.   Call light & frequently used items placed within reach.   Pt instructed to call staff for assistance as needed.  Frequent rounding done.    Problem: Adult Inpatient Plan of Care  Goal: Plan of Care Review  Outcome: Ongoing (interventions implemented as appropriate)  A/Ox4, VSS, afebrile, in no apparent distress, family at bedside earlier in shift.  On continuous O2 sat and cardiac monitoring.  Telemetry reading sinus bradycardia / NSR without ectopy noted.  Weaned from 5L to 3L of oxygen via NC.   Tolerating diabetic/renal diet.   Sat up in chair for about an hour.  Transfers back to bed with minimal assistance of one.   BG checked at bedtime - required no sliding scale insulin.   C/o gas pain per patient "I need to have a bowel movement" - may need to be started on a laxative to help patient (Miralax or Senkot).  IVs intact - on Amiodarone gtt at 1 mcg/min per MD orders.   No skin breakdown.  Anuric - hemodialysis patient.      Problem: Diabetes Comorbidity  Goal: Blood Glucose Level Within Desired Range  Outcome: Ongoing (interventions implemented as appropriate)  Blood glucose levels checked and review with AM labs.  Glucose within normal limits.  No sliding scale insulin coverage required throughout the shift.      "

## 2019-03-10 NOTE — PROGRESS NOTES
[facility] Hospital Medicine  Progress Note    Patient Name: Eli Vaz  MRN: [id number]  Patient Class: IP- Inpatient   Admission Date: 3/6/2019  Length of Stay: 4 days  Attending Physician: Carmelita Candelaria MD  Primary Care Provider: Chiara Nelson MD        Subjective:     Principal Problem:Atrial fibrillation with RVR    HPI:  74 y/o female with history of ESRD and COPD presents with shortness of breath.  She states that symptoms started around 6:00 a.m. this morning.  She also complains of left-sided chest pressure.  Pain is not associated with shortness of breath and has been presented for a while.  Patient also complains of chest congestion.  States she has been having a dry cough for several days.  Chest pain is worse with cough and deep breathing.  Chest pain is left-sided, nonradiating.  No recent fever.  No abdominal pain, nausea or vomiting. No previous episodes of abnormal heart rhythms, no palpitations.  Last dialysis was 2 days ago via left upper extremity AV fistula.  She presented to ER where she was noted to be in rapid AFib.  Patient was placed on Amiodarone with conversion to NSR.  No hx of Afib.  No other complaints.        Hospital Course:  Ms Vaz presented with AFib with RVR. Started on amiodarone infusion, admitted to ICU and Cardiology consulted. Converted to NSR but did not last long. Was dialyzed while inpatient as routine but also to address moderate hyperkalemia and UF. PO amiodarone and PO metoprolol added to regimen. Finally converted to NSR and stayed that way on 3/10 (3-4 days after admission). Stepped down to telemetry floor on 3/10.     Interval History: NSR. Feels well. Stated she walked with her daughter and felt well.     Review of Systems   Respiratory: Negative.    Cardiovascular: Negative.    Gastrointestinal: Negative.      Objective:     Vital Signs (Most Recent):  Temp: 98.1 °F (36.7 °C) (03/10/19 1445)  Pulse: 65 (03/10/19 1445)  Resp: 19 (03/10/19  1445)  BP: (!) 155/69 (03/10/19 1445)  SpO2: 97 % (03/10/19 1445) Vital Signs (24h Range):  Temp:  [98 °F (36.7 °C)-98.7 °F (37.1 °C)] 98.1 °F (36.7 °C)  Pulse:  [57-68] 65  Resp:  [10-27] 19  SpO2:  [92 %-100 %] 97 %  BP: (106-160)/(54-77) 155/69     Weight: 62.3 kg (137 lb 5.6 oz)  Body mass index is 23.58 kg/m².    Intake/Output Summary (Last 24 hours) at 3/10/2019 1514  Last data filed at 3/10/2019 1048  Gross per 24 hour   Intake 809.35 ml   Output --   Net 809.35 ml      Physical Exam   Constitutional: She is oriented to person, place, and time. She appears well-developed. No distress.   Cardiovascular: Normal rate and regular rhythm.   Pulmonary/Chest: Effort normal and breath sounds normal.   Stable at room air   Abdominal: Soft. Bowel sounds are normal.   Neurological: She is alert and oriented to person, place, and time.   Skin: Skin is warm and dry. Capillary refill takes less than 2 seconds. She is not diaphoretic.   Psychiatric: She has a normal mood and affect. Her behavior is normal. Judgment and thought content normal.   Nursing note and vitals reviewed.      Significant Labs: All pertinent labs within the past 24 hours have been reviewed.    Significant Imaging: I have reviewed all pertinent imaging results/findings within the past 24 hours.  I have reviewed and interpreted all pertinent imaging results/findings within the past 24 hours.    Assessment/Plan:      * Atrial fibrillation with RVR    Patient presented to ER with rapid AFib.  Now in NSR after 3-4 days on amiodarone infusion  Is on PO amiodarone (taper dose per Cardiology) and PO metoprolol  Cardiac monitoring  On low dose apixaban for stroke prophylaxis (risk of bleeding discussed with patient and  at bedside)  F/u with Cardiology as outpatient       Chest pain    Seems more pleuritic and musculoskeletal.  Secondary to persistent cough.  Resolved          Hypertension    Essential  Continue home medications and monitor.        Combined hyperlipidemia associated with type 2 diabetes mellitus    Continue Statin.       Diabetes mellitus with ESRD (end-stage renal disease)    Diabetic diet and insulin sliding scale.       Anemia of chronic disease    H/H similar to previous labs.  No evidence of bleeding.       ESRD on dialysis    Dialysis today       COPD (chronic obstructive pulmonary disease)    No acute exacerbation as no wheezing on exam.  Does complain of congestion.  Will provide Xopenex nebs.  Monitor.         VTE Risk Mitigation (From admission, onward)        Ordered     apixaban tablet 2.5 mg  2 times daily      03/06/19 2042     IP VTE HIGH RISK PATIENT  Once      03/06/19 1635     Place sequential compression device  Until discontinued      03/06/19 1635        Stable for step down to telemetry floor. Plan discussed with patient and  at bedside.     Time spent: > 45 minutes in patient's care    Carmelita Lucero MD  Department of Hospital Medicine   [facility]

## 2019-03-10 NOTE — ASSESSMENT & PLAN NOTE
Now back in SR  CHADSVASC at least 5  Cont amio 400mg tid for 2 weeks, then 400mg bid for 2 weeks, then 200mg bid for 2 weeks, then 200mg qd  Stop amio gtt  Cont toprol 50mg bid  Cont eliquis 2.5mg bid (borderline for 5mg bid given weight 61kg)  No need to repeat echo or plan for isch eval.  OK for transfer to tele

## 2019-03-10 NOTE — SUBJECTIVE & OBJECTIVE
Interval History: NSR. Feels well. Stated she walked with her daughter and felt well.     Review of Systems   Respiratory: Negative.    Cardiovascular: Negative.    Gastrointestinal: Negative.      Objective:     Vital Signs (Most Recent):  Temp: 98.1 °F (36.7 °C) (03/10/19 1445)  Pulse: 65 (03/10/19 1445)  Resp: 19 (03/10/19 1445)  BP: (!) 155/69 (03/10/19 1445)  SpO2: 97 % (03/10/19 1445) Vital Signs (24h Range):  Temp:  [98 °F (36.7 °C)-98.7 °F (37.1 °C)] 98.1 °F (36.7 °C)  Pulse:  [57-68] 65  Resp:  [10-27] 19  SpO2:  [92 %-100 %] 97 %  BP: (106-160)/(54-77) 155/69     Weight: 62.3 kg (137 lb 5.6 oz)  Body mass index is 23.58 kg/m².    Intake/Output Summary (Last 24 hours) at 3/10/2019 1514  Last data filed at 3/10/2019 1048  Gross per 24 hour   Intake 809.35 ml   Output --   Net 809.35 ml      Physical Exam   Constitutional: She is oriented to person, place, and time. She appears well-developed. No distress.   Cardiovascular: Normal rate and regular rhythm.   Pulmonary/Chest: Effort normal and breath sounds normal.   Stable at room air   Abdominal: Soft. Bowel sounds are normal.   Neurological: She is alert and oriented to person, place, and time.   Skin: Skin is warm and dry. Capillary refill takes less than 2 seconds. She is not diaphoretic.   Psychiatric: She has a normal mood and affect. Her behavior is normal. Judgment and thought content normal.   Nursing note and vitals reviewed.      Significant Labs: All pertinent labs within the past 24 hours have been reviewed.    Significant Imaging: I have reviewed all pertinent imaging results/findings within the past 24 hours.  I have reviewed and interpreted all pertinent imaging results/findings within the past 24 hours.

## 2019-03-10 NOTE — ASSESSMENT & PLAN NOTE
Patient presented to ER with rapid AFib.  Now in NSR after 3-4 days on amiodarone infusion  Is on PO amiodarone (taper dose per Cardiology) and PO metoprolol  Cardiac monitoring  On low dose apixaban for stroke prophylaxis (risk of bleeding discussed with patient and  at bedside)  F/u with Cardiology as outpatient

## 2019-03-10 NOTE — SUBJECTIVE & OBJECTIVE
Past Medical History:   Diagnosis Date    Arthritis     COPD (chronic obstructive pulmonary disease)     Diabetes mellitus type II     Dialysis patient     Encounter for blood transfusion     ESRD (end stage renal disease)        Past Surgical History:   Procedure Laterality Date    AV FISTULA PLACEMENT      TUBAL LIGATION         Review of patient's allergies indicates:  No Known Allergies    No current facility-administered medications on file prior to encounter.      Current Outpatient Medications on File Prior to Encounter   Medication Sig    albuterol (ACCUNEB) 1.25 mg/3 mL Nebu Take 3 mLs (1.25 mg total) by nebulization every 6 (six) hours as needed.    albuterol-ipratropium (DUO-NEB) 2.5 mg-0.5 mg/3 mL nebulizer solution Take 3 mLs by nebulization every 6 (six) hours as needed for Wheezing or Shortness of Breath. Rescue    amLODIPine (NORVASC) 10 MG tablet TAKE 1 TABLET (10 MG TOTAL) BY MOUTH ONCE DAILY.    atorvastatin (LIPITOR) 10 MG tablet Take 1 tablet (10 mg total) by mouth once daily.    busPIRone (BUSPAR) 10 MG tablet Take 10 mg by mouth 2 (two) times daily.    cloNIDine (CATAPRES) 0.2 MG tablet Take 0.2 mg by mouth.    diclofenac sodium (VOLTAREN) 1 % Gel Apply 4 g topically.    [] doxycycline (VIBRAMYCIN) 100 MG Cap Take 1 capsule (100 mg total) by mouth every 12 (twelve) hours. for 10 days    epoetin cat (PROCRIT) 3,000 unit/mL injection Inject 3,000 Units into the skin.    fluticasone (FLONASE) 50 mcg/actuation nasal spray 1 spray by Each Nare route 2 (two) times daily.    FOLIC ACID/VITAMIN B COMP W-C (RENAL CAPS ORAL) Take by mouth Daily.    gentamicin sulfate, PF, 60 mg/6 mL Soln Inject into the vein.    guaiFENesin (MUCINEX) 1,200 mg Ta12 Take 1 tablet by mouth 2 (two) times daily. for 14 days    guaifenesin-codeine 100-10 mg/5 ml (CHERATUSSIN AC)  mg/5 mL syrup Take 5 mLs by mouth every 12 (twelve) hours as needed for Cough or Congestion.    hydrALAZINE  (APRESOLINE) 25 MG tablet Take 1 tablet (25 mg total) by mouth every 12 (twelve) hours.    HYDROcodone-acetaminophen (NORCO) 7.5-325 mg per tablet Take 1 tablet by mouth every 8 (eight) hours as needed for Pain.    [START ON 3/14/2019] HYDROcodone-acetaminophen (NORCO) 7.5-325 mg per tablet Take 1 tablet by mouth every 8 (eight) hours as needed for Pain.    [START ON 4/13/2019] HYDROcodone-acetaminophen (NORCO) 7.5-325 mg per tablet Take 1 tablet by mouth every 8 (eight) hours as needed for Pain.    hydrOXYzine pamoate (VISTARIL) 25 MG Cap Take 1 capsule (25 mg total) by mouth every 8 (eight) hours as needed (panic attack).    levocetirizine (XYZAL) 5 MG tablet Take 1 tablet (5 mg total) by mouth every evening.    meclizine (ANTIVERT) 25 mg tablet Take 1 tablet (25 mg total) by mouth 3 (three) times daily as needed.    metoprolol succinate (TOPROL-XL) 25 MG 24 hr tablet Take 25 mg by mouth.    mirtazapine (REMERON) 7.5 MG Tab Take 7.5 mg by mouth nightly.     montelukast (SINGULAIR) 10 mg tablet Take 1 tablet (10 mg total) by mouth every evening.    pantoprazole (PROTONIX) 40 MG tablet Take 40 mg by mouth.    paroxetine (PAXIL) 30 MG tablet 1 TABLET IN THE MORNING ONCE A DAY ORALLY 90    predniSONE (DELTASONE) 10 MG tablet 2 tab po qday x 4 days, then 1 tab po qday x 4 days, then 1/2 tab po qday x 4 days    promethazine (PHENERGAN) 12.5 MG Tab Take 12.5 mg by mouth every 6 (six) hours as needed.    SENSIPAR 60 mg Tab Take 30 mg by mouth daily with breakfast.     sevelamer carbonate (RENVELA) 800 mg Tab Take 800 mg by mouth 3 (three) times daily with meals.    SODIUM BICARBONATE ORAL Take 650 mg by mouth.    traZODone (DESYREL) 50 MG tablet Take 1 tablet (50 mg total) by mouth nightly as needed for Insomnia.    VENTOLIN HFA 90 mcg/actuation inhaler INHALE 2 PUFFS INTO LUNGS EVERY 4 HOURS AS NEEDED FOR SHORTNESS OF BREATH OR WHEEZING. RESCUE    vit B,C-iron fum-FA-D3-zinc ox 8 mg iron-800 mcg-1,000  unit Tab Take by mouth.    vitamin renal formula, B-complex-vitamin c-folic acid, (NEPHROCAP) 1 mg Cap Take by mouth.    vortioxetine (BRINTELLIX) 5 mg Tab Take 1 tablet by mouth.     Family History     Problem Relation (Age of Onset)    Heart attack Sister, Sister, Mother        Tobacco Use    Smoking status: Former Smoker     Start date: 1/12/1991    Smokeless tobacco: Never Used   Substance and Sexual Activity    Alcohol use: No    Drug use: No    Sexual activity: Not on file     Review of Systems   Gastrointestinal: Negative for melena.   Genitourinary: Negative for hematuria.     Objective:     Vital Signs (Most Recent):  Temp: 98.7 °F (37.1 °C) (03/10/19 0330)  Pulse: 60 (03/10/19 0813)  Resp: (!) 26 (03/10/19 0813)  BP: (!) 127/56 (03/10/19 0600)  SpO2: 100 % (03/10/19 0813) Vital Signs (24h Range):  Temp:  [97.7 °F (36.5 °C)-98.7 °F (37.1 °C)] 98.7 °F (37.1 °C)  Pulse:  [] 60  Resp:  [10-41] 26  SpO2:  [87 %-100 %] 100 %  BP: ()/(46-70) 127/56     Weight: 62.3 kg (137 lb 5.6 oz)  Body mass index is 23.58 kg/m².    SpO2: 100 %  O2 Device (Oxygen Therapy): nasal cannula      Intake/Output Summary (Last 24 hours) at 3/10/2019 1037  Last data filed at 3/10/2019 0600  Gross per 24 hour   Intake 1846.14 ml   Output 1902 ml   Net -55.86 ml       Lines/Drains/Airways     Central Venous Catheter Line                 Hemodialysis Catheter -- days          Drain                 Hemodialysis AV Fistula 03/06/19 2339 Left upper arm 3 days          Peripheral Intravenous Line                 External Jugular IV 03/06/19 1300 3 days         Peripheral IV - Single Lumen 03/06/19 1544 Right Hand 3 days                Physical Exam   Constitutional: She is oriented to person, place, and time. She appears well-developed and well-nourished. No distress.   HENT:   Head: Normocephalic and atraumatic.   Mouth/Throat: No oropharyngeal exudate.   Eyes: Conjunctivae and EOM are normal. Pupils are equal, round, and  reactive to light. No scleral icterus.   Neck: Normal range of motion. Neck supple. No JVD present. No tracheal deviation present. No thyromegaly present.   Cardiovascular: Normal rate, regular rhythm, S1 normal and S2 normal.  No extrasystoles are present. Exam reveals distant heart sounds. Exam reveals no gallop and no friction rub.   Murmur heard.   Systolic murmur is present with a grade of 2/6.  Pulmonary/Chest: Effort normal. No respiratory distress. She has no wheezes. She has no rales. She exhibits no tenderness.   Dec BS L base   Abdominal: Soft. She exhibits no distension.   Musculoskeletal: Normal range of motion. She exhibits no edema.   Neurological: She is alert and oriented to person, place, and time. No cranial nerve deficit.   Skin: Skin is warm and dry. She is not diaphoretic.   Psychiatric: She has a normal mood and affect. Her behavior is normal. Judgment normal.       Current Medications:   sodium chloride 0.9%   Intravenous Once    amiodarone  400 mg Oral TID    apixaban  2.5 mg Oral BID    atorvastatin  10 mg Oral Daily    busPIRone  10 mg Oral BID    cetirizine  10 mg Oral Daily    docusate sodium  100 mg Oral Daily    doxycycline  100 mg Oral Q12H    epoetin cat  10,000 Units Intravenous Every Tues, Thurs, Sat    fluticasone  1 spray Each Nare BID    metoprolol succinate  50 mg Oral BID    mirtazapine  7.5 mg Oral Nightly    montelukast  10 mg Oral QHS    pantoprazole  40 mg Oral Daily    sevelamer carbonate  800 mg Oral TID WM    vitamin renal formula (B-complex-vitamin c-folic acid)  1 capsule Oral Daily      amiodarone in dextrose 5% 1 mg/min (03/10/19 0939)     sodium chloride 0.9%, acetaminophen, albuterol-ipratropium, cloNIDine, dextrose 50%, dextrose 50%, glucagon (human recombinant), glucose, glucose, ondansetron, promethazine-codeine 6.25-10 mg/5 ml, sodium chloride 0.9%    Laboratory (all labs reviewed):  CBC:  Recent Labs   Lab 03/06/19  1325 03/07/19  0689  03/08/19  0150 03/09/19  0308 03/10/19  0342   WHITE BLOOD CELL COUNT 11.61 8.65 8.65 8.78 7.57   HEMOGLOBIN 9.9 L 9.3 L 9.7 L 9.3 L 8.9 L   HEMATOCRIT 32.6 L 30.8 L 31.8 L 29.7 L 29.0 L   PLATELETS 348 265 313 291 276       CHEMISTRIES:  Recent Labs   Lab 02/26/17  1508 07/12/17  0110  03/06/19  1325 03/07/19  0647 03/08/19  0150 03/09/19  0445 03/10/19  0342   GLUCOSE 101 99   < > 117 H 93 103 98 133 H   SODIUM 140 139   < > 139 138 134 L 133 L 136   POTASSIUM 3.3 L 4.5   < > 5.3 H 6.0 H 5.8 H 6.1 H 4.9   BUN BLD 13 29 H   < > 56 H 69 H 54 H 77 H 42 H   CREATININE 6.5 H 7.7 H   < > 10.2 H 11.0 H 8.6 H 11.0 H 6.8 H   EGFR IF  7 A 6 A   < > 4 A 4 A 5 A 4 A 6 A   EGFR IF NON- 6 A 5 A   < > 3 A 3 A 4 A 3 A 6 A   CALCIUM 10.6 H 10.1   < > 10.6 H 10.7 H 10.7 H 10.4 10.3   MAGNESIUM 1.9 1.8  --  2.0  --   --   --   --     < > = values in this interval not displayed.       CARDIAC BIOMARKERS:  Recent Labs   Lab 02/03/19  1842 03/06/19  1325 03/06/19  1759 03/06/19  2344 03/07/19  0647   TROPONIN I 0.036 H 0.443 H 0.473 H 0.596 H 0.655 H       COAGS:  Recent Labs   Lab 02/26/17  1508 01/09/18 2020   INR 1.1 1.1       LIPIDS/LFTS:  Recent Labs   Lab 02/26/17  1508 07/12/17  0110 07/13/17  0559 01/09/18  2020 09/28/18  1010 03/06/19  1325   CHOLESTEROL  --   --  208 H  --  215 H  --    TRIGLYCERIDES  --   --  73  --  49  --    HDL  --   --  76 H  --  109 H  --    LDL CHOLESTEROL  --   --  117.4  --  96.2  --    NON-HDL CHOLESTEROL  --   --  132  --  106  --    AST 15 27  --  18 25 14   ALT 10 29  --  7 L 10 7 L       BNP:  Recent Labs   Lab 02/26/17  1508 07/12/17  0110 01/09/18 2020 03/06/19  1325   BNP 3,789 H >4,900 H 4,023 H 636 H       TSH:  Recent Labs   Lab 02/26/17  1508 03/06/19  1325   TSH 2.386 3.708       Free T4:        Diagnostic Results:  ECG (personally reviewed and interpreted tracing(s)):  3/6/19 1235   3/6/19 1417 SR 95, PRWP/low volt, NSSTTW changes, similar to  2/25/19    Chest X-Ray (personally reviewed and interpreted image(s)): 3/6/19 ?mild CHF, similar to 2/25/19    Echo: 2/4/19  · Normal left ventricular systolic function. The estimated ejection fraction is 70%  · Concentric left ventricular hypertrophy.  · Grade II (moderate) left ventricular diastolic dysfunction consistent with pseudonormalization.  · Moderate left atrial enlargement.  · Normal right ventricular systolic function.  · Moderate-to-severe mitral regurgitation.  · Mild tricuspid regurgitation.  · The estimated PA systolic pressure is 44 mm Hg  · Pulmonary hypertension present.    Stress Test: L MPI 2/4/19  · There is a mild, infarct defect(s) in the lateral apical wall(s),  · An ejection fraction of 72 % at rest  · The EKG portion of this study is negative for myocardial ischemia.

## 2019-03-11 VITALS
RESPIRATION RATE: 18 BRPM | TEMPERATURE: 97 F | WEIGHT: 137.38 LBS | BODY MASS INDEX: 23.45 KG/M2 | DIASTOLIC BLOOD PRESSURE: 52 MMHG | SYSTOLIC BLOOD PRESSURE: 97 MMHG | HEIGHT: 64 IN | HEART RATE: 88 BPM | OXYGEN SATURATION: 100 %

## 2019-03-11 PROBLEM — I48.91 ATRIAL FIBRILLATION WITH RVR: Status: RESOLVED | Noted: 2019-03-06 | Resolved: 2019-03-11

## 2019-03-11 PROBLEM — R07.9 CHEST PAIN: Status: RESOLVED | Noted: 2019-02-03 | Resolved: 2019-03-11

## 2019-03-11 LAB
ANION GAP SERPL CALC-SCNC: 14 MMOL/L
BASOPHILS # BLD AUTO: 0.01 K/UL
BASOPHILS NFR BLD: 0.1 %
BUN SERPL-MCNC: 57 MG/DL
CALCIUM SERPL-MCNC: 10.4 MG/DL
CHLORIDE SERPL-SCNC: 97 MMOL/L
CO2 SERPL-SCNC: 26 MMOL/L
CREAT SERPL-MCNC: 9.1 MG/DL
DIFFERENTIAL METHOD: ABNORMAL
EOSINOPHIL # BLD AUTO: 0 K/UL
EOSINOPHIL NFR BLD: 0.5 %
ERYTHROCYTE [DISTWIDTH] IN BLOOD BY AUTOMATED COUNT: 14.5 %
EST. GFR  (AFRICAN AMERICAN): 5 ML/MIN/1.73 M^2
EST. GFR  (NON AFRICAN AMERICAN): 4 ML/MIN/1.73 M^2
GLUCOSE SERPL-MCNC: 88 MG/DL
HCT VFR BLD AUTO: 30 %
HGB BLD-MCNC: 9.6 G/DL
LYMPHOCYTES # BLD AUTO: 0.5 K/UL
LYMPHOCYTES NFR BLD: 7 %
MCH RBC QN AUTO: 31.3 PG
MCHC RBC AUTO-ENTMCNC: 32 G/DL
MCV RBC AUTO: 98 FL
MONOCYTES # BLD AUTO: 1 K/UL
MONOCYTES NFR BLD: 13.5 %
NEUTROPHILS # BLD AUTO: 6 K/UL
NEUTROPHILS NFR BLD: 78.9 %
PLATELET # BLD AUTO: 383 K/UL
PMV BLD AUTO: 10.6 FL
POCT GLUCOSE: 105 MG/DL (ref 70–110)
POCT GLUCOSE: 95 MG/DL (ref 70–110)
POTASSIUM SERPL-SCNC: 5.3 MMOL/L
RBC # BLD AUTO: 3.07 M/UL
SODIUM SERPL-SCNC: 137 MMOL/L
WBC # BLD AUTO: 7.57 K/UL

## 2019-03-11 PROCEDURE — 94640 AIRWAY INHALATION TREATMENT: CPT

## 2019-03-11 PROCEDURE — 25000242 PHARM REV CODE 250 ALT 637 W/ HCPCS: Performed by: HOSPITALIST

## 2019-03-11 PROCEDURE — 85025 COMPLETE CBC W/AUTO DIFF WBC: CPT

## 2019-03-11 PROCEDURE — 25000003 PHARM REV CODE 250: Performed by: INTERNAL MEDICINE

## 2019-03-11 PROCEDURE — 63600175 PHARM REV CODE 636 W HCPCS: Performed by: INTERNAL MEDICINE

## 2019-03-11 PROCEDURE — 36415 COLL VENOUS BLD VENIPUNCTURE: CPT

## 2019-03-11 PROCEDURE — 25000003 PHARM REV CODE 250: Performed by: HOSPITALIST

## 2019-03-11 PROCEDURE — 25000003 PHARM REV CODE 250: Performed by: EMERGENCY MEDICINE

## 2019-03-11 PROCEDURE — 99900035 HC TECH TIME PER 15 MIN (STAT)

## 2019-03-11 PROCEDURE — 25000242 PHARM REV CODE 250 ALT 637 W/ HCPCS: Performed by: INTERNAL MEDICINE

## 2019-03-11 PROCEDURE — 94761 N-INVAS EAR/PLS OXIMETRY MLT: CPT

## 2019-03-11 PROCEDURE — P9047 ALBUMIN (HUMAN), 25%, 50ML: HCPCS | Performed by: INTERNAL MEDICINE

## 2019-03-11 PROCEDURE — 90935 HEMODIALYSIS ONE EVALUATION: CPT

## 2019-03-11 PROCEDURE — 27000221 HC OXYGEN, UP TO 24 HOURS

## 2019-03-11 PROCEDURE — 94664 DEMO&/EVAL PT USE INHALER: CPT

## 2019-03-11 PROCEDURE — 80048 BASIC METABOLIC PNL TOTAL CA: CPT

## 2019-03-11 RX ORDER — AMIODARONE HYDROCHLORIDE 400 MG/1
400 TABLET ORAL 3 TIMES DAILY
Qty: 90 TABLET | Refills: 11 | Status: ON HOLD | OUTPATIENT
Start: 2019-03-11 | End: 2019-03-25 | Stop reason: HOSPADM

## 2019-03-11 RX ORDER — AMIODARONE HYDROCHLORIDE 200 MG/1
200 TABLET ORAL 2 TIMES DAILY
Qty: 28 TABLET | Refills: 0 | Status: SHIPPED | OUTPATIENT
Start: 2019-04-07 | End: 2019-04-10 | Stop reason: SDUPTHER

## 2019-03-11 RX ORDER — SODIUM CHLORIDE 9 MG/ML
INJECTION, SOLUTION INTRAVENOUS ONCE
Status: DISCONTINUED | OUTPATIENT
Start: 2019-03-11 | End: 2019-03-11 | Stop reason: HOSPADM

## 2019-03-11 RX ORDER — AMIODARONE HYDROCHLORIDE 400 MG/1
400 TABLET ORAL 2 TIMES DAILY
Qty: 28 TABLET | Refills: 0 | Status: SHIPPED | OUTPATIENT
Start: 2019-03-24 | End: 2019-04-05

## 2019-03-11 RX ORDER — SODIUM CHLORIDE 9 MG/ML
INJECTION, SOLUTION INTRAVENOUS
Status: DISCONTINUED | OUTPATIENT
Start: 2019-03-11 | End: 2019-03-11 | Stop reason: HOSPADM

## 2019-03-11 RX ORDER — ALBUMIN HUMAN 250 G/1000ML
25 SOLUTION INTRAVENOUS ONCE
Status: COMPLETED | OUTPATIENT
Start: 2019-03-11 | End: 2019-03-11

## 2019-03-11 RX ORDER — AMIODARONE HYDROCHLORIDE 200 MG/1
200 TABLET ORAL DAILY
Qty: 30 TABLET | Refills: 5 | Status: SHIPPED | OUTPATIENT
Start: 2019-04-22 | End: 2019-04-01 | Stop reason: ALTCHOICE

## 2019-03-11 RX ADMIN — APIXABAN 2.5 MG: 2.5 TABLET, FILM COATED ORAL at 08:03

## 2019-03-11 RX ADMIN — AMIODARONE HYDROCHLORIDE 400 MG: 200 TABLET ORAL at 08:03

## 2019-03-11 RX ADMIN — IPRATROPIUM BROMIDE AND ALBUTEROL SULFATE 3 ML: .5; 3 SOLUTION RESPIRATORY (INHALATION) at 09:03

## 2019-03-11 RX ADMIN — IPRATROPIUM BROMIDE AND ALBUTEROL SULFATE 3 ML: .5; 3 SOLUTION RESPIRATORY (INHALATION) at 08:03

## 2019-03-11 RX ADMIN — ALBUMIN HUMAN 25 G: 0.25 SOLUTION INTRAVENOUS at 04:03

## 2019-03-11 RX ADMIN — MONTELUKAST SODIUM 10 MG: 10 TABLET, FILM COATED ORAL at 08:03

## 2019-03-11 RX ADMIN — AMIODARONE HYDROCHLORIDE 400 MG: 200 TABLET ORAL at 02:03

## 2019-03-11 RX ADMIN — AMIODARONE HYDROCHLORIDE 400 MG: 200 TABLET ORAL at 09:03

## 2019-03-11 RX ADMIN — PANTOPRAZOLE SODIUM 40 MG: 40 TABLET, DELAYED RELEASE ORAL at 09:03

## 2019-03-11 RX ADMIN — BUSPIRONE HYDROCHLORIDE 10 MG: 10 TABLET ORAL at 09:03

## 2019-03-11 RX ADMIN — BUSPIRONE HYDROCHLORIDE 10 MG: 10 TABLET ORAL at 08:03

## 2019-03-11 RX ADMIN — METOPROLOL SUCCINATE 50 MG: 50 TABLET, EXTENDED RELEASE ORAL at 09:03

## 2019-03-11 RX ADMIN — MIRTAZAPINE 7.5 MG: 7.5 TABLET ORAL at 08:03

## 2019-03-11 RX ADMIN — DOXYCYCLINE HYCLATE 100 MG: 100 TABLET, COATED ORAL at 09:03

## 2019-03-11 RX ADMIN — CETIRIZINE HYDROCHLORIDE 10 MG: 10 TABLET, FILM COATED ORAL at 09:03

## 2019-03-11 RX ADMIN — DOXYCYCLINE HYCLATE 100 MG: 100 TABLET, COATED ORAL at 08:03

## 2019-03-11 RX ADMIN — SEVELAMER CARBONATE 800 MG: 800 TABLET, FILM COATED ORAL at 09:03

## 2019-03-11 RX ADMIN — DOCUSATE SODIUM 100 MG: 100 CAPSULE, LIQUID FILLED ORAL at 09:03

## 2019-03-11 RX ADMIN — SEVELAMER CARBONATE 800 MG: 800 TABLET, FILM COATED ORAL at 12:03

## 2019-03-11 RX ADMIN — FLUTICASONE PROPIONATE 50 MCG: 50 SPRAY, METERED NASAL at 09:03

## 2019-03-11 RX ADMIN — APIXABAN 2.5 MG: 2.5 TABLET, FILM COATED ORAL at 09:03

## 2019-03-11 RX ADMIN — FLUTICASONE PROPIONATE 50 MCG: 50 SPRAY, METERED NASAL at 08:03

## 2019-03-11 NOTE — ASSESSMENT & PLAN NOTE
Patient presented to ER with rapid AFib.  Now in NSR after 3-4 days on amiodarone infusion  Is on PO amiodarone (taper dose per Cardiology) and PO metoprolol  Cardiac monitoring  On low dose apixaban for stroke prophylaxis (risk of bleeding discussed with patient and  at bedside)  F/u with Cardiology as outpatient  Now in NSR. Will discharge and follow up with Dr. Garcia. Will go home on amio taper/apixaban/others

## 2019-03-11 NOTE — PROGRESS NOTES
[facility] Hospital Medicine  Progress Note    Patient Name: Eli Vaz  MRN: [id number]  Patient Class: IP- Inpatient   Admission Date: 3/6/2019  Length of Stay: 5 days  Attending Physician: Jerry Urena MD  Primary Care Provider: Chiara Nelson MD        Subjective:     Principal Problem:Atrial fibrillation with RVR    HPI:  72 y/o female with history of ESRD and COPD presents with shortness of breath.  She states that symptoms started around 6:00 a.m. this morning.  She also complains of left-sided chest pressure.  Pain is not associated with shortness of breath and has been presented for a while.  Patient also complains of chest congestion.  States she has been having a dry cough for several days.  Chest pain is worse with cough and deep breathing.  Chest pain is left-sided, nonradiating.  No recent fever.  No abdominal pain, nausea or vomiting. No previous episodes of abnormal heart rhythms, no palpitations.  Last dialysis was 2 days ago via left upper extremity AV fistula.  She presented to ER where she was noted to be in rapid AFib.  Patient was placed on Amiodarone with conversion to NSR.  No hx of Afib.  No other complaints.        Hospital Course:  Ms Vaz presented with AFib with RVR. Started on amiodarone infusion, admitted to ICU and Cardiology consulted. Converted to NSR but did not last long. Was dialyzed while inpatient as routine but also to address moderate hyperkalemia and UF. PO amiodarone and PO metoprolol added to regimen. Finally converted to NSR and stayed that way on 3/10 (3-4 days after admission). Stepped down to telemetry floor on 3/10. Patient requesting discharge on 3/11. Will arrange H/H on discharge. Will follow up with Dr. Garcia in 1-2 weeks. Activity as tolerated. Diet regular     Interval History: No new issues.       Review of Systems   Constitutional: Negative for activity change, appetite change and diaphoresis.   HENT: Negative for congestion.     Respiratory: Negative for chest tightness and shortness of breath.    Cardiovascular: Negative for chest pain.   Gastrointestinal: Negative for abdominal distention, constipation, nausea and vomiting.   Genitourinary: Negative for difficulty urinating.   Musculoskeletal: Negative for arthralgias.   Neurological: Negative for dizziness.   Psychiatric/Behavioral: Negative for agitation.     Objective:     Vital Signs (Most Recent):  Temp: 97.8 °F (36.6 °C) (03/11/19 1152)  Pulse: 109 (03/11/19 1152)  Resp: 16 (03/11/19 1152)  BP: (!) 99/57 (03/11/19 1152)  SpO2: 100 % (03/11/19 1152) Vital Signs (24h Range):  Temp:  [97.8 °F (36.6 °C)-99.3 °F (37.4 °C)] 97.8 °F (36.6 °C)  Pulse:  [] 109  Resp:  [16-25] 16  SpO2:  [91 %-100 %] 100 %  BP: ()/(53-69) 99/57     Weight: 62.3 kg (137 lb 5.6 oz)  Body mass index is 23.58 kg/m².    Intake/Output Summary (Last 24 hours) at 3/11/2019 1243  Last data filed at 3/10/2019 1953  Gross per 24 hour   Intake 120 ml   Output --   Net 120 ml      Physical Exam   Constitutional: She is oriented to person, place, and time. She appears well-developed and well-nourished.   HENT:   Head: Normocephalic and atraumatic.   Cardiovascular: Normal rate and regular rhythm. Exam reveals no friction rub.   Pulmonary/Chest: Effort normal and breath sounds normal. No stridor. No respiratory distress.   Neurological: She is alert and oriented to person, place, and time.   Psychiatric: She has a normal mood and affect. Her behavior is normal.   Nursing note and vitals reviewed.      Significant Labs:   BMP:   Recent Labs   Lab 03/11/19  0707   GLU 88      K 5.3*   CL 97   CO2 26   BUN 57*   CREATININE 9.1*   CALCIUM 10.4     CBC:   Recent Labs   Lab 03/10/19  0342 03/11/19  0707   WBC 7.57 7.57   HGB 8.9* 9.6*   HCT 29.0* 30.0*    383*       Significant Imaging    Assessment/Plan:      * Atrial fibrillation with RVR    Patient presented to ER with rapid AFib.  Now in NSR after  3-4 days on amiodarone infusion  Is on PO amiodarone (taper dose per Cardiology) and PO metoprolol  Cardiac monitoring  On low dose apixaban for stroke prophylaxis (risk of bleeding discussed with patient and  at bedside)  F/u with Cardiology as outpatient  Now in NSR. Will discharge and follow up with Dr. Garcia. Will go home on amio taper/apixaban/others        Chest pain    Seems more pleuritic and musculoskeletal.  Secondary to persistent cough.  Resolved          Hypertension    Essential  Continue home medications and monitor.       Combined hyperlipidemia associated with type 2 diabetes mellitus    Continue Statin.       Diabetes mellitus with ESRD (end-stage renal disease)    Diabetic diet and insulin sliding scale.       Anemia of chronic disease    H/H similar to previous labs.  No evidence of bleeding.       ESRD on dialysis    Dialysis today       COPD (chronic obstructive pulmonary disease)    No acute exacerbation as no wheezing on exam.  Does complain of congestion.  Will provide Xopenex nebs.  Monitor.         VTE Risk Mitigation (From admission, onward)        Ordered     apixaban tablet 2.5 mg  2 times daily      03/06/19 2042     IP VTE HIGH RISK PATIENT  Once      03/06/19 1635     Place sequential compression device  Until discontinued      03/06/19 1635        Will d/c to home.      Jerry Man MD  Department of Hospital Medicine   [facility]

## 2019-03-11 NOTE — PLAN OF CARE
03/11/19 1646   Final Note   Assessment Type Final Discharge Note   Anticipated Discharge Disposition Home-Health  ([facility])   Hospital Follow Up  Appt(s) scheduled? Yes   Right Care Referral Info   Post Acute Recommendation Home-care   Referral Type Homecare   Facility Name [address] [address]        informed nurse Sammi  completed all discharge planning for patient and is clear to discharge from case management standpoint.

## 2019-03-11 NOTE — PROGRESS NOTES
TAMIKA rescheduled PCP follow-up with Dr. Nelson on 3/18/19 @ 1:20pm. TAMIKA faxed face sheet and home health orders to N @ [phone] to arrange home health services for patient.

## 2019-03-11 NOTE — PLAN OF CARE
Problem: Adult Inpatient Plan of Care  Goal: Plan of Care Review  Outcome: Ongoing (interventions implemented as appropriate)   03/11/19 0637   Plan of Care Review   Plan of Care Reviewed With patient     Patient experienced episode of shortness of breath during shift. Sat her up higher and turned oxygen to 4. Very difficult to get a reading with pulse oximeter, but it eventually read, fluctuating between 95-97. No other significant events this shift.

## 2019-03-11 NOTE — PROGRESS NOTES
Date of Admission:3/6/2019    SUBJECTIVE: notes no appetite  Family at bedside    Current Facility-Administered Medications   Medication    0.9%  NaCl infusion    0.9%  NaCl infusion    0.9%  NaCl infusion    0.9%  NaCl infusion    acetaminophen tablet 650 mg    albuterol-ipratropium 2.5 mg-0.5 mg/3 mL nebulizer solution 3 mL    amiodarone tablet 400 mg    Followed by    [START ON 3/24/2019] amiodarone tablet 400 mg    Followed by    [START ON 4/7/2019] amiodarone tablet 200 mg    Followed by    [START ON 4/22/2019] amiodarone tablet 200 mg    apixaban tablet 2.5 mg    atorvastatin tablet 10 mg    busPIRone tablet 10 mg    cetirizine tablet 10 mg    cloNIDine tablet 0.2 mg    dextrose 50% injection 12.5 g    dextrose 50% injection 25 g    docusate sodium capsule 100 mg    doxycycline tablet 100 mg    epoetin cat injection 10,000 Units    fluticasone 50 mcg/actuation nasal spray 50 mcg    glucagon (human recombinant) injection 1 mg    glucose chewable tablet 16 g    glucose chewable tablet 24 g    metoprolol succinate (TOPROL-XL) 24 hr tablet 50 mg    mirtazapine tablet 7.5 mg    montelukast tablet 10 mg    ondansetron injection 8 mg    pantoprazole EC tablet 40 mg    polyethylene glycol packet 17 g    promethazine-codeine 6.25-10 mg/5 ml syrup 5 mL    sevelamer carbonate tablet 800 mg    sodium chloride 0.9% flush 5 mL    vitamin renal formula (B-complex-vitamin c-folic acid) 1 mg per capsule 1 capsule       Wt Readings from Last 3 Encounters:   03/09/19 62.3 kg (137 lb 5.6 oz)   02/27/19 64.3 kg (141 lb 12.1 oz)   02/25/19 60.8 kg (134 lb)     Temp Readings from Last 3 Encounters:   03/11/19 98 °F (36.7 °C) (Oral)   02/27/19 97.2 °F (36.2 °C) (Oral)   02/25/19 98.2 °F (36.8 °C) (Oral)     BP Readings from Last 3 Encounters:   03/11/19 (!) 98/59   02/27/19 (!) 184/82   02/25/19 (!) 156/75     Pulse Readings from Last 3 Encounters:   03/11/19 106   02/27/19 103   02/25/19 83        Intake/Output Summary (Last 24 hours) at 3/11/2019 1719  Last data filed at 3/10/2019 1953  Gross per 24 hour   Intake 120 ml   Output --   Net 120 ml       PE:  GEN:wd female in nad  HEENT:ncat,eomi,mm  CVS:s1s2 regular  PULM:diminished bases  ABD:+bs,soft,nd  EXT:no leg edema, avf arm  NEURO:awake    Recent Labs   Lab 03/11/19  0707   GLU 88      K 5.3*   CL 97   CO2 26   BUN 57*   CREATININE 9.1*   CALCIUM 10.4       Lab Results   Component Value Date     (H) 12/14/2010    CALCIUM 10.4 03/11/2019    PHOS 7.5 (H) 03/06/2019       Recent Labs   Lab 03/11/19  0707   WBC 7.57   RBC 3.07*   HGB 9.6*   HCT 30.0*   *   MCV 98   MCH 31.3*   MCHC 32.0         A/P:  1.ESRD. HD early today. K up.    2.hyperkalemia. Mild. 2k bath today. HOld hd in am planned if k ok.  3.anemia 2nd to esrd. Add epo.  4.hypercalcemia. 2ca bath today.  5.copd. Cont tx.  6.afib.  Rate ok.  7.htn.  Low uf with hd.  8. 2nd hyperparathyroidism. Cont binders.

## 2019-03-11 NOTE — PROGRESS NOTES
Rosie with [facility] contacted  to confirm [facility] accepted patient for home health.

## 2019-03-11 NOTE — DISCHARGE SUMMARY
[facility] Hospital Medicine  Discharge Summary      Patient Name: Eli Vaz  MRN: [id number]  Admission Date: 3/6/2019  Hospital Length of Stay: 5 days  Discharge Date and Time:  03/11/2019 12:49 PM  Attending Physician: Jerry Urena MD   Discharging Provider: Jerry Urena MD  Primary Care Provider: Chiara Nelson MD      HPI:   74 y/o female with history of ESRD and COPD presents with shortness of breath.  She states that symptoms started around 6:00 a.m. this morning.  She also complains of left-sided chest pressure.  Pain is not associated with shortness of breath and has been presented for a while.  Patient also complains of chest congestion.  States she has been having a dry cough for several days.  Chest pain is worse with cough and deep breathing.  Chest pain is left-sided, nonradiating.  No recent fever.  No abdominal pain, nausea or vomiting. No previous episodes of abnormal heart rhythms, no palpitations.  Last dialysis was 2 days ago via left upper extremity AV fistula.  She presented to ER where she was noted to be in rapid AFib.  Patient was placed on Amiodarone with conversion to NSR.  No hx of Afib.  No other complaints.        * No surgery found *      Hospital Course:   Ms Vaz presented with AFib with RVR. Started on amiodarone infusion, admitted to ICU and Cardiology consulted. Converted to NSR but did not last long. Was dialyzed while inpatient as routine but also to address moderate hyperkalemia and UF. PO amiodarone and PO metoprolol added to regimen. Finally converted to NSR and stayed that way on 3/10 (3-4 days after admission). Stepped down to telemetry floor on 3/10. Patient requesting discharge on 3/11. Will arrange H/H on discharge. Will follow up with Dr. Garcia in 1-2 weeks. Activity as tolerated. Diet- low NA ADA 1800 ramon diet.      Consults:   Consults (From admission, onward)        Status Ordering Provider     Inpatient consult to Cardiology   Once     Provider:  Isrrael Weiss MD    Completed RUSLAN PARKINSON     Inpatient consult to Nephrology  Once     Provider:  Mathieu Barros MD    Completed RUSLAN PARKINSON     Inpatient consult to Social Work  Once     Provider:  (Not yet assigned)    ISRRAEL Puckett          No new Assessment & Plan notes have been filed under this hospital service since the last note was generated.  Service: Hospital Medicine    Final Active Diagnoses:    Diagnosis Date Noted POA    Non-rheumatic mitral regurgitation [I34.0] 03/06/2019 Yes    Combined hyperlipidemia associated with type 2 diabetes mellitus [E11.69, E78.2] 12/01/2015 Yes    Diabetes mellitus with ESRD (end-stage renal disease) [E11.22, N18.6] 12/01/2015 Yes     Chronic    Hypertension [I10] 06/04/2014 Yes    Anemia of chronic disease [D63.8] 03/11/2014 Yes    ESRD on dialysis [N18.6, Z99.2] 12/20/2013 Not Applicable    COPD (chronic obstructive pulmonary disease) [J44.9] 11/20/2013 Yes      Problems Resolved During this Admission:    Diagnosis Date Noted Date Resolved POA    PRINCIPAL PROBLEM:  Atrial fibrillation with RVR [I48.91] 03/06/2019 03/11/2019 Yes    Chest pain [R07.9] 02/03/2019 03/11/2019 Yes       Discharged Condition: good    Disposition:  to home     Follow Up:  Follow-up Information     Elgin Garcia MD On 3/21/2019.    Specialty:  Cardiology  Why:  Outpatient Services @2:00pm   Contact information:  [address]  [place] [address]  [phone]             Chiara Nelson MD On 3/11/2019.    Specialty:  Family Medicine  Why:  Outpatient Services@ 10:00am   Contact information:  [address]  [place] [address]  [phone]             Chiara Nelson MD In 1 week.    Specialty:  Family Medicine  Contact information:  [address]  [place] [address]  [phone]             Elgin Garcia MD In 1 week.    Specialty:  Cardiology  Contact information:  [address]  [address]  [place]  [address]  [phone]                 Patient Instructions:   No discharge procedures on file.    Significant Diagnostic Studies:    Pending Diagnostic Studies:     None         Medications:  Reconciled Home Medications:      Medication List      START taking these medications    * amiodarone 400 MG tablet  Commonly known as:  PACERONE  Take 1 tablet (400 mg total) by mouth 3 (three) times daily.     * amiodarone 400 MG tablet  Commonly known as:  PACERONE  Take 1 tablet (400 mg total) by mouth 2 (two) times daily. for 14 days  Start taking on:  3/24/2019     * amiodarone 200 MG Tab  Commonly known as:  PACERONE  Take 1 tablet (200 mg total) by mouth 2 (two) times daily. for 14 days  Start taking on:  4/7/2019     * amiodarone 200 MG Tab  Commonly known as:  PACERONE  Take 1 tablet (200 mg total) by mouth once daily.  Start taking on:  4/22/2019     apixaban 2.5 mg Tab  Commonly known as:  ELIQUIS  Take 1 tablet (2.5 mg total) by mouth 2 (two) times daily.         * This list has 4 medication(s) that are the same as other medications prescribed for you. Read the directions carefully, and ask your doctor or other care provider to review them with you.            CONTINUE taking these medications    albuterol-ipratropium 2.5 mg-0.5 mg/3 mL nebulizer solution  Commonly known as:  DUO-NEB  Take 3 mLs by nebulization every 6 (six) hours as needed for Wheezing or Shortness of Breath. Rescue     amLODIPine 10 MG tablet  Commonly known as:  NORVASC  TAKE 1 TABLET (10 MG TOTAL) BY MOUTH ONCE DAILY.     atorvastatin 10 MG tablet  Commonly known as:  LIPITOR  Take 1 tablet (10 mg total) by mouth once daily.     BRINTELLIX 5 mg Tab  Generic drug:  vortioxetine  Take 1 tablet by mouth.     busPIRone 10 MG tablet  Commonly known as:  BUSPAR  Take 10 mg by mouth 2 (two) times daily.     cloNIDine 0.2 MG tablet  Commonly known as:  CATAPRES  Take 0.2 mg by mouth.     diclofenac sodium 1 % Gel  Commonly known as:  VOLTAREN  Apply 4 g  topically.     epoetin cat 3,000 unit/mL injection  Commonly known as:  PROCRIT  Inject 3,000 Units into the skin.     fluticasone 50 mcg/actuation nasal spray  Commonly known as:  FLONASE  1 spray by Each Nare route 2 (two) times daily.     gentamicin sulfate (PF) 60 mg/6 mL Soln  Inject into the vein.     guaiFENesin 1,200 mg Ta12  Commonly known as:  MUCINEX  Take 1 tablet by mouth 2 (two) times daily. for 14 days     guaifenesin-codeine 100-10 mg/5 ml  mg/5 mL syrup  Commonly known as:  CHERATUSSIN AC  Take 5 mLs by mouth every 12 (twelve) hours as needed for Cough or Congestion.     hydrALAZINE 25 MG tablet  Commonly known as:  APRESOLINE  Take 1 tablet (25 mg total) by mouth every 12 (twelve) hours.     * HYDROcodone-acetaminophen 7.5-325 mg per tablet  Commonly known as:  NORCO  Take 1 tablet by mouth every 8 (eight) hours as needed for Pain.     * HYDROcodone-acetaminophen 7.5-325 mg per tablet  Commonly known as:  NORCO  Take 1 tablet by mouth every 8 (eight) hours as needed for Pain.  Start taking on:  3/14/2019     * HYDROcodone-acetaminophen 7.5-325 mg per tablet  Commonly known as:  NORCO  Take 1 tablet by mouth every 8 (eight) hours as needed for Pain.  Start taking on:  4/13/2019     hydrOXYzine pamoate 25 MG Cap  Commonly known as:  VISTARIL  Take 1 capsule (25 mg total) by mouth every 8 (eight) hours as needed (panic attack).     levocetirizine 5 MG tablet  Commonly known as:  XYZAL  Take 1 tablet (5 mg total) by mouth every evening.     meclizine 25 mg tablet  Commonly known as:  ANTIVERT  Take 1 tablet (25 mg total) by mouth 3 (three) times daily as needed.     metoprolol succinate 25 MG 24 hr tablet  Commonly known as:  TOPROL-XL  Take 25 mg by mouth.     mirtazapine 7.5 MG Tab  Commonly known as:  REMERON  Take 7.5 mg by mouth nightly.     montelukast 10 mg tablet  Commonly known as:  SINGULAIR  Take 1 tablet (10 mg total) by mouth every evening.     pantoprazole 40 MG tablet  Commonly  known as:  PROTONIX  Take 40 mg by mouth.     paroxetine 30 MG tablet  Commonly known as:  PAXIL  1 TABLET IN THE MORNING ONCE A DAY ORALLY 90     predniSONE 10 MG tablet  Commonly known as:  DELTASONE  2 tab po qday x 4 days, then 1 tab po qday x 4 days, then 1/2 tab po qday x 4 days     promethazine 12.5 MG Tab  Commonly known as:  PHENERGAN  Take 12.5 mg by mouth every 6 (six) hours as needed.     RENAL CAPS ORAL  Take by mouth Daily.     SENSIPAR 60 MG Tab  Generic drug:  cinacalcet  Take 30 mg by mouth daily with breakfast.     sevelamer carbonate 800 mg Tab  Commonly known as:  RENVELA  Take 800 mg by mouth 3 (three) times daily with meals.     SODIUM BICARBONATE ORAL  Take 650 mg by mouth.     traZODone 50 MG tablet  Commonly known as:  DESYREL  Take 1 tablet (50 mg total) by mouth nightly as needed for Insomnia.     * VENTOLIN HFA 90 mcg/actuation inhaler  Generic drug:  albuterol  INHALE 2 PUFFS INTO LUNGS EVERY 4 HOURS AS NEEDED FOR SHORTNESS OF BREATH OR WHEEZING. RESCUE     * albuterol 1.25 mg/3 mL Nebu  Commonly known as:  ACCUNEB  Take 3 mLs (1.25 mg total) by nebulization every 6 (six) hours as needed.     vit B,C-iron fum-FA-D3-zinc ox 8 mg iron-800 mcg-1,000 unit Tab  Take by mouth.     vitamin renal formula (B-complex-vitamin c-folic acid) 1 mg Cap  Commonly known as:  NEPHROCAP  Take by mouth.         * This list has 5 medication(s) that are the same as other medications prescribed for you. Read the directions carefully, and ask your doctor or other care provider to review them with you.            STOP taking these medications    doxycycline 100 MG Cap  Commonly known as:  VIBRAMYCIN            Indwelling Lines/Drains at time of discharge:   Lines/Drains/Airways     Central Venous Catheter Line                 Hemodialysis Catheter -- days          Drain                 Hemodialysis AV Fistula 03/06/19 2447 Left upper arm 4 days                Time spent on the discharge of patient:  > 30  minutes  Patient was seen and examined on the date of discharge and determined to be suitable for discharge.         Jerry Man MD  Department of Hospital Medicine  [facility]

## 2019-03-11 NOTE — PLAN OF CARE
[facility]    HOME HEALTH ORDERS  FACE TO FACE ENCOUNTER    Patient Name: Eli Vaz  YOB: 1945    PCP: Chiara Nelson MD   PCP Address: [address]  PCP Phone Number: [phone]  PCP Fax: [phone]    Encounter Date: 03/11/2019    Admit to Home Health    Diagnoses:  Active Hospital Problems    Diagnosis  POA    Non-rheumatic mitral regurgitation [I34.0]  Yes    Combined hyperlipidemia associated with type 2 diabetes mellitus [E11.69, E78.2]  Yes    Diabetes mellitus with ESRD (end-stage renal disease) [E11.22, N18.6]  Yes     Chronic    Hypertension [I10]  Yes    Anemia of chronic disease [D63.8]  Yes    ESRD on dialysis [N18.6, Z99.2]  Not Applicable     Dialyzes at [facility] in [place].  Followed by Dr. Arthur      COPD (chronic obstructive pulmonary disease) [J44.9]  Yes      Resolved Hospital Problems    Diagnosis Date Resolved POA    *Atrial fibrillation with RVR [I48.91] 03/11/2019 Yes     Priority: 1 - High    Chest pain [R07.9] 03/11/2019 Yes       Future Appointments   Date Time Provider Department Center   3/21/2019  2:00 PM Elgin Garcia MD [facility] CARDIO [facility]   5/6/2019 10:30 AM Isrrael Barton Jr., MD [facility]     Follow-up Information     Elgin Garcia MD On 3/21/2019.    Specialty:  Cardiology  Why:  Outpatient Services @2:00pm   Contact information:  [address]  [place] [address]  [phone]             Chiara Nelson MD On 3/11/2019.    Specialty:  Family Medicine  Why:  Outpatient Services@ 10:00am   Contact information:  [address] [place] [address]  [phone]             Chiara Nelson MD In 1 week.    Specialty:  Family Medicine  Contact information:  [address] [place] [address]  [phone]             Elgin Garcia MD In 1 week.    Specialty:  Cardiology  Contact information:  [address] [facility]  [address]  [place] [address] [phone]                     I have seen  and examined this patient face to face today. My clinical findings that support the need for the home health skilled services and home bound status are the following:  Weakness/numbness causing balance and gait disturbance due to Weakness/Debility making it taxing to leave home.    Allergies:Review of patient's allergies indicates:  No Known Allergies    Diet: diabetic diet: 1800 calorie and 2 gram sodium diet    Activities: activity as tolerated    Nursing:   SN to complete comprehensive assessment including routine vital signs. Instruct on disease process and s/s of complications to report to MD. Review/verify medication list sent home with the patient at time of discharge  and instruct patient/caregiver as needed. Frequency may be adjusted depending on start of care date.    Notify MD if SBP > 160 or < 90; DBP > 90 or < 50; HR > 120 or < 50; Temp > 101; Other:         CONSULTS:    Physical Therapy to evaluate and treat. Evaluate for home safety and equipment needs; Establish/upgrade home exercise program. Perform / instruct on therapeutic exercises, gait training, transfer training, and Range of Motion.  Occupational Therapy to evaluate and treat. Evaluate home environment for safety and equipment needs. Perform/Instruct on transfers, ADL training, ROM, and therapeutic exercises.    Medications: Review discharge medications with patient and family and provide education.      Current Discharge Medication List      START taking these medications    Details   !! amiodarone (PACERONE) 200 MG Tab Take 1 tablet (200 mg total) by mouth 2 (two) times daily. for 14 days  Qty: 28 tablet, Refills: 0      !! amiodarone (PACERONE) 200 MG Tab Take 1 tablet (200 mg total) by mouth once daily.  Qty: 30 tablet, Refills: 5      !! amiodarone (PACERONE) 400 MG tablet Take 1 tablet (400 mg total) by mouth 3 (three) times daily.  Qty: 90 tablet, Refills: 11      !! amiodarone (PACERONE) 400 MG tablet Take 1 tablet (400 mg total) by  mouth 2 (two) times daily. for 14 days  Qty: 28 tablet, Refills: 0      apixaban (ELIQUIS) 2.5 mg Tab Take 1 tablet (2.5 mg total) by mouth 2 (two) times daily.  Qty: 60 tablet, Refills: 5       !! - Potential duplicate medications found. Please discuss with provider.      CONTINUE these medications which have NOT CHANGED    Details   albuterol (ACCUNEB) 1.25 mg/3 mL Nebu Take 3 mLs (1.25 mg total) by nebulization every 6 (six) hours as needed.  Qty: 1 Box, Refills: 2    Associated Diagnoses: Chronic obstructive pulmonary disease with acute exacerbation      albuterol-ipratropium (DUO-NEB) 2.5 mg-0.5 mg/3 mL nebulizer solution Take 3 mLs by nebulization every 6 (six) hours as needed for Wheezing or Shortness of Breath. Rescue  Qty: 1 Box, Refills: 2    Associated Diagnoses: Chronic bronchitis with COPD (chronic obstructive pulmonary disease)      amLODIPine (NORVASC) 10 MG tablet TAKE 1 TABLET (10 MG TOTAL) BY MOUTH ONCE DAILY.  Qty: 90 tablet, Refills: 1    Associated Diagnoses: Hypertension associated with diabetes      atorvastatin (LIPITOR) 10 MG tablet Take 1 tablet (10 mg total) by mouth once daily.  Qty: 90 tablet, Refills: 1    Comments: **Patient requests 90 days supply**  Associated Diagnoses: Combined hyperlipidemia associated with type 2 diabetes mellitus      busPIRone (BUSPAR) 10 MG tablet Take 10 mg by mouth 2 (two) times daily.  Refills: 6      cloNIDine (CATAPRES) 0.2 MG tablet Take 0.2 mg by mouth.      diclofenac sodium (VOLTAREN) 1 % Gel Apply 4 g topically.      epoetin cat (PROCRIT) 3,000 unit/mL injection Inject 3,000 Units into the skin.      fluticasone (FLONASE) 50 mcg/actuation nasal spray 1 spray by Each Nare route 2 (two) times daily.  Qty: 1 Bottle, Refills: 1    Associated Diagnoses: Cough      !! FOLIC ACID/VITAMIN B COMP W-C (RENAL CAPS ORAL) Take by mouth Daily.      gentamicin sulfate, PF, 60 mg/6 mL Soln Inject into the vein.      guaiFENesin (MUCINEX) 1,200 mg Ta12 Take 1 tablet  by mouth 2 (two) times daily. for 14 days    Associated Diagnoses: Chronic bronchitis with COPD (chronic obstructive pulmonary disease)      guaifenesin-codeine 100-10 mg/5 ml (CHERATUSSIN AC)  mg/5 mL syrup Take 5 mLs by mouth every 12 (twelve) hours as needed for Cough or Congestion.  Qty: 150 mL, Refills: 0    Associated Diagnoses: Chronic bronchitis with COPD (chronic obstructive pulmonary disease)      hydrALAZINE (APRESOLINE) 25 MG tablet Take 1 tablet (25 mg total) by mouth every 12 (twelve) hours.  Qty: 180 tablet, Refills: 0    Associated Diagnoses: Hypertension associated with diabetes      !! HYDROcodone-acetaminophen (NORCO) 7.5-325 mg per tablet Take 1 tablet by mouth every 8 (eight) hours as needed for Pain.  Qty: 90 tablet, Refills: 0    Associated Diagnoses: Left rotator cuff tear arthropathy; Chronic left shoulder pain      !! HYDROcodone-acetaminophen (NORCO) 7.5-325 mg per tablet Take 1 tablet by mouth every 8 (eight) hours as needed for Pain.  Qty: 90 tablet, Refills: 0    Associated Diagnoses: Left rotator cuff tear arthropathy; Chronic left shoulder pain      !! HYDROcodone-acetaminophen (NORCO) 7.5-325 mg per tablet Take 1 tablet by mouth every 8 (eight) hours as needed for Pain.  Qty: 90 tablet, Refills: 0    Associated Diagnoses: Left rotator cuff tear arthropathy; Chronic left shoulder pain      hydrOXYzine pamoate (VISTARIL) 25 MG Cap Take 1 capsule (25 mg total) by mouth every 8 (eight) hours as needed (panic attack).  Qty: 30 capsule, Refills: 1    Associated Diagnoses: Adjustment disorder with mixed anxiety and depressed mood      levocetirizine (XYZAL) 5 MG tablet Take 1 tablet (5 mg total) by mouth every evening.  Qty: 30 tablet, Refills: 2    Associated Diagnoses: Chronic obstructive pulmonary disease, unspecified COPD type      meclizine (ANTIVERT) 25 mg tablet Take 1 tablet (25 mg total) by mouth 3 (three) times daily as needed.  Qty: 20 tablet, Refills: 0      metoprolol  succinate (TOPROL-XL) 25 MG 24 hr tablet Take 25 mg by mouth.      mirtazapine (REMERON) 7.5 MG Tab Take 7.5 mg by mouth nightly.       montelukast (SINGULAIR) 10 mg tablet Take 1 tablet (10 mg total) by mouth every evening.  Qty: 30 tablet, Refills: 2    Associated Diagnoses: Chronic obstructive pulmonary disease, unspecified COPD type      pantoprazole (PROTONIX) 40 MG tablet Take 40 mg by mouth.      paroxetine (PAXIL) 30 MG tablet 1 TABLET IN THE MORNING ONCE A DAY ORALLY 90  Refills: 3      predniSONE (DELTASONE) 10 MG tablet 2 tab po qday x 4 days, then 1 tab po qday x 4 days, then 1/2 tab po qday x 4 days  Qty: 14 tablet, Refills: 0    Associated Diagnoses: Chronic bronchitis with COPD (chronic obstructive pulmonary disease)      promethazine (PHENERGAN) 12.5 MG Tab Take 12.5 mg by mouth every 6 (six) hours as needed.      SENSIPAR 60 mg Tab Take 30 mg by mouth daily with breakfast.       sevelamer carbonate (RENVELA) 800 mg Tab Take 800 mg by mouth 3 (three) times daily with meals.      SODIUM BICARBONATE ORAL Take 650 mg by mouth.      traZODone (DESYREL) 50 MG tablet Take 1 tablet (50 mg total) by mouth nightly as needed for Insomnia.  Qty: 30 tablet, Refills: 3    Associated Diagnoses: Adjustment disorder with mixed anxiety and depressed mood      VENTOLIN HFA 90 mcg/actuation inhaler INHALE 2 PUFFS INTO LUNGS EVERY 4 HOURS AS NEEDED FOR SHORTNESS OF BREATH OR WHEEZING. RESCUE  Qty: 18 Inhaler, Refills: 2    Associated Diagnoses: Chronic obstructive pulmonary disease, unspecified COPD type      vit B,C-iron fum-FA-D3-zinc ox 8 mg iron-800 mcg-1,000 unit Tab Take by mouth.      !! vitamin renal formula, B-complex-vitamin c-folic acid, (NEPHROCAP) 1 mg Cap Take by mouth.      vortioxetine (BRINTELLIX) 5 mg Tab Take 1 tablet by mouth.       !! - Potential duplicate medications found. Please discuss with provider.      STOP taking these medications       doxycycline (VIBRAMYCIN) 100 MG Cap Comments:   Reason  for Stopping:               I certify that this patient is confined to her home and needs intermittent skilled nursing care, physical therapy and occupational therapy.

## 2019-03-11 NOTE — PROGRESS NOTES
Follow-up Information     Elgin Garcia MD. Go on 3/21/2019.    Specialty:  Cardiology  Why:  Outpatient Services, Cardiology Follow-up Appointment, Please arrive to clinic for 2:00PM  Contact information:  [address] [address]  [phone]             Chiara Nelson MD. Go on 3/18/2019.    Specialty:  Family Medicine  Why:  Outpatient Services, PCP Follow-up Appointment, Please arrive to clinic for 1:20PM  Contact information:  [address] [address]  [phone]             [facility].    Specialty:  Home Health Services  Why:  Home Health Provider  Contact information:  [address] [address]  [facility] [address]  [phone]                   [facility]    WRITTEN HEALTHCARE AND DISCHARGE INFORMATION                        Help at Home           [phone]  After discharge for assistance [facility] On Call Nurse Care Line 24/7  Assistance    Things You are responsible For To Manage Your Care At Home:  1.    Getting your prescriptions filled   2.    Taking your medications as directed, DO NOT MISS ANY DOSES!  3.    Going to your follow-up doctor appointment. This is important because it  allow the doctor to monitor your progress and determine if  any changes need to made to your treatment plan.     Thank you for choosing [facility] for your care.  Please answer any calls you may receive from [facility] we want to continue to support you as you manage your healthcare needs. [facility] is happy to have the opportunity to serve you.     Sincerely,  Your [facility] Team,  Rosie Jhaveri [facility] [place]  [phone]

## 2019-03-11 NOTE — PROGRESS NOTES
TAMIKA contacted PHN @ [phone] to follow-up on referral for home health, TAMIKA spoke to Rosie that confirmed orders just came through [facility] internal Q and she will have to call TAMIKA back, request has not been processed.

## 2019-03-11 NOTE — PHYSICIAN QUERY
PT Name: Eli Vaz  MR #: [id number]     Physician Query Form - Diagnosis Clarification      CDS/: Verona Willett               Contact information: [email or web address]    This form is a permanent document in the medical record.     Query Date: March 11, 2019    By submitting this query, we are merely seeking further clarification of documentation.  Please utilize your independent clinical judgment when addressing the question(s) below.     The medical record contains the following:      Findings Supporting Clinical Information Location in Medical Record   Pneumonia     Pneumonia    Vascular stents overlie the left axillary region and medial left upper arm as seen on 02/25/2019.    Today's examination was obtained with the patient in right posterior oblique position.    Mediastinal structures are midline.  Cardiac silhouette appears enlarged but stable.    There is a small amount of dependent fluid in each pleural space.  Patchy heterogeneous opacity in the right mid and lower lung zones may be due to atelectasis although aspiration or pneumonia could present in a similar fashion.    I detect no convincing evidence of ranjan pulmonary edema and no focal left pulmonary disease.    I detect no pneumothorax, pneumomediastinum, pneumoperitoneum or significant osseous abnormality. Nephrology notes     CXR 3/6      Please clarify if the __Pneumonia __ diagnosis has been:    [  ] Ruled In   [  ] Ruled In, Now Resolved   [  ] Ruled Out   [  ] Other/Clarification of findings (please specify):   [  ] Clinically insignificant     [ x ] Clinically undetermined     Please document in your progress notes daily for the duration of treatment, until resolved, and include in your discharge summary.

## 2019-03-11 NOTE — SUBJECTIVE & OBJECTIVE
Interval History: No new issues.       Review of Systems   Constitutional: Negative for activity change, appetite change and diaphoresis.   HENT: Negative for congestion.    Respiratory: Negative for chest tightness and shortness of breath.    Cardiovascular: Negative for chest pain.   Gastrointestinal: Negative for abdominal distention, constipation, nausea and vomiting.   Genitourinary: Negative for difficulty urinating.   Musculoskeletal: Negative for arthralgias.   Neurological: Negative for dizziness.   Psychiatric/Behavioral: Negative for agitation.     Objective:     Vital Signs (Most Recent):  Temp: 97.8 °F (36.6 °C) (03/11/19 1152)  Pulse: 109 (03/11/19 1152)  Resp: 16 (03/11/19 1152)  BP: (!) 99/57 (03/11/19 1152)  SpO2: 100 % (03/11/19 1152) Vital Signs (24h Range):  Temp:  [97.8 °F (36.6 °C)-99.3 °F (37.4 °C)] 97.8 °F (36.6 °C)  Pulse:  [] 109  Resp:  [16-25] 16  SpO2:  [91 %-100 %] 100 %  BP: ()/(53-69) 99/57     Weight: 62.3 kg (137 lb 5.6 oz)  Body mass index is 23.58 kg/m².    Intake/Output Summary (Last 24 hours) at 3/11/2019 1243  Last data filed at 3/10/2019 1953  Gross per 24 hour   Intake 120 ml   Output --   Net 120 ml      Physical Exam   Constitutional: She is oriented to person, place, and time. She appears well-developed and well-nourished.   HENT:   Head: Normocephalic and atraumatic.   Cardiovascular: Normal rate and regular rhythm. Exam reveals no friction rub.   Pulmonary/Chest: Effort normal and breath sounds normal. No stridor. No respiratory distress.   Neurological: She is alert and oriented to person, place, and time.   Psychiatric: She has a normal mood and affect. Her behavior is normal.   Nursing note and vitals reviewed.      Significant Labs:   BMP:   Recent Labs   Lab 03/11/19  0707   GLU 88      K 5.3*   CL 97   CO2 26   BUN 57*   CREATININE 9.1*   CALCIUM 10.4     CBC:   Recent Labs   Lab 03/10/19  0342 03/11/19  0707   WBC 7.57 7.57   HGB 8.9* 9.6*   HCT  29.0* 30.0*    383*       Significant Imaging

## 2019-03-12 ENCOUNTER — TELEPHONE (OUTPATIENT)
Dept: FAMILY MEDICINE | Facility: CLINIC | Age: 74
End: 2019-03-12

## 2019-03-12 RX ORDER — HYDROXYZINE PAMOATE 25 MG/1
25 CAPSULE ORAL
COMMUNITY
Start: 2018-02-26 | End: 2019-04-01 | Stop reason: ALTCHOICE

## 2019-03-12 RX ORDER — ATORVASTATIN CALCIUM 10 MG/1
10 TABLET, FILM COATED ORAL
COMMUNITY
Start: 2016-05-02 | End: 2019-04-01 | Stop reason: SDUPTHER

## 2019-03-12 RX ORDER — MECLIZINE HYDROCHLORIDE 25 MG/1
25 TABLET ORAL
Status: ON HOLD | COMMUNITY
Start: 2018-01-10 | End: 2019-04-03 | Stop reason: HOSPADM

## 2019-03-12 RX ORDER — AMLODIPINE BESYLATE 10 MG/1
10 TABLET ORAL
Status: ON HOLD | COMMUNITY
Start: 2018-10-03 | End: 2019-03-25 | Stop reason: HOSPADM

## 2019-03-12 RX ORDER — FLUTICASONE PROPIONATE 50 MCG
1 SPRAY, SUSPENSION (ML) NASAL
COMMUNITY
Start: 2016-05-18 | End: 2019-04-01 | Stop reason: SDUPTHER

## 2019-03-12 RX ORDER — BUSPIRONE HYDROCHLORIDE 10 MG/1
10 TABLET ORAL DAILY PRN
COMMUNITY
Start: 2018-05-11 | End: 2019-05-03

## 2019-03-12 RX ORDER — MONTELUKAST SODIUM 10 MG/1
10 TABLET ORAL
COMMUNITY
Start: 2018-07-09 | End: 2019-04-01 | Stop reason: SDUPTHER

## 2019-03-12 RX ORDER — ALBUTEROL SULFATE 0.83 MG/ML
2.5 SOLUTION RESPIRATORY (INHALATION)
COMMUNITY
Start: 2018-07-09 | End: 2020-01-30 | Stop reason: SDUPTHER

## 2019-03-12 RX ORDER — CINACALCET 60 MG/1
30 TABLET, FILM COATED ORAL
COMMUNITY
Start: 2016-10-06 | End: 2019-05-03

## 2019-03-12 RX ORDER — SEVELAMER CARBONATE 800 MG/1
800 TABLET, FILM COATED ORAL
COMMUNITY
End: 2019-04-01 | Stop reason: SDUPTHER

## 2019-03-12 RX ORDER — LEVOCETIRIZINE DIHYDROCHLORIDE 5 MG/1
5 TABLET, FILM COATED ORAL
Status: ON HOLD | COMMUNITY
Start: 2018-07-09 | End: 2019-04-03 | Stop reason: HOSPADM

## 2019-03-12 RX ORDER — HYDRALAZINE HYDROCHLORIDE 25 MG/1
25 TABLET, FILM COATED ORAL
Status: ON HOLD | COMMUNITY
Start: 2017-10-11 | End: 2019-03-25 | Stop reason: HOSPADM

## 2019-03-12 RX ORDER — MIRTAZAPINE 7.5 MG/1
7.5 TABLET, FILM COATED ORAL
COMMUNITY
Start: 2016-10-24 | End: 2019-04-01 | Stop reason: SDUPTHER

## 2019-03-12 RX ORDER — ALBUTEROL SULFATE 90 UG/1
2 AEROSOL, METERED RESPIRATORY (INHALATION)
COMMUNITY
Start: 2018-11-07 | End: 2019-05-03

## 2019-03-12 NOTE — TELEPHONE ENCOUNTER
I spoke to the  nurse and the medication list from the hospital does not match the medication sent to  nurse. Pt does not have the medicines listed below. She did receive Eliquis and Amiodarone that they had filled. She is using her nebulizer. Oxygen sats were on room air. Please advise.

## 2019-03-12 NOTE — PROGRESS NOTES
"Hemodialysis x 3 hours with 1L volume removal. Refused to dialyze for a 4th hour, very restless. Stated " I want to get off- my stomach hurts, I need the bathroom, & I just want to go home." AMA to terminate treatment after 3 hours signed.  "

## 2019-03-12 NOTE — TELEPHONE ENCOUNTER
----- Message from Ana Lilia Paulino LPN sent at 3/12/2019  8:32 AM CDT -----  Contact: [facility] - Margie Vargas advise.   ----- Message -----  From: Migdalia Snyder  Sent: 3/12/2019   8:20 AM  To: [facility] Staff    Calling to verify if  will follow the patient on Home Health. Please call at [phone].

## 2019-03-12 NOTE — TELEPHONE ENCOUNTER
----- Message from Migdalia Snyder sent at 3/12/2019 11:44 AM CDT -----  Contact: [facility]- [place]   Calling to update the office that the patient's Oxygen Saturation is between 76 - 80. Congestion to the lungs non productive cough. Some medications discrepancies. Please call at [phone].

## 2019-03-12 NOTE — TELEPHONE ENCOUNTER
Talked to Nurse Tellez at  pt. O2 stat is 79-86,   Pt. Does not have the following medications.  1. Metoprolol  2. buspar  3. Clonidine  4. Vistaril  5. brintellix  6. Hydroxyzine    Nurse is asking if pt. Is suppose to be on all these medications and if so she would need refills on all of them.

## 2019-03-12 NOTE — TELEPHONE ENCOUNTER
Please contact home health nurse and inquire if these oxygen saturations are on room air? Also patient was just discharged from the hospital and her medications were not appropriately reconciled by the hospital physician.    Patient should have been discharged with an updated medication list.

## 2019-03-12 NOTE — TELEPHONE ENCOUNTER
Talked to Margie with HH.  Dr. Nelson will follow pt/ until her two weeks is up then pt. Will have to make appt/ to establish care with a  Provider.

## 2019-03-12 NOTE — TELEPHONE ENCOUNTER
I will follow patient on home health for the remaining 2 weeks at that time she will need to establish with new physician who will continue after this.

## 2019-03-13 ENCOUNTER — HOSPITAL ENCOUNTER (EMERGENCY)
Facility: HOSPITAL | Age: 74
Discharge: HOME | End: 2019-03-13
Attending: EMERGENCY MEDICINE
Payer: MEDICARE

## 2019-03-13 VITALS
HEIGHT: 64 IN | DIASTOLIC BLOOD PRESSURE: 59 MMHG | SYSTOLIC BLOOD PRESSURE: 102 MMHG | WEIGHT: 134 LBS | BODY MASS INDEX: 22.88 KG/M2 | OXYGEN SATURATION: 96 % | HEART RATE: 114 BPM | RESPIRATION RATE: 18 BRPM | TEMPERATURE: 98 F

## 2019-03-13 DIAGNOSIS — S36.69XA RECTAL TEAR: Primary | ICD-10-CM

## 2019-03-13 DIAGNOSIS — K92.2 ACUTE LOWER GI BLEEDING: ICD-10-CM

## 2019-03-13 DIAGNOSIS — K59.00 CONSTIPATION: ICD-10-CM

## 2019-03-13 DIAGNOSIS — I48.91 ATRIAL FIBRILLATION WITH RVR: ICD-10-CM

## 2019-03-13 PROCEDURE — 99284 EMERGENCY DEPT VISIT MOD MDM: CPT | Mod: 25

## 2019-03-13 PROCEDURE — 25000003 PHARM REV CODE 250: Performed by: EMERGENCY MEDICINE

## 2019-03-13 RX ORDER — AMIODARONE HYDROCHLORIDE 200 MG/1
200 TABLET ORAL
Status: COMPLETED | OUTPATIENT
Start: 2019-03-13 | End: 2019-03-13

## 2019-03-13 RX ORDER — LIDOCAINE HYDROCHLORIDE 20 MG/ML
JELLY TOPICAL
Status: COMPLETED | OUTPATIENT
Start: 2019-03-13 | End: 2019-03-13

## 2019-03-13 RX ORDER — LIDOCAINE 40 MG/G
CREAM TOPICAL
Qty: 30 G | Refills: 0 | Status: SHIPPED | OUTPATIENT
Start: 2019-03-13 | End: 2019-05-03

## 2019-03-13 RX ORDER — LACTULOSE 10 G/15ML
20 SOLUTION ORAL 2 TIMES DAILY PRN
Qty: 600 ML | Refills: 0 | Status: SHIPPED | OUTPATIENT
Start: 2019-03-13 | End: 2019-03-23

## 2019-03-13 RX ORDER — LACTULOSE 10 G/15ML
20 SOLUTION ORAL
Status: COMPLETED | OUTPATIENT
Start: 2019-03-13 | End: 2019-03-13

## 2019-03-13 RX ORDER — METOPROLOL SUCCINATE 25 MG/1
25 TABLET, EXTENDED RELEASE ORAL
Status: COMPLETED | OUTPATIENT
Start: 2019-03-13 | End: 2019-03-13

## 2019-03-13 RX ADMIN — LIDOCAINE HYDROCHLORIDE: 20 JELLY TOPICAL at 01:03

## 2019-03-13 RX ADMIN — METOPROLOL SUCCINATE 25 MG: 25 TABLET, EXTENDED RELEASE ORAL at 01:03

## 2019-03-13 RX ADMIN — LACTULOSE 20 G: 20 SOLUTION ORAL at 01:03

## 2019-03-13 RX ADMIN — AMIODARONE HYDROCHLORIDE 200 MG: 200 TABLET ORAL at 01:03

## 2019-03-13 NOTE — ED PROVIDER NOTES
"Encounter Date: 3/13/2019    SCRIBE #1 NOTE: I, Howard Noriega, am scribing for, and in the presence of,  Subhash Landin MD. I have scribed the following portions of the note - Other sections scribed: HPI and ROS.       History     Chief Complaint   Patient presents with    Constipation     x 5 days. Given fleet enema and noticed rectal bleeding today.      CC: Constipation     HPI: This 73 y.o F with a hx of Arthritis, COPD, DM type II, Dialysis patient and ESRD presents to the ED c/o constipation x1 week. She also reports decreased appetite x1 week, noting that she has not eaten a full meal in a week. The pt was last seen in this ED 3/6/19 and admitted into the hospital for A-fib and discharged 3/11/19. Today, the pt's daughter states that she was able to visualize stool in her rectum and administered a fleet enema, which then allowed her to pass a large amount of stool. She then reports "bright red" rectal bleeding and rectal pain after passing her BM. The pt's daughter denies blood in stool and states the bleeding resolved after applying a warm towel compress. Her rectal pain is worse with coughing and walking. She was last dialyzed 3/11. She normally attends dialysis every Tuesday and Thursday and will return 3/14. She is compliant with her recently Rx'ed Eliquis. She denies fever, chills, diaphoresis, nausea, emesis, diarrhea, abdominal pain, abdominal distention, previous blood transfusions, chest pain, SOB and rash.      The history is provided by the patient. No  was used.     Review of patient's allergies indicates:  No Known Allergies  Past Medical History:   Diagnosis Date    Arthritis     Atrial fibrillation     COPD (chronic obstructive pulmonary disease)     Diabetes mellitus type II     Dialysis patient     Encounter for blood transfusion     ESRD (end stage renal disease)      Past Surgical History:   Procedure Laterality Date    AV FISTULA PLACEMENT      TUBAL " LIGATION       Family History   Problem Relation Age of Onset    Heart attack Sister     Heart attack Sister     Heart attack Mother      Social History     Tobacco Use    Smoking status: Former Smoker     Start date: 1/12/1991    Smokeless tobacco: Never Used    Tobacco comment: quit in 1991   Substance Use Topics    Alcohol use: No    Drug use: No     Review of Systems   Constitutional: Positive for appetite change (decreased). Negative for chills, diaphoresis and fever.   HENT: Negative for rhinorrhea and sore throat.    Eyes: Negative for redness.   Respiratory: Negative for cough and shortness of breath.    Cardiovascular: Negative for chest pain.   Gastrointestinal: Positive for anal bleeding, constipation and rectal pain. Negative for abdominal pain, blood in stool, diarrhea, nausea and vomiting.   Genitourinary: Negative for dysuria, frequency and urgency.   Musculoskeletal: Negative for back pain and neck pain.   Skin: Negative for rash.   Psychiatric/Behavioral: The patient is not nervous/anxious.        Physical Exam     Initial Vitals [03/13/19 1144]   BP Pulse Resp Temp SpO2   114/66 (!) 123 18 98.1 °F (36.7 °C) 96 %      MAP       --         Physical Exam    Nursing note and vitals reviewed.  Constitutional: She appears well-developed and well-nourished.   Eyes: EOM are normal. Pupils are equal, round, and reactive to light.   Neck: Normal range of motion. Neck supple. No thyromegaly present. No JVD present.   Cardiovascular: Normal heart sounds and intact distal pulses. Exam reveals no gallop and no friction rub.    No murmur heard.  Irregular    Pulmonary/Chest: Breath sounds normal. No respiratory distress. She has no wheezes. She has no rhonchi. She has no rales.   Abdominal: Soft. Bowel sounds are normal. She exhibits no distension. There is no tenderness. There is no rebound and no guarding.   Genitourinary:   Genitourinary Comments: SMall anal fissure. No hemorrhoid. No fecal  impaction noted on TADEO. Trace bright red blood. No stool sample.    Musculoskeletal: Normal range of motion. She exhibits no edema or tenderness.   Neurological: She is alert and oriented to person, place, and time. She has normal strength.   Skin: Skin is warm and dry.         ED Course   Procedures  Labs Reviewed - No data to display       Imaging Results          X-Ray Abdomen Flat And Erect (Final result)  Result time 03/13/19 13:09:07    Final result by Chicho Brewer Jr., MD (03/13/19 13:09:07)                 Impression:      No acute abnormality seen.  Possible large diverticular or gallstones right upper quadrant.      Electronically signed by: Chicho Brewer MD  Date:    03/13/2019  Time:    13:09             Narrative:    EXAMINATION:  XR ABDOMEN FLAT AND ERECT    CLINICAL HISTORY:  Constipation, unspecified    TECHNIQUE:  Flat and erect AP views of the abdomen were performed.    COMPARISON:  None    FINDINGS:  There is some scattered stool and bowel gas in the colon.  No focal dilatation.  There are oval densities in the right upper quadrant either gallstones or perhaps related to diverticula.  Vascular stent in the left arm.  Degenerative change in the spine.                                patient was just discharged from the hospital yesterday with treatment for atrial fibrillation with RVR.  This is a new abnormal rhythm. States currently on Eliquis b.i.d..  Patient is on dialysis.  Last dialysis was Monday.  Next dialysis is scheduled for Thursday/tomorrow.  Denies swelling or shortness of breath or chest pain.  Patient cannot sense that her heart is beating fast.  Patient did not take her medications this morning.  This includes her Norvasc, Toprol, Pacerone.  Will give the patient's Pacerone and Toprol here for rate control.  Will not give the Norvasc due to patient's blood pressure.  Patient is not lightheaded nor dizzy.  Patient states she has got Ativan here for her AFib and does not want her  AFib with RVR addressed at this time.  Rectal pain likely from rectal tear and rectal bleeding likely from rectal tear/fissure with recent starting Eliquis.  Per family there is only a small about of bright red blood.  No further bleeding.  No significant blood on digital rectal exam.  Per chart review patient was not significantly anemic while in the hospital.  Patient's rectal pain controlled with topical lidocaine.  Patient declined enema here in the emergency department.  Will treat constipation with lactulose.  No evidence of fecal impaction or bowel obstruction on KUB.  Patient denies any abdominal pain.  Patient has no abdominal tenderness. Patient denies any difficulty eating or drinking although has had less appetite since being hospitalized.  KUB likely shows gallstones.  Patient has no right upper quadrant epigastric pain or tenderness.  Likely incidental finding.             Scribe Attestation:   Scribe #1: I performed the above scribed service and the documentation accurately describes the services I performed. I attest to the accuracy of the note.    Attending Attestation:           Physician Attestation for Scribe:  Physician Attestation Statement for Scribe #1: I, Subhash Landin MD, reviewed documentation, as scribed by Howard Noriega in my presence, and it is both accurate and complete.                    Clinical Impression:       ICD-10-CM ICD-9-CM   1. Rectal tear S36.69XA 863.45   2. Constipation K59.00 564.00   3. Atrial fibrillation with RVR I48.91 427.31   4. Acute lower GI bleeding K92.2 578.9                                Subhash Landin MD  03/13/19 4232

## 2019-03-13 NOTE — ED TRIAGE NOTES
Pt presents to ED c/o constipation x 5 days and has not passed a normal BM in 5 days; pt's family member states that they gave her a fleet enema around [id number] and she passed a small BM and had a small amount of bright red blood when she wiped; pt denies N/V/D/fever/chills; pt also has a dry, but congested cough x few days and is taking mucinex at home; pt is AAOx4; denies any other symptoms; will continue to monitor

## 2019-03-18 ENCOUNTER — OFFICE VISIT (OUTPATIENT)
Dept: FAMILY MEDICINE | Facility: CLINIC | Age: 74
End: 2019-03-18
Payer: MEDICARE

## 2019-03-18 VITALS
WEIGHT: 130.94 LBS | SYSTOLIC BLOOD PRESSURE: 116 MMHG | BODY MASS INDEX: 22.35 KG/M2 | TEMPERATURE: 98 F | DIASTOLIC BLOOD PRESSURE: 66 MMHG | HEIGHT: 64 IN

## 2019-03-18 DIAGNOSIS — I15.2 HYPERTENSION ASSOCIATED WITH DIABETES: Chronic | ICD-10-CM

## 2019-03-18 DIAGNOSIS — E11.59 HYPERTENSION ASSOCIATED WITH DIABETES: Chronic | ICD-10-CM

## 2019-03-18 DIAGNOSIS — I48.0 PAROXYSMAL ATRIAL FIBRILLATION: ICD-10-CM

## 2019-03-18 DIAGNOSIS — J44.9 CHRONIC OBSTRUCTIVE PULMONARY DISEASE, UNSPECIFIED COPD TYPE: ICD-10-CM

## 2019-03-18 DIAGNOSIS — I50.9 PLEURAL EFFUSION DUE TO CONGESTIVE HEART FAILURE: Primary | ICD-10-CM

## 2019-03-18 DIAGNOSIS — J44.1 CHRONIC OBSTRUCTIVE PULMONARY DISEASE WITH ACUTE EXACERBATION: ICD-10-CM

## 2019-03-18 DIAGNOSIS — N18.6 ESRD ON DIALYSIS: ICD-10-CM

## 2019-03-18 DIAGNOSIS — Z99.2 ESRD ON DIALYSIS: ICD-10-CM

## 2019-03-18 PROCEDURE — 99214 PR OFFICE/OUTPT VISIT, EST, LEVL IV, 30-39 MIN: ICD-10-PCS | Mod: S$GLB,,, | Performed by: FAMILY MEDICINE

## 2019-03-18 PROCEDURE — 3044F HG A1C LEVEL LT 7.0%: CPT | Mod: CPTII,S$GLB,, | Performed by: FAMILY MEDICINE

## 2019-03-18 PROCEDURE — 1101F PR PT FALLS ASSESS DOC 0-1 FALLS W/OUT INJ PAST YR: ICD-10-PCS | Mod: CPTII,S$GLB,, | Performed by: FAMILY MEDICINE

## 2019-03-18 PROCEDURE — 99499 RISK ADDL DX/OHS AUDIT: ICD-10-PCS | Mod: S$GLB,,, | Performed by: FAMILY MEDICINE

## 2019-03-18 PROCEDURE — 1101F PT FALLS ASSESS-DOCD LE1/YR: CPT | Mod: CPTII,S$GLB,, | Performed by: FAMILY MEDICINE

## 2019-03-18 PROCEDURE — 99499 UNLISTED E&M SERVICE: CPT | Mod: S$GLB,,, | Performed by: FAMILY MEDICINE

## 2019-03-18 PROCEDURE — 99214 OFFICE O/P EST MOD 30 MIN: CPT | Mod: S$GLB,,, | Performed by: FAMILY MEDICINE

## 2019-03-18 PROCEDURE — 99999 PR PBB SHADOW E&M-EST. PATIENT-LVL III: CPT | Mod: PBBFAC,,, | Performed by: FAMILY MEDICINE

## 2019-03-18 PROCEDURE — 2024F 7 FLD RTA PHOTO EVC RTNOPTHY: CPT | Mod: S$GLB,,, | Performed by: FAMILY MEDICINE

## 2019-03-18 PROCEDURE — 2024F PR 7 FIELD PHOTOS WITH INTERP/ REVIEW: ICD-10-PCS | Mod: S$GLB,,, | Performed by: FAMILY MEDICINE

## 2019-03-18 PROCEDURE — 3044F PR MOST RECENT HEMOGLOBIN A1C LEVEL <7.0%: ICD-10-PCS | Mod: CPTII,S$GLB,, | Performed by: FAMILY MEDICINE

## 2019-03-18 PROCEDURE — 99999 PR PBB SHADOW E&M-EST. PATIENT-LVL III: ICD-10-PCS | Mod: PBBFAC,,, | Performed by: FAMILY MEDICINE

## 2019-03-18 RX ORDER — FUROSEMIDE 80 MG/1
1 TABLET ORAL
Status: ON HOLD | COMMUNITY
End: 2019-03-25 | Stop reason: HOSPADM

## 2019-03-18 RX ORDER — MONTELUKAST SODIUM 10 MG/1
TABLET ORAL
Qty: 30 TABLET | Refills: 2 | Status: SHIPPED | OUTPATIENT
Start: 2019-03-18 | End: 2019-05-03

## 2019-03-18 RX ORDER — TRAMADOL HYDROCHLORIDE 50 MG/1
1 TABLET ORAL
COMMUNITY
Start: 2013-09-13 | End: 2019-04-05

## 2019-03-18 NOTE — PROGRESS NOTES
"Chief Complaint   Patient presents with    Follow-up    Shortness of Breath     since being discharged from ER        HPI    Eli Vaz is 73 y.o. female. The primary encounter diagnosis was Pleural effusion due to congestive heart failure. Diagnoses of Chronic obstructive pulmonary disease with acute exacerbation, Paroxysmal atrial fibrillation, Hypertension associated with diabetes, and ESRD on dialysis were also pertinent to this visit.    73 year old female comes to clinic for hospital follow up.  Patient reports that she returned to ED and was admitted on 3/6/19 and discharged on 3/11/19.  She recalls onset of shortness of breath, left chest pain, and feeling "overheated" the night prior to her presentation.  She reports being informed of "irregular heart beat" in the past when she was diagnosed with heart failure.  Patient reports continued congestion after treatment for COPD exacerbation.  She notes continued attendance at all dialysis sessions.     Since discharge patient notes ED visit due to severe constipation.  She has had no difficulty with dialysis outpatient.  She is receiving home health and physical therapy.  She notes difficulty with activity due to severe shortness of breath.      Review of Systems   Constitutional: Negative for activity change, chills, diaphoresis, fatigue and fever.   Respiratory: Positive for cough and shortness of breath. Negative for chest tightness and wheezing.    Cardiovascular: Negative for chest pain, palpitations and leg swelling.   Gastrointestinal: Positive for constipation and rectal pain. Negative for diarrhea, nausea and vomiting.   Musculoskeletal: Positive for arthralgias, gait problem (due to weakness) and myalgias.   Neurological: Positive for weakness. Negative for dizziness, tremors, syncope, light-headedness, numbness and headaches.   Psychiatric/Behavioral: Negative for suicidal ideas.           Current Outpatient Medications:     albuterol (ACCUNEB) " 1.25 mg/3 mL Nebu, Take 3 mLs (1.25 mg total) by nebulization every 6 (six) hours as needed., Disp: 1 Box, Rfl: 2    albuterol (PROVENTIL) 2.5 mg /3 mL (0.083 %) nebulizer solution, Inhale 2.5 mg into the lungs., Disp: , Rfl:     albuterol (PROVENTIL/VENTOLIN HFA) 90 mcg/actuation inhaler, Inhale 2 puffs into the lungs., Disp: , Rfl:     albuterol-ipratropium (DUO-NEB) 2.5 mg-0.5 mg/3 mL nebulizer solution, Take 3 mLs by nebulization every 6 (six) hours as needed for Wheezing or Shortness of Breath. Rescue, Disp: 1 Box, Rfl: 2    [START ON 4/7/2019] amiodarone (PACERONE) 200 MG Tab, Take 1 tablet (200 mg total) by mouth 2 (two) times daily. for 14 days, Disp: 28 tablet, Rfl: 0    amLODIPine (NORVASC) 10 MG tablet, Take 10 mg by mouth., Disp: , Rfl:     apixaban (ELIQUIS) 2.5 mg Tab, Take 1 tablet (2.5 mg total) by mouth 2 (two) times daily., Disp: 60 tablet, Rfl: 5    atorvastatin (LIPITOR) 10 MG tablet, Take 1 tablet (10 mg total) by mouth once daily., Disp: 90 tablet, Rfl: 1    B complex w-C no.20/folic acid (B COMPLEX & C NO.20-FOLIC ACID ORAL), Take 1 capsule by mouth., Disp: , Rfl:     cinacalcet (SENSIPAR) 60 MG Tab, Take 30 mg by mouth., Disp: , Rfl:     cloNIDine (CATAPRES) 0.2 MG tablet, Take 0.2 mg by mouth., Disp: , Rfl:     fluticasone (FLONASE) 50 mcg/actuation nasal spray, 1 spray by Each Nare route 2 (two) times daily., Disp: 1 Bottle, Rfl: 1    FOLIC ACID/VITAMIN B COMP W-C (RENAL CAPS ORAL), Take by mouth Daily., Disp: , Rfl:     HYDROcodone-acetaminophen (NORCO) 7.5-325 mg per tablet, Take 1 tablet by mouth every 8 (eight) hours as needed for Pain., Disp: 90 tablet, Rfl: 0    lactulose (CHRONULAC) 20 gram/30 mL Soln, Take 30 mLs (20 g total) by mouth 2 (two) times daily as needed (constipation)., Disp: 600 mL, Rfl: 0    lidocaine (LMX) 4 % cream, Apply topically as needed. To affected area up to 5 times a day., Disp: 30 g, Rfl: 0    metoprolol succinate (TOPROL-XL) 25 MG 24 hr  tablet, Take 25 mg by mouth., Disp: , Rfl:     SENSIPAR 60 mg Tab, Take 30 mg by mouth daily with breakfast. , Disp: , Rfl:     sevelamer carbonate (RENVELA) 800 mg Tab, Take 800 mg by mouth 3 (three) times daily with meals., Disp: , Rfl:     SODIUM BICARBONATE ORAL, Take 650 mg by mouth., Disp: , Rfl:     VENTOLIN HFA 90 mcg/actuation inhaler, INHALE 2 PUFFS INTO LUNGS EVERY 4 HOURS AS NEEDED FOR SHORTNESS OF BREATH OR WHEEZING. RESCUE, Disp: 18 Inhaler, Rfl: 2    vitamin renal formula, B-complex-vitamin c-folic acid, (NEPHROCAP) 1 mg Cap, Take by mouth., Disp: , Rfl:     [START ON 4/22/2019] amiodarone (PACERONE) 200 MG Tab, Take 1 tablet (200 mg total) by mouth once daily., Disp: 30 tablet, Rfl: 5    amiodarone (PACERONE) 400 MG tablet, Take 1 tablet (400 mg total) by mouth 3 (three) times daily., Disp: 90 tablet, Rfl: 11    [START ON 3/24/2019] amiodarone (PACERONE) 400 MG tablet, Take 1 tablet (400 mg total) by mouth 2 (two) times daily. for 14 days, Disp: 28 tablet, Rfl: 0    amLODIPine (NORVASC) 10 MG tablet, TAKE 1 TABLET (10 MG TOTAL) BY MOUTH ONCE DAILY., Disp: 90 tablet, Rfl: 1    atorvastatin (LIPITOR) 10 MG tablet, Take 10 mg by mouth., Disp: , Rfl:     busPIRone (BUSPAR) 10 MG tablet, Take 10 mg by mouth., Disp: , Rfl:     diclofenac sodium (VOLTAREN) 1 % Gel, Apply 4 g topically., Disp: , Rfl:     epoetin cat (PROCRIT) 3,000 unit/mL injection, Inject 3,000 Units into the skin., Disp: , Rfl:     fluticasone (FLONASE) 50 mcg/actuation nasal spray, 1 spray by Nasal route., Disp: , Rfl:     furosemide (LASIX) 80 MG tablet, Take 1 tablet by mouth., Disp: , Rfl:     gentamicin sulfate, PF, 60 mg/6 mL Soln, Inject into the vein., Disp: , Rfl:     hydrALAZINE (APRESOLINE) 25 MG tablet, Take 1 tablet (25 mg total) by mouth every 12 (twelve) hours., Disp: 180 tablet, Rfl: 0    hydrALAZINE (APRESOLINE) 25 MG tablet, Take 25 mg by mouth., Disp: , Rfl:     [START ON 4/13/2019]  HYDROcodone-acetaminophen (NORCO) 7.5-325 mg per tablet, Take 1 tablet by mouth every 8 (eight) hours as needed for Pain., Disp: 90 tablet, Rfl: 0    hydrOXYzine pamoate (VISTARIL) 25 MG Cap, Take 1 capsule (25 mg total) by mouth every 8 (eight) hours as needed (panic attack)., Disp: 30 capsule, Rfl: 1    hydrOXYzine pamoate (VISTARIL) 25 MG Cap, Take 25 mg by mouth., Disp: , Rfl:     levocetirizine (XYZAL) 5 MG tablet, Take 1 tablet (5 mg total) by mouth every evening., Disp: 30 tablet, Rfl: 2    levocetirizine (XYZAL) 5 MG tablet, Take 5 mg by mouth., Disp: , Rfl:     meclizine (ANTIVERT) 25 mg tablet, Take 1 tablet (25 mg total) by mouth 3 (three) times daily as needed., Disp: 20 tablet, Rfl: 0    meclizine (ANTIVERT) 25 mg tablet, Take 25 mg by mouth., Disp: , Rfl:     mirtazapine (REMERON) 7.5 MG Tab, Take 7.5 mg by mouth nightly. , Disp: , Rfl:     mirtazapine (REMERON) 7.5 MG Tab, Take 7.5 mg by mouth., Disp: , Rfl:     montelukast (SINGULAIR) 10 mg tablet, Take 10 mg by mouth., Disp: , Rfl:     montelukast (SINGULAIR) 10 mg tablet, TAKE 1 TABLET BY MOUTH EVERY DAY IN THE EVENING, Disp: 30 tablet, Rfl: 2    nut.tx.imp.renal fxn,lac-reduc (NEPRO CARB STEADY) 0.08 gram-1.8 kcal/mL Liqd, Take 1 Can by mouth 3 (three) times daily with meals., Disp: 30 Can, Rfl: 11    pantoprazole (PROTONIX) 40 MG tablet, Take 40 mg by mouth., Disp: , Rfl:     paroxetine (PAXIL) 30 MG tablet, 1 TABLET IN THE MORNING ONCE A DAY ORALLY 90, Disp: , Rfl: 3    predniSONE (DELTASONE) 10 MG tablet, 2 tab po qday x 4 days, then 1 tab po qday x 4 days, then 1/2 tab po qday x 4 days, Disp: 14 tablet, Rfl: 0    promethazine (PHENERGAN) 12.5 MG Tab, Take 12.5 mg by mouth every 6 (six) hours as needed., Disp: , Rfl:     sevelamer carbonate (RENVELA) 800 mg Tab, Take 800 mg by mouth., Disp: , Rfl:     traMADol (ULTRAM) 50 mg tablet, Take 1 tablet by mouth., Disp: , Rfl:     traZODone (DESYREL) 50 MG tablet, Take 1 tablet (50  "mg total) by mouth nightly as needed for Insomnia., Disp: 30 tablet, Rfl: 3    vit B,C-iron fum-FA-D3-zinc ox 8 mg iron-800 mcg-1,000 unit Tab, Take by mouth., Disp: , Rfl:     vortioxetine (BRINTELLIX) 5 mg Tab, Take 1 tablet by mouth., Disp: , Rfl:       Blood pressure 116/66, temperature 97.8 °F (36.6 °C), temperature source Oral, height 5' 4" (1.626 m), weight 59.4 kg (130 lb 15.3 oz).    Physical Exam   Constitutional: Vital signs are normal. She appears well-developed.   Cardiovascular: Normal heart sounds. An irregularly irregular rhythm present.   No murmur heard.  Pulmonary/Chest: Effort normal. No tachypnea and no bradypnea. No respiratory distress. She has decreased breath sounds in the right lower field and the left lower field. She has no wheezes. She has no rhonchi. She has no rales.   Psychiatric: She has a normal mood and affect. Her behavior is normal.       Admission on 03/06/2019, Discharged on 03/11/2019   No results displayed because visit has over 200 results.      Admission on 02/25/2019, Discharged on 02/25/2019   Component Date Value Ref Range Status    POC Cardiac Troponin I 02/25/2019 0.01  <0.09 ng/mL Final    Sample 02/25/2019 UNK   Final    Albumin, POC 02/25/2019 3.7  3.3 - 5.5 g/dL Final    Alkaline Phosphatase, POC 02/25/2019 78  42 - 141 U/L Final    ALT (SGPT), POC 02/25/2019 11  10 - 47 U/L Final    AST (SGOT), POC 02/25/2019 36  11 - 38 U/L Final    POC BUN 02/25/2019 37* 7 - 22 mg/dL Final    Calcium, POC 02/25/2019 10.4* 8.0 - 10.3 mg/dL Final    POC Chloride 02/25/2019 97* 98 - 108 mmol/L Final    POC Creatinine 02/25/2019 8.9* 0.6 - 1.2 mg/dL Final    POC Glucose 02/25/2019 124* 73 - 118 mg/dL Final    POC Potassium 02/25/2019 5.0  3.6 - 5.1 mmol/L Final    POC Sodium 02/25/2019 148* 128 - 145 mmol/L Final    Bilirubin 02/25/2019 0.3  0.2 - 1.6 mg/dL Final    POC TCO2 02/25/2019 29  18 - 33 mmol/L Final    Protein 02/25/2019 7.7  6.4 - 8.1 g/dL Final    POC " B-Type Natriuretic Peptide 02/25/2019 467* 0.0 - 100.0 pg/mL Final    POC Cardiac Troponin I 02/25/2019 0.01  <0.09 ng/mL Final    Sample 02/25/2019 UNK   Final   Admission on 02/03/2019, Discharged on 02/04/2019   Component Date Value Ref Range Status    POC Cardiac Troponin I 02/03/2019 0.00  <0.09 ng/mL Final    Sample 02/03/2019 UNK   Final    POC PTWBT 02/03/2019 12.6  9.7 - 14.3 sec Final    POC PTINR 02/03/2019 1.1  0.9 - 1.2 Final    Sample 02/03/2019 UNK   Final    POC B-Type Natriuretic Peptide 02/03/2019 706* 0.0 - 100.0 pg/mL Final    Albumin, POC 02/03/2019 3.6  3.3 - 5.5 g/dL Final    Alkaline Phosphatase, POC 02/03/2019 93  42 - 141 U/L Final    ALT (SGPT), POC 02/03/2019 12  10 - 47 U/L Final    AST (SGOT), POC 02/03/2019 24  11 - 38 U/L Final    POC BUN 02/03/2019 36* 7 - 22 mg/dL Final    Calcium, POC 02/03/2019 10.0  8.0 - 10.3 mg/dL Final    POC Chloride 02/03/2019 95* 98 - 108 mmol/L Final    POC Creatinine 02/03/2019 7.9* 0.6 - 1.2 mg/dL Final    POC Glucose 02/03/2019 85  73 - 118 mg/dL Final    POC Potassium 02/03/2019 4.8  3.6 - 5.1 mmol/L Final    POC Sodium 02/03/2019 147* 128 - 145 mmol/L Final    Bilirubin 02/03/2019 0.4  0.2 - 1.6 mg/dL Final    POC TCO2 02/03/2019 28  18 - 33 mmol/L Final    Protein 02/03/2019 8.2* 6.4 - 8.1 g/dL Final    POC D-DI 02/03/2019 993* 0.0 - 450.0 ng/mL Final    Troponin I 02/03/2019 0.020  0.000 - 0.026 ng/mL Final    Troponin I 02/03/2019 0.036* 0.000 - 0.026 ng/mL Final    BSA 02/04/2019 1.68  m2 Final    TDI SEPTAL 02/04/2019 0.07   Final    LV LATERAL E/E' RATIO 02/04/2019 23.33   Final    LV SEPTAL E/E' RATIO 02/04/2019 20.00   Final    LA WIDTH 02/04/2019 3.97  cm Final    AORTIC VALVE CUSP SEPERATION 02/04/2019 1.84  cm Final    TDI LATERAL 02/04/2019 0.06   Final    PV PEAK VELOCITY 02/04/2019 1.15  cm/s Final    LVIDD 02/04/2019 4.32  3.5 - 6.0 cm Final    IVS 02/04/2019 1.29* 0.6 - 1.1 cm Final    PW 02/04/2019  1.30* 0.6 - 1.1 cm Final    Ao root annulus 02/04/2019 3.40  cm Final    LVIDS 02/04/2019 2.59  2.1 - 4.0 cm Final    FS 02/04/2019 40  28 - 44 % Final    LA volume 02/04/2019 76.63  cm3 Final    Sinus 02/04/2019 3.02  cm Final    STJ 02/04/2019 2.41  cm Final    Ascending aorta 02/04/2019 3.14  cm Final    LV mass 02/04/2019 208.03  g Final    LA size 02/04/2019 4.18  cm Final    RVDD 02/04/2019 4.50  cm Final    TAPSE 02/04/2019 1.95  cm Final    RV S' 02/04/2019 13.78  m/s Final    Left Ventricle Relative Wall Thick* 02/04/2019 0.60  cm Final    AV mean gradient 02/04/2019 7.00  mmHg Final    AV valve area 02/04/2019 3.58  cm2 Final    AV Velocity Ratio 02/04/2019 0.90   Final    AV index (prosthetic) 02/04/2019 1.03   Final    E/A ratio 02/04/2019 1.07   Final    Mean e' 02/04/2019 0.07   Final    E wave decelartion time 02/04/2019 232.00  msec Final    IVRT 02/04/2019 0.08  msec Final    LVOT diameter 02/04/2019 2.10  cm Final    LVOT area 02/04/2019 3.46  cm2 Final    LVOT peak salvador 02/04/2019 2.2232999822  m/s Final    LVOT peak VTI 02/04/2019 35.84  cm Final    Ao peak salvador 02/04/2019 1.81  m/s Final    Ao VTI 02/04/2019 34.64  cm Final    LVOT stroke volume 02/04/2019 124.07  cm3 Final    AV peak gradient 02/04/2019 13.10  mmHg Final    E/E' ratio 02/04/2019 21.54   Final    MV Peak E Salvador 02/04/2019 1.40  m/s Final    TR Max Salvador 02/04/2019 3.20  m/s Final    MV Peak A Salvador 02/04/2019 1.31  m/s Final    LV Systolic Volume 02/04/2019 24.31  mL Final    LV Systolic Volume Index 02/04/2019 14.6  mL/m2 Final    LV Diastolic Volume 02/04/2019 83.78  mL Final    LV Diastolic Volume Index 02/04/2019 50.42  mL/m2 Final    LA Volume Index 02/04/2019 46.1  mL/m2 Final    LV Mass Index 02/04/2019 125.2  g/m2 Final    RA Major Axis 02/04/2019 4.78  cm Final    Left Atrium Minor Axis 02/04/2019 5.24  cm Final    Left Atrium Major Axis 02/04/2019 5.64  cm Final    Triscuspid Valve  Regurgitation Pea* 02/04/2019 40.96  mmHg Final    RA Width 02/04/2019 3.88  cm Final    Right Atrial Pressure (from IVC) 02/04/2019 3  mmHg Final    TV rest pulmonary artery pressure 02/04/2019 44  mmHg Final    Systolic blood pressure 02/04/2019 165  mmHg Final    Diastolic blood pressure 02/04/2019 80  mmHg Final    HR at rest 02/04/2019 77  bpm Final    RPP 02/04/2019 12,705   Final    Peak HR 02/04/2019 85  bpm Final    Peak Systolic BP 02/04/2019 154  mmHg Final    Peak Diatolic BP 02/04/2019 69  mmHg Final    Peak RPP 02/04/2019 13,090   Final    Max Predicted HR 02/04/2019 143   Final    85% Max Predicted HR 02/04/2019 121   Final    % Max HR Achieved 02/04/2019 60   Final    OHS CV CPX PATIENT IS MALE 02/04/2019 0   Final    OHS CV CPX PATIENT IS FEMALE 02/04/2019 1   Final    Nuc Rest EF 02/04/2019 72   Final   Lab Visit on 01/21/2019   Component Date Value Ref Range Status    Amphetamine Scrn 01/21/2019 Negative   Final    Barbiturate Scrn 01/21/2019 Negative   Final    Benzodiazepines 01/21/2019 Negative   Final    Cocaine Lvl 01/21/2019 Negative   Final    Alcohol Scrn 01/21/2019 Negative   Final    Methadone (Dolophine), Serum 01/21/2019 Negative   Final    Opiates, Blood 01/21/2019 Negative   Final    Phencyclidine, Blood 01/21/2019 Negative   Final    Propoxyphene 01/21/2019 Negative   Final    THC (Marijuana) Metabolite, bl 01/21/2019 Negative   Final   ]    Assessment:    1. Pleural effusion due to congestive heart failure    2. Chronic obstructive pulmonary disease with acute exacerbation    3. Paroxysmal atrial fibrillation    4. Hypertension associated with diabetes    5. ESRD on dialysis          Eli was seen today for follow-up and shortness of breath.    Diagnoses and all orders for this visit:    Pleural effusion due to congestive heart failure  -     X-Ray Chest PA And Lateral; Future  -     OXYGEN FOR HOME USE  - New problem. Previous chest xray reviewed with  patient and daughter and abnormal lung exam.  - Follow up with Cardiology regarding possible worsening EF due to atrial fibrillation.  - Short term follow up scheduled with repeat chest xray prior.    Chronic obstructive pulmonary disease with acute exacerbation  -     OXYGEN FOR HOME USE  - Unstable. Documented O2 saturation: 72% at rest on RA, 39% with activity on RA.  - Order for home oxygen placed.    Paroxysmal atrial fibrillation   -New problem. Patient with understanding of Amiodarone taper. Follow up with Cardiology scheduled.    Hypertension associated with diabetes   -Stable. Patient and daughter to review medication regimen.    ESRD on dialysis  -     nut.tx.imp.renal fxn,lac-reduc (NEPRO CARB STEADY) 0.08 gram-1.8 kcal/mL Liqd; Take 1 Can by mouth 3 (three) times daily with meals.  -     OXYGEN FOR HOME USE  - Stable. Continue current dialysis regimen. Supplement prescribed due to recent weight loss and hospitalization.          FOLLOW UP: Follow-up in about 2 weeks (around 4/1/2019) for Lung follow up with chest xray.

## 2019-03-18 NOTE — PATIENT INSTRUCTIONS
Pleural Effusion     Pleural effusion is fluid buildup between the layers of tissue that line the outside of the lungs.   Your healthcare provider has told you that you have pleural effusion. Pleural effusion means that you have extra fluid between the pleura. This area is called the pleural space. The pleura are 2 layers of thin, smooth tissue that surround the lungs and line the chest. The pleural space usually holds only a small amount of fluid. This fluid lubricates the pleura. But if too much fluid fills the space, it can make it hard or painful to breathe.  There are 2 types of pleural effusion:  · Inflammatory. This is caused by a lung disease like pneumonia or lung cancer, both of which irritates the pleura.  · Noninflammatory. This is caused by abnormal fluid pressures inside the lungs. The pressure can be caused by congestive heart failure (CHF). In CHF, extra fluid collects inside the lung tissues because of a weak heart muscle. This extra fluid then leaks into the pleural space. Other causes of noninflammatory pleural effusions include kidney disease, liver disease, and malnutrition.  What are the symptoms of pleural effusion?  The symptoms of pleural effusion include:  · Sharp pains in the chest, especially when taking a breath, coughing, or sneezing  · Trouble breathing  · Cough  · Fever  What are the causes of pleural effusion?  Common causes include:  · Congestive heart failure  · Cirrhosis of the liver  · Pneumonia  · Viral lung infection  · Cancer  · Blood clot in the lung (pulmonary embolism)  · Heart surgery  Chest infections like pneumonia and heart disease are the most common causes. Less common causes include lung cancer.  How is pleural effusion diagnosed?  Your healthcare provider will examine you and ask about your health history. Tests include:  · Blood tests  · Analysis of fluid in pleural space, chest X-ray, CT scan, or ultrasound  How is pleural effusion treated?  The extra fluid may  be drained from the pleural space. This is done with a procedure called thoracentesis. This procedure uses a thin needle to draw out the fluid from the pleural space. In some cases, a tube is placed in the chest to drain the extra fluid. The tube will likely stay in place for several days.  You may have other treatments, depending on the cause of your pleural effusion. If its because of a bacterial infection, you will be given antibiotics to fight the infection. If its because of a heart condition, you will be given medicines and other treatment for your heart. Your healthcare provider can tell you more about the cause of your pleural effusion and your treatment choices.  What are the long-term concerns?  If untreated, pleural effusion can lead to serious health problems, such as collapsed lung from fluid filling the pleural space.     Call 911  Call 911 if you have:  · Trouble breathing  · Worsening chest pain   When to call your healthcare provider   Call your healthcare provider right away if you have:  · Continued coughing  · Fever  Date Last Reviewed: 12/1/2016  © 5221-9502 [employer] [address] [place] [address], [place] [address]. All rights reserved. This information is not intended as a substitute for professional medical care. Always follow your healthcare professional's instructions.

## 2019-03-19 ENCOUNTER — HOSPITAL ENCOUNTER (INPATIENT)
Facility: HOSPITAL | Age: 74
LOS: 6 days | Discharge: HOME HEALTH | DRG: 871 | End: 2019-03-25
Attending: EMERGENCY MEDICINE | Admitting: HOSPITALIST
Payer: MEDICARE

## 2019-03-19 ENCOUNTER — TELEPHONE (OUTPATIENT)
Dept: FAMILY MEDICINE | Facility: CLINIC | Age: 74
End: 2019-03-19

## 2019-03-19 DIAGNOSIS — R06.02 SHORTNESS OF BREATH: ICD-10-CM

## 2019-03-19 DIAGNOSIS — N18.6 ESRD ON DIALYSIS: ICD-10-CM

## 2019-03-19 DIAGNOSIS — R79.89 ELEVATED TROPONIN: ICD-10-CM

## 2019-03-19 DIAGNOSIS — N18.6 DIABETES MELLITUS WITH ESRD (END-STAGE RENAL DISEASE): ICD-10-CM

## 2019-03-19 DIAGNOSIS — I50.1 ACUTE CARDIAC PULMONARY EDEMA: ICD-10-CM

## 2019-03-19 DIAGNOSIS — I50.33 ACUTE ON CHRONIC DIASTOLIC CONGESTIVE HEART FAILURE: ICD-10-CM

## 2019-03-19 DIAGNOSIS — R50.9 FEVER, UNSPECIFIED FEVER CAUSE: ICD-10-CM

## 2019-03-19 DIAGNOSIS — J30.9 ALLERGIC RHINITIS, UNSPECIFIED SEASONALITY, UNSPECIFIED TRIGGER: ICD-10-CM

## 2019-03-19 DIAGNOSIS — R41.3 MEMORY LOSS: ICD-10-CM

## 2019-03-19 DIAGNOSIS — I15.2 HYPERTENSION ASSOCIATED WITH DIABETES: ICD-10-CM

## 2019-03-19 DIAGNOSIS — R79.89 ELEVATED BRAIN NATRIURETIC PEPTIDE (BNP) LEVEL: ICD-10-CM

## 2019-03-19 DIAGNOSIS — M12.812 LEFT ROTATOR CUFF TEAR ARTHROPATHY: ICD-10-CM

## 2019-03-19 DIAGNOSIS — E78.2 COMBINED HYPERLIPIDEMIA ASSOCIATED WITH TYPE 2 DIABETES MELLITUS: ICD-10-CM

## 2019-03-19 DIAGNOSIS — I70.0 ATHEROSCLEROSIS OF AORTA: ICD-10-CM

## 2019-03-19 DIAGNOSIS — H35.89 RETINAL NERVE FIBER BUNDLE DEFECTS: ICD-10-CM

## 2019-03-19 DIAGNOSIS — R63.0 LOSS OF APPETITE: ICD-10-CM

## 2019-03-19 DIAGNOSIS — M75.102 LEFT ROTATOR CUFF TEAR ARTHROPATHY: ICD-10-CM

## 2019-03-19 DIAGNOSIS — E11.22 DIABETES MELLITUS WITH ESRD (END-STAGE RENAL DISEASE): ICD-10-CM

## 2019-03-19 DIAGNOSIS — J18.9 PNEUMONIA OF LEFT LOWER LOBE DUE TO INFECTIOUS ORGANISM: ICD-10-CM

## 2019-03-19 DIAGNOSIS — I51.89 DIASTOLIC DYSFUNCTION: ICD-10-CM

## 2019-03-19 DIAGNOSIS — F41.9 ANXIETY: ICD-10-CM

## 2019-03-19 DIAGNOSIS — A41.9 SEPSIS DUE TO PNEUMONIA: ICD-10-CM

## 2019-03-19 DIAGNOSIS — I34.0 NON-RHEUMATIC MITRAL REGURGITATION: ICD-10-CM

## 2019-03-19 DIAGNOSIS — Z99.2 ESRD ON DIALYSIS: ICD-10-CM

## 2019-03-19 DIAGNOSIS — G89.29 CHRONIC LEFT SHOULDER PAIN: ICD-10-CM

## 2019-03-19 DIAGNOSIS — J18.9 SEPSIS DUE TO PNEUMONIA: ICD-10-CM

## 2019-03-19 DIAGNOSIS — A41.9 SEPSIS, DUE TO UNSPECIFIED ORGANISM: Primary | ICD-10-CM

## 2019-03-19 DIAGNOSIS — R42 VERTIGO: ICD-10-CM

## 2019-03-19 DIAGNOSIS — I50.33 ACUTE ON CHRONIC DIASTOLIC HEART FAILURE: ICD-10-CM

## 2019-03-19 DIAGNOSIS — I48.91 ATRIAL FIBRILLATION WITH RVR: ICD-10-CM

## 2019-03-19 DIAGNOSIS — D72.819 LEUKOPENIA, UNSPECIFIED TYPE: ICD-10-CM

## 2019-03-19 DIAGNOSIS — M25.512 CHRONIC LEFT SHOULDER PAIN: ICD-10-CM

## 2019-03-19 DIAGNOSIS — E11.59 HYPERTENSION ASSOCIATED WITH DIABETES: ICD-10-CM

## 2019-03-19 DIAGNOSIS — I10 ESSENTIAL HYPERTENSION: ICD-10-CM

## 2019-03-19 DIAGNOSIS — J44.1 CHRONIC OBSTRUCTIVE PULMONARY DISEASE WITH ACUTE EXACERBATION: ICD-10-CM

## 2019-03-19 DIAGNOSIS — Z86.73 HISTORY OF CVA (CEREBROVASCULAR ACCIDENT): ICD-10-CM

## 2019-03-19 DIAGNOSIS — J90 PLEURAL EFFUSION: ICD-10-CM

## 2019-03-19 DIAGNOSIS — D63.8 ANEMIA OF CHRONIC DISEASE: ICD-10-CM

## 2019-03-19 DIAGNOSIS — I48.91 A-FIB: ICD-10-CM

## 2019-03-19 DIAGNOSIS — E11.69 COMBINED HYPERLIPIDEMIA ASSOCIATED WITH TYPE 2 DIABETES MELLITUS: ICD-10-CM

## 2019-03-19 DIAGNOSIS — J90 BILATERAL PLEURAL EFFUSION: ICD-10-CM

## 2019-03-19 DIAGNOSIS — D69.6 THROMBOCYTOPENIA: ICD-10-CM

## 2019-03-19 DIAGNOSIS — N25.81 SECONDARY HYPERPARATHYROIDISM: ICD-10-CM

## 2019-03-19 LAB
ALBUMIN SERPL BCP-MCNC: 2.9 G/DL
ALP SERPL-CCNC: 78 U/L
ALT SERPL W/O P-5'-P-CCNC: 10 U/L
ANION GAP SERPL CALC-SCNC: 14 MMOL/L
APTT BLDCRRT: 46.7 SEC
AST SERPL-CCNC: 13 U/L
BASOPHILS # BLD AUTO: 0.01 K/UL
BASOPHILS NFR BLD: 0.1 %
BILIRUB SERPL-MCNC: 0.6 MG/DL
BNP SERPL-MCNC: 2560 PG/ML
BNP SERPL-MCNC: 2560 PG/ML
BUN SERPL-MCNC: 28 MG/DL
CALCIUM SERPL-MCNC: 10 MG/DL
CHLORIDE SERPL-SCNC: 97 MMOL/L
CO2 SERPL-SCNC: 29 MMOL/L
CREAT SERPL-MCNC: 6 MG/DL
DIFFERENTIAL METHOD: ABNORMAL
EOSINOPHIL # BLD AUTO: 0 K/UL
EOSINOPHIL NFR BLD: 0.3 %
ERYTHROCYTE [DISTWIDTH] IN BLOOD BY AUTOMATED COUNT: 15.5 %
EST. GFR  (AFRICAN AMERICAN): 7 ML/MIN/1.73 M^2
EST. GFR  (NON AFRICAN AMERICAN): 6 ML/MIN/1.73 M^2
GLUCOSE SERPL-MCNC: 100 MG/DL
HCT VFR BLD AUTO: 30.9 %
HGB BLD-MCNC: 9.4 G/DL
INR PPP: 1.5
LACTATE SERPL-SCNC: 1.5 MMOL/L
LYMPHOCYTES # BLD AUTO: 0.6 K/UL
LYMPHOCYTES NFR BLD: 5 %
MAGNESIUM SERPL-MCNC: 1.9 MG/DL
MCH RBC QN AUTO: 30 PG
MCHC RBC AUTO-ENTMCNC: 30.4 G/DL
MCV RBC AUTO: 99 FL
MONOCYTES # BLD AUTO: 1.6 K/UL
MONOCYTES NFR BLD: 14.2 %
NEUTROPHILS # BLD AUTO: 8.9 K/UL
NEUTROPHILS NFR BLD: 80.4 %
PHOSPHATE SERPL-MCNC: 4 MG/DL
PLATELET # BLD AUTO: 304 K/UL
PMV BLD AUTO: 10 FL
POTASSIUM SERPL-SCNC: 4.5 MMOL/L
PROT SERPL-MCNC: 7.7 G/DL
PROTHROMBIN TIME: 15.7 SEC
RBC # BLD AUTO: 3.13 M/UL
SODIUM SERPL-SCNC: 140 MMOL/L
TROPONIN I SERPL DL<=0.01 NG/ML-MCNC: 0.07 NG/ML
TROPONIN I SERPL DL<=0.01 NG/ML-MCNC: 0.07 NG/ML
WBC # BLD AUTO: 11.09 K/UL

## 2019-03-19 PROCEDURE — 83880 ASSAY OF NATRIURETIC PEPTIDE: CPT

## 2019-03-19 PROCEDURE — 83735 ASSAY OF MAGNESIUM: CPT

## 2019-03-19 PROCEDURE — 96367 TX/PROPH/DG ADDL SEQ IV INF: CPT

## 2019-03-19 PROCEDURE — 93005 ELECTROCARDIOGRAM TRACING: CPT

## 2019-03-19 PROCEDURE — 25500020 PHARM REV CODE 255: Performed by: EMERGENCY MEDICINE

## 2019-03-19 PROCEDURE — 94640 AIRWAY INHALATION TREATMENT: CPT

## 2019-03-19 PROCEDURE — 85025 COMPLETE CBC W/AUTO DIFF WBC: CPT

## 2019-03-19 PROCEDURE — 96366 THER/PROPH/DIAG IV INF ADDON: CPT

## 2019-03-19 PROCEDURE — 87040 BLOOD CULTURE FOR BACTERIA: CPT | Mod: 59

## 2019-03-19 PROCEDURE — 80053 COMPREHEN METABOLIC PANEL: CPT

## 2019-03-19 PROCEDURE — 84484 ASSAY OF TROPONIN QUANT: CPT

## 2019-03-19 PROCEDURE — 85610 PROTHROMBIN TIME: CPT

## 2019-03-19 PROCEDURE — 12000002 HC ACUTE/MED SURGE SEMI-PRIVATE ROOM

## 2019-03-19 PROCEDURE — 85730 THROMBOPLASTIN TIME PARTIAL: CPT

## 2019-03-19 PROCEDURE — 93010 ELECTROCARDIOGRAM REPORT: CPT | Mod: ,,, | Performed by: INTERNAL MEDICINE

## 2019-03-19 PROCEDURE — 25000003 PHARM REV CODE 250: Performed by: EMERGENCY MEDICINE

## 2019-03-19 PROCEDURE — 63600175 PHARM REV CODE 636 W HCPCS: Performed by: EMERGENCY MEDICINE

## 2019-03-19 PROCEDURE — 96365 THER/PROPH/DIAG IV INF INIT: CPT

## 2019-03-19 PROCEDURE — 96375 TX/PRO/DX INJ NEW DRUG ADDON: CPT

## 2019-03-19 PROCEDURE — 83605 ASSAY OF LACTIC ACID: CPT

## 2019-03-19 PROCEDURE — 84100 ASSAY OF PHOSPHORUS: CPT

## 2019-03-19 PROCEDURE — 99285 EMERGENCY DEPT VISIT HI MDM: CPT | Mod: 25

## 2019-03-19 PROCEDURE — 93010 EKG 12-LEAD: ICD-10-PCS | Mod: ,,, | Performed by: INTERNAL MEDICINE

## 2019-03-19 PROCEDURE — 25000242 PHARM REV CODE 250 ALT 637 W/ HCPCS: Performed by: EMERGENCY MEDICINE

## 2019-03-19 RX ORDER — IPRATROPIUM BROMIDE AND ALBUTEROL SULFATE 2.5; .5 MG/3ML; MG/3ML
3 SOLUTION RESPIRATORY (INHALATION)
Status: COMPLETED | OUTPATIENT
Start: 2019-03-19 | End: 2019-03-19

## 2019-03-19 RX ORDER — ACETAMINOPHEN 500 MG
1000 TABLET ORAL
Status: COMPLETED | OUTPATIENT
Start: 2019-03-19 | End: 2019-03-19

## 2019-03-19 RX ORDER — ONDANSETRON 2 MG/ML
8 INJECTION INTRAMUSCULAR; INTRAVENOUS
Status: COMPLETED | OUTPATIENT
Start: 2019-03-19 | End: 2019-03-19

## 2019-03-19 RX ADMIN — IPRATROPIUM BROMIDE AND ALBUTEROL SULFATE 3 ML: .5; 3 SOLUTION RESPIRATORY (INHALATION) at 07:03

## 2019-03-19 RX ADMIN — IOHEXOL 50 ML: 350 INJECTION, SOLUTION INTRAVENOUS at 10:03

## 2019-03-19 RX ADMIN — ACETAMINOPHEN 1000 MG: 500 TABLET, FILM COATED ORAL at 07:03

## 2019-03-19 RX ADMIN — ONDANSETRON 8 MG: 2 INJECTION INTRAMUSCULAR; INTRAVENOUS at 07:03

## 2019-03-19 RX ADMIN — PIPERACILLIN AND TAZOBACTAM 4.5 G: 4; .5 INJECTION, POWDER, LYOPHILIZED, FOR SOLUTION INTRAVENOUS; PARENTERAL at 07:03

## 2019-03-19 NOTE — TELEPHONE ENCOUNTER
----- Message from Chaparrita Cormier sent at 3/19/2019  1:27 PM CDT -----  Contact: carlos manuel Suresh [phone]  Checking on the status on a oxygen orders. Pls call daughter Demetrice [phone]. Thanks......Concha

## 2019-03-19 NOTE — ED PROVIDER NOTES
Encounter Date: 3/19/2019      hSCRIBE #1 NOTE: I, Magdi Purcell, am scribing for, and in the presence of,  Janeth Disla MD. I have scribed the following portions of the note - Other sections scribed: HPI, ROS and PE.     Patient presents for shortness of breath that started today.  Patient was dialyzed today.  Daughter stated the patient was just discharged recently for AFib.  Daughter states the patient was seen by primary care yesterday.  Went to dialysis today and became short of breath there.  Patient arrives to ER in wheelchair.  Patient was given oxygen at dialysis and is awaiting home oxygen tank.  Patient shortness of breath is worse with exertion.  Daughter states that she believes her mother has fluid in her lungs for respiratory infection she was suffering from last week.  Patient has shunt to left arm and goes to dialysis Tuesdays,Thursday, and Saturday    ,     Chief Complaint   Patient presents with    Shortness of Breath     Pt went to dialysis today Pt states she is SOB. Pt vomiting in lobby. Pt admitted last week for respiratory infection and a-fib per family.     CC: Shortness of Breath    HPI: This is a 73 y.o. female with ESRD on HD ([facility]) and afib on Eliquis who presents via personal transportation (family) with SOB and decreased appetite that began yesterday (Monday 3/18/19). Pt attempted breathing treatment at home; however, this did not relieve her symptoms. Pt notes she was able to go to and complete her dialysis session today (Tuesday 3/19/19) but required O2 midway through the sessiion. She notesher SOB has been a persistent issue over the past 2 months. Pt became nauseated and vomited x 2 shortly prior to and at ED today. No abdominal pain or chest pain.    Pt states she has been mostly feeling better since being discharged about a week ago (3/11/19) aside from onset of current symptoms. Pt has had multiple recent admits for shortness of breath and afib with RVR.  Pt is not on  "home O2 but it was recently ordered by her PCP.     Pt was at PCP's office yesterday and was told she has a "spot" of pneumonia based on on imaging. She is not currently on abx.    PMHx COPD, ESRD, DM, encounter for blood transfusion, arthritis, atrial fibrillation   PSHx AV fistula placement    PCP: Chiara Nelson MD      The history is provided by the patient. No  was used.     Review of patient's allergies indicates:  No Known Allergies  Past Medical History:   Diagnosis Date    Arthritis     Atrial fibrillation     COPD (chronic obstructive pulmonary disease)     Diabetes mellitus type II     Dialysis patient     Encounter for blood transfusion     ESRD (end stage renal disease)      Past Surgical History:   Procedure Laterality Date    AV FISTULA PLACEMENT      TUBAL LIGATION       Family History   Problem Relation Age of Onset    Heart attack Sister     Heart attack Sister     Heart attack Mother      Social History     Tobacco Use    Smoking status: Former Smoker     Start date: 1/12/1991    Smokeless tobacco: Never Used    Tobacco comment: quit in 1991   Substance Use Topics    Alcohol use: No    Drug use: No     Review of Systems   Constitutional: Positive for appetite change (decreased).   HENT: Negative for rhinorrhea.    Eyes: Negative for visual disturbance.   Respiratory: Positive for shortness of breath.    Cardiovascular: Negative for chest pain.   Gastrointestinal: Positive for nausea and vomiting. Negative for abdominal pain and diarrhea.   Genitourinary: Positive for difficulty urinating (anuric).   Musculoskeletal: Negative for neck pain.   Skin: Positive for pallor.   Neurological: Negative for headaches.   Hematological: Bruises/bleeds easily (on Eliquis).       Physical Exam     Initial Vitals   BP Pulse Resp Temp SpO2   03/19/19 1825 03/19/19 1825 03/19/19 1825 03/19/19 1825 03/19/19 1937   129/72 (!) 120 (!) 22 (!) 102.1 °F (38.9 °C) (!) 94 %      MAP     "   --                Physical Exam    Nursing note and vitals reviewed.  Constitutional: She appears well-developed and well-nourished. She is diaphoretic.   Patient is awake and alert. She appears somewhat frail and uncomfortable.   HENT:   Head: Normocephalic and atraumatic.   Mouth/Throat: Mucous membranes are dry.   Dry oropharynx.   Eyes: Conjunctivae and EOM are normal. Pupils are equal, round, and reactive to light.   Neck: Normal range of motion. Neck supple. JVD present.   Cardiovascular: Normal rate and intact distal pulses. An irregularly irregular rhythm present.    Murmur heard.  Pulmonary/Chest: She is in respiratory distress (mild). She has no wheezes. She has no rhonchi.   She has diminished breath sounds bilaterally.   Abdominal: Soft. Bowel sounds are normal. She exhibits no distension. There is no tenderness. There is no rebound and no guarding.   Musculoskeletal: Normal range of motion. She exhibits edema. She exhibits no tenderness.   She has 1+ pitting edema bilaterally.   Neurological: She is alert and oriented to person, place, and time. She has normal strength.   Moving all extremities.   Skin: Skin is warm. No erythema. There is pallor.   She has a left upper arm fistula with dressing in place. Skin warm to touch.   Psychiatric: She has a normal mood and affect.         ED Course   Procedures  Labs Reviewed   CBC W/ AUTO DIFFERENTIAL - Abnormal; Notable for the following components:       Result Value    RBC 3.13 (*)     Hemoglobin 9.4 (*)     Hematocrit 30.9 (*)     MCV 99 (*)     MCHC 30.4 (*)     RDW 15.5 (*)     Gran # (ANC) 8.9 (*)     Lymph # 0.6 (*)     Mono # 1.6 (*)     Gran% 80.4 (*)     Lymph% 5.0 (*)     All other components within normal limits   COMPREHENSIVE METABOLIC PANEL - Abnormal; Notable for the following components:    BUN, Bld 28 (*)     Creatinine 6.0 (*)     Albumin 2.9 (*)     eGFR if  7 (*)     eGFR if non  6 (*)     All other  components within normal limits   TROPONIN I - Abnormal; Notable for the following components:    Troponin I 0.071 (*)     All other components within normal limits   B-TYPE NATRIURETIC PEPTIDE - Abnormal; Notable for the following components:    BNP 2,560 (*)     All other components within normal limits   PROTIME-INR - Abnormal; Notable for the following components:    Prothrombin Time 15.7 (*)     INR 1.5 (*)     All other components within normal limits   B-TYPE NATRIURETIC PEPTIDE - Abnormal; Notable for the following components:    BNP 2,560 (*)     All other components within normal limits   TROPONIN I - Abnormal; Notable for the following components:    Troponin I 0.071 (*)     All other components within normal limits   APTT - Abnormal; Notable for the following components:    aPTT 46.7 (*)     All other components within normal limits   LACTIC ACID, PLASMA   MAGNESIUM   PHOSPHORUS   URINALYSIS, REFLEX TO URINE CULTURE     EKG Readings: (Independently Interpreted)   18:40: Sinus tach, . 1st degree AV block. TWI in II, III, aVF, V2-V4. No ST elevation. No STEMI. Artifact noted.  18:46: Sinus tach with PACs, . TWI in II, III, aVF, V2-V3. No ST elevation. No STEMI. Artifact noted.     ECG Results          EKG 12-lead (In process)  Result time 03/20/19 06:12:14    In process by Interface, Lab In [facility] (03/20/19 06:12:14)                 Narrative:    Test Reason : R06.02,    Vent. Rate : 123 BPM     Atrial Rate : 234 BPM     P-R Int : 000 ms          QRS Dur : 094 ms      QT Int : 444 ms       P-R-T Axes : 000 041 081 degrees     QTc Int : 635 ms    Atrial flutter with variable A-V block  Cannot rule out Anterior infarct (cited on or before 06-MAR-2019)  Abnormal ECG  When compared with ECG of 07-MAR-2019 08:45,  Significant changes have occurred    Referred By: [email or web address]   SELF           Confirmed By:                   In process by Interface, Lab In [facility] (03/20/19 06:06:16)                  Narrative:    Test Reason : R06.02,    Vent. Rate : 123 BPM     Atrial Rate : 234 BPM     P-R Int : 000 ms          QRS Dur : 094 ms      QT Int : 444 ms       P-R-T Axes : 000 041 081 degrees     QTc Int : 635 ms    Atrial flutter with variable A-V block  Cannot rule out Anterior infarct (cited on or before 06-MAR-2019)  Abnormal ECG  When compared with ECG of 19-MAR-2019 18:40,  Significant changes have occurred    Referred By: [email or web address]   SELF           Confirmed By:                             Imaging Results          CT Chest With Contrast (Final result)     Abnormal  Result time 03/20/19 00:10:27    Final result by Yoana Chong MD (03/20/19 00:10:27)                 Impression:      Marked pericardial effusion.  Moderate bilateral pleural effusions.  Biatrial enlargement.  Consider echocardiogram.    Subtle ground-glass opacity in the aerated lung fields.  Pulmonary vascular congestion or congestive heart failure may be considered.    This report was flagged in Epic as abnormal.      Electronically signed by: Yoana Chong  Date:    03/20/2019  Time:    00:10             Narrative:    EXAMINATION:  CT CHEST WITH CONTRAST    CLINICAL HISTORY:  suspected PNA;    TECHNIQUE:  Contiguous axial 5 mm images was obtained from the lung apices through the lung bases. Coronal and sagittal reformatted images were provided.  Fifty ml of Omnipaque 350 intravenous contrast was given.    COMPARISON:  None.    FINDINGS:  No focal consolidation or pulmonary masses are detected in the aerated lung fields.  There is mild linear scarring is seen in the right upper lobe and minimally in the medial aspect of the left upper lobe.  In the aerated lung fields, there is some mild ground-glass opacity.    There is marked pericardial effusion.    There are moderate bilateral pleural effusions with associated compressive atelectasis.  Small fluid is seen along the right major fissure.    There is no evidence of mediastinal, hilar,  or axillary adenopathy.    Biatrial enlargement is noted.  Coronary arterial calcifications are present.  Advanced vascular calcifications are present.    In the visualized upper abdomen, the kidneys are atrophic..    There is severe kyphosis.                               X-Ray Chest AP Portable (Final result)  Result time 03/19/19 18:48:38    Final result by Bharathi Gomez MD (03/19/19 18:48:38)                 Impression:      1. Cardiomegaly with findings suggesting edema.  There are bilateral pleural effusions, mild on the right, moderate on the left with associated compressive atelectasis of the lower lobes.  Developing left lower lobe consolidation however is not excluded.      Electronically signed by: Bharathi Gomez MD  Date:    03/19/2019  Time:    18:48             Narrative:    EXAMINATION:  XR CHEST AP PORTABLE    CLINICAL HISTORY:  CHF;    TECHNIQUE:  Single frontal view of the chest was performed.    COMPARISON:  03/06/2019    FINDINGS:  The cardiomediastinal silhouette is prominent noting limited evaluation given shallow inspiratory effort.  There is calcification of the aorta..  There are bilateral pleural effusions.  The trachea is deviated rightward by a tortuous aorta..  The lungs are symmetrically expanded bilaterally with coarse interstitial attenuation bilaterally.  There is patchy increased interstitial and parenchymal attenuation projected over the bilateral lower lung zones, left greater than right.  There is no pneumothorax.  The osseous structures are remarkable for degenerative changes.  There are partially visualized left vascular stents..                              X-Rays:   Independently Interpreted Readings:   Other Readings:  CXR cardiomegaly, bilateral pleural effusions     Medical Decision Making:   History:   I obtained history from: someone other than patient.  Old Medical Records: I decided to obtain old medical records.  Old Records Summarized: records from previous  admission(s).  Initial Assessment:   73 y.o. Female with ESRD on HD and afib on Eliquis now with acute on chronic SOB and decreased appetite x 1 day and nausea with vomiting this afternoon. Febrile in ED to 102.1.  Differential Diagnosis:   Ddx includes sepsis, bacteremia from HD, PNA, COPD exacerbation, ACS, CHF, fluid overload from ESRD, other.  Independently Interpreted Test(s):   I have ordered and independently interpreted X-rays - see prior notes.  I have ordered and independently interpreted EKG Reading(s) - see prior notes  Clinical Tests:   Lab Tests: Ordered and Reviewed  Radiological Study: Ordered and Reviewed  Medical Tests: Ordered and Reviewed  Sepsis Perfusion Assessment: I attest, a sepsis perfusion exam was performed within 6 hours of Septic Shock presentation, following fluid resuscitation.  ED Management:  EKG no STEMI.    CXR bilat pleural effusions.    CBC: WBC 11.09, L shift, no bands. Hgb 9.4. CMP with BUN/creatinine c/w ESRD on HD but lytes reassuring. Troponin 0.071, c/w priors. BNP 2560, c/w priors. Patient anuric so no UA.    Patient tx'ed with early empiric abx for sepsis. She did not receive NS bolus due to ESRD. She had IV zofran for nausea and nebs for SOB. She had PO APAP for fever.    Patient felt better after ED tx. HR improved. BP transiently low but improved, SBP around 90.     Unclear etiology of fever -- CT chest with IV contrast (discussed with Dr. Apodaca) without PNA.    Patient requires admit for sepsis, IV abx, supportive care, f/u fx. I have discussed this with patient and family. I have discussed admit with Dr. Apodaca, nocturnist, and have written courtesy orders.    Other:   I have discussed this case with another health care provider.            Scribe Attestation:   Scribe #1: I performed the above scribed service and the documentation accurately describes the services I performed. I attest to the accuracy of the note.    Attending Attestation:           Physician  Attestation for Scribe:  Physician Attestation Statement for Scribe #1: I, Janeth Disla MD, reviewed documentation, as scribed by Magdi Purcell in my presence, and it is both accurate and complete.                    Clinical Impression:       ICD-10-CM ICD-9-CM   1. Sepsis, due to unspecified organism A41.9 038.9     995.91   2. Shortness of breath R06.02 786.05   3. Fever, unspecified fever cause R50.9 780.60   4. Pneumonia of left lower lobe due to infectious organism J18.1 486                                Janeth Disla MD  03/20/19 0821

## 2019-03-19 NOTE — TELEPHONE ENCOUNTER
----- Message from Florence Mcqueen sent at 3/19/2019  4:22 PM CDT -----  Contact: Ching Mena [phone]  Type: Patient Call Back    Who called:Summer with [employer]    What is the request in detail: Ching Mena called to notify the staff in regards to the order for oxygen sent over for the patient. They also report that they are not in network with the patient's insurance company.    Best call back number:[phone]

## 2019-03-20 PROBLEM — I48.91 ATRIAL FIBRILLATION WITH RVR: Status: ACTIVE | Noted: 2019-03-20

## 2019-03-20 PROBLEM — J90 BILATERAL PLEURAL EFFUSION: Status: ACTIVE | Noted: 2019-03-20

## 2019-03-20 PROBLEM — I50.1 ACUTE CARDIAC PULMONARY EDEMA: Status: ACTIVE | Noted: 2019-03-20

## 2019-03-20 PROBLEM — R50.9 FEVER: Status: ACTIVE | Noted: 2019-03-20

## 2019-03-20 PROBLEM — R79.89 ELEVATED BRAIN NATRIURETIC PEPTIDE (BNP) LEVEL: Status: ACTIVE | Noted: 2019-03-20

## 2019-03-20 PROBLEM — A41.9 SEPSIS: Status: ACTIVE | Noted: 2019-03-20

## 2019-03-20 PROBLEM — I50.9 ACUTE EXACERBATION OF CHF (CONGESTIVE HEART FAILURE): Status: ACTIVE | Noted: 2019-03-20

## 2019-03-20 PROBLEM — R79.89 ELEVATED TROPONIN: Status: ACTIVE | Noted: 2019-03-20

## 2019-03-20 LAB
ANION GAP SERPL CALC-SCNC: 11 MMOL/L
AORTIC ROOT ANNULUS: 2.99 CM
AORTIC VALVE CUSP SEPERATION: 1.95 CM
ASCENDING AORTA: 2.29 CM
AV INDEX (PROSTH): 1.08
AV MEAN GRADIENT: 3.86 MMHG
AV PEAK GRADIENT: 7.84 MMHG
AV VALVE AREA: 3.49 CM2
AV VELOCITY RATIO: 0.92
BSA FOR ECHO PROCEDURE: 1.64 M2
BUN SERPL-MCNC: 31 MG/DL
CALCIUM SERPL-MCNC: 9.5 MG/DL
CHLORIDE SERPL-SCNC: 98 MMOL/L
CO2 SERPL-SCNC: 29 MMOL/L
CREAT SERPL-MCNC: 6.1 MG/DL
CV ECHO LV RWT: 0.57 CM
DOP CALC AO PEAK VEL: 1.4 M/S
DOP CALC AO VTI: 19.41 CM
DOP CALC LVOT AREA: 3.23 CM2
DOP CALC LVOT DIAMETER: 2.03 CM
DOP CALC LVOT PEAK VEL: 1.29 M/S
DOP CALC LVOT STROKE VOLUME: 67.71 CM3
DOP CALCLVOT PEAK VEL VTI: 20.93 CM
ECHO LV POSTERIOR WALL: 1.09 CM (ref 0.6–1.1)
EST. GFR  (AFRICAN AMERICAN): 7 ML/MIN/1.73 M^2
EST. GFR  (NON AFRICAN AMERICAN): 6 ML/MIN/1.73 M^2
FLUAV AG SPEC QL IA: NEGATIVE
FLUBV AG SPEC QL IA: NEGATIVE
FRACTIONAL SHORTENING: 21 % (ref 28–44)
GLUCOSE SERPL-MCNC: 94 MG/DL
INTERVENTRICULAR SEPTUM: 1.23 CM (ref 0.6–1.1)
LA MAJOR: 5.74 CM
LA MINOR: 5.89 CM
LA WIDTH: 4.22 CM
LACTATE SERPL-SCNC: 1.1 MMOL/L
LEFT ATRIUM SIZE: 4.39 CM
LEFT ATRIUM VOLUME INDEX: 56 ML/M2
LEFT ATRIUM VOLUME: 91.55 CM3
LEFT INTERNAL DIMENSION IN SYSTOLE: 3.02 CM (ref 2.1–4)
LEFT VENTRICLE DIASTOLIC VOLUME INDEX: 37.88 ML/M2
LEFT VENTRICLE DIASTOLIC VOLUME: 61.9 ML
LEFT VENTRICLE MASS INDEX: 89.1 G/M2
LEFT VENTRICLE SYSTOLIC VOLUME INDEX: 21.7 ML/M2
LEFT VENTRICLE SYSTOLIC VOLUME: 35.49 ML
LEFT VENTRICULAR INTERNAL DIMENSION IN DIASTOLE: 3.8 CM (ref 3.5–6)
LEFT VENTRICULAR MASS: 145.66 G
PISA TR MAX VEL: 3.94 M/S
POTASSIUM SERPL-SCNC: 4.3 MMOL/L
PV PEAK VELOCITY: 1 CM/S
RA MAJOR: 4.93 CM
RA PRESSURE: 3 MMHG
RA WIDTH: 3.95 CM
RIGHT VENTRICULAR END-DIASTOLIC DIMENSION: 3.52 CM
RV TISSUE DOPPLER FREE WALL SYSTOLIC VELOCITY 1 (APICAL 4 CHAMBER VIEW): 6.62 M/S
SINUS: 3.15 CM
SODIUM SERPL-SCNC: 138 MMOL/L
SPECIMEN SOURCE: NORMAL
STJ: 2.35 CM
TR MAX PG: 62.09 MMHG
TRICUSPID ANNULAR PLANE SYSTOLIC EXCURSION: 1.04 CM
TV REST PULMONARY ARTERY PRESSURE: 65 MMHG
VANCOMYCIN SERPL-MCNC: 20.2 UG/ML

## 2019-03-20 PROCEDURE — 25000003 PHARM REV CODE 250

## 2019-03-20 PROCEDURE — 20000000 HC ICU ROOM

## 2019-03-20 PROCEDURE — 25000242 PHARM REV CODE 250 ALT 637 W/ HCPCS: Performed by: EMERGENCY MEDICINE

## 2019-03-20 PROCEDURE — 63600175 PHARM REV CODE 636 W HCPCS: Performed by: HOSPITALIST

## 2019-03-20 PROCEDURE — 25000003 PHARM REV CODE 250: Performed by: INTERNAL MEDICINE

## 2019-03-20 PROCEDURE — 80202 ASSAY OF VANCOMYCIN: CPT

## 2019-03-20 PROCEDURE — 25000003 PHARM REV CODE 250: Performed by: EMERGENCY MEDICINE

## 2019-03-20 PROCEDURE — 80048 BASIC METABOLIC PNL TOTAL CA: CPT

## 2019-03-20 PROCEDURE — 25000003 PHARM REV CODE 250: Performed by: HOSPITALIST

## 2019-03-20 PROCEDURE — 83605 ASSAY OF LACTIC ACID: CPT

## 2019-03-20 PROCEDURE — 27000221 HC OXYGEN, UP TO 24 HOURS

## 2019-03-20 PROCEDURE — 63600175 PHARM REV CODE 636 W HCPCS: Performed by: INTERNAL MEDICINE

## 2019-03-20 PROCEDURE — 99291 CRITICAL CARE FIRST HOUR: CPT | Mod: ,,, | Performed by: INTERNAL MEDICINE

## 2019-03-20 PROCEDURE — 94761 N-INVAS EAR/PLS OXIMETRY MLT: CPT

## 2019-03-20 PROCEDURE — 94640 AIRWAY INHALATION TREATMENT: CPT

## 2019-03-20 PROCEDURE — 87400 INFLUENZA A/B EACH AG IA: CPT

## 2019-03-20 PROCEDURE — 36415 COLL VENOUS BLD VENIPUNCTURE: CPT

## 2019-03-20 PROCEDURE — 99291 PR CRITICAL CARE, E/M 30-74 MINUTES: ICD-10-PCS | Mod: ,,, | Performed by: INTERNAL MEDICINE

## 2019-03-20 RX ORDER — AMIODARONE HYDROCHLORIDE 200 MG/1
400 TABLET ORAL 2 TIMES DAILY
Status: DISCONTINUED | OUTPATIENT
Start: 2019-03-20 | End: 2019-03-20

## 2019-03-20 RX ORDER — VANCOMYCIN HCL IN 5 % DEXTROSE 1G/250ML
15 PLASTIC BAG, INJECTION (ML) INTRAVENOUS
Status: DISCONTINUED | OUTPATIENT
Start: 2019-03-20 | End: 2019-03-20 | Stop reason: DRUGHIGH

## 2019-03-20 RX ORDER — PAROXETINE 10 MG/5ML
30 SUSPENSION ORAL DAILY
Status: DISCONTINUED | OUTPATIENT
Start: 2019-03-20 | End: 2019-03-25 | Stop reason: HOSPADM

## 2019-03-20 RX ORDER — ACETAMINOPHEN 325 MG/1
650 TABLET ORAL EVERY 8 HOURS PRN
Status: DISCONTINUED | OUTPATIENT
Start: 2019-03-20 | End: 2019-03-22

## 2019-03-20 RX ORDER — SODIUM CHLORIDE 9 MG/ML
INJECTION, SOLUTION INTRAVENOUS
Status: DISCONTINUED | OUTPATIENT
Start: 2019-03-20 | End: 2019-03-25 | Stop reason: HOSPADM

## 2019-03-20 RX ORDER — MIRTAZAPINE 7.5 MG/1
7.5 TABLET, FILM COATED ORAL NIGHTLY PRN
Status: DISCONTINUED | OUTPATIENT
Start: 2019-03-20 | End: 2019-03-20

## 2019-03-20 RX ORDER — POLYETHYLENE GLYCOL 3350 17 G/17G
17 POWDER, FOR SOLUTION ORAL DAILY
Status: DISCONTINUED | OUTPATIENT
Start: 2019-03-20 | End: 2019-03-25 | Stop reason: HOSPADM

## 2019-03-20 RX ORDER — METOPROLOL TARTRATE 1 MG/ML
5 INJECTION, SOLUTION INTRAVENOUS ONCE
Status: COMPLETED | OUTPATIENT
Start: 2019-03-20 | End: 2019-03-20

## 2019-03-20 RX ORDER — METOPROLOL SUCCINATE 25 MG/1
25 TABLET, EXTENDED RELEASE ORAL DAILY
Status: DISCONTINUED | OUTPATIENT
Start: 2019-03-20 | End: 2019-03-21

## 2019-03-20 RX ORDER — MORPHINE SULFATE 10 MG/ML
2 INJECTION INTRAMUSCULAR; INTRAVENOUS; SUBCUTANEOUS EVERY 4 HOURS PRN
Status: DISCONTINUED | OUTPATIENT
Start: 2019-03-20 | End: 2019-03-20

## 2019-03-20 RX ORDER — TRAZODONE HYDROCHLORIDE 50 MG/1
50 TABLET ORAL NIGHTLY PRN
Status: DISCONTINUED | OUTPATIENT
Start: 2019-03-20 | End: 2019-03-25 | Stop reason: HOSPADM

## 2019-03-20 RX ORDER — ACETAMINOPHEN 325 MG/1
650 TABLET ORAL EVERY 8 HOURS PRN
Status: DISCONTINUED | OUTPATIENT
Start: 2019-03-20 | End: 2019-03-20

## 2019-03-20 RX ORDER — MONTELUKAST SODIUM 10 MG/1
10 TABLET ORAL NIGHTLY
Status: DISCONTINUED | OUTPATIENT
Start: 2019-03-20 | End: 2019-03-25 | Stop reason: HOSPADM

## 2019-03-20 RX ORDER — BUSPIRONE HYDROCHLORIDE 10 MG/1
10 TABLET ORAL 2 TIMES DAILY
Status: DISCONTINUED | OUTPATIENT
Start: 2019-03-20 | End: 2019-03-24 | Stop reason: RX

## 2019-03-20 RX ORDER — ATORVASTATIN CALCIUM 10 MG/1
10 TABLET, FILM COATED ORAL DAILY
Status: DISCONTINUED | OUTPATIENT
Start: 2019-03-20 | End: 2019-03-25 | Stop reason: HOSPADM

## 2019-03-20 RX ORDER — CINACALCET 30 MG/1
30 TABLET, FILM COATED ORAL
Status: DISCONTINUED | OUTPATIENT
Start: 2019-03-20 | End: 2019-03-25 | Stop reason: HOSPADM

## 2019-03-20 RX ORDER — ONDANSETRON 2 MG/ML
4 INJECTION INTRAMUSCULAR; INTRAVENOUS EVERY 4 HOURS PRN
Status: DISCONTINUED | OUTPATIENT
Start: 2019-03-20 | End: 2019-03-25 | Stop reason: HOSPADM

## 2019-03-20 RX ORDER — MORPHINE SULFATE 10 MG/ML
4 INJECTION INTRAMUSCULAR; INTRAVENOUS; SUBCUTANEOUS EVERY 4 HOURS PRN
Status: DISCONTINUED | OUTPATIENT
Start: 2019-03-20 | End: 2019-03-20

## 2019-03-20 RX ORDER — ALBUTEROL SULFATE 2.5 MG/.5ML
2.5 SOLUTION RESPIRATORY (INHALATION) EVERY 4 HOURS PRN
Status: DISCONTINUED | OUTPATIENT
Start: 2019-03-20 | End: 2019-03-22

## 2019-03-20 RX ORDER — METOPROLOL TARTRATE 1 MG/ML
INJECTION, SOLUTION INTRAVENOUS
Status: COMPLETED
Start: 2019-03-20 | End: 2019-03-20

## 2019-03-20 RX ORDER — SODIUM CHLORIDE 0.9 % (FLUSH) 0.9 %
5 SYRINGE (ML) INJECTION
Status: DISCONTINUED | OUTPATIENT
Start: 2019-03-20 | End: 2019-03-25 | Stop reason: HOSPADM

## 2019-03-20 RX ORDER — PANTOPRAZOLE SODIUM 40 MG/1
40 TABLET, DELAYED RELEASE ORAL DAILY
Status: DISCONTINUED | OUTPATIENT
Start: 2019-03-20 | End: 2019-03-25 | Stop reason: HOSPADM

## 2019-03-20 RX ADMIN — ALBUTEROL SULFATE 2.5 MG: 2.5 SOLUTION RESPIRATORY (INHALATION) at 11:03

## 2019-03-20 RX ADMIN — CINACALCET HYDROCHLORIDE 30 MG: 30 TABLET, COATED ORAL at 07:03

## 2019-03-20 RX ADMIN — AMIODARONE HYDROCHLORIDE 150 MG: 1.5 INJECTION, SOLUTION INTRAVENOUS at 02:03

## 2019-03-20 RX ADMIN — APIXABAN 2.5 MG: 2.5 TABLET, FILM COATED ORAL at 01:03

## 2019-03-20 RX ADMIN — AMIODARONE HYDROCHLORIDE 1 MG/MIN: 1.8 INJECTION, SOLUTION INTRAVENOUS at 02:03

## 2019-03-20 RX ADMIN — AMIODARONE HYDROCHLORIDE 0.5 MG/MIN: 1.8 INJECTION, SOLUTION INTRAVENOUS at 07:03

## 2019-03-20 RX ADMIN — AMIODARONE HYDROCHLORIDE 400 MG: 200 TABLET ORAL at 01:03

## 2019-03-20 RX ADMIN — PIPERACILLIN AND TAZOBACTAM 4.5 G: 4; .5 INJECTION, POWDER, LYOPHILIZED, FOR SOLUTION INTRAVENOUS; PARENTERAL at 07:03

## 2019-03-20 RX ADMIN — ATORVASTATIN CALCIUM 10 MG: 10 TABLET, FILM COATED ORAL at 09:03

## 2019-03-20 RX ADMIN — Medication 1 CAPSULE: at 09:03

## 2019-03-20 RX ADMIN — APIXABAN 2.5 MG: 2.5 TABLET, FILM COATED ORAL at 09:03

## 2019-03-20 RX ADMIN — METOPROLOL TARTRATE 5 MG: 5 INJECTION, SOLUTION INTRAVENOUS at 10:03

## 2019-03-20 RX ADMIN — ALBUTEROL SULFATE 2.5 MG: 2.5 SOLUTION RESPIRATORY (INHALATION) at 09:03

## 2019-03-20 RX ADMIN — MONTELUKAST SODIUM 10 MG: 10 TABLET, FILM COATED ORAL at 09:03

## 2019-03-20 RX ADMIN — BUSPIRONE HYDROCHLORIDE 10 MG: 5 TABLET ORAL at 09:03

## 2019-03-20 RX ADMIN — PAROXETINE HYDROCHLORIDE 30 MG: 10 SUSPENSION ORAL at 09:03

## 2019-03-20 RX ADMIN — MIRTAZAPINE 7.5 MG: 7.5 TABLET ORAL at 01:03

## 2019-03-20 RX ADMIN — ALBUTEROL SULFATE 2.5 MG: 2.5 SOLUTION RESPIRATORY (INHALATION) at 04:03

## 2019-03-20 RX ADMIN — BUSPIRONE HYDROCHLORIDE 10 MG: 5 TABLET ORAL at 01:03

## 2019-03-20 RX ADMIN — PIPERACILLIN AND TAZOBACTAM 4.5 G: 4; .5 INJECTION, POWDER, LYOPHILIZED, FOR SOLUTION INTRAVENOUS; PARENTERAL at 09:03

## 2019-03-20 RX ADMIN — METOPROLOL SUCCINATE 25 MG: 25 TABLET, EXTENDED RELEASE ORAL at 09:03

## 2019-03-20 RX ADMIN — PANTOPRAZOLE SODIUM 40 MG: 40 TABLET, DELAYED RELEASE ORAL at 09:03

## 2019-03-20 RX ADMIN — AMIODARONE HYDROCHLORIDE 400 MG: 200 TABLET ORAL at 09:03

## 2019-03-20 RX ADMIN — MONTELUKAST SODIUM 10 MG: 10 TABLET, FILM COATED ORAL at 01:03

## 2019-03-20 NOTE — NURSING
"Contacted Dr. Lucero and notified of persistent afib/aflutter and HR 120s.  MD ordered, " consult to cardiology and transfer to ICU."  Called report to KARTHIKEYAN Leo  And gave report.  Transported pt to ICU.   "

## 2019-03-20 NOTE — H&P
[facility] Medicine  History & Physical    Patient Name: Eli Vaz  MRN: [id number]  Admission Date: 03/20/2019  Attending Physician: Isrrael Apodaca MD, MPH      PCP:     Chiara Nelson MD    CC:     Chief Complaint   Patient presents with    Shortness of Breath     Pt went to dialysis today Pt states she is SOB. Pt vomiting in lobby. Pt admitted last week for respiratory infection and a-fib per family.       HISTORY OF PRESENT ILLNESS:  A bathing saw if you have not heard before.     Eli Vaz is a 73 y.o. female that (in part)  has a past medical history of Arthritis, Atrial fibrillation, COPD (chronic obstructive pulmonary disease), Diabetes mellitus type II, Dialysis patient, Encounter for blood transfusion, and ESRD (end stage renal disease).  has a past surgical history that includes Tubal ligation and AV fistula placement. Presents to [facility] Department complaining of shortness of breath.  She has been nauseous and febrile as well.  She was admitted last week for atrial fibrillation as well as respiratory infection.  Reports compliance with home medications.  She is awaiting arrival of home oxygen.  She is high by primary care physician yesterday and subsequently went to dialysis.  She became severely shortness of breath and was sent to the emergency department.     Description of symptoms    Location:  Lungs/chest  Onset:  Subacute onset with progressive worsening  Character:  Moderate to severe intensity shortness of breath  Frequency:  Daily frequency, but worse today.  She has had multiple hospitalizations this year for similar presentations.  Duration:  Constant   Associated Symptoms:  Nausea and fever  Radiation:  Throughout chest  Exacerbating factors:  Worsened with exertion  Relieving factors:  Some relief given with supplemental oxygen in ED.          In the emergency department routine laboratory studies, chest x-ray, EKG,  cardiac enzymes were obtained.  There was concern for past medications on chest x-ray and given the tachycardia, tachypnea, and fever broad-spectrum empiric antibiotics were initiated after cultures were obtained.  The patient is not make urine and therefore urinalysis was not obtained.  A CT of the chest was ordered to further evaluate for pneumonia.    Hospital medicine has been asked to admit for further evaluation and treatment.       REVIEW OF SYSTEMS:     -- Constitutional:  Fever generalized malaise  -- Eyes: No visual changes, diplopia, pain, tearing, blind spots, or discharge.   -- Ears, nose, mouth, throat, and face: No congestion, sore throat, epistaxis, d/c, bleeding gums, neck stiffness masses, or dental issues.  -- Respiratory:  As above in HPI  -- Cardiovascular:  Dyspnea with exertion.  Peripheral edema.  History of atrial fibrillation s.   -- Gastrointestinal:  Nausea with vomiting.  Denies abdominal pain, hematemesis, melena, dyspepsia, or change in bowel habits.  -- Genitourinary:   No vaginal discharge or hematuria   -- Integument/breast: No rash, pruritis, pigmentation changes, dryness, or changes in hair  -- Hematologic/lymphatic: No easy bruising or lymphadenopathy.   -- Musculoskeletal: No acute arthralgias, acute myalgias, joint swelling, acute limitations of ROM, or acute muscular weakness.  -- Neurological: No seizures, headaches, incoordination, paraesthesias, ataxia, vertigo, or tremors.  -- Behavioral/Psych: No auditory or visual hallucinations, depression, or suicidal/homicidal ideations.  -- Endocrine: No heat or cold intolerance, polydipsia, or unintentional weight gain / loss.  -- Allergy/Immunologic: No recurrent infections or adverse reaction to food, insects, or difficulty breathing.        PAST MEDICAL / SURGICAL HISTORY:     Past Medical History:   Diagnosis Date    Arthritis     Atrial fibrillation     COPD (chronic obstructive pulmonary disease)     Diabetes mellitus type  II     Dialysis patient     Encounter for blood transfusion     ESRD (end stage renal disease)      Past Surgical History:   Procedure Laterality Date    AV FISTULA PLACEMENT      TUBAL LIGATION           FAMILY HISTORY:     Family History   Problem Relation Age of Onset    Heart attack Sister     Heart attack Sister     Heart attack Mother          SOCIAL HISTORY:     Social History     Socioeconomic History    Marital status:      Spouse name: None    Number of children: None    Years of education: None    Highest education level: None   Social Needs    Financial resource strain: None    Food insecurity - worry: None    Food insecurity - inability: None    Transportation needs - medical: None    Transportation needs - non-medical: None   Occupational History    None   Tobacco Use    Smoking status: Former Smoker     Start date: 1/12/1991    Smokeless tobacco: Never Used    Tobacco comment: quit in 1991   Substance and Sexual Activity    Alcohol use: No    Drug use: No    Sexual activity: None   Other Topics Concern    None   Social History Narrative    None         ALLERGIES:       Review of patient's allergies indicates:  No Known Allergies        HOME MEDICATIONS:     Prior to Admission medications    Medication Sig Start Date End Date Taking? Authorizing Provider   albuterol (ACCUNEB) 1.25 mg/3 mL Nebu Take 3 mLs (1.25 mg total) by nebulization every 6 (six) hours as needed. 2/11/19   Chiara Nelson MD   albuterol (PROVENTIL) 2.5 mg /3 mL (0.083 %) nebulizer solution Inhale 2.5 mg into the lungs. 7/9/18   Historical Provider, MD   albuterol (PROVENTIL/VENTOLIN HFA) 90 mcg/actuation inhaler Inhale 2 puffs into the lungs. 11/7/18   Historical Provider, MD   albuterol-ipratropium (DUO-NEB) 2.5 mg-0.5 mg/3 mL nebulizer solution Take 3 mLs by nebulization every 6 (six) hours as needed for Wheezing or Shortness of Breath. Rescue 2/27/19 2/27/20  Chiara Nelson MD   amiodarone  (PACERONE) 200 MG Tab Take 1 tablet (200 mg total) by mouth 2 (two) times daily. for 14 days 4/7/19 4/21/19  Jerry Urena MD   amiodarone (PACERONE) 200 MG Tab Take 1 tablet (200 mg total) by mouth once daily. 4/22/19 4/21/20  Jerry Urena MD   amiodarone (PACERONE) 400 MG tablet Take 1 tablet (400 mg total) by mouth 3 (three) times daily. 3/11/19 3/10/20  Jerry Urena MD   amiodarone (PACERONE) 400 MG tablet Take 1 tablet (400 mg total) by mouth 2 (two) times daily. for 14 days 3/24/19 4/7/19  Jerry Urena MD   amLODIPine (NORVASC) 10 MG tablet TAKE 1 TABLET (10 MG TOTAL) BY MOUTH ONCE DAILY. 10/3/18   Chiara Nelson MD   amLODIPine (NORVASC) 10 MG tablet Take 10 mg by mouth. 10/3/18   Historical Provider, MD   apixaban (ELIQUIS) 2.5 mg Tab Take 1 tablet (2.5 mg total) by mouth 2 (two) times daily. 3/11/19   Jerry Urena MD   atorvastatin (LIPITOR) 10 MG tablet Take 1 tablet (10 mg total) by mouth once daily. 2/11/19   Chiara Nelson MD   atorvastatin (LIPITOR) 10 MG tablet Take 10 mg by mouth. 5/2/16   Historical Provider, MD   B complex w-C no.20/folic acid (B COMPLEX & C NO.20-FOLIC ACID ORAL) Take 1 capsule by mouth.    Historical Provider, MD   busPIRone (BUSPAR) 10 MG tablet Take 10 mg by mouth. 5/11/18   Historical Provider, MD   cinacalcet (SENSIPAR) 60 MG Tab Take 30 mg by mouth. 10/6/16   Historical Provider, MD   cloNIDine (CATAPRES) 0.2 MG tablet Take 0.2 mg by mouth.    Historical Provider, MD   diclofenac sodium (VOLTAREN) 1 % Gel Apply 4 g topically. 7/9/14   Historical Provider, MD   epoetin cat (PROCRIT) 3,000 unit/mL injection Inject 3,000 Units into the skin.    Historical Provider, MD   fluticasone (FLONASE) 50 mcg/actuation nasal spray 1 spray by Each Nare route 2 (two) times daily. 5/18/16   Antwan Everett MD   fluticasone (FLONASE) 50 mcg/actuation nasal spray 1 spray by Nasal route. 5/18/16   Historical Provider, MD   FOLIC ACID/VITAMIN B COMP  W-C (RENAL CAPS ORAL) Take by mouth Daily.    Historical Provider, MD   furosemide (LASIX) 80 MG tablet Take 1 tablet by mouth.    Historical Provider, MD   gentamicin sulfate, PF, 60 mg/6 mL Soln Inject into the vein.    Historical Provider, MD   hydrALAZINE (APRESOLINE) 25 MG tablet Take 1 tablet (25 mg total) by mouth every 12 (twelve) hours. 10/11/17   Christiano Leblanc MD   hydrALAZINE (APRESOLINE) 25 MG tablet Take 25 mg by mouth. 10/11/17   Historical Provider, MD   HYDROcodone-acetaminophen (NORCO) 7.5-325 mg per tablet Take 1 tablet by mouth every 8 (eight) hours as needed for Pain. 3/14/19 4/13/19  Isrrael Barton Jr., MD   HYDROcodone-acetaminophen (NORCO) 7.5-325 mg per tablet Take 1 tablet by mouth every 8 (eight) hours as needed for Pain. 4/13/19 5/13/19  Isrrael Barton Jr., MD   hydrOXYzine pamoate (VISTARIL) 25 MG Cap Take 1 capsule (25 mg total) by mouth every 8 (eight) hours as needed (panic attack). 2/26/18   Chiara Nelson MD   hydrOXYzine pamoate (VISTARIL) 25 MG Cap Take 25 mg by mouth. 2/26/18   Historical Provider, MD   lactulose (CHRONULAC) 20 gram/30 mL Soln Take 30 mLs (20 g total) by mouth 2 (two) times daily as needed (constipation). 3/13/19 3/23/19  Subhash Landin MD   levocetirizine (XYZAL) 5 MG tablet Take 1 tablet (5 mg total) by mouth every evening. 7/9/18   Charles Moody Jr., MD   levocetirizine (XYZAL) 5 MG tablet Take 5 mg by mouth. 7/9/18   Historical Provider, MD   lidocaine (LMX) 4 % cream Apply topically as needed. To affected area up to 5 times a day. 3/13/19   Subhash Landin MD   meclizine (ANTIVERT) 25 mg tablet Take 1 tablet (25 mg total) by mouth 3 (three) times daily as needed. 1/10/18   Subhash Landin MD   meclizine (ANTIVERT) 25 mg tablet Take 25 mg by mouth. 1/10/18   Historical Provider, MD   metoprolol succinate (TOPROL-XL) 25 MG 24 hr tablet Take 25 mg by mouth.    Historical Provider, MD   mirtazapine (REMERON) 7.5 MG Tab Take 7.5 mg  by mouth nightly.  10/24/16   Historical Provider, MD   mirtazapine (REMERON) 7.5 MG Tab Take 7.5 mg by mouth. 10/24/16   Historical Provider, MD   montelukast (SINGULAIR) 10 mg tablet Take 10 mg by mouth. 7/9/18   Historical Provider, MD   montelukast (SINGULAIR) 10 mg tablet TAKE 1 TABLET BY MOUTH EVERY DAY IN THE EVENING 3/18/19   Charles Moody Jr., MD   nut.tx.imp.renal fxn,lac-reduc (NEPRO CARB STEADY) 0.08 gram-1.8 kcal/mL Liqd Take 1 Can by mouth 3 (three) times daily with meals. 3/18/19   Chiara Nelson MD   pantoprazole (PROTONIX) 40 MG tablet Take 40 mg by mouth.    Historical Provider, MD   paroxetine (PAXIL) 30 MG tablet 1 TABLET IN THE MORNING ONCE A DAY ORALLY 90 11/6/18   Historical Provider, MD   predniSONE (DELTASONE) 10 MG tablet 2 tab po qday x 4 days, then 1 tab po qday x 4 days, then 1/2 tab po qday x 4 days 2/27/19   Chiara Nelson MD   promethazine (PHENERGAN) 12.5 MG Tab Take 12.5 mg by mouth every 6 (six) hours as needed. 6/4/14   Historical Provider, MD   SENSIPAR 60 mg Tab Take 30 mg by mouth daily with breakfast.  10/6/16   Historical Provider, MD   sevelamer carbonate (RENVELA) 800 mg Tab Take 800 mg by mouth 3 (three) times daily with meals.    Historical Provider, MD   sevelamer carbonate (RENVELA) 800 mg Tab Take 800 mg by mouth.    Historical Provider, MD   SODIUM BICARBONATE ORAL Take 650 mg by mouth.    Historical Provider, MD   traMADol (ULTRAM) 50 mg tablet Take 1 tablet by mouth. 9/13/13   Historical Provider, MD   traZODone (DESYREL) 50 MG tablet Take 1 tablet (50 mg total) by mouth nightly as needed for Insomnia. 2/26/18 2/26/19  Chiara Nelson MD   VENTOLIN HFA 90 mcg/actuation inhaler INHALE 2 PUFFS INTO LUNGS EVERY 4 HOURS AS NEEDED FOR SHORTNESS OF BREATH OR WHEEZING. RESCUE 11/7/18   Charles Moody Jr., MD   vit B,C-iron fum-FA-D3-zinc ox 8 mg iron-800 mcg-1,000 unit Tab Take by mouth.    Historical Provider, MD   vitamin renal formula, B-complex-vitamin c-folic  "acid, (NEPHROCAP) 1 mg Cap Take by mouth.    Historical Provider, MD   vortioxetine (BRINTELLIX) 5 mg Tab Take 1 tablet by mouth. 7/16/15   Historical Provider, MD          HOSPITAL MEDICATIONS:     Scheduled Meds:    amiodarone  400 mg Oral BID    apixaban  2.5 mg Oral BID    atorvastatin  10 mg Oral Daily    busPIRone  10 mg Oral BID    cinacalcet  30 mg Oral Daily with breakfast    metoprolol succinate  25 mg Oral Daily    montelukast  10 mg Oral QHS    pantoprazole  40 mg Oral Daily    paroxetine  30 mg Oral Daily    piperacillin-tazobactam (ZOSYN) IVPB  4.5 g Intravenous Q12H    polyethylene glycol  17 g Oral Daily    vancomycin (VANCOCIN) IVPB  20 mg/kg Intravenous ED 1 Time    vitamin renal formula (B-complex-vitamin c-folic acid)  1 capsule Oral Daily     Continuous Infusions:   PRN Meds: acetaminophen, acetaminophen, albuterol sulfate, mirtazapine, morphine, morphine, ondansetron, promethazine (PHENERGAN) IVPB, sodium chloride 0.9%, traZODone, vancomycin (VANCOCIN) IVPB      PHYSICAL EXAM:     Wt Readings from Last 1 Encounters:   03/20/19 0100 59.5 kg (131 lb 1.6 oz)   03/19/19 1825 60.8 kg (134 lb)     Body mass index is 22.5 kg/m².  Vitals:    03/19/19 2307 03/20/19 0028 03/20/19 0100 03/20/19 0335   BP: (!) 98/51 (!) 103/56  (!) 109/55   BP Location:  Right arm  Right arm   Patient Position:  Lying  Lying   Pulse: (!) 114 (!) 115  94   Resp: (!) 22 18  18   Temp:  98.1 °F (36.7 °C)  98.1 °F (36.7 °C)   TempSrc:  Oral  Oral   SpO2: 96% 98%  (!) 93%   Weight:   59.5 kg (131 lb 1.6 oz)    Height:   5' 4" (1.626 m)           -- General appearance: well developed. appears stated age   -- Head: normocephalic, atraumatic   -- Eyes: conjunctivae clear. Extraocular muscles intact  -- Nose: Nares normal. Septum midline.   -- Mouth/Throat: lips, mucosa, and tongue normal. no throat erythema.   -- Neck: + JVD. Otherwise supple, symmetrical, trachea midline, and thyroid not grossly enlarged, appears " symmetric  -- Lungs:  Decreased breath sounds to auscultation bilaterally. Bibasilar crackles.  normal respiratory effort. No use of accessory muscles.   -- Chest wall: no tenderness. equal bilateral chest rise   -- Heart:  Rapid irregularly irregular heart rate and rhythm. S1, S2 normal.  no click, rub or gallop   -- Abdomen: soft, non-tender, non-distended, non-tympanic; bowel sounds normal; no masses  -- Extremities:  Fistula left upper extremity.  no cyanosis, clubbing or edema.   -- Pulses: 2+ and symmetric   -- Skin: color normal, texture normal, turgor normal. No rashes or lesions.   -- Neurologic:  Globally decreased muscle strength and tone. No focal numbness or weakness. CNII-XII intact. [place] coma scale: eyes open spontaneously-4, oriented & converses-5, obeys commands-6.      LABORATORY STUDIES:     Recent Results (from the past 36 hour(s))   CBC auto differential    Collection Time: 03/19/19  6:52 PM   Result Value Ref Range    WBC 11.09 3.90 - 12.70 K/uL    RBC 3.13 (L) 4.00 - 5.40 M/uL    Hemoglobin 9.4 (L) 12.0 - 16.0 g/dL    Hematocrit 30.9 (L) 37.0 - 48.5 %    MCV 99 (H) 82 - 98 fL    MCH 30.0 27.0 - 31.0 pg    MCHC 30.4 (L) 32.0 - 36.0 g/dL    RDW 15.5 (H) 11.5 - 14.5 %    Platelets 304 150 - 350 K/uL    MPV 10.0 9.2 - 12.9 fL    Gran # (ANC) 8.9 (H) 1.8 - 7.7 K/uL    Lymph # 0.6 (L) 1.0 - 4.8 K/uL    Mono # 1.6 (H) 0.3 - 1.0 K/uL    Eos # 0.0 0.0 - 0.5 K/uL    Baso # 0.01 0.00 - 0.20 K/uL    Gran% 80.4 (H) 38.0 - 73.0 %    Lymph% 5.0 (L) 18.0 - 48.0 %    Mono% 14.2 4.0 - 15.0 %    Eosinophil% 0.3 0.0 - 8.0 %    Basophil% 0.1 0.0 - 1.9 %    Differential Method Automated    Comprehensive metabolic panel    Collection Time: 03/19/19  6:52 PM   Result Value Ref Range    Sodium 140 136 - 145 mmol/L    Potassium 4.5 3.5 - 5.1 mmol/L    Chloride 97 95 - 110 mmol/L    CO2 29 23 - 29 mmol/L    Glucose 100 70 - 110 mg/dL    BUN, Bld 28 (H) 8 - 23 mg/dL    Creatinine 6.0 (H) 0.5 - 1.4 mg/dL    Calcium  10.0 8.7 - 10.5 mg/dL    Total Protein 7.7 6.0 - 8.4 g/dL    Albumin 2.9 (L) 3.5 - 5.2 g/dL    Total Bilirubin 0.6 0.1 - 1.0 mg/dL    Alkaline Phosphatase 78 55 - 135 U/L    AST 13 10 - 40 U/L    ALT 10 10 - 44 U/L    Anion Gap 14 8 - 16 mmol/L    eGFR if African American 7 (A) >60 mL/min/1.73 m^2    eGFR if non African American 6 (A) >60 mL/min/1.73 m^2   Troponin I    Collection Time: 03/19/19  6:52 PM   Result Value Ref Range    Troponin I 0.071 (H) 0.000 - 0.026 ng/mL   Brain natriuretic peptide    Collection Time: 03/19/19  6:52 PM   Result Value Ref Range    BNP 2,560 (H) 0 - 99 pg/mL   Protime-INR    Collection Time: 03/19/19  6:52 PM   Result Value Ref Range    Prothrombin Time 15.7 (H) 9.0 - 12.5 sec    INR 1.5 (H) 0.8 - 1.2   Lactic acid, plasma #1    Collection Time: 03/19/19  6:52 PM   Result Value Ref Range    Lactate (Lactic Acid) 1.5 0.5 - 2.2 mmol/L   Magnesium    Collection Time: 03/19/19  6:52 PM   Result Value Ref Range    Magnesium 1.9 1.6 - 2.6 mg/dL   Brain natriuretic peptide    Collection Time: 03/19/19  6:52 PM   Result Value Ref Range    BNP 2,560 (H) 0 - 99 pg/mL   Troponin I    Collection Time: 03/19/19  6:52 PM   Result Value Ref Range    Troponin I 0.071 (H) 0.000 - 0.026 ng/mL   Phosphorus    Collection Time: 03/19/19  6:52 PM   Result Value Ref Range    Phosphorus 4.0 2.7 - 4.5 mg/dL   APTT    Collection Time: 03/19/19  6:52 PM   Result Value Ref Range    aPTT 46.7 (H) 21.0 - 32.0 sec   Lactic acid, plasma #2    Collection Time: 03/20/19  1:02 AM   Result Value Ref Range    Lactate (Lactic Acid) 1.1 0.5 - 2.2 mmol/L   Basic metabolic panel    Collection Time: 03/20/19  1:02 AM   Result Value Ref Range    Sodium 138 136 - 145 mmol/L    Potassium 4.3 3.5 - 5.1 mmol/L    Chloride 98 95 - 110 mmol/L    CO2 29 23 - 29 mmol/L    Glucose 94 70 - 110 mg/dL    BUN, Bld 31 (H) 8 - 23 mg/dL    Creatinine 6.1 (H) 0.5 - 1.4 mg/dL    Calcium 9.5 8.7 - 10.5 mg/dL    Anion Gap 11 8 - 16 mmol/L     eGFR if African American 7 (A) >60 mL/min/1.73 m^2    eGFR if non African American 6 (A) >60 mL/min/1.73 m^2       Lab Results   Component Value Date    INR 1.5 (H) 03/19/2019    INR 1.1 01/09/2018    INR 1.1 02/26/2017     Lab Results   Component Value Date    HGBA1C 5.3 03/06/2019     No results for input(s): POCTGLUCOSE in the last 72 hours.        MICROBIOLOGY DATA:     Urine Culture, Routine   Date Value Ref Range Status   07/12/2017   Final    LACTOBACILLUS SPECIES  > 100,000 cfu/ml  No further workup.     12/09/2010 NO SIGNIFICANT GROWTH  Final       Microbiology x 7d:   Microbiology Results (last 7 days)     Procedure Component Value Units Date/Time    Blood culture x two cultures. Draw prior to antibiotics. [[id number]] Collected:  03/19/19 1918    Order Status:  Sent Specimen:  Blood from Peripheral, Hand, Right Updated:  03/19/19 1929    Blood culture x two cultures. Draw prior to antibiotics. [[id number]] Collected:  03/19/19 1910    Order Status:  Sent Specimen:  Blood from Peripheral, Hand, Right Updated:  03/19/19 1929            IMAGING:     Imaging Results          CT Chest With Contrast (Final result)     Abnormal  Result time 03/20/19 00:10:27    Final result by Yoana Chong MD (03/20/19 00:10:27)                 Impression:      Marked pericardial effusion.  Moderate bilateral pleural effusions.  Biatrial enlargement.  Consider echocardiogram.    Subtle ground-glass opacity in the aerated lung fields.  Pulmonary vascular congestion or congestive heart failure may be considered.    This report was flagged in Epic as abnormal.      Electronically signed by: Yoana Chong  Date:    03/20/2019  Time:    00:10             Narrative:    EXAMINATION:  CT CHEST WITH CONTRAST    CLINICAL HISTORY:  suspected PNA;    TECHNIQUE:  Contiguous axial 5 mm images was obtained from the lung apices through the lung bases. Coronal and sagittal reformatted images were provided.  Fifty ml of Omnipaque 350  intravenous contrast was given.    COMPARISON:  None.    FINDINGS:  No focal consolidation or pulmonary masses are detected in the aerated lung fields.  There is mild linear scarring is seen in the right upper lobe and minimally in the medial aspect of the left upper lobe.  In the aerated lung fields, there is some mild ground-glass opacity.    There is marked pericardial effusion.    There are moderate bilateral pleural effusions with associated compressive atelectasis.  Small fluid is seen along the right major fissure.    There is no evidence of mediastinal, hilar, or axillary adenopathy.    Biatrial enlargement is noted.  Coronary arterial calcifications are present.  Advanced vascular calcifications are present.    In the visualized upper abdomen, the kidneys are atrophic..    There is severe kyphosis.                               X-Ray Chest AP Portable (Final result)  Result time 03/19/19 18:48:38    Final result by Bharathi Gomez MD (03/19/19 18:48:38)                 Impression:      1. Cardiomegaly with findings suggesting edema.  There are bilateral pleural effusions, mild on the right, moderate on the left with associated compressive atelectasis of the lower lobes.  Developing left lower lobe consolidation however is not excluded.      Electronically signed by: Bharathi Gomez MD  Date:    03/19/2019  Time:    18:48             Narrative:    EXAMINATION:  XR CHEST AP PORTABLE    CLINICAL HISTORY:  CHF;    TECHNIQUE:  Single frontal view of the chest was performed.    COMPARISON:  03/06/2019    FINDINGS:  The cardiomediastinal silhouette is prominent noting limited evaluation given shallow inspiratory effort.  There is calcification of the aorta..  There are bilateral pleural effusions.  The trachea is deviated rightward by a tortuous aorta..  The lungs are symmetrically expanded bilaterally with coarse interstitial attenuation bilaterally.  There is patchy increased interstitial and parenchymal  attenuation projected over the bilateral lower lung zones, left greater than right.  There is no pneumothorax.  The osseous structures are remarkable for degenerative changes.  There are partially visualized left vascular stents..                                  CONSULTS:     IP CONSULT TO NEPHROLOGY       ASSESSMENT & PLAN:     Primary Diagnosis:  Acute exacerbation of CHF (congestive heart failure)    Active Hospital Problems    Diagnosis  POA    *Acute exacerbation of CHF (congestive heart failure) [I50.9]  Yes     Priority: 1 - High    Fever [R50.9]  Yes     Priority: 2     Acute cardiac pulmonary edema [I50.1]  Yes     Priority: 3     Bilateral pleural effusion [J90]  Yes     Priority: 4     Atrial fibrillation [I48.91]  Yes    Diabetes mellitus with ESRD (end-stage renal disease) [E11.22, N18.6]  Yes     Chronic    Combined hyperlipidemia associated with type 2 diabetes mellitus [E11.69, E78.2]  Yes    Hypertension associated with diabetes [E11.59, I10]  Yes     Chronic    Hypertension [I10]  Yes    History of CVA (cerebrovascular accident) [Z86.73]  Not Applicable    Anemia of chronic disease [D63.8]  Yes    ESRD on dialysis [N18.6, Z99.2]  Not Applicable     [facility] at [facility].  Followed by Dr. Arthur      COPD (chronic obstructive pulmonary disease) [J44.9]  Yes      Resolved Hospital Problems   No resolved problems to display.         Shortness of breath  Due to multifactorial etiology including acute exacerbation of CHF, acute cardiac pulmonary edema secondary to ESRD and CHF, bilateral pleural effusion, atrial fibrillation, and possible influenza    Fever  · No evidence of pneumonia on CT, which was the initial suspicion based on chest x-ray.    · Broad-spectrum Empiric antibiotics were given  · Acute bronchitis?  · Patient does not make urine therefore urinalysis was not evaluated.  · Blood cultures obtained and are pending  · Influenza testing pending    Acute CHF  exacerbation  · Evidenced by history, elevated BNP, pulmonary edema, peripheral edema  · Provide fluid removal with dialysis  · Maintain w/ beta-blocker  · ACE inhibitor   · If 1) Patient cannot tolerate ACEi or 2) NYHA class III or above, add spironolactone (or eplerenone) and hydralazine-isosorbide dinitrate   · Daily Weights  · Strict I/O  · Fluid restriction to 1,500cc daily  · Low-sodium cardiac diet  · Obtain 2D echo if <6 months     Nuc Rest EF   Date Value Ref Range Status   02/04/2019 72  Final     · Chest X-ray  · Check TSH, albumin, UA, and renal function  · EKG and cardiac enzymes PRN  · DVT prophylaxis w/ pharmacological and/or mechanical measures  · Oxygen supplementation support PRN    Instructions given to patient/family:  Monitor daily weight.  Regular activity within patient's limitations.  Low salt, low fat and low choleterol diet and restrict fluid < 2L per day.  Call MD if SOB, chest pain, weight gain > 2-3 lbs per day and/or 5-6 lbs per week.   No smoking. Annual influenza vaccine required.    End-stage renal disease on dialysis  · Acute issue with significant volume overload requiring urgent dialysis  · No acute issues with electrolyte abnormality, or acid-base abnormality at this time  · Nephrology consulted    Elevated troponin  · No evidence of acute ST elevation MI   · Monitoring on telemetry  · Likely due to cardiac strain and CHF exacerbation  · Aspirin   · Supplemental oxygen  · Trend troponins    COPD exacerbation with possible bronchitis  · Continue with home medication regimen  · Nebulizer treatments - Xopenex only given AFib  · Titrate O2 sats between 88 to 93%.  No supplemental 02 for 02 saturation greater than 93% due to V/Q mismatch  · Consider initiating regimen of Breo, Advair 500/50mg, Spiriva 18mcg, and/or Daliresp 500mcg at or before discharge.  May also defer to outpatient pulmonology after formal pulmonary function testing.  · Mucolytics as needed  · Outpatient referral to  pulmonology for further optimization  · Tobacco cessation counseling    Cerebral vascular disease  · No evidence of acute stroke at this time  · Maintain adequate blood pressure control  · Heart healthy diet  · Aspirin  · Statin    Hypertension  · Goal while inpatient is a systolic blood pressure less than 160mmHg  · BP in acceptable range at this time  · Continue current home regimen with hold parameters  · PRN antihypertensives available    Diabetes mellitus type 2  · BG in acceptable range at this time  · Maintain w/ subcutaneous insulin management order set  · Hold oral diabetic meds  · ADA 1800 kcal diet  · BG goal while in patient is <180mg/dL  · HgA1c = Pending    Hyperlipidemia   · Lipid panel - as an outpatient  · Cardiac diet  · Continue statin    Atrial fibrillation  · Currently rate controlled  · Maintain with beta-blocker with hold parameters  · Monitor on telemetry  · Maintain magnesium around 2.0  · Maintain potassium around 4.0        VTE Risk Mitigation (From admission, onward)        Ordered     IP VTE HIGH RISK PATIENT  Once      03/20/19 0039     Reason for No Pharmacological VTE Prophylaxis  Once      03/20/19 0039     Place ROS hose  Until discontinued      03/20/19 0039     Place sequential compression device  Until discontinued      03/20/19 0039     apixaban tablet 2.5 mg  2 times daily      03/20/19 0039            Adult PRN medications available   DVT prophylaxis given       DISPOSITION:     Will admit to the Hospital Medicine service for further evaluation and treatment.    Chart reviewed and updated where applicable.    High Risk Conditions:  Patient has a condition that poses threat to life and bodily function: Severe Respiratory Distress      ===============================================================    Isrrael Apodaca MD, MPH  Department of Hospital Medicine   [facility]  [phone]  (7pm - 6am)          This note is dictated using MModal voice  recognition software.  There are word recognition mistakes that are occasionally missed on review.

## 2019-03-20 NOTE — PLAN OF CARE
"   03/20/19 1308   Discharge Assessment   Assessment Type Discharge Planning Assessment   Confirmed/corrected address and phone number on facesheet? Yes   Assessment information obtained from? Other  ((daughter) Elizabeth)   Communicated expected length of stay with patient/caregiver no   Prior to hospitilization cognitive status: Alert/Oriented   Prior to hospitalization functional status: Independent   Current cognitive status: Alert/Oriented   Current Functional Status: Independent   Lives With spouse   Able to Return to Prior Arrangements yes   Is patient able to care for self after discharge? Unable to determine at this time (comments)   Patient's perception of discharge disposition home or selfcare   Patient currently being followed by outpatient case management? No   Patient currently receives any other outside agency services? No   Equipment Currently Used at Home nebulizer   Do you have any problems affording any of your prescribed medications? No   Is the patient taking medications as prescribed? yes   Does the patient have transportation home? Yes   Transportation Anticipated family or friend will provide   Discharge Plan A Home with family   Discharge Plan B Home Health   DME Needed Upon Discharge  (TBD)   Patient/Family in Agreement with Plan yes     Patient's daughter prefers appointments mid evening on Mon, Wed, Fri    TN explained role of case manage, "Help manage care at Home", blue folder at bedside "My health packet", pink and green stickers reviewed with daughter at bedside, voiced understanding.    Patient has dialysis with [facility] in [place] on Tues/Thur/Sat      [id number] - [place] - [address]  [address] [address]  Phone: [phone] Fax: [phone]      "

## 2019-03-20 NOTE — NURSING
Pt become SOB and o2 sat dropped to 77% on 2LNC; requested RT and increased to 4LNC.  Weaning back down due to COPD.

## 2019-03-20 NOTE — HPI
Eli Vaz is a 73 y.o. female that (in part)  has a past medical history of Arthritis, Atrial fibrillation, COPD (chronic obstructive pulmonary disease), Diabetes mellitus type II, Dialysis patient, Encounter for blood transfusion, and ESRD (end stage renal disease).  has a past surgical history that includes Tubal ligation and AV fistula placement. Presents to [facility] Emergency Department complaining of shortness of breath.  She has been nauseous and febrile as well.  She was admitted last week for atrial fibrillation as well as respiratory infection.  Reports compliance with home medications.  She is awaiting arrival of home oxygen.  She is high by primary care physician yesterday and subsequently went to dialysis.  She became severely shortness of breath and was sent to the emergency department.     Description of symptoms    Location:  Lungs/chest  Onset:  Subacute onset with progressive worsening  Character:  Moderate to severe intensity shortness of breath  Frequency:  Daily frequency, but worse today.  She has had multiple hospitalizations this year for similar presentations.  Duration:  Constant   Associated Symptoms:  Nausea and fever  Radiation:  Throughout chest  Exacerbating factors:  Worsened with exertion  Relieving factors:  Some relief given with supplemental oxygen in ED.          In the emergency department routine laboratory studies, chest x-ray, EKG, cardiac enzymes were obtained.  There was concern for past medications on chest x-ray and given the tachycardia, tachypnea, and fever broad-spectrum empiric antibiotics were initiated after cultures were obtained.  The patient is not make urine and therefore urinalysis was not obtained.  A CT of the chest was ordered to further evaluate for pneumonia.    Hospital medicine has been asked to admit for further evaluation and treatment.

## 2019-03-20 NOTE — SUBJECTIVE & OBJECTIVE
Past Medical History:   Diagnosis Date    Arthritis     Atrial fibrillation     COPD (chronic obstructive pulmonary disease)     Diabetes mellitus type II     Dialysis patient     Encounter for blood transfusion     ESRD (end stage renal disease)        Past Surgical History:   Procedure Laterality Date    AV FISTULA PLACEMENT      TUBAL LIGATION         Review of patient's allergies indicates:  No Known Allergies    No current facility-administered medications on file prior to encounter.      Current Outpatient Medications on File Prior to Encounter   Medication Sig    albuterol (ACCUNEB) 1.25 mg/3 mL Nebu Take 3 mLs (1.25 mg total) by nebulization every 6 (six) hours as needed.    albuterol (PROVENTIL) 2.5 mg /3 mL (0.083 %) nebulizer solution Inhale 2.5 mg into the lungs.    albuterol (PROVENTIL/VENTOLIN HFA) 90 mcg/actuation inhaler Inhale 2 puffs into the lungs.    albuterol-ipratropium (DUO-NEB) 2.5 mg-0.5 mg/3 mL nebulizer solution Take 3 mLs by nebulization every 6 (six) hours as needed for Wheezing or Shortness of Breath. Rescue    [START ON 4/7/2019] amiodarone (PACERONE) 200 MG Tab Take 1 tablet (200 mg total) by mouth 2 (two) times daily. for 14 days    [START ON 4/22/2019] amiodarone (PACERONE) 200 MG Tab Take 1 tablet (200 mg total) by mouth once daily.    amiodarone (PACERONE) 400 MG tablet Take 1 tablet (400 mg total) by mouth 3 (three) times daily.    [START ON 3/24/2019] amiodarone (PACERONE) 400 MG tablet Take 1 tablet (400 mg total) by mouth 2 (two) times daily. for 14 days    amLODIPine (NORVASC) 10 MG tablet TAKE 1 TABLET (10 MG TOTAL) BY MOUTH ONCE DAILY.    amLODIPine (NORVASC) 10 MG tablet Take 10 mg by mouth.    apixaban (ELIQUIS) 2.5 mg Tab Take 1 tablet (2.5 mg total) by mouth 2 (two) times daily.    atorvastatin (LIPITOR) 10 MG tablet Take 1 tablet (10 mg total) by mouth once daily.    atorvastatin (LIPITOR) 10 MG tablet Take 10 mg by mouth.    B complex w-C  no.20/folic acid (B COMPLEX & C NO.20-FOLIC ACID ORAL) Take 1 capsule by mouth.    busPIRone (BUSPAR) 10 MG tablet Take 10 mg by mouth.    cinacalcet (SENSIPAR) 60 MG Tab Take 30 mg by mouth.    cloNIDine (CATAPRES) 0.2 MG tablet Take 0.2 mg by mouth.    diclofenac sodium (VOLTAREN) 1 % Gel Apply 4 g topically.    epoetin cat (PROCRIT) 3,000 unit/mL injection Inject 3,000 Units into the skin.    fluticasone (FLONASE) 50 mcg/actuation nasal spray 1 spray by Each Nare route 2 (two) times daily.    fluticasone (FLONASE) 50 mcg/actuation nasal spray 1 spray by Nasal route.    FOLIC ACID/VITAMIN B COMP W-C (RENAL CAPS ORAL) Take by mouth Daily.    furosemide (LASIX) 80 MG tablet Take 1 tablet by mouth.    gentamicin sulfate, PF, 60 mg/6 mL Soln Inject into the vein.    hydrALAZINE (APRESOLINE) 25 MG tablet Take 1 tablet (25 mg total) by mouth every 12 (twelve) hours.    hydrALAZINE (APRESOLINE) 25 MG tablet Take 25 mg by mouth.    HYDROcodone-acetaminophen (NORCO) 7.5-325 mg per tablet Take 1 tablet by mouth every 8 (eight) hours as needed for Pain.    [START ON 4/13/2019] HYDROcodone-acetaminophen (NORCO) 7.5-325 mg per tablet Take 1 tablet by mouth every 8 (eight) hours as needed for Pain.    hydrOXYzine pamoate (VISTARIL) 25 MG Cap Take 1 capsule (25 mg total) by mouth every 8 (eight) hours as needed (panic attack).    hydrOXYzine pamoate (VISTARIL) 25 MG Cap Take 25 mg by mouth.    lactulose (CHRONULAC) 20 gram/30 mL Soln Take 30 mLs (20 g total) by mouth 2 (two) times daily as needed (constipation).    levocetirizine (XYZAL) 5 MG tablet Take 1 tablet (5 mg total) by mouth every evening.    levocetirizine (XYZAL) 5 MG tablet Take 5 mg by mouth.    lidocaine (LMX) 4 % cream Apply topically as needed. To affected area up to 5 times a day.    meclizine (ANTIVERT) 25 mg tablet Take 1 tablet (25 mg total) by mouth 3 (three) times daily as needed.    meclizine (ANTIVERT) 25 mg tablet Take 25 mg by  mouth.    metoprolol succinate (TOPROL-XL) 25 MG 24 hr tablet Take 25 mg by mouth.    mirtazapine (REMERON) 7.5 MG Tab Take 7.5 mg by mouth nightly.     mirtazapine (REMERON) 7.5 MG Tab Take 7.5 mg by mouth.    montelukast (SINGULAIR) 10 mg tablet Take 10 mg by mouth.    montelukast (SINGULAIR) 10 mg tablet TAKE 1 TABLET BY MOUTH EVERY DAY IN THE EVENING    nut.tx.imp.renal fxn,lac-reduc (NEPRO CARB STEADY) 0.08 gram-1.8 kcal/mL Liqd Take 1 Can by mouth 3 (three) times daily with meals.    pantoprazole (PROTONIX) 40 MG tablet Take 40 mg by mouth.    paroxetine (PAXIL) 30 MG tablet 1 TABLET IN THE MORNING ONCE A DAY ORALLY 90    predniSONE (DELTASONE) 10 MG tablet 2 tab po qday x 4 days, then 1 tab po qday x 4 days, then 1/2 tab po qday x 4 days    promethazine (PHENERGAN) 12.5 MG Tab Take 12.5 mg by mouth every 6 (six) hours as needed.    SENSIPAR 60 mg Tab Take 30 mg by mouth daily with breakfast.     sevelamer carbonate (RENVELA) 800 mg Tab Take 800 mg by mouth 3 (three) times daily with meals.    sevelamer carbonate (RENVELA) 800 mg Tab Take 800 mg by mouth.    SODIUM BICARBONATE ORAL Take 650 mg by mouth.    traMADol (ULTRAM) 50 mg tablet Take 1 tablet by mouth.    traZODone (DESYREL) 50 MG tablet Take 1 tablet (50 mg total) by mouth nightly as needed for Insomnia.    VENTOLIN HFA 90 mcg/actuation inhaler INHALE 2 PUFFS INTO LUNGS EVERY 4 HOURS AS NEEDED FOR SHORTNESS OF BREATH OR WHEEZING. RESCUE    vit B,C-iron fum-FA-D3-zinc ox 8 mg iron-800 mcg-1,000 unit Tab Take by mouth.    vitamin renal formula, B-complex-vitamin c-folic acid, (NEPHROCAP) 1 mg Cap Take by mouth.    vortioxetine (BRINTELLIX) 5 mg Tab Take 1 tablet by mouth.     Family History     Problem Relation (Age of Onset)    Heart attack Sister, Sister, Mother        Tobacco Use    Smoking status: Former Smoker     Start date: 1/12/1991    Smokeless tobacco: Never Used    Tobacco comment: quit in 1991   Substance and Sexual  Activity    Alcohol use: No    Drug use: No    Sexual activity: Not on file     Review of Systems   Constitution: Negative for decreased appetite.   HENT: Negative for ear discharge.    Eyes: Negative for blurred vision.   Respiratory: Negative for hemoptysis.    Endocrine: Negative for polyphagia.   Hematologic/Lymphatic: Negative for adenopathy.   Skin: Negative for color change.   Musculoskeletal: Negative for joint swelling.   Genitourinary: Negative for bladder incontinence.   Neurological: Negative for brief paralysis.   Psychiatric/Behavioral: Negative for hallucinations.   Allergic/Immunologic: Negative for hives.     Objective:     Vital Signs (Most Recent):  Temp: 97.9 °F (36.6 °C) (03/20/19 1330)  Pulse: (!) 113 (03/20/19 1330)  Resp: (!) 24 (03/20/19 1330)  BP: (!) 98/52 (03/20/19 1330)  SpO2: 95 % (03/20/19 1141) Vital Signs (24h Range):  Temp:  [97.8 °F (36.6 °C)-102.1 °F (38.9 °C)] 97.9 °F (36.6 °C)  Pulse:  [] 113  Resp:  [18-29] 24  SpO2:  [93 %-99 %] 95 %  BP: ()/(51-72) 98/52     Weight: 59.4 kg (131 lb)  Body mass index is 22.49 kg/m².    SpO2: 95 %  O2 Device (Oxygen Therapy): nasal cannula      Intake/Output Summary (Last 24 hours) at 3/20/2019 1519  Last data filed at 3/20/2019 0830  Gross per 24 hour   Intake 470 ml   Output --   Net 470 ml       Lines/Drains/Airways     Central Venous Catheter Line                 Hemodialysis Catheter -- days          Drain                 Hemodialysis AV Fistula 03/06/19 2339 Left upper arm 13 days          Peripheral Intravenous Line                 Peripheral IV - Single Lumen 03/19/19 1852 Right Antecubital less than 1 day                Physical Exam   Constitutional: She is oriented to person, place, and time. She appears well-developed and well-nourished.   HENT:   Head: Normocephalic and atraumatic.   Eyes: Conjunctivae are normal. Pupils are equal, round, and reactive to light.   Neck: Normal range of motion. Neck supple.    Cardiovascular: Normal heart sounds and intact distal pulses. An irregularly irregular rhythm present. Tachycardia present.   Pulmonary/Chest: Effort normal and breath sounds normal.   Abdominal: Soft. Bowel sounds are normal.   Musculoskeletal: Normal range of motion.   Neurological: She is alert and oriented to person, place, and time.   Skin: Skin is warm and dry.       Significant Labs: All pertinent lab results from the last 24 hours have been reviewed.    Significant Imaging: Echocardiogram:   2D echo with color flow doppler:   Results for orders placed or performed during the hospital encounter of 03/10/14   2D echo with color flow doppler   Result Value Ref Range    QEF 70     Mitral Valve Regurgitation trivial     Diastolic Dysfunction Yes

## 2019-03-20 NOTE — ASSESSMENT & PLAN NOTE
Recent admission with PAF earlier this month. On eliquis. IV amiodarone restarted. Could consider ISAMAR/CV if A-fib perisists

## 2019-03-20 NOTE — PLAN OF CARE
Problem: Adult Inpatient Plan of Care  Goal: Plan of Care Review  Outcome: Ongoing (interventions implemented as appropriate)   03/20/19 0609   Plan of Care Review   Plan of Care Reviewed With patient;family   Progress improving     Assumed care. Pt was AAOx4.  Family at bedside. Vanc and Zosyn ordered per MD orders. First dose given in ED.  Bed low and locked. Call light in reach.  Constanza ordered Nasopharyngeal Nasal culture and was obtained 3/20/19. Patient slept through the nighit. No falls or injuries throughout shift. Skin integrity maintained. Patient repositioned independently throughout shift.  Will continue to monitor

## 2019-03-20 NOTE — HPI
Eli Vaz is a 73 y.o. female that (in part)  has a past medical history of Arthritis, Atrial fibrillation, COPD (chronic obstructive pulmonary disease), Diabetes mellitus type II, Dialysis patient, Encounter for blood transfusion, and ESRD (end stage renal disease).  has a past surgical history that includes Tubal ligation and AV fistula placement. Presents to [facility] Emergency Department complaining of shortness of breath.  She has been nauseous and febrile as well.  She was admitted last week for atrial fibrillation as well as respiratory infection.  Reports compliance with home medications.  She is awaiting arrival of home oxygen.  She is high by primary care physician yesterday and subsequently went to dialysis.  She became severely shortness of breath and was sent to the emergency department.    Transferred to ICU for A-fib RVR - now on IV amiodarone  EKG A-flutter wit NSSTT changes  BNP 2560  Troponin 0.07    Had A-fib RVR during admission 3/6/19 - placed on amiodarone and eliquis    Known to me  ESRD, MR - mod-severe, diastolic CHF, HTN, DM, COPD, CVA, PAF on eliquis and amiodarone     Admitted 2/3/19  Eli Vaz is a 73 y.o. AAF with PMHx  including end-stage renal disease (TTS), diabetes mellitus, COPD.  Per patient she was awakened in the early morning with acute onset shortness of breath plus mild midsternal diffuse to left and right-sided chest pain.  This felt like pressure.  She reports she took 2 aspirin at home and presented to the [facility] emergency room, where she was administered 2 nitroglycerin sublingual tab that did not relieve her chest pain. She reports she received morphine after that that improved but did not return completely relieve her chest pain. Patient denies headache, blurry vision, history of long trips, recent trauma, changes to bowel functions.  Important to mention is that patient recently was seen by Dr. segundo coleman but lower on  02/01/2019 and diagnosed with upper respiratory infection and prescribed antibiotic started on 02/01/2019.     [place] the ED her D-dimer was found to be elevated and therefore emergency room physicians requested observation placement to rule out pulmonary embolus.  Patient has end-stage renal and therefore not a candidate for CTA with contrast.  Will obtain a stat V/Q perfusion scan, and trend troponins which have been negative thus for.  Her vitals are grossly stable blood pressure 121/55 and sat 95%.  She does exhibit low-grade temp of 99° and nonproductive cough     Patient ruled out ACS with serial negative troponins.  Discussed case with Cardiology initially due to her atypical presentation no need to consult.  She had chest pressure diffuse across her chest that was nonradiating to the face or shoulders unrelieved by nitroglycerin or morphine.  Patient presented with a nonproductive intermittent cough.  Noted in patient's records she had doctor appointment with another MD on 02/01/2019 was ordered doxycycline for upper respiratory infection.  Noted to have a low-grade fever 99 that was nonsustained.  She was set for discharged after V/Q stand scan showed low probability; she denies leg or calf pain numbness or weakness in any extremity, recent long trips or trauma.  Her symptoms thought to likely be related to upper respiratory infection although viral due to comorbidities will continue her doxycycline and treatment as ordered by Dr. Nelson in the clinic on the 1st, she can follow up with the primary care doctor in the clinic when she completes her antibiotics.  Will add short course of steroids.  Vital stable discharged to home in stable condition.     At the time of discharge the patient complained of chest pain again. She was kept overnight and NST performed today. The NST that showed a mild infarct defect in the lateral apical all, however, was negative for myocardial ischemia. LVEF 72%. Close follow up  with cardiology in clinic. Appointment arranged by CM. Stable for discharge.     Echo 2/4/19  · Normal left ventricular systolic function. The estimated ejection fraction is 70%  · Concentric left ventricular hypertrophy.  · Grade II (moderate) left ventricular diastolic dysfunction consistent with pseudonormalization.  · Moderate left atrial enlargement.  · Normal right ventricular systolic function.  · Moderate-to-severe mitral regurgitation.  · Mild tricuspid regurgitation.  · The estimated PA systolic pressure is 44 mm Hg  · Pulmonary hypertension present.     Stress test 2/4/19  · There is a mild, infarct defect(s) in the lateral apical wall(s),  · An ejection fraction of 72 % at rest  · The EKG portion of this study is negative for myocardial ischemia

## 2019-03-20 NOTE — CONSULTS
Renal Consult    Date of Admission:  3/19/2019  6:30 PM        Chief Complaint:   Chief Complaint   Patient presents with    Shortness of Breath     Pt went to dialysis today Pt states she is SOB. Pt vomiting in lobby. Pt admitted last week for respiratory infection and a-fib per family.       HPI: 73 y.o. female known to our group with a past medical history significant for: ESRD on HD (TTS)  Arthritis, Atrial fibrillation, COPD Diabetes mellitus type II.  Pte.  presented  to [facility] Emergency Department complaining of acute onset shortness of breath that started after her Dialysis yesterday, pte. Also reported fever and nausea. Pte. was admitted last week for atrial fibrillation with RVR as well as with an upper respiratory infection.    Consulted for ESRD management      PMH:  Past Medical History:   Diagnosis Date    Arthritis     Atrial fibrillation     COPD (chronic obstructive pulmonary disease)     Diabetes mellitus type II     Dialysis patient     Encounter for blood transfusion     ESRD (end stage renal disease)        PSH:  Past Surgical History:   Procedure Laterality Date    AV FISTULA PLACEMENT      TUBAL LIGATION         Allergies:  Review of patient's allergies indicates:  No Known Allergies    No current facility-administered medications on file prior to encounter.      Current Outpatient Medications on File Prior to Encounter   Medication Sig Dispense Refill    albuterol (ACCUNEB) 1.25 mg/3 mL Nebu Take 3 mLs (1.25 mg total) by nebulization every 6 (six) hours as needed. 1 Box 2    albuterol (PROVENTIL) 2.5 mg /3 mL (0.083 %) nebulizer solution Inhale 2.5 mg into the lungs.      albuterol (PROVENTIL/VENTOLIN HFA) 90 mcg/actuation inhaler Inhale 2 puffs into the lungs.      albuterol-ipratropium (DUO-NEB) 2.5 mg-0.5 mg/3 mL nebulizer solution Take 3 mLs by nebulization every 6 (six) hours as needed for Wheezing or Shortness of Breath. Rescue 1 Box 2     [START ON 4/7/2019] amiodarone (PACERONE) 200 MG Tab Take 1 tablet (200 mg total) by mouth 2 (two) times daily. for 14 days 28 tablet 0    [START ON 4/22/2019] amiodarone (PACERONE) 200 MG Tab Take 1 tablet (200 mg total) by mouth once daily. 30 tablet 5    amiodarone (PACERONE) 400 MG tablet Take 1 tablet (400 mg total) by mouth 3 (three) times daily. 90 tablet 11    [START ON 3/24/2019] amiodarone (PACERONE) 400 MG tablet Take 1 tablet (400 mg total) by mouth 2 (two) times daily. for 14 days 28 tablet 0    amLODIPine (NORVASC) 10 MG tablet TAKE 1 TABLET (10 MG TOTAL) BY MOUTH ONCE DAILY. 90 tablet 1    amLODIPine (NORVASC) 10 MG tablet Take 10 mg by mouth.      apixaban (ELIQUIS) 2.5 mg Tab Take 1 tablet (2.5 mg total) by mouth 2 (two) times daily. 60 tablet 5    atorvastatin (LIPITOR) 10 MG tablet Take 1 tablet (10 mg total) by mouth once daily. 90 tablet 1    atorvastatin (LIPITOR) 10 MG tablet Take 10 mg by mouth.      B complex w-C no.20/folic acid (B COMPLEX & C NO.20-FOLIC ACID ORAL) Take 1 capsule by mouth.      busPIRone (BUSPAR) 10 MG tablet Take 10 mg by mouth.      cinacalcet (SENSIPAR) 60 MG Tab Take 30 mg by mouth.      cloNIDine (CATAPRES) 0.2 MG tablet Take 0.2 mg by mouth.      diclofenac sodium (VOLTAREN) 1 % Gel Apply 4 g topically.      epoetin cat (PROCRIT) 3,000 unit/mL injection Inject 3,000 Units into the skin.      fluticasone (FLONASE) 50 mcg/actuation nasal spray 1 spray by Each Nare route 2 (two) times daily. 1 Bottle 1    fluticasone (FLONASE) 50 mcg/actuation nasal spray 1 spray by Nasal route.      FOLIC ACID/VITAMIN B COMP W-C (RENAL CAPS ORAL) Take by mouth Daily.      furosemide (LASIX) 80 MG tablet Take 1 tablet by mouth.      gentamicin sulfate, PF, 60 mg/6 mL Soln Inject into the vein.      hydrALAZINE (APRESOLINE) 25 MG tablet Take 1 tablet (25 mg total) by mouth every 12 (twelve) hours. 180 tablet 0    hydrALAZINE (APRESOLINE) 25 MG tablet Take 25 mg  by mouth.      HYDROcodone-acetaminophen (NORCO) 7.5-325 mg per tablet Take 1 tablet by mouth every 8 (eight) hours as needed for Pain. 90 tablet 0    [START ON 4/13/2019] HYDROcodone-acetaminophen (NORCO) 7.5-325 mg per tablet Take 1 tablet by mouth every 8 (eight) hours as needed for Pain. 90 tablet 0    hydrOXYzine pamoate (VISTARIL) 25 MG Cap Take 1 capsule (25 mg total) by mouth every 8 (eight) hours as needed (panic attack). 30 capsule 1    hydrOXYzine pamoate (VISTARIL) 25 MG Cap Take 25 mg by mouth.      lactulose (CHRONULAC) 20 gram/30 mL Soln Take 30 mLs (20 g total) by mouth 2 (two) times daily as needed (constipation). 600 mL 0    levocetirizine (XYZAL) 5 MG tablet Take 1 tablet (5 mg total) by mouth every evening. 30 tablet 2    levocetirizine (XYZAL) 5 MG tablet Take 5 mg by mouth.      lidocaine (LMX) 4 % cream Apply topically as needed. To affected area up to 5 times a day. 30 g 0    meclizine (ANTIVERT) 25 mg tablet Take 1 tablet (25 mg total) by mouth 3 (three) times daily as needed. 20 tablet 0    meclizine (ANTIVERT) 25 mg tablet Take 25 mg by mouth.      metoprolol succinate (TOPROL-XL) 25 MG 24 hr tablet Take 25 mg by mouth.      mirtazapine (REMERON) 7.5 MG Tab Take 7.5 mg by mouth nightly.       mirtazapine (REMERON) 7.5 MG Tab Take 7.5 mg by mouth.      montelukast (SINGULAIR) 10 mg tablet Take 10 mg by mouth.      montelukast (SINGULAIR) 10 mg tablet TAKE 1 TABLET BY MOUTH EVERY DAY IN THE EVENING 30 tablet 2    nut.tx.imp.renal fxn,lac-reduc (NEPRO CARB STEADY) 0.08 gram-1.8 kcal/mL Liqd Take 1 Can by mouth 3 (three) times daily with meals. 30 Can 11    pantoprazole (PROTONIX) 40 MG tablet Take 40 mg by mouth.      paroxetine (PAXIL) 30 MG tablet 1 TABLET IN THE MORNING ONCE A DAY ORALLY 90  3    predniSONE (DELTASONE) 10 MG tablet 2 tab po qday x 4 days, then 1 tab po qday x 4 days, then 1/2 tab po qday x 4 days 14 tablet 0    promethazine (PHENERGAN) 12.5 MG Tab Take  12.5 mg by mouth every 6 (six) hours as needed.      SENSIPAR 60 mg Tab Take 30 mg by mouth daily with breakfast.       sevelamer carbonate (RENVELA) 800 mg Tab Take 800 mg by mouth 3 (three) times daily with meals.      sevelamer carbonate (RENVELA) 800 mg Tab Take 800 mg by mouth.      SODIUM BICARBONATE ORAL Take 650 mg by mouth.      traMADol (ULTRAM) 50 mg tablet Take 1 tablet by mouth.      traZODone (DESYREL) 50 MG tablet Take 1 tablet (50 mg total) by mouth nightly as needed for Insomnia. 30 tablet 3    VENTOLIN HFA 90 mcg/actuation inhaler INHALE 2 PUFFS INTO LUNGS EVERY 4 HOURS AS NEEDED FOR SHORTNESS OF BREATH OR WHEEZING. RESCUE 18 Inhaler 2    vit B,C-iron fum-FA-D3-zinc ox 8 mg iron-800 mcg-1,000 unit Tab Take by mouth.      vitamin renal formula, B-complex-vitamin c-folic acid, (NEPHROCAP) 1 mg Cap Take by mouth.      vortioxetine (BRINTELLIX) 5 mg Tab Take 1 tablet by mouth.         Medications:  Current Facility-Administered Medications   Medication Dose Route Frequency Provider Last Rate Last Dose    acetaminophen tablet 650 mg  650 mg Oral Q8H PRN Janeth Disla MD        albuterol sulfate nebulizer solution 2.5 mg  2.5 mg Nebulization Q4H PRN Janeth Disla MD        amiodarone tablet 400 mg  400 mg Oral BID Janeth Disla MD   400 mg at 03/20/19 0915    apixaban tablet 2.5 mg  2.5 mg Oral BID Janeth Disla MD   2.5 mg at 03/20/19 0915    atorvastatin tablet 10 mg  10 mg Oral Daily Janeth Disla MD   10 mg at 03/20/19 0915    busPIRone tablet 10 mg  10 mg Oral BID Janeth Disla MD   10 mg at 03/20/19 0915    cinacalcet tablet 30 mg  30 mg Oral Daily with breakfast Janeth Disla MD   30 mg at 03/20/19 0745    metoprolol succinate (TOPROL-XL) 24 hr tablet 25 mg  25 mg Oral Daily Janeth Disla MD   25 mg at 03/20/19 0916    montelukast tablet 10 mg  10 mg Oral QHS Janeth Disla MD   10 mg at 03/20/19 0109    ondansetron injection 4 mg  4  mg Intravenous Q4H PRN Janeth Disla MD        pantoprazole EC tablet 40 mg  40 mg Oral Daily Janeth Disla MD   40 mg at 03/20/19 0915    paroxetine 10 mg/5 mL suspension 30 mg  30 mg Oral Daily Janeth Disla MD   30 mg at 03/20/19 0915    piperacillin-tazobactam 4.5 g in sodium chloride 0.9% 100 mL IVPB (ready to mix system)  4.5 g Intravenous Q12H Isrrael Apodaca MD 25 mL/hr at 03/20/19 0914 4.5 g at 03/20/19 0914    polyethylene glycol packet 17 g  17 g Oral Daily Janeth Disla MD        promethazine (PHENERGAN) 6.25 mg in dextrose 5 % 50 mL IVPB  6.25 mg Intravenous Q6H PRN Janeth Disla MD        sodium chloride 0.9% flush 5 mL  5 mL Intravenous PRN Janeth Disla MD        traZODone tablet 50 mg  50 mg Oral Nightly PRN Janeth Disla MD        vancomycin 500 mg in dextrose 5 % 100 mL IVPB (ready to mix system)  500 mg Intravenous PRN Isrrael Apodaca MD        vitamin renal formula (B-complex-vitamin c-folic acid) 1 mg per capsule 1 capsule  1 capsule Oral Daily Janeth Disla MD   1 capsule at 03/20/19 0915       FamHx:  Family History   Problem Relation Age of Onset    Heart attack Sister     Heart attack Sister     Heart attack Mother        SocHx:  Social History     Socioeconomic History    Marital status:      Spouse name: Not on file    Number of children: Not on file    Years of education: Not on file    Highest education level: Not on file   Social Needs    Financial resource strain: Not on file    Food insecurity - worry: Not on file    Food insecurity - inability: Not on file    Transportation needs - medical: Not on file    Transportation needs - non-medical: Not on file   Occupational History    Not on file   Tobacco Use    Smoking status: Former Smoker     Start date: 1/12/1991    Smokeless tobacco: Never Used    Tobacco comment: quit in 1991   Substance and Sexual Activity    Alcohol use: No    Drug use: No    Sexual  activity: Not on file   Other Topics Concern    Not on file   Social History Narrative    Not on file           Review of Systems:  General: + fever, chills, fatigability  HEENT: neg  Respiratory: + SOB and non productive cough  Heart: no CP or palpitations  Abdomen: No abdominal pain, + nausea, no vomiting or diarrhea  : No flank pain, dysuria, urgency, frequency or hematuria  Musculoskeletal: No edema  Skin: No rashes:  Neurologic: Neg.      Physical Exam:  Vitals:   Vitals:    03/20/19 0810   BP: 113/67   Pulse: (!) 111   Resp: 18   Temp: 98 °F (36.7 °C)       I/O last 3 completed shifts:  In: 350 [IV Piggyback:350]  Out: -   I/O this shift:  In: 120 [P.O.:120]  Out: -     General: No apparent distress.   Neck: supple no JVD  Lungs: CTA bilateral  Heart: RRR, S1-S2  Abdomen: Soft. NT.  : n/a  Ext: No edema  Skin: The skin is warm and dry  Neurologic: Awake and alert, oriented x 3      Laboratories:    Recent Labs   Lab 03/19/19 1852   WBC 11.09   RBC 3.13*   HGB 9.4*   HCT 30.9*      MCV 99*   MCH 30.0   MCHC 30.4*       Recent Labs   Lab 03/19/19 1852 03/20/19  0102   CALCIUM 10.0 9.5   PROT 7.7  --     138   K 4.5 4.3   CO2 29 29   CL 97 98   BUN 28* 31*   CREATININE 6.0* 6.1*   ALKPHOS 78  --    ALT 10  --    AST 13  --    BILITOT 0.6  --        No results for input(s): COLORU, CLARITYU, SPECGRAV, PHUR, PROTEINUA, GLUCOSEU, BLOODU, WBCU, RBCU, BACTERIA, MUCUS in the last 24 hours.    Invalid input(s):  BILIRUBINCON    Microbiology Results (last 7 days)     Procedure Component Value Units Date/Time    Blood culture x two cultures. Draw prior to antibiotics. [[id number]] Collected:  03/19/19 1910    Order Status:  Completed Specimen:  Blood from Peripheral, Hand, Right Updated:  03/20/19 0512     Blood Culture, Routine No Growth to date    Narrative:       Aerobic and anaerobic    Blood culture x two cultures. Draw prior to antibiotics. [[id number]] Collected:  03/19/19 1918    Order Status:   "Completed Specimen:  Blood from Peripheral, Hand, Right Updated:  03/20/19 0512     Blood Culture, Routine No Growth to date    Narrative:       Aerobic and anaerobic            Diagnostic Tests:  X-Ray Chest AP Portable [[id number]] Resulted: 03/19/19 1848   Order Status: Completed      Impression:       1. Cardiomegaly with findings suggesting edema.  There are bilateral pleural effusions, mild on the right, moderate on the left with associated compressive atelectasis of the lower lobes.  Developing left lower lobe consolidation however is not excluded.      Electronically signed by: Bharathi Gomez MD  Date: 03/19/2019  Time: 18:48     CT Chest With Contrast [[id number]] (Abnormal) Resulted: 03/20/19 0010   Order Status: Completed      Impression:       Marked pericardial effusion.  Moderate bilateral pleural effusions.  Biatrial enlargement.  Consider echocardiogram.    Subtle ground-glass opacity in the aerated lung fields.  Pulmonary vascular congestion or congestive heart failure may be considered.    This report was flagged in Epic as abnormal.      Electronically signed by: Yoana Chong  Date: 03/20/2019  Time: 00:10               Assessment:    74 y/o AAF with Hx. ESRD on chronic Hemodialysis admitted with:    - Dyspnea with abnormal CXR showing Pleural effusions  - COPD exacerbation with possible bronchitis  - r/o PNA  - Pericardial effusion  - Anemia of CKD  - Hx. PAF  - Hx. HTN  - Hx. DM-2        Plan:    - Resume Dialysis in a.m.  - Epogen with HD  - Will "challenge" pte.'s dry weight as tolerated  - Empiric antibiotics, B2 agonists per admitting  - Consider Thoracentesis and Pulmonary Consult  - Consider Echocardiogram and Cardiology Consult  - Glycemic control per admitting    "

## 2019-03-20 NOTE — ED NOTES
Past Medical History:   Diagnosis Date    Arthritis     Atrial fibrillation     COPD (chronic obstructive pulmonary disease)     Diabetes mellitus type II     Dialysis patient     Encounter for blood transfusion     ESRD (end stage renal disease)      I am assuming care of this pt.   [place] is at the bedside obtaining BC. Pt is aao4 with family at bedside.  Physical assessment done and documented.  Pt has cardiac monitoring , continuous pulse ox, nibp with bed low and locked.  This note was typed by Cristal Galdamez RN

## 2019-03-20 NOTE — CARE UPDATE
I agree with my colleague's assessment and plan at time of admission. I personally evaluated Ms Vaz today. Is resting with low flow NC in place. Is in not distress. Laying with HOB at about 30 degree up. Is afebrile but still tachycardic and with borderline low BP. Breath sounds absent at bases. Did not appreciated rales or wheezing. CT chest showed bilateral pleural effusions despite completing dialysis PTA. Agree with empiric abx as she is at risk for hospital acquired PNA. Will need more UF via dialysis. She may still require portable O2 at discharge as I expect these effusions to be persistent despite more UF. A Thoracentesis will not offer long term benefit, in my opinion. Will add Echo. Workup not consistent with ACS thus far. Nephrology consultation pending. AP discussed with patient and daughter.     Addendum: AFib with RVR in cardiac monitor. Will give a dose of metoprolol IV now. If not responsive to this, will consult cardiology and transfer to ICU for amio infusion. This patient's AFib was difficult to treat on previous admission.     Addendum: still in AFib. Will consult cardiology and start amiodarone infusion. Already on NOAC for stroke prophylaxis.

## 2019-03-20 NOTE — NURSING
Patient arrived to unit via stretcher accompanied by staff and family. Resp. Fiorella non-labored oxygen Via NC. At  no acute distress noted. Patient AA&Ox 4 able to make needs known. Denies pain and discomfort at present time. Patient oriented to call light system, restroom and statff. Safety maintained, bed locked and in lowest position. Call light in reach.

## 2019-03-20 NOTE — ED NOTES
Lab at bedside.    Physical Exam    Wound #1 Assessment wound #1 Location:, right lower leg (multi), started on 6/2/16, Care for this wound started on 6/13/16  Wound Status: not healed  Length: 0 5cm x Width: 2 6cm x Depth: 0 4cm   Total: 1 3sq cm   Wound Volume: 0 52cm3   Tunneling 0 5cm at 3 o'clock          Tissue type: Subcutaneous, Granulation and Slough   Color of Wound: Red - 80% and Yellow - 20%   Exudate Amount: Minimal   Exudate Type: Serosangiunous   Odor: None   Exudate Color: brown   Wound Edges: Intact and Epidermal Migration   Periwound Skin Condition: Intact, Edema   Comments: slowly improving  Wound Drsg  Orders/Instructions  Wound Identification Dressing Orders--Instructions:   Wound Identification and Instructions   Wound #1: right lower extremity multi wound  Wound Care Instructions  Discussed with Patient/Caregiver  Dressing Type: Acticoat 3  Wash with mild soap and water, normal saline, wound cleanser  As specified, use: NSS  Apply specified dressing to wound base/bed  To periwound apply: Calmoseptine  Secondary dressing apply: Gauze, 4x4,5x9  Secure with: Kerlix, tape  Dressing change frequency: Three times per week, at [facility]  Comments/Other:   Acticoat 3 in tunnel and Puracol on superficial areas of wound, 4x10 inch ace and Tubigrip F  Apply compression using: Tubigrip F  Future Appointments    Date/Time Provider Specialty Site   07/22/2016 08:30 AM Adalberto Bloom MD General Surgery [facility] WOUND CARE   [id number] 69:08 PM Monique Zazueta, [address] [place] [address]   07/13/2016 02:00 PM Wound Care [place], Nurse Schedule  [facility]   07/15/2016 02:00 PM Wound Care B[place] Nurse Schedule  [address] Plan  Wound Nursing Care Plan [facility]:    Wound Nursing Care Plan   Impaired Tissue Integrity related to:   Knowledge Deficit Related To:   Risk for Infection related to open wound:   Pain related to disease process and/or wound treatment:   Imbalanced Nutrition, less than body requirements related to inadequate intake and/or disease process: inadequate protein intake   Risk for Fall related to criteria noted on Fall Risk Assessment:   Goals   Patient will achieve 100% epithelialization:  Patient will demonstrate and verbalize knowledge of their disease process and management:  Patient will verbalize signs and symptoms of infection and when to notify physician or [facility]:    Patient will demonstrate and verbalize infection control and preventative measures:  Patient will report adequate pain management:  Patient will verbalize knowledge of and demonstrate adherence to interventions to achieve optimal nutrition required for wound healing:  Patient will not experience a fall:  Patient will maintain or achieve adequate hydration: and nutrition  Wound Nursing Care Interventions:   Provide moist wound healing:  Evaluate current medication regime for medications which may slow/impede wound healing:  Teach patient and/or family about disease process and management methods:  Teach patient and/or family about infection control measures (gloving, hand sanitation, MDROs, disposal of soiled dressings, antibiotic therapy): Robles Rosario Assess pain and patients pain tolerance level:  Evaluate effectiveness of pain management interventions:  Assess patient's nutrition on each wound care visit (including protein and fluid intake): Robles Rosario Educate on diet and hydration required to enhance wound healing:  Teach patient and caregiver how to change wound dressing:  Complete Fall Risk Assessment upon admission to wound program and with change in condition/implement identified interventions:    Elevate legs above heart 30 minutes 3 times a day, walk 30 minutes daily, reduced sodium diet, diuretics as ordered, wear compression hose or wrap as ordered:     Other:  Care plan initiated 6/13/16      Signatures   Electronically signed by : Briseida Mares RN; Jul 11 2016  4:12PM EST                       (Author)

## 2019-03-20 NOTE — PLAN OF CARE
Problem: Adult Inpatient Plan of Care  Goal: Plan of Care Review  Outcome: Ongoing (interventions implemented as appropriate)  Pt transferred from 4th floor to ICU d/t afib/aflutter with RVR. Upon arrival to floor, pt was asymptomatic, no sob reported, no c/o pain/discomfort. Pt alert and oriented x 4. Pt on 3L NC, Oxygen saturation was difficult to obtain d/t pt's circulation, obtained a forehead probe from ED and sats wdl. 2nd IV #22 in R wrist started and amio drip initiated in already existing #20 RAC. Pt c/o constipation and per pt and previous nurse a laxative was given. Pt has one small hard formed stool. Pt is anuric. Family remains at bedside attentive and very active in pt's care.

## 2019-03-21 ENCOUNTER — ANESTHESIA EVENT (OUTPATIENT)
Dept: CARDIOLOGY | Facility: HOSPITAL | Age: 74
DRG: 871 | End: 2019-03-21
Payer: MEDICARE

## 2019-03-21 ENCOUNTER — ANESTHESIA (OUTPATIENT)
Dept: CARDIOLOGY | Facility: HOSPITAL | Age: 74
DRG: 871 | End: 2019-03-21
Payer: MEDICARE

## 2019-03-21 PROBLEM — I10 BENIGN ESSENTIAL HYPERTENSION: Chronic | Status: ACTIVE | Noted: 2019-03-21

## 2019-03-21 LAB
ALBUMIN SERPL BCP-MCNC: 2.5 G/DL
ANION GAP SERPL CALC-SCNC: 18 MMOL/L
BASOPHILS # BLD AUTO: 0 K/UL
BASOPHILS NFR BLD: 0 %
BSA FOR ECHO PROCEDURE: 1.64 M2
BUN SERPL-MCNC: 44 MG/DL
CALCIUM SERPL-MCNC: 10 MG/DL
CHLORIDE SERPL-SCNC: 97 MMOL/L
CO2 SERPL-SCNC: 24 MMOL/L
CREAT SERPL-MCNC: 7.9 MG/DL
DIFFERENTIAL METHOD: ABNORMAL
EOSINOPHIL # BLD AUTO: 0 K/UL
EOSINOPHIL NFR BLD: 0.3 %
ERYTHROCYTE [DISTWIDTH] IN BLOOD BY AUTOMATED COUNT: 15.5 %
EST. GFR  (AFRICAN AMERICAN): 5 ML/MIN/1.73 M^2
EST. GFR  (NON AFRICAN AMERICAN): 5 ML/MIN/1.73 M^2
GLUCOSE SERPL-MCNC: 95 MG/DL
HCT VFR BLD AUTO: 29.6 %
HGB BLD-MCNC: 8.7 G/DL
LYMPHOCYTES # BLD AUTO: 0.6 K/UL
LYMPHOCYTES NFR BLD: 6.7 %
MCH RBC QN AUTO: 29.9 PG
MCHC RBC AUTO-ENTMCNC: 29.4 G/DL
MCV RBC AUTO: 102 FL
MONOCYTES # BLD AUTO: 1.1 K/UL
MONOCYTES NFR BLD: 11.7 %
NEUTROPHILS # BLD AUTO: 7.8 K/UL
NEUTROPHILS NFR BLD: 81.3 %
PHOSPHATE SERPL-MCNC: 6.6 MG/DL
PLATELET # BLD AUTO: 315 K/UL
PMV BLD AUTO: 9.7 FL
POCT GLUCOSE: 78 MG/DL (ref 70–110)
POTASSIUM SERPL-SCNC: 4.9 MMOL/L
RBC # BLD AUTO: 2.91 M/UL
SODIUM SERPL-SCNC: 139 MMOL/L
VANCOMYCIN SERPL-MCNC: 19.3 UG/ML
WBC # BLD AUTO: 9.58 K/UL

## 2019-03-21 PROCEDURE — 80069 RENAL FUNCTION PANEL: CPT

## 2019-03-21 PROCEDURE — 25000003 PHARM REV CODE 250: Performed by: INTERNAL MEDICINE

## 2019-03-21 PROCEDURE — 85025 COMPLETE CBC W/AUTO DIFF WBC: CPT

## 2019-03-21 PROCEDURE — P9047 ALBUMIN (HUMAN), 25%, 50ML: HCPCS | Performed by: INTERNAL MEDICINE

## 2019-03-21 PROCEDURE — 63600175 PHARM REV CODE 636 W HCPCS: Performed by: INTERNAL MEDICINE

## 2019-03-21 PROCEDURE — 37000009 HC ANESTHESIA EA ADD 15 MINS: Performed by: INTERNAL MEDICINE

## 2019-03-21 PROCEDURE — 80100016 HC MAINTENANCE HEMODIALYSIS

## 2019-03-21 PROCEDURE — 27000221 HC OXYGEN, UP TO 24 HOURS

## 2019-03-21 PROCEDURE — D9220A PRA ANESTHESIA: ICD-10-PCS | Mod: ANES,,, | Performed by: ANESTHESIOLOGY

## 2019-03-21 PROCEDURE — 25000242 PHARM REV CODE 250 ALT 637 W/ HCPCS: Performed by: EMERGENCY MEDICINE

## 2019-03-21 PROCEDURE — 93010 EKG 12-LEAD: ICD-10-PCS | Mod: 59,,, | Performed by: INTERNAL MEDICINE

## 2019-03-21 PROCEDURE — 25000003 PHARM REV CODE 250: Performed by: HOSPITALIST

## 2019-03-21 PROCEDURE — 25000003 PHARM REV CODE 250: Performed by: EMERGENCY MEDICINE

## 2019-03-21 PROCEDURE — 63600175 PHARM REV CODE 636 W HCPCS: Performed by: HOSPITALIST

## 2019-03-21 PROCEDURE — 80202 ASSAY OF VANCOMYCIN: CPT

## 2019-03-21 PROCEDURE — 21400001 HC TELEMETRY ROOM

## 2019-03-21 PROCEDURE — 37000008 HC ANESTHESIA 1ST 15 MINUTES: Performed by: INTERNAL MEDICINE

## 2019-03-21 PROCEDURE — D9220A PRA ANESTHESIA: ICD-10-PCS | Mod: CRNA,,, | Performed by: NURSE ANESTHETIST, CERTIFIED REGISTERED

## 2019-03-21 PROCEDURE — 99233 SBSQ HOSP IP/OBS HIGH 50: CPT | Mod: 25,,, | Performed by: INTERNAL MEDICINE

## 2019-03-21 PROCEDURE — D9220A PRA ANESTHESIA: Mod: ANES,,, | Performed by: ANESTHESIOLOGY

## 2019-03-21 PROCEDURE — 94761 N-INVAS EAR/PLS OXIMETRY MLT: CPT

## 2019-03-21 PROCEDURE — 93005 ELECTROCARDIOGRAM TRACING: CPT

## 2019-03-21 PROCEDURE — 25000003 PHARM REV CODE 250: Performed by: NURSE ANESTHETIST, CERTIFIED REGISTERED

## 2019-03-21 PROCEDURE — 36415 COLL VENOUS BLD VENIPUNCTURE: CPT

## 2019-03-21 PROCEDURE — D9220A PRA ANESTHESIA: Mod: CRNA,,, | Performed by: NURSE ANESTHETIST, CERTIFIED REGISTERED

## 2019-03-21 PROCEDURE — 93010 ELECTROCARDIOGRAM REPORT: CPT | Mod: 59,,, | Performed by: INTERNAL MEDICINE

## 2019-03-21 PROCEDURE — 63600175 PHARM REV CODE 636 W HCPCS: Performed by: NURSE ANESTHETIST, CERTIFIED REGISTERED

## 2019-03-21 PROCEDURE — 94640 AIRWAY INHALATION TREATMENT: CPT

## 2019-03-21 PROCEDURE — 99233 PR SUBSEQUENT HOSPITAL CARE,LEVL III: ICD-10-PCS | Mod: 25,,, | Performed by: INTERNAL MEDICINE

## 2019-03-21 RX ORDER — LIDOCAINE HCL/PF 100 MG/5ML
SYRINGE (ML) INTRAVENOUS
Status: DISCONTINUED | OUTPATIENT
Start: 2019-03-21 | End: 2019-03-21

## 2019-03-21 RX ORDER — AMIODARONE HYDROCHLORIDE 200 MG/1
200 TABLET ORAL 2 TIMES DAILY
Status: DISCONTINUED | OUTPATIENT
Start: 2019-03-21 | End: 2019-03-25 | Stop reason: HOSPADM

## 2019-03-21 RX ORDER — SEVELAMER CARBONATE 800 MG/1
800 TABLET, FILM COATED ORAL
Status: DISCONTINUED | OUTPATIENT
Start: 2019-03-21 | End: 2019-03-25 | Stop reason: HOSPADM

## 2019-03-21 RX ORDER — SODIUM CHLORIDE 0.9 % (FLUSH) 0.9 %
5 SYRINGE (ML) INJECTION
Status: CANCELLED | OUTPATIENT
Start: 2019-03-21

## 2019-03-21 RX ORDER — LIDOCAINE 40 MG/G
CREAM TOPICAL
Status: DISCONTINUED | OUTPATIENT
Start: 2019-03-21 | End: 2019-03-25 | Stop reason: HOSPADM

## 2019-03-21 RX ORDER — METOPROLOL SUCCINATE 50 MG/1
50 TABLET, EXTENDED RELEASE ORAL DAILY
Status: DISCONTINUED | OUTPATIENT
Start: 2019-03-22 | End: 2019-03-23

## 2019-03-21 RX ORDER — IBUPROFEN 200 MG
24 TABLET ORAL
Status: DISCONTINUED | OUTPATIENT
Start: 2019-03-21 | End: 2019-03-25 | Stop reason: HOSPADM

## 2019-03-21 RX ORDER — GLUCAGON 1 MG
1 KIT INJECTION
Status: DISCONTINUED | OUTPATIENT
Start: 2019-03-21 | End: 2019-03-25 | Stop reason: HOSPADM

## 2019-03-21 RX ORDER — INSULIN ASPART 100 [IU]/ML
0-5 INJECTION, SOLUTION INTRAVENOUS; SUBCUTANEOUS
Status: DISCONTINUED | OUTPATIENT
Start: 2019-03-21 | End: 2019-03-25 | Stop reason: HOSPADM

## 2019-03-21 RX ORDER — ALBUMIN HUMAN 250 G/1000ML
12.5 SOLUTION INTRAVENOUS ONCE
Status: COMPLETED | OUTPATIENT
Start: 2019-03-21 | End: 2019-03-21

## 2019-03-21 RX ORDER — IBUPROFEN 200 MG
16 TABLET ORAL
Status: DISCONTINUED | OUTPATIENT
Start: 2019-03-21 | End: 2019-03-25 | Stop reason: HOSPADM

## 2019-03-21 RX ORDER — EPHEDRINE SULFATE 50 MG/ML
INJECTION, SOLUTION INTRAVENOUS
Status: DISCONTINUED
Start: 2019-03-21 | End: 2019-03-21 | Stop reason: WASHOUT

## 2019-03-21 RX ORDER — ETOMIDATE 2 MG/ML
INJECTION INTRAVENOUS
Status: DISCONTINUED | OUTPATIENT
Start: 2019-03-21 | End: 2019-03-21

## 2019-03-21 RX ADMIN — BUSPIRONE HYDROCHLORIDE 10 MG: 5 TABLET ORAL at 09:03

## 2019-03-21 RX ADMIN — PIPERACILLIN AND TAZOBACTAM 4.5 G: 4; .5 INJECTION, POWDER, LYOPHILIZED, FOR SOLUTION INTRAVENOUS; PARENTERAL at 07:03

## 2019-03-21 RX ADMIN — ALBUMIN HUMAN 12.5 G: 0.25 SOLUTION INTRAVENOUS at 06:03

## 2019-03-21 RX ADMIN — ALBUTEROL SULFATE 2.5 MG: 2.5 SOLUTION RESPIRATORY (INHALATION) at 04:03

## 2019-03-21 RX ADMIN — ETOMIDATE 2 MG: 2 INJECTION, SOLUTION INTRAVENOUS at 11:03

## 2019-03-21 RX ADMIN — METOPROLOL SUCCINATE 25 MG: 25 TABLET, EXTENDED RELEASE ORAL at 08:03

## 2019-03-21 RX ADMIN — MONTELUKAST SODIUM 10 MG: 10 TABLET, FILM COATED ORAL at 09:03

## 2019-03-21 RX ADMIN — VANCOMYCIN HYDROCHLORIDE 500 MG: 500 INJECTION, POWDER, LYOPHILIZED, FOR SOLUTION INTRAVENOUS at 10:03

## 2019-03-21 RX ADMIN — Medication 1 CAPSULE: at 08:03

## 2019-03-21 RX ADMIN — SODIUM CHLORIDE: 0.9 INJECTION, SOLUTION INTRAVENOUS at 11:03

## 2019-03-21 RX ADMIN — APIXABAN 2.5 MG: 2.5 TABLET, FILM COATED ORAL at 09:03

## 2019-03-21 RX ADMIN — ERYTHROPOIETIN 10000 UNITS: 10000 INJECTION, SOLUTION INTRAVENOUS; SUBCUTANEOUS at 07:03

## 2019-03-21 RX ADMIN — PAROXETINE HYDROCHLORIDE 30 MG: 10 SUSPENSION ORAL at 08:03

## 2019-03-21 RX ADMIN — ATORVASTATIN CALCIUM 10 MG: 10 TABLET, FILM COATED ORAL at 08:03

## 2019-03-21 RX ADMIN — PANTOPRAZOLE SODIUM 40 MG: 40 TABLET, DELAYED RELEASE ORAL at 08:03

## 2019-03-21 RX ADMIN — AMIODARONE HYDROCHLORIDE 200 MG: 200 TABLET ORAL at 12:03

## 2019-03-21 RX ADMIN — ETOMIDATE 12 MG: 2 INJECTION, SOLUTION INTRAVENOUS at 11:03

## 2019-03-21 RX ADMIN — POLYETHYLENE GLYCOL 3350 17 G: 17 POWDER, FOR SOLUTION ORAL at 08:03

## 2019-03-21 RX ADMIN — ALBUTEROL SULFATE 2.5 MG: 2.5 SOLUTION RESPIRATORY (INHALATION) at 09:03

## 2019-03-21 RX ADMIN — LIDOCAINE 4%: 4 CREAM TOPICAL at 02:03

## 2019-03-21 RX ADMIN — APIXABAN 2.5 MG: 2.5 TABLET, FILM COATED ORAL at 08:03

## 2019-03-21 RX ADMIN — AMIODARONE HYDROCHLORIDE 0.5 MG/MIN: 1.8 INJECTION, SOLUTION INTRAVENOUS at 08:03

## 2019-03-21 RX ADMIN — BUSPIRONE HYDROCHLORIDE 10 MG: 5 TABLET ORAL at 08:03

## 2019-03-21 RX ADMIN — AMIODARONE HYDROCHLORIDE 200 MG: 200 TABLET ORAL at 09:03

## 2019-03-21 RX ADMIN — LIDOCAINE HYDROCHLORIDE 75 MG: 20 INJECTION, SOLUTION INTRAVENOUS at 11:03

## 2019-03-21 NOTE — ASSESSMENT & PLAN NOTE
Recent admission with PAF earlier this month. On eliquis. IV amiodarone restarted. ISAMAR/CV today

## 2019-03-21 NOTE — PLAN OF CARE
Problem: Adult Inpatient Plan of Care  Goal: Patient-Specific Goal (Individualization)  Outcome: Ongoing (interventions implemented as appropriate)  Pt continues in ICU on 3L NC. Dialysis scheduled for today. No BM overnight. AAOx4. Pt continues in AFib Rhythm. Amiodarone gtt infusing at 0.5 mg/min. Anuric. No new injury or fall noted. Pt shows no signs or symptoms of distress at this time.

## 2019-03-21 NOTE — NURSING
Called and s/w KARTHIKEYAN Alonzo with dialysis to notify pt will be transferring to [facility] and report was given to Tali. Cheri requests pt be transferred to dialysis prior, Cheri stated she is calling Tali to confirm

## 2019-03-21 NOTE — PROCEDURES
"Eli Vaz is a 73 y.o. female patient.    Temp: 98.3 °F (36.8 °C) (03/21/19 0700)  Pulse: (!) 122 (03/21/19 1000)  Resp: (!) 25 (03/21/19 1000)  BP: (!) 101/57 (03/21/19 1000)  SpO2: 100 % (03/21/19 1000)  Weight: 60.2 kg (132 lb 11.5 oz) (03/21/19 0345)  Height: 5' 4" (162.6 cm) (03/20/19 1148)       Procedures     ISAMAR/CV   Dr Garcia  Pre-op Dx A-fib  Post-op Dx same  Specimen none  EBL < 50 cc    3/21/19 ISAMAR/CV - EF 55%, moderate to severe MR, moderate TR, no thrombus seen, CV 200J converted A-fib to NSR    Changes amiodarone to po  Ok for telemetry    Elgin Garcia  3/21/2019  "

## 2019-03-21 NOTE — PROGRESS NOTES
[facility] Hospital Medicine  Progress Note    Patient Name: Eli Vaz  MRN: [id number]  Patient Class: IP- Inpatient   Admission Date: 3/19/2019  Length of Stay: 2 days  Attending Physician: Mark Huerta MD  Primary Care Provider: Chiara Nelson MD        Subjective:     Principal Problem:Shortness of breath    HPI:    Eli Vaz is a 73 y.o. female that (in part)  has a past medical history of Arthritis, Atrial fibrillation, COPD (chronic obstructive pulmonary disease), Diabetes mellitus type II, Dialysis patient, Encounter for blood transfusion, and ESRD (end stage renal disease).  has a past surgical history that includes Tubal ligation and AV fistula placement. Presents to [facility] Emergency Department complaining of shortness of breath.  She has been nauseous and febrile as well.  She was admitted last week for atrial fibrillation as well as respiratory infection.  Reports compliance with home medications.  She is awaiting arrival of home oxygen.  She is high by primary care physician yesterday and subsequently went to dialysis.  She became severely shortness of breath and was sent to the emergency department.     Description of symptoms    Location:  Lungs/chest  Onset:  Subacute onset with progressive worsening  Character:  Moderate to severe intensity shortness of breath  Frequency:  Daily frequency, but worse today.  She has had multiple hospitalizations this year for similar presentations.  Duration:  Constant   Associated Symptoms:  Nausea and fever  Radiation:  Throughout chest  Exacerbating factors:  Worsened with exertion  Relieving factors:  Some relief given with supplemental oxygen in ED.          In the emergency department routine laboratory studies, chest x-ray, EKG, cardiac enzymes were obtained.  There was concern for past medications on chest x-ray and given the tachycardia, tachypnea, and fever broad-spectrum empiric antibiotics were  initiated after cultures were obtained.  The patient is not make urine and therefore urinalysis was not obtained.  A CT of the chest was ordered to further evaluate for pneumonia.    Hospital medicine has been asked to admit for further evaluation and treatment.     Hospital Course:  74 y/o female presented with shortness of breath. CT chest showed bilateral pleural effusions despite completing dialysis PTA.   Admitted on empiric ABx's for possible hospital acquired pneumonia.  Nephrology consulted for HD.  Workup not consistent with ACS thus far.  Patient was then noted to be in Afib with RVR.  Transferred to ICU and Cardiology consulted.  Started on Amiodarone infusion.  Patient already on Apixaban.  Patient is s/p successful cardioversion on 3/21.        Interval History: Feeling better with decreased SOB.    Review of Systems   HENT: Negative for ear discharge and ear pain.    Eyes: Negative for itching.   Endocrine: Negative for polyphagia and polyuria.   Neurological: Negative for seizures and syncope.     Objective:     Vital Signs (Most Recent):  Temp: 98.1 °F (36.7 °C) (03/21/19 1142)  Pulse: 74 (03/21/19 1300)  Resp: (!) 23 (03/21/19 1300)  BP: (!) 136/52 (03/21/19 1300)  SpO2: 100 % (03/21/19 1300) Vital Signs (24h Range):  Temp:  [98.1 °F (36.7 °C)-98.6 °F (37 °C)] 98.1 °F (36.7 °C)  Pulse:  [] 74  Resp:  [16-33] 23  SpO2:  [96 %-100 %] 100 %  BP: ()/(44-70) 136/52     Weight: 60.2 kg (132 lb 11.5 oz)  Body mass index is 22.78 kg/m².    Intake/Output Summary (Last 24 hours) at 3/21/2019 1515  Last data filed at 3/21/2019 1227  Gross per 24 hour   Intake 350.36 ml   Output --   Net 350.36 ml      Physical Exam   Constitutional: She is oriented to person, place, and time. No distress.   HENT:   Head: Normocephalic and atraumatic.   Eyes: Conjunctivae are normal. Right eye exhibits no discharge. Left eye exhibits no discharge.   Neck: Neck supple. No tracheal deviation present.   Cardiovascular:  Normal rate and regular rhythm.   Pulmonary/Chest: Effort normal.   Decreased BS bilateral bases   Abdominal: Soft. Bowel sounds are normal.   Musculoskeletal: Normal range of motion.   Neurological: She is alert and oriented to person, place, and time.   Skin: Skin is warm and dry. She is not diaphoretic.       Significant Labs:   BMP:   Recent Labs   Lab 03/19/19 1852 03/21/19  0209      < > 95      < > 139   K 4.5   < > 4.9   CL 97   < > 97   CO2 29   < > 24   BUN 28*   < > 44*   CREATININE 6.0*   < > 7.9*   CALCIUM 10.0   < > 10.0   MG 1.9  --   --     < > = values in this interval not displayed.     CBC:   Recent Labs   Lab 03/19/19 1852 03/21/19  0209   WBC 11.09 9.58   HGB 9.4* 8.7*   HCT 30.9* 29.6*    315       Significant Imaging: I have reviewed all pertinent imaging results/findings within the past 24 hours.    Assessment/Plan:      * Shortness of breath    This is probably multifactorial.    SOB probably related to acute on chronic diastolic heart failure, bilateral pleural effusions, possible hospital acquired pneumonia, ESRD and uncontrolled Afib.  Fluid control with HD.  Nephrology consulted for HD.  Patient with fever on presentation.  No great evidence of pneumonia on CT, but empirically started on ABx's for possible hospital acquired pneumonia.  Afib with RVR--initially placed on Amiodarone drip.  Cardiology consulted.  Underwent successful cardioversion on 3/20.  Continue oral Amiodarone and B blocker.  Already on Apixaban.       Benign essential hypertension    Currently well controlled.  Continue current management.       Atrial fibrillation with RVR    Treatment as above.       Bilateral pleural effusion           Acute exacerbation of CHF (congestive heart failure)    Acute on chronic diastolic CHF as above.       Combined hyperlipidemia associated with type 2 diabetes mellitus           Diabetes mellitus with ESRD (end-stage renal disease)    Type 2 well  controlled.  Continue diabetic diet and insulin sliding scale.       Anemia of chronic disease    H/H similar to previous labs.  No evidence of bleeding.       ESRD on dialysis           COPD (chronic obstructive pulmonary disease)    No wheezing on exam.  Add prn duonebs.         VTE Risk Mitigation (From admission, onward)        Ordered     IP VTE HIGH RISK PATIENT  Once      03/20/19 0039     Reason for No Pharmacological VTE Prophylaxis  Once      03/20/19 0039     Place ROS hose  Until discontinued      03/20/19 0039     Place sequential compression device  Until discontinued      03/20/19 0039     apixaban tablet 2.5 mg  2 times daily      03/20/19 0039          Critical care time spent on the evaluation and treatment of severe organ dysfunction, review of pertinent labs and imaging studies, discussions with consulting providers and discussions with patient/family: 50 minutes.    Mark Huerta MD  Department of Hospital Medicine   [facility]

## 2019-03-21 NOTE — ANESTHESIA POSTPROCEDURE EVALUATION
"Anesthesia Post Evaluation    Patient: Eli Vaz    Procedure(s) Performed: Procedure(s) (LRB):  TRANSESOPHAGEAL ECHOCARDIOGRAM WITH POSSIBLE CARDIOVERSION (ISAMAR W/ POSS CARDIOVERSION) (N/A)    Final Anesthesia Type: general  Patient location during evaluation: ICU  Patient participation: Yes- Able to Participate  Level of consciousness: awake and alert, oriented and awake  Post-procedure vital signs: reviewed and stable  Airway patency: patent  PONV status at discharge: No PONV  Anesthetic complications: no      Cardiovascular status: blood pressure returned to baseline  Respiratory status: unassisted, spontaneous ventilation and room air  Hydration status: euvolemic  Follow-up not needed.        Visit Vitals  BP (!) 141/62   Pulse 73   Temp 36.7 °C (98.1 °F) (Oral)   Resp (!) 26   Ht 5' 4" (1.626 m)   Wt 60.2 kg (132 lb 11.5 oz)   SpO2 100%   Breastfeeding? No   BMI 22.78 kg/m²       Pain/Triston Score: No Data Recorded      "

## 2019-03-21 NOTE — ANESTHESIA PREPROCEDURE EVALUATION
03/21/2019  Eli Vaz is a 73 y.o., female.    Anesthesia Evaluation    I have reviewed the Patient Summary Reports.    I have reviewed the Nursing Notes.   I have reviewed the Medications.     Review of Systems  Anesthesia Hx:  No problems with previous Anesthesia Denies Hx of Anesthetic complications  Neg history of prior surgery. Denies Family Hx of Anesthesia complications.   Denies Personal Hx of Anesthesia complications.   Social:  Non-Smoker, No Alcohol Use    Hematology/Oncology:  Hematology Normal   Oncology Normal     EENT/Dental:EENT/Dental Normal   Cardiovascular:   Exercise tolerance: good Hypertension Dysrhythmias atrial fibrillation CHF SINCLAIR ECG has been reviewed. · Normal left ventricular systolic function. The estimated ejection fraction is 55%  · Concentric left ventricular remodeling.  · Indeterminate left ventricular diastolic function.  · Normal right ventricular systolic function.  · Moderate left atrial enlargement.  · Mild right atrial enlargement.  · Moderate-to-severe mitral regurgitation.  · Mild to moderate tricuspid regurgitation.  · The estimated PA systolic pressure is 65 mm Hg  · Pulmonary hypertension present.  · Small pericardial effusion. No tamponade      Pulmonary:  Pulmonary Normal    Renal/:   Chronic Renal Disease, ESRD, Dialysis HD scheduled for later today   Hepatic/GI:  Hepatic/GI Normal    Musculoskeletal:  Musculoskeletal Normal    Neurological:  Neurology Normal    Endocrine:   Diabetes, type 2    Dermatological:  Skin Normal    Psych:   anxiety          Physical Exam  General:  Cachexia    Airway/Jaw/Neck:  Airway Findings: Mouth Opening: Normal General Airway Assessment: Adult  Mallampati: II  TM Distance: Normal, at least 6 cm         Dental:  DENTAL FINDINGS: Normal        Mental Status:  Mental Status Findings:  Cooperative, Alert and Oriented          Anesthesia Plan  Type of Anesthesia, risks & benefits discussed:  Anesthesia Type:  general  Patient's Preference:   Intra-op Monitoring Plan: standard ASA monitors  Intra-op Monitoring Plan Comments:   Post Op Pain Control Plan:   Post Op Pain Control Plan Comments:   Induction:   IV  Beta Blocker:  Patient is not currently on a Beta-Blocker (No further documentation required).       Informed Consent: Patient understands risks and agrees with Anesthesia plan.  Questions answered. Anesthesia consent signed with patient.  ASA Score: 4     Day of Surgery Review of History & Physical:            Ready For Surgery From Anesthesia Perspective.

## 2019-03-21 NOTE — TRANSFER OF CARE
"Anesthesia Transfer of Care Note    Patient: Eli Vaz    Procedure(s) Performed: Procedure(s) (LRB):  TRANSESOPHAGEAL ECHOCARDIOGRAM WITH POSSIBLE CARDIOVERSION (ISAMAR W/ POSS CARDIOVERSION) (N/A)    Patient location: ICU    Anesthesia Type: general    Transport from OR: Transported from OR on 6-10 L/min O2 by face mask with adequate spontaneous ventilation. Continuous ECG monitoring in transport. Continuous SpO2 monitoring in transport. Continuos invasive BP monitoring in transport    Post pain: adequate analgesia    Post assessment: no apparent anesthetic complications    Post vital signs: stable    Level of consciousness: responds to stimulation    Complications: none    Transfer of care protocol was followed      Last vitals:   Visit Vitals  BP (!) 134/57 (BP Location: Right leg, Patient Position: Lying)   Pulse (!) 122   Temp 36.7 °C (98.1 °F) (Oral)   Resp 18   Ht 5' 4" (1.626 m)   Wt 60.2 kg (132 lb 11.5 oz)   SpO2 100%   Breastfeeding? No   BMI 22.78 kg/m²     "

## 2019-03-21 NOTE — NURSING TRANSFER
Nursing Transfer Note      3/21/2019     Transfer To: dialysis prior to 312    Transfer via bed    Transfer with  to O2, cardiac monitoring    Transported by RN and transporter    Medicines sent: none    Chart send with patient: Yes    Notified: spouse    Patient reassessed at: 1500 3/21/2019     Upon arrival to floor: transferred to dialysis first

## 2019-03-21 NOTE — PROGRESS NOTES
Renal ICU Progress Note    Date of Admission:  3/19/2019  6:30 PM    Length of Stay: 2  Days    Interim History: Transferred to ICU yesterday due to A-fib / flutter with RVR, Cardioverted successfully earlier today    Subjective: Feels better, no dyspnea or CP.    Objective:    Current Facility-Administered Medications   Medication    0.9%  NaCl infusion    acetaminophen tablet 650 mg    albuterol sulfate nebulizer solution 2.5 mg    amiodarone tablet 200 mg    apixaban tablet 2.5 mg    atorvastatin tablet 10 mg    busPIRone tablet 10 mg    cinacalcet tablet 30 mg    epoetin cat injection 10,000 Units    lidocaine 4 % cream    [START ON 3/22/2019] metoprolol succinate (TOPROL-XL) 24 hr tablet 50 mg    montelukast tablet 10 mg    ondansetron injection 4 mg    pantoprazole EC tablet 40 mg    paroxetine 10 mg/5 mL suspension 30 mg    piperacillin-tazobactam 4.5 g in sodium chloride 0.9% 100 mL IVPB (ready to mix system)    polyethylene glycol packet 17 g    promethazine (PHENERGAN) 6.25 mg in dextrose 5 % 50 mL IVPB    sodium chloride 0.9% flush 5 mL    traZODone tablet 50 mg    vancomycin 500 mg in dextrose 5 % 100 mL IVPB (ready to mix system)    vitamin renal formula (B-complex-vitamin c-folic acid) 1 mg per capsule 1 capsule       Vitals:    03/21/19 1000 03/21/19 1100 03/21/19 1142 03/21/19 1200   BP: (!) 101/57 (!) 111/52 (!) 134/57 (!) 141/62   BP Location:   Right leg    Patient Position:   Lying    Pulse: (!) 122 (!) 120  73   Resp: (!) 25 (!) 27 18 (!) 26   Temp:   98.1 °F (36.7 °C)    TempSrc:   Oral    SpO2: 100% 98% 100% 100%   Weight:       Height:           I/O last 3 completed shifts:  In: 754.9 [P.O.:120; I.V.:184.9; IV Piggyback:450]  Out: 1 [Stool:1]  I/O this shift:  In: 191 [I.V.:91; IV Piggyback:100]  Out: -       Physical Exam:    NAD  Neck: supple, no JVD  Heart: RRR S1-S2 no MGR  Lungs: CTA  Abdomen: Not tender or distended  : n/a  Extremities:   "No edema  Neurologic: Awake, alert, oriented x 3      Laboratories:    Recent Labs   Lab 03/21/19  0209   WBC 9.58   RBC 2.91*   HGB 8.7*   HCT 29.6*      *   MCH 29.9   MCHC 29.4*       Recent Labs   Lab 03/21/19  0209   CALCIUM 10.0      K 4.9   CO2 24   CL 97   BUN 44*   CREATININE 7.9*       No results for input(s): COLORU, CLARITYU, SPECGRAV, PHUR, PROTEINUA, GLUCOSEU, BLOODU, WBCU, RBCU, BACTERIA, MUCUS in the last 24 hours.    Invalid input(s):  BILIRUBINCON    Microbiology Results (last 7 days)     Procedure Component Value Units Date/Time    Blood culture x two cultures. Draw prior to antibiotics. [[id number]] Collected:  03/19/19 1910    Order Status:  Completed Specimen:  Blood from Peripheral, Hand, Right Updated:  03/20/19 2303     Blood Culture, Routine No Growth to date     Blood Culture, Routine No Growth to date    Narrative:       Aerobic and anaerobic    Blood culture x two cultures. Draw prior to antibiotics. [[id number]] Collected:  03/19/19 1918    Order Status:  Completed Specimen:  Blood from Peripheral, Hand, Right Updated:  03/20/19 2303     Blood Culture, Routine No Growth to date     Blood Culture, Routine No Growth to date    Narrative:       Aerobic and anaerobic            Diagnostic Tests:      Assessment:       72 y/o AAF with Hx. ESRD on chronic Hemodialysis admitted with:     - Dyspnea with abnormal CXR showing Pleural effusions  - Acute exacerbation of CHF  - COPD exacerbation with possible bronchitis  - r/o PNA  - Pericardial effusion  - Anemia of CKD  - Hyperphosphatemia  - PAF w/ Hx. Non-rheumatic MR  - Hx. HTN  - Hx. DM-2           Plan:     - Dialysis today  - Epogen with HD  - Will "challenge" pte.'s dry weight as tolerated  - Empiric antibiotics, B2 agonists per admitting  - Cardiology following s/p Cardioversion  - p.o. Amiodarone per Cardiology  - Renal ADA, low PO4- diet  - Phosphate binders w/ meals  - Glycemic control per admitting                   "

## 2019-03-21 NOTE — SUBJECTIVE & OBJECTIVE
Interval History:     Review of Systems   Constitution: Negative for decreased appetite.   HENT: Negative for ear discharge.    Eyes: Negative for blurred vision.   Respiratory: Negative for hemoptysis.    Endocrine: Negative for polyphagia.   Hematologic/Lymphatic: Negative for adenopathy.   Skin: Negative for color change.   Musculoskeletal: Negative for joint swelling.   Genitourinary: Negative for bladder incontinence.   Neurological: Negative for brief paralysis.   Psychiatric/Behavioral: Negative for hallucinations.   Allergic/Immunologic: Negative for hives.     Objective:     Vital Signs (Most Recent):  Temp: 98.3 °F (36.8 °C) (03/21/19 0700)  Pulse: (!) 120 (03/21/19 0800)  Resp: 17 (03/21/19 0800)  BP: (!) 109/54 (03/21/19 0800)  SpO2: 99 % (03/21/19 0800) Vital Signs (24h Range):  Temp:  [97.8 °F (36.6 °C)-98.6 °F (37 °C)] 98.3 °F (36.8 °C)  Pulse:  [106-123] 120  Resp:  [13-33] 17  SpO2:  [92 %-100 %] 99 %  BP: ()/(44-70) 109/54     Weight: 60.2 kg (132 lb 11.5 oz)  Body mass index is 22.78 kg/m².     SpO2: 99 %  O2 Device (Oxygen Therapy): nasal cannula      Intake/Output Summary (Last 24 hours) at 3/21/2019 0833  Last data filed at 3/21/2019 0721  Gross per 24 hour   Intake 584.87 ml   Output 1 ml   Net 583.87 ml       Lines/Drains/Airways     Central Venous Catheter Line                 Hemodialysis Catheter -- days          Drain                 Hemodialysis AV Fistula 03/06/19 2339 Left upper arm 14 days          Peripheral Intravenous Line                 Peripheral IV - Single Lumen 03/20/19 1345 Right Wrist less than 1 day         Peripheral IV - Single Lumen 03/20/19 2315 Anterior;Right Forearm less than 1 day                Physical Exam   Constitutional: She is oriented to person, place, and time. She appears well-developed and well-nourished.   HENT:   Head: Normocephalic and atraumatic.   Eyes: Conjunctivae are normal. Pupils are equal, round, and reactive to light.   Neck: Normal  range of motion. Neck supple.   Cardiovascular: Normal heart sounds and intact distal pulses. An irregularly irregular rhythm present. Tachycardia present.   Pulmonary/Chest: Effort normal and breath sounds normal.   Abdominal: Soft. Bowel sounds are normal.   Musculoskeletal: Normal range of motion.   Neurological: She is alert and oriented to person, place, and time.   Skin: Skin is warm and dry.       Significant Labs: All pertinent lab results from the last 24 hours have been reviewed.    Significant Imaging: Echocardiogram:   2D echo with color flow doppler:   Results for orders placed or performed during the hospital encounter of 03/10/14   2D echo with color flow doppler   Result Value Ref Range    QEF 70     Mitral Valve Regurgitation trivial     Diastolic Dysfunction Yes

## 2019-03-21 NOTE — HOSPITAL COURSE
74 y/o female presented with shortness of breath. CT chest showed bilateral pleural effusions despite completing dialysis PTA.   Admitted on empiric ABx's for possible hospital acquired pneumonia.  Nephrology consulted for HD.  Workup not consistent with ACS thus far.  Patient was then noted to be in Afib with RVR.  Transferred to ICU and Cardiology consulted.  Started on Amiodarone infusion.  Patient already on Apixaban.  Patient is s/p successful cardioversion on 3/21. On Amiodarone and metoprolol PO. Stepped down to floor in stable condition. Developed diarrhea. CDiff negative. Qualified for portable O2. This was provided and home health set up. Is to follow a low sodium diet, in addition to renal. HD every TTS. Activity as tolerated. F/u with cardiology and PCP.

## 2019-03-21 NOTE — ASSESSMENT & PLAN NOTE
This is probably multifactorial.    SOB probably related to acute on chronic diastolic heart failure, bilateral pleural effusions, possible hospital acquired pneumonia, ESRD and uncontrolled Afib.  Fluid control with HD.  Nephrology consulted for HD.  Patient with fever on presentation.  No great evidence of pneumonia on CT, but empirically started on ABx's for possible hospital acquired pneumonia.  Afib with RVR--initially placed on Amiodarone drip.  Cardiology consulted.  Underwent successful cardioversion on 3/20.  Continue oral Amiodarone and B blocker.  Already on Apixaban.

## 2019-03-21 NOTE — SUBJECTIVE & OBJECTIVE
Interval History: Feeling better with decreased SOB.    Review of Systems   HENT: Negative for ear discharge and ear pain.    Eyes: Negative for itching.   Endocrine: Negative for polyphagia and polyuria.   Neurological: Negative for seizures and syncope.     Objective:     Vital Signs (Most Recent):  Temp: 98.1 °F (36.7 °C) (03/21/19 1142)  Pulse: 74 (03/21/19 1300)  Resp: (!) 23 (03/21/19 1300)  BP: (!) 136/52 (03/21/19 1300)  SpO2: 100 % (03/21/19 1300) Vital Signs (24h Range):  Temp:  [98.1 °F (36.7 °C)-98.6 °F (37 °C)] 98.1 °F (36.7 °C)  Pulse:  [] 74  Resp:  [16-33] 23  SpO2:  [96 %-100 %] 100 %  BP: ()/(44-70) 136/52     Weight: 60.2 kg (132 lb 11.5 oz)  Body mass index is 22.78 kg/m².    Intake/Output Summary (Last 24 hours) at 3/21/2019 1515  Last data filed at 3/21/2019 1227  Gross per 24 hour   Intake 350.36 ml   Output --   Net 350.36 ml      Physical Exam   Constitutional: She is oriented to person, place, and time. No distress.   HENT:   Head: Normocephalic and atraumatic.   Eyes: Conjunctivae are normal. Right eye exhibits no discharge. Left eye exhibits no discharge.   Neck: Neck supple. No tracheal deviation present.   Cardiovascular: Normal rate and regular rhythm.   Pulmonary/Chest: Effort normal.   Decreased BS bilateral bases   Abdominal: Soft. Bowel sounds are normal.   Musculoskeletal: Normal range of motion.   Neurological: She is alert and oriented to person, place, and time.   Skin: Skin is warm and dry. She is not diaphoretic.       Significant Labs:   BMP:   Recent Labs   Lab 03/19/19  1852  03/21/19  0209      < > 95      < > 139   K 4.5   < > 4.9   CL 97   < > 97   CO2 29   < > 24   BUN 28*   < > 44*   CREATININE 6.0*   < > 7.9*   CALCIUM 10.0   < > 10.0   MG 1.9  --   --     < > = values in this interval not displayed.     CBC:   Recent Labs   Lab 03/19/19  1852 03/21/19  0209   WBC 11.09 9.58   HGB 9.4* 8.7*   HCT 30.9* 29.6*    315       Significant  Imaging: I have reviewed all pertinent imaging results/findings within the past 24 hours.

## 2019-03-21 NOTE — PROGRESS NOTES
[facility]  Cardiology  Progress Note    Patient Name: Eli Vaz  MRN: [id number]  Admission Date: 3/19/2019  Hospital Length of Stay: 2 days  Code Status: Full Code   Attending Physician: Mark Huerta MD   Primary Care Physician: Chiara Nelson MD  Expected Discharge Date:   Principal Problem:Acute exacerbation of CHF (congestive heart failure)    Subjective:     Hospital Course:   3-21 Still with A-fib on IV amiodarone    Echo 3/20/19  · Normal left ventricular systolic function. The estimated ejection fraction is 55%  · Concentric left ventricular remodeling.  · Indeterminate left ventricular diastolic function.  · Normal right ventricular systolic function.  · Moderate left atrial enlargement.  · Mild right atrial enlargement.  · Moderate-to-severe mitral regurgitation.  · Mild to moderate tricuspid regurgitation.  · The estimated PA systolic pressure is 65 mm Hg  Pulmonary hypertension present.    Interval History:     Review of Systems   Constitution: Negative for decreased appetite.   HENT: Negative for ear discharge.    Eyes: Negative for blurred vision.   Respiratory: Negative for hemoptysis.    Endocrine: Negative for polyphagia.   Hematologic/Lymphatic: Negative for adenopathy.   Skin: Negative for color change.   Musculoskeletal: Negative for joint swelling.   Genitourinary: Negative for bladder incontinence.   Neurological: Negative for brief paralysis.   Psychiatric/Behavioral: Negative for hallucinations.   Allergic/Immunologic: Negative for hives.     Objective:     Vital Signs (Most Recent):  Temp: 98.3 °F (36.8 °C) (03/21/19 0700)  Pulse: (!) 120 (03/21/19 0800)  Resp: 17 (03/21/19 0800)  BP: (!) 109/54 (03/21/19 0800)  SpO2: 99 % (03/21/19 0800) Vital Signs (24h Range):  Temp:  [97.8 °F (36.6 °C)-98.6 °F (37 °C)] 98.3 °F (36.8 °C)  Pulse:  [106-123] 120  Resp:  [13-33] 17  SpO2:  [92 %-100 %] 99 %  BP: ()/(44-70) 109/54     Weight: 60.2 kg (132 lb 11.5 oz)  Body mass  index is 22.78 kg/m².     SpO2: 99 %  O2 Device (Oxygen Therapy): nasal cannula      Intake/Output Summary (Last 24 hours) at 3/21/2019 0833  Last data filed at 3/21/2019 0721  Gross per 24 hour   Intake 584.87 ml   Output 1 ml   Net 583.87 ml       Lines/Drains/Airways     Central Venous Catheter Line                 Hemodialysis Catheter -- days          Drain                 Hemodialysis AV Fistula 03/06/19 2339 Left upper arm 14 days          Peripheral Intravenous Line                 Peripheral IV - Single Lumen 03/20/19 1345 Right Wrist less than 1 day         Peripheral IV - Single Lumen 03/20/19 2315 Anterior;Right Forearm less than 1 day                Physical Exam   Constitutional: She is oriented to person, place, and time. She appears well-developed and well-nourished.   HENT:   Head: Normocephalic and atraumatic.   Eyes: Conjunctivae are normal. Pupils are equal, round, and reactive to light.   Neck: Normal range of motion. Neck supple.   Cardiovascular: Normal heart sounds and intact distal pulses. An irregularly irregular rhythm present. Tachycardia present.   Pulmonary/Chest: Effort normal and breath sounds normal.   Abdominal: Soft. Bowel sounds are normal.   Musculoskeletal: Normal range of motion.   Neurological: She is alert and oriented to person, place, and time.   Skin: Skin is warm and dry.       Significant Labs: All pertinent lab results from the last 24 hours have been reviewed.    Significant Imaging: Echocardiogram:   2D echo with color flow doppler:   Results for orders placed or performed during the hospital encounter of 03/10/14   2D echo with color flow doppler   Result Value Ref Range    QEF 70     Mitral Valve Regurgitation trivial     Diastolic Dysfunction Yes      Assessment and Plan:     Brief HPI:     * Acute exacerbation of CHF (congestive heart failure)    Afterload reduction as tolerated. Volume control with HD     Atrial fibrillation    Recent admission with PAF earlier  this month. On eliquis. IV amiodarone restarted. ISAMAR/CV today     Non-rheumatic mitral regurgitation    Likely driving PAF. May need out patient surgical evaluation for MVR     Hypertension    stable     Diabetes mellitus with ESRD (end-stage renal disease)    Per primary     History of CVA (cerebrovascular accident)    On eliquis     ESRD on dialysis    Per renal         VTE Risk Mitigation (From admission, onward)        Ordered     IP VTE HIGH RISK PATIENT  Once      03/20/19 0039     Reason for No Pharmacological VTE Prophylaxis  Once      03/20/19 0039     Place ROS hose  Until discontinued      03/20/19 0039     Place sequential compression device  Until discontinued      03/20/19 0039     apixaban tablet 2.5 mg  2 times daily      03/20/19 0039          Elgin Garcia MD  Cardiology  [facility]

## 2019-03-22 LAB
POCT GLUCOSE: 73 MG/DL (ref 70–110)
POCT GLUCOSE: 80 MG/DL (ref 70–110)
POCT GLUCOSE: 86 MG/DL (ref 70–110)
POCT GLUCOSE: 86 MG/DL (ref 70–110)
POCT GLUCOSE: 94 MG/DL (ref 70–110)
VANCOMYCIN SERPL-MCNC: 22.5 UG/ML

## 2019-03-22 PROCEDURE — 80202 ASSAY OF VANCOMYCIN: CPT

## 2019-03-22 PROCEDURE — 25000003 PHARM REV CODE 250: Performed by: EMERGENCY MEDICINE

## 2019-03-22 PROCEDURE — 25000003 PHARM REV CODE 250: Performed by: INTERNAL MEDICINE

## 2019-03-22 PROCEDURE — 21400001 HC TELEMETRY ROOM

## 2019-03-22 PROCEDURE — 94640 AIRWAY INHALATION TREATMENT: CPT

## 2019-03-22 PROCEDURE — 27000221 HC OXYGEN, UP TO 24 HOURS

## 2019-03-22 PROCEDURE — 25000003 PHARM REV CODE 250: Performed by: HOSPITALIST

## 2019-03-22 PROCEDURE — 99232 SBSQ HOSP IP/OBS MODERATE 35: CPT | Mod: ,,, | Performed by: INTERNAL MEDICINE

## 2019-03-22 PROCEDURE — 99232 PR SUBSEQUENT HOSPITAL CARE,LEVL II: ICD-10-PCS | Mod: ,,, | Performed by: INTERNAL MEDICINE

## 2019-03-22 PROCEDURE — 80100016 HC MAINTENANCE HEMODIALYSIS

## 2019-03-22 PROCEDURE — 25000242 PHARM REV CODE 250 ALT 637 W/ HCPCS: Performed by: HOSPITALIST

## 2019-03-22 PROCEDURE — 63600175 PHARM REV CODE 636 W HCPCS: Performed by: HOSPITALIST

## 2019-03-22 PROCEDURE — 94761 N-INVAS EAR/PLS OXIMETRY MLT: CPT

## 2019-03-22 PROCEDURE — 36415 COLL VENOUS BLD VENIPUNCTURE: CPT

## 2019-03-22 RX ORDER — ACETAMINOPHEN 325 MG/1
650 TABLET ORAL EVERY 4 HOURS PRN
Status: DISCONTINUED | OUTPATIENT
Start: 2019-03-22 | End: 2019-03-25 | Stop reason: HOSPADM

## 2019-03-22 RX ORDER — LEVALBUTEROL INHALATION SOLUTION 0.63 MG/3ML
0.63 SOLUTION RESPIRATORY (INHALATION) EVERY 8 HOURS PRN
Status: DISCONTINUED | OUTPATIENT
Start: 2019-03-22 | End: 2019-03-25 | Stop reason: HOSPADM

## 2019-03-22 RX ADMIN — MONTELUKAST SODIUM 10 MG: 10 TABLET, FILM COATED ORAL at 08:03

## 2019-03-22 RX ADMIN — PANTOPRAZOLE SODIUM 40 MG: 40 TABLET, DELAYED RELEASE ORAL at 10:03

## 2019-03-22 RX ADMIN — CINACALCET HYDROCHLORIDE 30 MG: 30 TABLET, COATED ORAL at 10:03

## 2019-03-22 RX ADMIN — APIXABAN 2.5 MG: 2.5 TABLET, FILM COATED ORAL at 08:03

## 2019-03-22 RX ADMIN — APIXABAN 2.5 MG: 2.5 TABLET, FILM COATED ORAL at 10:03

## 2019-03-22 RX ADMIN — SEVELAMER CARBONATE 800 MG: 800 TABLET, FILM COATED ORAL at 10:03

## 2019-03-22 RX ADMIN — LEVALBUTEROL HYDROCHLORIDE 0.63 MG: 0.63 SOLUTION RESPIRATORY (INHALATION) at 11:03

## 2019-03-22 RX ADMIN — SEVELAMER CARBONATE 800 MG: 800 TABLET, FILM COATED ORAL at 06:03

## 2019-03-22 RX ADMIN — PIPERACILLIN AND TAZOBACTAM 4.5 G: 4; .5 INJECTION, POWDER, LYOPHILIZED, FOR SOLUTION INTRAVENOUS; PARENTERAL at 12:03

## 2019-03-22 RX ADMIN — PAROXETINE HYDROCHLORIDE 30 MG: 10 SUSPENSION ORAL at 10:03

## 2019-03-22 RX ADMIN — SEVELAMER CARBONATE 800 MG: 800 TABLET, FILM COATED ORAL at 03:03

## 2019-03-22 RX ADMIN — AMIODARONE HYDROCHLORIDE 200 MG: 200 TABLET ORAL at 10:03

## 2019-03-22 RX ADMIN — Medication 1 CAPSULE: at 10:03

## 2019-03-22 RX ADMIN — PIPERACILLIN AND TAZOBACTAM 4.5 G: 4; .5 INJECTION, POWDER, LYOPHILIZED, FOR SOLUTION INTRAVENOUS; PARENTERAL at 10:03

## 2019-03-22 RX ADMIN — ATORVASTATIN CALCIUM 10 MG: 10 TABLET, FILM COATED ORAL at 10:03

## 2019-03-22 NOTE — PROGRESS NOTES
[facility] Hospital Medicine  Progress Note    Patient Name: Eli Vaz  MRN: [id number]  Patient Class: IP- Inpatient   Admission Date: 3/19/2019  Length of Stay: 3 days  Attending Physician: Carmelita Candelaria MD  Primary Care Provider: Chiara Nelson MD        Subjective:     Principal Problem:Shortness of breath    HPI:    Eli Vaz is a 73 y.o. female that (in part)  has a past medical history of Arthritis, Atrial fibrillation, COPD (chronic obstructive pulmonary disease), Diabetes mellitus type II, Dialysis patient, Encounter for blood transfusion, and ESRD (end stage renal disease).  has a past surgical history that includes Tubal ligation and AV fistula placement. Presents to [facility] Emergency Department complaining of shortness of breath.  She has been nauseous and febrile as well.  She was admitted last week for atrial fibrillation as well as respiratory infection.  Reports compliance with home medications.  She is awaiting arrival of home oxygen.  She is high by primary care physician yesterday and subsequently went to dialysis.  She became severely shortness of breath and was sent to the emergency department.     Description of symptoms    Location:  Lungs/chest  Onset:  Subacute onset with progressive worsening  Character:  Moderate to severe intensity shortness of breath  Frequency:  Daily frequency, but worse today.  She has had multiple hospitalizations this year for similar presentations.  Duration:  Constant   Associated Symptoms:  Nausea and fever  Radiation:  Throughout chest  Exacerbating factors:  Worsened with exertion  Relieving factors:  Some relief given with supplemental oxygen in ED.          In the emergency department routine laboratory studies, chest x-ray, EKG, cardiac enzymes were obtained.  There was concern for past medications on chest x-ray and given the tachycardia, tachypnea, and fever broad-spectrum empiric antibiotics were  initiated after cultures were obtained.  The patient is not make urine and therefore urinalysis was not obtained.  A CT of the chest was ordered to further evaluate for pneumonia.    Hospital medicine has been asked to admit for further evaluation and treatment.     Hospital Course:  74 y/o female presented with shortness of breath. CT chest showed bilateral pleural effusions despite completing dialysis PTA.   Admitted on empiric ABx's for possible hospital acquired pneumonia.  Nephrology consulted for HD.  Workup not consistent with ACS thus far.  Patient was then noted to be in Afib with RVR.  Transferred to ICU and Cardiology consulted.  Started on Amiodarone infusion.  Patient already on Apixaban.  Patient is s/p successful cardioversion on 3/21. Stepped down to floor in stable condition.         Interval History: feeling better today. Pending O2 sats at room air to verify if she qualifies for home O2.     Review of Systems   HENT: Negative for ear discharge and ear pain.    Eyes: Negative for itching.   Respiratory: Negative for shortness of breath.    Endocrine: Negative for polyphagia and polyuria.   Neurological: Negative for seizures and syncope.     Objective:     Vital Signs (Most Recent):  Temp: 98.1 °F (36.7 °C) (03/22/19 1545)  Pulse: 66 (03/22/19 1625)  Resp: 18 (03/22/19 1545)  BP: (!) 105/54 (03/22/19 1545)  SpO2: 96 % (03/22/19 1625) Vital Signs (24h Range):  Temp:  [97.8 °F (36.6 °C)-98.6 °F (37 °C)] 98.1 °F (36.7 °C)  Pulse:  [60-77] 66  Resp:  [16-18] 18  SpO2:  [94 %-100 %] 96 %  BP: ()/(41-89) 105/54     Weight: 57 kg (125 lb 10.6 oz)  Body mass index is 21.57 kg/m².    Intake/Output Summary (Last 24 hours) at 3/22/2019 7457  Last data filed at 3/22/2019 1100  Gross per 24 hour   Intake 1040 ml   Output 2200 ml   Net -1160 ml      Physical Exam   Constitutional: She is oriented to person, place, and time. No distress.   HENT:   Head: Normocephalic and atraumatic.   Eyes: Conjunctivae are  normal. Right eye exhibits no discharge. Left eye exhibits no discharge.   Neck: Neck supple. No tracheal deviation present.   Cardiovascular: Normal rate and regular rhythm.   Pulmonary/Chest: Effort normal.   Decreased BS bilateral bases  Breathing comfortably on low flow NC  Speaking in full sentences   Abdominal: Soft. Bowel sounds are normal.   Musculoskeletal: Normal range of motion.   Neurological: She is alert and oriented to person, place, and time.   Skin: Skin is warm and dry. She is not diaphoretic.       Significant Labs: All pertinent labs within the past 24 hours have been reviewed.    Significant Imaging: I have reviewed all pertinent imaging results/findings within the past 24 hours.  I have reviewed and interpreted all pertinent imaging results/findings within the past 24 hours.    Assessment/Plan:      * Shortness of breath    This is probably multifactorial.    SOB probably related to acute on chronic diastolic heart failure, bilateral pleural effusions, possible hospital acquired pneumonia, ESRD and uncontrolled Afib.  Fluid control with HD.  Nephrology consulted for HD.  Patient with fever on presentation.  No great evidence of pneumonia on CT, but empirically started on ABx's for possible hospital acquired pneumonia.  Afib with RVR--initially placed on Amiodarone drip.  Cardiology consulted.  Underwent successful cardioversion on 3/20.  Continue oral Amiodarone and B blocker.  Already on Apixaban.  Will test for O2 today to see if she qualifies for portable device       Benign essential hypertension    Currently well controlled.  Continue current management.       Atrial fibrillation with RVR    Treatment as above.       Bilateral pleural effusion           Acute exacerbation of CHF (congestive heart failure)    Acute on chronic diastolic CHF as above.       Combined hyperlipidemia associated with type 2 diabetes mellitus           Diabetes mellitus with ESRD (end-stage renal disease)    Type  2 well controlled.  Continue diabetic diet and insulin sliding scale.       Anemia of chronic disease    H/H similar to previous labs.  No evidence of bleeding.       ESRD on dialysis           COPD (chronic obstructive pulmonary disease)    No wheezing on exam.  Add prn duonebs.         VTE Risk Mitigation (From admission, onward)        Ordered     IP VTE HIGH RISK PATIENT  Once      03/20/19 0039     Reason for No Pharmacological VTE Prophylaxis  Once      03/20/19 0039     apixaban tablet 2.5 mg  2 times daily      03/20/19 0039          Addendum: will stop abx, Resp issues likely from heart failure itself. Will monitor closely    Carmelita Lucero MD  Department of Hospital Medicine   [facility]

## 2019-03-22 NOTE — SUBJECTIVE & OBJECTIVE
Review of Systems   Gastrointestinal: Negative for melena.   Genitourinary: Negative for hematuria.     Objective:     Vital Signs (Most Recent):  Temp: 98.6 °F (37 °C) (03/22/19 0722)  Pulse: 75 (03/22/19 0722)  Resp: 18 (03/22/19 0722)  BP: (!) 109/50 (03/22/19 0722)  SpO2: 96 % (03/22/19 0722) Vital Signs (24h Range):  Temp:  [97.8 °F (36.6 °C)-98.6 °F (37 °C)] 98.6 °F (37 °C)  Pulse:  [] 75  Resp:  [16-27] 18  SpO2:  [80 %-100 %] 96 %  BP: ()/(41-89) 109/50     Weight: 57 kg (125 lb 10.6 oz)  Body mass index is 21.57 kg/m².     SpO2: 96 %  O2 Device (Oxygen Therapy): nasal cannula      Intake/Output Summary (Last 24 hours) at 3/22/2019 0858  Last data filed at 3/22/2019 0447  Gross per 24 hour   Intake 791.02 ml   Output 2200 ml   Net -1408.98 ml       Lines/Drains/Airways     Central Venous Catheter Line                 Hemodialysis Catheter -- days          Drain                 Hemodialysis AV Fistula 03/06/19 2339 Left upper arm 15 days          Airway                 Airway - Non-Surgical 03/21/19 1119 Nasal Cannula less than 1 day          Peripheral Intravenous Line                 Peripheral IV - Single Lumen 03/20/19 1345 Right Wrist 1 day         Peripheral IV - Single Lumen 03/20/19 2315 Anterior;Right Forearm 1 day                Physical Exam   Constitutional: She is oriented to person, place, and time. She appears well-developed and well-nourished. No distress.   HENT:   Head: Normocephalic and atraumatic.   Mouth/Throat: No oropharyngeal exudate.   Eyes: Conjunctivae and EOM are normal. Pupils are equal, round, and reactive to light. No scleral icterus.   Neck: Normal range of motion. Neck supple. No JVD present. No tracheal deviation present. No thyromegaly present.   Cardiovascular: Normal rate, regular rhythm, S1 normal and S2 normal. Exam reveals distant heart sounds. Exam reveals no gallop and no friction rub.   No murmur heard.  Pulmonary/Chest: Effort normal. No respiratory  distress. She has no wheezes. She has no rales. She exhibits no tenderness.   Dec BS at bases bilat   Abdominal: Soft. She exhibits no distension.   Musculoskeletal: Normal range of motion. She exhibits no edema.   Neurological: She is alert and oriented to person, place, and time. No cranial nerve deficit.   Skin: Skin is warm and dry. She is not diaphoretic.   Psychiatric: She has a normal mood and affect. Her behavior is normal. Judgment normal.       Current Medications:   amiodarone  200 mg Oral BID    apixaban  2.5 mg Oral BID    atorvastatin  10 mg Oral Daily    busPIRone  10 mg Oral BID    cinacalcet  30 mg Oral Daily with breakfast    epoetin cat  10,000 Units Intravenous Every Tues, Thurs, Sat    metoprolol succinate  50 mg Oral Daily    montelukast  10 mg Oral QHS    pantoprazole  40 mg Oral Daily    paroxetine  30 mg Oral Daily    piperacillin-tazobactam (ZOSYN) IVPB  4.5 g Intravenous Q12H    polyethylene glycol  17 g Oral Daily    sevelamer carbonate  800 mg Oral TID WM    vitamin renal formula (B-complex-vitamin c-folic acid)  1 capsule Oral Daily       sodium chloride 0.9%, acetaminophen, dextrose 50%, dextrose 50%, glucagon (human recombinant), glucose, glucose, insulin aspart U-100, levalbuterol, lidocaine, ondansetron, promethazine (PHENERGAN) IVPB, sodium chloride 0.9%, traZODone, vancomycin (VANCOCIN) IVPB    Laboratory (all labs reviewed):  CBC:  Recent Labs   Lab 03/09/19  0308 03/10/19  0342 03/11/19  0707 03/19/19  1852 03/21/19  0209   WHITE BLOOD CELL COUNT 8.78 7.57 7.57 11.09 9.58   HEMOGLOBIN 9.3 L 8.9 L 9.6 L 9.4 L 8.7 L   HEMATOCRIT 29.7 L 29.0 L 30.0 L 30.9 L 29.6 L   PLATELETS 291 276 383 H 304 315       CHEMISTRIES:  Recent Labs   Lab 02/26/17  1508 07/12/17  0110  03/06/19  1325  03/10/19  0342 03/11/19  0707 03/19/19  1852 03/20/19  0102 03/21/19  0209   GLUCOSE 101 99   < > 117 H   < > 133 H 88 100 94 95   SODIUM 140 139   < > 139   < > 136 137 140 138 139    POTASSIUM 3.3 L 4.5   < > 5.3 H   < > 4.9 5.3 H 4.5 4.3 4.9   BUN BLD 13 29 H   < > 56 H   < > 42 H 57 H 28 H 31 H 44 H   CREATININE 6.5 H 7.7 H   < > 10.2 H   < > 6.8 H 9.1 H 6.0 H 6.1 H 7.9 H   EGFR IF  7 A 6 A   < > 4 A   < > 6 A 5 A 7 A 7 A 5 A   EGFR IF NON- 6 A 5 A   < > 3 A   < > 6 A 4 A 6 A 6 A 5 A   CALCIUM 10.6 H 10.1   < > 10.6 H   < > 10.3 10.4 10.0 9.5 10.0   MAGNESIUM 1.9 1.8  --  2.0  --   --   --  1.9  --   --     < > = values in this interval not displayed.       CARDIAC BIOMARKERS:  Recent Labs   Lab 03/06/19  1325 03/06/19  1759 03/06/19  2344 03/07/19  0647 03/19/19  1852   TROPONIN I 0.443 H 0.473 H 0.596 H 0.655 H 0.071 H  0.071 H       COAGS:  Recent Labs   Lab 02/26/17  1508 01/09/18 2020 03/19/19  1852   INR 1.1 1.1 1.5 H       LIPIDS/LFTS:  Recent Labs   Lab 07/12/17  0110 07/13/17  0559 01/09/18 2020 09/28/18  1010 03/06/19  1325 03/19/19  1852   CHOLESTEROL  --  208 H  --  215 H  --   --    TRIGLYCERIDES  --  73  --  49  --   --    HDL  --  76 H  --  109 H  --   --    LDL CHOLESTEROL  --  117.4  --  96.2  --   --    NON-HDL CHOLESTEROL  --  132  --  106  --   --    AST 27  --  18 25 14 13   ALT 29  --  7 L 10 7 L 10       BNP:  Recent Labs   Lab 02/26/17  1508 07/12/17  0110 01/09/18 2020 03/06/19  1325 03/19/19  1852   BNP 3,789 H >4,900 H 4,023 H 636 H 2,560 H  2,560 H       TSH:  Recent Labs   Lab 02/26/17  1508 03/06/19  1325   TSH 2.386 3.708       Free T4:        Diagnostic Results:  ECG (personally reviewed and interpreted tracing(s)):  3/21/19 1148 SR 72, PRWP    ISAMAR/CV 3/21/19   EF 55%  moderate to severe MR  moderate TR  no thrombus seen  CV 200J converted A-fib to NSR    Echo: 3/20/19  · Normal left ventricular systolic function. The estimated ejection fraction is 55%  · Concentric left ventricular remodeling.  · Indeterminate left ventricular diastolic function.  · Normal right ventricular systolic function.  · Moderate left atrial  enlargement.  · Mild right atrial enlargement.  · Moderate-to-severe mitral regurgitation.  · Mild to moderate tricuspid regurgitation.  · The estimated PA systolic pressure is 65 mm Hg  · Pulmonary hypertension present.  · Small pericardial effusion. No tamponade    Stress Test: L MPI 2/4/19  · There is a mild, infarct defect(s) in the lateral apical wall(s),  · An ejection fraction of 72 % at rest  · The EKG portion of this study is negative for myocardial ischemia.

## 2019-03-22 NOTE — ASSESSMENT & PLAN NOTE
Back in SR after ISAMAR/DCCV 3/21/19  Cont eliquis and amio  Mod-sev MR noted, may complicate management necessitating repair (outpatient eval for MVR).

## 2019-03-22 NOTE — PROGRESS NOTES
Renal Progress Note    Date of Admission:  3/19/2019  6:30 PM    Length of Stay: 3  Days    Subjective: Reports some wheezing earlier, overall feels better    Objective:    Current Facility-Administered Medications   Medication    0.9%  NaCl infusion    acetaminophen tablet 650 mg    amiodarone tablet 200 mg    apixaban tablet 2.5 mg    atorvastatin tablet 10 mg    busPIRone tablet 10 mg    cinacalcet tablet 30 mg    dextrose 50% injection 12.5 g    dextrose 50% injection 25 g    epoetin cat injection 10,000 Units    glucagon (human recombinant) injection 1 mg    glucose chewable tablet 16 g    glucose chewable tablet 24 g    insulin aspart U-100 pen 0-5 Units    levalbuterol nebulizer solution 0.63 mg    lidocaine 4 % cream    metoprolol succinate (TOPROL-XL) 24 hr tablet 50 mg    montelukast tablet 10 mg    ondansetron injection 4 mg    pantoprazole EC tablet 40 mg    paroxetine 10 mg/5 mL suspension 30 mg    piperacillin-tazobactam 4.5 g in sodium chloride 0.9% 100 mL IVPB (ready to mix system)    polyethylene glycol packet 17 g    promethazine (PHENERGAN) 6.25 mg in dextrose 5 % 50 mL IVPB    sevelamer carbonate tablet 800 mg    sodium chloride 0.9% flush 5 mL    traZODone tablet 50 mg    vancomycin 500 mg in dextrose 5 % 100 mL IVPB (ready to mix system)    vitamin renal formula (B-complex-vitamin c-folic acid) 1 mg per capsule 1 capsule       Vitals:    03/21/19 2351 03/22/19 0447 03/22/19 0722 03/22/19 1127   BP: (!) 97/53 (!) 96/50 (!) 109/50 (!) 102/52   BP Location: Right arm Right arm     Patient Position: Lying Lying     Pulse: 72 74 75 69   Resp: 18 18 18 18   Temp: 97.9 °F (36.6 °C) 98 °F (36.7 °C) 98.6 °F (37 °C) 98 °F (36.7 °C)   TempSrc: Oral Oral     SpO2: 100% (!) 94% 96% 100%   Weight:  57 kg (125 lb 10.6 oz)     Height:           I/O last 3 completed shifts:  In: 983.8 [I.V.:183.8; Other:500; IV Piggyback:300]  Out: 2200 [Other:2200]  No  intake/output data recorded.      Physical Exam:    NAD  Neck: supple  Heart: RRR   Lungs: unlabored breathing on O2 n.c.  Abdomen: n/a  : n/a  Extremities:  No edema  Neurologic: Awake, alert, oriented x 3      Laboratories:    No results for input(s): WBC, RBC, HGB, HCT, PLT, MCV, MCH, MCHC in the last 24 hours.    No results for input(s): GLUCOSE, CALCIUM, PROT, NA, K, CO2, CL, BUN, CREATININE, ALKPHOS, ALT, AST, BILITOT in the last 24 hours.    Invalid input(s):  ALBUMIN    No results for input(s): COLORU, CLARITYU, SPECGRAV, PHUR, PROTEINUA, GLUCOSEU, BLOODU, WBCU, RBCU, BACTERIA, MUCUS in the last 24 hours.    Invalid input(s):  BILIRUBINCON    Microbiology Results (last 7 days)     Procedure Component Value Units Date/Time    Blood culture x two cultures. Draw prior to antibiotics. [[id number]] Collected:  03/19/19 1910    Order Status:  Completed Specimen:  Blood from Peripheral, Hand, Right Updated:  03/21/19 2303     Blood Culture, Routine No Growth to date     Blood Culture, Routine No Growth to date     Blood Culture, Routine No Growth to date    Narrative:       Aerobic and anaerobic    Blood culture x two cultures. Draw prior to antibiotics. [[id number]] Collected:  03/19/19 1918    Order Status:  Completed Specimen:  Blood from Peripheral, Hand, Right Updated:  03/21/19 2303     Blood Culture, Routine No Growth to date     Blood Culture, Routine No Growth to date     Blood Culture, Routine No Growth to date    Narrative:       Aerobic and anaerobic            Diagnostic Tests:      Assessment:       74 y/o AAF with Hx. ESRD on chronic Hemodialysis admitted with:     - Dyspnea with abnormal CXR showing Pleural effusions  - Acute exacerbation of CHF  - COPD exacerbation with possible bronchitis  - r/o PNA  - Pericardial effusion  - Anemia of CKD  - Hyperphosphatemia  - PAF w/ Hx. Non-rheumatic MR  - Hx. HTN  - Hx. DM-2           Plan:     - Dialysis Q TTS  - Epogen with HD  - Cardiology following s/p  Cardioversion  - Renal ADA, low PO4- diet  - Phosphate binders w/ meals  - Glycemic control per admitting  - Home when cleared by Cardiology    Will f/u on Dialysis days only.

## 2019-03-22 NOTE — NURSING
Received report from LAURA Martin. Patient lying in bed, Alert and oriented x4, NAD noted,will monitor.

## 2019-03-22 NOTE — PROGRESS NOTES
[facility]  Cardiology  Progress Note    Patient Name: Eli Vaz  MRN: [id number]  Admission Date: 3/19/2019  Hospital Length of Stay: 3 days  Code Status: Full Code   Attending Physician: Carmelita Candelaria MD   Primary Care Physician: Chiara Nelson MD  Expected Discharge Date:   Principal Problem:Shortness of breath    Subjective:     Hospital Course:   3/21/19: successful ISAMAR/DCCV AF->NSR, mod-sev MR noted with normal LV fxn.    Interval Hx: still SOB, but improved.  No cp/palps/LH.    Tele: SR (personally reviewed and interpreted)        Review of Systems   Gastrointestinal: Negative for melena.   Genitourinary: Negative for hematuria.     Objective:     Vital Signs (Most Recent):  Temp: 98.6 °F (37 °C) (03/22/19 0722)  Pulse: 75 (03/22/19 0722)  Resp: 18 (03/22/19 0722)  BP: (!) 109/50 (03/22/19 0722)  SpO2: 96 % (03/22/19 0722) Vital Signs (24h Range):  Temp:  [97.8 °F (36.6 °C)-98.6 °F (37 °C)] 98.6 °F (37 °C)  Pulse:  [] 75  Resp:  [16-27] 18  SpO2:  [80 %-100 %] 96 %  BP: ()/(41-89) 109/50     Weight: 57 kg (125 lb 10.6 oz)  Body mass index is 21.57 kg/m².     SpO2: 96 %  O2 Device (Oxygen Therapy): nasal cannula      Intake/Output Summary (Last 24 hours) at 3/22/2019 0858  Last data filed at 3/22/2019 0447  Gross per 24 hour   Intake 791.02 ml   Output 2200 ml   Net -1408.98 ml       Lines/Drains/Airways     Central Venous Catheter Line                 Hemodialysis Catheter -- days          Drain                 Hemodialysis AV Fistula 03/06/19 2339 Left upper arm 15 days          Airway                 Airway - Non-Surgical 03/21/19 1119 Nasal Cannula less than 1 day          Peripheral Intravenous Line                 Peripheral IV - Single Lumen 03/20/19 1345 Right Wrist 1 day         Peripheral IV - Single Lumen 03/20/19 2315 Anterior;Right Forearm 1 day                Physical Exam   Constitutional: She is oriented to person, place, and time. She appears well-developed  and well-nourished. No distress.   HENT:   Head: Normocephalic and atraumatic.   Mouth/Throat: No oropharyngeal exudate.   Eyes: Conjunctivae and EOM are normal. Pupils are equal, round, and reactive to light. No scleral icterus.   Neck: Normal range of motion. Neck supple. No JVD present. No tracheal deviation present. No thyromegaly present.   Cardiovascular: Normal rate, regular rhythm, S1 normal and S2 normal. Exam reveals distant heart sounds. Exam reveals no gallop and no friction rub.   No murmur heard.  Pulmonary/Chest: Effort normal. No respiratory distress. She has no wheezes. She has no rales. She exhibits no tenderness.   Dec BS at bases bilat   Abdominal: Soft. She exhibits no distension.   Musculoskeletal: Normal range of motion. She exhibits no edema.   Neurological: She is alert and oriented to person, place, and time. No cranial nerve deficit.   Skin: Skin is warm and dry. She is not diaphoretic.   Psychiatric: She has a normal mood and affect. Her behavior is normal. Judgment normal.       Current Medications:   amiodarone  200 mg Oral BID    apixaban  2.5 mg Oral BID    atorvastatin  10 mg Oral Daily    busPIRone  10 mg Oral BID    cinacalcet  30 mg Oral Daily with breakfast    epoetin cat  10,000 Units Intravenous Every Tues, Thurs, Sat    metoprolol succinate  50 mg Oral Daily    montelukast  10 mg Oral QHS    pantoprazole  40 mg Oral Daily    paroxetine  30 mg Oral Daily    piperacillin-tazobactam (ZOSYN) IVPB  4.5 g Intravenous Q12H    polyethylene glycol  17 g Oral Daily    sevelamer carbonate  800 mg Oral TID WM    vitamin renal formula (B-complex-vitamin c-folic acid)  1 capsule Oral Daily       sodium chloride 0.9%, acetaminophen, dextrose 50%, dextrose 50%, glucagon (human recombinant), glucose, glucose, insulin aspart U-100, levalbuterol, lidocaine, ondansetron, promethazine (PHENERGAN) IVPB, sodium chloride 0.9%, traZODone, vancomycin (VANCOCIN) IVPB    Laboratory (all  labs reviewed):  CBC:  Recent Labs   Lab 03/09/19  0308 03/10/19  0342 03/11/19  0707 03/19/19  1852 03/21/19  0209   WHITE BLOOD CELL COUNT 8.78 7.57 7.57 11.09 9.58   HEMOGLOBIN 9.3 L 8.9 L 9.6 L 9.4 L 8.7 L   HEMATOCRIT 29.7 L 29.0 L 30.0 L 30.9 L 29.6 L   PLATELETS 291 276 383 H 304 315       CHEMISTRIES:  Recent Labs   Lab 02/26/17  1508 07/12/17  0110  03/06/19  1325  03/10/19  0342 03/11/19  0707 03/19/19  1852 03/20/19  0102 03/21/19  0209   GLUCOSE 101 99   < > 117 H   < > 133 H 88 100 94 95   SODIUM 140 139   < > 139   < > 136 137 140 138 139   POTASSIUM 3.3 L 4.5   < > 5.3 H   < > 4.9 5.3 H 4.5 4.3 4.9   BUN BLD 13 29 H   < > 56 H   < > 42 H 57 H 28 H 31 H 44 H   CREATININE 6.5 H 7.7 H   < > 10.2 H   < > 6.8 H 9.1 H 6.0 H 6.1 H 7.9 H   EGFR IF  7 A 6 A   < > 4 A   < > 6 A 5 A 7 A 7 A 5 A   EGFR IF NON- 6 A 5 A   < > 3 A   < > 6 A 4 A 6 A 6 A 5 A   CALCIUM 10.6 H 10.1   < > 10.6 H   < > 10.3 10.4 10.0 9.5 10.0   MAGNESIUM 1.9 1.8  --  2.0  --   --   --  1.9  --   --     < > = values in this interval not displayed.       CARDIAC BIOMARKERS:  Recent Labs   Lab 03/06/19  1325 03/06/19  1759 03/06/19  2344 03/07/19  0647 03/19/19  1852   TROPONIN I 0.443 H 0.473 H 0.596 H 0.655 H 0.071 H  0.071 H       COAGS:  Recent Labs   Lab 02/26/17  1508 01/09/18 2020 03/19/19  1852   INR 1.1 1.1 1.5 H       LIPIDS/LFTS:  Recent Labs   Lab 07/12/17  0110 07/13/17  0559 01/09/18 2020 09/28/18  1010 03/06/19  1325 03/19/19  1852   CHOLESTEROL  --  208 H  --  215 H  --   --    TRIGLYCERIDES  --  73  --  49  --   --    HDL  --  76 H  --  109 H  --   --    LDL CHOLESTEROL  --  117.4  --  96.2  --   --    NON-HDL CHOLESTEROL  --  132  --  106  --   --    AST 27  --  18 25 14 13   ALT 29  --  7 L 10 7 L 10       BNP:  Recent Labs   Lab 02/26/17  1508 07/12/17  0110 01/09/18 2020 03/06/19  1325 03/19/19  1852   BNP 3,789 H >4,900 H 4,023 H 636 H 2,560 H  2,560 H       TSH:  Recent Labs   Lab  02/26/17  1508 03/06/19  1325   TSH 2.386 3.708       Free T4:        Diagnostic Results:  ECG (personally reviewed and interpreted tracing(s)):  3/21/19 1148 SR 72, PRWP    ISAMAR/CV 3/21/19   EF 55%  moderate to severe MR  moderate TR  no thrombus seen  CV 200J converted A-fib to NSR    Echo: 3/20/19  · Normal left ventricular systolic function. The estimated ejection fraction is 55%  · Concentric left ventricular remodeling.  · Indeterminate left ventricular diastolic function.  · Normal right ventricular systolic function.  · Moderate left atrial enlargement.  · Mild right atrial enlargement.  · Moderate-to-severe mitral regurgitation.  · Mild to moderate tricuspid regurgitation.  · The estimated PA systolic pressure is 65 mm Hg  · Pulmonary hypertension present.  · Small pericardial effusion. No tamponade    Stress Test: L MPI 2/4/19  · There is a mild, infarct defect(s) in the lateral apical wall(s),  · An ejection fraction of 72 % at rest  · The EKG portion of this study is negative for myocardial ischemia.        Assessment and Plan:     * Shortness of breath    Multifactorial  Cont HD per renal, may need to pull more fluid off during HD sessions.     Atrial fibrillation    Back in SR after ISAMAR/DCCV 3/21/19  Cont eliquis and amio  Mod-sev MR noted, may complicate management necessitating repair (outpatient eval for MVR).     Acute exacerbation of CHF (congestive heart failure)    Afterload reduction as tolerated. Volume control with HD     ESRD on dialysis    Per renal     Diabetes mellitus with ESRD (end-stage renal disease)    Per primary     Combined hyperlipidemia associated with type 2 diabetes mellitus    Per IM     Non-rheumatic mitral regurgitation    Likely driving PAF. May need out patient surgical evaluation for MVR.     History of CVA (cerebrovascular accident)    On eliquis         VTE Risk Mitigation (From admission, onward)        Ordered     IP VTE HIGH RISK PATIENT  Once      03/20/19 0039      Reason for No Pharmacological VTE Prophylaxis  Once      03/20/19 0039     apixaban tablet 2.5 mg  2 times daily      03/20/19 0039        Cardiology will sign off, pls call with questions.  Pt to follow up with Dr. Garcia after discharge.    Isrrael Weiss MD  Cardiology  [facility]

## 2019-03-22 NOTE — SUBJECTIVE & OBJECTIVE
Interval History: feeling better today. Pending O2 sats at room air to verify if she qualifies for home O2.     Review of Systems   HENT: Negative for ear discharge and ear pain.    Eyes: Negative for itching.   Respiratory: Negative for shortness of breath.    Endocrine: Negative for polyphagia and polyuria.   Neurological: Negative for seizures and syncope.     Objective:     Vital Signs (Most Recent):  Temp: 98.1 °F (36.7 °C) (03/22/19 1545)  Pulse: 66 (03/22/19 1625)  Resp: 18 (03/22/19 1545)  BP: (!) 105/54 (03/22/19 1545)  SpO2: 96 % (03/22/19 1625) Vital Signs (24h Range):  Temp:  [97.8 °F (36.6 °C)-98.6 °F (37 °C)] 98.1 °F (36.7 °C)  Pulse:  [60-77] 66  Resp:  [16-18] 18  SpO2:  [94 %-100 %] 96 %  BP: ()/(41-89) 105/54     Weight: 57 kg (125 lb 10.6 oz)  Body mass index is 21.57 kg/m².    Intake/Output Summary (Last 24 hours) at 3/22/2019 1748  Last data filed at 3/22/2019 1100  Gross per 24 hour   Intake 1040 ml   Output 2200 ml   Net -1160 ml      Physical Exam   Constitutional: She is oriented to person, place, and time. No distress.   HENT:   Head: Normocephalic and atraumatic.   Eyes: Conjunctivae are normal. Right eye exhibits no discharge. Left eye exhibits no discharge.   Neck: Neck supple. No tracheal deviation present.   Cardiovascular: Normal rate and regular rhythm.   Pulmonary/Chest: Effort normal.   Decreased BS bilateral bases  Breathing comfortably on low flow NC  Speaking in full sentences   Abdominal: Soft. Bowel sounds are normal.   Musculoskeletal: Normal range of motion.   Neurological: She is alert and oriented to person, place, and time.   Skin: Skin is warm and dry. She is not diaphoretic.       Significant Labs: All pertinent labs within the past 24 hours have been reviewed.    Significant Imaging: I have reviewed all pertinent imaging results/findings within the past 24 hours.  I have reviewed and interpreted all pertinent imaging results/findings within the past 24 hours.

## 2019-03-22 NOTE — CONSULTS
PCP and Cardiology appointments scheduled.    Follow-up Information     Chiara Nelson MD On 3/26/2019.    Specialty:  Family Medicine  Why:  On Tuesdsay @ 9:20am, outpatient services  Contact information:  [address] [facility] [address]  [phone]             Elgin Garcia MD On 4/3/2019.    Specialty:  Cardiology  Why:  On Wednesday @ 10:00am, outpatient services  Contact information:  [address] [facility] [facility]  [address]  [place] [facility] [place] [address]  [phone]

## 2019-03-22 NOTE — ASSESSMENT & PLAN NOTE
This is probably multifactorial.    SOB probably related to acute on chronic diastolic heart failure, bilateral pleural effusions, possible hospital acquired pneumonia, ESRD and uncontrolled Afib.  Fluid control with HD.  Nephrology consulted for HD.  Patient with fever on presentation.  No great evidence of pneumonia on CT, but empirically started on ABx's for possible hospital acquired pneumonia.  Afib with RVR--initially placed on Amiodarone drip.  Cardiology consulted.  Underwent successful cardioversion on 3/20.  Continue oral Amiodarone and B blocker.  Already on Apixaban.  Will test for O2 today to see if she qualifies for portable device

## 2019-03-22 NOTE — PLAN OF CARE
Problem: Adult Inpatient Plan of Care  Goal: Plan of Care Review   03/22/19 1420   Plan of Care Review   Plan of Care Reviewed With patient;family   Progress improving       Problem: Fall Injury Risk  Goal: Absence of Fall and Fall-Related Injury    Intervention: Identify and Manage Contributors to Fall Injury Risk   03/22/19 1420   Manage Acute Allergic Reaction   Medication Review/Management medications reviewed         Problem: Hematologic Alteration (Chronic Kidney Disease)  Goal: Absence of Anemia Signs/Symptoms    Intervention: Monitor and Manage Signs/Symptoms of Anemia and Bleeding   03/22/19 1420   Prevent and Manage Risk of Hemorrhage   Bleeding Precautions blood pressure closely monitored   Monitor/Manage Chemotherapy Gastrointestinal Effects   Environmental Support rest periods encouraged         Problem: Infection (Hemodialysis)  Goal: Absence of Infection Signs/Symptoms    Intervention: Prevent or Manage Infection   03/22/19 1420   Prevent or Manage Infection   Fever Reduction/Comfort Measures medication administered   Infection Management aseptic technique maintained

## 2019-03-23 LAB
POCT GLUCOSE: 103 MG/DL (ref 70–110)
POCT GLUCOSE: 115 MG/DL (ref 70–110)
POCT GLUCOSE: 73 MG/DL (ref 70–110)
POCT GLUCOSE: 87 MG/DL (ref 70–110)
POCT GLUCOSE: 89 MG/DL (ref 70–110)

## 2019-03-23 PROCEDURE — 94761 N-INVAS EAR/PLS OXIMETRY MLT: CPT

## 2019-03-23 PROCEDURE — 25000242 PHARM REV CODE 250 ALT 637 W/ HCPCS: Performed by: HOSPITALIST

## 2019-03-23 PROCEDURE — 27000221 HC OXYGEN, UP TO 24 HOURS

## 2019-03-23 PROCEDURE — 25000003 PHARM REV CODE 250: Performed by: INTERNAL MEDICINE

## 2019-03-23 PROCEDURE — 80100016 HC MAINTENANCE HEMODIALYSIS

## 2019-03-23 PROCEDURE — 21400001 HC TELEMETRY ROOM

## 2019-03-23 PROCEDURE — 63600175 PHARM REV CODE 636 W HCPCS: Performed by: INTERNAL MEDICINE

## 2019-03-23 PROCEDURE — 94640 AIRWAY INHALATION TREATMENT: CPT

## 2019-03-23 PROCEDURE — 25000003 PHARM REV CODE 250: Performed by: EMERGENCY MEDICINE

## 2019-03-23 RX ADMIN — ERYTHROPOIETIN 10000 UNITS: 10000 INJECTION, SOLUTION INTRAVENOUS; SUBCUTANEOUS at 01:03

## 2019-03-23 RX ADMIN — LEVALBUTEROL HYDROCHLORIDE 0.63 MG: 0.63 SOLUTION RESPIRATORY (INHALATION) at 08:03

## 2019-03-23 RX ADMIN — LEVALBUTEROL HYDROCHLORIDE 0.63 MG: 0.63 SOLUTION RESPIRATORY (INHALATION) at 07:03

## 2019-03-23 RX ADMIN — ATORVASTATIN CALCIUM 10 MG: 10 TABLET, FILM COATED ORAL at 09:03

## 2019-03-23 RX ADMIN — APIXABAN 2.5 MG: 2.5 TABLET, FILM COATED ORAL at 09:03

## 2019-03-23 RX ADMIN — AMIODARONE HYDROCHLORIDE 200 MG: 200 TABLET ORAL at 09:03

## 2019-03-23 RX ADMIN — MONTELUKAST SODIUM 10 MG: 10 TABLET, FILM COATED ORAL at 09:03

## 2019-03-23 RX ADMIN — Medication 1 CAPSULE: at 09:03

## 2019-03-23 RX ADMIN — SEVELAMER CARBONATE 800 MG: 800 TABLET, FILM COATED ORAL at 06:03

## 2019-03-23 RX ADMIN — PANTOPRAZOLE SODIUM 40 MG: 40 TABLET, DELAYED RELEASE ORAL at 09:03

## 2019-03-23 RX ADMIN — PAROXETINE HYDROCHLORIDE 30 MG: 10 SUSPENSION ORAL at 09:03

## 2019-03-23 RX ADMIN — BUSPIRONE HYDROCHLORIDE 10 MG: 5 TABLET ORAL at 09:03

## 2019-03-23 RX ADMIN — CINACALCET HYDROCHLORIDE 30 MG: 30 TABLET, COATED ORAL at 09:03

## 2019-03-23 RX ADMIN — SEVELAMER CARBONATE 800 MG: 800 TABLET, FILM COATED ORAL at 09:03

## 2019-03-23 RX ADMIN — BUSPIRONE HYDROCHLORIDE 10 MG: 5 TABLET ORAL at 11:03

## 2019-03-23 NOTE — ASSESSMENT & PLAN NOTE
This is probably multifactorial.    SOB probably related to acute on chronic diastolic heart failure, bilateral pleural effusions, possible hospital acquired pneumonia, ESRD and uncontrolled Afib.  Fluid control with HD.  Nephrology consulted for HD.  Patient with fever on presentation.  No great evidence of pneumonia on CT, but empirically started on ABx's for possible hospital acquired pneumonia. No longer suspect this. Abx stopped.   Afib with RVR--initially placed on Amiodarone drip.  Cardiology consulted.  Underwent successful cardioversion on 3/20.  Continue oral Amiodarone and B blocker (decrease dose for hypotension).  Already on Apixaban.  Will test for O2 today to see if she qualifies for portable device. So far 98% at room air at rest but patient feels very short of breath when not on O2

## 2019-03-23 NOTE — SUBJECTIVE & OBJECTIVE
Interval History: feeling weak. Os sats > 95% at room air at rest, per nurse.     Review of Systems   HENT: Negative for ear discharge and ear pain.    Eyes: Negative for itching.   Respiratory: Negative for shortness of breath.    Endocrine: Negative for polyphagia and polyuria.   Neurological: Negative for seizures and syncope.     Objective:     Vital Signs (Most Recent):  Temp: 98.5 °F (36.9 °C) (03/23/19 1524)  Pulse: 69 (03/23/19 1524)  Resp: 16 (03/23/19 1524)  BP: (!) 95/50 (03/23/19 1524)  SpO2: 100 % (03/23/19 1524) Vital Signs (24h Range):  Temp:  [97.7 °F (36.5 °C)-98.6 °F (37 °C)] 98.5 °F (36.9 °C)  Pulse:  [60-70] 69  Resp:  [16-20] 16  SpO2:  [97 %-100 %] 100 %  BP: ()/(32-98) 95/50     Weight: 55.9 kg (123 lb 3.8 oz)  Body mass index is 21.15 kg/m².    Intake/Output Summary (Last 24 hours) at 3/23/2019 1650  Last data filed at 3/23/2019 1345  Gross per 24 hour   Intake 700 ml   Output 2625 ml   Net -1925 ml      Physical Exam   Constitutional: She is oriented to person, place, and time. No distress.   HENT:   Head: Normocephalic and atraumatic.   Eyes: Conjunctivae are normal. Right eye exhibits no discharge. Left eye exhibits no discharge.   Neck: Neck supple. No tracheal deviation present.   Cardiovascular: Normal rate and regular rhythm.   Pulmonary/Chest: Effort normal.   Decreased BS bilateral bases  Breathing comfortably on low flow NC  Speaking in full sentences   Abdominal: Soft. Bowel sounds are normal.   Musculoskeletal: Normal range of motion.   Neurological: She is alert and oriented to person, place, and time.   Skin: Skin is warm and dry. She is not diaphoretic.       Significant Labs: All pertinent labs within the past 24 hours have been reviewed.    Significant Imaging: I have reviewed all pertinent imaging results/findings within the past 24 hours.  I have reviewed and interpreted all pertinent imaging results/findings within the past 24 hours.

## 2019-03-23 NOTE — NURSING
Blood pressure runs low. 85/50-90/50 checked manually. Due Amiodarone hold as per Pharmacy it could decrease blood pressure. Dr. Marrero  notified.      Blood Glucose is 73mg/dl. Patient is awake, asymptomatic.Orange juice with sugar given.    Blood sugar rechecked after an hour shows 94mg/dl.     Dr. Marrero updated. Will continue to monitor.

## 2019-03-23 NOTE — PROGRESS NOTES
[facility] Hospital Medicine  Progress Note    Patient Name: Eli Vaz  MRN: [id number]  Patient Class: IP- Inpatient   Admission Date: 3/19/2019  Length of Stay: 4 days  Attending Physician: Carmelita Candelaria MD  Primary Care Provider: Chiara Nelson MD        Subjective:     Principal Problem:Shortness of breath    HPI:    Eli Vaz is a 73 y.o. female that (in part)  has a past medical history of Arthritis, Atrial fibrillation, COPD (chronic obstructive pulmonary disease), Diabetes mellitus type II, Dialysis patient, Encounter for blood transfusion, and ESRD (end stage renal disease).  has a past surgical history that includes Tubal ligation and AV fistula placement. Presents to [facility] Emergency Department complaining of shortness of breath.  She has been nauseous and febrile as well.  She was admitted last week for atrial fibrillation as well as respiratory infection.  Reports compliance with home medications.  She is awaiting arrival of home oxygen.  She is high by primary care physician yesterday and subsequently went to dialysis.  She became severely shortness of breath and was sent to the emergency department.     Description of symptoms    Location:  Lungs/chest  Onset:  Subacute onset with progressive worsening  Character:  Moderate to severe intensity shortness of breath  Frequency:  Daily frequency, but worse today.  She has had multiple hospitalizations this year for similar presentations.  Duration:  Constant   Associated Symptoms:  Nausea and fever  Radiation:  Throughout chest  Exacerbating factors:  Worsened with exertion  Relieving factors:  Some relief given with supplemental oxygen in ED.          In the emergency department routine laboratory studies, chest x-ray, EKG, cardiac enzymes were obtained.  There was concern for past medications on chest x-ray and given the tachycardia, tachypnea, and fever broad-spectrum empiric antibiotics were  initiated after cultures were obtained.  The patient is not make urine and therefore urinalysis was not obtained.  A CT of the chest was ordered to further evaluate for pneumonia.    Hospital medicine has been asked to admit for further evaluation and treatment.     Hospital Course:  74 y/o female presented with shortness of breath. CT chest showed bilateral pleural effusions despite completing dialysis PTA.   Admitted on empiric ABx's for possible hospital acquired pneumonia.  Nephrology consulted for HD.  Workup not consistent with ACS thus far.  Patient was then noted to be in Afib with RVR.  Transferred to ICU and Cardiology consulted.  Started on Amiodarone infusion.  Patient already on Apixaban.  Patient is s/p successful cardioversion on 3/21. Stepped down to floor in stable condition.         Interval History: feeling weak. Os sats > 95% at room air at rest, per nurse.     Review of Systems   HENT: Negative for ear discharge and ear pain.    Eyes: Negative for itching.   Respiratory: Negative for shortness of breath.    Endocrine: Negative for polyphagia and polyuria.   Neurological: Negative for seizures and syncope.     Objective:     Vital Signs (Most Recent):  Temp: 98.5 °F (36.9 °C) (03/23/19 1524)  Pulse: 69 (03/23/19 1524)  Resp: 16 (03/23/19 1524)  BP: (!) 95/50 (03/23/19 1524)  SpO2: 100 % (03/23/19 1524) Vital Signs (24h Range):  Temp:  [97.7 °F (36.5 °C)-98.6 °F (37 °C)] 98.5 °F (36.9 °C)  Pulse:  [60-70] 69  Resp:  [16-20] 16  SpO2:  [97 %-100 %] 100 %  BP: ()/(32-98) 95/50     Weight: 55.9 kg (123 lb 3.8 oz)  Body mass index is 21.15 kg/m².    Intake/Output Summary (Last 24 hours) at 3/23/2019 1650  Last data filed at 3/23/2019 1345  Gross per 24 hour   Intake 700 ml   Output 2625 ml   Net -1925 ml      Physical Exam   Constitutional: She is oriented to person, place, and time. No distress.   HENT:   Head: Normocephalic and atraumatic.   Eyes: Conjunctivae are normal. Right eye exhibits no  discharge. Left eye exhibits no discharge.   Neck: Neck supple. No tracheal deviation present.   Cardiovascular: Normal rate and regular rhythm.   Pulmonary/Chest: Effort normal.   Decreased BS bilateral bases  Breathing comfortably on low flow NC  Speaking in full sentences   Abdominal: Soft. Bowel sounds are normal.   Musculoskeletal: Normal range of motion.   Neurological: She is alert and oriented to person, place, and time.   Skin: Skin is warm and dry. She is not diaphoretic.       Significant Labs: All pertinent labs within the past 24 hours have been reviewed.    Significant Imaging: I have reviewed all pertinent imaging results/findings within the past 24 hours.  I have reviewed and interpreted all pertinent imaging results/findings within the past 24 hours.    Assessment/Plan:      * Shortness of breath    This is probably multifactorial.    SOB probably related to acute on chronic diastolic heart failure, bilateral pleural effusions, possible hospital acquired pneumonia, ESRD and uncontrolled Afib.  Fluid control with HD.  Nephrology consulted for HD.  Patient with fever on presentation.  No great evidence of pneumonia on CT, but empirically started on ABx's for possible hospital acquired pneumonia. No longer suspect this. Abx stopped.   Afib with RVR--initially placed on Amiodarone drip.  Cardiology consulted.  Underwent successful cardioversion on 3/20.  Continue oral Amiodarone and B blocker (decrease dose for hypotension).  Already on Apixaban.  Will test for O2 today to see if she qualifies for portable device. So far 98% at room air at rest but patient feels very short of breath when not on O2       Benign essential hypertension    Currently well controlled.  Continue current management.       Atrial fibrillation with RVR    Treatment as above.       Bilateral pleural effusion           Acute exacerbation of CHF (congestive heart failure)    Acute on chronic diastolic CHF as above.       Combined  hyperlipidemia associated with type 2 diabetes mellitus           Diabetes mellitus with ESRD (end-stage renal disease)    Type 2 well controlled.  Continue diabetic diet and insulin sliding scale.       Anemia of chronic disease    H/H similar to previous labs.  No evidence of bleeding.       ESRD on dialysis           COPD (chronic obstructive pulmonary disease)    No wheezing on exam.  Add prn duonebs.         VTE Risk Mitigation (From admission, onward)        Ordered     IP VTE HIGH RISK PATIENT  Once      03/20/19 0039     Reason for No Pharmacological VTE Prophylaxis  Once      03/20/19 0039     apixaban tablet 2.5 mg  2 times daily      03/20/19 0039        PT/OT consult for pamela Lucero MD  Department of Hospital Medicine   [facility]

## 2019-03-23 NOTE — NURSING
Report given to morning shift. Patient in bed, AAOx4. On oxygen at 3L via nasal cannula. Respiratory treatment on going. Denies any discomfort and needs this time. No further complaints made.

## 2019-03-23 NOTE — PLAN OF CARE
Problem: Diabetes Comorbidity  Goal: Blood Glucose Level Within Desired Range    Intervention: Maintain Glycemic Control   03/23/19 1735   Monitor and Manage Ketoacidosis   Glycemic Management blood glucose monitoring      03/23/19 1735   Monitor and Manage Ketoacidosis   Glycemic Management blood glucose monitoring         Problem: Adjustment to Illness (Chronic Kidney Disease)  Goal: Optimal Coping with Chronic Illness    Intervention: Support Child/Family Psychosocial Response to Chronic Illness   03/23/19 1735   Promote Anxiety Reduction   Supportive Measures active listening utilized;relaxation techniques promoted;self-reflection promoted         Problem: Skin Injury Risk Increased  Goal: Skin Health and Integrity    Intervention: Optimize Skin Protection   03/23/19 1735   Monitor and Manage Hypervolemia   Skin Protection adhesive use limited   Prevent Additional Skin Injury   Head of Bed (HOB) HOB at 30-45 degrees   Pressure Reduction Techniques frequent weight shift encouraged

## 2019-03-23 NOTE — NURSING
Bedside hand off received from morning shift. Patient is awake and alert. On oxygen at 3L via nasal cannula saturating well. Family at bedside. Denies discomfort/pain. not in distress.Safety precautions maintained. Will continue to monitor.

## 2019-03-23 NOTE — PLAN OF CARE
Problem: Fall Injury Risk  Goal: Absence of Fall and Fall-Related Injury  Outcome: Ongoing (interventions implemented as appropriate)  Intervention: Identify and Manage Contributors to Fall Injury Risk   03/23/19 0306   Manage Acute Allergic Reaction   Medication Review/Management medications reviewed;high risk medications identified   Identify and Manage Contributors to Fall Injury Risk   Self-Care Promotion BADL personal objects within reach;BADL personal routines maintained     Intervention: Promote Injury-Free Environment   03/23/19 0306   Optimize Rogers and Functional Mobility   Environmental Safety Modification assistive device/personal items within reach;clutter free environment maintained;lighting adjusted   Optimize Balance and Safe Activity   Safety Promotion/Fall Prevention assistive device/personal item within reach;bed alarm set;Fall Risk reviewed with patient/family;family to remain at bedside;medications reviewed;instructed to call staff for mobility;side rails raised x 3

## 2019-03-23 NOTE — NURSING
Reported off to oncoming nurse, patient resting in bed, aaox3. Patient can make needs known to staff, minimal assist required for adls and transfers, no acute distress noted, safety precautions maintained.    Chart check completed.

## 2019-03-23 NOTE — PROGRESS NOTES
Renal Progress Note    Date of Admission:  3/19/2019  6:30 PM    Length of Stay: 4  Days    Subjective: No new complaints    Objective:    Current Facility-Administered Medications   Medication    0.9%  NaCl infusion    acetaminophen tablet 650 mg    amiodarone tablet 200 mg    apixaban tablet 2.5 mg    atorvastatin tablet 10 mg    busPIRone tablet 10 mg    cinacalcet tablet 30 mg    dextrose 50% injection 12.5 g    dextrose 50% injection 25 g    epoetin cat injection 10,000 Units    glucagon (human recombinant) injection 1 mg    glucose chewable tablet 16 g    glucose chewable tablet 24 g    insulin aspart U-100 pen 0-5 Units    levalbuterol nebulizer solution 0.63 mg    lidocaine 4 % cream    metoprolol succinate (TOPROL-XL) 24 hr tablet 50 mg    montelukast tablet 10 mg    ondansetron injection 4 mg    pantoprazole EC tablet 40 mg    paroxetine 10 mg/5 mL suspension 30 mg    polyethylene glycol packet 17 g    promethazine (PHENERGAN) 6.25 mg in dextrose 5 % 50 mL IVPB    sevelamer carbonate tablet 800 mg    sodium chloride 0.9% flush 5 mL    traZODone tablet 50 mg    vitamin renal formula (B-complex-vitamin c-folic acid) 1 mg per capsule 1 capsule       Vitals:    03/23/19 0759 03/23/19 1015 03/23/19 1030 03/23/19 1100   BP: (!) 110/56 (!) 101/49 (!) (P) 98/45 (!) (P) 93/39   BP Location: Right arm (P) Right arm     Patient Position: Lying (P) Lying     Pulse: 65 70 (P) 67 (P) 66   Resp: 18 16     Temp: 98 °F (36.7 °C) 98.6 °F (37 °C)     TempSrc: Axillary Oral     SpO2: 98%      Weight:       Height:           I/O last 3 completed shifts:  In: 1040 [P.O.:240; Other:500; IV Piggyback:300]  Out: 2200 [Other:2200]  No intake/output data recorded.      Physical Exam:    Seen during Dialysis  NAD  Neck: supple  Heart: RRR   Lungs: unlabored breathing on O2 n.c.  Abdomen: n/a  : n/a  Extremities:  No edema  Neurologic: Awake, alert      Laboratories:    No results  for input(s): WBC, RBC, HGB, HCT, PLT, MCV, MCH, MCHC in the last 24 hours.    No results for input(s): GLUCOSE, CALCIUM, PROT, NA, K, CO2, CL, BUN, CREATININE, ALKPHOS, ALT, AST, BILITOT in the last 24 hours.    Invalid input(s):  ALBUMIN    No results for input(s): COLORU, CLARITYU, SPECGRAV, PHUR, PROTEINUA, GLUCOSEU, BLOODU, WBCU, RBCU, BACTERIA, MUCUS in the last 24 hours.    Invalid input(s):  BILIRUBINCON    Microbiology Results (last 7 days)     Procedure Component Value Units Date/Time    Blood culture x two cultures. Draw prior to antibiotics. [[id number]] Collected:  03/19/19 1910    Order Status:  Completed Specimen:  Blood from Peripheral, Hand, Right Updated:  03/22/19 2303     Blood Culture, Routine No Growth to date     Blood Culture, Routine No Growth to date     Blood Culture, Routine No Growth to date     Blood Culture, Routine No Growth to date    Narrative:       Aerobic and anaerobic    Blood culture x two cultures. Draw prior to antibiotics. [[id number]] Collected:  03/19/19 1918    Order Status:  Completed Specimen:  Blood from Peripheral, Hand, Right Updated:  03/22/19 2303     Blood Culture, Routine No Growth to date     Blood Culture, Routine No Growth to date     Blood Culture, Routine No Growth to date     Blood Culture, Routine No Growth to date    Narrative:       Aerobic and anaerobic            Diagnostic Tests:      Assessment:       74 y/o AAF with Hx. ESRD on chronic Hemodialysis admitted with:     - Dyspnea with abnormal CXR showing Pleural effusions  - Acute exacerbation of CHF  - s/p PAF w/ RVR requiring cardioversion  - s/p COPD exacerbation with possible bronchitis  - Pericardial effusion  - Anemia of CKD  - Hyperphosphatemia  - Hx. Non-rheumatic MR  - Hx. HTN  - Hx. DM-2           Plan:     - Dialysis Q TTS  - Epogen with HD  - Cardiology following s/p Cardioversion  - Renal ADA, low PO4- diet  - Phosphate binders w/ meals  - Glycemic control per admitting  - Home when cleared  by Cardiology    Will f/u on Dialysis days only.

## 2019-03-24 PROBLEM — R19.7 DIARRHEA: Status: ACTIVE | Noted: 2019-03-24

## 2019-03-24 PROBLEM — R50.9 FEVER: Status: RESOLVED | Noted: 2019-03-20 | Resolved: 2019-03-24

## 2019-03-24 PROBLEM — J96.01 ACUTE RESPIRATORY FAILURE WITH HYPOXIA: Status: ACTIVE | Noted: 2019-03-24

## 2019-03-24 LAB
ALBUMIN SERPL BCP-MCNC: 2.4 G/DL (ref 3.5–5.2)
ANION GAP SERPL CALC-SCNC: 11 MMOL/L (ref 8–16)
BACTERIA BLD CULT: NORMAL
BACTERIA BLD CULT: NORMAL
BASOPHILS # BLD AUTO: 0.01 K/UL (ref 0–0.2)
BASOPHILS NFR BLD: 0.2 % (ref 0–1.9)
BUN SERPL-MCNC: 19 MG/DL (ref 8–23)
CALCIUM SERPL-MCNC: 9.1 MG/DL (ref 8.7–10.5)
CHLORIDE SERPL-SCNC: 101 MMOL/L (ref 95–110)
CO2 SERPL-SCNC: 29 MMOL/L (ref 23–29)
CREAT SERPL-MCNC: 5.6 MG/DL (ref 0.5–1.4)
DIFFERENTIAL METHOD: ABNORMAL
EOSINOPHIL # BLD AUTO: 0.1 K/UL (ref 0–0.5)
EOSINOPHIL NFR BLD: 1.7 % (ref 0–8)
ERYTHROCYTE [DISTWIDTH] IN BLOOD BY AUTOMATED COUNT: 15.4 % (ref 11.5–14.5)
EST. GFR  (AFRICAN AMERICAN): 8 ML/MIN/1.73 M^2
EST. GFR  (NON AFRICAN AMERICAN): 7 ML/MIN/1.73 M^2
GLUCOSE SERPL-MCNC: 76 MG/DL (ref 70–110)
HCT VFR BLD AUTO: 26.5 % (ref 37–48.5)
HGB BLD-MCNC: 7.5 G/DL (ref 12–16)
LYMPHOCYTES # BLD AUTO: 0.4 K/UL (ref 1–4.8)
LYMPHOCYTES NFR BLD: 7.7 % (ref 18–48)
MCH RBC QN AUTO: 29.2 PG (ref 27–31)
MCHC RBC AUTO-ENTMCNC: 28.3 G/DL (ref 32–36)
MCV RBC AUTO: 103 FL (ref 82–98)
MONOCYTES # BLD AUTO: 0.9 K/UL (ref 0.3–1)
MONOCYTES NFR BLD: 17 % (ref 4–15)
NEUTROPHILS # BLD AUTO: 3.8 K/UL (ref 1.8–7.7)
NEUTROPHILS NFR BLD: 73.4 % (ref 38–73)
PHOSPHATE SERPL-MCNC: 3.5 MG/DL (ref 2.7–4.5)
PLATELET # BLD AUTO: 261 K/UL (ref 150–350)
PMV BLD AUTO: 9.5 FL (ref 9.2–12.9)
POCT GLUCOSE: 120 MG/DL (ref 70–110)
POCT GLUCOSE: 60 MG/DL (ref 70–110)
POCT GLUCOSE: 90 MG/DL (ref 70–110)
POCT GLUCOSE: 95 MG/DL (ref 70–110)
POTASSIUM SERPL-SCNC: 4 MMOL/L (ref 3.5–5.1)
RBC # BLD AUTO: 2.57 M/UL (ref 4–5.4)
SODIUM SERPL-SCNC: 141 MMOL/L (ref 136–145)
WBC # BLD AUTO: 5.17 K/UL (ref 3.9–12.7)

## 2019-03-24 PROCEDURE — 21400001 HC TELEMETRY ROOM

## 2019-03-24 PROCEDURE — 25000003 PHARM REV CODE 250: Performed by: INTERNAL MEDICINE

## 2019-03-24 PROCEDURE — 80069 RENAL FUNCTION PANEL: CPT

## 2019-03-24 PROCEDURE — 94640 AIRWAY INHALATION TREATMENT: CPT

## 2019-03-24 PROCEDURE — 85025 COMPLETE CBC W/AUTO DIFF WBC: CPT

## 2019-03-24 PROCEDURE — 36415 COLL VENOUS BLD VENIPUNCTURE: CPT

## 2019-03-24 PROCEDURE — 25000003 PHARM REV CODE 250: Performed by: EMERGENCY MEDICINE

## 2019-03-24 PROCEDURE — 87449 NOS EACH ORGANISM AG IA: CPT

## 2019-03-24 PROCEDURE — 25000242 PHARM REV CODE 250 ALT 637 W/ HCPCS: Performed by: HOSPITALIST

## 2019-03-24 PROCEDURE — 97162 PT EVAL MOD COMPLEX 30 MIN: CPT

## 2019-03-24 PROCEDURE — 94761 N-INVAS EAR/PLS OXIMETRY MLT: CPT

## 2019-03-24 RX ORDER — BUSPIRONE HYDROCHLORIDE 5 MG/1
10 TABLET ORAL 2 TIMES DAILY
Status: DISCONTINUED | OUTPATIENT
Start: 2019-03-25 | End: 2019-03-25 | Stop reason: HOSPADM

## 2019-03-24 RX ADMIN — Medication 1 CAPSULE: at 09:03

## 2019-03-24 RX ADMIN — ATORVASTATIN CALCIUM 10 MG: 10 TABLET, FILM COATED ORAL at 09:03

## 2019-03-24 RX ADMIN — LEVALBUTEROL HYDROCHLORIDE 0.63 MG: 0.63 SOLUTION RESPIRATORY (INHALATION) at 05:03

## 2019-03-24 RX ADMIN — AMIODARONE HYDROCHLORIDE 200 MG: 200 TABLET ORAL at 09:03

## 2019-03-24 RX ADMIN — LEVALBUTEROL HYDROCHLORIDE 0.63 MG: 0.63 SOLUTION RESPIRATORY (INHALATION) at 09:03

## 2019-03-24 RX ADMIN — Medication 125 MG: at 04:03

## 2019-03-24 RX ADMIN — BUSPIRONE HYDROCHLORIDE 10 MG: 5 TABLET ORAL at 09:03

## 2019-03-24 RX ADMIN — CINACALCET HYDROCHLORIDE 30 MG: 30 TABLET, COATED ORAL at 09:03

## 2019-03-24 RX ADMIN — SEVELAMER CARBONATE 800 MG: 800 TABLET, FILM COATED ORAL at 09:03

## 2019-03-24 RX ADMIN — APIXABAN 2.5 MG: 2.5 TABLET, FILM COATED ORAL at 09:03

## 2019-03-24 RX ADMIN — METOPROLOL SUCCINATE 12.5 MG: 25 TABLET, EXTENDED RELEASE ORAL at 09:03

## 2019-03-24 RX ADMIN — PAROXETINE HYDROCHLORIDE 30 MG: 10 SUSPENSION ORAL at 09:03

## 2019-03-24 RX ADMIN — PANTOPRAZOLE SODIUM 40 MG: 40 TABLET, DELAYED RELEASE ORAL at 09:03

## 2019-03-24 RX ADMIN — SEVELAMER CARBONATE 800 MG: 800 TABLET, FILM COATED ORAL at 04:03

## 2019-03-24 RX ADMIN — MONTELUKAST SODIUM 10 MG: 10 TABLET, FILM COATED ORAL at 09:03

## 2019-03-24 NOTE — PLAN OF CARE
Problem: Adult Inpatient Plan of Care  Goal: Absence of Hospital-Acquired Illness or Injury    Intervention: Identify and Manage Fall Risk     03/24/19 0444   Optimize Balance and Safe Activity   Safety Promotion/Fall Prevention assistive device/personal item within reach;bed alarm set;medications reviewed;side rails raised x 2;bedside commode chair

## 2019-03-24 NOTE — ASSESSMENT & PLAN NOTE
2/2 pulmonary edema + pleural effusions  Patient does not urinate and is HD dependent. Echo with significant mitral valve regurgitation, diastolic dysfunction and pulmonary HTN. AFib with RVR also contributed. Responded well to cardioversion and currently on PO amio and metoprolol. On low dose apixaban for stroke prophylaxis. UF via HD.   Qualifies for home O2. Will set up prior to discharge.

## 2019-03-24 NOTE — PROGRESS NOTES
[facility] Hospital Medicine  Progress Note    Patient Name: Eli Vaz  MRN: [id number]  Patient Class: IP- Inpatient   Admission Date: 3/19/2019  Length of Stay: 5 days  Attending Physician: Carmelita Candelaria MD  Primary Care Provider: Chiara Nelson MD        Subjective:     Principal Problem:Shortness of breath    HPI:    Eli Vaz is a 73 y.o. female that (in part)  has a past medical history of Arthritis, Atrial fibrillation, COPD (chronic obstructive pulmonary disease), Diabetes mellitus type II, Dialysis patient, Encounter for blood transfusion, and ESRD (end stage renal disease).  has a past surgical history that includes Tubal ligation and AV fistula placement. Presents to [facility] Emergency Department complaining of shortness of breath.  She has been nauseous and febrile as well.  She was admitted last week for atrial fibrillation as well as respiratory infection.  Reports compliance with home medications.  She is awaiting arrival of home oxygen.  She is high by primary care physician yesterday and subsequently went to dialysis.  She became severely shortness of breath and was sent to the emergency department.     Description of symptoms    Location:  Lungs/chest  Onset:  Subacute onset with progressive worsening  Character:  Moderate to severe intensity shortness of breath  Frequency:  Daily frequency, but worse today.  She has had multiple hospitalizations this year for similar presentations.  Duration:  Constant   Associated Symptoms:  Nausea and fever  Radiation:  Throughout chest  Exacerbating factors:  Worsened with exertion  Relieving factors:  Some relief given with supplemental oxygen in ED.          In the emergency department routine laboratory studies, chest x-ray, EKG, cardiac enzymes were obtained.  There was concern for past medications on chest x-ray and given the tachycardia, tachypnea, and fever broad-spectrum empiric antibiotics were  initiated after cultures were obtained.  The patient is not make urine and therefore urinalysis was not obtained.  A CT of the chest was ordered to further evaluate for pneumonia.    Hospital medicine has been asked to admit for further evaluation and treatment.     Hospital Course:  74 y/o female presented with shortness of breath. CT chest showed bilateral pleural effusions despite completing dialysis PTA.   Admitted on empiric ABx's for possible hospital acquired pneumonia.  Nephrology consulted for HD.  Workup not consistent with ACS thus far.  Patient was then noted to be in Afib with RVR.  Transferred to ICU and Cardiology consulted.  Started on Amiodarone infusion.  Patient already on Apixaban.  Patient is s/p successful cardioversion on 3/21. Stepped down to floor in stable condition.         Interval History: watery diarrhea today. No abd pain. Afebrile. Denied bloody diarrhea. Hgb 7.5 today. BP is much better    Review of Systems   HENT: Negative for ear discharge and ear pain.    Eyes: Negative for itching.   Respiratory: Negative for shortness of breath.    Endocrine: Negative for polyphagia and polyuria.   Neurological: Negative for seizures and syncope.     Objective:     Vital Signs (Most Recent):  Temp: 97.7 °F (36.5 °C) (03/24/19 1223)  Pulse: 70 (03/24/19 1223)  Resp: 19 (03/24/19 1223)  BP: 127/62 (03/24/19 1223)  SpO2: 95 % (03/24/19 1223) Vital Signs (24h Range):  Temp:  [97.7 °F (36.5 °C)-99.4 °F (37.4 °C)] 97.7 °F (36.5 °C)  Pulse:  [70-75] 70  Resp:  [17-22] 19  SpO2:  [93 %-99 %] 95 %  BP: (104-127)/(51-70) 127/62     Weight: 60 kg (132 lb 4.4 oz)  Body mass index is 22.71 kg/m².  No intake or output data in the 24 hours ending 03/24/19 1545   Physical Exam   Constitutional: She is oriented to person, place, and time. No distress.   HENT:   Head: Normocephalic and atraumatic.   Eyes: Conjunctivae are normal. Right eye exhibits no discharge. Left eye exhibits no discharge.   Neck: Neck  supple. No tracheal deviation present.   Cardiovascular: Normal rate and regular rhythm.   Pulmonary/Chest: Effort normal.   Decreased BS bilateral bases  Breathing comfortably on low flow NC  Speaking in full sentences   Abdominal: Soft. She exhibits no distension. There is no tenderness.   Hyperactive bowel sounds   Musculoskeletal: Normal range of motion.   Neurological: She is alert and oriented to person, place, and time.   Skin: Skin is warm and dry. She is not diaphoretic.       Significant Labs: All pertinent labs within the past 24 hours have been reviewed.    Significant Imaging: I have reviewed all pertinent imaging results/findings within the past 24 hours.  I have reviewed and interpreted all pertinent imaging results/findings within the past 24 hours.    Assessment/Plan:      * Acute respiratory failure with hypoxia  2/2 pulmonary edema + pleural effusions  Patient does not urinate and is HD dependent. Echo with significant mitral valve regurgitation, diastolic dysfunction and pulmonary HTN. AFib with RVR also contributed. Responded well to cardioversion and currently on PO amio and metoprolol. On low dose apixaban for stroke prophylaxis. UF via HD.   Qualifies for home O2. Will set up prior to discharge.       Diarrhea  R/o CDiff  Start empiric oral vanc pending results      Benign essential hypertension  Currently well controlled without antihypertensive therapy  Continue current management.      Atrial fibrillation with RVR  Treatment as above.      Acute cardiac pulmonary edema  As above      Bilateral pleural effusion  Repeat CXR. Poor candidate for thoracentesis due to NOAC and likelihood for recurrence 2/2 HF. UF via HD      Acute exacerbation of CHF (congestive heart failure)  Acute on chronic diastolic CHF as above.      Shortness of breath  As above      Combined hyperlipidemia associated with type 2 diabetes mellitus        Diabetes mellitus with ESRD (end-stage renal disease)  Type 2 well  controlled.  Continue diabetic diet and insulin sliding scale.      Anemia of chronic disease  No bleeding however Hgb lower  2/2 dialysis?  CBC in AM      ESRD on dialysis  Per nephro  HD every TTS      COPD (chronic obstructive pulmonary disease)  No wheezing on exam.  Add prn duonebs.      VTE Risk Mitigation (From admission, onward)        Ordered     IP VTE HIGH RISK PATIENT  Once      03/20/19 0039     Reason for No Pharmacological VTE Prophylaxis  Once      03/20/19 0039     apixaban tablet 2.5 mg  2 times daily      03/20/19 0039        Discharge home 1-2 days with GIL Lucero MD  Department of Hospital Medicine   [facility]

## 2019-03-24 NOTE — PLAN OF CARE
Problem: Adult Inpatient Plan of Care  Goal: Plan of Care Review  Outcome: Ongoing (interventions implemented as appropriate)  Patient is A/Ox4, remains free from falls, is afebrile, states no pain.  Patient O2 at 1L, 96% saturation when in bed.  Patient continues to get SOB when OOB, requested 2 PRN breathing tx today for SOB.  Patient not tolerating diet well, is having frequent loose watery stools after eating.  C-Diff being ruled out, isolation status initiated and maintained.  Patient skin remains intact.

## 2019-03-24 NOTE — PLAN OF CARE
Problem: Physical Therapy Goal  Goal: Physical Therapy Goal  Goals to be met by: 19     Patient will increase functional independence with mobility by performin. Supine to sit with Modified Snyder  2. Rolling to Left and Right with Modified Snyder  3. Sit to stand transfer with Modified Snyder  4. Bed to chair transfer with Modified Snyder   5. Gait  x250 feet with Modified Snyder using Rolling Walker   6. Lower extremity exercise program 3 sets x10 reps per handout, with independence    Pt ambulated ~70 ft with CGA using RW and 2L O2 NC, unable to obtain walking O2 stats.

## 2019-03-24 NOTE — NURSING
Patient has had multiple loose stools this shift.  C-Diff order panel set placed to R/O C-Diff.  Patient placed on contact (+) isolation, waiting for stool sample to be obtained.

## 2019-03-24 NOTE — SUBJECTIVE & OBJECTIVE
Interval History: watery diarrhea today. No abd pain. Afebrile. Denied bloody diarrhea. Hgb 7.5 today. BP is much better    Review of Systems   HENT: Negative for ear discharge and ear pain.    Eyes: Negative for itching.   Respiratory: Negative for shortness of breath.    Endocrine: Negative for polyphagia and polyuria.   Neurological: Negative for seizures and syncope.     Objective:     Vital Signs (Most Recent):  Temp: 97.7 °F (36.5 °C) (03/24/19 1223)  Pulse: 70 (03/24/19 1223)  Resp: 19 (03/24/19 1223)  BP: 127/62 (03/24/19 1223)  SpO2: 95 % (03/24/19 1223) Vital Signs (24h Range):  Temp:  [97.7 °F (36.5 °C)-99.4 °F (37.4 °C)] 97.7 °F (36.5 °C)  Pulse:  [70-75] 70  Resp:  [17-22] 19  SpO2:  [93 %-99 %] 95 %  BP: (104-127)/(51-70) 127/62     Weight: 60 kg (132 lb 4.4 oz)  Body mass index is 22.71 kg/m².  No intake or output data in the 24 hours ending 03/24/19 1545   Physical Exam   Constitutional: She is oriented to person, place, and time. No distress.   HENT:   Head: Normocephalic and atraumatic.   Eyes: Conjunctivae are normal. Right eye exhibits no discharge. Left eye exhibits no discharge.   Neck: Neck supple. No tracheal deviation present.   Cardiovascular: Normal rate and regular rhythm.   Pulmonary/Chest: Effort normal.   Decreased BS bilateral bases  Breathing comfortably on low flow NC  Speaking in full sentences   Abdominal: Soft. She exhibits no distension. There is no tenderness.   Hyperactive bowel sounds   Musculoskeletal: Normal range of motion.   Neurological: She is alert and oriented to person, place, and time.   Skin: Skin is warm and dry. She is not diaphoretic.       Significant Labs: All pertinent labs within the past 24 hours have been reviewed.    Significant Imaging: I have reviewed all pertinent imaging results/findings within the past 24 hours.  I have reviewed and interpreted all pertinent imaging results/findings within the past 24 hours.

## 2019-03-24 NOTE — ASSESSMENT & PLAN NOTE
Repeat CXR. Poor candidate for thoracentesis due to NOAC and likelihood for recurrence 2/2 HF. UF via HD

## 2019-03-25 VITALS
DIASTOLIC BLOOD PRESSURE: 59 MMHG | WEIGHT: 131.38 LBS | RESPIRATION RATE: 18 BRPM | TEMPERATURE: 98 F | BODY MASS INDEX: 22.43 KG/M2 | HEIGHT: 64 IN | HEART RATE: 65 BPM | SYSTOLIC BLOOD PRESSURE: 131 MMHG | OXYGEN SATURATION: 95 %

## 2019-03-25 LAB
BASOPHILS # BLD AUTO: 0.02 K/UL (ref 0–0.2)
BASOPHILS NFR BLD: 0.4 % (ref 0–1.9)
C DIFF GDH STL QL: NEGATIVE
C DIFF TOX A+B STL QL IA: NEGATIVE
DIFFERENTIAL METHOD: ABNORMAL
EOSINOPHIL # BLD AUTO: 0.2 K/UL (ref 0–0.5)
EOSINOPHIL NFR BLD: 2.7 % (ref 0–8)
ERYTHROCYTE [DISTWIDTH] IN BLOOD BY AUTOMATED COUNT: 15 % (ref 11.5–14.5)
HCT VFR BLD AUTO: 28 % (ref 37–48.5)
HGB BLD-MCNC: 8 G/DL (ref 12–16)
LYMPHOCYTES # BLD AUTO: 0.8 K/UL (ref 1–4.8)
LYMPHOCYTES NFR BLD: 13.5 % (ref 18–48)
MCH RBC QN AUTO: 29.5 PG (ref 27–31)
MCHC RBC AUTO-ENTMCNC: 28.6 G/DL (ref 32–36)
MCV RBC AUTO: 103 FL (ref 82–98)
MONOCYTES # BLD AUTO: 0.8 K/UL (ref 0.3–1)
MONOCYTES NFR BLD: 14.6 % (ref 4–15)
NEUTROPHILS # BLD AUTO: 3.8 K/UL (ref 1.8–7.7)
NEUTROPHILS NFR BLD: 68.8 % (ref 38–73)
PLATELET # BLD AUTO: 312 K/UL (ref 150–350)
PMV BLD AUTO: 9.6 FL (ref 9.2–12.9)
POCT GLUCOSE: 107 MG/DL (ref 70–110)
POCT GLUCOSE: 77 MG/DL (ref 70–110)
POCT GLUCOSE: 85 MG/DL (ref 70–110)
RBC # BLD AUTO: 2.71 M/UL (ref 4–5.4)
WBC # BLD AUTO: 5.54 K/UL (ref 3.9–12.7)

## 2019-03-25 PROCEDURE — 85025 COMPLETE CBC W/AUTO DIFF WBC: CPT

## 2019-03-25 PROCEDURE — 94761 N-INVAS EAR/PLS OXIMETRY MLT: CPT

## 2019-03-25 PROCEDURE — 27000221 HC OXYGEN, UP TO 24 HOURS

## 2019-03-25 PROCEDURE — 25000003 PHARM REV CODE 250: Performed by: EMERGENCY MEDICINE

## 2019-03-25 PROCEDURE — 99900035 HC TECH TIME PER 15 MIN (STAT)

## 2019-03-25 PROCEDURE — 25000003 PHARM REV CODE 250: Performed by: INTERNAL MEDICINE

## 2019-03-25 PROCEDURE — 36415 COLL VENOUS BLD VENIPUNCTURE: CPT

## 2019-03-25 PROCEDURE — 97116 GAIT TRAINING THERAPY: CPT

## 2019-03-25 PROCEDURE — 97530 THERAPEUTIC ACTIVITIES: CPT

## 2019-03-25 RX ORDER — LOPERAMIDE HCL 1MG/7.5ML
2 LIQUID (ML) ORAL 4 TIMES DAILY PRN
Status: DISCONTINUED | OUTPATIENT
Start: 2019-03-25 | End: 2019-03-25 | Stop reason: HOSPADM

## 2019-03-25 RX ADMIN — SEVELAMER CARBONATE 800 MG: 800 TABLET, FILM COATED ORAL at 12:03

## 2019-03-25 RX ADMIN — PAROXETINE HYDROCHLORIDE 30 MG: 10 SUSPENSION ORAL at 08:03

## 2019-03-25 RX ADMIN — PANTOPRAZOLE SODIUM 40 MG: 40 TABLET, DELAYED RELEASE ORAL at 08:03

## 2019-03-25 RX ADMIN — SEVELAMER CARBONATE 800 MG: 800 TABLET, FILM COATED ORAL at 05:03

## 2019-03-25 RX ADMIN — Medication 125 MG: at 06:03

## 2019-03-25 RX ADMIN — Medication 2 MG: at 12:03

## 2019-03-25 RX ADMIN — APIXABAN 2.5 MG: 2.5 TABLET, FILM COATED ORAL at 08:03

## 2019-03-25 RX ADMIN — AMIODARONE HYDROCHLORIDE 200 MG: 200 TABLET ORAL at 08:03

## 2019-03-25 RX ADMIN — SEVELAMER CARBONATE 800 MG: 800 TABLET, FILM COATED ORAL at 08:03

## 2019-03-25 RX ADMIN — METOPROLOL SUCCINATE 12.5 MG: 25 TABLET, EXTENDED RELEASE ORAL at 08:03

## 2019-03-25 RX ADMIN — CINACALCET HYDROCHLORIDE 30 MG: 30 TABLET, COATED ORAL at 08:03

## 2019-03-25 RX ADMIN — Medication 125 MG: at 12:03

## 2019-03-25 RX ADMIN — Medication 1 CAPSULE: at 08:03

## 2019-03-25 RX ADMIN — BUSPIRONE HYDROCHLORIDE 10 MG: 10 TABLET ORAL at 08:03

## 2019-03-25 RX ADMIN — ATORVASTATIN CALCIUM 10 MG: 10 TABLET, FILM COATED ORAL at 08:03

## 2019-03-25 NOTE — PHYSICIAN QUERY
PT Name: Eli Vaz  MR #: [id number]    Physician Query Form -Present on Admission (POA) Diagnosis Clarification     CDS/: Bhakti Goetz RN              Contact information: [phone]    This form is a permanent document in the medical record.     Query Date: March 25, 2019    By submitting this query, we are merely seeking further clarification of documentation. Please utilize your independent clinical judgment when addressing the question(s) below.       The Medical record contains the following:    Diagnosis      Supporting Clinical Information   Location in Medical Record     Acute respiratory failure with hypoxia         * Acute respiratory failure with hypoxia    2/2 pulmonary edema + pleural effusions    Patient does not urinate and is HD dependent. Echo with significant mitral valve regurgitation, diastolic dysfunction and pulmonary HTN. AFib with RVR also contributed. Responded well to cardioversion and currently on PO amio and metoprolol. On low dose  apixaban for stroke prophylaxis. UF via HD.    Qualifies for home O2. Will set up prior to discharge      -- Lungs:  Decreased breath sounds to auscultation bilaterally. Bibasilar crackles.   normal respiratory effort. No use of accessory muscles.     Shortness of breath   Due to multifactorial etiology including acute exacerbation of CHF, acute cardiac pulmonary edema secondary to ESRD and CHF, bilateral pleural effusion, atrial fibrillation, and possible influenza       High Risk Conditions:   Patient has a condition that poses threat to life and bodily function: Severe Respiratory Distress          Progress Note 3/24       Hospital Medicine                          H&P 3/20          H&P 3/20                H&P 3/20         Doctor, Please specify Present On Admission (POA) status of ___Acute respiratory failure with hypoxia   _____________________.    [ x ] Present on Admission    [  ] Not Present on Admission   [  ] Clinically Undetermined

## 2019-03-25 NOTE — PLAN OF CARE
Problem: Physical Therapy Goal  Goal: Physical Therapy Goal  Goals to be met by: 19     Patient will increase functional independence with mobility by performin. Supine to sit with Modified Dunn  2. Rolling to Left and Right with Modified Dunn  3. Sit to stand transfer with Modified Dunn  4. Bed to chair transfer with Modified Dunn   5. Gait  x250 feet with Modified Dunn using Rolling Walker   6. Lower extremity exercise program 3 sets x10 reps per handout, with independence     Outcome: Ongoing (interventions implemented as appropriate)  Pt ambulated ~ 60 ft with RW , CGA (2L O2 NC).

## 2019-03-25 NOTE — PLAN OF CARE
Problem: Infection  Goal: Infection Symptom Resolution  Outcome: Outcome(s) achieved Date Met: 03/25/19  Intervention: Prevent or Manage Infection     03/25/19 2048   Prevent or Manage Infection   Infection Management aseptic technique maintained   Manage Diarrhea   Isolation Precautions contact precautions maintained

## 2019-03-25 NOTE — PROGRESS NOTES
Call received from Rosie at [facility]. Patient's home oxygen approved by PHN- Auth #- [id number].     [facility] [place] contacted Hermilo in Respiratory to pull patient's oxygen. Approved by Lara with [facility].

## 2019-03-25 NOTE — PT/OT/SLP PROGRESS
Occupational Therapy      Patient Name:  Eli Vaz   MRN:  [id number]    Patient not seen today secondary to   patient refusal. The patient states she walked already with PT and is ready to go home, The patient denies need for OT evaluation or a BSC.for home uise.     Sangeeta Benton, OT  3/25/2019

## 2019-03-25 NOTE — PT/OT/SLP PROGRESS
Physical Therapy Treatment    Patient Name:  Eli Vaz   MRN:  [id number]    Recommendations:     Discharge Recommendations:  home health PT(with family assistance)   Discharge Equipment Recommendations: commode, tub bench   Barriers to discharge: None    Assessment:     Eli Vaz is a 73 y.o. female admitted with a medical diagnosis of Acute respiratory failure with hypoxia.  She presents with the following impairments/functional limitations:  weakness, impaired endurance, impaired self care skills, gait instability, decreased upper extremity function, decreased lower extremity function, decreased safety awareness, impaired cardiopulmonary response to activity .    Rehab Prognosis: Good; patient would benefit from acute skilled PT services to address these deficits and reach maximum level of function.    Recent Surgery: Procedure(s) (LRB):  TRANSESOPHAGEAL ECHOCARDIOGRAM WITH POSSIBLE CARDIOVERSION (ISAMAR W/ POSS CARDIOVERSION) (N/A) 4 Days Post-Op    Plan:     During this hospitalization, patient to be seen 5 x/week(M-F) to address the identified rehab impairments via gait training, therapeutic activities, therapeutic exercises and progress toward the following goals:    · Plan of Care Expires:  04/07/19    Subjective     Chief Complaint: did not get enough sleep   Patient/Family Comments/goals: to go home   Pain/Comfort:  · Pain Rating 1: 0/10  · Pain Rating Post-Intervention 1: 0/10      Objective:     Communicated with nurse Trujillo prior to session.  Patient found with bed in chair position with telemetry, oxygen upon PT entry to room.     General Precautions: Standard, fall, respiratory   Orthopedic Precautions:N/A   Braces: N/A     Functional Mobility:  · Bed Mobility:     · Scooting: supervision  · Supine to Sit: supervision  · Transfers:     · Sit to Stand:  stand by assistance with no AD and rolling walker. VC's for safety   · Toilet Transfer: contact guard assistance with  rolling walker  using  Step  Transfer  · Gait: pt ambulated ~ 60 ft with RW, CGA (2L O2NC ). Noted with decreased merrill, decreased step length. VCs for safety and sequence.    · Balance: good in sitting, fair /fair+ in standing.       AM-PAC 6 CLICK MOBILITY  Turning over in bed (including adjusting bedclothes, sheets and blankets)?: 4  Sitting down on and standing up from a chair with arms (e.g., wheelchair, bedside commode, etc.): 4  Moving from lying on back to sitting on the side of the bed?: 4  Moving to and from a bed to a chair (including a wheelchair)?: 3  Need to walk in hospital room?: 3  Climbing 3-5 steps with a railing?: 3  Basic Mobility Total Score: 21       Therapeutic Activities and Exercises:   pt performed bed mobility, transfer and gait training as above.  Pt tolerated standing balance to get clean up after using BM,SBA for balance however required total A for wiping ( pt stated her shoulder hurt  if she reach back to clean herself  )  .  Pt performed seated BLE x 20 reps : AP, pillow squeezes.   Educated pt on Safety awareness with all OOB mobility, transfer and gait training.     Patient left up in chair with all lines intact, call button in reach, nurse Ronnie notified and family present..    GOALS:   Multidisciplinary Problems     Physical Therapy Goals        Problem: Physical Therapy Goal    Goal Priority Disciplines Outcome Goal Variances Interventions   Physical Therapy Goal     PT, PT/OT Ongoing (interventions implemented as appropriate)     Description:  Goals to be met by: 19     Patient will increase functional independence with mobility by performin. Supine to sit with Modified Arbuckle  2. Rolling to Left and Right with Modified Arbuckle  3. Sit to stand transfer with Modified Arbuckle  4. Bed to chair transfer with Modified Arbuckle   5. Gait  x250 feet with Modified Arbuckle using Rolling Walker   6. Lower extremity exercise program 3 sets x10 reps per handout, with  independence                      Time Tracking:     PT Received On: 03/25/19  PT Start Time: 1127     PT Stop Time: 1153  PT Total Time (min): 26 min     Billable Minutes: Gait Training 14 and Therapeutic Activity 12    Treatment Type: Treatment  PT/PTA: PTA     PTA Visit Number: 1     Lisa Forte, PTA  03/25/2019

## 2019-03-25 NOTE — PROGRESS NOTES
WRITTEN HEALTHCARE DISCHARGE INFORMATION     Things that YOU are responsible for to Manage Your Care At Home:  1. Getting your prescriptions filled.  2. Taking you medications as directed. DO NOT MISS ANY DOSES!  3. Going to your follow-up doctor appointments. This is important because it allows the doctor to monitor your progress and to determine if any changes need to be made to your treatment plan.    If you are unable to make your follow up appointments, please call the number listed and reschedule this appointment.     After discharge, if you need assistance, you can call [facility] Line for 24/7 assistance at [phone]    Thank you for choosing [facility] and allowing us to care for you.   From your care manager: Sujata RODRIGUEZ,TN @ [phone]     You should receive a call from [facility] within 48-72 hours to help manage your care after discharge. Please try to make sure that you answer your phone for this important phone call.     Follow-up Information     Chiara Nelson MD On 3/26/2019.    Specialty:  Family Medicine  Why:  On Tuesdsay @ 9:20am, outpatient services  Contact information:  [address] [facility] [address]  [phone]             Elgin Garcia MD On 4/3/2019.    Specialty:  Cardiology  Why:  On Wednesday @ 10:00am, outpatient services  Contact information:  [address]  [address]  [facility] [address]  [phone]             [facility] .    Why:  Resume dialysis on your regular scheduled days on TTAT           [facility]    Specialty:  DME Provider  Why:  Please call if you have questions regarding your home healh   Contact information:  [address]  [address]  [facility] [address]  [phone]

## 2019-03-25 NOTE — PROGRESS NOTES
Pt and family members expressed disappointment for having to redo this test for the third time in less than 24 hours.  RN informed  02 sats on RA at rest= 91%  02 sats on RA with exercise=86%  02 sats on 2 L with exercise=94%

## 2019-03-25 NOTE — PROGRESS NOTES
TN faxed patient's clinicals (Facesheet, HP, MD notes, MAR, POC, and PT notes) to [facility] @ [phone] for Home Health auth. Awaiting feedback.    1415- Call received from [facility]. Patient accepted to [facility].

## 2019-03-25 NOTE — PLAN OF CARE
1430- TN contacted Rosario with [facility] to inform of patient's oxygen order. Rosario stated she has to acquire authorization from [facility]. Awaiting auth from [facility].          03/25/19 8139   Post-Acute Status   Post-Acute Authorization Home Health/Hospice   Home Health/Hospice Status Set-up Complete   Discharge Delays (!) Home Medical Equipment (Insurance, Delivery)  (Awaiting auth from [facility] for home oxygen )

## 2019-03-25 NOTE — PLAN OF CARE
"TN reviewed follow up appointment information as well as  "Pneumonia discharge instructions" handout with patient using teach back. Patient stated she will notify the doctor if she has chest pain, trouble breathing, and discoloration of her fingertips and lips.  Patient is in agreement and verbalized an understanding. Placed discharge information in blue discharge folder.  TN also reviewed patient responsibility checklist with her using teach back. Patient was able to verbalize her responsibilities after discharge to manage her care at home bein. Going to follow up appointments   2. Picking up rx from the pharmacy when discharged  3. Taking her medications as prescribed     Patient informed that a nurse from [facility] will contact her to arrange her home visits.        19 1420   Final Note   Assessment Type Final Discharge Note   Anticipated Discharge Disposition Home-Health   What phone number can be called within the next 1-3 days to see how you are doing after discharge?   ([phone])   Hospital Follow Up  Appt(s) scheduled? Yes   Discharge plans and expectations educations in teach back method with documentation complete? Yes   Right Care Referral Info   Post Acute Recommendation Home-care   Facility Name   ([facility])     "

## 2019-03-25 NOTE — PLAN OF CARE
[facility]    HOME HEALTH ORDERS  FACE TO FACE ENCOUNTER    Patient Name: Eli Vaz  YOB: 1945    PCP: Chiara Nelson MD   PCP Address: [address]  PCP Phone Number: [phone]  PCP Fax: [phone]    Encounter Date: 03/25/2019    Admit to Home Health    Diagnoses:  Active Hospital Problems    Diagnosis  POA    *Acute respiratory failure with hypoxia [J96.01]  Yes    Diarrhea [R19.7]  No    Benign essential hypertension [I10]  Yes     Chronic    Acute exacerbation of CHF (congestive heart failure) [I50.9]  Yes    Bilateral pleural effusion [J90]  Yes    Acute cardiac pulmonary edema [I50.1]  Yes    Elevated troponin [R74.8]  Yes    Elevated brain natriuretic peptide (BNP) level [R79.89]  Yes    Sepsis [A41.9]  Yes    Atrial fibrillation with RVR [I48.91]  Yes    Atrial fibrillation [I48.91]  Yes    Non-rheumatic mitral regurgitation [I34.0]  Yes    Shortness of breath [R06.02]  Yes    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  Followed by Dr. Arthur      COPD (chronic obstructive pulmonary disease) [J44.9]  Yes      Resolved Hospital Problems   No resolved problems to display.       Future Appointments   Date Time Provider Department Center   3/26/2019  9:20 AM Chiara Nelson MD [facility]   3/29/2019  2:00 PM [facility] XR1 [facility] XRAY [facility]   4/1/2019  1:20 PM Charles Moody Jr., MD [facility]   4/3/2019 10:00 AM Elgin Garcia MD [facility] CARDIO [facility] [place]   5/6/2019 10:30 AM Isrrael Barton Jr., MD [facility]     Follow-up Information     Chiara Nelson MD On  3/26/2019.    Specialty:  Family Medicine  Why:  On Tuesdsay @ 9:20am, outpatient services  Contact information:  [address]  [place] [address]  [phone]             Elgin Garcia MD On 4/3/2019.    Specialty:  Cardiology  Why:  On Wednesday @ 10:00am, outpatient services  Contact information:  [address]  [address]  [place] [address]  [phone]             [facility] .    [facility]                   I have seen and examined this patient face to face today. My clinical findings that support the need for the home health skilled services and home bound status are the following:  Weakness/numbness causing balance and gait disturbance due to Heart Failure, COPD Exacerbation and Weakness/Debility making it taxing to leave home.  Requiring assistive device to leave home due to unsteady gait caused by  Heart Failure, COPD Exacerbation and Weakness/Debility.  Patient with medication mismanagement issues requiring home bound status as evidenced by  Unstable vital signs (blood pressure, heart rate) and Poor understanding of medication regimen/dosage.  Medical restrictions requiring assistance of another human to leave home due to  Dyspnea on exertion (SOB), Fluid/volume overload and Home oxygen requirement.  Mental confusion making it unsafe for patient to leave home alone due to  cognitive impairment.    Allergies:Review of patient's allergies indicates:  No Known Allergies    Diet: cardiac diet and renal diet    Activities: ambulate in house with assistance    Nursing:   SN to complete comprehensive assessment including routine vital signs. Instruct on disease process and s/s of complications to report to MD. Review/verify medication list sent home with the patient at time of discharge  and instruct patient/caregiver as needed. Frequency may be adjusted depending on start of care date.    Notify MD if SBP > 160 or < 90; DBP > 90 or < 50; HR > 120 or  < 50; Temp > 101      CONSULTS:    Physical Therapy to evaluate and treat. Evaluate for home safety and equipment needs; Establish/upgrade home exercise program. Perform / instruct on therapeutic exercises, gait training, transfer training, and Range of Motion.  Occupational Therapy to evaluate and treat. Evaluate home environment for safety and equipment needs. Perform/Instruct on transfers, ADL training, ROM, and therapeutic exercises.    MISCELLANEOUS CARE:  Home Oxygen:  Oxygen at 3 L/min nasal canula to be used:  As needed for SOB. and Assess oxygen saturation via pulse oximeter as needed for increase in SOB.    Medications: Review discharge medications with patient and family and provide education.      Current Discharge Medication List      START taking these medications    Details   loperamide (IMODIUM) 1 mg/5 mL solution Take 10 mLs (2 mg total) by mouth 4 (four) times daily as needed for Diarrhea.  Refills: 0         CONTINUE these medications which have NOT CHANGED    Details   albuterol-ipratropium (DUO-NEB) 2.5 mg-0.5 mg/3 mL nebulizer solution Take 3 mLs by nebulization every 6 (six) hours as needed for Wheezing or Shortness of Breath. Rescue  Qty: 1 Box, Refills: 2    Associated Diagnoses: Chronic bronchitis with COPD (chronic obstructive pulmonary disease)      amiodarone (PACERONE) 200 MG Tab Take 1 tablet (200 mg total) by mouth 2 (two) times daily. for 14 days  Qty: 28 tablet, Refills: 0    From 4/7/2019 to 4/21/2019   amiodarone (PACERONE) 200 MG Tab Take 1 tablet (200 mg total) by mouth once daily.  Qty: 30 tablet, Refills: 5    From 4/21/2019 to indefinitely   amiodarone (PACERONE) 400 MG tablet Take 1 tablet (400 mg total) by mouth 2 (two) times daily. for 14 days  Qty: 28 tablet, Refills: 0    From 3/24/2019 to 4/7/2019   apixaban (ELIQUIS) 2.5 mg Tab Take 1 tablet (2.5 mg total) by mouth 2 (two) times daily.  Qty: 60 tablet, Refills: 5      atorvastatin (LIPITOR) 10 MG tablet Take 1 tablet  (10 mg total) by mouth once daily.  Qty: 90 tablet, Refills: 1    Comments: **Patient requests 90 days supply**  Associated Diagnoses: Combined hyperlipidemia associated with type 2 diabetes mellitus      fluticasone (FLONASE) 50 mcg/actuation nasal spray 1 spray by Each Nare route 2 (two) times daily.  Qty: 1 Bottle, Refills: 1    Associated Diagnoses: Cough      hydrOXYzine pamoate (VISTARIL) 25 MG Cap Take 1 capsule (25 mg total) by mouth every 8 (eight) hours as needed (panic attack).  Qty: 30 capsule, Refills: 1    Associated Diagnoses: Adjustment disorder with mixed anxiety and depressed mood      levocetirizine (XYZAL) 5 MG tablet Take 1 tablet (5 mg total) by mouth every evening.  Qty: 30 tablet, Refills: 2    Associated Diagnoses: Chronic obstructive pulmonary disease, unspecified COPD type      meclizine (ANTIVERT) 25 mg tablet Take 1 tablet (25 mg total) by mouth 3 (three) times daily as needed.  Qty: 20 tablet, Refills: 0      metoprolol succinate (TOPROL-XL) 25 MG 24 hr tablet Take 25 mg by mouth daily.      mirtazapine (REMERON) 7.5 MG Tab Take 7.5 mg by mouth nightly.       montelukast (SINGULAIR) 10 mg tablet TAKE 1 TABLET BY MOUTH EVERY DAY IN THE EVENING  Qty: 30 tablet, Refills: 2    Associated Diagnoses: Chronic obstructive pulmonary disease, unspecified COPD type      nut.tx.imp.renal fxn,lac-reduc (NEPRO CARB STEADY) 0.08 gram-1.8 kcal/mL Liqd Take 1 Can by mouth 3 (three) times daily with meals.  Qty: 30 Can, Refills: 11    Associated Diagnoses: ESRD on dialysis      pantoprazole (PROTONIX) 40 MG tablet Take 40 mg by mouth.      paroxetine (PAXIL) 30 MG tablet 1 TABLET IN THE MORNING ONCE A DAY ORALLY 90  Refills: 3      promethazine (PHENERGAN) 12.5 MG Tab Take 12.5 mg by mouth every 6 (six) hours as needed for nausea.      SENSIPAR 60 mg Tab Take 30 mg by mouth daily with breakfast.       sevelamer carbonate (RENVELA) 800 mg Tab Take 800 mg by mouth 3 (three) times daily with meals.       SODIUM BICARBONATE ORAL Take 650 mg by mouth three times a day.      traMADol (ULTRAM) 50 mg tablet Take 1 tablet by mouth.      traZODone (DESYREL) 50 MG tablet Take 1 tablet (50 mg total) by mouth nightly as needed for Insomnia.  Qty: 30 tablet, Refills: 3    Associated Diagnoses: Adjustment disorder with mixed anxiety and depressed mood                 vit B,C-iron fum-FA-D3-zinc ox 8 mg iron-800 mcg-1,000 unit Tab Take by mouth daily.             I certify that this patient is confined to her home and needs physical therapy and occupational therapy.

## 2019-03-25 NOTE — PROGRESS NOTES
TN taught Symptoms and Problems for Pneumonia home care with pt and spouse/Kai and son/Kai Jr.  with teach back:  1. Temp of 102.5,  2. Coughing up brown sputum, 3. Labored breathing, 4. Wheezing. TN placed education sheet in [employer]..     Help at home will be from spouse,/ Kai, assisting in pt's recovery.     TN taught patient about things she is responsible for when discharged TO HELP WITH her RECOVERY:  Particularly on how to Manage her Care At Home:  1. Getting his prescriptions filled.  2. Taking his medications as directed. DO NOT MISS ANY DOSES!  3. Going to his follow-up doctor appointments.   .  Orquidea Lindsay, RN, BSN, STN CCM

## 2019-03-25 NOTE — PROGRESS NOTES
TN contacted N to inquire of patient's authorization for home oxygen, spoke with Nidia. Nidia stated she will have someone review patient's request.

## 2019-03-25 NOTE — PLAN OF CARE
Problem: Fall Injury Risk  Goal: Absence of Fall and Fall-Related Injury    Intervention: Identify and Manage Contributors to Fall Injury Risk     03/25/19 0758   Manage Acute Allergic Reaction   Medication Review/Management medications reviewed   Identify and Manage Contributors to Fall Injury Risk   Self-Care Promotion BADL personal objects within reach         Comments: Pt encouraged to use call light when ambulating

## 2019-03-25 NOTE — PROGRESS NOTES
Patient's nurse Ronnie informed that patient can discharge from  standpoint when patient's oxygen is delivered to patient's room.

## 2019-03-26 NOTE — DISCHARGE SUMMARY
[facility] Hospital Medicine  Discharge Summary      Patient Name: Eli Vaz  MRN: [id number]  Admission Date: 3/19/2019  Hospital Length of Stay: 6 days  Discharge Date and Time:  03/25/2019 11:54 AM  Attending Physician: Treva att. providers found   Discharging Provider: Carmelita Lucero MD  Primary Care Provider: Chiara Nelson MD      HPI:     Eli Vaz is a 73 y.o. female that (in part)  has a past medical history of Arthritis, Atrial fibrillation, COPD (chronic obstructive pulmonary disease), Diabetes mellitus type II, Dialysis patient, Encounter for blood transfusion, and ESRD (end stage renal disease).  has a past surgical history that includes Tubal ligation and AV fistula placement. Presents to [facility] Emergency Department complaining of shortness of breath.  She has been nauseous and febrile as well.  She was admitted last week for atrial fibrillation as well as respiratory infection.  Reports compliance with home medications.  She is awaiting arrival of home oxygen.  She is high by primary care physician yesterday and subsequently went to dialysis.  She became severely shortness of breath and was sent to the emergency department.     Description of symptoms    Location:  Lungs/chest  Onset:  Subacute onset with progressive worsening  Character:  Moderate to severe intensity shortness of breath  Frequency:  Daily frequency, but worse today.  She has had multiple hospitalizations this year for similar presentations.  Duration:  Constant   Associated Symptoms:  Nausea and fever  Radiation:  Throughout chest  Exacerbating factors:  Worsened with exertion  Relieving factors:  Some relief given with supplemental oxygen in ED.          In the emergency department routine laboratory studies, chest x-ray, EKG, cardiac enzymes were obtained.  There was concern for past medications on chest x-ray and given the tachycardia, tachypnea, and fever broad-spectrum empiric  antibiotics were initiated after cultures were obtained.  The patient is not make urine and therefore urinalysis was not obtained.  A CT of the chest was ordered to further evaluate for pneumonia.    Hospital medicine has been asked to admit for further evaluation and treatment.     Procedure(s) (LRB):  TRANSESOPHAGEAL ECHOCARDIOGRAM WITH POSSIBLE CARDIOVERSION (ISAMAR W/ POSS CARDIOVERSION) (N/A)      Hospital Course:   74 y/o female presented with shortness of breath. CT chest showed bilateral pleural effusions despite completing dialysis PTA.   Admitted on empiric ABx's for possible hospital acquired pneumonia.  Nephrology consulted for HD.  Workup not consistent with ACS thus far.  Patient was then noted to be in Afib with RVR.  Transferred to ICU and Cardiology consulted.  Started on Amiodarone infusion.  Patient already on Apixaban.  Patient is s/p successful cardioversion on 3/21. On Amiodarone and metoprolol PO. Stepped down to floor in stable condition. Developed diarrhea. CDiff negative. Qualified for portable O2. This was provided and home health set up. Is to follow a low sodium diet, in addition to renal. HD every TTS. Activity as tolerated. F/u with cardiology and PCP.          Consults:   Consults (From admission, onward)        Status Ordering Provider     Inpatient consult to Cardiology  Once     Provider:  Elgin Garcia MD    Completed KATE HUBBARD     Inpatient consult to Cardiology  Once     Provider:  Elgin Garcia MD    Completed KATE HUBBARD     Inpatient consult to Social Work  Once     Provider:  (Not yet assigned)    Completed SARAH NARAYAN            Final Active Diagnoses:    Diagnosis Date Noted POA    PRINCIPAL PROBLEM:  Acute respiratory failure with hypoxia [J96.01] 03/24/2019 Yes    Diarrhea [R19.7] 03/24/2019 No    Benign essential hypertension [I10] 03/21/2019 Yes     Chronic    Acute exacerbation of CHF (congestive heart failure) [I50.9] 03/20/2019 Yes     Bilateral pleural effusion [J90] 03/20/2019 Yes    Acute cardiac pulmonary edema [I50.1] 03/20/2019 Yes    Elevated troponin [R74.8] 03/20/2019 Yes    Elevated brain natriuretic peptide (BNP) level [R79.89] 03/20/2019 Yes    Sepsis [A41.9] 03/20/2019 Yes    Atrial fibrillation with RVR [I48.91] 03/20/2019 Yes    Atrial fibrillation [I48.91] 03/19/2019 Yes    Non-rheumatic mitral regurgitation [I34.0] 03/06/2019 Yes    Shortness of breath [R06.02] 02/03/2019 Yes    Diabetes mellitus with ESRD (end-stage renal disease) [E11.22, N18.6] 12/01/2015 Yes     Chronic    Combined hyperlipidemia associated with type 2 diabetes mellitus [E11.69, E78.2] 12/01/2015 Yes    History of CVA (cerebrovascular accident) [Z86.73] 03/13/2014 Not Applicable    Anemia of chronic disease [D63.8] 03/11/2014 Yes    ESRD on dialysis [N18.6, Z99.2] 12/20/2013 Not Applicable    COPD (chronic obstructive pulmonary disease) [J44.9] 11/20/2013 Yes      Problems Resolved During this Admission:       Discharged Condition: stable    Disposition: Home-Health Care [facility]    Follow Up:  Follow-up Information     Chiara Nelson MD On 3/26/2019.    Specialty:  Family Medicine  Why:  On Tuesdsay @ 9:20am, outpatient services  Contact information:  [address]  [place] [address]  [phone]             Elgin Garcia MD On 4/3/2019.    Specialty:  Cardiology  Why:  On Wednesday @ 10:00am, outpatient services  Contact information:  [address]  [address]  [place] [address]  [phone]             [facility] .    Why:  [facility]           [facility]    Specialty:  DME Provider  Why:  Please call if you have questions regarding your home healh   Contact information:  [address]  [facility] [address]  [phone]             [facility].    Specialty:  Home Health Services  Why:  Nurse will call you to schedule your home visits            "    Patient Instructions:      OXYGEN FOR HOME USE     Order Specific Question Answer Comments   Liter Flow 2    Duration With activity    Qualifying SpO2: 86    Testing done at: Exercise/Activity    Route nasal cannula    Portable mode: pulse dose acceptable    Device home concentrator with portable unit    Patient condition with qualifying saturation CHF    Height: 5' 4" (1.626 m)    Weight: 59.6 kg (131 lb 6.3 oz)    Does patient have medical equipment at home? rollator    Alternative treatment measures have been tried or considered and deemed clinically ineffective. Yes      COMMODE FOR HOME USE     Order Specific Question Answer Comments   Type: Standard    Height: 5' 4" (1.626 m)    Weight: 59.6 kg (131 lb 6.3 oz)    Does patient have medical equipment at home? rollator    Length of need (1-99 months): 99      Transesophageal echo (ISAMAR) with possible cardioversion   Standing Status: Future Number of Occurrences: 1 Standing Exp. Date: 03/21/20     Case Request-Cath Lab: TRANSESOPHAGEAL ECHOCARDIOGRAM WITH POSSIBLE CARDIOVERSION (ISAMAR W/ POSS CARDIOVERSION)     Order Specific Question Answer Comments   CPT Code: ME CARDIOVERSION, ELECTIVE;EXTERN [76178]    Post-Procedure Disposition: ICU [40]    Medical Necessity: Medically Urgent [101]          Medications:  Reconciled Home Medications:      Medication List      START taking these medications    loperamide 1 mg/5 mL solution  Commonly known as:  IMODIUM  Take 10 mLs (2 mg total) by mouth 4 (four) times daily as needed for Diarrhea.        CHANGE how you take these medications    * amiodarone 400 MG tablet  Commonly known as:  PACERONE  Take 1 tablet (400 mg total) by mouth 2 (two) times daily. for 14 days  What changed:  Another medication with the same name was removed. Continue taking this medication, and follow the directions you see here.     * amiodarone 200 MG Tab  Commonly known as:  PACERONE  Take 1 tablet (200 mg total) by mouth 2 (two) times daily. for " 14 days  Start taking on:  4/7/2019  What changed:  Another medication with the same name was removed. Continue taking this medication, and follow the directions you see here.     * amiodarone 200 MG Tab  Commonly known as:  PACERONE  Take 1 tablet (200 mg total) by mouth once daily.  Start taking on:  4/22/2019  What changed:  Another medication with the same name was removed. Continue taking this medication, and follow the directions you see here.           CONTINUE taking these medications    albuterol-ipratropium 2.5 mg-0.5 mg/3 mL nebulizer solution  Commonly known as:  DUO-NEB  Take 3 mLs by nebulization every 6 (six) hours as needed for Wheezing or Shortness of Breath. Rescue     apixaban 2.5 mg Tab  Commonly known as:  ELIQUIS  Take 1 tablet (2.5 mg total) by mouth 2 (two) times daily.     * atorvastatin 10 MG tablet  Commonly known as:  LIPITOR  Take 10 mg by mouth.     * fluticasone 50 mcg/actuation nasal spray  Commonly known as:  FLONASE  1 spray by Nasal route.     * hydrOXYzine pamoate 25 MG Cap  Commonly known as:  VISTARIL  Take 1 capsule (25 mg total) by mouth every 8 (eight) hours as needed (panic attack).     * meclizine 25 mg tablet  Commonly known as:  ANTIVERT  Take 25 mg by mouth.     metoprolol succinate 25 MG 24 hr tablet  Commonly known as:  TOPROL-XL  Take 25 mg by mouth.     * mirtazapine 7.5 MG Tab  Commonly known as:  REMERON  Take 7.5 mg by mouth nightly.     * montelukast 10 mg tablet  Commonly known as:  SINGULAIR  Take 10 mg by mouth.     * montelukast 10 mg tablet  Commonly known as:  SINGULAIR  TAKE 1 TABLET BY MOUTH EVERY DAY IN THE EVENING     nut.tx.imp.renal fxn,lac-reduc 0.08 gram-1.8 kcal/mL Liqd  Commonly known as:  NEPRO CARB STEADY  Take 1 Can by mouth 3 (three) times daily with meals.     pantoprazole 40 MG tablet  Commonly known as:  PROTONIX  Take 40 mg by mouth.     promethazine 12.5 MG Tab  Commonly known as:  PHENERGAN  Take 12.5 mg by mouth every 6 (six) hours as  needed.     RENAL CAPS ORAL  Take by mouth Daily.     * SENSIPAR 60 MG Tab  Generic drug:  cinacalcet  Take 30 mg by mouth daily with breakfast.     * sevelamer carbonate 800 mg Tab  Commonly known as:  RENVELA  Take 800 mg by mouth 3 (three) times daily with meals.     SODIUM BICARBONATE ORAL  Take 650 mg by mouth.     traMADol 50 mg tablet  Commonly known as:  ULTRAM  Take 1 tablet by mouth.     traZODone 50 MG tablet  Commonly known as:  DESYREL  Take 1 tablet (50 mg total) by mouth nightly as needed for Insomnia.     vit B,C-iron fum-FA-D3-zinc ox 8 mg iron-800 mcg-1,000 unit Tab  Take by mouth.     * vitamin renal formula (B-complex-vitamin c-folic acid) 1 mg Cap  Commonly known as:  NEPHROCAP  Take by mouth.     * B COMPLEX & C NO.20-FOLIC ACID ORAL  Take 1 capsule by mouth.        epoetin cat 3,000 unit/mL injection  Commonly known as:  PROCRIT  Inject 3,000 Units into the skin.     lactulose 20 gram/30 mL Soln  Commonly known as:  CHRONULAC  Take 30 mLs (20 g total) by mouth 2 (two) times daily as needed (constipation).  Ask about: Should I take this medication?         Indwelling Lines/Drains at time of discharge:   Lines/Drains/Airways     Central Venous Catheter Line                 Hemodialysis Catheter -- days          Drain                 Hemodialysis AV Fistula 03/06/19 2339 Left upper arm 19 days          Airway                 Airway - Non-Surgical 03/21/19 1119 Nasal Cannula 5 days                Time spent on the discharge of patient: > 45 minutes  Patient was seen and examined on the date of discharge and determined to be suitable for discharge.         Carmelita Lucero MD  Department of Hospital Medicine  [facility]

## 2019-03-28 ENCOUNTER — APPOINTMENT (OUTPATIENT)
Dept: RADIOLOGY | Facility: HOSPITAL | Age: 74
End: 2019-03-28
Attending: FAMILY MEDICINE
Payer: MEDICARE

## 2019-03-28 DIAGNOSIS — I50.9 PLEURAL EFFUSION DUE TO CONGESTIVE HEART FAILURE: ICD-10-CM

## 2019-03-28 PROCEDURE — 71046 X-RAY EXAM CHEST 2 VIEWS: CPT | Mod: TC,FY,PN

## 2019-03-28 PROCEDURE — 71046 X-RAY EXAM CHEST 2 VIEWS: CPT | Mod: 26,,, | Performed by: RADIOLOGY

## 2019-03-28 PROCEDURE — 71046 XR CHEST PA AND LATERAL: ICD-10-PCS | Mod: 26,,, | Performed by: RADIOLOGY

## 2019-04-01 ENCOUNTER — HOSPITAL ENCOUNTER (INPATIENT)
Facility: HOSPITAL | Age: 74
LOS: 2 days | Discharge: HOME HEALTH | DRG: 308 | End: 2019-04-03
Attending: EMERGENCY MEDICINE | Admitting: HOSPITALIST
Payer: MEDICARE

## 2019-04-01 ENCOUNTER — OFFICE VISIT (OUTPATIENT)
Dept: FAMILY MEDICINE | Facility: CLINIC | Age: 74
DRG: 308 | End: 2019-04-01
Payer: MEDICARE

## 2019-04-01 VITALS
OXYGEN SATURATION: 99 % | HEIGHT: 64 IN | BODY MASS INDEX: 22.55 KG/M2 | TEMPERATURE: 99 F | RESPIRATION RATE: 19 BRPM | SYSTOLIC BLOOD PRESSURE: 101 MMHG | DIASTOLIC BLOOD PRESSURE: 61 MMHG

## 2019-04-01 DIAGNOSIS — E11.59 HYPERTENSION ASSOCIATED WITH DIABETES: Chronic | ICD-10-CM

## 2019-04-01 DIAGNOSIS — E11.22 DIABETES MELLITUS WITH ESRD (END-STAGE RENAL DISEASE): Chronic | ICD-10-CM

## 2019-04-01 DIAGNOSIS — N18.6 ESRD ON DIALYSIS: ICD-10-CM

## 2019-04-01 DIAGNOSIS — E78.2 COMBINED HYPERLIPIDEMIA ASSOCIATED WITH TYPE 2 DIABETES MELLITUS: ICD-10-CM

## 2019-04-01 DIAGNOSIS — Z99.2 ESRD ON DIALYSIS: ICD-10-CM

## 2019-04-01 DIAGNOSIS — I48.91 ATRIAL FIBRILLATION WITH RAPID VENTRICULAR RESPONSE: Primary | ICD-10-CM

## 2019-04-01 DIAGNOSIS — I48.91 ATRIAL FIBRILLATION, UNSPECIFIED TYPE: Primary | ICD-10-CM

## 2019-04-01 DIAGNOSIS — I15.2 HYPERTENSION ASSOCIATED WITH DIABETES: Chronic | ICD-10-CM

## 2019-04-01 DIAGNOSIS — N18.6 DIABETES MELLITUS WITH ESRD (END-STAGE RENAL DISEASE): Chronic | ICD-10-CM

## 2019-04-01 DIAGNOSIS — J43.9 PULMONARY EMPHYSEMA, UNSPECIFIED EMPHYSEMA TYPE: ICD-10-CM

## 2019-04-01 DIAGNOSIS — Z86.73 HISTORY OF CVA (CEREBROVASCULAR ACCIDENT): ICD-10-CM

## 2019-04-01 DIAGNOSIS — E11.69 COMBINED HYPERLIPIDEMIA ASSOCIATED WITH TYPE 2 DIABETES MELLITUS: ICD-10-CM

## 2019-04-01 DIAGNOSIS — J96.01 ACUTE RESPIRATORY FAILURE WITH HYPOXIA: ICD-10-CM

## 2019-04-01 LAB
ALBUMIN SERPL BCP-MCNC: 2.6 G/DL (ref 3.5–5.2)
ALP SERPL-CCNC: 74 U/L (ref 55–135)
ALT SERPL W/O P-5'-P-CCNC: 7 U/L (ref 10–44)
ANION GAP SERPL CALC-SCNC: 11 MMOL/L (ref 8–16)
AST SERPL-CCNC: 10 U/L (ref 10–40)
BASOPHILS # BLD AUTO: 0.01 K/UL (ref 0–0.2)
BASOPHILS NFR BLD: 0.1 % (ref 0–1.9)
BILIRUB SERPL-MCNC: 0.5 MG/DL (ref 0.1–1)
BUN SERPL-MCNC: 35 MG/DL (ref 8–23)
CALCIUM SERPL-MCNC: 9.8 MG/DL (ref 8.7–10.5)
CHLORIDE SERPL-SCNC: 98 MMOL/L (ref 95–110)
CO2 SERPL-SCNC: 30 MMOL/L (ref 23–29)
CREAT SERPL-MCNC: 8 MG/DL (ref 0.5–1.4)
DIFFERENTIAL METHOD: ABNORMAL
EOSINOPHIL # BLD AUTO: 0.1 K/UL (ref 0–0.5)
EOSINOPHIL NFR BLD: 0.7 % (ref 0–8)
ERYTHROCYTE [DISTWIDTH] IN BLOOD BY AUTOMATED COUNT: 16.2 % (ref 11.5–14.5)
EST. GFR  (AFRICAN AMERICAN): 5 ML/MIN/1.73 M^2
EST. GFR  (NON AFRICAN AMERICAN): 5 ML/MIN/1.73 M^2
GLUCOSE SERPL-MCNC: 104 MG/DL (ref 70–110)
HCT VFR BLD AUTO: 27.3 % (ref 37–48.5)
HGB BLD-MCNC: 8 G/DL (ref 12–16)
LACTATE SERPL-SCNC: 1 MMOL/L (ref 0.5–2.2)
LYMPHOCYTES # BLD AUTO: 0.6 K/UL (ref 1–4.8)
LYMPHOCYTES NFR BLD: 5.6 % (ref 18–48)
MAGNESIUM SERPL-MCNC: 1.8 MG/DL (ref 1.6–2.6)
MCH RBC QN AUTO: 29.4 PG (ref 27–31)
MCHC RBC AUTO-ENTMCNC: 29.3 G/DL (ref 32–36)
MCV RBC AUTO: 100 FL (ref 82–98)
MONOCYTES # BLD AUTO: 1.5 K/UL (ref 0.3–1)
MONOCYTES NFR BLD: 13.5 % (ref 4–15)
NEUTROPHILS # BLD AUTO: 9.1 K/UL (ref 1.8–7.7)
NEUTROPHILS NFR BLD: 80.1 % (ref 38–73)
PHOSPHATE SERPL-MCNC: 2.6 MG/DL (ref 2.7–4.5)
PLATELET # BLD AUTO: 199 K/UL (ref 150–350)
PMV BLD AUTO: 9.3 FL (ref 9.2–12.9)
POTASSIUM SERPL-SCNC: 4.1 MMOL/L (ref 3.5–5.1)
PROT SERPL-MCNC: 7.5 G/DL (ref 6–8.4)
RBC # BLD AUTO: 2.72 M/UL (ref 4–5.4)
SODIUM SERPL-SCNC: 139 MMOL/L (ref 136–145)
WBC # BLD AUTO: 11.36 K/UL (ref 3.9–12.7)

## 2019-04-01 PROCEDURE — 99900035 HC TECH TIME PER 15 MIN (STAT)

## 2019-04-01 PROCEDURE — 93005 EKG 12-LEAD: ICD-10-PCS | Mod: S$GLB,,, | Performed by: FAMILY MEDICINE

## 2019-04-01 PROCEDURE — 85025 COMPLETE CBC W/AUTO DIFF WBC: CPT

## 2019-04-01 PROCEDURE — 3044F HG A1C LEVEL LT 7.0%: CPT | Mod: CPTII,S$GLB,, | Performed by: FAMILY MEDICINE

## 2019-04-01 PROCEDURE — 93005 ELECTROCARDIOGRAM TRACING: CPT | Mod: S$GLB,,, | Performed by: FAMILY MEDICINE

## 2019-04-01 PROCEDURE — 96365 THER/PROPH/DIAG IV INF INIT: CPT

## 2019-04-01 PROCEDURE — 1101F PR PT FALLS ASSESS DOC 0-1 FALLS W/OUT INJ PAST YR: ICD-10-PCS | Mod: CPTII,S$GLB,, | Performed by: FAMILY MEDICINE

## 2019-04-01 PROCEDURE — 99999 PR PBB SHADOW E&M-EST. PATIENT-LVL III: CPT | Mod: PBBFAC,,, | Performed by: FAMILY MEDICINE

## 2019-04-01 PROCEDURE — 99291 CRITICAL CARE FIRST HOUR: CPT | Mod: 25

## 2019-04-01 PROCEDURE — 63600175 PHARM REV CODE 636 W HCPCS: Performed by: EMERGENCY MEDICINE

## 2019-04-01 PROCEDURE — 99499 RISK ADDL DX/OHS AUDIT: ICD-10-PCS | Mod: S$GLB,,, | Performed by: FAMILY MEDICINE

## 2019-04-01 PROCEDURE — 25000003 PHARM REV CODE 250: Performed by: EMERGENCY MEDICINE

## 2019-04-01 PROCEDURE — 93010 EKG 12-LEAD: ICD-10-PCS | Mod: S$GLB,,, | Performed by: INTERNAL MEDICINE

## 2019-04-01 PROCEDURE — 80053 COMPREHEN METABOLIC PANEL: CPT

## 2019-04-01 PROCEDURE — 84439 ASSAY OF FREE THYROXINE: CPT

## 2019-04-01 PROCEDURE — 99215 OFFICE O/P EST HI 40 MIN: CPT | Mod: S$GLB,,, | Performed by: FAMILY MEDICINE

## 2019-04-01 PROCEDURE — 93010 ELECTROCARDIOGRAM REPORT: CPT | Mod: S$GLB,,, | Performed by: INTERNAL MEDICINE

## 2019-04-01 PROCEDURE — 93010 EKG 12-LEAD: ICD-10-PCS | Mod: 76,,, | Performed by: INTERNAL MEDICINE

## 2019-04-01 PROCEDURE — 96375 TX/PRO/DX INJ NEW DRUG ADDON: CPT

## 2019-04-01 PROCEDURE — 1101F PT FALLS ASSESS-DOCD LE1/YR: CPT | Mod: CPTII,S$GLB,, | Performed by: FAMILY MEDICINE

## 2019-04-01 PROCEDURE — 2024F 7 FLD RTA PHOTO EVC RTNOPTHY: CPT | Mod: S$GLB,,, | Performed by: FAMILY MEDICINE

## 2019-04-01 PROCEDURE — 96361 HYDRATE IV INFUSION ADD-ON: CPT

## 2019-04-01 PROCEDURE — 84481 FREE ASSAY (FT-3): CPT

## 2019-04-01 PROCEDURE — 96376 TX/PRO/DX INJ SAME DRUG ADON: CPT

## 2019-04-01 PROCEDURE — 84100 ASSAY OF PHOSPHORUS: CPT

## 2019-04-01 PROCEDURE — 83735 ASSAY OF MAGNESIUM: CPT

## 2019-04-01 PROCEDURE — 3044F PR MOST RECENT HEMOGLOBIN A1C LEVEL <7.0%: ICD-10-PCS | Mod: CPTII,S$GLB,, | Performed by: FAMILY MEDICINE

## 2019-04-01 PROCEDURE — 83605 ASSAY OF LACTIC ACID: CPT

## 2019-04-01 PROCEDURE — 99999 PR PBB SHADOW E&M-EST. PATIENT-LVL III: ICD-10-PCS | Mod: PBBFAC,,, | Performed by: FAMILY MEDICINE

## 2019-04-01 PROCEDURE — 93005 ELECTROCARDIOGRAM TRACING: CPT | Mod: PN

## 2019-04-01 PROCEDURE — 84443 ASSAY THYROID STIM HORMONE: CPT

## 2019-04-01 PROCEDURE — 20000000 HC ICU ROOM

## 2019-04-01 PROCEDURE — 99215 PR OFFICE/OUTPT VISIT, EST, LEVL V, 40-54 MIN: ICD-10-PCS | Mod: S$GLB,,, | Performed by: FAMILY MEDICINE

## 2019-04-01 PROCEDURE — 93010 ELECTROCARDIOGRAM REPORT: CPT | Mod: 76,,, | Performed by: INTERNAL MEDICINE

## 2019-04-01 PROCEDURE — 99499 UNLISTED E&M SERVICE: CPT | Mod: S$GLB,,, | Performed by: FAMILY MEDICINE

## 2019-04-01 PROCEDURE — 2024F PR 7 FIELD PHOTOS WITH INTERP/ REVIEW: ICD-10-PCS | Mod: S$GLB,,, | Performed by: FAMILY MEDICINE

## 2019-04-01 RX ORDER — ATORVASTATIN CALCIUM 10 MG/1
10 TABLET, FILM COATED ORAL DAILY
Status: DISCONTINUED | OUTPATIENT
Start: 2019-04-02 | End: 2019-04-03 | Stop reason: HOSPADM

## 2019-04-01 RX ORDER — SODIUM CHLORIDE 0.9 % (FLUSH) 0.9 %
10 SYRINGE (ML) INJECTION
Status: DISCONTINUED | OUTPATIENT
Start: 2019-04-01 | End: 2019-04-03 | Stop reason: HOSPADM

## 2019-04-01 RX ORDER — PROMETHAZINE HYDROCHLORIDE 6.25 MG/5ML
12.5 SYRUP ORAL EVERY 6 HOURS PRN
Status: DISCONTINUED | OUTPATIENT
Start: 2019-04-01 | End: 2019-04-03

## 2019-04-01 RX ORDER — IPRATROPIUM BROMIDE AND ALBUTEROL SULFATE 2.5; .5 MG/3ML; MG/3ML
3 SOLUTION RESPIRATORY (INHALATION) EVERY 6 HOURS PRN
Status: DISCONTINUED | OUTPATIENT
Start: 2019-04-01 | End: 2019-04-03 | Stop reason: HOSPADM

## 2019-04-01 RX ORDER — CINACALCET 30 MG/1
60 TABLET, FILM COATED ORAL
Status: DISCONTINUED | OUTPATIENT
Start: 2019-04-02 | End: 2019-04-03 | Stop reason: HOSPADM

## 2019-04-01 RX ORDER — METOPROLOL SUCCINATE 25 MG/1
25 TABLET, EXTENDED RELEASE ORAL DAILY
Status: DISCONTINUED | OUTPATIENT
Start: 2019-04-02 | End: 2019-04-03 | Stop reason: HOSPADM

## 2019-04-01 RX ORDER — PANTOPRAZOLE SODIUM 40 MG/1
40 TABLET, DELAYED RELEASE ORAL DAILY
Status: DISCONTINUED | OUTPATIENT
Start: 2019-04-02 | End: 2019-04-03 | Stop reason: HOSPADM

## 2019-04-01 RX ORDER — METOPROLOL TARTRATE 1 MG/ML
5 INJECTION, SOLUTION INTRAVENOUS EVERY 5 MIN PRN
Status: DISCONTINUED | OUTPATIENT
Start: 2019-04-01 | End: 2019-04-02

## 2019-04-01 RX ORDER — MONTELUKAST SODIUM 10 MG/1
10 TABLET ORAL NIGHTLY
Status: DISCONTINUED | OUTPATIENT
Start: 2019-04-01 | End: 2019-04-03 | Stop reason: HOSPADM

## 2019-04-01 RX ORDER — PAROXETINE 10 MG/5ML
30 SUSPENSION ORAL DAILY
Status: DISCONTINUED | OUTPATIENT
Start: 2019-04-02 | End: 2019-04-03

## 2019-04-01 RX ORDER — MIRTAZAPINE 7.5 MG/1
7.5 TABLET, FILM COATED ORAL NIGHTLY
Status: DISCONTINUED | OUTPATIENT
Start: 2019-04-01 | End: 2019-04-03 | Stop reason: HOSPADM

## 2019-04-01 RX ORDER — AMLODIPINE BESYLATE 10 MG/1
10 TABLET ORAL DAILY PRN
COMMUNITY
End: 2019-05-03

## 2019-04-01 RX ORDER — POLYETHYLENE GLYCOL 3350 17 G/17G
17 POWDER, FOR SOLUTION ORAL DAILY
Status: DISCONTINUED | OUTPATIENT
Start: 2019-04-02 | End: 2019-04-03 | Stop reason: HOSPADM

## 2019-04-01 RX ORDER — HYDROCODONE BITARTRATE AND ACETAMINOPHEN 5; 325 MG/1; MG/1
1 TABLET ORAL EVERY 4 HOURS PRN
Status: DISCONTINUED | OUTPATIENT
Start: 2019-04-01 | End: 2019-04-03 | Stop reason: HOSPADM

## 2019-04-01 RX ADMIN — MIRTAZAPINE 7.5 MG: 7.5 TABLET ORAL at 08:04

## 2019-04-01 RX ADMIN — APIXABAN 2.5 MG: 2.5 TABLET, FILM COATED ORAL at 08:04

## 2019-04-01 RX ADMIN — AMIODARONE HYDROCHLORIDE 1 MG/MIN: 1.8 INJECTION, SOLUTION INTRAVENOUS at 06:04

## 2019-04-01 RX ADMIN — AMIODARONE HYDROCHLORIDE 150 MG: 1.5 INJECTION, SOLUTION INTRAVENOUS at 06:04

## 2019-04-01 RX ADMIN — MONTELUKAST SODIUM 10 MG: 10 TABLET, FILM COATED ORAL at 08:04

## 2019-04-01 RX ADMIN — METOPROLOL TARTRATE 5 MG: 5 INJECTION, SOLUTION INTRAVENOUS at 05:04

## 2019-04-01 RX ADMIN — SODIUM CHLORIDE 500 ML: 0.9 INJECTION, SOLUTION INTRAVENOUS at 03:04

## 2019-04-01 NOTE — ED PROVIDER NOTES
Encounter Date: 4/1/2019    SCRIBE #1 NOTE: I, Minooellis Matthew, am scribing for, and in the presence of,  Huseyin Valero MD. I have scribed the following portions of the note - Other sections scribed: HPI, ROS.       History     Chief Complaint   Patient presents with    Abnormal ECG     pt comes from doctor's office with HR of 127; hx of afib; pt denies any complaints at this time     CC: Abnormal ECG    HPI: This is a 72 y/o female with PMHx of Arthritis, Atrial fibrillation, COPD, DM, Dialysis patient, HTN, and ESRD who presents to the ED for emergent evaluation of an increased heart rate of 127 at doctor's office today. Pt's daughter reports that pt was doing a hospital follow up today at her doctor's office when they realized that her HR was high and sent her to the ED. Pt notes chronic SOB and reports doing breathing treatments every 6 hours. Pt is on Eliquis and does dialysis TTS. Pt denies fever, chills, congestion, rhinorrhea, eye pain, otalgia, cough, sore throat, abdominal pain, nausea, vomiting, diarrhea, dysuria, back pain, neck pain, headache, rash, arm/leg trouble, or further symptoms. Denies smoking cigarettes, drinking EtOH, or illicit drug use.     The history is provided by the patient and a relative. No  was used.     Review of patient's allergies indicates:  No Known Allergies  Past Medical History:   Diagnosis Date    Arthritis     Atrial fibrillation     COPD (chronic obstructive pulmonary disease)     Diabetes mellitus type II     Dialysis patient     Encounter for blood transfusion     ESRD (end stage renal disease)      Past Surgical History:   Procedure Laterality Date    AV FISTULA PLACEMENT      TRANSESOPHAGEAL ECHOCARDIOGRAM WITH POSSIBLE CARDIOVERSION (ISAMAR W/ POSS CARDIOVERSION) N/A 3/21/2019    Performed by Elgin Garcia MD at [facility] CATH LAB    TUBAL LIGATION       Family History   Problem Relation Age of Onset    Heart attack Sister     Heart  attack Sister     Heart attack Mother      Social History     Tobacco Use    Smoking status: Former Smoker     Start date: 1/12/1991    Smokeless tobacco: Never Used    Tobacco comment: quit in 1991   Substance Use Topics    Alcohol use: No    Drug use: No     Review of Systems   Constitutional: Negative for chills and fever.   HENT: Negative for congestion, ear pain, rhinorrhea and sore throat.    Eyes: Negative for pain and visual disturbance.   Respiratory: Positive for shortness of breath (chronic). Negative for cough.    Cardiovascular: Negative for chest pain.   Gastrointestinal: Negative for abdominal pain, diarrhea, nausea and vomiting.   Genitourinary: Negative for dysuria.   Musculoskeletal: Negative for back pain and neck pain.   Skin: Negative for rash.   Neurological: Negative for headaches.   All other systems reviewed and are negative.      Physical Exam     Initial Vitals [04/01/19 1503]   BP Pulse Resp Temp SpO2   (!) 82/54 (!) 113 20 98.4 °F (36.9 °C) 98 %      MAP       --         Physical Exam  The patient was examined specifically for the following:   General:No significant distress, Good color, Warm and dry. Head and neck:Scalp atraumatic, Neck supple. Neurological:Appropriate conversation, Gross motor deficits. Eyes:Conjugate gaze, Clear corneas. ENT: No epistaxis. Cardiac: Regular rate and rhythm, Grossly normal heart tones. Pulmonary: Wheezing, Rales. Gastrointestinal: Abdominal tenderness, Abdominal distention. Musculoskeletal: Extremity deformity, Apparent pain with range of motion of the joints. Skin: Rash.   The findings on examination were normal except for the following:  Patient has a dialysis shunt with a thrill in the left humeral arm.  Patient has an irregularly irregular tachycardia.  The lungs are clear and free of wheezing rales or rhonchi.  The abdomen is soft.  There is no pedal edema. Patient is borderline hypotensive at 82/54.  ED Course   Procedures  Labs Reviewed    COMPREHENSIVE METABOLIC PANEL - Abnormal; Notable for the following components:       Result Value    CO2 30 (*)     BUN, Bld 35 (*)     Creatinine 8.0 (*)     Albumin 2.6 (*)     ALT 7 (*)     eGFR if  5 (*)     eGFR if non  5 (*)     All other components within normal limits   CBC W/ AUTO DIFFERENTIAL - Abnormal; Notable for the following components:    RBC 2.72 (*)     Hemoglobin 8.0 (*)     Hematocrit 27.3 (*)      (*)     MCHC 29.3 (*)     RDW 16.2 (*)     Gran # (ANC) 9.1 (*)     Lymph # 0.6 (*)     Mono # 1.5 (*)     Gran% 80.1 (*)     Lymph% 5.6 (*)     All other components within normal limits   PHOSPHORUS - Abnormal; Notable for the following components:    Phosphorus 2.6 (*)     All other components within normal limits   MAGNESIUM   LACTIC ACID, PLASMA     EKG Readings: (Independently Interpreted)   This patient is in atrial fibrillation with a heart rate between 123 and 103.  There are nonspecific ST segment and T-wave changes.  There is no definite evidence of acute myocardial infarction or malignant arrhythmia.  There are low voltage QRS complexes.     ECG Results          EKG 12-lead (In process)  Result time 04/02/19 10:39:07    In process by Interface, Lab In [facility] (04/02/19 10:39:07)                 Narrative:    Test Reason : I48.91,    Vent. Rate : 103 BPM     Atrial Rate : 214 BPM     P-R Int : 000 ms          QRS Dur : 098 ms      QT Int : 514 ms       P-R-T Axes : 000 001 076 degrees     QTc Int : 673 ms    Atrial flutter with variable A-V block  Nonspecific T wave abnormality  Abnormal ECG  When compared with ECG of 01-APR-2019 14:03,  Significant changes have occurred    Referred By: [email or web address]   SELF           Confirmed By:                   In process by Interface, Lab In [facility] (04/02/19 10:34:40)                 Narrative:    Test Reason : I48.91,    Vent. Rate : 103 BPM     Atrial Rate : 214 BPM     P-R Int : 000 ms          QRS Dur : 098 ms       QT Int : 514 ms       P-R-T Axes : 000 001 076 degrees     QTc Int : 673 ms    Atrial flutter with variable A-V block  Nonspecific T wave abnormality  Abnormal ECG  When compared with ECG of 01-APR-2019 14:03,  Significant changes have occurred    Referred By: RAJAT   SELF           Confirmed By:                              EKG 12-LEAD (Final result)  Result time 04/02/19 10:23:22    Final result by Unknown User (04/02/19 10:23:22)                                Imaging Results          X-Ray Chest AP Portable (Final result)  Result time 04/01/19 16:26:28    Final result by Dedrick Sheffield MD (04/01/19 16:26:28)                 Impression:      1. No significant interval change in moderate enlargement of the cardiac silhouette and pulmonary interstitial edema with small bilateral layering pleural effusions and overlying atelectasis and/or pneumonia.      Electronically signed by: Dedrick Sheffield  Date:    04/01/2019  Time:    16:26             Narrative:    EXAMINATION:  XR CHEST AP PORTABLE    CLINICAL HISTORY:  chest pain;    TECHNIQUE:  Single frontal portable view of the chest was performed.    COMPARISON:  Chest radiograph 03/28/2019    FINDINGS:  Persistent moderate enlargement of the cardiac silhouette which may reflect cardiomegaly and/or pericardial effusion.    Pulmonary interstitial edema and small bilateral layering pleural effusions with overlying atelectasis and/or pneumonia, not significantly changed.  No pneumothorax.    Osseous structures are stable.                              Medical decision making:  Given the above this patient presents to the emergency with atrial fibrillation with rapid ventricular response.  We will admit her to the ICU on amiodarone drip.  I consulted , who recommended a loading the patient even though she is on amiodarone.  Patient had no chest pain or shortness of breath she is stable. She did have a borderline low blood pressure on arrival.  She was treated  with a 500 mL fluid bolus and she improved to 134/74.  The patient was treated with IV metoprolol and IV amiodarone.                Scribe Attestation:   Scribe #1: I performed the above scribed service and the documentation accurately describes the services I performed. I attest to the accuracy of the note.    Attending Attestation:           Physician Attestation for Scribe:  Physician Attestation Statement for Scribe #1: I, Huseyin Valero MD, reviewed documentation, as scribed by Minoo Matthew in my presence, and it is both accurate and complete.                    Clinical Impression:       ICD-10-CM ICD-9-CM   1. Atrial fibrillation with rapid ventricular response I48.91 427.31   2. Pulmonary emphysema, unspecified emphysema type J43.9 492.8   3. ESRD on dialysis N18.6 585.6    Z99.2 V45.11   4. History of CVA (cerebrovascular accident) Z86.73 V12.54   5. Hypertension associated with diabetes E11.59 250.80    I10 401.9   6. Diabetes mellitus with ESRD (end-stage renal disease) E11.22 250.40    N18.6 585.6   7. Combined hyperlipidemia associated with type 2 diabetes mellitus E11.69 250.80    E78.2 272.2                                Huseyin Valero MD  04/06/19 0934

## 2019-04-01 NOTE — PROGRESS NOTES
Routine Office Visit    Patient Name: Eli Vaz    : 1945  MRN: [id number]    Subjective:  Eli is a 73 y.o. female who presents today for:   Chief Complaint   Patient presents with    Hospital Follow Up     D/C 1 wk ago     73-year-old female with recent hospitalization comes in for follow-up on the position.  The patient has a history of COPD, end-stage renal disease, diabetes, thrombocytopenia, hyperparathyroidism, and more recently was started on amiodarone for atrial fibrillation.  While in the office, while her medicines are being done her heart rate going into the low 200.  The patient's daughters here with her.  She reports that at home her heart rate was not going higher than 100.  The patient reports that she feels extremely fatigued, but denies any chest pain, palpitations, worsening shortness of breath, fevers, blood in his stool or urine.  The patient was discharged on oxygen from her last hospitalization.  She reports that her breathing is doing okay with the home oxygen at 2 L via nasal cannula.  The patient lasted dialysis was Saturday.  Her dialysis days are , , and .    Past Medical History  Past Medical History:   Diagnosis Date    Arthritis     Atrial fibrillation     COPD (chronic obstructive pulmonary disease)     Diabetes mellitus type II     Dialysis patient     Encounter for blood transfusion     ESRD (end stage renal disease)        Past Surgical History  Past Surgical History:   Procedure Laterality Date    AV FISTULA PLACEMENT      TRANSESOPHAGEAL ECHOCARDIOGRAM WITH POSSIBLE CARDIOVERSION (ISAMAR W/ POSS CARDIOVERSION) N/A 3/21/2019    Performed by Elgin Garcia MD at [facility] CATH LAB    TUBAL LIGATION          Family History  Family History   Problem Relation Age of Onset    Heart attack Sister     Heart attack Sister     Heart attack Mother        Social History  Social History     Socioeconomic History    Marital status:       Spouse name: Not on file    Number of children: Not on file    Years of education: Not on file    Highest education level: Not on file   Occupational History    Not on file   Social Needs    Financial resource strain: Not on file    Food insecurity:     Worry: Not on file     Inability: Not on file    Transportation needs:     Medical: Not on file     Non-medical: Not on file   Tobacco Use    Smoking status: Former Smoker     Start date: 1/12/1991    Smokeless tobacco: Never Used    Tobacco comment: quit in 1991   Substance and Sexual Activity    Alcohol use: No    Drug use: No    Sexual activity: Not on file   Lifestyle    Physical activity:     Days per week: Not on file     Minutes per session: Not on file    Stress: Not on file   Relationships    Social connections:     Talks on phone: Not on file     Gets together: Not on file     Attends Yazidi service: Not on file     Active member of club or organization: Not on file     Attends meetings of clubs or organizations: Not on file     Relationship status: Not on file    Intimate partner violence:     Fear of current or ex partner: Not on file     Emotionally abused: Not on file     Physically abused: Not on file     Forced sexual activity: Not on file   Other Topics Concern    Not on file   Social History Narrative    Not on file       Current Medications  Current Outpatient Medications on File Prior to Visit   Medication Sig Dispense Refill    albuterol (ACCUNEB) 1.25 mg/3 mL Nebu Take 3 mLs (1.25 mg total) by nebulization every 6 (six) hours as needed. 1 Box 2    albuterol (PROVENTIL) 2.5 mg /3 mL (0.083 %) nebulizer solution Inhale 2.5 mg into the lungs.      albuterol (PROVENTIL/VENTOLIN HFA) 90 mcg/actuation inhaler Inhale 2 puffs into the lungs.      albuterol-ipratropium (DUO-NEB) 2.5 mg-0.5 mg/3 mL nebulizer solution Take 3 mLs by nebulization every 6 (six) hours as needed for Wheezing or Shortness of Breath. Rescue 1 Box 2     amiodarone (PACERONE) 400 MG tablet Take 1 tablet (400 mg total) by mouth 2 (two) times daily. for 14 days 28 tablet 0    amLODIPine (NORVASC) 10 MG tablet Take 10 mg by mouth once daily.      apixaban (ELIQUIS) 2.5 mg Tab Take 1 tablet (2.5 mg total) by mouth 2 (two) times daily. 60 tablet 5    atorvastatin (LIPITOR) 10 MG tablet Take 1 tablet (10 mg total) by mouth once daily. 90 tablet 1    busPIRone (BUSPAR) 10 MG tablet Take 10 mg by mouth.      cinacalcet (SENSIPAR) 60 MG Tab Take 30 mg by mouth.      epoetin cat (PROCRIT) 3,000 unit/mL injection Inject 3,000 Units into the skin.      fluticasone (FLONASE) 50 mcg/actuation nasal spray 1 spray by Each Nare route 2 (two) times daily. 1 Bottle 1    mirtazapine (REMERON) 7.5 MG Tab Take 7.5 mg by mouth nightly.       montelukast (SINGULAIR) 10 mg tablet TAKE 1 TABLET BY MOUTH EVERY DAY IN THE EVENING 30 tablet 2    sevelamer carbonate (RENVELA) 800 mg Tab Take 800 mg by mouth 3 (three) times daily with meals.      VENTOLIN HFA 90 mcg/actuation inhaler INHALE 2 PUFFS INTO LUNGS EVERY 4 HOURS AS NEEDED FOR SHORTNESS OF BREATH OR WHEEZING. RESCUE 18 Inhaler 2    vitamin renal formula, B-complex-vitamin c-folic acid, (NEPHROCAP) 1 mg Cap Take by mouth.      [START ON 4/7/2019] amiodarone (PACERONE) 200 MG Tab Take 1 tablet (200 mg total) by mouth 2 (two) times daily. for 14 days 28 tablet 0    levocetirizine (XYZAL) 5 MG tablet Take 1 tablet (5 mg total) by mouth every evening. 30 tablet 2    levocetirizine (XYZAL) 5 MG tablet Take 5 mg by mouth.      lidocaine (LMX) 4 % cream Apply topically as needed. To affected area up to 5 times a day. 30 g 0    loperamide (IMODIUM) 1 mg/5 mL solution Take 10 mLs (2 mg total) by mouth 4 (four) times daily as needed for Diarrhea.  0    meclizine (ANTIVERT) 25 mg tablet Take 1 tablet (25 mg total) by mouth 3 (three) times daily as needed. 20 tablet 0    meclizine (ANTIVERT) 25 mg tablet Take 25 mg by mouth.       metoprolol succinate (TOPROL-XL) 25 MG 24 hr tablet Take 25 mg by mouth.      nut.tx.imp.renal fxn,lac-reduc (NEPRO CARB STEADY) 0.08 gram-1.8 kcal/mL Liqd Take 1 Can by mouth 3 (three) times daily with meals. 30 Can 11    pantoprazole (PROTONIX) 40 MG tablet Take 40 mg by mouth.      paroxetine (PAXIL) 30 MG tablet 1 TABLET IN THE MORNING ONCE A DAY ORALLY 90  3    promethazine (PHENERGAN) 12.5 MG Tab Take 12.5 mg by mouth every 6 (six) hours as needed.      SENSIPAR 60 mg Tab Take 30 mg by mouth daily with breakfast.       SODIUM BICARBONATE ORAL Take 650 mg by mouth.      traMADol (ULTRAM) 50 mg tablet Take 1 tablet by mouth.      traZODone (DESYREL) 50 MG tablet Take 1 tablet (50 mg total) by mouth nightly as needed for Insomnia. 30 tablet 3    vit B,C-iron fum-FA-D3-zinc ox 8 mg iron-800 mcg-1,000 unit Tab Take by mouth.      vortioxetine (BRINTELLIX) 5 mg Tab Take 1 tablet by mouth.      [DISCONTINUED] amiodarone (PACERONE) 200 MG Tab Take 1 tablet (200 mg total) by mouth once daily. 30 tablet 5    [DISCONTINUED] atorvastatin (LIPITOR) 10 MG tablet Take 10 mg by mouth.      [DISCONTINUED] B complex w-C no.20/folic acid (B COMPLEX & C NO.20-FOLIC ACID ORAL) Take 1 capsule by mouth.      [DISCONTINUED] fluticasone (FLONASE) 50 mcg/actuation nasal spray 1 spray by Nasal route.      [DISCONTINUED] FOLIC ACID/VITAMIN B COMP W-C (RENAL CAPS ORAL) Take by mouth Daily.      [DISCONTINUED] hydrOXYzine pamoate (VISTARIL) 25 MG Cap Take 1 capsule (25 mg total) by mouth every 8 (eight) hours as needed (panic attack). 30 capsule 1    [DISCONTINUED] hydrOXYzine pamoate (VISTARIL) 25 MG Cap Take 25 mg by mouth.      [DISCONTINUED] mirtazapine (REMERON) 7.5 MG Tab Take 7.5 mg by mouth.      [DISCONTINUED] montelukast (SINGULAIR) 10 mg tablet Take 10 mg by mouth.      [DISCONTINUED] sevelamer carbonate (RENVELA) 800 mg Tab Take 800 mg by mouth.       No current facility-administered medications on file  "prior to visit.        Allergies   Review of patient's allergies indicates:  No Known Allergies    Review of Systems   Constitutional: Positive for fatigue. Negative for chills and fever.   Respiratory: Negative for shortness of breath (while on oxygen) and wheezing.    Cardiovascular: Negative for chest pain and palpitations.   Gastrointestinal: Negative for abdominal pain, blood in stool, constipation, diarrhea and nausea.   Genitourinary: Negative for dysuria.   Skin: Negative for rash.     /61 (BP Location: Right arm, Patient Position: Sitting, BP Method: Small (Automatic))   Temp 98.5 °F (36.9 °C) (Oral)   Resp 19   Ht 5' 4" (1.626 m)   SpO2 99%   BMI 22.55 kg/m²     Physical Exam   Constitutional:  Non-toxic appearance. She appears ill (chronically ill appearing). No distress.   HENT:   Head: Normocephalic and atraumatic.   Right Ear: External ear and ear canal normal.   Left Ear: External ear and ear canal normal.   Nose: No mucosal edema or rhinorrhea.   Mouth/Throat: No posterior oropharyngeal erythema.   Eyes: Conjunctivae, EOM and lids are normal.   Neck: Trachea normal and normal range of motion.   Cardiovascular: S1 normal and S2 normal. An irregularly irregular rhythm present. Tachycardia present.   Pulmonary/Chest: No tachypnea and no bradypnea. No respiratory distress. She has no wheezes. She has no rhonchi.   Abdominal: Soft. Normal appearance and bowel sounds are normal.     Assessment/Plan:  lEi was seen today for hospital follow up.    Diagnoses and all orders for this visit:    Atrial fibrillation, unspecified type  -     IN OFFICE EKG 12-LEAD (to [place])    Acute respiratory failure with hypoxia    Diabetes mellitus with ESRD (end-stage renal disease)    ESRD on dialysis      An EKG was done given patient's heart rate when her vital signs were done.  The EKG showed atrial fibrillation rapid ventricular response in the 120s.  Dangers of this were discussed with the patient of daughter " given that pulse kept going into the 200s.  Advised urgent evaluation in the hospital.  Patient's daughter wants dictation by car however discussed that it was important to call EMS as patient can have a cardiovascular event on route.  911 was called and EMT arrived 5-10 minutes later.  EKG was redone by EMT and patient was still in atrial fibrillation with rapid ventricular response rate with ventricular rate in the 130s.  EMT took patient by ambulance to the hospital.        This office note has been dictated.  This dictation has been generated using M-Modal Fluency Direct dictation; some phonetic errors may occur.

## 2019-04-01 NOTE — ED TRIAGE NOTES
Pt arrived to ED with c/o tachycardia at the clinic for follow up. Daughter at bedside. Daughter states pt was here a few days ago for the same thing and was discharged. HX afib. Pt has no complaints at this time. Pt on home O2 2L NC. Pt connected to continuous cardiac monitor, bp cuff, and pulse ox. Call light within reach.

## 2019-04-02 ENCOUNTER — TELEPHONE (OUTPATIENT)
Dept: FAMILY MEDICINE | Facility: CLINIC | Age: 74
End: 2019-04-02

## 2019-04-02 ENCOUNTER — ANESTHESIA (OUTPATIENT)
Dept: CARDIOLOGY | Facility: HOSPITAL | Age: 74
DRG: 308 | End: 2019-04-02
Payer: MEDICARE

## 2019-04-02 ENCOUNTER — ANESTHESIA EVENT (OUTPATIENT)
Dept: CARDIOLOGY | Facility: HOSPITAL | Age: 74
DRG: 308 | End: 2019-04-02
Payer: MEDICARE

## 2019-04-02 PROBLEM — J81.1 PULMONARY EDEMA: Status: ACTIVE | Noted: 2019-04-02

## 2019-04-02 LAB
BNP SERPL-MCNC: 1901 PG/ML (ref 0–99)
ESTIMATED AVG GLUCOSE: 97 MG/DL (ref 68–131)
HBA1C MFR BLD HPLC: 5 % (ref 4–5.6)
T3FREE SERPL-MCNC: <1 PG/ML (ref 2.3–4.2)
T4 FREE SERPL-MCNC: 1.07 NG/DL (ref 0.71–1.51)
TSH SERPL DL<=0.005 MIU/L-ACNC: 4.28 UIU/ML (ref 0.4–4)

## 2019-04-02 PROCEDURE — 27000221 HC OXYGEN, UP TO 24 HOURS

## 2019-04-02 PROCEDURE — 25000242 PHARM REV CODE 250 ALT 637 W/ HCPCS: Performed by: EMERGENCY MEDICINE

## 2019-04-02 PROCEDURE — 37000008 HC ANESTHESIA 1ST 15 MINUTES: Performed by: INTERNAL MEDICINE

## 2019-04-02 PROCEDURE — 25000003 PHARM REV CODE 250: Performed by: HOSPITALIST

## 2019-04-02 PROCEDURE — 63600175 PHARM REV CODE 636 W HCPCS: Performed by: INTERNAL MEDICINE

## 2019-04-02 PROCEDURE — D9220A PRA ANESTHESIA: ICD-10-PCS | Mod: ANES,,, | Performed by: ANESTHESIOLOGY

## 2019-04-02 PROCEDURE — 25000242 PHARM REV CODE 250 ALT 637 W/ HCPCS: Performed by: HOSPITALIST

## 2019-04-02 PROCEDURE — D9220A PRA ANESTHESIA: Mod: CRNA,,, | Performed by: REGISTERED NURSE

## 2019-04-02 PROCEDURE — 37000009 HC ANESTHESIA EA ADD 15 MINS: Performed by: ANESTHESIOLOGY

## 2019-04-02 PROCEDURE — 37000008 HC ANESTHESIA 1ST 15 MINUTES: Performed by: ANESTHESIOLOGY

## 2019-04-02 PROCEDURE — 92960 CARDIOVERSION ELECTRIC EXT: CPT | Mod: ,,, | Performed by: INTERNAL MEDICINE

## 2019-04-02 PROCEDURE — 80100014 HC HEMODIALYSIS 1:1

## 2019-04-02 PROCEDURE — 93010 ELECTROCARDIOGRAM REPORT: CPT | Mod: ,,, | Performed by: INTERNAL MEDICINE

## 2019-04-02 PROCEDURE — 94761 N-INVAS EAR/PLS OXIMETRY MLT: CPT

## 2019-04-02 PROCEDURE — 36415 COLL VENOUS BLD VENIPUNCTURE: CPT

## 2019-04-02 PROCEDURE — 83880 ASSAY OF NATRIURETIC PEPTIDE: CPT

## 2019-04-02 PROCEDURE — 93005 ELECTROCARDIOGRAM TRACING: CPT

## 2019-04-02 PROCEDURE — 99291 CRITICAL CARE FIRST HOUR: CPT | Mod: 25,,, | Performed by: INTERNAL MEDICINE

## 2019-04-02 PROCEDURE — 25000003 PHARM REV CODE 250: Performed by: INTERNAL MEDICINE

## 2019-04-02 PROCEDURE — 92960 PR CARDIOVERSION, ELECTIVE;EXTERN: ICD-10-PCS | Mod: ,,, | Performed by: INTERNAL MEDICINE

## 2019-04-02 PROCEDURE — 63600175 PHARM REV CODE 636 W HCPCS: Performed by: REGISTERED NURSE

## 2019-04-02 PROCEDURE — 99291 PR CRITICAL CARE, E/M 30-74 MINUTES: ICD-10-PCS | Mod: 25,,, | Performed by: INTERNAL MEDICINE

## 2019-04-02 PROCEDURE — D9220A PRA ANESTHESIA: ICD-10-PCS | Mod: CRNA,,, | Performed by: REGISTERED NURSE

## 2019-04-02 PROCEDURE — 83036 HEMOGLOBIN GLYCOSYLATED A1C: CPT

## 2019-04-02 PROCEDURE — D9220A PRA ANESTHESIA: Mod: ANES,,, | Performed by: ANESTHESIOLOGY

## 2019-04-02 PROCEDURE — 20000000 HC ICU ROOM

## 2019-04-02 PROCEDURE — 92960 CARDIOVERSION ELECTRIC EXT: CPT | Performed by: INTERNAL MEDICINE

## 2019-04-02 PROCEDURE — 25000003 PHARM REV CODE 250: Performed by: REGISTERED NURSE

## 2019-04-02 PROCEDURE — 63600175 PHARM REV CODE 636 W HCPCS: Performed by: HOSPITALIST

## 2019-04-02 PROCEDURE — 37000009 HC ANESTHESIA EA ADD 15 MINS: Performed by: INTERNAL MEDICINE

## 2019-04-02 PROCEDURE — 93010 EKG 12-LEAD: ICD-10-PCS | Mod: ,,, | Performed by: INTERNAL MEDICINE

## 2019-04-02 PROCEDURE — 94640 AIRWAY INHALATION TREATMENT: CPT

## 2019-04-02 PROCEDURE — 63600175 PHARM REV CODE 636 W HCPCS: Performed by: EMERGENCY MEDICINE

## 2019-04-02 RX ORDER — LIDOCAINE HCL/PF 100 MG/5ML
SYRINGE (ML) INTRAVENOUS
Status: DISCONTINUED | OUTPATIENT
Start: 2019-04-02 | End: 2019-04-02

## 2019-04-02 RX ORDER — ACETAMINOPHEN 325 MG/1
650 TABLET ORAL EVERY 6 HOURS PRN
Status: DISCONTINUED | OUTPATIENT
Start: 2019-04-02 | End: 2019-04-03 | Stop reason: HOSPADM

## 2019-04-02 RX ORDER — SODIUM CHLORIDE 9 MG/ML
INJECTION, SOLUTION INTRAVENOUS CONTINUOUS PRN
Status: DISCONTINUED | OUTPATIENT
Start: 2019-04-02 | End: 2019-04-02

## 2019-04-02 RX ORDER — METOPROLOL TARTRATE 1 MG/ML
5 INJECTION, SOLUTION INTRAVENOUS EVERY 5 MIN PRN
Status: DISCONTINUED | OUTPATIENT
Start: 2019-04-02 | End: 2019-04-03 | Stop reason: HOSPADM

## 2019-04-02 RX ORDER — AMIODARONE HYDROCHLORIDE 200 MG/1
400 TABLET ORAL 2 TIMES DAILY
Status: DISCONTINUED | OUTPATIENT
Start: 2019-04-02 | End: 2019-04-03 | Stop reason: HOSPADM

## 2019-04-02 RX ORDER — IPRATROPIUM BROMIDE AND ALBUTEROL SULFATE 2.5; .5 MG/3ML; MG/3ML
3 SOLUTION RESPIRATORY (INHALATION) EVERY 6 HOURS
Status: DISCONTINUED | OUTPATIENT
Start: 2019-04-02 | End: 2019-04-03 | Stop reason: HOSPADM

## 2019-04-02 RX ORDER — ETOMIDATE 2 MG/ML
INJECTION INTRAVENOUS
Status: DISCONTINUED | OUTPATIENT
Start: 2019-04-02 | End: 2019-04-02

## 2019-04-02 RX ORDER — SEVELAMER CARBONATE 800 MG/1
800 TABLET, FILM COATED ORAL
Status: DISCONTINUED | OUTPATIENT
Start: 2019-04-02 | End: 2019-04-03 | Stop reason: HOSPADM

## 2019-04-02 RX ADMIN — HYDROCODONE BITARTRATE AND ACETAMINOPHEN 1 TABLET: 5; 325 TABLET ORAL at 05:04

## 2019-04-02 RX ADMIN — AMIODARONE HYDROCHLORIDE 0.5 MG/MIN: 1.8 INJECTION, SOLUTION INTRAVENOUS at 09:04

## 2019-04-02 RX ADMIN — ETOMIDATE 6 MG: 2 INJECTION, SOLUTION INTRAVENOUS at 02:04

## 2019-04-02 RX ADMIN — METOPROLOL TARTRATE 5 MG: 5 INJECTION, SOLUTION INTRAVENOUS at 06:04

## 2019-04-02 RX ADMIN — ERYTHROPOIETIN 10000 UNITS: 10000 INJECTION, SOLUTION INTRAVENOUS; SUBCUTANEOUS at 05:04

## 2019-04-02 RX ADMIN — AMIODARONE HYDROCHLORIDE 0.5 MG/MIN: 1.8 INJECTION, SOLUTION INTRAVENOUS at 12:04

## 2019-04-02 RX ADMIN — AMIODARONE HYDROCHLORIDE 400 MG: 200 TABLET ORAL at 04:04

## 2019-04-02 RX ADMIN — LIDOCAINE HYDROCHLORIDE 100 MG: 20 INJECTION, SOLUTION INTRAVENOUS at 02:04

## 2019-04-02 RX ADMIN — IPRATROPIUM BROMIDE AND ALBUTEROL SULFATE 3 ML: .5; 3 SOLUTION RESPIRATORY (INHALATION) at 01:04

## 2019-04-02 RX ADMIN — MONTELUKAST SODIUM 10 MG: 10 TABLET, FILM COATED ORAL at 10:04

## 2019-04-02 RX ADMIN — IPRATROPIUM BROMIDE AND ALBUTEROL SULFATE 3 ML: .5; 3 SOLUTION RESPIRATORY (INHALATION) at 07:04

## 2019-04-02 RX ADMIN — SODIUM CHLORIDE: 0.9 INJECTION, SOLUTION INTRAVENOUS at 02:04

## 2019-04-02 RX ADMIN — APIXABAN 2.5 MG: 2.5 TABLET, FILM COATED ORAL at 10:04

## 2019-04-02 RX ADMIN — ATORVASTATIN CALCIUM 10 MG: 10 TABLET, FILM COATED ORAL at 09:04

## 2019-04-02 RX ADMIN — APIXABAN 2.5 MG: 2.5 TABLET, FILM COATED ORAL at 09:04

## 2019-04-02 RX ADMIN — MIRTAZAPINE 7.5 MG: 7.5 TABLET ORAL at 10:04

## 2019-04-02 RX ADMIN — PANTOPRAZOLE SODIUM 40 MG: 40 TABLET, DELAYED RELEASE ORAL at 09:04

## 2019-04-02 RX ADMIN — IPRATROPIUM BROMIDE AND ALBUTEROL SULFATE 3 ML: .5; 3 SOLUTION RESPIRATORY (INHALATION) at 12:04

## 2019-04-02 RX ADMIN — IPRATROPIUM BROMIDE AND ALBUTEROL SULFATE 3 ML: .5; 3 SOLUTION RESPIRATORY (INHALATION) at 08:04

## 2019-04-02 RX ADMIN — ACETAMINOPHEN 650 MG: 325 TABLET, FILM COATED ORAL at 11:04

## 2019-04-02 RX ADMIN — PAROXETINE HYDROCHLORIDE 30 MG: 10 SUSPENSION ORAL at 09:04

## 2019-04-02 RX ADMIN — SEVELAMER CARBONATE 800 MG: 800 TABLET, FILM COATED ORAL at 04:04

## 2019-04-02 NOTE — SUBJECTIVE & OBJECTIVE
Past Medical History:   Diagnosis Date    Arthritis     Atrial fibrillation     COPD (chronic obstructive pulmonary disease)     Diabetes mellitus type II     Dialysis patient     Encounter for blood transfusion     ESRD (end stage renal disease)        Past Surgical History:   Procedure Laterality Date    AV FISTULA PLACEMENT      TRANSESOPHAGEAL ECHOCARDIOGRAM WITH POSSIBLE CARDIOVERSION (ISAMAR W/ POSS CARDIOVERSION) N/A 3/21/2019    Performed by Elgin Garcia MD at [facility] CATH LAB    TUBAL LIGATION         Review of patient's allergies indicates:  No Known Allergies    No current facility-administered medications on file prior to encounter.      Current Outpatient Medications on File Prior to Encounter   Medication Sig    albuterol (ACCUNEB) 1.25 mg/3 mL Nebu Take 3 mLs (1.25 mg total) by nebulization every 6 (six) hours as needed.    albuterol (PROVENTIL) 2.5 mg /3 mL (0.083 %) nebulizer solution Inhale 2.5 mg into the lungs.    albuterol (PROVENTIL/VENTOLIN HFA) 90 mcg/actuation inhaler Inhale 2 puffs into the lungs.    albuterol-ipratropium (DUO-NEB) 2.5 mg-0.5 mg/3 mL nebulizer solution Take 3 mLs by nebulization every 6 (six) hours as needed for Wheezing or Shortness of Breath. Rescue    [START ON 4/7/2019] amiodarone (PACERONE) 200 MG Tab Take 1 tablet (200 mg total) by mouth 2 (two) times daily. for 14 days    amiodarone (PACERONE) 400 MG tablet Take 1 tablet (400 mg total) by mouth 2 (two) times daily. for 14 days    amLODIPine (NORVASC) 10 MG tablet Take 10 mg by mouth once daily.    apixaban (ELIQUIS) 2.5 mg Tab Take 1 tablet (2.5 mg total) by mouth 2 (two) times daily.    atorvastatin (LIPITOR) 10 MG tablet Take 1 tablet (10 mg total) by mouth once daily.    busPIRone (BUSPAR) 10 MG tablet Take 10 mg by mouth.    cinacalcet (SENSIPAR) 60 MG Tab Take 30 mg by mouth.    epoetin cat (PROCRIT) 3,000 unit/mL injection Inject 3,000 Units into the skin.    fluticasone (FLONASE) 50  mcg/actuation nasal spray 1 spray by Each Nare route 2 (two) times daily.    levocetirizine (XYZAL) 5 MG tablet Take 1 tablet (5 mg total) by mouth every evening.    levocetirizine (XYZAL) 5 MG tablet Take 5 mg by mouth.    lidocaine (LMX) 4 % cream Apply topically as needed. To affected area up to 5 times a day.    loperamide (IMODIUM) 1 mg/5 mL solution Take 10 mLs (2 mg total) by mouth 4 (four) times daily as needed for Diarrhea.    meclizine (ANTIVERT) 25 mg tablet Take 1 tablet (25 mg total) by mouth 3 (three) times daily as needed.    meclizine (ANTIVERT) 25 mg tablet Take 25 mg by mouth.    metoprolol succinate (TOPROL-XL) 25 MG 24 hr tablet Take 25 mg by mouth.    mirtazapine (REMERON) 7.5 MG Tab Take 7.5 mg by mouth nightly.     montelukast (SINGULAIR) 10 mg tablet TAKE 1 TABLET BY MOUTH EVERY DAY IN THE EVENING    nut.tx.imp.renal fxn,lac-reduc (NEPRO CARB STEADY) 0.08 gram-1.8 kcal/mL Liqd Take 1 Can by mouth 3 (three) times daily with meals.    pantoprazole (PROTONIX) 40 MG tablet Take 40 mg by mouth.    paroxetine (PAXIL) 30 MG tablet 1 TABLET IN THE MORNING ONCE A DAY ORALLY 90    promethazine (PHENERGAN) 12.5 MG Tab Take 12.5 mg by mouth every 6 (six) hours as needed.    SENSIPAR 60 mg Tab Take 30 mg by mouth daily with breakfast.     sevelamer carbonate (RENVELA) 800 mg Tab Take 800 mg by mouth 3 (three) times daily with meals.    SODIUM BICARBONATE ORAL Take 650 mg by mouth.    traMADol (ULTRAM) 50 mg tablet Take 1 tablet by mouth.    traZODone (DESYREL) 50 MG tablet Take 1 tablet (50 mg total) by mouth nightly as needed for Insomnia.    VENTOLIN HFA 90 mcg/actuation inhaler INHALE 2 PUFFS INTO LUNGS EVERY 4 HOURS AS NEEDED FOR SHORTNESS OF BREATH OR WHEEZING. RESCUE    vit B,C-iron fum-FA-D3-zinc ox 8 mg iron-800 mcg-1,000 unit Tab Take by mouth.    vitamin renal formula, B-complex-vitamin c-folic acid, (NEPHROCAP) 1 mg Cap Take by mouth.    vortioxetine (BRINTELLIX) 5 mg Tab  Take 1 tablet by mouth.     Family History     Problem Relation (Age of Onset)    Heart attack Sister, Sister, Mother        Tobacco Use    Smoking status: Former Smoker     Start date: 1/12/1991    Smokeless tobacco: Never Used    Tobacco comment: quit in 1991   Substance and Sexual Activity    Alcohol use: No    Drug use: No    Sexual activity: Not on file     Review of Systems   Constitution: Negative.   HENT: Negative.    Eyes: Negative.    Cardiovascular: Positive for dyspnea on exertion and irregular heartbeat. Negative for chest pain, leg swelling, near-syncope, orthopnea, palpitations, paroxysmal nocturnal dyspnea and syncope.   Respiratory: Negative for shortness of breath.    Skin: Negative.    Musculoskeletal: Negative.    Gastrointestinal: Negative for abdominal pain, constipation and diarrhea.   Genitourinary: Negative for dysuria.   Neurological: Negative.    Psychiatric/Behavioral: Negative.      Objective:     Vital Signs (Most Recent):  Temp: 99.1 °F (37.3 °C) (04/02/19 0701)  Pulse: (!) 112 (04/02/19 0900)  Resp: (!) 27 (04/02/19 0900)  BP: (!) 108/53 (04/02/19 0900)  SpO2: 96 % (04/02/19 0900) Vital Signs (24h Range):  Temp:  [97.9 °F (36.6 °C)-99.1 °F (37.3 °C)] 99.1 °F (37.3 °C)  Pulse:  [] 112  Resp:  [19-54] 27  SpO2:  [91 %-100 %] 96 %  BP: ()/(48-91) 108/53     Weight: 57.7 kg (127 lb 3.3 oz)  Body mass index is 21.83 kg/m².    SpO2: 96 %  O2 Device (Oxygen Therapy): nasal cannula      Intake/Output Summary (Last 24 hours) at 4/2/2019 1013  Last data filed at 4/2/2019 1000  Gross per 24 hour   Intake 842.38 ml   Output --   Net 842.38 ml       Lines/Drains/Airways     Central Venous Catheter Line                 Hemodialysis Catheter -- days          Drain                 Hemodialysis AV Fistula 03/06/19 2339 Left upper arm 26 days          Airway                 Airway - Non-Surgical 03/21/19 1119 Nasal Cannula 11 days          Peripheral Intravenous Line                  External Jugular IV 04/01/19 1829 less than 1 day         Peripheral IV - Single Lumen 04/01/19 1519 Right Antecubital less than 1 day                Physical Exam   Constitutional: She is oriented to person, place, and time. She appears well-developed and well-nourished.   HENT:   Head: Normocephalic and atraumatic.   Eyes: Pupils are equal, round, and reactive to light. Conjunctivae and EOM are normal.   Neck: Normal range of motion. Neck supple. No thyromegaly present.   Cardiovascular: An irregularly irregular rhythm present. Tachycardia present.   No murmur heard.  Pulmonary/Chest: Effort normal and breath sounds normal. No respiratory distress.   Abdominal: Soft. Bowel sounds are normal.   Musculoskeletal: She exhibits no edema.   Neurological: She is alert and oriented to person, place, and time.   Skin: Skin is warm and dry.   Psychiatric: She has a normal mood and affect. Her behavior is normal.       Significant Labs:   CMP   Recent Labs   Lab 04/01/19  1518      K 4.1   CL 98   CO2 30*      BUN 35*   CREATININE 8.0*   CALCIUM 9.8   PROT 7.5   ALBUMIN 2.6*   BILITOT 0.5   ALKPHOS 74   AST 10   ALT 7*   ANIONGAP 11   ESTGFRAFRICA 5*   EGFRNONAA 5*   , CBC   Recent Labs   Lab 04/01/19  1518   WBC 11.36   HGB 8.0*   HCT 27.3*      , INR No results for input(s): INR, PROTIME in the last 48 hours., Lipid Panel No results for input(s): CHOL, HDL, LDLCALC, TRIG, CHOLHDL in the last 48 hours. and Troponin No results for input(s): TROPONINI in the last 48 hours.    Significant Imaging: Echocardiogram:   Transthoracic echo (TTE) complete (Cupid Only):   Results for orders placed or performed during the hospital encounter of 03/19/19   Transthoracic echo (TTE) complete (Cupid Only)   Result Value Ref Range    BSA 1.64 m2    LA WIDTH 4.22 cm    AORTIC VALVE CUSP SEPERATION 1.95 cm    PV PEAK VELOCITY 1.00 cm/s    LVIDD 3.80 3.5 - 6.0 cm    IVS 1.23 (A) 0.6 - 1.1 cm    PW 1.09 0.6 - 1.1 cm    Ao  root annulus 2.99 cm    LVIDS 3.02 2.1 - 4.0 cm    FS 21 28 - 44 %    LA volume 91.55 cm3    Sinus 3.15 cm    STJ 2.35 cm    Ascending aorta 2.29 cm    LV mass 145.66 g    LA size 4.39 cm    RVDD 3.52 cm    TAPSE 1.04 cm    RV S' 6.62 m/s    Left Ventricle Relative Wall Thickness 0.57 cm    AV mean gradient 3.86 mmHg    AV valve area 3.49 cm2    AV Velocity Ratio 0.92     AV index (prosthetic) 1.08     LVOT diameter 2.03 cm    LVOT area 3.23 cm2    LVOT peak salvador [id number] m/s    LVOT peak VTI 20.93 cm    Ao peak salvador 1.40 m/s    Ao VTI 19.41 cm    LVOT stroke volume 67.71 cm3    AV peak gradient 7.84 mmHg    TR Max Salvador 3.94 m/s    LV Systolic Volume 35.49 mL    LV Systolic Volume Index 21.7 mL/m2    LV Diastolic Volume 61.90 mL    LV Diastolic Volume Index 37.88 mL/m2    LA Volume Index 56.0 mL/m2    LV Mass Index 89.1 g/m2    RA Major Axis 4.93 cm    Left Atrium Minor Axis 5.89 cm    Left Atrium Major Axis 5.74 cm    Triscuspid Valve Regurgitation Peak Gradient 62.09 mmHg    RA Width 3.95 cm    Right Atrial Pressure (from IVC) 3 mmHg    TV rest pulmonary artery pressure 65 mmHg     · Normal left ventricular systolic function. The estimated ejection fraction is 55%  · Concentric left ventricular remodeling.  · Indeterminate left ventricular diastolic function.  · Normal right ventricular systolic function.  · Moderate left atrial enlargement.  · Mild right atrial enlargement.  · Moderate-to-severe mitral regurgitation.  · Mild to moderate tricuspid regurgitation.  · The estimated PA systolic pressure is 65 mm Hg  · Pulmonary hypertension present.  · Small pericardial effusion. No tamponade     * all labs and studies personally reviewed

## 2019-04-02 NOTE — CONSULTS
[facility]  Cardiology  Consult Note    Patient Name: Eli Vaz  MRN: [id number]  Admission Date: 4/1/2019  Hospital Length of Stay: 1 days  Code Status: Full Code   Attending Provider: Janine Bowman MD   Consulting Provider: Rakesh Briggs MD  Primary Care Physician: Chiara Nelson MD  Principal Problem:Atrial fibrillation with rapid ventricular response    Patient information was obtained from patient, past medical records and ER records.     Anesthesiology  Consult performed by: Rakesh Briggs MD  Consult ordered by: Rakesh Briggs MD    Inpatient consult to Cardiology  Consult performed by: Rakesh Briggs MD  Consult ordered by: Isrrael Apodaca MD  Reason for consult: AFib with RVR        Subjective:     Chief Complaint:  Heart rate was high     HPI:   72 y/o female with PMHx of Arthritis, Atrial fibrillation, COPD, DM, Dialysis patient, HTN, and ESRD who presents to the ED for emergent evaluation of an increased heart rate of 127 at doctor's office today. Pt's daughter reports that pt was doing a hospital follow up today at her doctor's office when they realized that her HR was high and sent her to the ED. Pt notes chronic SOB and reports doing breathing treatments every 6 hours. Pt is on Eliquis and does dialysis TTS. Pt denies fever, chills, congestion, rhinorrhea, eye pain, otalgia, cough, sore throat, abdominal pain, nausea, vomiting, diarrhea, dysuria, back pain, neck pain, headache, rash, arm/leg trouble, or further symptoms. Denies smoking cigarettes, drinking EtOH, or illicit drug use.     Recently seen by our service and underwent yony/DC cardioversion by doctor Gracia last month.  Within a week she re-presented to clinic despite stating compliance with medicines etc she was found to be in AFib with RVR in related on amiodarone infusion.  She still refractory in AFib with RVR with some symptomatic shortness of breath this a.m..  She denies any chest pain.  She has not  experienced any PND, orthopnea or lower extremity edema. She denies any dizziness to the point of presyncope or syncope.    Past Medical History:   Diagnosis Date    Arthritis     Atrial fibrillation     COPD (chronic obstructive pulmonary disease)     Diabetes mellitus type II     Dialysis patient     Encounter for blood transfusion     ESRD (end stage renal disease)        Past Surgical History:   Procedure Laterality Date    AV FISTULA PLACEMENT      TRANSESOPHAGEAL ECHOCARDIOGRAM WITH POSSIBLE CARDIOVERSION (ISAMAR W/ POSS CARDIOVERSION) N/A 3/21/2019    Performed by Elgin Garcia MD at [facility] CATH LAB    TUBAL LIGATION         Review of patient's allergies indicates:  No Known Allergies    No current facility-administered medications on file prior to encounter.      Current Outpatient Medications on File Prior to Encounter   Medication Sig    albuterol (ACCUNEB) 1.25 mg/3 mL Nebu Take 3 mLs (1.25 mg total) by nebulization every 6 (six) hours as needed.    albuterol (PROVENTIL) 2.5 mg /3 mL (0.083 %) nebulizer solution Inhale 2.5 mg into the lungs.    albuterol (PROVENTIL/VENTOLIN HFA) 90 mcg/actuation inhaler Inhale 2 puffs into the lungs.    albuterol-ipratropium (DUO-NEB) 2.5 mg-0.5 mg/3 mL nebulizer solution Take 3 mLs by nebulization every 6 (six) hours as needed for Wheezing or Shortness of Breath. Rescue    [START ON 4/7/2019] amiodarone (PACERONE) 200 MG Tab Take 1 tablet (200 mg total) by mouth 2 (two) times daily. for 14 days    amiodarone (PACERONE) 400 MG tablet Take 1 tablet (400 mg total) by mouth 2 (two) times daily. for 14 days    amLODIPine (NORVASC) 10 MG tablet Take 10 mg by mouth once daily.    apixaban (ELIQUIS) 2.5 mg Tab Take 1 tablet (2.5 mg total) by mouth 2 (two) times daily.    atorvastatin (LIPITOR) 10 MG tablet Take 1 tablet (10 mg total) by mouth once daily.    busPIRone (BUSPAR) 10 MG tablet Take 10 mg by mouth.    cinacalcet (SENSIPAR) 60 MG Tab Take 30 mg by  mouth.    epoetin cat (PROCRIT) 3,000 unit/mL injection Inject 3,000 Units into the skin.    fluticasone (FLONASE) 50 mcg/actuation nasal spray 1 spray by Each Nare route 2 (two) times daily.    levocetirizine (XYZAL) 5 MG tablet Take 1 tablet (5 mg total) by mouth every evening.    levocetirizine (XYZAL) 5 MG tablet Take 5 mg by mouth.    lidocaine (LMX) 4 % cream Apply topically as needed. To affected area up to 5 times a day.    loperamide (IMODIUM) 1 mg/5 mL solution Take 10 mLs (2 mg total) by mouth 4 (four) times daily as needed for Diarrhea.    meclizine (ANTIVERT) 25 mg tablet Take 1 tablet (25 mg total) by mouth 3 (three) times daily as needed.    meclizine (ANTIVERT) 25 mg tablet Take 25 mg by mouth.    metoprolol succinate (TOPROL-XL) 25 MG 24 hr tablet Take 25 mg by mouth.    mirtazapine (REMERON) 7.5 MG Tab Take 7.5 mg by mouth nightly.     montelukast (SINGULAIR) 10 mg tablet TAKE 1 TABLET BY MOUTH EVERY DAY IN THE EVENING    nut.tx.imp.renal fxn,lac-reduc (NEPRO CARB STEADY) 0.08 gram-1.8 kcal/mL Liqd Take 1 Can by mouth 3 (three) times daily with meals.    pantoprazole (PROTONIX) 40 MG tablet Take 40 mg by mouth.    paroxetine (PAXIL) 30 MG tablet 1 TABLET IN THE MORNING ONCE A DAY ORALLY 90    promethazine (PHENERGAN) 12.5 MG Tab Take 12.5 mg by mouth every 6 (six) hours as needed.    SENSIPAR 60 mg Tab Take 30 mg by mouth daily with breakfast.     sevelamer carbonate (RENVELA) 800 mg Tab Take 800 mg by mouth 3 (three) times daily with meals.    SODIUM BICARBONATE ORAL Take 650 mg by mouth.    traMADol (ULTRAM) 50 mg tablet Take 1 tablet by mouth.    traZODone (DESYREL) 50 MG tablet Take 1 tablet (50 mg total) by mouth nightly as needed for Insomnia.    VENTOLIN HFA 90 mcg/actuation inhaler INHALE 2 PUFFS INTO LUNGS EVERY 4 HOURS AS NEEDED FOR SHORTNESS OF BREATH OR WHEEZING. RESCUE    vit B,C-iron fum-FA-D3-zinc ox 8 mg iron-800 mcg-1,000 unit Tab Take by mouth.    vitamin  renal formula, B-complex-vitamin c-folic acid, (NEPHROCAP) 1 mg Cap Take by mouth.    vortioxetine (BRINTELLIX) 5 mg Tab Take 1 tablet by mouth.     Family History     Problem Relation (Age of Onset)    Heart attack Sister, Sister, Mother        Tobacco Use    Smoking status: Former Smoker     Start date: 1/12/1991    Smokeless tobacco: Never Used    Tobacco comment: quit in 1991   Substance and Sexual Activity    Alcohol use: No    Drug use: No    Sexual activity: Not on file     Review of Systems   Constitution: Negative.   HENT: Negative.    Eyes: Negative.    Cardiovascular: Positive for dyspnea on exertion and irregular heartbeat. Negative for chest pain, leg swelling, near-syncope, orthopnea, palpitations, paroxysmal nocturnal dyspnea and syncope.   Respiratory: Negative for shortness of breath.    Skin: Negative.    Musculoskeletal: Negative.    Gastrointestinal: Negative for abdominal pain, constipation and diarrhea.   Genitourinary: Negative for dysuria.   Neurological: Negative.    Psychiatric/Behavioral: Negative.      Objective:     Vital Signs (Most Recent):  Temp: 99.1 °F (37.3 °C) (04/02/19 0701)  Pulse: (!) 112 (04/02/19 0900)  Resp: (!) 27 (04/02/19 0900)  BP: (!) 108/53 (04/02/19 0900)  SpO2: 96 % (04/02/19 0900) Vital Signs (24h Range):  Temp:  [97.9 °F (36.6 °C)-99.1 °F (37.3 °C)] 99.1 °F (37.3 °C)  Pulse:  [] 112  Resp:  [19-54] 27  SpO2:  [91 %-100 %] 96 %  BP: ()/(48-91) 108/53     Weight: 57.7 kg (127 lb 3.3 oz)  Body mass index is 21.83 kg/m².    SpO2: 96 %  O2 Device (Oxygen Therapy): nasal cannula      Intake/Output Summary (Last 24 hours) at 4/2/2019 1013  Last data filed at 4/2/2019 1000  Gross per 24 hour   Intake 842.38 ml   Output --   Net 842.38 ml       Lines/Drains/Airways     Central Venous Catheter Line                 Hemodialysis Catheter -- days          Drain                 Hemodialysis AV Fistula 03/06/19 6549 Left upper arm 26 days          Airway                  Airway - Non-Surgical 03/21/19 1119 Nasal Cannula 11 days          Peripheral Intravenous Line                 External Jugular IV 04/01/19 1829 less than 1 day         Peripheral IV - Single Lumen 04/01/19 1519 Right Antecubital less than 1 day                Physical Exam   Constitutional: She is oriented to person, place, and time. She appears well-developed and well-nourished.   HENT:   Head: Normocephalic and atraumatic.   Eyes: Pupils are equal, round, and reactive to light. Conjunctivae and EOM are normal.   Neck: Normal range of motion. Neck supple. No thyromegaly present.   Cardiovascular: An irregularly irregular rhythm present. Tachycardia present.   No murmur heard.  Pulmonary/Chest: Effort normal and breath sounds normal. No respiratory distress.   Abdominal: Soft. Bowel sounds are normal.   Musculoskeletal: She exhibits no edema.   Neurological: She is alert and oriented to person, place, and time.   Skin: Skin is warm and dry.   Psychiatric: She has a normal mood and affect. Her behavior is normal.       Significant Labs:   CMP   Recent Labs   Lab 04/01/19  1518      K 4.1   CL 98   CO2 30*      BUN 35*   CREATININE 8.0*   CALCIUM 9.8   PROT 7.5   ALBUMIN 2.6*   BILITOT 0.5   ALKPHOS 74   AST 10   ALT 7*   ANIONGAP 11   ESTGFRAFRICA 5*   EGFRNONAA 5*   , CBC   Recent Labs   Lab 04/01/19  1518   WBC 11.36   HGB 8.0*   HCT 27.3*      , INR No results for input(s): INR, PROTIME in the last 48 hours., Lipid Panel No results for input(s): CHOL, HDL, LDLCALC, TRIG, CHOLHDL in the last 48 hours. and Troponin No results for input(s): TROPONINI in the last 48 hours.    Significant Imaging: Echocardiogram:   Transthoracic echo (TTE) complete (Cupid Only):   Results for orders placed or performed during the hospital encounter of 03/19/19   Transthoracic echo (TTE) complete (Cupid Only)   Result Value Ref Range    BSA 1.64 m2    LA WIDTH 4.22 cm    AORTIC VALVE CUSP SEPERATION 1.95 cm     PV PEAK VELOCITY 1.00 cm/s    LVIDD 3.80 3.5 - 6.0 cm    IVS 1.23 (A) 0.6 - 1.1 cm    PW 1.09 0.6 - 1.1 cm    Ao root annulus 2.99 cm    LVIDS 3.02 2.1 - 4.0 cm    FS 21 28 - 44 %    LA volume 91.55 cm3    Sinus 3.15 cm    STJ 2.35 cm    Ascending aorta 2.29 cm    LV mass 145.66 g    LA size 4.39 cm    RVDD 3.52 cm    TAPSE 1.04 cm    RV S' 6.62 m/s    Left Ventricle Relative Wall Thickness 0.57 cm    AV mean gradient 3.86 mmHg    AV valve area 3.49 cm2    AV Velocity Ratio 0.92     AV index (prosthetic) 1.08     LVOT diameter 2.03 cm    LVOT area 3.23 cm2    LVOT peak salvador [id number] m/s    LVOT peak VTI 20.93 cm    Ao peak salvador 1.40 m/s    Ao VTI 19.41 cm    LVOT stroke volume 67.71 cm3    AV peak gradient 7.84 mmHg    TR Max Salvador 3.94 m/s    LV Systolic Volume 35.49 mL    LV Systolic Volume Index 21.7 mL/m2    LV Diastolic Volume 61.90 mL    LV Diastolic Volume Index 37.88 mL/m2    LA Volume Index 56.0 mL/m2    LV Mass Index 89.1 g/m2    RA Major Axis 4.93 cm    Left Atrium Minor Axis 5.89 cm    Left Atrium Major Axis 5.74 cm    Triscuspid Valve Regurgitation Peak Gradient 62.09 mmHg    RA Width 3.95 cm    Right Atrial Pressure (from IVC) 3 mmHg    TV rest pulmonary artery pressure 65 mmHg     · Normal left ventricular systolic function. The estimated ejection fraction is 55%  · Concentric left ventricular remodeling.  · Indeterminate left ventricular diastolic function.  · Normal right ventricular systolic function.  · Moderate left atrial enlargement.  · Mild right atrial enlargement.  · Moderate-to-severe mitral regurgitation.  · Mild to moderate tricuspid regurgitation.  · The estimated PA systolic pressure is 65 mm Hg  · Pulmonary hypertension present.  · Small pericardial effusion. No tamponade     * all labs and studies personally reviewed    Assessment and Plan:     * Atrial fibrillation with rapid ventricular response  Reversion and refractory on amiodarone and Toprol  Plan for cardioversion today  On  Eliquis for OAC (* no ISAMAR required)    **Risks/benefits of the procedure were d/w the patient including throat irritation, aspiration, etc.  Patient understands and wishes to proceed.  Consent was placed on the chart.    Benign essential hypertension  Stable on medicines    Diabetes mellitus with ESRD (end-stage renal disease)  Per IM    ESRD on dialysis  Per Nephrology    COPD (chronic obstructive pulmonary disease)  Change albuterol to Xopenex    Combined hyperlipidemia associated with type 2 diabetes mellitus  On statin        VTE Risk Mitigation (From admission, onward)        Ordered     apixaban tablet 2.5 mg  2 times daily      04/01/19 1908     IP VTE HIGH RISK PATIENT  Once      04/01/19 1908     Reason for No Pharmacological VTE Prophylaxis  Once      04/01/19 1908        * total ICU time spent on case 35 min    Thank you for your consult. I will follow-up with patient. Please contact us if you have any additional questions.    Rakesh Briggs MD  Cardiology   [facility]

## 2019-04-02 NOTE — PLAN OF CARE
Problem: Adult Inpatient Plan of Care  Goal: Plan of Care Review  Outcome: Ongoing (interventions implemented as appropriate)  Patient remains in ICU in atrial fib with rate between 100-115. Amiodarone drip infusing at 0.5mcg. BNP elevated, nephrology consulted. Patient had dialysis on Saturday and is due for dialysis tomorrow. AV shunt to left upper arm. Lungs clear but diminshed and patient complained of SOB x1 this shift. Resp treatment given and patient felt better.

## 2019-04-02 NOTE — PLAN OF CARE
Problem: Adult Inpatient Plan of Care  Goal: Plan of Care Review  Outcome: Ongoing (interventions implemented as appropriate)  Pt remains in NSR after cardioversion today. Pt had T-max today of 100.8 good results with tylenol. Pt has no skin breakdown noted.Amiodarone IV will be d/c'd at 6pm tonight. Pt awaiting for dialysis today. Pt has denied CP had SOB earlier when A-fib and rate 130. Pt remains on O2 at 2l/nc.

## 2019-04-02 NOTE — EICU
New admit    72 y/o F ESRD on HD,  COPD FEV1 0.73, HFpEF, pulm HTN PAP 65, recent recurrent admission for afib RVR and respiratory tract infection Underwent cardioversion 3/21. Presented to the ED from clinic after noting her HR were in the 120s.  She denies any worsening respiratory symptoms but does feel fatigued.    Camera assessment:  Patient resting comfortably  Ongoing amiodarone drip    Telemetry:  /52  HR 98  O2 100%     Data:  WBC 11.36, H/H 8/27, plt 199  Na 139, K 4.1, CO2 30, BUN 35 creatinine 8  Phos 2.6  Lactate 1  Most recent BNP 2560 (previously 900s), TSH 3.708  CXR persistent bilateral effusion and enlarged cardiac silhouette  EKG atrial flutter    CT (3/19) marked pericardial effusion and moderate pleural effusion  2D echo done the following day showed small pericardial effusion with no tamponade    · Afib/flutter now rate controlled, with signs of volume overload on CXR. Most recent BNP higher than baseline, will re-check.  Findings of pericardial effusion on CT not seen on echo. May need to review.  · ESRD, may need further volume removal  · COPD does not appear to be in exacerbation

## 2019-04-02 NOTE — PROGRESS NOTES
Patient complaining of feeling SOB. O2 sat reading is 95% on 3L NC. HOB elevated. Patient requesting resp treatment. Respiratory therapist notified.

## 2019-04-02 NOTE — PROGRESS NOTES
Patient received from ER via stretcher. She is awake, alert and oriented x4. No complaints of pain. Mild SOB reported. O2 2L in place and O2 sats 100%. AV shunt to left upper arm with positive thrill and bruit. Skin intact. SCD's applied. Amiodarone infusing at 1mg.

## 2019-04-02 NOTE — TRANSFER OF CARE
"Anesthesia Transfer of Care Note    Patient: Eli Vaz    Procedure(s) Performed: Procedure(s) (LRB):  Cardioversion or Defibrillation (N/A)    Patient location: ICU    Anesthesia Type: general    Transport from OR: Transported from OR on 2-3 L/min O2 by NC with adequate spontaneous ventilation. Continuous SpO2 monitoring in transport. Continuous ECG monitoring in transport    Post pain: adequate analgesia    Post assessment: no apparent anesthetic complications and tolerated procedure well    Post vital signs: stable    Level of consciousness: awake, alert and oriented    Nausea/Vomiting: no nausea/vomiting    Complications: none          Last vitals:   Visit Vitals  /63 (BP Location: Right arm, Patient Position: Lying)   Pulse 76   Temp 36.5 °C (97.7 °F) (Oral)   Resp 14   Ht 5' 4" (1.626 m)   Wt 57.7 kg (127 lb 3.3 oz)   SpO2 100%   Breastfeeding? No   BMI 21.83 kg/m²     "

## 2019-04-02 NOTE — CONSULTS
73 y o f known to me with esrd on chronic hd x 8 years who dialyzes TTS at [facility] and her last HD was this past Saturday.    Went to her Doctor for a routine visit when her HR was found to be >125 snf referred to the ER where after evaluation she was admitted for HR@127. All through his episode she has been asx.    Awake alert oriented NAD    Denies CNS ENT CP GI  RHEUM OR DERM SX  Past Medical History:   Diagnosis Date    Arthritis     Atrial fibrillation     COPD (chronic obstructive pulmonary disease)     Diabetes mellitus type II     Dialysis patient     Encounter for blood transfusion     ESRD (end stage renal disease)      Past Surgical History:   Procedure Laterality Date    AV FISTULA PLACEMENT      TRANSESOPHAGEAL ECHOCARDIOGRAM WITH POSSIBLE CARDIOVERSION (ISAMAR W/ POSS CARDIOVERSION) N/A 3/21/2019    Performed by Elgin Garcia MD at [facility] CATH LAB    TUBAL LIGATION       Social History     Socioeconomic History    Marital status:      Spouse name: Not on file    Number of children: Not on file    Years of education: Not on file    Highest education level: Not on file   Occupational History    Not on file   Social Needs    Financial resource strain: Not on file    Food insecurity:     Worry: Not on file     Inability: Not on file    Transportation needs:     Medical: Not on file     Non-medical: Not on file   Tobacco Use    Smoking status: Former Smoker     Start date: 1/12/1991    Smokeless tobacco: Never Used    Tobacco comment: quit in 1991   Substance and Sexual Activity    Alcohol use: No    Drug use: No    Sexual activity: Not on file   Lifestyle    Physical activity:     Days per week: Not on file     Minutes per session: Not on file    Stress: Not on file   Relationships    Social connections:     Talks on phone: Not on file     Gets together: Not on file     Attends Yazidism service: Not on file     Active member of club or organization: Not on file      Attends meetings of clubs or organizations: Not on file     Relationship status: Not on file    Intimate partner violence:     Fear of current or ex partner: Not on file     Emotionally abused: Not on file     Physically abused: Not on file     Forced sexual activity: Not on file   Other Topics Concern    Not on file   Social History Narrative    Not on file     Family History   Problem Relation Age of Onset    Heart attack Sister     Heart attack Sister     Heart attack Mother        Review of patient's allergies indicates:  No Known Allergies    Current Facility-Administered Medications   Medication    albuterol-ipratropium 2.5 mg-0.5 mg/3 mL nebulizer solution 3 mL    albuterol-ipratropium 2.5 mg-0.5 mg/3 mL nebulizer solution 3 mL    amiodarone 360 mg/200 mL (1.8 mg/mL) infusion    apixaban tablet 2.5 mg    atorvastatin tablet 10 mg    cinacalcet tablet 60 mg    HYDROcodone-acetaminophen 5-325 mg per tablet 1 tablet    metoprolol injection 5 mg    metoprolol succinate (TOPROL-XL) 24 hr tablet 25 mg    mirtazapine tablet 7.5 mg    montelukast tablet 10 mg    pantoprazole EC tablet 40 mg    paroxetine 10 mg/5 mL suspension 30 mg    polyethylene glycol packet 17 g    promethazine (PHENERGAN) 6.25 mg in dextrose 5 % 50 mL IVPB    promethazine 6.25 mg/5 mL syrup 12.5 mg    sevelamer carbonate tablet 800 mg    sodium chloride 0.9% flush 10 mL       LABS    Recent Results (from the past 24 hour(s))   T4, free    Collection Time: 04/01/19  3:15 PM   Result Value Ref Range    Free T4 1.07 0.71 - 1.51 ng/dL   TSH    Collection Time: 04/01/19  3:15 PM   Result Value Ref Range    TSH 4.282 (H) 0.400 - 4.000 uIU/mL   T3, free    Collection Time: 04/01/19  3:15 PM   Result Value Ref Range    T3, Free <1.0 (L) 2.3 - 4.2 pg/mL   Comprehensive metabolic panel    Collection Time: 04/01/19  3:18 PM   Result Value Ref Range    Sodium 139 136 - 145 mmol/L    Potassium 4.1 3.5 - 5.1 mmol/L    Chloride 98 95 -  110 mmol/L    CO2 30 (H) 23 - 29 mmol/L    Glucose 104 70 - 110 mg/dL    BUN, Bld 35 (H) 8 - 23 mg/dL    Creatinine 8.0 (H) 0.5 - 1.4 mg/dL    Calcium 9.8 8.7 - 10.5 mg/dL    Total Protein 7.5 6.0 - 8.4 g/dL    Albumin 2.6 (L) 3.5 - 5.2 g/dL    Total Bilirubin 0.5 0.1 - 1.0 mg/dL    Alkaline Phosphatase 74 55 - 135 U/L    AST 10 10 - 40 U/L    ALT 7 (L) 10 - 44 U/L    Anion Gap 11 8 - 16 mmol/L    eGFR if African American 5 (A) >60 mL/min/1.73 m^2    eGFR if non African American 5 (A) >60 mL/min/1.73 m^2   Magnesium    Collection Time: 04/01/19  3:18 PM   Result Value Ref Range    Magnesium 1.8 1.6 - 2.6 mg/dL   CBC auto differential    Collection Time: 04/01/19  3:18 PM   Result Value Ref Range    WBC 11.36 3.90 - 12.70 K/uL    RBC 2.72 (L) 4.00 - 5.40 M/uL    Hemoglobin 8.0 (L) 12.0 - 16.0 g/dL    Hematocrit 27.3 (L) 37.0 - 48.5 %     (H) 82 - 98 fL    MCH 29.4 27.0 - 31.0 pg    MCHC 29.3 (L) 32.0 - 36.0 g/dL    RDW 16.2 (H) 11.5 - 14.5 %    Platelets 199 150 - 350 K/uL    MPV 9.3 9.2 - 12.9 fL    Gran # (ANC) 9.1 (H) 1.8 - 7.7 K/uL    Lymph # 0.6 (L) 1.0 - 4.8 K/uL    Mono # 1.5 (H) 0.3 - 1.0 K/uL    Eos # 0.1 0.0 - 0.5 K/uL    Baso # 0.01 0.00 - 0.20 K/uL    Gran% 80.1 (H) 38.0 - 73.0 %    Lymph% 5.6 (L) 18.0 - 48.0 %    Mono% 13.5 4.0 - 15.0 %    Eosinophil% 0.7 0.0 - 8.0 %    Basophil% 0.1 0.0 - 1.9 %    Differential Method Automated    Phosphorus    Collection Time: 04/01/19  3:31 PM   Result Value Ref Range    Phosphorus 2.6 (L) 2.7 - 4.5 mg/dL   Lactic acid, plasma    Collection Time: 04/01/19  5:29 PM   Result Value Ref Range    Lactate (Lactic Acid) 1.0 0.5 - 2.2 mmol/L   Brain natriuretic peptide    Collection Time: 04/02/19  2:55 AM   Result Value Ref Range    BNP 1,901 (H) 0 - 99 pg/mL   ]    I/O last 3 completed shifts:  In: 792.3 [I.V.:292.3; IV Piggyback:500]  Out: -     Vitals:    04/02/19 0701 04/02/19 0715 04/02/19 0730 04/02/19 0745   BP: (!) 108/53 (!) 110/52 (!) 116/56 (!) 118/56    Pulse: 109 109 110 (!) 112   Resp: (!) 26 (!) 31 (!) 25 (!) 25   Temp: 99.1 °F (37.3 °C)      TempSrc: Oral      SpO2: 100% 95% (!) 91% 99%   Weight:       Height:           No Jvd, Thyromegaly or Lymphadenopathy  Lungs: Fairly clear anteriorly and laterally  Cor: IRR no G or rubs  Abd: Soft benign good bowel sounds non tender  Ext: No E C C  L arm avf with good PBT    A)  ESRD  AFIB with RVR asx  COPD  T2DM  COPD   Anemia of ckd  2nd hypeprth    P)  Maintain hydration  Avoid nephrotoxic medications  Adjust all medications to the degree of renal function  Protect access  When able renal diabetic diet  Hemod after cardioversion  EPO  Binders as needed

## 2019-04-02 NOTE — TELEPHONE ENCOUNTER
Rt pt HH nurse Marlin , nurse wasn't aware pt has been admitted . Nurse was calling to get pt medication list up to date . Informed that medications may change due to being the hospital .

## 2019-04-02 NOTE — HPI
74 y/o female with PMHx of Arthritis, Atrial fibrillation, COPD, DM, Dialysis patient, HTN, and ESRD who presents to the ED for emergent evaluation of an increased heart rate of 127 at doctor's office today. Pt's daughter reports that pt was doing a hospital follow up today at her doctor's office when they realized that her HR was high and sent her to the ED. Pt notes chronic SOB and reports doing breathing treatments every 6 hours. Pt is on Eliquis and does dialysis TTS. Pt denies fever, chills, congestion, rhinorrhea, eye pain, otalgia, cough, sore throat, abdominal pain, nausea, vomiting, diarrhea, dysuria, back pain, neck pain, headache, rash, arm/leg trouble, or further symptoms. Denies smoking cigarettes, drinking EtOH, or illicit drug use.     Recently seen by our service and underwent yony/DC cardioversion by doctor Garcia last month.  Within a week she re-presented to clinic despite stating compliance with medicines etc she was found to be in AFib with RVR in related on amiodarone infusion.  She still refractory in AFib with RVR with some symptomatic shortness of breath this a.m..  She denies any chest pain.  She has not experienced any PND, orthopnea or lower extremity edema. She denies any dizziness to the point of presyncope or syncope.

## 2019-04-02 NOTE — TELEPHONE ENCOUNTER
"----- Message from Marsha Mcgregor sent at 4/2/2019  1:58 PM CDT -----  Contact: Rosalinda " Home Health nurse" [phone]  Calling to get clarification on pt list of medication   "

## 2019-04-02 NOTE — ASSESSMENT & PLAN NOTE
Reversion and refractory on amiodarone and Toprol  Plan for cardioversion today  On Eliquis for OAC (* no ISAMAR required)

## 2019-04-02 NOTE — HPI
Eli Vaz is a 73 y.o. female that (in part)  has a past medical history of Arthritis, Atrial fibrillation, COPD (chronic obstructive pulmonary disease), Diabetes mellitus type II, Dialysis patient, Encounter for blood transfusion, and ESRD (end stage renal disease).  has a past surgical history that includes Tubal ligation and AV fistula placement. Presents to [facility] complaining of recurrent shortness of breath.  She was admitted for similar complaints on March 19, 2019, approximately 2 weeks ago.  And just prior to that she was admitted week for atrial fibrillation as well as respiratory infection.  Reports compliance with home medications.  She is awaiting arrival of home oxygen.  She reports compliance with dialysis.                   Description of symptoms     Location:  Lungs/chest  Onset:  Subacute onset with progressive worsening  Character:  Moderate to severe intensity shortness of breath  Frequency:  Daily frequency, but worse today.  She has had multiple hospitalizations this year for similar presentations.  Duration:  Constant   Associated Symptoms:  Complains of palpitations.  Denies fever chills  Radiation:  Throughout chest  Exacerbating factors:  Worsened with exertion  Relieving factors:  Some relief given with supplemental oxygen in ED.             In the emergency department routine laboratory studies, chest x-ray, and EKG were obtained.  There was concern for pulmonary edema on chest x-ray.  EKG shows evidence of atrial fibrillation rapid ventricular response.     Hospital medicine has been asked to admit for further evaluation and treatment.

## 2019-04-02 NOTE — H&P
[facility] Hospital Medicine  History & Physical    Patient Name: Eli Vaz  MRN: [id number]  Admission Date: 04/02/2019  Attending Physician: Isrrael Apodaca MD, MPH      PCP:     Chiara Nelson MD    CC:     Chief Complaint   Patient presents with    Abnormal ECG     pt comes from doctor's office with HR of 127; hx of afib; pt denies any complaints at this time       HISTORY OF PRESENT ILLNESS:  A bathing saw if you have not heard before.     Eli Vaz is a 73 y.o. female that (in part)  has a past medical history of Arthritis, Atrial fibrillation, COPD (chronic obstructive pulmonary disease), Diabetes mellitus type II, Dialysis patient, Encounter for blood transfusion, and ESRD (end stage renal disease).  has a past surgical history that includes Tubal ligation and AV fistula placement. Presents to [facility] Emergency Department complaining of recurrent shortness of breath.  She was admitted for similar complaints on March 19, 2019, approximately 2 weeks ago.  And just prior to that she was admitted week for atrial fibrillation as well as respiratory infection.  Reports compliance with home medications.  She is awaiting arrival of home oxygen.  She reports compliance with dialysis.     Description of symptoms    Location:  Lungs/chest  Onset:  Subacute onset with progressive worsening  Character:  Moderate to severe intensity shortness of breath  Frequency:  Daily frequency, but worse today.  She has had multiple hospitalizations this year for similar presentations.  Duration:  Constant   Associated Symptoms:  Complains of palpitations.  Denies fever chills  Radiation:  Throughout chest  Exacerbating factors:  Worsened with exertion  Relieving factors:  Some relief given with supplemental oxygen in ED.          In the emergency department routine laboratory studies, chest x-ray, and EKG were obtained.  There was concern for pulmonary edema on chest x-ray.  EKG  shows evidence of atrial fibrillation rapid ventricular response.    Hospital medicine has been asked to admit for further evaluation and treatment.       REVIEW OF SYSTEMS:     -- Constitutional:  Denies fevers.  Positive for generalized malaise  -- Eyes: No visual changes, diplopia, pain, tearing, blind spots, or discharge.   -- Ears, nose, mouth, throat, and face: No congestion, sore throat, epistaxis, d/c, bleeding gums, neck stiffness masses, or dental issues.  -- Respiratory:  As above in HPI  -- Cardiovascular:  Dyspnea with exertion.  Peripheral edema.  Palpitations.  -- Gastrointestinal:  Nausea with vomiting.  Denies abdominal pain, hematemesis, melena, dyspepsia, or change in bowel habits.  -- Genitourinary:   No vaginal discharge or hematuria   -- Integument/breast: No rash, pruritis, pigmentation changes, dryness, or changes in hair  -- Hematologic/lymphatic: No easy bruising or lymphadenopathy.   -- Musculoskeletal:  Chronic arthralgias.  No acute arthralgias, acute myalgias, joint swelling, acute limitations of ROM, or acute muscular weakness.  -- Neurological: No seizures, headaches, incoordination, paraesthesias, ataxia, vertigo, or tremors.  -- Behavioral/Psych: No auditory or visual hallucinations, depression, or suicidal/homicidal ideations.  -- Endocrine: No heat or cold intolerance, polydipsia, or unintentional weight gain / loss.  -- Allergy/Immunologic: No recurrent infections or adverse reaction to food, insects, or difficulty breathing.        PAST MEDICAL / SURGICAL HISTORY:     Past Medical History:   Diagnosis Date    Arthritis     Atrial fibrillation     COPD (chronic obstructive pulmonary disease)     Diabetes mellitus type II     Dialysis patient     Encounter for blood transfusion     ESRD (end stage renal disease)      Past Surgical History:   Procedure Laterality Date    AV FISTULA PLACEMENT      TRANSESOPHAGEAL ECHOCARDIOGRAM WITH POSSIBLE CARDIOVERSION (ISAMAR W/ POSS  CARDIOVERSION) N/A 3/21/2019    Performed by Elgin Garcia MD at [facility] CATH LAB    TUBAL LIGATION           FAMILY HISTORY:     Family History   Problem Relation Age of Onset    Heart attack Sister     Heart attack Sister     Heart attack Mother          SOCIAL HISTORY:     Social History     Socioeconomic History    Marital status:      Spouse name: None    Number of children: None    Years of education: None    Highest education level: None   Occupational History    None   Social Needs    Financial resource strain: None    Food insecurity:     Worry: None     Inability: None    Transportation needs:     Medical: None     Non-medical: None   Tobacco Use    Smoking status: Former Smoker     Start date: 1/12/1991    Smokeless tobacco: Never Used    Tobacco comment: quit in 1991   Substance and Sexual Activity    Alcohol use: No    Drug use: No    Sexual activity: None   Lifestyle    Physical activity:     Days per week: None     Minutes per session: None    Stress: None   Relationships    Social connections:     Talks on phone: None     Gets together: None     Attends [employer] service: None     Active member of club or organization: None     Attends meetings of clubs or organizations: None     Relationship status: None    Intimate partner violence:     Fear of current or ex partner: None     Emotionally abused: None     Physically abused: None     Forced sexual activity: None   Other Topics Concern    None   Social History Narrative    None         ALLERGIES:       Review of patient's allergies indicates:  No Known Allergies        HOME MEDICATIONS:     Prior to Admission medications    Medication Sig Start Date End Date Taking? Authorizing Provider   albuterol (ACCUNEB) 1.25 mg/3 mL Nebu Take 3 mLs (1.25 mg total) by nebulization every 6 (six) hours as needed. 2/11/19   Chiara Nelson MD   albuterol (PROVENTIL) 2.5 mg /3 mL (0.083 %) nebulizer solution Inhale 2.5 mg into the  lungs. 7/9/18   Historical Provider, MD   albuterol (PROVENTIL/VENTOLIN HFA) 90 mcg/actuation inhaler Inhale 2 puffs into the lungs. 11/7/18   Historical Provider, MD   albuterol-ipratropium (DUO-NEB) 2.5 mg-0.5 mg/3 mL nebulizer solution Take 3 mLs by nebulization every 6 (six) hours as needed for Wheezing or Shortness of Breath. Rescue 2/27/19 2/27/20  Chiara Nelson MD   amiodarone (PACERONE) 200 MG Tab Take 1 tablet (200 mg total) by mouth 2 (two) times daily. for 14 days 4/7/19 4/21/19  Jerry Urena MD   amiodarone (PACERONE) 200 MG Tab Take 1 tablet (200 mg total) by mouth once daily. 4/22/19 4/21/20  Jerry Urena MD   amiodarone (PACERONE) 400 MG tablet Take 1 tablet (400 mg total) by mouth 3 (three) times daily. 3/11/19 3/10/20  Jerry Urena MD   amiodarone (PACERONE) 400 MG tablet Take 1 tablet (400 mg total) by mouth 2 (two) times daily. for 14 days 3/24/19 4/7/19  Jerry Urena MD   amLODIPine (NORVASC) 10 MG tablet TAKE 1 TABLET (10 MG TOTAL) BY MOUTH ONCE DAILY. 10/3/18   Chiara Nelson MD   amLODIPine (NORVASC) 10 MG tablet Take 10 mg by mouth. 10/3/18   Historical Provider, MD   apixaban (ELIQUIS) 2.5 mg Tab Take 1 tablet (2.5 mg total) by mouth 2 (two) times daily. 3/11/19   Jerry Urena MD   atorvastatin (LIPITOR) 10 MG tablet Take 1 tablet (10 mg total) by mouth once daily. 2/11/19   Chiara Nelson MD   atorvastatin (LIPITOR) 10 MG tablet Take 10 mg by mouth. 5/2/16   Historical Provider, MD   B complex w-C no.20/folic acid (B COMPLEX & C NO.20-FOLIC ACID ORAL) Take 1 capsule by mouth.    Historical Provider, MD   busPIRone (BUSPAR) 10 MG tablet Take 10 mg by mouth. 5/11/18   Historical Provider, MD   cinacalcet (SENSIPAR) 60 MG Tab Take 30 mg by mouth. 10/6/16   Historical Provider, MD   cloNIDine (CATAPRES) 0.2 MG tablet Take 0.2 mg by mouth.    Historical Provider, MD   diclofenac sodium (VOLTAREN) 1 % Gel Apply 4 g topically. 7/9/14   Historical  Provider, MD   epoetin cat (PROCRIT) 3,000 unit/mL injection Inject 3,000 Units into the skin.    Historical Provider, MD   fluticasone (FLONASE) 50 mcg/actuation nasal spray 1 spray by Each Nare route 2 (two) times daily. 5/18/16   Antwan Everett MD   fluticasone (FLONASE) 50 mcg/actuation nasal spray 1 spray by Nasal route. 5/18/16   Historical Provider, MD   FOLIC ACID/VITAMIN B COMP W-C (RENAL CAPS ORAL) Take by mouth Daily.    Historical Provider, MD   furosemide (LASIX) 80 MG tablet Take 1 tablet by mouth.    Historical Provider, MD   gentamicin sulfate, PF, 60 mg/6 mL Soln Inject into the vein.    Historical Provider, MD   hydrALAZINE (APRESOLINE) 25 MG tablet Take 1 tablet (25 mg total) by mouth every 12 (twelve) hours. 10/11/17   Christiano Leblanc MD   hydrALAZINE (APRESOLINE) 25 MG tablet Take 25 mg by mouth. 10/11/17   Historical Provider, MD   HYDROcodone-acetaminophen (NORCO) 7.5-325 mg per tablet Take 1 tablet by mouth every 8 (eight) hours as needed for Pain. 3/14/19 4/13/19  Isrrael Barton Jr., MD   HYDROcodone-acetaminophen (NORCO) 7.5-325 mg per tablet Take 1 tablet by mouth every 8 (eight) hours as needed for Pain. 4/13/19 5/13/19  Isrrael Barton Jr., MD   hydrOXYzine pamoate (VISTARIL) 25 MG Cap Take 1 capsule (25 mg total) by mouth every 8 (eight) hours as needed (panic attack). 2/26/18   Chiara Nelson MD   hydrOXYzine pamoate (VISTARIL) 25 MG Cap Take 25 mg by mouth. 2/26/18   Historical Provider, MD   lactulose (CHRONULAC) 20 gram/30 mL Soln Take 30 mLs (20 g total) by mouth 2 (two) times daily as needed (constipation). 3/13/19 3/23/19  Subhash Landin MD   levocetirizine (XYZAL) 5 MG tablet Take 1 tablet (5 mg total) by mouth every evening. 7/9/18   Charles Moody Jr., MD   levocetirizine (XYZAL) 5 MG tablet Take 5 mg by mouth. 7/9/18   Historical Provider, MD   lidocaine (LMX) 4 % cream Apply topically as needed. To affected area up to 5 times a day. 3/13/19   Subhash IZQUIERDO  MD Mushtaq   meclizine (ANTIVERT) 25 mg tablet Take 1 tablet (25 mg total) by mouth 3 (three) times daily as needed. 1/10/18   Subhash Landin MD   meclizine (ANTIVERT) 25 mg tablet Take 25 mg by mouth. 1/10/18   Historical Provider, MD   metoprolol succinate (TOPROL-XL) 25 MG 24 hr tablet Take 25 mg by mouth.    Historical Provider, MD   mirtazapine (REMERON) 7.5 MG Tab Take 7.5 mg by mouth nightly.  10/24/16   Historical Provider, MD   mirtazapine (REMERON) 7.5 MG Tab Take 7.5 mg by mouth. 10/24/16   Historical Provider, MD   montelukast (SINGULAIR) 10 mg tablet Take 10 mg by mouth. 7/9/18   Historical Provider, MD   montelukast (SINGULAIR) 10 mg tablet TAKE 1 TABLET BY MOUTH EVERY DAY IN THE EVENING 3/18/19   Charles Moody Jr., MD   nut.tx.imp.renal fxn,lac-reduc (NEPRO CARB STEADY) 0.08 gram-1.8 kcal/mL Liqd Take 1 Can by mouth 3 (three) times daily with meals. 3/18/19   Chiara Nelson MD   pantoprazole (PROTONIX) 40 MG tablet Take 40 mg by mouth.    Historical Provider, MD   paroxetine (PAXIL) 30 MG tablet 1 TABLET IN THE MORNING ONCE A DAY ORALLY 90 11/6/18   Historical Provider, MD   predniSONE (DELTASONE) 10 MG tablet 2 tab po qday x 4 days, then 1 tab po qday x 4 days, then 1/2 tab po qday x 4 days 2/27/19   Chiara Nelson MD   promethazine (PHENERGAN) 12.5 MG Tab Take 12.5 mg by mouth every 6 (six) hours as needed. 6/4/14   Historical Provider, MD   SENSIPAR 60 mg Tab Take 30 mg by mouth daily with breakfast.  10/6/16   Historical Provider, MD   sevelamer carbonate (RENVELA) 800 mg Tab Take 800 mg by mouth 3 (three) times daily with meals.    Historical Provider, MD   sevelamer carbonate (RENVELA) 800 mg Tab Take 800 mg by mouth.    Historical Provider, MD   SODIUM BICARBONATE ORAL Take 650 mg by mouth.    Historical Provider, MD   traMADol (ULTRAM) 50 mg tablet Take 1 tablet by mouth. 9/13/13   Historical Provider, MD   traZODone (DESYREL) 50 MG tablet Take 1 tablet (50 mg total) by mouth  nightly as needed for Insomnia. 2/26/18 2/26/19  Chiara Nelson MD   VENTOLIN HFA 90 mcg/actuation inhaler INHALE 2 PUFFS INTO LUNGS EVERY 4 HOURS AS NEEDED FOR SHORTNESS OF BREATH OR WHEEZING. RESCUE 11/7/18   Charles Moody Jr., MD   vit B,C-iron fum-FA-D3-zinc ox 8 mg iron-800 mcg-1,000 unit Tab Take by mouth.    Historical Provider, MD   vitamin renal formula, B-complex-vitamin c-folic acid, (NEPHROCAP) 1 mg Cap Take by mouth.    Historical Provider, MD   vortioxetine (BRINTELLIX) 5 mg Tab Take 1 tablet by mouth. 7/16/15   Historical Provider, MD          HOSPITAL MEDICATIONS:     Scheduled Meds:    albuterol-ipratropium  3 mL Nebulization Q6H    apixaban  2.5 mg Oral BID    atorvastatin  10 mg Oral Daily    cinacalcet  60 mg Oral Daily with breakfast    metoprolol succinate  25 mg Oral Daily    mirtazapine  7.5 mg Oral Nightly    montelukast  10 mg Oral QHS    pantoprazole  40 mg Oral Daily    paroxetine  30 mg Oral Daily    polyethylene glycol  17 g Oral Daily    sevelamer carbonate  800 mg Oral TID WM     Continuous Infusions:    amiodarone in dextrose 5% 0.5 mg/min (04/02/19 0500)     PRN Meds: albuterol-ipratropium, HYDROcodone-acetaminophen, metoprolol, promethazine (PHENERGAN) IVPB, promethazine, sodium chloride 0.9%      PHYSICAL EXAM:     Wt Readings from Last 1 Encounters:   04/01/19 1915 57.7 kg (127 lb 3.3 oz)   04/01/19 1503 60.8 kg (134 lb)     Body mass index is 21.83 kg/m².  Vitals:    04/02/19 0415 04/02/19 0430 04/02/19 0445 04/02/19 0500   BP: 115/65 (!) 103/52 (!) 109/53 (!) 115/56   Pulse: (!) 112 (!) 112 (!) 111 (!) 115   Resp: (!) 23 (!) 24 (!) 25 (!) 28   Temp:       TempSrc:       SpO2: 100% 100% 100% 95%   Weight:       Height:              -- General appearance: well developed. appears stated age   -- Head: normocephalic, atraumatic   -- Eyes: conjunctivae clear. Extraocular muscles intact  -- Nose: Nares normal. Septum midline.   -- Mouth/Throat: lips, mucosa, and tongue  normal. no throat erythema.   -- Neck: + JVD. Otherwise supple, symmetrical, trachea midline, and thyroid not grossly enlarged, appears symmetric  -- Lungs:  Decreased breath sounds to auscultation bilaterally. Bibasilar crackles.  normal respiratory effort. No use of accessory muscles.   -- Chest wall: no tenderness. equal bilateral chest rise   -- Heart:  Rapid irregularly irregular heart rate and rhythm. S1, S2 normal.  no click, rub or gallop   -- Abdomen: soft, non-tender, non-distended, non-tympanic; bowel sounds normal; no masses  -- Extremities:  Fistula left upper extremity.  no cyanosis, clubbing or edema.   -- Pulses: 2+ and symmetric   -- Skin: color normal, texture normal, turgor normal. No rashes or lesions.   -- Neurologic:  Globally decreased muscle strength and tone. No focal numbness or weakness. CNII-XII intact. [place] coma scale: eyes open spontaneously-4, oriented & converses-5, obeys commands-6.      LABORATORY STUDIES:     Recent Results (from the past 36 hour(s))   Comprehensive metabolic panel    Collection Time: 04/01/19  3:18 PM   Result Value Ref Range    Sodium 139 136 - 145 mmol/L    Potassium 4.1 3.5 - 5.1 mmol/L    Chloride 98 95 - 110 mmol/L    CO2 30 (H) 23 - 29 mmol/L    Glucose 104 70 - 110 mg/dL    BUN, Bld 35 (H) 8 - 23 mg/dL    Creatinine 8.0 (H) 0.5 - 1.4 mg/dL    Calcium 9.8 8.7 - 10.5 mg/dL    Total Protein 7.5 6.0 - 8.4 g/dL    Albumin 2.6 (L) 3.5 - 5.2 g/dL    Total Bilirubin 0.5 0.1 - 1.0 mg/dL    Alkaline Phosphatase 74 55 - 135 U/L    AST 10 10 - 40 U/L    ALT 7 (L) 10 - 44 U/L    Anion Gap 11 8 - 16 mmol/L    eGFR if African American 5 (A) >60 mL/min/1.73 m^2    eGFR if non African American 5 (A) >60 mL/min/1.73 m^2   Magnesium    Collection Time: 04/01/19  3:18 PM   Result Value Ref Range    Magnesium 1.8 1.6 - 2.6 mg/dL   CBC auto differential    Collection Time: 04/01/19  3:18 PM   Result Value Ref Range    WBC 11.36 3.90 - 12.70 K/uL    RBC 2.72 (L) 4.00 - 5.40  M/uL    Hemoglobin 8.0 (L) 12.0 - 16.0 g/dL    Hematocrit 27.3 (L) 37.0 - 48.5 %     (H) 82 - 98 fL    MCH 29.4 27.0 - 31.0 pg    MCHC 29.3 (L) 32.0 - 36.0 g/dL    RDW 16.2 (H) 11.5 - 14.5 %    Platelets 199 150 - 350 K/uL    MPV 9.3 9.2 - 12.9 fL    Gran # (ANC) 9.1 (H) 1.8 - 7.7 K/uL    Lymph # 0.6 (L) 1.0 - 4.8 K/uL    Mono # 1.5 (H) 0.3 - 1.0 K/uL    Eos # 0.1 0.0 - 0.5 K/uL    Baso # 0.01 0.00 - 0.20 K/uL    Gran% 80.1 (H) 38.0 - 73.0 %    Lymph% 5.6 (L) 18.0 - 48.0 %    Mono% 13.5 4.0 - 15.0 %    Eosinophil% 0.7 0.0 - 8.0 %    Basophil% 0.1 0.0 - 1.9 %    Differential Method Automated    Phosphorus    Collection Time: 04/01/19  3:31 PM   Result Value Ref Range    Phosphorus 2.6 (L) 2.7 - 4.5 mg/dL   Lactic acid, plasma    Collection Time: 04/01/19  5:29 PM   Result Value Ref Range    Lactate (Lactic Acid) 1.0 0.5 - 2.2 mmol/L   Brain natriuretic peptide    Collection Time: 04/02/19  2:55 AM   Result Value Ref Range    BNP 1,901 (H) 0 - 99 pg/mL       Lab Results   Component Value Date    INR 1.5 (H) 03/19/2019    INR 1.1 01/09/2018    INR 1.1 02/26/2017     Lab Results   Component Value Date    HGBA1C 5.3 03/06/2019     No results for input(s): POCTGLUCOSE in the last 72 hours.        IMAGING:     Imaging Results          X-Ray Chest AP Portable (Final result)  Result time 04/01/19 16:26:28    Final result by Dedrick Sheffield MD (04/01/19 16:26:28)                 Impression:      1. No significant interval change in moderate enlargement of the cardiac silhouette and pulmonary interstitial edema with small bilateral layering pleural effusions and overlying atelectasis and/or pneumonia.      Electronically signed by: Dedrick Sheffield  Date:    04/01/2019  Time:    16:26             Narrative:    EXAMINATION:  XR CHEST AP PORTABLE    CLINICAL HISTORY:  chest pain;    TECHNIQUE:  Single frontal portable view of the chest was performed.    COMPARISON:  Chest radiograph 03/28/2019    FINDINGS:  Persistent  moderate enlargement of the cardiac silhouette which may reflect cardiomegaly and/or pericardial effusion.    Pulmonary interstitial edema and small bilateral layering pleural effusions with overlying atelectasis and/or pneumonia, not significantly changed.  No pneumothorax.    Osseous structures are stable.                                  CONSULTS:     IP CONSULT TO CARDIOLOGY  IP CONSULT TO NEPHROLOGY       ASSESSMENT & PLAN:     Primary Diagnosis:  Atrial fibrillation with rapid ventricular response    Active Hospital Problems    Diagnosis  POA    *Atrial fibrillation with rapid ventricular response [I48.91]  Yes     Priority: 1 - High    Acute exacerbation of CHF (congestive heart failure) [I50.9]  Yes     Priority: 1 - High    Acute cardiac pulmonary edema [I50.1]  Yes     Priority: 3     Elevated brain natriuretic peptide (BNP) level [R79.89]  Yes     Priority: 6     Benign essential hypertension [I10]  Yes     Chronic    Diabetes mellitus with ESRD (end-stage renal disease) [E11.22, N18.6]  Yes     Chronic    Hypertension associated with diabetes [E11.59, I10]  Yes     Chronic    History of CVA (cerebrovascular accident) [Z86.73]  Not Applicable    ESRD on dialysis [N18.6, Z99.2]  Not Applicable     Dialyzes at [facility].  Followed by Dr. Arthur      COPD (chronic obstructive pulmonary disease) [J44.9]  Yes    Diastolic dysfunction [I51.9]  Yes      Resolved Hospital Problems   No resolved problems to display.     Atrial fibrillation with rapid ventricular response  · Currently not rate controlled on amiodarone; beta-blockers p.r.n..  However, blood pressure remains stable  · Maintain with beta-blocker with hold parameters  · Monitor on telemetry  · Maintain magnesium around 2.0  · Maintain potassium around 4.0      Shortness of breath  Due to multifactorial etiology including acute exacerbation of CHF, acute cardiac pulmonary edema secondary to ESRD and CHF, bilateral pleural effusion,  atrial fibrillation      Acute CHF exacerbation  · Evidenced by history, elevated BNP, pulmonary edema, peripheral edema  · Provide fluid removal with dialysis  · Maintain w/ beta-blocker  · ACE inhibitor   · If 1) Patient cannot tolerate ACEi or 2) NYHA class III or above, add spironolactone (or eplerenone) and hydralazine-isosorbide dinitrate   · Daily Weights  · Strict I/O  · Fluid restriction to 1,500cc daily  · Low-sodium cardiac diet  · Obtain 2D echo if <6 months     Nuc Rest EF   Date Value Ref Range Status   02/04/2019 73  Final     · Chest X-ray  · Check TSH, albumin, UA, and renal function  · EKG and cardiac enzymes PRN  · DVT prophylaxis w/ pharmacological and/or mechanical measures  · Oxygen supplementation support PRN    Instructions given to patient/family:  Monitor daily weight.  Regular activity within patient's limitations.  Low salt, low fat and low choleterol diet and restrict fluid < 2L per day.  Call MD if SOB, chest pain, weight gain > 2-3 lbs per day and/or 5-6 lbs per week.   No smoking. Annual influenza vaccine required.    End-stage renal disease on dialysis  · Acute issue with significant volume overload requiring urgent dialysis  · No acute issues with electrolyte abnormality, or acid-base abnormality at this time  · Nephrology consulted    COPD  · No acute issues  · Continue with home medication regimen  · Nebulizer treatments (albuterol/atrovent)  · Titrate O2 sats between 88 to 93%.  No supplemental 02 for 02 saturation greater than 93% due to V/Q mismatch  · Consider initiating regimen of Breo, Advair 500/50mg, Spiriva 18mcg, and/or Daliresp 500mcg at or before discharge.  May also defer to outpatient pulmonology after formal pulmonary function testing.  · Mucolytics as needed  · Outpatient referral to pulmonology for further optimization  · Tobacco cessation counseling    Cerebral vascular disease  · No evidence of acute stroke at this time  · Maintain adequate blood pressure  control  · Heart healthy diet  · Aspirin  · Statin    Hypertension  · Goal while inpatient is a systolic blood pressure less than 160mmHg  · BP in acceptable range at this time  · Continue current home regimen with hold parameters  · PRN antihypertensives available    Diabetes mellitus type 2  · BG in acceptable range at this time  · Maintain w/ subcutaneous insulin management order set  · Hold oral diabetic meds  · ADA 1800 kcal diet  · BG goal while in patient is <180mg/dL  · HgA1c = Pending    Hyperlipidemia   · Lipid panel - as an outpatient  · Cardiac diet  · Continue statin    Atrial fibrillation  · Currently rate controlled  · Maintain with beta-blocker with hold parameters  · Monitor on telemetry  · Maintain magnesium around 2.0  · Maintain potassium around 4.0        VTE Risk Mitigation (From admission, onward)        Ordered     apixaban tablet 2.5 mg  2 times daily      04/01/19 1908     IP VTE HIGH RISK PATIENT  Once      04/01/19 1908     Reason for No Pharmacological VTE Prophylaxis  Once      04/01/19 1908            Adult PRN medications available   DVT prophylaxis given       DISPOSITION:     Will admit to the Hospital Medicine service for further evaluation and treatment.    Chart reviewed and updated where applicable.    High Risk Conditions:  Patient has a condition that poses threat to life and bodily function: Severe Respiratory Distress      ===============================================================    Isrrael Apodaca MD, MPH  Department of Hospital Medicine   [facility]  [phone] pg  (7pm - 6am)          This note is dictated using [employer] voice recognition software.  There are word recognition mistakes that are occasionally missed on review.

## 2019-04-02 NOTE — ANESTHESIA PREPROCEDURE EVALUATION
04/02/2019  Eli Vaz is a 73 y.o., female.    Pre-op Assessment    I have reviewed the Patient Summary Reports.     I have reviewed the Nursing Notes.   I have reviewed the Medications.     Review of Systems  Anesthesia Hx:  No problems with previous Anesthesia Denies Hx of Anesthetic complications  Denies Family Hx of Anesthesia complications.   Denies Personal Hx of Anesthesia complications.   Social:  Non-Smoker, No Alcohol Use    Hematology/Oncology:  Hematology Normal   Oncology Normal   Hematology Comments: On oral anticoagulation; has not missed any dose since hospital discharge    EENT/Dental:EENT/Dental Normal   Cardiovascular:   Exercise tolerance: poor Hypertension Dysrhythmias atrial fibrillation CHF [facility] ECG has been reviewed. · Normal left ventricular systolic function. The estimated ejection fraction is 55%  · Concentric left ventricular remodeling.  · Indeterminate left ventricular diastolic function.  · Normal right ventricular systolic function.  · Moderate left atrial enlargement.  · Mild right atrial enlargement.  · Moderate-to-severe mitral regurgitation.  · Mild to moderate tricuspid regurgitation.  · The estimated PA systolic pressure is 65 mm Hg  · Pulmonary hypertension present.  · Small pericardial effusion. No tamponade      Pulmonary:   COPD On home O2 at 2L   Renal/:   Chronic Renal Disease, ESRD, Dialysis HD TTS  Via left arm AVF   Hepatic/GI:  Hepatic/GI Normal    Musculoskeletal:  Musculoskeletal Normal    Neurological:  Neurology Normal    Endocrine:   Diabetes, type 2    Dermatological:  Skin Normal    Psych:   anxiety          Physical Exam  General:  frail   Airway/Jaw/Neck:  Airway Findings: Mouth Opening: Normal Tongue: Large  General Airway Assessment: Adult  Mallampati: III  TM Distance: 4 - 6 cm  Jaw/Neck Findings:  Neck ROM: Normal ROM      Dental:  Dental  Findings: Upper partial dentures   Chest/Lungs:  Chest/Lungs Findings: Normal Respiratory Rate     Heart/Vascular:  Heart Findings: Rhythm: Irregularly Irregular        Mental Status:  Mental Status Findings:  Cooperative, Alert and Oriented       Wt Readings from Last 3 Encounters:   04/01/19 57.7 kg (127 lb 3.3 oz)   03/25/19 59.6 kg (131 lb 6.3 oz)   03/18/19 59.4 kg (130 lb 15.3 oz)     Temp Readings from Last 3 Encounters:   04/02/19 37.1 °C (98.8 °F) (Oral)   04/01/19 36.9 °C (98.5 °F) (Oral)   03/25/19 36.8 °C (98.2 °F)     BP Readings from Last 3 Encounters:   04/02/19 (!) 100/52   04/01/19 101/61   03/25/19 (!) 131/59     Pulse Readings from Last 3 Encounters:   04/02/19 (!) 116   03/25/19 65   03/13/19 (!) 114     Lab Results   Component Value Date    WBC 11.36 04/01/2019    HGB 8.0 (L) 04/01/2019    HCT 27.3 (L) 04/01/2019     (H) 04/01/2019     04/01/2019     CMP  Sodium   Date Value Ref Range Status   04/01/2019 139 136 - 145 mmol/L Final     Potassium   Date Value Ref Range Status   04/01/2019 4.1 3.5 - 5.1 mmol/L Final     Chloride   Date Value Ref Range Status   04/01/2019 98 95 - 110 mmol/L Final     CO2   Date Value Ref Range Status   04/01/2019 30 (H) 23 - 29 mmol/L Final     Glucose   Date Value Ref Range Status   04/01/2019 104 70 - 110 mg/dL Final     BUN, Bld   Date Value Ref Range Status   04/01/2019 35 (H) 8 - 23 mg/dL Final     Creatinine   Date Value Ref Range Status   04/01/2019 8.0 (H) 0.5 - 1.4 mg/dL Final     Calcium   Date Value Ref Range Status   04/01/2019 9.8 8.7 - 10.5 mg/dL Final     Total Protein   Date Value Ref Range Status   04/01/2019 7.5 6.0 - 8.4 g/dL Final     Albumin   Date Value Ref Range Status   04/01/2019 2.6 (L) 3.5 - 5.2 g/dL Final     Total Bilirubin   Date Value Ref Range Status   04/01/2019 0.5 0.1 - 1.0 mg/dL Final     Comment:     For infants and newborns, interpretation of results should be based  on gestational age, weight and in agreement with  clinical  observations.  Premature Infant recommended reference ranges:  Up to 24 hours.............<8.0 mg/dL  Up to 48 hours............<12.0 mg/dL  3-5 days..................<15.0 mg/dL  6-29 days.................<15.0 mg/dL       Alkaline Phosphatase   Date Value Ref Range Status   04/01/2019 74 55 - 135 U/L Final     AST   Date Value Ref Range Status   04/01/2019 10 10 - 40 U/L Final     ALT   Date Value Ref Range Status   04/01/2019 7 (L) 10 - 44 U/L Final     Anion Gap   Date Value Ref Range Status   04/01/2019 11 8 - 16 mmol/L Final     eGFR if    Date Value Ref Range Status   04/01/2019 5 (A) >60 mL/min/1.73 m^2 Final     eGFR if non    Date Value Ref Range Status   04/01/2019 5 (A) >60 mL/min/1.73 m^2 Final     Comment:     Calculation used to obtain the estimated glomerular filtration  rate (eGFR) is the CKD-EPI equation.            Anesthesia Plan  Type of Anesthesia, risks & benefits discussed:  Anesthesia Type:  general, MAC  Patient's Preference:   Intra-op Monitoring Plan: standard ASA monitors  Intra-op Monitoring Plan Comments:   Post Op Pain Control Plan: per primary service following discharge from PACU  Post Op Pain Control Plan Comments:   Induction:   IV  Beta Blocker:  Patient is not currently on a Beta-Blocker (No further documentation required).       Informed Consent: Patient understands risks and agrees with Anesthesia plan.  Questions answered. Anesthesia consent signed with patient.  ASA Score: 3     Day of Surgery Review of History & Physical: I have interviewed and examined the patient. I have reviewed the patient's H&P dated:            Ready For Surgery From Anesthesia Perspective.

## 2019-04-02 NOTE — PROCEDURES
DC cardioversion    Indication:  Refractory AFib with RVR    Procedure detail:  After informed consent was obtained patient was anesthetized using propofol by Anesthesia.  A single synchronized 200 joule shock was delivered with conversion to normal sinus rhythm at a heart rate of 64 beats per minute.  Patient tolerated the procedure well without any complications.    Recommendation:  -continue medical therapy  -outpatient referral to EP for consideration of ablation    * likely okay for DC from CV standpoint after changing to oral amiodarone after protocol end at 6:00 p.m. versus observation overnight after dialysis

## 2019-04-03 VITALS
OXYGEN SATURATION: 100 % | TEMPERATURE: 98 F | DIASTOLIC BLOOD PRESSURE: 58 MMHG | WEIGHT: 127.19 LBS | BODY MASS INDEX: 21.71 KG/M2 | HEART RATE: 72 BPM | RESPIRATION RATE: 24 BRPM | HEIGHT: 64 IN | SYSTOLIC BLOOD PRESSURE: 125 MMHG

## 2019-04-03 LAB
CHOLEST SERPL-MCNC: 144 MG/DL (ref 120–199)
CHOLEST/HDLC SERPL: 3.1 {RATIO} (ref 2–5)
HDLC SERPL-MCNC: 46 MG/DL (ref 40–75)
HDLC SERPL: 31.9 % (ref 20–50)
LDLC SERPL CALC-MCNC: 81.6 MG/DL (ref 63–159)
MAGNESIUM SERPL-MCNC: 1.7 MG/DL (ref 1.6–2.6)
NONHDLC SERPL-MCNC: 98 MG/DL
PHOSPHATE SERPL-MCNC: 3.4 MG/DL (ref 2.7–4.5)
TRIGL SERPL-MCNC: 82 MG/DL (ref 30–150)

## 2019-04-03 PROCEDURE — 63600175 PHARM REV CODE 636 W HCPCS: Performed by: HOSPITALIST

## 2019-04-03 PROCEDURE — 99232 SBSQ HOSP IP/OBS MODERATE 35: CPT | Mod: ,,, | Performed by: INTERNAL MEDICINE

## 2019-04-03 PROCEDURE — 83735 ASSAY OF MAGNESIUM: CPT

## 2019-04-03 PROCEDURE — 25000003 PHARM REV CODE 250: Performed by: HOSPITALIST

## 2019-04-03 PROCEDURE — 97161 PT EVAL LOW COMPLEX 20 MIN: CPT

## 2019-04-03 PROCEDURE — 80061 LIPID PANEL: CPT

## 2019-04-03 PROCEDURE — 94640 AIRWAY INHALATION TREATMENT: CPT

## 2019-04-03 PROCEDURE — 84100 ASSAY OF PHOSPHORUS: CPT

## 2019-04-03 PROCEDURE — 94761 N-INVAS EAR/PLS OXIMETRY MLT: CPT

## 2019-04-03 PROCEDURE — 36415 COLL VENOUS BLD VENIPUNCTURE: CPT

## 2019-04-03 PROCEDURE — 25000003 PHARM REV CODE 250: Performed by: INTERNAL MEDICINE

## 2019-04-03 PROCEDURE — 99232 PR SUBSEQUENT HOSPITAL CARE,LEVL II: ICD-10-PCS | Mod: ,,, | Performed by: INTERNAL MEDICINE

## 2019-04-03 PROCEDURE — 25000242 PHARM REV CODE 250 ALT 637 W/ HCPCS: Performed by: HOSPITALIST

## 2019-04-03 PROCEDURE — 97165 OT EVAL LOW COMPLEX 30 MIN: CPT

## 2019-04-03 RX ORDER — ONDANSETRON 2 MG/ML
8 INJECTION INTRAMUSCULAR; INTRAVENOUS EVERY 6 HOURS PRN
Status: DISCONTINUED | OUTPATIENT
Start: 2019-04-03 | End: 2019-04-03 | Stop reason: HOSPADM

## 2019-04-03 RX ADMIN — CINACALCET HYDROCHLORIDE 60 MG: 30 TABLET, FILM COATED ORAL at 07:04

## 2019-04-03 RX ADMIN — METOPROLOL SUCCINATE 25 MG: 25 TABLET, EXTENDED RELEASE ORAL at 09:04

## 2019-04-03 RX ADMIN — PANTOPRAZOLE SODIUM 40 MG: 40 TABLET, DELAYED RELEASE ORAL at 09:04

## 2019-04-03 RX ADMIN — ONDANSETRON 8 MG: 2 INJECTION INTRAMUSCULAR; INTRAVENOUS at 11:04

## 2019-04-03 RX ADMIN — ATORVASTATIN CALCIUM 10 MG: 10 TABLET, FILM COATED ORAL at 09:04

## 2019-04-03 RX ADMIN — APIXABAN 2.5 MG: 2.5 TABLET, FILM COATED ORAL at 09:04

## 2019-04-03 RX ADMIN — SEVELAMER CARBONATE 800 MG: 800 TABLET, FILM COATED ORAL at 07:04

## 2019-04-03 RX ADMIN — PROMETHAZINE HYDROCHLORIDE 6.25 MG: 25 INJECTION INTRAMUSCULAR; INTRAVENOUS at 10:04

## 2019-04-03 RX ADMIN — PAROXETINE HYDROCHLORIDE 30 MG: 10 SUSPENSION ORAL at 09:04

## 2019-04-03 RX ADMIN — AMIODARONE HYDROCHLORIDE 400 MG: 200 TABLET ORAL at 09:04

## 2019-04-03 RX ADMIN — IPRATROPIUM BROMIDE AND ALBUTEROL SULFATE 3 ML: .5; 3 SOLUTION RESPIRATORY (INHALATION) at 07:04

## 2019-04-03 RX ADMIN — IPRATROPIUM BROMIDE AND ALBUTEROL SULFATE 3 ML: .5; 3 SOLUTION RESPIRATORY (INHALATION) at 12:04

## 2019-04-03 NOTE — PROGRESS NOTES
Pt with con't nausea even after phenergan given. Dr. Bowman notified with new orders received. Pt instructed on new orders and verb understanding.

## 2019-04-03 NOTE — SUBJECTIVE & OBJECTIVE
Interval History:  Feels well without complaints this a.m..  She denies any chest pain, shortness of breath or palpitations.  She tolerated hemodialysis yesterday.    Telemetry:  Normal sinus rhythm    Review of Systems   All other systems reviewed and are negative.    Objective:     Vital Signs (Most Recent):  Temp: 97.9 °F (36.6 °C) (04/03/19 0701)  Pulse: 78 (04/03/19 0907)  Resp: (!) 44 (04/03/19 0745)  BP: (!) 173/75 (04/03/19 0907)  SpO2: 100 % (04/03/19 0745) Vital Signs (24h Range):  Temp:  [97.1 °F (36.2 °C)-100.8 °F (38.2 °C)] 97.9 °F (36.6 °C)  Pulse:  [] 78  Resp:  [14-44] 44  SpO2:  [96 %-100 %] 100 %  BP: ()/(40-75) 173/75     Weight: 57.7 kg (127 lb 3.3 oz)  Body mass index is 21.83 kg/m².     SpO2: 100 %  O2 Device (Oxygen Therapy): nasal cannula      Intake/Output Summary (Last 24 hours) at 4/3/2019 0949  Last data filed at 4/3/2019 0600  Gross per 24 hour   Intake 983.6 ml   Output 1500 ml   Net -516.4 ml       Lines/Drains/Airways     Central Venous Catheter Line                 Hemodialysis Catheter -- days          Drain                 Hemodialysis AV Fistula 03/06/19 2339 Left upper arm 27 days          Peripheral Intravenous Line                 External Jugular IV 04/01/19 1829 1 day         Peripheral IV - Single Lumen 04/01/19 1519 Right Antecubital 1 day                Physical Exam   Constitutional: She is oriented to person, place, and time. She appears well-developed and well-nourished.   HENT:   Head: Normocephalic and atraumatic.   Eyes: Pupils are equal, round, and reactive to light. Conjunctivae and EOM are normal.   Neck: Normal range of motion. Neck supple. No thyromegaly present.   Cardiovascular: Normal rate and regular rhythm.   No murmur heard.  Pulmonary/Chest: Effort normal and breath sounds normal. No respiratory distress.   Abdominal: Soft. Bowel sounds are normal.   Musculoskeletal: She exhibits no edema.   Neurological: She is alert and oriented to person,  place, and time.   Skin: Skin is warm and dry.   Psychiatric: She has a normal mood and affect. Her behavior is normal.       Significant Labs:   BMP:   Recent Labs   Lab 04/01/19  1518 04/03/19  0448     --      --    K 4.1  --    CL 98  --    CO2 30*  --    BUN 35*  --    CREATININE 8.0*  --    CALCIUM 9.8  --    MG 1.8 1.7    and CBC   Recent Labs   Lab 04/01/19  1518   WBC 11.36   HGB 8.0*   HCT 27.3*          Significant Imaging: Echocardiogram:   Transthoracic echo (TTE) complete (Cupid Only):   Results for orders placed or performed during the hospital encounter of 03/19/19   Transthoracic echo (TTE) complete (Cupid Only)   Result Value Ref Range    BSA 1.64 m2    LA WIDTH 4.22 cm    AORTIC VALVE CUSP SEPERATION 1.95 cm    PV PEAK VELOCITY 1.00 cm/s    LVIDD 3.80 3.5 - 6.0 cm    IVS 1.23 (A) 0.6 - 1.1 cm    PW 1.09 0.6 - 1.1 cm    Ao root annulus 2.99 cm    LVIDS 3.02 2.1 - 4.0 cm    FS 21 28 - 44 %    LA volume 91.55 cm3    Sinus 3.15 cm    STJ 2.35 cm    Ascending aorta 2.29 cm    LV mass 145.66 g    LA size 4.39 cm    RVDD 3.52 cm    TAPSE 1.04 cm    RV S' 6.62 m/s    Left Ventricle Relative Wall Thickness 0.57 cm    AV mean gradient 3.86 mmHg    AV valve area 3.49 cm2    AV Velocity Ratio 0.92     AV index (prosthetic) 1.08     LVOT diameter 2.03 cm    LVOT area 3.23 cm2    LVOT peak salvador [id number] m/s    LVOT peak VTI 20.93 cm    Ao peak salvador 1.40 m/s    Ao VTI 19.41 cm    LVOT stroke volume 67.71 cm3    AV peak gradient 7.84 mmHg    TR Max Salvador 3.94 m/s    LV Systolic Volume 35.49 mL    LV Systolic Volume Index 21.7 mL/m2    LV Diastolic Volume 61.90 mL    LV Diastolic Volume Index 37.88 mL/m2    LA Volume Index 56.0 mL/m2    LV Mass Index 89.1 g/m2    RA Major Axis 4.93 cm    Left Atrium Minor Axis 5.89 cm    Left Atrium Major Axis 5.74 cm    Triscuspid Valve Regurgitation Peak Gradient 62.09 mmHg    RA Width 3.95 cm    Right Atrial Pressure (from IVC) 3 mmHg    TV rest pulmonary  artery pressure 65 mmHg     · Normal left ventricular systolic function. The estimated ejection fraction is 55%  · Concentric left ventricular remodeling.  · Indeterminate left ventricular diastolic function.  · Normal right ventricular systolic function.  · Moderate left atrial enlargement.  · Mild right atrial enlargement.  · Moderate-to-severe mitral regurgitation.  · Mild to moderate tricuspid regurgitation.  · The estimated PA systolic pressure is 65 mm Hg  · Pulmonary hypertension present.  · Small pericardial effusion. No tamponade    NST:  · There is a mild, infarct defect(s) in the lateral apical wall(s),  · An ejection fraction of 72 % at rest  · The EKG portion of this study is negative for myocardial ischemia.

## 2019-04-03 NOTE — PT/OT/SLP EVAL
Physical Therapy Evaluation    Patient Name:  Eli Vaz   MRN:  [id number]    Recommendations:     Discharge Recommendations:  home health PT   Discharge Equipment Recommendations: none   Barriers to discharge: None    Assessment:     Eli Vaz is a 73 y.o. female admitted with a medical diagnosis of Atrial fibrillation with rapid ventricular response.  She presents with the following impairments/functional limitations:  weakness, impaired endurance, impaired functional mobilty, gait instability, impaired balance, decreased lower extremity function, decreased safety awareness, impaired cardiopulmonary response to activity. Patient was recently discharged from the hospital on 3/26/19. She stated that she had 2 visits from  PT while at home. Prior to PT evaluation, she stated that she was feeling better since coming into the hospital. She was stand by assist for bed mobility and contact guard assist to ambulate ~40ft with RW and 1 standing rest break due to fatigue. She would benefit from  PT being resumed at discharge. She is okay to ambulate with nursing staff assistance and rolling walker. If patient does not discharge home today PT will continue to see her in the hospital.     Rehab Prognosis: Good; patient would benefit from acute skilled PT services to address these deficits and reach maximum level of function.    Recent Surgery: Procedure(s) (LRB):  Cardioversion or Defibrillation (N/A) 1 Day Post-Op    Plan:     During this hospitalization, patient to be seen 5 x/week to address the identified rehab impairments via gait training, therapeutic activities, therapeutic exercises and progress toward the following goals:    · Plan of Care Expires:  04/17/19    Subjective     Chief Complaint: Weak but feels much better than when she came to the hospital.   Patient/Family Comments/goals: To go home.     Pain/Comfort:  Pain Rating 1: 0/10    Living Environment:  Patient lives at home with her spouse in a [facility].  Prior to admission, patients level of function was short distance ambulation with rollator since last hospital admission and discharge 3/26/19. She has had 2 sessions with  PT since being home. Equipment used at home: bedside commode, walker, rolling, oxygen. Her daughter assists her with sponge baths at home. Family brings her and picks her up from dialysis. Upon discharge, patient will have assistance from spouse, daughter, and other family members.    Objective:     Communicated with nurse Grossman prior to session.  Patient found supine with blood pressure cuff, telemetry, central line, oxygen, pulse ox (continuous)  upon PT entry to room.    General Precautions: Standard, fall, respiratory   Orthopedic Precautions:N/A   Braces: N/A     Exams:  · Cognitive Exam:  Patient is oriented to Person, Place and Situation  · Gross Motor Coordination:  WFL  · Sensation:    · -       Intact  · Skin Integrity/Edema:      · -       Skin integrity: Visible skin intact  · RLE ROM: WFL  · RLE Strength: WFL  · LLE ROM: WFL  · LLE Strength: WFL    Functional Mobility:  · Bed Mobility:     · Supine to Sit: stand by assistance  · Transfers:     · Sit to Stand:  stand by assistance with rolling walker  · Gait:  Patient ambulated 40ft with Rolling Walker and CGA using 3-point gait. Patient demonstrated decreased merrill and decreased step length during gait due to impaired balance and decreased endurance. She required 1 standing rest break due to SOB. Her O2 sats were 100% on RA after ambulation.      Therapeutic Activities and Exercises:  Patient tolerated sitting on edge of bed ~8 minutes prior to ambulation.     AM-PAC 6 CLICK MOBILITY  Total Score:21     Patient left up in chair with all lines intact, call button in reach, nurse Grossman notified and daughter present.    GOALS:   Multidisciplinary Problems     Physical Therapy Goals        Problem: Physical Therapy Goal    Goal Priority Disciplines Outcome Goal Variances  Interventions   Physical Therapy Goal     PT, PT/OT      Description:  Goals to be met by: 19    Patient will increase functional independence with mobility by performin. Sit to stand transfer with Supervision  2. Gait x100 feet with Supervision using Rolling Walker  3. Lower extremity exercise program x30 reps per handout, with supervision                      History:     Past Medical History:   Diagnosis Date    Arthritis     Atrial fibrillation     COPD (chronic obstructive pulmonary disease)     Diabetes mellitus type II     Dialysis patient     Encounter for blood transfusion     ESRD (end stage renal disease)        Past Surgical History:   Procedure Laterality Date    AV FISTULA PLACEMENT      TRANSESOPHAGEAL ECHOCARDIOGRAM WITH POSSIBLE CARDIOVERSION (ISAMAR W/ POSS CARDIOVERSION) N/A 3/21/2019    Performed by Elgin [facility] at [facility] LAB    TUBAL LIGATION         Time Tracking:     PT Received On: 19  PT Start Time: 0948     PT Stop Time: 1012  PT Total Time (min): 24 min     Billable Minutes: Evaluation  24    Ally Price, PT  2019

## 2019-04-03 NOTE — PLAN OF CARE
Problem: Adult Inpatient Plan of Care  Goal: Plan of Care Review  Outcome: Outcome(s) achieved Date Met: 04/03/19  Pt d/c'd home with no distress noted. Pt no longer with N/V. Pt given d/c instructions on diet, meds activity and follow-up. Pt and family verb understanding. Pt denied CP and SOB. Pt remains with NSR rate 70's-80's.

## 2019-04-03 NOTE — PROGRESS NOTES
Awake alert oriented NAD    Cardioverted yesterday without problems dialysis followed which pt tolerated well.  Being dc today      Denies CNS ENT CP GI  RHEUM OR DERM SX  Past Medical History:   Diagnosis Date    Arthritis     Atrial fibrillation     COPD (chronic obstructive pulmonary disease)     Diabetes mellitus type II     Dialysis patient     Encounter for blood transfusion     ESRD (end stage renal disease)      Review of patient's allergies indicates:  No Known Allergies    Current Facility-Administered Medications   Medication    acetaminophen tablet 650 mg    albuterol-ipratropium 2.5 mg-0.5 mg/3 mL nebulizer solution 3 mL    albuterol-ipratropium 2.5 mg-0.5 mg/3 mL nebulizer solution 3 mL    amiodarone 360 mg/200 mL (1.8 mg/mL) infusion    amiodarone tablet 400 mg    apixaban tablet 2.5 mg    atorvastatin tablet 10 mg    cinacalcet tablet 60 mg    epoetin cat injection 10,000 Units    HYDROcodone-acetaminophen 5-325 mg per tablet 1 tablet    metoprolol injection 5 mg    metoprolol succinate (TOPROL-XL) 24 hr tablet 25 mg    mirtazapine tablet 7.5 mg    montelukast tablet 10 mg    pantoprazole EC tablet 40 mg    paroxetine 10 mg/5 mL suspension 30 mg    polyethylene glycol packet 17 g    promethazine (PHENERGAN) 6.25 mg in dextrose 5 % 50 mL IVPB    promethazine 6.25 mg/5 mL syrup 12.5 mg    sevelamer carbonate tablet 800 mg    sodium chloride 0.9% flush 10 mL       LABS    Recent Results (from the past 24 hour(s))   Phosphorus    Collection Time: 04/03/19  4:48 AM   Result Value Ref Range    Phosphorus 3.4 2.7 - 4.5 mg/dL   Magnesium    Collection Time: 04/03/19  4:48 AM   Result Value Ref Range    Magnesium 1.7 1.6 - 2.6 mg/dL   Lipid panel    Collection Time: 04/03/19  4:48 AM   Result Value Ref Range    Cholesterol 144 120 - 199 mg/dL    Triglycerides 82 30 - 150 mg/dL    HDL 46 40 - 75 mg/dL    LDL Cholesterol 81.6 63.0 - 159.0 mg/dL    HDL/Chol Ratio 31.9 20.0 - 50.0 %     Total Cholesterol/HDL Ratio 3.1 2.0 - 5.0    Non-HDL Cholesterol 98 mg/dL   ]    I/O last 3 completed shifts:  In: 1300.4 [P.O.:300; I.V.:500.4; Other:500]  Out: 1500 [Other:1500]    Vitals:    04/03/19 0715 04/03/19 0730 04/03/19 0740 04/03/19 0745   BP: (!) 173/74 (!) 173/73  (!) 150/64   Pulse: 78 77 79 79   Resp: (!) 28 19 (!) 24 (!) 44   Temp:       TempSrc:       SpO2: 100% 100% 100% 100%   Weight:       Height:           No Jvd, Thyromegaly or Lymphadenopathy  Lungs: Fairly clear anteriorly and laterally  Cor: RRR no G or rubs  Abd: Soft benign good bowel sounds non tender  Ext: No E C C    A)  ESRD  AFIB with RVR asx has been cardioverted x 2 this week  COPD  T2DM  COPD   Anemia of ckd  2nd hypeprth    P)    Renal wise,   out pt hd  Renal diet  Binders  epo        P)

## 2019-04-03 NOTE — PLAN OF CARE
04/03/19 1002   Discharge Assessment   Assessment Type Discharge Planning Assessment   Confirmed/corrected address and phone number on facesheet? Yes   Assessment information obtained from? Caregiver   Prior to hospitilization cognitive status: Alert/Oriented   Prior to hospitalization functional status: Independent   Current cognitive status: Alert/Oriented   Current Functional Status: Independent   Facility Arrived From: home   Lives With spouse;child(maisha), dependent   Able to Return to Prior Arrangements yes   Is patient able to care for self after discharge? No   Who are your caregiver(s) and their phone number(s)? Kai [phone]  Veronica [phone]   Patient's perception of discharge disposition home or selfcare;home health   Readmission Within the Last 30 Days planned readmission   If yes, [facility]    Patient currently being followed by outpatient case management? No   Patient currently receives any other outside agency services? Yes   Name and contact number of agency or person providing outside services home health    Equipment Currently Used at Home walker, rolling;bedside commode;nebulizer;oxygen;glucometer   Do you have any problems affording any of your prescribed medications? No   Is the patient taking medications as prescribed? yes   Does the patient have transportation home? Yes   Transportation Anticipated family or friend will provide   Dialysis Name and Scheduled days t/Th/[facility] in [place]    Does the patient receive services at the [facility]? No   Discharge Plan A Home with family;Home Health   Discharge Plan B Home with family   DME Needed Upon Discharge  shower chair   Patient/Family in Agreement with Plan yes   Readmission Questionnaire   At the time of your discharge, did someone talk to you about what your health problems were? Yes   At the time of discharge, did someone talk to you about what to watch out for regarding worsening of your  health problem? Yes   At the time of discharge, did someone talk to you about what to do if you experienced worsening of your health problem? Yes   At the time of discharge, did someone talk to you about which medication to take when you left the hospital and which ones to stop taking? Yes   At the time of discharge, did someone talk to you about when and where to follow up with a doctor after you left the hospital? Yes   What do you believe caused you to be sick enough to be re-admitted? A-fib    How often do you need to have someone help you when you read instructions, pamphlets, or other written material from your doctor or pharmacy? Always   Do you have problems taking your medications as prescribed? Yes   Do you have any problems affording any of  your prescribed medications? No   Do you have problems obtaining/receiving your medications? No   Does the patient have transportation to healthcare appointments? Yes   Living Arrangements house   Does the patient have family/friends to help with healtcare needs after discharge? yes   Does your caregiver provide all the help you need? Yes       Jamie met with pt daughter.

## 2019-04-03 NOTE — DISCHARGE SUMMARY
[facility] Hospital Medicine  Discharge Summary      Patient Name: Eli Vaz  MRN: [id number]  Admission Date: 4/1/2019  Hospital Length of Stay: 2 days  Discharge Date and Time:  04/03/2019 10:01 AM  Attending Physician: Janine Bowman MD   Discharging Provider: Janine Bowman MD  Primary Care Provider: Chiara Nelson MD      HPI:   Eli Vaz is a 73 y.o. female that (in part)  has a past medical history of Arthritis, Atrial fibrillation, COPD (chronic obstructive pulmonary disease), Diabetes mellitus type II, Dialysis patient, Encounter for blood transfusion, and ESRD (end stage renal disease).  has a past surgical history that includes Tubal ligation and AV fistula placement. Presents to [facility] Emergency Department complaining of recurrent shortness of breath.  She was admitted for similar complaints on March 19, 2019, approximately 2 weeks ago.  And just prior to that she was admitted week for atrial fibrillation as well as respiratory infection.  Reports compliance with home medications.  She is awaiting arrival of home oxygen.  She reports compliance with dialysis.                   Description of symptoms  Location:  Lungs/chest  Onset:  Subacute onset with progressive worsening  Character:  Moderate to severe intensity shortness of breath  Frequency:  Daily frequency, but worse today.  She has had multiple hospitalizations this year for similar presentations.  Duration:  Constant   Associated Symptoms:  Complains of palpitations.  Denies fever chills  Radiation:  Throughout chest  Exacerbating factors:  Worsened with exertion  Relieving factors:  Some relief given with supplemental oxygen in ED.    In the emergency department routine laboratory studies, chest x-ray, and EKG were obtained.  There was concern for pulmonary edema on chest x-ray.  EKG shows evidence of atrial fibrillation rapid ventricular response.    Procedure(s) (LRB):  Cardioversion or  Defibrillation (N/A)      Hospital Course:   Ms. Vaz was admitted with AFib with rapid ventricular response along with volume over load state due to her end-stage renal disease and acute on chronic CHF exacerbation.  Cardiology and Nephrology were consulted. She was started on amiodarone infusion and underwent urgent dialysis with correction of volume status.  Cardiology performed a DC cardioversion on April 2, 2019 that was successful with returning the patient to normal sinus rhythm.  She was continued on amiodarone infusion until after completion of the infusion protocol and then amiodarone was converted to p.o. Patient was observed overnight in the ICU.  Patient remained in normal sinus rhythm and respiratory status normalized.  Anticoagulation continued with Eliquis. Patient was seen by PT and OT, and she was resumed on home health upon discharge.  Patient was arranged for follow-up with her primary care physician and Cardiology, and she will need outpatient referral to EP for consideration of ablation.  Patient to resume her normal scheduled dialysis on TTS at [facility].      Consults:   Consults (From admission, onward)        Status Ordering Provider     Anesthesiology  Once     Provider:  (Not yet assigned)    Completed RAKESH BRIGGS     Inpatient consult to Cardiology  Once     Provider:  Rakesh Briggs MD    Completed SARAH PETERSON     Inpatient consult to Nephrology  Once     Provider:  Armand Cárdenas MD    Completed BEATRICE GALVAN          Final Active Diagnoses:    Diagnosis Date Noted POA    PRINCIPAL PROBLEM:  Atrial fibrillation with rapid ventricular response [I48.91] 04/01/2019 Yes    Benign essential hypertension [I10] 03/21/2019 Yes     Chronic    Acute exacerbation of CHF (congestive heart failure) [I50.9] 03/20/2019 Yes    Acute cardiac pulmonary edema [I50.1] 03/20/2019 Yes    Elevated brain natriuretic peptide (BNP) level [R79.89] 03/20/2019 Yes    Diabetes  mellitus with ESRD (end-stage renal disease) [E11.22, N18.6] 12/01/2015 Yes     Chronic    Combined hyperlipidemia associated with type 2 diabetes mellitus [E11.69, E78.2] 12/01/2015 Yes    Hypertension associated with diabetes [E11.59, I10] 10/23/2015 Yes     Chronic    History of CVA (cerebrovascular accident) [Z86.73] 03/13/2014 Not Applicable    ESRD on dialysis [N18.6, Z99.2] 12/20/2013 Not Applicable    COPD (chronic obstructive pulmonary disease) [J44.9] 11/20/2013 Yes    Diastolic dysfunction [I51.9] 11/20/2013 Yes      Problems Resolved During this Admission:       Discharged Condition: good    Disposition: Home-Health Care Svc    Follow Up:  Follow-up Information     Chiara Nelson MD In 1 week.    Specialty:  Family Medicine  Contact information:  [address]  [place] [id number]  [phone]             Rakesh Briggs MD In 1 week.    Specialties:  INTERVENTIONAL CARDIOLOGY, Cardiology  Contact information:  [address]  [address]  [place] [id number] [phone]                 Patient Instructions:      Diet Cardiac     Diet renal     Diet diabetic     Activity as tolerated       Significant Diagnostic Studies:     Pending Diagnostic Studies:     Procedure Component Value Units Date/Time    EKG 12-LEAD [[id number]] Collected:  04/02/19 0943    Order Status:  Sent Lab Status:  In process Updated:  04/02/19 1038    Narrative:       Test Reason :     Vent. Rate : 106 BPM     Atrial Rate : 106 BPM     P-R Int : 000 ms          QRS Dur : 102 ms      QT Int : 362 ms       P-R-T Axes : 000 -19 072 degrees     QTc Int : 480 ms    Atrial fibrillation with rapid ventricular response  Low voltage QRS  Cannot rule out Anterior infarct ,age undetermined  Abnormal ECG  When compared with ECG of 01-APR-2019 15:16,  Significant changes have occurred    Referred By: [email or web address]   SELF           Confirmed By:          Medications:  Reconciled Home Medications:      Medication List      CHANGE how you take  these medications    levocetirizine 5 MG tablet  Commonly known as:  XYZAL  Take 1 tablet (5 mg total) by mouth every evening.  What changed:  Another medication with the same name was removed. Continue taking this medication, and follow the directions you see here.     meclizine 25 mg tablet  Commonly known as:  ANTIVERT  Take 1 tablet (25 mg total) by mouth 3 (three) times daily as needed.  What changed:  Another medication with the same name was removed. Continue taking this medication, and follow the directions you see here.        CONTINUE taking these medications    albuterol-ipratropium 2.5 mg-0.5 mg/3 mL nebulizer solution  Commonly known as:  DUO-NEB  Take 3 mLs by nebulization every 6 (six) hours as needed for Wheezing or Shortness of Breath. Rescue     * amiodarone 400 MG tablet  Commonly known as:  PACERONE  Take 1 tablet (400 mg total) by mouth 2 (two) times daily. for 14 days     * amiodarone 200 MG Tab  Commonly known as:  PACERONE  Take 1 tablet (200 mg total) by mouth 2 (two) times daily. for 14 days  Start taking on:  4/7/2019     amLODIPine 10 MG tablet  Commonly known as:  NORVASC  Take 10 mg by mouth once daily.     apixaban 2.5 mg Tab  Commonly known as:  ELIQUIS  Take 1 tablet (2.5 mg total) by mouth 2 (two) times daily.     atorvastatin 10 MG tablet  Commonly known as:  LIPITOR  Take 1 tablet (10 mg total) by mouth once daily.     BRINTELLIX 5 mg Tab  Generic drug:  vortioxetine  Take 1 tablet by mouth.     busPIRone 10 MG tablet  Commonly known as:  BUSPAR  Take 10 mg by mouth.     epoetin cat 3,000 unit/mL injection  Commonly known as:  PROCRIT  Inject 3,000 Units into the skin.     fluticasone 50 mcg/actuation nasal spray  Commonly known as:  FLONASE  1 spray by Each Nare route 2 (two) times daily.     lidocaine 4 % cream  Commonly known as:  LMX  Apply topically as needed. To affected area up to 5 times a day.     loperamide 1 mg/5 mL solution  Commonly known as:  IMODIUM  Take 10 mLs (2  mg total) by mouth 4 (four) times daily as needed for Diarrhea.     metoprolol succinate 25 MG 24 hr tablet  Commonly known as:  TOPROL-XL  Take 25 mg by mouth.     mirtazapine 7.5 MG Tab  Commonly known as:  REMERON  Take 7.5 mg by mouth nightly.     montelukast 10 mg tablet  Commonly known as:  SINGULAIR  TAKE 1 TABLET BY MOUTH EVERY DAY IN THE EVENING     nut.tx.imp.renal fxn,lac-reduc 0.08 gram-1.8 kcal/mL Liqd  Commonly known as:  NEPRO CARB STEADY  Take 1 Can by mouth 3 (three) times daily with meals.     pantoprazole 40 MG tablet  Commonly known as:  PROTONIX  Take 40 mg by mouth.     paroxetine 30 MG tablet  Commonly known as:  PAXIL  1 TABLET IN THE MORNING ONCE A DAY ORALLY 90     promethazine 12.5 MG Tab  Commonly known as:  PHENERGAN  Take 12.5 mg by mouth every 6 (six) hours as needed.     * SENSIPAR 60 MG Tab  Generic drug:  cinacalcet  Take 30 mg by mouth daily with breakfast.     * cinacalcet 60 MG Tab  Commonly known as:  SENSIPAR  Take 30 mg by mouth.     sevelamer carbonate 800 mg Tab  Commonly known as:  RENVELA  Take 800 mg by mouth 3 (three) times daily with meals.     SODIUM BICARBONATE ORAL  Take 650 mg by mouth.     traMADol 50 mg tablet  Commonly known as:  ULTRAM  Take 1 tablet by mouth.     traZODone 50 MG tablet  Commonly known as:  DESYREL  Take 1 tablet (50 mg total) by mouth nightly as needed for Insomnia.     * albuterol 2.5 mg /3 mL (0.083 %) nebulizer solution  Commonly known as:  PROVENTIL  Inhale 2.5 mg into the lungs.     * VENTOLIN HFA 90 mcg/actuation inhaler  Generic drug:  albuterol  INHALE 2 PUFFS INTO LUNGS EVERY 4 HOURS AS NEEDED FOR SHORTNESS OF BREATH OR WHEEZING. RESCUE     * albuterol 90 mcg/actuation inhaler  Commonly known as:  PROVENTIL/VENTOLIN HFA  Inhale 2 puffs into the lungs.     * albuterol 1.25 mg/3 mL Nebu  Commonly known as:  ACCUNEB  Take 3 mLs (1.25 mg total) by nebulization every 6 (six) hours as needed.     vit B,C-iron fum-FA-D3-zinc ox 8 mg iron-800  mcg-1,000 unit Tab  Take by mouth.     vitamin renal formula (B-complex-vitamin c-folic acid) 1 mg Cap  Commonly known as:  NEPHROCAP  Take by mouth.         * This list has 8 medication(s) that are the same as other medications prescribed for you. Read the directions carefully, and ask your doctor or other care provider to review them with you.                Indwelling Lines/Drains at time of discharge:   Lines/Drains/Airways     Central Venous Catheter Line                 Hemodialysis Catheter -- days          Drain                 Hemodialysis AV Fistula 03/06/19 8849 Left upper arm 27 days                Time spent on the discharge of patient: 40 minutes  Patient was seen and examined on the date of discharge and determined to be suitable for discharge.      Janine Bowman MD  Department of Hospital Medicine  [facility]

## 2019-04-03 NOTE — PT/OT/SLP EVAL
Occupational Therapy   Evaluation    Name: Eli Vaz  MRN: [id number]  Admitting Diagnosis:  Atrial fibrillation with rapid ventricular response 1 Day Post-Op    Recommendations:     Discharge Recommendations: home health OT  Discharge Equipment Recommendations:  none  Barriers to discharge:  None    Assessment:     Eli Vaz is a 73 y.o. female with a medical diagnosis of Atrial fibrillation with rapid ventricular response.  She presents with fatigue following PT evaluation earlier then vomiting twice. Performance deficits affecting function: weakness, impaired functional mobilty, impaired balance, decreased upper extremity function, decreased safety awareness, impaired cardiopulmonary response to activity, impaired endurance, impaired self care skills, gait instability, decreased lower extremity function, decreased ROM.      Rehab Prognosis: Good; patient would benefit from acute skilled OT services to address these deficits and reach maximum level of function.       Plan:     Patient to be seen 5 x/week to address the above listed problems via self-care/home management, therapeutic activities, therapeutic exercises  · Plan of Care Expires: 04/17/19  · Plan of Care Reviewed with: patient    Subjective     Chief Complaint: weakness   Patient/Family Comments/goals: to go home    Occupational Profile:  Living Environment: Lives with [place] in [facility].   Previous level of function: Since d/c from the hospital on 3/26/19, pt ambulated short distances with rollator. Pt's daughter helps her with dressing and sponge baths. Pt's family drives her to dialysis.  Roles and Routines: dialysis, home with family   Equipment Used at Home:  bedside commode, oxygen, walker, rolling  Assistance upon Discharge: assistance from spouse, daughter, and other family members.    Pain/Comfort:  · Pain Rating 1: 0/10(Pt not in pain, but did vomit twice today, but she said she feels better.)    Patients cultural, spiritual, Temple  conflicts given the current situation: no    Objective:     Communicated with: nurseNgoc, prior to session.  Patient found up in chair with blood pressure cuff, telemetry, central line, oxygen, pulse ox (continuous) upon OT entry to room.    General Precautions: Standard, fall, aspiration   Orthopedic Precautions:N/A   Braces: N/A     Occupational Performance:    Bed Mobility:    · Not tested. Pt found up in chair.     Functional Mobility/Transfers:  · Patient completed Sit <> Stand Transfer with contact guard assistance  with  hand-held assist   · Functional Mobility: Pt stated that she already walked with physical therapy and asked to not do any more since she vomited twice recently and felt weak.     Activities of Daily Living:  · Lower Body Dressing: supervision with socks    Cognitive/Visual Perceptual:  Cognitive/Psychosocial Skills:     -       Oriented to: Person, Place and Time   -       Follows Commands/attention:Follows two-step commands  -       Communication: clear/fluent  -       Memory: No Deficits noted  -       Mood/Affect/Coping skills/emotional control: Appropriate to situation  Visual/Perceptual:      -Intact      Physical Exam:  Skin integrity: Visible skin intact  Edema:  None noted  Sensation:    -       Intact  Dominant hand:    -       R hand  Upper Extremity Range of Motion:     -       Right Upper Extremity: WFL  -       Left Upper Extremity: Deficits: Pt complained of arthritis in the shoulder and pain with movement  Upper Extremity Strength:    -       Right Upper Extremity: WFL  -       Left Upper Extremity: unable to test secondary to pain with arthritis in shoulder   Strength:    -       Right Upper Extremity: WFL  -       Left Upper Extremity: WFL  Fine Motor Coordination:    -       Intact  Gross motor coordination:   WFL    AMPAC 6 Click ADL:  AMPAC Total Score: 17    Treatment & Education:  Pt consented to eval. Pt educated on role of OT and safety. Pt limited in functional  mobility and ADLs d/t fatigue and 2 occurrences of vomiting 5-10 min prior to OT eval.   Education:    Patient left up in chair with all lines intact, call button in reach and nurseNgoc notified    GOALS:   Multidisciplinary Problems     Occupational Therapy Goals        Problem: Occupational Therapy Goal    Goal Priority Disciplines Outcome Interventions   Occupational Therapy Goal     OT, PT/OT Ongoing (interventions implemented as appropriate)    Description:  Goals to be met by: 04/17/19     Patient will increase functional independence with ADLs by performing:    UE Dressing with Set-up Assistance.  LE Dressing with Modified Bell.  Grooming while seated at sink with Modified Bell.  Toileting from bedside commode for hygiene and clothing management- not tested at this time secondary to fatigue; continue to assess   Sitting at edge of bed x20 minutes with Modified Bell.  Rolling to Bilateral- not tested at this time secondary to fatigue   Supine to sit- not tested at this time secondary to fatigue    Step transfer- not tested at this time secondary to fatigue   Toilet transfer to- not tested at this time secondary to fatigue   Upper extremity exercise program x15 reps per handout, with supervision.                      History:     Past Medical History:   Diagnosis Date    Arthritis     Atrial fibrillation     COPD (chronic obstructive pulmonary disease)     Diabetes mellitus type II     Dialysis patient     Encounter for blood transfusion     ESRD (end stage renal disease)        Past Surgical History:   Procedure Laterality Date    AV FISTULA PLACEMENT      TRANSESOPHAGEAL ECHOCARDIOGRAM WITH POSSIBLE CARDIOVERSION (ISAMAR W/ POSS CARDIOVERSION) N/A 3/21/2019    Performed by Elgin Garcia MD at [facility] CATH LAB    TUBAL LIGATION         Time Tracking:     OT Date of Treatment: 04/03/19  OT Start Time: 1115  OT Stop Time: 1125  OT Total Time (min): 10 min    Billable  Minutes:Evaluation 10 min  Total Time 10 min    Brie Davalos, OT  4/3/2019

## 2019-04-03 NOTE — PLAN OF CARE
"   04/03/19 1250   Final Note   Assessment Type Final Discharge Note   Anticipated Discharge Disposition Home-[facility]   Hospital Follow Up  Appt(s) scheduled? Yes   Discharge plans and expectations educations in teach back method with documentation complete? Yes         EDUCATION:  Pt provided with educational information on " A-Fib ".  Information reviewed and placed in :My Healthcare Packet" to be brought home for pt to use as resource after discharge.  Information included:  signs and symptoms to look for and call the doctor if experiencing, and symptoms that may indicate a medical emergency: CALL 911.      All questions answered.  Teach back method used.  Pt  verbalized understanding of all information by stating, she knows if she feels weak or dizzy"  That he is aware of the signs and symptoms to watch for and when to contact MD and when to report to the ED immediately. Also SW took this time to ask pt to provide at least 3 symptoms. Pt stated she knows the signs.       TAMIKA informed Nurse Grossman pt is cleared from case management.   "

## 2019-04-03 NOTE — PLAN OF CARE
Problem: Physical Therapy Goal  Goal: Physical Therapy Goal  Goals to be met by: 19    Patient will increase functional independence with mobility by performin. Sit to stand transfer with Supervision  2. Gait x100 feet with Supervision using Rolling Walker  3. Lower extremity exercise program x30 reps per handout, with supervision      Patient ambulated 40ft with RW and CGA. HH PT needs to be resumed at discharge. She is okay to ambulate with nursing staff assistance and RW.

## 2019-04-03 NOTE — PROGRESS NOTES
[facility]    WRITTEN HEALTHCARE AND DISCHARGE INFORMATION                        Help at Home           [phone]  After discharge for assistance [facility] On Call Nurse Care Line 24/7  Assistance    Things You are responsible For To Manage Your Care At Home:  1.    Getting your prescriptions filled   2.    Taking your medications as directed, DO NOT MISS ANY DOSES!  3.    Going to your follow-up doctor appointment. This is important because it  allow the doctor to monitor your progress and determine if  any changes need to made to your treatment plan.     Thank you for choosing [facility] for your care.  Please answer any calls you may receive from [facility] we want to continue to support you as you manage your healthcare needs. [facility] is happy to have the opportunity to serve you.     Sincerely,  Your [facility] Team,  MELVIN OrellanaW    I  [phone]        Follow-up Information     Chiara Nelson MD In 1 week.    Specialty:  Family Medicine  Contact information:  [address] [place] [id number]  [phone]             Rakesh Briggs MD In 1 week.    Specialties:  INTERVENTIONAL CARDIOLOGY, Cardiology  Contact information:  [address]  [address]  [place] [address]  [phone]             Elgin Garcia MD On 4/10/2019.    Specialty:  Cardiology  Why:  Outpatient Services @9:30am   Contact information:  [address] [facility] [address]  [address]  [place] [address]  [phone]             Charles Moody Jr, MD On 4/5/2019.    Specialty:  Family Medicine  Why:  Outpatient Services@ 1:00pm   Contact information:  [address] [place] [id number]  [phone]             [facility].    Specialty:  Home Health Services

## 2019-04-03 NOTE — PROGRESS NOTES
"Pt with episode of N/V approx 60cc of liquid orange. Pt states " I think it was the mouth wash". Dr. Bowman notified and pt given phenergan IV.  "

## 2019-04-03 NOTE — PROGRESS NOTES
[facility]  Cardiology  Progress Note    Patient Name: Eli Vaz  MRN: [id number]  Admission Date: 4/1/2019  Hospital Length of Stay: 2 days  Code Status: Full Code   Attending Physician: Janine Bowman MD   Primary Care Physician: Chiara Nelson MD  Expected Discharge Date: 4/3/2019  Principal Problem:Atrial fibrillation with rapid ventricular response    Subjective:     Hospital Course:   4-2:  Admitted with AFib with RVR status post cardioversion    Interval History:  Feels well without complaints this a.m..  She denies any chest pain, shortness of breath or palpitations.  She tolerated hemodialysis yesterday.    Telemetry:  Normal sinus rhythm    Review of Systems   All other systems reviewed and are negative.    Objective:     Vital Signs (Most Recent):  Temp: 97.9 °F (36.6 °C) (04/03/19 0701)  Pulse: 78 (04/03/19 0907)  Resp: (!) 44 (04/03/19 0745)  BP: (!) 173/75 (04/03/19 0907)  SpO2: 100 % (04/03/19 0745) Vital Signs (24h Range):  Temp:  [97.1 °F (36.2 °C)-100.8 °F (38.2 °C)] 97.9 °F (36.6 °C)  Pulse:  [] 78  Resp:  [14-44] 44  SpO2:  [96 %-100 %] 100 %  BP: ()/(40-75) 173/75     Weight: 57.7 kg (127 lb 3.3 oz)  Body mass index is 21.83 kg/m².     SpO2: 100 %  O2 Device (Oxygen Therapy): nasal cannula      Intake/Output Summary (Last 24 hours) at 4/3/2019 0949  Last data filed at 4/3/2019 0600  Gross per 24 hour   Intake 983.6 ml   Output 1500 ml   Net -516.4 ml       Lines/Drains/Airways     Central Venous Catheter Line                 Hemodialysis Catheter -- days          Drain                 Hemodialysis AV Fistula 03/06/19 2339 Left upper arm 27 days          Peripheral Intravenous Line                 External Jugular IV 04/01/19 1829 1 day         Peripheral IV - Single Lumen 04/01/19 1519 Right Antecubital 1 day                Physical Exam   Constitutional: She is oriented to person, place, and time. She appears well-developed and well-nourished.   HENT:   Head:  Normocephalic and atraumatic.   Eyes: Pupils are equal, round, and reactive to light. Conjunctivae and EOM are normal.   Neck: Normal range of motion. Neck supple. No thyromegaly present.   Cardiovascular: Normal rate and regular rhythm.   No murmur heard.  Pulmonary/Chest: Effort normal and breath sounds normal. No respiratory distress.   Abdominal: Soft. Bowel sounds are normal.   Musculoskeletal: She exhibits no edema.   Neurological: She is alert and oriented to person, place, and time.   Skin: Skin is warm and dry.   Psychiatric: She has a normal mood and affect. Her behavior is normal.       Significant Labs:   BMP:   Recent Labs   Lab 04/01/19  1518 04/03/19  0448     --      --    K 4.1  --    CL 98  --    CO2 30*  --    BUN 35*  --    CREATININE 8.0*  --    CALCIUM 9.8  --    MG 1.8 1.7    and CBC   Recent Labs   Lab 04/01/19  1518   WBC 11.36   HGB 8.0*   HCT 27.3*          Significant Imaging: Echocardiogram:   Transthoracic echo (TTE) complete (Cupid Only):   Results for orders placed or performed during the hospital encounter of 03/19/19   Transthoracic echo (TTE) complete (Cupid Only)   Result Value Ref Range    BSA 1.64 m2    LA WIDTH 4.22 cm    AORTIC VALVE CUSP SEPERATION 1.95 cm    PV PEAK VELOCITY 1.00 cm/s    LVIDD 3.80 3.5 - 6.0 cm    IVS 1.23 (A) 0.6 - 1.1 cm    PW 1.09 0.6 - 1.1 cm    Ao root annulus 2.99 cm    LVIDS 3.02 2.1 - 4.0 cm    FS 21 28 - 44 %    LA volume 91.55 cm3    Sinus 3.15 cm    STJ 2.35 cm    Ascending aorta 2.29 cm    LV mass 145.66 g    LA size 4.39 cm    RVDD 3.52 cm    TAPSE 1.04 cm    RV S' 6.62 m/s    Left Ventricle Relative Wall Thickness 0.57 cm    AV mean gradient 3.86 mmHg    AV valve area 3.49 cm2    AV Velocity Ratio 0.92     AV index (prosthetic) 1.08     LVOT diameter 2.03 cm    LVOT area 3.23 cm2    LVOT peak nano 2.0392183637 m/s    LVOT peak VTI 20.93 cm    Ao peak nano 1.40 m/s    Ao VTI 19.41 cm    LVOT stroke volume 67.71 cm3    AV peak  gradient 7.84 mmHg    TR Max [place] 3.94 m/s    LV Systolic Volume 35.49 mL    LV Systolic Volume Index 21.7 mL/m2    LV Diastolic Volume 61.90 mL    LV Diastolic Volume Index 37.88 mL/m2    LA Volume Index 56.0 mL/m2    LV Mass Index 89.1 g/m2    RA Major Axis 4.93 cm    Left Atrium Minor Axis 5.89 cm    Left Atrium Major Axis 5.74 cm    Triscuspid Valve Regurgitation Peak Gradient 62.09 mmHg    RA Width 3.95 cm    Right Atrial Pressure (from IVC) 3 mmHg    TV rest pulmonary artery pressure 65 mmHg     · Normal left ventricular systolic function. The estimated ejection fraction is 55%  · Concentric left ventricular remodeling.  · Indeterminate left ventricular diastolic function.  · Normal right ventricular systolic function.  · Moderate left atrial enlargement.  · Mild right atrial enlargement.  · Moderate-to-severe mitral regurgitation.  · Mild to moderate tricuspid regurgitation.  · The estimated PA systolic pressure is 65 mm Hg  · Pulmonary hypertension present.  · Small pericardial effusion. No tamponade    NST:  · There is a mild, infarct defect(s) in the lateral apical wall(s),  · An ejection fraction of 72 % at rest  · The EKG portion of this study is negative for myocardial ischemia.    Assessment and Plan:     Brief HPI:  No problems post cardioversion yesterday.  Maintaining normal sinus rhythm.  Discussed compliance with medications and restricted activity until follow-up.  May need outpatient EP referral.  May also need further evaluation of mitral regurgitation.      * Atrial fibrillation with rapid ventricular response  Reversion and refractory on amiodarone and Toprol  Plan for cardioversion today  On Eliquis for OAC (* no ISAMAR required)    Benign essential hypertension  Stable on medicines    Diabetes mellitus with ESRD (end-stage renal disease)  Per IM    ESRD on dialysis  Per Nephrology    COPD (chronic obstructive pulmonary disease)  Change albuterol to Xopenex    Combined hyperlipidemia associated  with type 2 diabetes mellitus  On statin      No further recommendations from CV standpoint.  Okay to follow up as an outpatient    VTE Risk Mitigation (From admission, onward)        Ordered     apixaban tablet 2.5 mg  2 times daily      04/01/19 1908     IP VTE HIGH RISK PATIENT  Once      04/01/19 1908     Reason for No Pharmacological VTE Prophylaxis  Once      04/01/19 1908          Rakesh Briggs MD  Cardiology  [facility]

## 2019-04-03 NOTE — HOSPITAL COURSE
Ms. Vaz was admitted with AFib with rapid ventricular response along with volume over load state due to her end-stage renal disease and acute on chronic CHF exacerbation.  Cardiology and Nephrology were consulted. She was started on amiodarone infusion and underwent urgent dialysis with correction of volume status.  Cardiology performed a DC cardioversion on April 2, 2019 that was successful with returning the patient to normal sinus rhythm.  She was continued on amiodarone infusion until after completion of the infusion protocol and then amiodarone was converted to p.o. Patient was observed overnight in the ICU.  Patient remained in normal sinus rhythm and respiratory status normalized.  Anticoagulation continued with Eliquis. Patient was seen by PT and OT, and she was resumed on home health upon discharge.  Patient was arranged for follow-up with her primary care physician and Cardiology, and she will need outpatient referral to EP for consideration of ablation.  Patient to resume her normal scheduled dialysis on TTS at [facility].

## 2019-04-03 NOTE — PROGRESS NOTES
"Pt with another episode of N/V small amt yellowish. Pt states " I feel OK I just want to go home and lay on my couch". I informed pt that d/c just on hold for now will most likely d/c in a little while once N/V no longer an issue.  "

## 2019-04-03 NOTE — PLAN OF CARE
Problem: Occupational Therapy Goal  Goal: Occupational Therapy Goal  Goals to be met by: 04/17/19     Patient will increase functional independence with ADLs by performing:    UE Dressing with Set-up Assistance.  LE Dressing with Modified [place].  Grooming while seated at sink with Modified [place].  Toileting from bedside commode for hygiene and clothing management- not tested at this time secondary to fatigue; continue to assess   Sitting at edge of bed x20 minutes with Modified [place].  Rolling to Bilateral- not tested at this time secondary to fatigue   Supine to sit- not tested at this time secondary to fatigue    Step transfer- not tested at this time secondary to fatigue   Toilet transfer to- not tested at this time secondary to fatigue   Upper extremity exercise program x15 reps per handout, with supervision.    Outcome: Ongoing (interventions implemented as appropriate)  Pt will continue to benefit from skilled OT acute services. OT rec. HHOT at d/c.

## 2019-04-03 NOTE — PLAN OF CARE
Problem: Adult Inpatient Plan of Care  Goal: Plan of Care Review  Outcome: Ongoing (interventions implemented as appropriate)  Pt continues in the ICU.  Pt denies any pain.  Pt is sinus rhythm on the monitor.  Pt tolerated dialysis without any problems.  Pt sats 99 to 100% on 2L. Pt was weaned to room air.  Pt without any falls/injuries this shift.

## 2019-04-03 NOTE — PLAN OF CARE
[facility]    HOME HEALTH ORDERS  FACE TO FACE ENCOUNTER    Patient Name: Eli Vaz  YOB: 1945    PCP: Chiara Nelson MD   PCP Address: [address] / [place] [address]  PCP Phone Number: [phone]  PCP Fax: [phone]    Encounter Date: 04/03/2019    Admit to Home Health    Diagnoses:  Active Hospital Problems    Diagnosis  POA    *Atrial fibrillation with rapid ventricular response [I48.91]  Yes    Benign essential hypertension [I10]  Yes     Chronic    Acute exacerbation of CHF (congestive heart failure) [I50.9]  Yes    Acute cardiac pulmonary edema [I50.1]  Yes    Elevated brain natriuretic peptide (BNP) level [R79.89]  Yes    Diabetes mellitus with ESRD (end-stage renal disease) [E11.22, N18.6]  Yes     Chronic    Combined hyperlipidemia associated with type 2 diabetes mellitus [E11.69, E78.2]  Yes    Hypertension associated with diabetes [E11.59, I10]  Yes     Chronic    History of CVA (cerebrovascular accident) [Z86.73]  Not Applicable    ESRD on dialysis [N18.6, Z99.2]  Not Applicable     Dialyzes at [facility].  Followed by Dr. Arthur      COPD (chronic obstructive pulmonary disease) [J44.9]  Yes    Diastolic dysfunction [I51.9]  Yes      Resolved Hospital Problems   No resolved problems to display.       Future Appointments   Date Time Provider Department Center   4/3/2019 10:00 AM Elgin Garcia MD [facility] CARDIO [facility]   5/6/2019 10:30 AM Isrrael Barton Jr., MD [facility]     Follow-up Information     Chiara Nelson MD In 1 week.    Specialty:  Family Medicine  Contact information:  [address] [place] [address] [phone]             Rakesh Briggs MD In 1 week.    Specialties:  INTERVENTIONAL CARDIOLOGY, Cardiology  Contact information:  [address]  [address]  [place] [address] [phone]                     I have seen and examined this patient face to face today. My clinical findings that support  the need for the home health skilled services and home bound status are the following:  Weakness/numbness causing balance and gait disturbance due to Weakness/Debility making it taxing to leave home.    Allergies:Review of patient's allergies indicates:  No Known Allergies    Diet: cardiac diet, diabetic diet: 1800 calorie and renal diet    Activities: activity as tolerated    Nursing:   SN to complete comprehensive assessment including routine vital signs. Instruct on disease process and s/s of complications to report to MD. Review/verify medication list sent home with the patient at time of discharge  and instruct patient/caregiver as needed. Frequency may be adjusted depending on start of care date.    Notify MD if SBP > 160 or < 90; DBP > 90 or < 50; HR > 120 or < 50; Temp > 101.      CONSULTS:    Physical Therapy to evaluate and treat. Evaluate for home safety and equipment needs; Establish/upgrade home exercise program. Perform / instruct on therapeutic exercises, gait training, transfer training, and Range of Motion.  Occupational Therapy to evaluate and treat. Evaluate home environment for safety and equipment needs. Perform/Instruct on transfers, ADL training, ROM, and therapeutic exercises.      Medications: Review discharge medications with patient and family and provide education.      Current Discharge Medication List      CONTINUE these medications which have NOT CHANGED    Details   albuterol (ACCUNEB) 1.25 mg/3 mL Nebu Take 3 mLs (1.25 mg total) by nebulization every 6 (six) hours as needed.  Qty: 1 Box, Refills: 2    Associated Diagnoses: Chronic obstructive pulmonary disease with acute exacerbation      albuterol (PROVENTIL) 2.5 mg /3 mL (0.083 %) nebulizer solution Inhale 2.5 mg into the lungs.      !! albuterol (PROVENTIL/VENTOLIN HFA) 90 mcg/actuation inhaler Inhale 2 puffs into the lungs.      albuterol-ipratropium (DUO-NEB) 2.5 mg-0.5 mg/3 mL nebulizer solution Take 3 mLs by nebulization every  6 (six) hours as needed for Wheezing or Shortness of Breath. Rescue  Qty: 1 Box, Refills: 2    Associated Diagnoses: Chronic bronchitis with COPD (chronic obstructive pulmonary disease)      !! amiodarone (PACERONE) 200 MG Tab Take 1 tablet (200 mg total) by mouth 2 (two) times daily. for 14 days  Qty: 28 tablet, Refills: 0      !! amiodarone (PACERONE) 400 MG tablet Take 1 tablet (400 mg total) by mouth 2 (two) times daily. for 14 days  Qty: 28 tablet, Refills: 0      amLODIPine (NORVASC) 10 MG tablet Take 10 mg by mouth once daily.      apixaban (ELIQUIS) 2.5 mg Tab Take 1 tablet (2.5 mg total) by mouth 2 (two) times daily.  Qty: 60 tablet, Refills: 5      atorvastatin (LIPITOR) 10 MG tablet Take 1 tablet (10 mg total) by mouth once daily.  Qty: 90 tablet, Refills: 1    Comments: **Patient requests 90 days supply**  Associated Diagnoses: Combined hyperlipidemia associated with type 2 diabetes mellitus      busPIRone (BUSPAR) 10 MG tablet Take 10 mg by mouth.      !! cinacalcet (SENSIPAR) 60 MG Tab Take 30 mg by mouth.      epoetin cat (PROCRIT) 3,000 unit/mL injection Inject 3,000 Units into the skin.      fluticasone (FLONASE) 50 mcg/actuation nasal spray 1 spray by Each Nare route 2 (two) times daily.  Qty: 1 Bottle, Refills: 1    Associated Diagnoses: Cough      levocetirizine (XYZAL) 5 MG tablet Take 1 tablet (5 mg total) by mouth every evening.  Qty: 30 tablet, Refills: 2    Associated Diagnoses: Chronic obstructive pulmonary disease, unspecified COPD type      lidocaine (LMX) 4 % cream Apply topically as needed. To affected area up to 5 times a day.  Qty: 30 g, Refills: 0      loperamide (IMODIUM) 1 mg/5 mL solution Take 10 mLs (2 mg total) by mouth 4 (four) times daily as needed for Diarrhea.  Refills: 0      meclizine (ANTIVERT) 25 mg tablet Take 1 tablet (25 mg total) by mouth 3 (three) times daily as needed.  Qty: 20 tablet, Refills: 0      metoprolol succinate (TOPROL-XL) 25 MG 24 hr tablet Take 25 mg  by mouth.      mirtazapine (REMERON) 7.5 MG Tab Take 7.5 mg by mouth nightly.       montelukast (SINGULAIR) 10 mg tablet TAKE 1 TABLET BY MOUTH EVERY DAY IN THE EVENING  Qty: 30 tablet, Refills: 2    Associated Diagnoses: Chronic obstructive pulmonary disease, unspecified COPD type      nut.tx.imp.renal fxn,lac-reduc (NEPRO CARB STEADY) 0.08 gram-1.8 kcal/mL Liqd Take 1 Can by mouth 3 (three) times daily with meals.  Qty: 30 Can, Refills: 11    Associated Diagnoses: ESRD on dialysis      pantoprazole (PROTONIX) 40 MG tablet Take 40 mg by mouth.      paroxetine (PAXIL) 30 MG tablet 1 TABLET IN THE MORNING ONCE A DAY ORALLY 90  Refills: 3      promethazine (PHENERGAN) 12.5 MG Tab Take 12.5 mg by mouth every 6 (six) hours as needed.      !! SENSIPAR 60 mg Tab Take 30 mg by mouth daily with breakfast.       sevelamer carbonate (RENVELA) 800 mg Tab Take 800 mg by mouth 3 (three) times daily with meals.      SODIUM BICARBONATE ORAL Take 650 mg by mouth.      traMADol (ULTRAM) 50 mg tablet Take 1 tablet by mouth.      traZODone (DESYREL) 50 MG tablet Take 1 tablet (50 mg total) by mouth nightly as needed for Insomnia.  Qty: 30 tablet, Refills: 3    Associated Diagnoses: Adjustment disorder with mixed anxiety and depressed mood      !! VENTOLIN HFA 90 mcg/actuation inhaler INHALE 2 PUFFS INTO LUNGS EVERY 4 HOURS AS NEEDED FOR SHORTNESS OF BREATH OR WHEEZING. RESCUE  Qty: 18 Inhaler, Refills: 2    Associated Diagnoses: Chronic obstructive pulmonary disease, unspecified COPD type      vit B,C-iron fum-FA-D3-zinc ox 8 mg iron-800 mcg-1,000 unit Tab Take by mouth.      vitamin renal formula, B-complex-vitamin c-folic acid, (NEPHROCAP) 1 mg Cap Take by mouth.      vortioxetine (BRINTELLIX) 5 mg Tab Take 1 tablet by mouth.       !! - Potential duplicate medications found. Please discuss with provider.          I certify that this patient is confined to her home and needs intermittent skilled nursing care, physical therapy and  occupational therapy.

## 2019-04-04 DIAGNOSIS — J44.9 CHRONIC OBSTRUCTIVE PULMONARY DISEASE, UNSPECIFIED COPD TYPE: ICD-10-CM

## 2019-04-04 RX ORDER — LEVOCETIRIZINE DIHYDROCHLORIDE 5 MG/1
TABLET, FILM COATED ORAL
Qty: 30 TABLET | Refills: 2 | OUTPATIENT
Start: 2019-04-04

## 2019-04-04 NOTE — PT/OT/SLP DISCHARGE
Physical Therapy Discharge Summary    Name: Eli Vaz  MRN: [id number]   Principal Problem: Atrial fibrillation with rapid ventricular response     Patient Discharged from acute Physical Therapy on 4/3/19.  Please refer to prior PT noted date on 4/3/19 for functional status.     Assessment:     Patient appropriate for care in another setting.    Objective:     GOALS:   Multidisciplinary Problems     Physical Therapy Goals        Problem: Physical Therapy Goal    Goal Priority Disciplines Outcome Goal Variances Interventions   Physical Therapy Goal     PT, PT/OT      Description:  Goals to be met by: 19    Patient will increase functional independence with mobility by performin. Sit to stand transfer with Supervision  2. Gait x100 feet with Supervision using Rolling Walker  3. Lower extremity exercise program x30 reps per handout, with supervision                      Reasons for Discontinuation of Therapy Services  Transfer to alternate level of care.      Plan:     Patient Discharged to: Home with Home Health Service.    Ally Price, PT  2019

## 2019-04-04 NOTE — PT/OT/SLP DISCHARGE
Occupational Therapy Discharge Summary    Eli Vaz  MRN: [id number]   Principal Problem: Atrial fibrillation with rapid ventricular response      Patient Discharged from acute Occupational Therapy on 04/03/19.  Please refer to prior OT notes for functional status.    Assessment:      Patient appropriate for care in another setting.    Objective:     GOALS:   Multidisciplinary Problems     Occupational Therapy Goals        Problem: Occupational Therapy Goal    Goal Priority Disciplines Outcome Interventions   Occupational Therapy Goal     OT, PT/OT Ongoing (interventions implemented as appropriate)    Description:  Goals to be met by: 04/17/19     Patient will increase functional independence with ADLs by performing:    UE Dressing with Set-up Assistance.  LE Dressing with Modified [place].  Grooming while seated at sink with Modified [place].  Toileting from bedside commode for hygiene and clothing management- not tested at this time secondary to fatigue; continue to assess   Sitting at edge of bed x20 minutes with Modified [place].  Rolling to Bilateral- not tested at this time secondary to fatigue   Supine to sit- not tested at this time secondary to fatigue    Step transfer- not tested at this time secondary to fatigue   Toilet transfer to- not tested at this time secondary to fatigue   Upper extremity exercise program x15 reps per handout, with supervision.                      Reasons for Discontinuation of Therapy Services  Transfer to alternate level of care.      Plan:     Patient Discharged to: Home with Home Health Service    Brie Davalos OT  4/4/2019

## 2019-04-05 ENCOUNTER — OFFICE VISIT (OUTPATIENT)
Dept: FAMILY MEDICINE | Facility: CLINIC | Age: 74
End: 2019-04-05
Payer: MEDICARE

## 2019-04-05 VITALS
TEMPERATURE: 99 F | DIASTOLIC BLOOD PRESSURE: 52 MMHG | HEIGHT: 64 IN | SYSTOLIC BLOOD PRESSURE: 103 MMHG | BODY MASS INDEX: 21.83 KG/M2

## 2019-04-05 DIAGNOSIS — E11.22 DIABETES MELLITUS WITH ESRD (END-STAGE RENAL DISEASE): ICD-10-CM

## 2019-04-05 DIAGNOSIS — Z99.2 ESRD ON DIALYSIS: ICD-10-CM

## 2019-04-05 DIAGNOSIS — I10 ESSENTIAL HYPERTENSION: ICD-10-CM

## 2019-04-05 DIAGNOSIS — N18.6 ESRD ON DIALYSIS: ICD-10-CM

## 2019-04-05 DIAGNOSIS — N18.6 DIABETES MELLITUS WITH ESRD (END-STAGE RENAL DISEASE): ICD-10-CM

## 2019-04-05 DIAGNOSIS — I48.91 ATRIAL FIBRILLATION, UNSPECIFIED TYPE: Primary | ICD-10-CM

## 2019-04-05 PROCEDURE — 1101F PT FALLS ASSESS-DOCD LE1/YR: CPT | Mod: CPTII,S$GLB,, | Performed by: FAMILY MEDICINE

## 2019-04-05 PROCEDURE — 2024F PR 7 FIELD PHOTOS WITH INTERP/ REVIEW: ICD-10-PCS | Mod: S$GLB,,, | Performed by: FAMILY MEDICINE

## 2019-04-05 PROCEDURE — 2024F 7 FLD RTA PHOTO EVC RTNOPTHY: CPT | Mod: S$GLB,,, | Performed by: FAMILY MEDICINE

## 2019-04-05 PROCEDURE — 99214 OFFICE O/P EST MOD 30 MIN: CPT | Mod: S$GLB,,, | Performed by: FAMILY MEDICINE

## 2019-04-05 PROCEDURE — 3044F PR MOST RECENT HEMOGLOBIN A1C LEVEL <7.0%: ICD-10-PCS | Mod: CPTII,S$GLB,, | Performed by: FAMILY MEDICINE

## 2019-04-05 PROCEDURE — 3044F HG A1C LEVEL LT 7.0%: CPT | Mod: CPTII,S$GLB,, | Performed by: FAMILY MEDICINE

## 2019-04-05 PROCEDURE — 99214 PR OFFICE/OUTPT VISIT, EST, LEVL IV, 30-39 MIN: ICD-10-PCS | Mod: S$GLB,,, | Performed by: FAMILY MEDICINE

## 2019-04-05 PROCEDURE — 99999 PR PBB SHADOW E&M-EST. PATIENT-LVL III: CPT | Mod: PBBFAC,,, | Performed by: FAMILY MEDICINE

## 2019-04-05 PROCEDURE — 99999 PR PBB SHADOW E&M-EST. PATIENT-LVL III: ICD-10-PCS | Mod: PBBFAC,,, | Performed by: FAMILY MEDICINE

## 2019-04-05 PROCEDURE — 1101F PR PT FALLS ASSESS DOC 0-1 FALLS W/OUT INJ PAST YR: ICD-10-PCS | Mod: CPTII,S$GLB,, | Performed by: FAMILY MEDICINE

## 2019-04-05 RX ORDER — LACTULOSE 10 G/15ML
SOLUTION ORAL; RECTAL
Refills: 0 | COMMUNITY
Start: 2019-03-13 | End: 2019-05-03

## 2019-04-05 RX ORDER — HYDRALAZINE HYDROCHLORIDE 50 MG/1
TABLET, FILM COATED ORAL
COMMUNITY
Start: 2015-06-16 | End: 2019-04-05

## 2019-04-05 RX ORDER — DICLOFENAC SODIUM 10 MG/G
4 GEL TOPICAL
COMMUNITY
Start: 2014-07-09 | End: 2019-05-03

## 2019-04-05 RX ORDER — PREDNISONE 10 MG/1
10 TABLET ORAL
COMMUNITY
Start: 2014-08-08 | End: 2019-04-05

## 2019-04-05 RX ORDER — GENTAMICIN SULFATE 10 MG/ML
INJECTION, SOLUTION, CONCENTRATE INTRAVENOUS
COMMUNITY
End: 2019-04-05

## 2019-04-05 NOTE — PHYSICIAN QUERY
PT Name: Eli Vaz  MR #: [id number]     Physician Query Form - Documentation Clarification      CDS/: Bhakti Goetz RN              Contact information:[phone]    This form is a permanent document in the medical record.     Query Date: April 5, 2019    By submitting this query, we are merely seeking further clarification of documentation. Please utilize your independent clinical judgment when addressing the question(s) below.    The Medical record reflects the following:    Supporting Clinical Findings Location in Medical Record      Active Hospital Problems :  Benign essential hypertension     Hypertension associated with diabetes        Hypertension    · Goal while inpatient is a systolic blood pressure less than 160mmHg    · BP in acceptable range at this time    · Continue current home regimen with hold parameters    · PRN antihypertensives available          H&P 4/2      H&P 4/2   Shortness of breath    Due to multifactorial etiology including acute exacerbation of CHF, acute cardiac pulmonary edema secondary to ESRD and CHF, bilateral pleural effusion, atrial fibrillation          H&P 4/2                                                                            Doctor, Please specify diagnosis or diagnoses associated with above clinical findings.    Please clarify the above documentation regarding the Hypertension diagnosis.    Provider Use Only      [ X  ] Benign essential hypertension    [   ] Hypertension associated with diabetes ( Please clarify)        [   ]  Hypertension is a manifestation of DM        [   ]  Hypertension is not a manifestation of DM    [   ] Other___________________________.                                                                                                             [  ] Clinically Undetermined

## 2019-04-05 NOTE — PHYSICIAN QUERY
PT Name: Eli Vaz  MR #: [id number]    Physician Query Form - Atrial Fibrillation Specificity     CDS/: Bhakti Goetz RN             Contact information: [phone]     This form is a permanent document in the medical record.     Query Date: April 5, 2019    By submitting this query, we are merely seeking further clarification of documentation. Please utilize your independent clinical judgment when addressing the question(s) below.    The medical record contains the following:   Indicators     Supporting Clinical Findings Location in Medical Record   x Atrial Fibrillation Primary Diagnosis:        Atrial fibrillation with rapid ventricular response        H&P 4/2   x EKG results Atrial fibrillation with rapid ventricular response      Normal sinus rhythm EKG 4/2      EKG 4/2     x Medication Milind was admitted with AFib with rapid ventricular response along with volume over load state due to her end-stage renal disease and acute on chronic CHF exacerbation.  Cardiology and Nephrology  were consulted. She was started on amiodarone infusion and underwent urgent dialysis with correction of volume status.  Cardiology performed a DC cardioversion on April 2, 2019 that was successful with returning the patient to normal sinus rhythm.  She  was continued on amiodarone infusion until after completion of the infusion protocol and then amiodarone was converted to p.o. Patient was observed overnight in the ICU.  Patient remained in normal sinus rhythm and respiratory status normalized.  Anticoagulation  continued with Eliquis.    Discharge Summary 4/3   x Treatment  DC cardioversion        Procedure detail:    After informed consent was obtained patient was anesthetized using propofol by Anesthesia.  A single synchronized 200 joule shock was delivered with conversion to normal sinus rhythm at a heart rate of 64 beats per minute.  Patient tolerated the procedure  well without any complications.    Procedure 4/2     Other         Provider, please further specify the Atrial Fibrillation diagnosis.    [ X ] Paroxysmal   [  ] Other (please specify):   [  ] Clinically Undetermined       Please document in your progress notes daily for the duration of treatment until resolved, and include in your discharge summary.

## 2019-04-05 NOTE — PHYSICIAN QUERY
"PT Name: Eli Vaz  MR #: [id number]    Physician Query Form - Heart  Condition Clarification     CDS/: Bhakti Goetz RN              Contact information:[phone]  This form is a permanent document in the medical record.     Query Date: April 5, 2019    By submitting this query, we are merely seeking further clarification of documentation. Please utilize your independent clinical judgment when addressing the question(s) below.    The medical record contains the following   Indicators     Supporting Clinical Findings Location in Medical Record   x BNP BNP = 1,901   Lab 4/2   x EF The estimated ejection fraction is 55%  Consult 4/2       Cardiology     x Radiology findings FINDINGS:  Enlargement of the cardiac silhouette remains without significant change from previous study.  There is atherosclerosis of the aortic arch and the descending thoracic aorta.    Bilateral pleural effusions without significant change from the previous study.  There is worsening of the pulmonary vascular congestion and interstitial edema since the previous day's study.  There is also pleural fluid in the horizontal fissure..   Chest Xray 4/2   x Echo Results Results for orders placed or performed during the hospital encounter of 03/19/19      Normal left ventricular systolic function. The estimated ejection fraction is 55%             Concentric left ventricular remodeling.             Indeterminate left ventricular diastolic function.             Normal right ventricular systolic function.             Moderate left atrial enlargement.             Mild right atrial enlargement.             Moderate-to-severe mitral regurgitation.             Mild to moderate tricuspid regurgitation.             The estimated PA systolic pressure is 65 mm Hg             Pulmonary hypertension present.             Small pericardial effusion. No tamponade    Consult 4/2       Cardiology    "Ascites" documented     x "SOB" or "SINCLAIR" documented " "Pt notes chronic SOB and reports doing breathing treatments every 6 hours.    ED Provider Note      4/1    "Hypoxia" documented     x Heart Failure documented      Acute CHF exacerbation             Evidenced by history, elevated BNP, pulmonary edema, peripheral edema             Provide fluid removal with dialysis             Maintain w/ beta-blocker             ACE inhibitor             If 1) Patient cannot tolerate ACEi or 2) NYHA class III or above, add spironolactone (or eplerenone) and hydralazine-isosorbide dinitrate             Daily Weights             Strict I/O             Fluid restriction to 1,500cc daily             Low-sodium cardiac diet             Obtain 2D echo if <6 months       Ms. Vaz was admitted with AFib with rapid ventricular response along with volume over load state due to her end-stage renal disease and acute on chronic CHF exacerbation.      H&P 4/2                                  Discharge Summary 4/3   x "Edema" documented Peripheral edema.   H&P 4/2    Diuretics/Meds     x Treatment:           Provide fluid removal with dialysis             Maintain w/ beta-blocker             ACE inhibitor             If 1) Patient cannot tolerate ACEi or 2) NYHA class III or above, add spironolactone (or eplerenone) and hydralazine-isosorbide dinitrate             Daily Weights             Strict I/O             Fluid restriction to 1,500cc daily             Low-sodium cardiac diet             Obtain 2D echo if <6 months     H&P 4/2    Other:         Heart failure (HF) can be acute, chronic or both. It is generally further specificed as systolic, diastolic, or combined. Lastly, it is important to identify an underlying etiology if known or suspected.     Common clues to acute exacerbation:  Rapidly progressive symptoms (w/in 2 weeks of presentation), using IV diuretics to treat, using supplemental O2, pulmonary edema on Xray, MI w/in 4 weeks, and/or BNP >500    Systolic Heart " Failure: is defined as chart documentation of a left ventricular ejection fraction (LVEF) less than 40%     Diastolic Heart Failure: is defined as a left ventricular ejection fraction (LVEF) greater than 40%   +      Evidence of diastolic dysfunction on echocardiography OR    Right heart catheterization wedge pressure above 12 mm Hg OR    Left heart catheterization left ventricular end diastolic pressure 18 mm Hg or above.    References: *American Heart Association    The clinical guidelines noted below are only system guidelines, and do not replace the providers clinical judgment.     Provider, please specify the diagnosis associated with above clinical findings      Please clarify the type of  Acute on Chronic CHF.      [ X  ] Acute on Chronic Diastolic Heart Failure -    Pre-existing diastoic HF diagnosis.  EF > 40%  and acute HF symptoms documented                                   [   ] Other Type of Heart Failure (please specify type): _________________________    [   ] Other (please specify): ___________________________________    [   ] Clinically Undetermined                          Please document in your progress notes daily for the duration of treatment until resolved and include in your discharge summary.

## 2019-04-05 NOTE — ANESTHESIA POSTPROCEDURE EVALUATION
Anesthesia Post Evaluation    Patient: Eli Vaz    Procedure(s) Performed: Procedure(s) (LRB):  Cardioversion or Defibrillation (N/A)    Final Anesthesia Type: MAC  Patient location during evaluation: ICU  Patient participation: Yes- Able to Participate  Level of consciousness: awake and alert  Post-procedure vital signs: reviewed and stable  Pain management: adequate  Airway patency: patent  PONV status at discharge: No PONV  Anesthetic complications: no      Cardiovascular status: hemodynamically stable  Respiratory status: unassisted and spontaneous ventilation  Hydration status: euvolemic  Follow-up not needed.          Vitals Value Taken Time   /58 4/3/2019  1:02 PM   Temp 36.8 °C (98.2 °F) 4/3/2019 11:00 AM   Pulse 73 4/3/2019  2:03 PM   Resp 44 4/3/2019  2:03 PM   SpO2 100 % 4/3/2019  1:59 PM   Vitals shown include unvalidated device data.      No case tracking events are documented in the log.      Pain/Triston Score: No data recorded

## 2019-04-10 ENCOUNTER — OFFICE VISIT (OUTPATIENT)
Dept: CARDIOLOGY | Facility: CLINIC | Age: 74
End: 2019-04-10
Payer: MEDICARE

## 2019-04-10 ENCOUNTER — TELEPHONE (OUTPATIENT)
Dept: FAMILY MEDICINE | Facility: CLINIC | Age: 74
End: 2019-04-10

## 2019-04-10 VITALS
OXYGEN SATURATION: 96 % | DIASTOLIC BLOOD PRESSURE: 67 MMHG | HEART RATE: 82 BPM | HEIGHT: 64 IN | BODY MASS INDEX: 21.46 KG/M2 | SYSTOLIC BLOOD PRESSURE: 153 MMHG | WEIGHT: 125.69 LBS

## 2019-04-10 DIAGNOSIS — I48.91 ATRIAL FIBRILLATION WITH RAPID VENTRICULAR RESPONSE: ICD-10-CM

## 2019-04-10 DIAGNOSIS — E11.22 DIABETES MELLITUS WITH ESRD (END-STAGE RENAL DISEASE): Chronic | ICD-10-CM

## 2019-04-10 DIAGNOSIS — I15.2 HYPERTENSION ASSOCIATED WITH DIABETES: Chronic | ICD-10-CM

## 2019-04-10 DIAGNOSIS — Z86.73 HISTORY OF CVA (CEREBROVASCULAR ACCIDENT): Primary | ICD-10-CM

## 2019-04-10 DIAGNOSIS — J43.9 PULMONARY EMPHYSEMA, UNSPECIFIED EMPHYSEMA TYPE: ICD-10-CM

## 2019-04-10 DIAGNOSIS — Z99.2 ESRD ON DIALYSIS: ICD-10-CM

## 2019-04-10 DIAGNOSIS — E11.59 HYPERTENSION ASSOCIATED WITH DIABETES: Chronic | ICD-10-CM

## 2019-04-10 DIAGNOSIS — N18.6 DIABETES MELLITUS WITH ESRD (END-STAGE RENAL DISEASE): Chronic | ICD-10-CM

## 2019-04-10 DIAGNOSIS — N18.6 ESRD ON DIALYSIS: ICD-10-CM

## 2019-04-10 DIAGNOSIS — R06.02 SOB (SHORTNESS OF BREATH): ICD-10-CM

## 2019-04-10 PROCEDURE — 99999 PR PBB SHADOW E&M-EST. PATIENT-LVL IV: CPT | Mod: PBBFAC,,, | Performed by: INTERNAL MEDICINE

## 2019-04-10 PROCEDURE — 1101F PT FALLS ASSESS-DOCD LE1/YR: CPT | Mod: CPTII,S$GLB,, | Performed by: INTERNAL MEDICINE

## 2019-04-10 PROCEDURE — 2024F 7 FLD RTA PHOTO EVC RTNOPTHY: CPT | Mod: S$GLB,,, | Performed by: INTERNAL MEDICINE

## 2019-04-10 PROCEDURE — 3044F PR MOST RECENT HEMOGLOBIN A1C LEVEL <7.0%: ICD-10-PCS | Mod: CPTII,S$GLB,, | Performed by: INTERNAL MEDICINE

## 2019-04-10 PROCEDURE — 93000 EKG 12-LEAD: ICD-10-PCS | Mod: S$GLB,,, | Performed by: INTERNAL MEDICINE

## 2019-04-10 PROCEDURE — 1101F PR PT FALLS ASSESS DOC 0-1 FALLS W/OUT INJ PAST YR: ICD-10-PCS | Mod: CPTII,S$GLB,, | Performed by: INTERNAL MEDICINE

## 2019-04-10 PROCEDURE — 99999 PR PBB SHADOW E&M-EST. PATIENT-LVL IV: ICD-10-PCS | Mod: PBBFAC,,, | Performed by: INTERNAL MEDICINE

## 2019-04-10 PROCEDURE — 2024F PR 7 FIELD PHOTOS WITH INTERP/ REVIEW: ICD-10-PCS | Mod: S$GLB,,, | Performed by: INTERNAL MEDICINE

## 2019-04-10 PROCEDURE — 99214 OFFICE O/P EST MOD 30 MIN: CPT | Mod: S$GLB,,, | Performed by: INTERNAL MEDICINE

## 2019-04-10 PROCEDURE — 93000 ELECTROCARDIOGRAM COMPLETE: CPT | Mod: S$GLB,,, | Performed by: INTERNAL MEDICINE

## 2019-04-10 PROCEDURE — 99214 PR OFFICE/OUTPT VISIT, EST, LEVL IV, 30-39 MIN: ICD-10-PCS | Mod: S$GLB,,, | Performed by: INTERNAL MEDICINE

## 2019-04-10 PROCEDURE — 3044F HG A1C LEVEL LT 7.0%: CPT | Mod: CPTII,S$GLB,, | Performed by: INTERNAL MEDICINE

## 2019-04-10 RX ORDER — AMIODARONE HYDROCHLORIDE 200 MG/1
200 TABLET ORAL DAILY
Qty: 90 TABLET | Refills: 3 | Status: SHIPPED | OUTPATIENT
Start: 2019-04-10 | End: 2019-05-03

## 2019-04-10 NOTE — PROGRESS NOTES
Subjective:    Patient ID:  Eli Vaz is a 73 y.o. female who presents for follow-up of Hospital Follow Up      HPI     PAF on amiodarone and eliquis - s/p ISAMAR/CV 3/21/19 and CV 4/2/19,  ESRD, MR - mod-severe, diastolic CHF, HTN, DM, COPD, CVA     Admitted 2/3/19  Eli Vaz is a 73 y.o. AAF with PMHx  including end-stage renal disease (TTS), diabetes mellitus, COPD.  Per patient she was awakened in the early morning with acute onset shortness of breath plus mild midsternal diffuse to left and right-sided chest pain.  This felt like pressure.  She reports she took 2 aspirin at home and presented to the [facility] emergency room, where she was administered 2 nitroglycerin sublingual tab that did not relieve her chest pain. She reports she received morphine after that that improved but did not return completely relieve her chest pain. Patient denies headache, blurry vision, history of long trips, recent trauma, changes to bowel functions.  Important to mention is that patient recently was seen by Dr. raymond him but lower on 02/01/2019 and diagnosed with upper respiratory infection and prescribed antibiotic started on 02/01/2019.     Kiefer the ED her D-dimer was found to be elevated and therefore emergency room physicians requested observation placement to rule out pulmonary embolus.  Patient has end-stage renal and therefore not a candidate for CTA with contrast.  Will obtain a stat V/Q perfusion scan, and trend troponins which have been negative thus for.  Her vitals are grossly stable blood pressure 121/55 and sat 95%.  She does exhibit low-grade temp of 99° and nonproductive cough     Patient ruled out ACS with serial negative troponins.  Discussed case with Cardiology initially due to her atypical presentation no need to consult.  She had chest pressure diffuse across her chest that was nonradiating to the face or shoulders unrelieved by nitroglycerin or morphine.  Patient presented with a  nonproductive intermittent cough.  Noted in patient's records she had doctor appointment with another MD on 02/01/2019 was ordered doxycycline for upper respiratory infection.  Noted to have a low-grade fever 99 that was nonsustained.  She was set for discharged after V/Q stand scan showed low probability; she denies leg or calf pain numbness or weakness in any extremity, recent long trips or trauma.  Her symptoms thought to likely be related to upper respiratory infection although viral due to comorbidities will continue her doxycycline and treatment as ordered by Dr. Nelson in the clinic on the 1st, she can follow up with the primary care doctor in the clinic when she completes her antibiotics.  Will add short course of steroids.  Vital stable discharged to home in stable condition.     At the time of discharge the patient complained of chest pain again. She was kept overnight and NST performed today. The NST that showed a mild infarct defect in the lateral apical all, however, was negative for myocardial ischemia. LVEF 72%. Close follow up with cardiology in clinic. Appointment arranged by CM. Stable for discharge.     Echo 3/20/19  · Normal left ventricular systolic function. The estimated ejection fraction is 55%  · Concentric left ventricular remodeling.  · Indeterminate left ventricular diastolic function.  · Normal right ventricular systolic function.  · Moderate left atrial enlargement.  · Mild right atrial enlargement.  · Moderate-to-severe mitral regurgitation.  · Mild to moderate tricuspid regurgitation.  · The estimated PA systolic pressure is 65 mm Hg  · Pulmonary hypertension present.  · Small pericardial effusion. No tamponade    Stress test 2/4/19  · There is a mild, infarct defect(s) in the lateral apical wall(s),  · An ejection fraction of 72 % at rest  · The EKG portion of this study is negative for myocardial ischemia.      2/13/19 CP has resolved  Mild stable SINCLAIR  May need surgical evaluation  for MVR if MR worsens  May have upcoming shoulder surgery - cleared at moderate cardiac risk  OV 3 months    Admitted 4/1/19  Eli Vaz is a 73 y.o. female that (in part)  has a past medical history of Arthritis, Atrial fibrillation, COPD (chronic obstructive pulmonary disease), Diabetes mellitus type II, Dialysis patient, Encounter for blood transfusion, and ESRD (end stage renal disease).  has a past surgical history that includes Tubal ligation and AV fistula placement. Presents to [facility] complaining of recurrent shortness of breath.  She was admitted for similar complaints on March 19, 2019, approximately 2 weeks ago.  And just prior to that she was admitted week for atrial fibrillation as well as respiratory infection.  Reports compliance with home medications.  She is awaiting arrival of home oxygen.  She reports compliance with dialysis.      Ms. Vaz was admitted with AFib with rapid ventricular response along with volume over load state due to her end-stage renal disease and acute on chronic CHF exacerbation.  Cardiology and Nephrology were consulted. She was started on amiodarone infusion and underwent urgent dialysis with correction of volume status.  Cardiology performed a DC cardioversion on April 2, 2019 that was successful with returning the patient to normal sinus rhythm.  She was continued on amiodarone infusion until after completion of the infusion protocol and then amiodarone was converted to p.o. Patient was observed overnight in the ICU.  Patient remained in normal sinus rhythm and respiratory status normalized.  Anticoagulation continued with Eliquis. Patient was seen by PT and OT, and she was resumed on home health upon discharge.  Patient was arranged for follow-up with her primary care physician and Cardiology, and she will need outpatient referral to EP for consideration of ablation.  Patient to resume her normal scheduled dialysis on  TTS at [facility].      Feels better since discharge  Less SOB - on home O2  EKG NSR NSSTT changes        Review of Systems   Constitution: Negative for decreased appetite.   HENT: Negative for ear discharge.    Eyes: Negative for blurred vision.   Respiratory: Negative for hemoptysis.    Endocrine: Negative for polyphagia.   Hematologic/Lymphatic: Negative for adenopathy.   Skin: Negative for color change.   Musculoskeletal: Negative for joint swelling.   Genitourinary: Negative for bladder incontinence.   Neurological: Negative for brief paralysis.   Psychiatric/Behavioral: Negative for hallucinations.   Allergic/Immunologic: Negative for hives.        Objective:    Physical Exam   Constitutional: She is oriented to person, place, and time. She appears well-developed and well-nourished.   HENT:   Head: Normocephalic and atraumatic.   Eyes: Pupils are equal, round, and reactive to light. Conjunctivae are normal.   Neck: Normal range of motion. Neck supple.   Cardiovascular: Normal rate and intact distal pulses.   Murmur heard.  High-pitched blowing holosystolic murmur is present with a grade of 2/6 at the apex.  Pulmonary/Chest: Effort normal and breath sounds normal.   Abdominal: Soft. Bowel sounds are normal.   Musculoskeletal: Normal range of motion.   Neurological: She is alert and oriented to person, place, and time.   Skin: Skin is warm and dry.         Assessment:       1. History of CVA (cerebrovascular accident)    2. Pulmonary emphysema, unspecified emphysema type    3. Hypertension associated with diabetes    4. Atrial fibrillation with rapid ventricular response    5. ESRD on dialysis    6. Diabetes mellitus with ESRD (end-stage renal disease)         Plan:       Staying in [facility] so far. Decrease amiodarone 200 qd  Will refer to EP if PAF returns  Discussed the possible need for MVR/Maze in the future  OV 1 month with EKG

## 2019-04-11 ENCOUNTER — TELEPHONE (OUTPATIENT)
Dept: FAMILY MEDICINE | Facility: CLINIC | Age: 74
End: 2019-04-11

## 2019-04-11 NOTE — TELEPHONE ENCOUNTER
----- Message from Nilsa Blanco MA sent at 4/11/2019  4:22 PM CDT -----  Contact: Devin from [facility] [phone]  Please advise  ----- Message -----  From: Florence Mcqueen  Sent: 4/11/2019   4:05 PM  To: Fransisco Soto Staff    Type: Patient Call Back    Who called:Devin     What is the request in detail: Devin from [facility] called to speak to the staff. They wanted to know if Dr. Moody would follow the patient's home health care    Can the clinic reply by [facility]?no    Would the patient rather a call back or a response via [facility]? Call back    Best call back number:[phone]

## 2019-04-15 NOTE — PROGRESS NOTES
Routine Office Visit    Patient Name: Eli Vaz    : 1945  MRN: [id number]    Subjective:  Eli is a 73 y.o. female who presents today for:   Chief Complaint   Patient presents with    Follow-up     73-year-old female with atrial fibrillation, hypertension, diabetes, end-stage renal disease on dialysis, and COPD comes in with her daughter for hospital follow-up visit.  At her last visit and ambulance was called because she was in a atrial fibrillation with rapid ventricular response.  The patient was admitted to the hospital and started on an amiodarone drip and transition to oral.  When she was stable she was discharged home.  The since being home, the patient reports that she is gradually improving.  She states that she feels much better than she did at her last visit.  The she states that she is the having more energy.  Her daughter reports that she is gaining her appetite again.  They report that home health has come to the home.  No new concerns reported by the patient or the daughter today.    Past Medical History  Past Medical History:   Diagnosis Date    Arthritis     Atrial fibrillation     COPD (chronic obstructive pulmonary disease)     Diabetes mellitus type II     Dialysis patient     Encounter for blood transfusion     ESRD (end stage renal disease)        Past Surgical History  Past Surgical History:   Procedure Laterality Date    AV FISTULA PLACEMENT      TRANSESOPHAGEAL ECHOCARDIOGRAM WITH POSSIBLE CARDIOVERSION (ISAMAR W/ POSS CARDIOVERSION) N/A 3/21/2019    Performed by Elgin Garcia MD at [facility] C[facility]    TUBAL LIGATION          Family History  Family History   Problem Relation Age of Onset    Heart attack Sister     Heart attack Sister     Heart attack Mother        Social History  Social History     Socioeconomic History    Marital status:      Spouse name: Not on file    Number of children: Not on file    Years of education: Not on file    Highest education  level: Not on file   Occupational History    Not on file   Social Needs    Financial resource strain: Not on file    Food insecurity:     Worry: Not on file     Inability: Not on file    Transportation needs:     Medical: Not on file     Non-medical: Not on file   Tobacco Use    Smoking status: Former Smoker     Start date: 1/12/1991    Smokeless tobacco: Never Used    Tobacco comment: quit in 1991   Substance and Sexual Activity    Alcohol use: No    Drug use: No    Sexual activity: Not on file   Lifestyle    Physical activity:     Days per week: Not on file     Minutes per session: Not on file    Stress: Not on file   Relationships    Social connections:     Talks on phone: Not on file     Gets together: Not on file     Attends [employer] service: Not on file     Active member of club or organization: Not on file     Attends meetings of clubs or organizations: Not on file     Relationship status: Not on file   Other Topics Concern    Not on file   Social History Narrative    Not on file       Current Medications  Current Outpatient Medications on File Prior to Visit   Medication Sig Dispense Refill    albuterol (PROVENTIL) 2.5 mg /3 mL (0.083 %) nebulizer solution Inhale 2.5 mg into the lungs.      albuterol (PROVENTIL/VENTOLIN HFA) 90 mcg/actuation inhaler Inhale 2 puffs into the lungs.      amLODIPine (NORVASC) 10 MG tablet Take 10 mg by mouth daily as needed.       apixaban (ELIQUIS) 2.5 mg Tab Take 1 tablet (2.5 mg total) by mouth 2 (two) times daily. 60 tablet 5    atorvastatin (LIPITOR) 10 MG tablet Take 1 tablet (10 mg total) by mouth once daily. 90 tablet 1    busPIRone (BUSPAR) 10 MG tablet Take 10 mg by mouth daily as needed.       cinacalcet (SENSIPAR) 60 MG Tab Take 30 mg by mouth.      diclofenac sodium (VOLTAREN) 1 % Gel Apply 4 g topically.      fluticasone (FLONASE) 50 mcg/actuation nasal spray 1 spray by Each Nare route 2 (two) times daily. 1 Bottle 1    lactulose  "(CHRONULAC) 10 gram/15 mL solution TAKE 30MLS BY MOUTH TWICE A DAY AS NEEDED FOR CONSTIPATION  0    lidocaine (LMX) 4 % cream Apply topically as needed. To affected area up to 5 times a day. 30 g 0    mirtazapine (REMERON) 7.5 MG Tab Take 7.5 mg by mouth nightly.       montelukast (SINGULAIR) 10 mg tablet TAKE 1 TABLET BY MOUTH EVERY DAY IN THE EVENING 30 tablet 2    nut.tx.imp.renal fxn,lac-reduc (NEPRO CARB STEADY) 0.08 gram-1.8 kcal/mL Liqd Take 1 Can by mouth 3 (three) times daily with meals. 30 Can 11    pantoprazole (PROTONIX) 40 MG tablet Take 40 mg by mouth daily as needed.       sevelamer carbonate (RENVELA) 800 mg Tab Take 800 mg by mouth 3 (three) times daily with meals.       No current facility-administered medications on file prior to visit.        Allergies   Review of patient's allergies indicates:  No Known Allergies    Review of Systems   Constitutional: Negative for chills, diaphoresis and fever.   HENT: Negative for congestion and rhinorrhea.    Respiratory: Negative for shortness of breath and wheezing.    Cardiovascular: Negative for chest pain and palpitations.   Gastrointestinal: Negative for abdominal pain, blood in stool, constipation, diarrhea and nausea.   Skin: Negative for rash.   Neurological: Negative for dizziness, tremors, syncope and headaches.   Hematological: Negative for adenopathy.     BP (!) 103/52 (BP Location: Right arm, Patient Position: Sitting, BP Method: Medium (Automatic))   Temp 98.9 °F (37.2 °C)   Ht 5' 4" (1.626 m)   BMI 21.83 kg/m²     Physical Exam   Constitutional: She is cooperative. No distress.   Sitting in wheelchair   HENT:   Head: Normocephalic and atraumatic.   Right Ear: External ear normal.   Left Ear: External ear normal.   Mouth/Throat: Oropharynx is clear and moist.   Eyes: Pupils are equal, round, and reactive to light. EOM are normal.   Neck: Normal range of motion. Neck supple. No tracheal deviation present.   Cardiovascular: Normal rate, " regular rhythm, normal heart sounds and intact distal pulses.   Pulmonary/Chest: Effort normal and breath sounds normal. No respiratory distress. She has no wheezes. She has no rales.   Abdominal: Soft. Bowel sounds are normal. She exhibits no mass. There is no tenderness. There is no guarding.   Lymphadenopathy:     She has no cervical adenopathy.   Neurological: She is alert.   Psychiatric: Her speech is normal and behavior is normal. Her mood appears not anxious. Cognition and memory are normal. She does not exhibit a depressed mood.         Assessment/Plan:  Eli was seen today for follow-up.    Diagnoses and all orders for this visit:    Atrial fibrillation, unspecified type  Patient's rate and rhythm controlled at present.  Patient has follow-up appointment scheduled with Cardiology and she was encouraged to keep this.    Diabetes mellitus with ESRD (end-stage renal disease)  A1c on a April 2nd was 5.0.    ESRD on dialysis  Management as per nephrologist.    Essential hypertension  Blood pressure control.  Continue current regimen.          This office note has been dictated.  This dictation has been generated using M-Modal Fluency Direct dictation; some phonetic errors may occur.

## 2019-04-24 ENCOUNTER — HOSPITAL ENCOUNTER (OUTPATIENT)
Facility: HOSPITAL | Age: 74
Discharge: HOME | End: 2019-04-25
Attending: EMERGENCY MEDICINE | Admitting: INTERNAL MEDICINE
Payer: MEDICARE

## 2019-04-24 DIAGNOSIS — Z99.2 ESRD ON DIALYSIS: Chronic | ICD-10-CM

## 2019-04-24 DIAGNOSIS — D64.9 SEVERE ANEMIA: ICD-10-CM

## 2019-04-24 DIAGNOSIS — E11.59 HYPERTENSION ASSOCIATED WITH DIABETES: Chronic | ICD-10-CM

## 2019-04-24 DIAGNOSIS — I51.89 DIASTOLIC DYSFUNCTION: Chronic | ICD-10-CM

## 2019-04-24 DIAGNOSIS — I15.2 HYPERTENSION ASSOCIATED WITH DIABETES: Chronic | ICD-10-CM

## 2019-04-24 DIAGNOSIS — E11.22 DIABETES MELLITUS WITH ESRD (END-STAGE RENAL DISEASE): Chronic | ICD-10-CM

## 2019-04-24 DIAGNOSIS — I48.0 PAROXYSMAL ATRIAL FIBRILLATION: Chronic | ICD-10-CM

## 2019-04-24 DIAGNOSIS — N18.6 ESRD ON DIALYSIS: Chronic | ICD-10-CM

## 2019-04-24 DIAGNOSIS — N18.6 DIABETES MELLITUS WITH ESRD (END-STAGE RENAL DISEASE): Chronic | ICD-10-CM

## 2019-04-24 DIAGNOSIS — D64.9 ANEMIA: ICD-10-CM

## 2019-04-24 DIAGNOSIS — Z99.2 STAGE 5 CHRONIC KIDNEY DISEASE ON CHRONIC DIALYSIS: ICD-10-CM

## 2019-04-24 DIAGNOSIS — J43.9 PULMONARY EMPHYSEMA, UNSPECIFIED EMPHYSEMA TYPE: Chronic | ICD-10-CM

## 2019-04-24 DIAGNOSIS — N18.6 STAGE 5 CHRONIC KIDNEY DISEASE ON CHRONIC DIALYSIS: ICD-10-CM

## 2019-04-24 DIAGNOSIS — Z86.73 HISTORY OF CVA (CEREBROVASCULAR ACCIDENT): Chronic | ICD-10-CM

## 2019-04-24 DIAGNOSIS — F41.9 ANXIETY: Chronic | ICD-10-CM

## 2019-04-24 DIAGNOSIS — J96.11 CHRONIC RESPIRATORY FAILURE WITH HYPOXIA: Chronic | ICD-10-CM

## 2019-04-24 DIAGNOSIS — D64.9 ANEMIA, UNSPECIFIED TYPE: Primary | ICD-10-CM

## 2019-04-24 PROBLEM — I50.9 ACUTE EXACERBATION OF CHF (CONGESTIVE HEART FAILURE): Status: RESOLVED | Noted: 2019-03-20 | Resolved: 2019-04-24

## 2019-04-24 PROBLEM — D72.819 LEUKOPENIA: Status: RESOLVED | Noted: 2017-10-11 | Resolved: 2019-04-24

## 2019-04-24 PROBLEM — A41.9 SEPSIS DUE TO PNEUMONIA: Status: RESOLVED | Noted: 2019-03-19 | Resolved: 2019-04-24

## 2019-04-24 PROBLEM — J18.9 SEPSIS DUE TO PNEUMONIA: Status: RESOLVED | Noted: 2019-03-19 | Resolved: 2019-04-24

## 2019-04-24 PROBLEM — R19.7 DIARRHEA: Status: RESOLVED | Noted: 2019-03-24 | Resolved: 2019-04-24

## 2019-04-24 PROBLEM — I50.1 ACUTE CARDIAC PULMONARY EDEMA: Status: RESOLVED | Noted: 2019-03-20 | Resolved: 2019-04-24

## 2019-04-24 PROBLEM — M75.102 LEFT ROTATOR CUFF TEAR ARTHROPATHY: Status: RESOLVED | Noted: 2018-09-19 | Resolved: 2019-04-24

## 2019-04-24 PROBLEM — I48.91 ATRIAL FIBRILLATION WITH RVR: Status: RESOLVED | Noted: 2019-03-20 | Resolved: 2019-04-24

## 2019-04-24 PROBLEM — I48.91 ATRIAL FIBRILLATION WITH RAPID VENTRICULAR RESPONSE: Status: RESOLVED | Noted: 2019-04-01 | Resolved: 2019-04-24

## 2019-04-24 PROBLEM — J90 BILATERAL PLEURAL EFFUSION: Status: RESOLVED | Noted: 2019-03-20 | Resolved: 2019-04-24

## 2019-04-24 PROBLEM — R06.02 SHORTNESS OF BREATH: Status: RESOLVED | Noted: 2019-02-03 | Resolved: 2019-04-24

## 2019-04-24 PROBLEM — A41.9 SEPSIS: Status: RESOLVED | Noted: 2019-03-20 | Resolved: 2019-04-24

## 2019-04-24 PROBLEM — D69.6 THROMBOCYTOPENIA: Status: RESOLVED | Noted: 2017-10-11 | Resolved: 2019-04-24

## 2019-04-24 PROBLEM — R79.89 ELEVATED BRAIN NATRIURETIC PEPTIDE (BNP) LEVEL: Status: RESOLVED | Noted: 2019-03-20 | Resolved: 2019-04-24

## 2019-04-24 PROBLEM — M12.812 LEFT ROTATOR CUFF TEAR ARTHROPATHY: Status: RESOLVED | Noted: 2018-09-19 | Resolved: 2019-04-24

## 2019-04-24 PROBLEM — R79.89 ELEVATED TROPONIN: Status: RESOLVED | Noted: 2019-03-20 | Resolved: 2019-04-24

## 2019-04-24 LAB
ABO + RH BLD: NORMAL
ALBUMIN SERPL BCP-MCNC: 2.4 G/DL (ref 3.5–5.2)
ALBUMIN SERPL-MCNC: 2.5 G/DL
ALP SERPL-CCNC: 62 U/L
ALP SERPL-CCNC: 73 U/L (ref 55–135)
ALT SERPL W/O P-5'-P-CCNC: <5 U/L (ref 10–44)
ANION GAP SERPL CALC-SCNC: 11 MMOL/L (ref 8–16)
ANISOCYTOSIS BLD QL SMEAR: SLIGHT
APTT BLDCRRT: 36 SEC (ref 21–32)
AST SERPL-CCNC: 10 U/L (ref 10–40)
BASOPHILS # BLD AUTO: 0.03 K/UL (ref 0–0.2)
BASOPHILS NFR BLD: 0.7 % (ref 0–1.9)
BILIRUB SERPL-MCNC: 0.4 MG/DL (ref 0.1–1)
BILIRUB SERPL-MCNC: 0.5 MG/DL
BLD GP AB SCN CELLS X3 SERPL QL: NORMAL
BUN SERPL-MCNC: 24 MG/DL
BUN SERPL-MCNC: 26 MG/DL (ref 8–23)
CALCIUM SERPL-MCNC: 8.7 MG/DL (ref 8.7–10.5)
CALCIUM SERPL-MCNC: 9.1 MG/DL
CHLORIDE SERPL-SCNC: 102 MMOL/L
CHLORIDE SERPL-SCNC: 102 MMOL/L (ref 95–110)
CO2 SERPL-SCNC: 30 MMOL/L (ref 23–29)
CREAT SERPL-MCNC: 5.6 MG/DL
CREAT SERPL-MCNC: 5.8 MG/DL (ref 0.5–1.4)
DIFFERENTIAL METHOD: ABNORMAL
EOSINOPHIL # BLD AUTO: 0.3 K/UL (ref 0–0.5)
EOSINOPHIL NFR BLD: 6.1 % (ref 0–8)
ERYTHROCYTE [DISTWIDTH] IN BLOOD BY AUTOMATED COUNT: 18.2 % (ref 11.5–14.5)
EST. GFR  (AFRICAN AMERICAN): 8 ML/MIN/1.73 M^2
EST. GFR  (NON AFRICAN AMERICAN): 7 ML/MIN/1.73 M^2
GLUCOSE SERPL-MCNC: 101 MG/DL (ref 70–110)
GLUCOSE SERPL-MCNC: 85 MG/DL (ref 70–110)
HCT VFR BLD AUTO: 16.7 % (ref 37–48.5)
HGB BLD-MCNC: 4.7 G/DL (ref 12–16)
HYPOCHROMIA BLD QL SMEAR: ABNORMAL
INR PPP: 1.2 (ref 0.8–1.2)
IRON SERPL-MCNC: 36 UG/DL (ref 30–160)
LYMPHOCYTES # BLD AUTO: 1 K/UL (ref 1–4.8)
LYMPHOCYTES NFR BLD: 23.1 % (ref 18–48)
MAGNESIUM SERPL-MCNC: 1.4 MG/DL (ref 1.6–2.6)
MCH RBC QN AUTO: 27.3 PG (ref 27–31)
MCHC RBC AUTO-ENTMCNC: 28.1 G/DL (ref 32–36)
MCV RBC AUTO: 97 FL (ref 82–98)
MONOCYTES # BLD AUTO: 0.8 K/UL (ref 0.3–1)
MONOCYTES NFR BLD: 18.2 % (ref 4–15)
NEUTROPHILS # BLD AUTO: 2.2 K/UL (ref 1.8–7.7)
NEUTROPHILS NFR BLD: 53.3 % (ref 38–73)
OVALOCYTES BLD QL SMEAR: ABNORMAL
PHOSPHATE SERPL-MCNC: 3.3 MG/DL (ref 2.7–4.5)
PLATELET # BLD AUTO: 245 K/UL (ref 150–350)
PMV BLD AUTO: 8.1 FL (ref 9.2–12.9)
POC ALT (SGPT): 14 U/L
POC AST (SGOT): 20 U/L
POC TCO2: 31 MMOL/L
POTASSIUM BLD-SCNC: 3.7 MMOL/L
POTASSIUM SERPL-SCNC: 3.5 MMOL/L (ref 3.5–5.1)
PROT SERPL-MCNC: 6.9 G/DL (ref 6–8.4)
PROTEIN, POC: 7 G/DL
PROTHROMBIN TIME: 12.9 SEC (ref 9–12.5)
RBC # BLD AUTO: 1.72 M/UL (ref 4–5.4)
SATURATED IRON: 20 % (ref 20–50)
SODIUM BLD-SCNC: 145 MMOL/L
SODIUM SERPL-SCNC: 143 MMOL/L (ref 136–145)
TOTAL IRON BINDING CAPACITY: 176 UG/DL (ref 250–450)
TRANSFERRIN SERPL-MCNC: 119 MG/DL (ref 200–375)
WBC # BLD AUTO: 4.28 K/UL (ref 3.9–12.7)

## 2019-04-24 PROCEDURE — 85730 THROMBOPLASTIN TIME PARTIAL: CPT

## 2019-04-24 PROCEDURE — 25000003 PHARM REV CODE 250: Performed by: EMERGENCY MEDICINE

## 2019-04-24 PROCEDURE — G0378 HOSPITAL OBSERVATION PER HR: HCPCS

## 2019-04-24 PROCEDURE — 80053 COMPREHEN METABOLIC PANEL: CPT | Mod: ER

## 2019-04-24 PROCEDURE — 85610 PROTHROMBIN TIME: CPT

## 2019-04-24 PROCEDURE — 82607 VITAMIN B-12: CPT

## 2019-04-24 PROCEDURE — 86920 COMPATIBILITY TEST SPIN: CPT | Mod: 59

## 2019-04-24 PROCEDURE — 83540 ASSAY OF IRON: CPT

## 2019-04-24 PROCEDURE — 85025 COMPLETE CBC W/AUTO DIFF WBC: CPT | Mod: ER

## 2019-04-24 PROCEDURE — 82746 ASSAY OF FOLIC ACID SERUM: CPT

## 2019-04-24 PROCEDURE — 84100 ASSAY OF PHOSPHORUS: CPT

## 2019-04-24 PROCEDURE — 99291 CRITICAL CARE FIRST HOUR: CPT | Mod: 25

## 2019-04-24 PROCEDURE — 86850 RBC ANTIBODY SCREEN: CPT

## 2019-04-24 PROCEDURE — 83735 ASSAY OF MAGNESIUM: CPT

## 2019-04-24 PROCEDURE — 85025 COMPLETE CBC W/AUTO DIFF WBC: CPT

## 2019-04-24 PROCEDURE — 80053 COMPREHEN METABOLIC PANEL: CPT

## 2019-04-24 RX ORDER — SODIUM CHLORIDE 0.9 % (FLUSH) 0.9 %
10 SYRINGE (ML) INJECTION
Status: DISCONTINUED | OUTPATIENT
Start: 2019-04-24 | End: 2019-04-25 | Stop reason: HOSPADM

## 2019-04-24 RX ORDER — HYDROCODONE BITARTRATE AND ACETAMINOPHEN 500; 5 MG/1; MG/1
TABLET ORAL
Status: DISCONTINUED | OUTPATIENT
Start: 2019-04-24 | End: 2019-04-25

## 2019-04-24 RX ORDER — MIRTAZAPINE 7.5 MG/1
7.5 TABLET, FILM COATED ORAL NIGHTLY
Status: DISCONTINUED | OUTPATIENT
Start: 2019-04-24 | End: 2019-04-25 | Stop reason: HOSPADM

## 2019-04-24 RX ORDER — CINACALCET 30 MG/1
30 TABLET, FILM COATED ORAL
Status: DISCONTINUED | OUTPATIENT
Start: 2019-04-25 | End: 2019-04-25 | Stop reason: HOSPADM

## 2019-04-24 RX ORDER — ACETAMINOPHEN 325 MG/1
650 TABLET ORAL EVERY 6 HOURS PRN
Status: DISCONTINUED | OUTPATIENT
Start: 2019-04-24 | End: 2019-04-25 | Stop reason: HOSPADM

## 2019-04-24 RX ORDER — SEVELAMER CARBONATE 800 MG/1
800 TABLET, FILM COATED ORAL
Status: DISCONTINUED | OUTPATIENT
Start: 2019-04-25 | End: 2019-04-25 | Stop reason: HOSPADM

## 2019-04-24 RX ORDER — CLONIDINE HYDROCHLORIDE 0.1 MG/1
0.1 TABLET ORAL EVERY 6 HOURS PRN
Status: DISCONTINUED | OUTPATIENT
Start: 2019-04-24 | End: 2019-04-25 | Stop reason: HOSPADM

## 2019-04-24 RX ORDER — ALBUTEROL SULFATE 2.5 MG/.5ML
2.5 SOLUTION RESPIRATORY (INHALATION) EVERY 4 HOURS
Status: DISCONTINUED | OUTPATIENT
Start: 2019-04-25 | End: 2019-04-25 | Stop reason: HOSPADM

## 2019-04-24 RX ORDER — ONDANSETRON 2 MG/ML
4 INJECTION INTRAMUSCULAR; INTRAVENOUS EVERY 6 HOURS PRN
Status: DISCONTINUED | OUTPATIENT
Start: 2019-04-24 | End: 2019-04-25 | Stop reason: HOSPADM

## 2019-04-24 RX ORDER — AMLODIPINE BESYLATE 5 MG/1
10 TABLET ORAL DAILY
Status: DISCONTINUED | OUTPATIENT
Start: 2019-04-25 | End: 2019-04-25 | Stop reason: HOSPADM

## 2019-04-24 RX ORDER — BUSPIRONE HYDROCHLORIDE 10 MG/1
10 TABLET ORAL DAILY PRN
Status: DISCONTINUED | OUTPATIENT
Start: 2019-04-24 | End: 2019-04-25 | Stop reason: HOSPADM

## 2019-04-24 RX ORDER — ATORVASTATIN CALCIUM 10 MG/1
10 TABLET, FILM COATED ORAL DAILY
Status: DISCONTINUED | OUTPATIENT
Start: 2019-04-25 | End: 2019-04-25 | Stop reason: HOSPADM

## 2019-04-24 RX ORDER — AMIODARONE HYDROCHLORIDE 200 MG/1
200 TABLET ORAL DAILY
Status: DISCONTINUED | OUTPATIENT
Start: 2019-04-25 | End: 2019-04-25 | Stop reason: HOSPADM

## 2019-04-24 RX ADMIN — APIXABAN 2.5 MG: 2.5 TABLET, FILM COATED ORAL at 09:04

## 2019-04-24 RX ADMIN — MIRTAZAPINE 7.5 MG: 7.5 TABLET ORAL at 09:04

## 2019-04-24 NOTE — ED PROVIDER NOTES
Encounter Date: 4/24/2019    SCRIBE #1 NOTE: I, Ovi Choe, am scribing for, and in the presence of,  Dr. Schmitt. I have scribed the following portions of the note - Other sections scribed: HPI, ROS, PE.       History     Chief Complaint   Patient presents with    low hemoglobin     Patient stated she was dialyzed yesterday and the dialysis nurse  called her today and instructed her to go to ER for low blood count   Patient denies symptoms     CC:   Lower Blood Count  HPI:   This is a 73 y.o. female who presents to the ED with a chief complaint of a low blood count after going through dialysis yesterday.  Pt states that her blood count was 4.8.  Pt states she was called by dialysis today after treatment yesterday and was told to visit the ED due to her low blood count.  Pt states that she feels fine today.  Pt is on home oxygen.  Pt states that she normally feels SOB, but notes her SOB has been getting worse with exertion.  Pt has a history of low blood counts, but denies having a previous blood transfusion.  Pt states that she normally bruises easily.  She denies visual disturbances, weakness, CP, abdominal pain, nausea, emesis, HA, or dizziness.  Pt states that she was recently admitted to [facility] for CP and was dx 'd with Afib with RVR and stabilized with amiodarone.  No blood in stool    The history is provided by the patient.     Review of patient's allergies indicates:  No Known Allergies  Past Medical History:   Diagnosis Date    Arthritis     Atrial fibrillation     COPD (chronic obstructive pulmonary disease)     Diabetes mellitus type II     Dialysis patient     Encounter for blood transfusion     ESRD (end stage renal disease)      Past Surgical History:   Procedure Laterality Date    AV FISTULA PLACEMENT      TRANSESOPHAGEAL ECHOCARDIOGRAM WITH POSSIBLE CARDIOVERSION (ISAMAR W/ POSS CARDIOVERSION) N/A 3/21/2019    Performed by Elgin Garcia MD at [facility] CATH LAB    TUBAL  LIGATION       Family History   Problem Relation Age of Onset    Heart attack Sister     Heart attack Sister     Heart attack Mother      Social History     Tobacco Use    Smoking status: Former Smoker     Start date: 1/12/1991    Smokeless tobacco: Never Used    Tobacco comment: quit in 1991   Substance Use Topics    Alcohol use: No    Drug use: No     Review of Systems   Eyes: Negative for visual disturbance.   Respiratory: Positive for shortness of breath (Chronic, on home oxygen).    Cardiovascular: Negative for chest pain.   Gastrointestinal: Negative for abdominal pain, nausea and vomiting.   Neurological: Negative for dizziness, weakness and headaches.   Hematological: Bruises/bleeds easily.   All other systems reviewed and are negative.      Physical Exam     Initial Vitals [04/24/19 1623]   BP Pulse Resp Temp SpO2   134/62 75 20 99 °F (37.2 °C) --      MAP       --         Physical Exam    Nursing note and vitals reviewed.  Constitutional: She appears well-developed and well-nourished.   HENT:   Head: Normocephalic and atraumatic.   Mouth/Throat: Uvula is midline, oropharynx is clear and moist and mucous membranes are normal.   Eyes: Conjunctivae are normal. Pupils are equal, round, and reactive to light.   Neck: Normal range of motion. Neck supple.   Cardiovascular: Normal rate, regular rhythm and intact distal pulses. Exam reveals no gallop and no friction rub.    Murmur heard.  Pulses:       Radial pulses are 2+ on the right side, and 2+ on the left side.   Pulmonary/Chest: Effort normal and breath sounds normal. No respiratory distress. She has no wheezes. She has no rhonchi. She has no rales. She exhibits no tenderness.   Musculoskeletal: Normal range of motion.        Arms:  Neurological: She is alert and oriented to person, place, and time.   Skin: Skin is warm and dry.   Pt's palms are pale.    Psychiatric: She has a normal mood and affect.         ED Course   Procedures  Labs Reviewed    POCT CBC   POCT CMP   POCT CMP          Imaging Results    None          Medical Decision Making:   Initial Assessment:   This is a 73 y.o. female who presents to the ED after a blood count of 4.8 after having dialysis yesterday    The pt's physical examination was significant for a thrill to the left arm and a heart murmur.     Patient appears well. She has no acute complaints  Clinical Tests:   Lab Tests: Ordered  ED Management:  I will order POCT CBC and CMP.     Patient's H&H of 5.1/15.9.  Platelet count is normal. CMP was significant for chronic kidney disease with a creatinine of 5.6.  Patient will be transferred to the ER at the [facility].  Discussed with .  Patient says she will go directly to the emergency room.  She refuses rectal exam.  Vital signs are stable            Scribe Attestation:   Scribe #1: I performed the above scribed service and the documentation accurately describes the services I performed. I attest to the accuracy of the note.       I, Dr. Liliya Schmitt, personally performed the services described in this documentation. All medical record entries made by the scribe were at my direction and in my presence.  I have reviewed the chart and agree that the record reflects my personal performance and is accurate and complete. Liliya Schmitt MD.  5:27 PM 04/24/2019             Clinical Impression:     1. Anemia, unspecified type    2. Stage 5 chronic kidney disease on chronic dialysis                                  Liliya Schmitt MD  04/24/19 8427

## 2019-04-24 NOTE — ED PROVIDER NOTES
Encounter Date: 4/24/2019    This is a SORT/MSE of a 73 y.o. female presenting to the ED as a transfer by POV from [facility] for anemia. Was called by her dialysis center for low blood count. Went to [facility] and transferred to [facility].  H/H on CBC from [facility]: 5.1 and 15.9 . Normal CMP. Care will be transferred to an alternate provider when patient is roomed for a full evaluation and final disposition. VIN Altamirano, FNP-C 4/24/2019 6:13 PM    SCRIBE #1 NOTE: I, Leena Sarabia, am scribing for, and in the presence of,  Dr. Valero. I have scribed the following portions of the note - Other sections scribed: HPI/ROS.       History     Chief Complaint   Patient presents with    low hemoglobin     Patient stated she was dialyzed yesterday and the dialysis nurse  called her today and instructed her to go to ER for low blood count   Patient denies symptoms     Time seen by provider: 7:02 PM  This is a 73 y.o. female with a PMHx of HTN, ESRD on HD, and PSHx of tubal ligation who presents to the Emergency Department from Urgent Care with a cc of asymptomatic low hemoglobin. The pt was called today for labs drawn yesterday at dialysis showing a Hemoglobin of 5 and advised to present to the ED. She denies fever, chills, cough, shortness of breath, chest pain, abdominal pain, dysuria, rash, or headache. She reports a prior history of low blood count. NKDA. She denies smoking or drinking.        The history is provided by the patient.     Review of patient's allergies indicates:  No Known Allergies  Past Medical History:   Diagnosis Date    Arthritis     Atrial fibrillation     COPD (chronic obstructive pulmonary disease)     Dialysis patient     Encounter for blood transfusion     ESRD (end stage renal disease)      Past Surgical History:   Procedure Laterality Date    AV FISTULA PLACEMENT      TRANSESOPHAGEAL ECHOCARDIOGRAM WITH POSSIBLE CARDIOVERSION (ISAMAR W/ POSS CARDIOVERSION) N/A 3/21/2019    Performed by Elgin Garcia,  MD at [facility] CATH LAB    TUBAL LIGATION       Family History   Problem Relation Age of Onset    Heart attack Sister     Heart attack Sister     Heart attack Mother      Social History     Tobacco Use    Smoking status: Former Smoker     Start date: 1/12/1991    Smokeless tobacco: Never Used    Tobacco comment: quit in 1991   Substance Use Topics    Alcohol use: No    Drug use: No     Review of Systems   Constitutional: Negative for fever.   HENT: Negative for sore throat.    Respiratory: Negative for shortness of breath.    Cardiovascular: Negative for chest pain.   Gastrointestinal: Negative for nausea.   Genitourinary: Negative for dysuria.   Musculoskeletal: Negative for back pain.   Skin: Negative for rash.   Neurological: Negative for weakness.   Hematological: Does not bruise/bleed easily.   All other systems reviewed and are negative.      Physical Exam     Initial Vitals   BP Pulse Resp Temp SpO2   04/24/19 1623 04/24/19 1623 04/24/19 1623 04/24/19 1623 04/24/19 1737   134/62 75 20 99 °F (37.2 °C) 100 %      MAP       --                Physical Exam  The patient was examined specifically for the following:   General:No significant distress, Good color, Warm and dry. Head and neck:Scalp atraumatic, Neck supple. Neurological:Appropriate conversation, Gross motor deficits. Eyes:Conjugate gaze, Clear corneas. ENT: No epistaxis. Cardiac: Regular rate and rhythm, Grossly normal heart tones. Pulmonary: Wheezing, Rales. Gastrointestinal: Abdominal tenderness, Abdominal distention. Musculoskeletal: Extremity deformity, Apparent pain with range of motion of the joints. Skin: Rash.   The findings on examination were normal except for the following:  Patient is pale.  The lungs are clear.  The heart tones are normal. She does not look ill or distressed.  ED Course   Critical Care  Date/Time: 4/24/2019 7:26 PM  Performed by: Huseyin Valero MD  Authorized by: Huseyin Valero MD   Direct patient critical care  time: 11 minutes  Additional history critical care time: 11 minutes  Ordering / reviewing critical care time: 11 minutes  Documentation critical care time: 11 minutes  Consulting other physicians critical care time: 5 minutes  Total critical care time (exclusive of procedural time) : 49 minutes  Critical care time was exclusive of separately billable procedures and treating other patients.  Critical care was necessary to treat or prevent imminent or life-threatening deterioration of the following conditions: circulatory failure and metabolic crisis.  Critical care was time spent personally by me on the following activities: obtaining history from patient or surrogate, ordering and review of laboratory studies, review of old charts, discussions with primary provider, examination of patient and ordering and performing treatments and interventions.        Labs Reviewed   COMPREHENSIVE METABOLIC PANEL   MAGNESIUM   CBC W/ AUTO DIFFERENTIAL   APTT   PROTIME-INR   PHOSPHORUS   VITAMIN B12   IRON AND TIBC   FOLATE   POCT CBC   TYPE & SCREEN   POCT CMP   POCT CMP          Imaging Results    None      Medical decision making:  Given the above, this patient will require admission for transfusion.  It may be that the patient will require transfusion on dialysis.  I will consult Nephrology.  The patient does not seem ill at this time.  Her hemoglobin is 5.1 on testing prior to arrival to the emergency room.  I will admit the patient for transfusion on dialysis.  Patient denies rectal bleeding.  There is no history of doctor or stools.                          Clinical Impression:       ICD-10-CM ICD-9-CM   1. Anemia, unspecified type D64.9 285.9   2. Stage 5 chronic kidney disease on chronic dialysis N18.6 585.6    Z99.2 V45.11                 Scribe attestation:  Please note that the documentation on this chart was provided by the scribe above on the date of service noted above, and that the documentation in the chart  accurately reflects the work and decisions made by me alone.  Signed, Dr. Marylu Valero MD  04/24/19 1927

## 2019-04-25 VITALS
RESPIRATION RATE: 18 BRPM | DIASTOLIC BLOOD PRESSURE: 71 MMHG | HEIGHT: 64 IN | HEART RATE: 70 BPM | TEMPERATURE: 98 F | OXYGEN SATURATION: 100 % | WEIGHT: 124.13 LBS | SYSTOLIC BLOOD PRESSURE: 168 MMHG | BODY MASS INDEX: 21.19 KG/M2

## 2019-04-25 LAB
FOLATE SERPL-MCNC: 7 NG/ML (ref 4–24)
HCT VFR BLD AUTO: 17.1 % (ref 37–48.5)
HCT VFR BLD AUTO: 29.4 % (ref 37–48.5)
HGB BLD-MCNC: 4.7 G/DL (ref 12–16)
HGB BLD-MCNC: 9.1 G/DL (ref 12–16)
VIT B12 SERPL-MCNC: 570 PG/ML (ref 210–950)

## 2019-04-25 PROCEDURE — 85018 HEMOGLOBIN: CPT

## 2019-04-25 PROCEDURE — 25000003 PHARM REV CODE 250: Performed by: EMERGENCY MEDICINE

## 2019-04-25 PROCEDURE — G0257 UNSCHED DIALYSIS ESRD PT HOS: HCPCS

## 2019-04-25 PROCEDURE — 85014 HEMATOCRIT: CPT | Mod: 91

## 2019-04-25 PROCEDURE — G0378 HOSPITAL OBSERVATION PER HR: HCPCS

## 2019-04-25 PROCEDURE — 36415 COLL VENOUS BLD VENIPUNCTURE: CPT

## 2019-04-25 PROCEDURE — 80100016 HC MAINTENANCE HEMODIALYSIS

## 2019-04-25 PROCEDURE — 85018 HEMOGLOBIN: CPT | Mod: 91

## 2019-04-25 PROCEDURE — 27000221 HC OXYGEN, UP TO 24 HOURS

## 2019-04-25 PROCEDURE — 94640 AIRWAY INHALATION TREATMENT: CPT

## 2019-04-25 PROCEDURE — 85014 HEMATOCRIT: CPT

## 2019-04-25 PROCEDURE — 25000242 PHARM REV CODE 250 ALT 637 W/ HCPCS: Performed by: EMERGENCY MEDICINE

## 2019-04-25 PROCEDURE — P9016 RBC LEUKOCYTES REDUCED: HCPCS

## 2019-04-25 RX ORDER — HYDROCODONE BITARTRATE AND ACETAMINOPHEN 500; 5 MG/1; MG/1
TABLET ORAL
Status: DISCONTINUED | OUTPATIENT
Start: 2019-04-25 | End: 2019-04-25 | Stop reason: HOSPADM

## 2019-04-25 RX ORDER — SODIUM CHLORIDE 9 MG/ML
INJECTION, SOLUTION INTRAVENOUS
Status: DISCONTINUED | OUTPATIENT
Start: 2019-04-25 | End: 2019-04-25 | Stop reason: HOSPADM

## 2019-04-25 RX ADMIN — ALBUTEROL SULFATE 2.5 MG: 2.5 SOLUTION RESPIRATORY (INHALATION) at 08:04

## 2019-04-25 RX ADMIN — APIXABAN 2.5 MG: 2.5 TABLET, FILM COATED ORAL at 09:04

## 2019-04-25 RX ADMIN — SEVELAMER CARBONATE 800 MG: 800 TABLET, FILM COATED ORAL at 04:04

## 2019-04-25 RX ADMIN — AMLODIPINE BESYLATE 10 MG: 5 TABLET ORAL at 04:04

## 2019-04-25 RX ADMIN — ALBUTEROL SULFATE 2.5 MG: 2.5 SOLUTION RESPIRATORY (INHALATION) at 03:04

## 2019-04-25 RX ADMIN — CINACALCET HYDROCHLORIDE 30 MG: 30 TABLET, FILM COATED ORAL at 09:04

## 2019-04-25 RX ADMIN — AMIODARONE HYDROCHLORIDE 200 MG: 200 TABLET ORAL at 09:04

## 2019-04-25 RX ADMIN — ALBUTEROL SULFATE 2.5 MG: 2.5 SOLUTION RESPIRATORY (INHALATION) at 12:04

## 2019-04-25 RX ADMIN — ALBUTEROL SULFATE 2.5 MG: 2.5 SOLUTION RESPIRATORY (INHALATION) at 11:04

## 2019-04-25 RX ADMIN — ALBUTEROL SULFATE 2.5 MG: 2.5 SOLUTION RESPIRATORY (INHALATION) at 04:04

## 2019-04-25 RX ADMIN — ATORVASTATIN CALCIUM 10 MG: 10 TABLET, FILM COATED ORAL at 09:04

## 2019-04-25 RX ADMIN — SEVELAMER CARBONATE 800 MG: 800 TABLET, FILM COATED ORAL at 09:04

## 2019-04-25 NOTE — PLAN OF CARE
04/25/19 1609   Final Note   Assessment Type Final Discharge Note   Anticipated Discharge Disposition Home   Hospital Follow Up  Appt(s) scheduled? Yes   Discharge plans and expectations educations in teach back method with documentation complete? Yes   Right Care Referral Info   Post Acute Recommendation No Care   pts nurse Sigala advised that all CM needs have been addressed and pt can d/c from CM standpoint.

## 2019-04-25 NOTE — NURSING
Discharge instructions given to patient and  Kai at bedside. Patient verbalized understanding of instructions. Patient states willingness to comply. Saline lock removed. Tele monitoring removed. Patient escorted by PCT to family vehicle for discharge home, pt wearing portable travel O2 tank (from home). Patient accompanied by spouse. No apparent distress noted.

## 2019-04-25 NOTE — NURSING
Patient returned to room from dialysis via bed with 2L O2 nc. Patient awake, alert, oriented without c/o discomfort. Tele monitoring in progress. No apparent distress noted at this time.

## 2019-04-25 NOTE — PROGRESS NOTES
TN taught Symptoms and Problems for ANEMIA home care with pt and with teach back:  1. WEAKNESS, 2. INCREASE HEART RATE, 3. DARK URINE. TN placed education sheet in [employer] Packet..     Help at home will be from [facility], assisting in pt's recovery.     TN taught patient about things SHE is responsible for when discharged TO HELP WITH HER RECOVERY:  Particularly on how to Manage HER Care At Home:  1. Getting his prescriptions filled.  2. Taking his medications as directed. DO NOT MISS ANY DOSES!  3. Going to his follow-up doctor appointments.   .  Orquidea Lindsay RN, BSN, STN CCM

## 2019-04-25 NOTE — ASSESSMENT & PLAN NOTE
Significantly lower than baseline, no report or evidence of overt bleeding.  She was typed, screened, and consented in the ED.  Asymptomatic.  Nephrology consult, likely transfusion with HD tomorrow.

## 2019-04-25 NOTE — NURSING
Dr. Cárdenas at bedside. Orders received for STAT hemoglobin before third unit of blood. Dr. Cárdenas states if hemoglobin is greater than 8, then don't transfuse third unit.

## 2019-04-25 NOTE — HPI
73 y.o. female with hypertension, DM 2, ESRD on HD (TThS), COPD, chronic diastolic heart failure, memory loss, anemia chronic disease, hyperlipidemia, anxiety, paroxysmal AFib, chronic respiratory failure with hypoxia on home O2, and history of CVA presents after being called by her dialysis center for low hemoglobin.  She was dialyzed yesterday, lab work at that time showed hemoglobin 5.  The patient denies fever, chills, cough, SOB, chest pain, palpitations, orthopnea, PND, dizziness, syncope, nausea, vomiting, diarrhea, abdominal pain, hematemesis, coffee ground emesis, bloody or black stools, nor any other associated symptom.  In the ED, hemoglobin 4.7.  She was typed, screened, and consented in the ED.  Nephrology consulted.  Placed in observation for further evaluation treatment.

## 2019-04-25 NOTE — PLAN OF CARE
04/25/19 1020   Discharge Assessment   Assessment Type Discharge Planning Assessment  (pt off unit for dialysis)   Assessment information obtained from? Medical Record   Prior to hospitilization cognitive status: Alert/Oriented   Prior to hospitalization functional status: Independent   Current cognitive status: Alert/Oriented   Current Functional Status: Independent   Facility Arrived From: home   Lives With spouse;child(maisha), dependent   Able to Return to Prior Arrangements yes   Is patient able to care for self after discharge? Yes   Who are your caregiver(s) and their phone number(s)? Veronica- [phone]   Patient's perception of discharge disposition home or selfcare   Readmission Within the Last 30 Days other (see comments)  (pt instructed to come to hospital due to anemia)   Patient currently being followed by outpatient case management? No   Patient currently receives any other outside agency services? No   Equipment Currently Used at Home walker, rolling;oxygen;nebulizer;bedside commode;glucometer   Do you have any problems affording any of your prescribed medications? No   Is the patient taking medications as prescribed? yes   Does the patient have transportation home? Yes   Transportation Anticipated family or friend will provide   Dialysis Name and Scheduled days [facility]   Does the patient receive services at the [facility]? No   Discharge Plan A Home with family   Discharge Plan B Home with family  (with follow ups)   DME Needed Upon Discharge  none   Patient/Family in Agreement with Plan yes     [id number] [place] - [address]  [address] [place] [address]  Phone: [phone] Fax: [phone]

## 2019-04-25 NOTE — CONSULTS
Renal Consult    Date of Admission:  4/24/2019  4:13 PM        Chief Complaint:   Chief Complaint   Patient presents with    low hemoglobin     Patient stated she was dialyzed yesterday and the dialysis nurse  called her today and instructed her to go to ER for low blood count   Patient denies symptoms       HPI: 73 y.o. female known to me with a PMHx. Significant for:  hypertension, DM 2, ESRD on HD (TThSat last HD 4/23) COPD, chronic diastolic heart failure, memory loss, anemia chronic disease, hyperlipidemia, anxiety, paroxysmal AFib, chronic respiratory failure with hypoxia on home O2, and history of CVA who was sent to the ED from her out-pte Dialysis unit due to a critical Hgb 4.7   The patient denied fever, chills, cough, SOB, chest pain, palpitations, orthopnea, PND, dizziness, syncope, nausea, vomiting, diarrhea, abdominal pain, hematemesis, coffee ground emesis, bloody or black stools.  Consulted for ESRD management.        PMH:  Past Medical History:   Diagnosis Date    Arthritis     Atrial fibrillation     COPD (chronic obstructive pulmonary disease)     Dialysis patient     Encounter for blood transfusion     ESRD (end stage renal disease)        PSH:  Past Surgical History:   Procedure Laterality Date    AV FISTULA PLACEMENT      TRANSESOPHAGEAL ECHOCARDIOGRAM WITH POSSIBLE CARDIOVERSION (ISAMAR W/ POSS CARDIOVERSION) N/A 3/21/2019    Performed by [facility] at [facility] CATH LAB    TUBAL LIGATION         Allergies:  Review of patient's allergies indicates:  No Known Allergies    No current facility-administered medications on file prior to encounter.      Current Outpatient Medications on File Prior to Encounter   Medication Sig Dispense Refill    albuterol (PROVENTIL) 2.5 mg /3 mL (0.083 %) nebulizer solution Inhale 2.5 mg into the lungs.      albuterol (PROVENTIL/VENTOLIN HFA) 90 mcg/actuation inhaler Inhale 2 puffs into the lungs.       amiodarone (PACERONE) 200 MG Tab Take 1 tablet (200 mg total) by mouth once daily. 90 tablet 3    amLODIPine (NORVASC) 10 MG tablet Take 10 mg by mouth daily as needed.       apixaban (ELIQUIS) 2.5 mg Tab Take 1 tablet (2.5 mg total) by mouth 2 (two) times daily. 60 tablet 5    atorvastatin (LIPITOR) 10 MG tablet Take 1 tablet (10 mg total) by mouth once daily. 90 tablet 1    mirtazapine (REMERON) 7.5 MG Tab Take 7.5 mg by mouth nightly.       montelukast (SINGULAIR) 10 mg tablet TAKE 1 TABLET BY MOUTH EVERY DAY IN THE EVENING 30 tablet 2    sevelamer carbonate (RENVELA) 800 mg Tab Take 800 mg by mouth 3 (three) times daily with meals.      busPIRone (BUSPAR) 10 MG tablet Take 10 mg by mouth daily as needed.       cinacalcet (SENSIPAR) 60 MG Tab Take 30 mg by mouth.      diclofenac sodium (VOLTAREN) 1 % Gel Apply 4 g topically.      fluticasone (FLONASE) 50 mcg/actuation nasal spray 1 spray by Each Nare route 2 (two) times daily. 1 Bottle 1    lactulose (CHRONULAC) 10 gram/15 mL solution TAKE 30MLS BY MOUTH TWICE A DAY AS NEEDED FOR CONSTIPATION  0    lidocaine (LMX) 4 % cream Apply topically as needed. To affected area up to 5 times a day. 30 g 0    nut.tx.imp.renal fxn,lac-reduc (NEPRO CARB STEADY) 0.08 gram-1.8 kcal/mL Liqd Take 1 Can by mouth 3 (three) times daily with meals. 30 Can 11       Medications:  Current Facility-Administered Medications   Medication Dose Route Frequency Provider Last Rate Last Dose    0.9%  NaCl infusion (for blood administration)   Intravenous Q24H PRN Angie Rabago MD        0.9%  NaCl infusion   Intravenous PRN Armand Cárdenas MD        acetaminophen tablet 650 mg  650 mg Oral Q6H PRN Chriss Whitney Jr., NP        albuterol sulfate nebulizer solution 2.5 mg  2.5 mg Nebulization Q4H Huseyin Valero MD   2.5 mg at 04/25/19 1551    amiodarone tablet 200 mg  200 mg Oral Daily Huseyin Valero MD   200 mg at 04/25/19 0909    amLODIPine tablet 10 mg  10  mg Oral Daily Huseyin Valero MD   10 mg at 04/25/19 1633    apixaban tablet 2.5 mg  2.5 mg Oral BID Huseyin Valero MD   2.5 mg at 04/25/19 0909    atorvastatin tablet 10 mg  10 mg Oral Daily Huseyin Valero MD   10 mg at 04/25/19 0909    busPIRone tablet 10 mg  10 mg Oral Daily PRN Huseyin Valero MD        cinacalcet tablet 30 mg  30 mg Oral Daily with breakfast Huseyin Valero MD   30 mg at 04/25/19 0908    cloNIDine tablet 0.1 mg  0.1 mg Oral Q6H PRN Chriss Whitney Jr., NP        mirtazapine tablet 7.5 mg  7.5 mg Oral Nightly Huseyin Valero MD   7.5 mg at 04/24/19 2152    ondansetron injection 4 mg  4 mg Intravenous Q6H PRN Chriss Whitney Jr., NP        promethazine (PHENERGAN) 6.25 mg in dextrose 5 % 50 mL IVPB  6.25 mg Intravenous Q6H PRN Huseyin Valero MD        sevelamer carbonate tablet 800 mg  800 mg Oral TID WM Huseyin Valero MD   800 mg at 04/25/19 1632    sodium chloride 0.9% flush 10 mL  10 mL Intravenous PRN Huseyin Valero MD           FamHx:  Family History   Problem Relation Age of Onset    Heart attack Sister     Heart attack Sister     Heart attack Mother        SocHx:  Social History     Socioeconomic History    Marital status:      Spouse name: Not on file    Number of children: Not on file    Years of education: Not on file    Highest education level: Not on file   Occupational History    Not on file   Social Needs    Financial resource strain: Not on file    Food insecurity:     Worry: Not on file     Inability: Not on file    Transportation needs:     Medical: Not on file     Non-medical: Not on file   Tobacco Use    Smoking status: Former Smoker     Start date: 1/12/1991    Smokeless tobacco: Never Used    Tobacco comment: quit in 1991   Substance and Sexual Activity    Alcohol use: No    Drug use: No    Sexual activity: Not on file   Lifestyle    Physical activity:     Days per week: Not on file     Minutes per session: Not on file     Stress: Not on file   Relationships    Social connections:     Talks on phone: Not on file     Gets together: Not on file     Attends [employer] service: Not on file     Active member of club or organization: Not on file     Attends meetings of clubs or organizations: Not on file     Relationship status: Not on file   Other Topics Concern    Not on file   Social History Narrative    Not on file           Review of Systems: see H&P      Physical Exam:  Vitals:   Vitals:    04/25/19 1558   BP: (!) 168/71   Pulse: 70   Resp: 18   Temp: 98.2 °F (36.8 °C)       No intake/output data recorded.  I/O this shift:  In: 1830 [Blood:730; Other:1100]  Out: 4100 [Other:4100]    General: No apparent distress.   Neck: supple   Lungs: Unlabored breathing  Heart: RRR  Abdomen: n/a  : n/a  Ext: No edema  Neurologic: Awake and alert, oriented x 3      Laboratories:    Recent Labs   Lab 04/24/19 1913 04/25/19  1618   WBC 4.28  --   --    RBC 1.72*  --   --    HGB 4.7*   < > 9.1*   HCT 16.7*   < > 29.4*     --   --    MCV 97  --   --    MCH 27.3  --   --    MCHC 28.1*  --   --     < > = values in this interval not displayed.       Recent Labs   Lab 04/24/19 1913   CALCIUM 8.7   PROT 6.9      K 3.5   CO2 30*      BUN 26*   CREATININE 5.8*   ALKPHOS 73   ALT <5*   AST 10   BILITOT 0.4       No results for input(s): COLORU, CLARITYU, SPECGRAV, PHUR, PROTEINUA, GLUCOSEU, BLOODU, WBCU, RBCU, BACTERIA, MUCUS in the last 24 hours.    Invalid input(s):  BILIRUBINCON    Microbiology Results (last 7 days)     ** No results found for the last 168 hours. **            Diagnostic Tests: n/a        Assessment:    72 y/o AAF with Hx. Of ESRD on chronic HD admitted with:    - Severe asymptomatic anemia      Plan:    - Pte. Dialyzed and transfused 2 units of PRBCs with improvement of Hgb to 9.1    O.K. To discharge home tonight  Resume out. pte HD on Saturday  Hgb will be followed out-pte HD clinic per protocol

## 2019-04-25 NOTE — SUBJECTIVE & OBJECTIVE
Past Medical History:   Diagnosis Date    Arthritis     Atrial fibrillation     COPD (chronic obstructive pulmonary disease)     Dialysis patient     Encounter for blood transfusion     ESRD (end stage renal disease)        Past Surgical History:   Procedure Laterality Date    AV FISTULA PLACEMENT      TRANSESOPHAGEAL ECHOCARDIOGRAM WITH POSSIBLE CARDIOVERSION (ISAMAR W/ POSS CARDIOVERSION) N/A 3/21/2019    Performed by Elgin Garcia MD at [facility] CATH LAB    TUBAL LIGATION         Review of patient's allergies indicates:  No Known Allergies    No current facility-administered medications on file prior to encounter.      Current Outpatient Medications on File Prior to Encounter   Medication Sig    albuterol (PROVENTIL) 2.5 mg /3 mL (0.083 %) nebulizer solution Inhale 2.5 mg into the lungs.    albuterol (PROVENTIL/VENTOLIN HFA) 90 mcg/actuation inhaler Inhale 2 puffs into the lungs.    amiodarone (PACERONE) 200 MG Tab Take 1 tablet (200 mg total) by mouth once daily.    amLODIPine (NORVASC) 10 MG tablet Take 10 mg by mouth daily as needed.     apixaban (ELIQUIS) 2.5 mg Tab Take 1 tablet (2.5 mg total) by mouth 2 (two) times daily.    atorvastatin (LIPITOR) 10 MG tablet Take 1 tablet (10 mg total) by mouth once daily.    mirtazapine (REMERON) 7.5 MG Tab Take 7.5 mg by mouth nightly.     montelukast (SINGULAIR) 10 mg tablet TAKE 1 TABLET BY MOUTH EVERY DAY IN THE EVENING    sevelamer carbonate (RENVELA) 800 mg Tab Take 800 mg by mouth 3 (three) times daily with meals.    busPIRone (BUSPAR) 10 MG tablet Take 10 mg by mouth daily as needed.     cinacalcet (SENSIPAR) 60 MG Tab Take 30 mg by mouth.    diclofenac sodium (VOLTAREN) 1 % Gel Apply 4 g topically.    fluticasone (FLONASE) 50 mcg/actuation nasal spray 1 spray by Each Nare route 2 (two) times daily.    lactulose (CHRONULAC) 10 gram/15 mL solution TAKE 30MLS BY MOUTH TWICE A DAY AS NEEDED FOR CONSTIPATION    lidocaine (LMX) 4 % cream Apply  topically as needed. To affected area up to 5 times a day.    nut.tx.imp.renal fxn,lac-reduc (NEPRO CARB STEADY) 0.08 gram-1.8 kcal/mL Liqd Take 1 Can by mouth 3 (three) times daily with meals.    [DISCONTINUED] pantoprazole (PROTONIX) 40 MG tablet Take 40 mg by mouth daily as needed.      Family History     Problem Relation (Age of Onset)    Heart attack Sister, Sister, Mother        Tobacco Use    Smoking status: Former Smoker     Start date: 1/12/1991    Smokeless tobacco: Never Used    Tobacco comment: quit in 1991   Substance and Sexual Activity    Alcohol use: No    Drug use: No    Sexual activity: Not on file     Review of Systems   Constitutional: Negative for chills, fatigue and fever.   Eyes: Negative for photophobia and visual disturbance.   Respiratory: Negative for cough and shortness of breath.    Cardiovascular: Negative for chest pain, palpitations and leg swelling.   Gastrointestinal: Negative for abdominal pain, diarrhea, nausea and vomiting.   Genitourinary: Negative for dysuria, frequency and urgency.   Skin: Negative for pallor, rash and wound.   Neurological: Negative for light-headedness and headaches.   Psychiatric/Behavioral: Negative for confusion and decreased concentration.     Objective:     Vital Signs (Most Recent):  Temp: 98.6 °F (37 °C) (04/24/19 1813)  Pulse: 77 (04/24/19 1936)  Resp: 18 (04/24/19 1813)  BP: (!) 164/74 (04/24/19 1932)  SpO2: 100 % (04/24/19 1936) Vital Signs (24h Range):  Temp:  [98.4 °F (36.9 °C)-99 °F (37.2 °C)] 98.6 °F (37 °C)  Pulse:  [62-77] 77  Resp:  [18-20] 18  SpO2:  [87 %-100 %] 100 %  BP: (134-186)/(62-76) 164/74     Weight: 60.8 kg (134 lb)  Body mass index is 23 kg/m².    Physical Exam   Constitutional: She is oriented to person, place, and time. She appears well-developed and well-nourished. No distress.   HENT:   Head: Normocephalic and atraumatic.   Right Ear: External ear normal.   Left Ear: External ear normal.   Nose: Nose normal.    Mouth/Throat: Oropharynx is clear and moist.   Eyes: Pupils are equal, round, and reactive to light. Conjunctivae and EOM are normal.   Neck: Normal range of motion. Neck supple.   Cardiovascular: Normal rate, regular rhythm and intact distal pulses.   Pulmonary/Chest: Effort normal and breath sounds normal. No respiratory distress. She has no wheezes.   Abdominal: Soft. Bowel sounds are normal. She exhibits no distension. There is no tenderness.   No palpable hepatomegaly or splenomegaly    Musculoskeletal: Normal range of motion. She exhibits no edema or tenderness.   Neurological: She is alert and oriented to person, place, and time.   Skin: Skin is warm and dry. There is pallor.   Psychiatric: She has a normal mood and affect. Thought content normal.   Nursing note and vitals reviewed.        CRANIAL NERVES     CN III, IV, VI   Pupils are equal, round, and reactive to light.  Extraocular motions are normal.        Significant Labs: All pertinent labs within the past 24 hours have been reviewed.    Significant Imaging: I have reviewed all pertinent imaging results/findings within the past 24 hours.

## 2019-04-25 NOTE — HOSPITAL COURSE
Placed in observation for evaluation of profound anemia - patient denied overt bleeding - thought to be related to ESRD. She was dialyzed and transfused 3 units of PRBC while on dialysis. She endorses that she was never symptomatic. She is otherwise, in her usual state of health, has no complaints and will discharge to home after dialysis. Vitals stable.

## 2019-04-25 NOTE — ASSESSMENT & PLAN NOTE
Last HgbA1c   Lab Results   Component Value Date    HGBA1C 5.0 04/02/2019     Hold oral antihyperglycemics while inpatient  PRN sliding scale insulin  ACHS glucose monitoring   ADA diet

## 2019-04-25 NOTE — H&P
[facility] Hospital Medicine  History & Physical    Patient Name: Eli Vaz  MRN: [id number]  Admission Date: 4/24/2019  Attending Physician: Angie Rabago MD  Primary Care Provider: Charles Moody Jr, MD         Patient information was obtained from patient, past medical records and ER records.     Subjective:     Principal Problem:Anemia    Chief Complaint:   Chief Complaint   Patient presents with    low hemoglobin     Patient stated she was dialyzed yesterday and the dialysis nurse  called her today and instructed her to go to ER for low blood count   Patient denies symptoms        HPI: 73 y.o. female with hypertension, DM 2, ESRD on HD (TThS), COPD, chronic diastolic heart failure, memory loss, anemia chronic disease, hyperlipidemia, anxiety, paroxysmal AFib, chronic respiratory failure with hypoxia on home O2, and history of CVA presents after being called by her dialysis center for low hemoglobin.  She was dialyzed yesterday, lab work at that time showed hemoglobin 5.  The patient denies fever, chills, cough, SOB, chest pain, palpitations, orthopnea, PND, dizziness, syncope, nausea, vomiting, diarrhea, abdominal pain, hematemesis, coffee ground emesis, bloody or black stools, nor any other associated symptom.  In the ED, hemoglobin 4.7.  She was typed, screened, and consented in the ED.  Nephrology consulted.  Placed in observation for further evaluation treatment.    Past Medical History:   Diagnosis Date    Arthritis     Atrial fibrillation     COPD (chronic obstructive pulmonary disease)     Dialysis patient     Encounter for blood transfusion     ESRD (end stage renal disease)        Past Surgical History:   Procedure Laterality Date    AV FISTULA PLACEMENT      TRANSESOPHAGEAL ECHOCARDIOGRAM WITH POSSIBLE CARDIOVERSION (ISAMAR W/ POSS CARDIOVERSION) N/A 3/21/2019    Performed by El[facility] at [facility] CATH LAB    TUBAL LIGATION         Review of patient's allergies  indicates:  No Known Allergies    No current facility-administered medications on file prior to encounter.      Current Outpatient Medications on File Prior to Encounter   Medication Sig    albuterol (PROVENTIL) 2.5 mg /3 mL (0.083 %) nebulizer solution Inhale 2.5 mg into the lungs.    albuterol (PROVENTIL/VENTOLIN HFA) 90 mcg/actuation inhaler Inhale 2 puffs into the lungs.    amiodarone (PACERONE) 200 MG Tab Take 1 tablet (200 mg total) by mouth once daily.    amLODIPine (NORVASC) 10 MG tablet Take 10 mg by mouth daily as needed.     apixaban (ELIQUIS) 2.5 mg Tab Take 1 tablet (2.5 mg total) by mouth 2 (two) times daily.    atorvastatin (LIPITOR) 10 MG tablet Take 1 tablet (10 mg total) by mouth once daily.    mirtazapine (REMERON) 7.5 MG Tab Take 7.5 mg by mouth nightly.     montelukast (SINGULAIR) 10 mg tablet TAKE 1 TABLET BY MOUTH EVERY DAY IN THE EVENING    sevelamer carbonate (RENVELA) 800 mg Tab Take 800 mg by mouth 3 (three) times daily with meals.    busPIRone (BUSPAR) 10 MG tablet Take 10 mg by mouth daily as needed.     cinacalcet (SENSIPAR) 60 MG Tab Take 30 mg by mouth.    diclofenac sodium (VOLTAREN) 1 % Gel Apply 4 g topically.    fluticasone (FLONASE) 50 mcg/actuation nasal spray 1 spray by Each Nare route 2 (two) times daily.    lactulose (CHRONULAC) 10 gram/15 mL solution TAKE 30MLS BY MOUTH TWICE A DAY AS NEEDED FOR CONSTIPATION    lidocaine (LMX) 4 % cream Apply topically as needed. To affected area up to 5 times a day.    nut.tx.imp.renal fxn,lac-reduc (NEPRO CARB STEADY) 0.08 gram-1.8 kcal/mL Liqd Take 1 Can by mouth 3 (three) times daily with meals.    [DISCONTINUED] pantoprazole (PROTONIX) 40 MG tablet Take 40 mg by mouth daily as needed.      Family History     Problem Relation (Age of Onset)    Heart attack Sister, Sister, Mother        Tobacco Use    Smoking status: Former Smoker     Start date: 1/12/1991    Smokeless tobacco: Never Used    Tobacco comment: quit in  1991   Substance and Sexual Activity    Alcohol use: No    Drug use: No    Sexual activity: Not on file     Review of Systems   Constitutional: Negative for chills, fatigue and fever.   Eyes: Negative for photophobia and visual disturbance.   Respiratory: Negative for cough and shortness of breath.    Cardiovascular: Negative for chest pain, palpitations and leg swelling.   Gastrointestinal: Negative for abdominal pain, diarrhea, nausea and vomiting.   Genitourinary: Negative for dysuria, frequency and urgency.   Skin: Negative for pallor, rash and wound.   Neurological: Negative for light-headedness and headaches.   Psychiatric/Behavioral: Negative for confusion and decreased concentration.     Objective:     Vital Signs (Most Recent):  Temp: 98.6 °F (37 °C) (04/24/19 1813)  Pulse: 77 (04/24/19 1936)  Resp: 18 (04/24/19 1813)  BP: (!) 164/74 (04/24/19 1932)  SpO2: 100 % (04/24/19 1936) Vital Signs (24h Range):  Temp:  [98.4 °F (36.9 °C)-99 °F (37.2 °C)] 98.6 °F (37 °C)  Pulse:  [62-77] 77  Resp:  [18-20] 18  SpO2:  [87 %-100 %] 100 %  BP: (134-186)/(62-76) 164/74     Weight: 60.8 kg (134 lb)  Body mass index is 23 kg/m².    Physical Exam   Constitutional: She is oriented to person, place, and time. She appears well-developed and well-nourished. No distress.   HENT:   Head: Normocephalic and atraumatic.   Right Ear: External ear normal.   Left Ear: External ear normal.   Nose: Nose normal.   Mouth/Throat: Oropharynx is clear and moist.   Eyes: Pupils are equal, round, and reactive to light. Conjunctivae and EOM are normal.   Neck: Normal range of motion. Neck supple.   Cardiovascular: Normal rate, regular rhythm and intact distal pulses.   Pulmonary/Chest: Effort normal and breath sounds normal. No respiratory distress. She has no wheezes.   Abdominal: Soft. Bowel sounds are normal. She exhibits no distension. There is no tenderness.   No palpable hepatomegaly or splenomegaly    Musculoskeletal: Normal range of  motion. She exhibits no edema or tenderness.   Neurological: She is alert and oriented to person, place, and time.   Skin: Skin is warm and dry. There is pallor.   Psychiatric: She has a normal mood and affect. Thought content normal.   Nursing note and vitals reviewed.        CRANIAL NERVES     CN III, IV, VI   Pupils are equal, round, and reactive to light.  Extraocular motions are normal.        Significant Labs: All pertinent labs within the past 24 hours have been reviewed.    Significant Imaging: I have reviewed all pertinent imaging results/findings within the past 24 hours.    Assessment/Plan:     * Anemia  Significantly lower than baseline, no report or evidence of overt bleeding.  She was typed, screened, and consented in the ED.  Asymptomatic.  Nephrology consult, likely transfusion with HD tomorrow.    Chronic respiratory failure with hypoxia  Stable, no acute issue, continue home supplemental oxygen    Paroxysmal atrial fibrillation  Currently sinus, hold apixaban for now, continue amiodarone    Anxiety  Continue buspirone and mirtazapine    Combined hyperlipidemia associated with type 2 diabetes mellitus  Continue statin    Diabetes mellitus with ESRD (end-stage renal disease)  Last HgbA1c   Lab Results   Component Value Date    HGBA1C 5.0 04/02/2019     Hold oral antihyperglycemics while inpatient  PRN sliding scale insulin  ACHS glucose monitoring   ADA diet    Hypertension associated with diabetes  Well controlled, continue home medications and monitor blood pressure, adjust as needed.    History of CVA (cerebrovascular accident)  Continue statin    ESRD on dialysis  Nephrologist Dr. Arthur, adherence to outpatient treatment plan, nephrology consult    Diastolic dysfunction  No evidence of acute decompensation or fluid overload, continue home regimen, daily weights    COPD (chronic obstructive pulmonary disease)  Continue montelukast, P.r.n. nebs      VTE Risk Mitigation (From admission, onward)     None        Chriss Whitney Jr., APRN, AGAAd[facility]-BC  Hospitalist - Department of Hospital Medicine  [facility] [address] [place] [address]  Office #: [phone]; Pager #: [phone]

## 2019-04-25 NOTE — DISCHARGE SUMMARY
[facility] Hospital Medicine  Discharge Summary      Patient Name: Eli Vaz  MRN: [id number]  Admission Date: 4/24/2019  Hospital Length of Stay: 0 days  Discharge Date and Time:  04/25/2019 2:18 PM  Attending Physician: Angie Rabago MD   Discharging Provider: LACIE Hutton  Primary Care Provider: Charles Moody Jr, MD      HPI:   73 y.o. female with hypertension, DM 2, ESRD on HD (TThS), COPD, chronic diastolic heart failure, memory loss, anemia chronic disease, hyperlipidemia, anxiety, paroxysmal AFib, chronic respiratory failure with hypoxia on home O2, and history of CVA presents after being called by her dialysis center for low hemoglobin.  She was dialyzed yesterday, lab work at that time showed hemoglobin 5.  The patient denies fever, chills, cough, SOB, chest pain, palpitations, orthopnea, PND, dizziness, syncope, nausea, vomiting, diarrhea, abdominal pain, hematemesis, coffee ground emesis, bloody or black stools, nor any other associated symptom.  In the ED, hemoglobin 4.7.  She was typed, screened, and consented in the ED.  Nephrology consulted.  Placed in observation for further evaluation treatment.    * No surgery found *      Hospital Course:   Placed in observation for evaluation of profound anemia - patient denied overt bleeding - thought to be related to ESRD. She was dialyzed and transfused 3 units of PRBC while on dialysis. She endorses that she was never symptomatic. She is otherwise, in her usual state of health, has no complaints and will discharge to home after dialysis. Vitals stable.     Consults:   Consults (From admission, onward)        Status Ordering Provider     Inpatient consult to Nephrology  Once     Provider:  Maki Arthur MD    Acknowledged CLIFTON SPEARS consult to case management  Once     Provider:  (Not yet assigned)    Acknowledged DARRIAN FLETCHER JR            Final Active Diagnoses:    Diagnosis Date Noted POA    PRINCIPAL  PROBLEM:  Anemia [D64.9] 04/24/2019 Yes    Anxiety [F41.9] 12/01/2015 Yes     Chronic    Chronic respiratory failure with hypoxia [J96.11] 03/24/2019 Yes     Chronic    Paroxysmal atrial fibrillation [I48.0] 03/19/2019 Yes     Chronic    Diabetes mellitus with ESRD (end-stage renal disease) [E11.22, N18.6] 12/01/2015 Yes     Chronic    Combined hyperlipidemia associated with type 2 diabetes mellitus [E11.69, E78.2] 12/01/2015 Yes     Chronic    Hypertension associated with diabetes [E11.59, I10] 10/23/2015 Yes     Chronic    History of CVA (cerebrovascular accident) [Z86.73] 03/13/2014 Not Applicable     Chronic    ESRD on dialysis [N18.6, Z99.2] 12/20/2013 Not Applicable     Chronic    COPD (chronic obstructive pulmonary disease) [J44.9] 11/20/2013 Yes     Chronic    Diastolic dysfunction [I51.9] 11/20/2013 Yes     Chronic      Problems Resolved During this Admission:       Discharged Condition: stable    Disposition:     Follow Up:  Follow-up Information     Charles Moody Jr, MD On 5/3/2019.    Specialty:  Family Medicine  Why:  Appointment scheduled for Friday may 3rd at 1pm  Contact information:  [address] [place] [address]  [phone]             Isrrael Barton Jr, MD On 5/6/2019.    Specialties:  Physical Medicine and Rehabilitation, Pain Medicine  Why:  Appointment scheuduled for Monday May 6th at 10:30am  Contact information:  [address]  [place] [address]  [phone]             Elgin Garcia MD On 5/17/2019.    Specialty:  Cardiology  Why:  Appointment scheduled for Friday May 17th at 10:15am  Contact information:  [address]  [address]  [place] [address]  [phone]                 Patient Instructions:      Activity as tolerated     Diet: Renal    Significant Diagnostic Studies: Labs:   CMP   Recent Labs   Lab 04/24/19  1913      K 3.5      CO2 30*   GLU 85   BUN 26*   CREATININE 5.8*   CALCIUM 8.7   PROT 6.9   ALBUMIN 2.4*   BILITOT 0.4   ALKPHOS 73    AST 10   ALT <5*   ANIONGAP 11   ESTGFRAFRICA 8*   EGFRNONAA 7*   , CBC   Recent Labs   Lab 04/24/19  1913 04/25/19  0824   WBC 4.28  --    HGB 4.7* 4.7*   HCT 16.7* 17.1*     --    , INR   Lab Results   Component Value Date    INR 1.2 04/24/2019    INR 1.5 (H) 03/19/2019    INR 1.1 01/09/2018   , Lipid Panel   Lab Results   Component Value Date    CHOL 144 04/03/2019    HDL 46 04/03/2019    LDLCALC 81.6 04/03/2019    TRIG 82 04/03/2019    CHOLHDL 31.9 04/03/2019   , Troponin No results for input(s): TROPONINI in the last 168 hours. and A1C:   Recent Labs   Lab 03/06/19  1759 04/02/19  0255   HGBA1C 5.3 5.0       Pending Diagnostic Studies:     Procedure Component Value Units Date/Time    Hematocrit [[id number]]     Order Status:  Sent Lab Status:  No result     Specimen:  Blood     Hemoglobin [[id number]]     Order Status:  Sent Lab Status:  No result     Specimen:  Blood          Medications:  Reconciled Home Medications:      Medication List      CONTINUE taking these medications    * albuterol 2.5 mg /3 mL (0.083 %) nebulizer solution  Commonly known as:  PROVENTIL  Inhale 2.5 mg into the lungs.     * albuterol 90 mcg/actuation inhaler  Commonly known as:  PROVENTIL/VENTOLIN HFA  Inhale 2 puffs into the lungs.     amiodarone 200 MG Tab  Commonly known as:  PACERONE  Take 1 tablet (200 mg total) by mouth once daily.     amLODIPine 10 MG tablet  Commonly known as:  NORVASC  Take 10 mg by mouth daily as needed.     apixaban 2.5 mg Tab  Commonly known as:  ELIQUIS  Take 1 tablet (2.5 mg total) by mouth 2 (two) times daily.     atorvastatin 10 MG tablet  Commonly known as:  LIPITOR  Take 1 tablet (10 mg total) by mouth once daily.     busPIRone 10 MG tablet  Commonly known as:  BUSPAR  Take 10 mg by mouth daily as needed.     cinacalcet 60 MG Tab  Commonly known as:  SENSIPAR  Take 30 mg by mouth.     fluticasone 50 mcg/actuation nasal spray  Commonly known as:  FLONASE  1 spray by Each Nare route 2 (two)  times daily.     lactulose 10 gram/15 mL solution  Commonly known as:  CHRONULAC  TAKE 30MLS BY MOUTH TWICE A DAY AS NEEDED FOR CONSTIPATION     lidocaine 4 % cream  Commonly known as:  LMX  Apply topically as needed. To affected area up to 5 times a day.     mirtazapine 7.5 MG Tab  Commonly known as:  REMERON  Take 7.5 mg by mouth nightly.     montelukast 10 mg tablet  Commonly known as:  SINGULAIR  TAKE 1 TABLET BY MOUTH EVERY DAY IN THE EVENING     nut.tx.imp.renal fxn,lac-reduc 0.08 gram-1.8 kcal/mL Liqd  Commonly known as:  NEPRO CARB STEADY  Take 1 Can by mouth 3 (three) times daily with meals.     sevelamer carbonate 800 mg Tab  Commonly known as:  RENVELA  Take 800 mg by mouth 3 (three) times daily with meals.     VOLTAREN 1 % Gel  Generic drug:  diclofenac sodium  Apply 4 g topically.         * This list has 2 medication(s) that are the same as other medications prescribed for you. Read the directions carefully, and ask your doctor or other care provider to review them with you.                Indwelling Lines/Drains at time of discharge:   Lines/Drains/Airways     Central Venous Catheter Line                 Hemodialysis Catheter -- days          Drain                 Hemodialysis AV Fistula 03/06/19 2339 Left upper arm 49 days                Time spent on the discharge of patient: 35 minutes  Patient was seen and examined on the date of discharge and determined to be suitable for discharge.         VIN Cabrera, FNP-C  Hospitalist - Department of Hospital Medicine  [address], [place] [address]  Office [phone]; Pager [phone]

## 2019-04-25 NOTE — NURSING
H/H resulted 9.1 and 29.4 after two units prbcs in dialysis, NP states ok for pt to discharge without third unit PRBC transfusion

## 2019-04-25 NOTE — NURSING
Patient to dialysis via bed with 2L O2 nc (home O2 level) . Patient awake, alert, oriented without c/o discomfort at this time. No apparent distress noted.

## 2019-04-26 LAB
BLD PROD TYP BPU: NORMAL
BLOOD UNIT EXPIRATION DATE: NORMAL
BLOOD UNIT TYPE CODE: 7300
BLOOD UNIT TYPE: NORMAL
CODING SYSTEM: NORMAL
DISPENSE STATUS: NORMAL
NUM UNITS TRANS PACKED RBC: NORMAL

## 2019-05-03 ENCOUNTER — OFFICE VISIT (OUTPATIENT)
Dept: FAMILY MEDICINE | Facility: CLINIC | Age: 74
End: 2019-05-03
Payer: MEDICARE

## 2019-05-03 VITALS
OXYGEN SATURATION: 96 % | HEART RATE: 56 BPM | RESPIRATION RATE: 16 BRPM | BODY MASS INDEX: 21.27 KG/M2 | DIASTOLIC BLOOD PRESSURE: 60 MMHG | TEMPERATURE: 98 F | WEIGHT: 123.88 LBS | SYSTOLIC BLOOD PRESSURE: 118 MMHG

## 2019-05-03 DIAGNOSIS — N18.6 DIABETES MELLITUS WITH ESRD (END-STAGE RENAL DISEASE): ICD-10-CM

## 2019-05-03 DIAGNOSIS — I10 ESSENTIAL HYPERTENSION: ICD-10-CM

## 2019-05-03 DIAGNOSIS — J96.11 CHRONIC RESPIRATORY FAILURE WITH HYPOXIA: ICD-10-CM

## 2019-05-03 DIAGNOSIS — I48.91 ATRIAL FIBRILLATION, UNSPECIFIED TYPE: ICD-10-CM

## 2019-05-03 DIAGNOSIS — J44.9 CHRONIC OBSTRUCTIVE PULMONARY DISEASE, UNSPECIFIED COPD TYPE: ICD-10-CM

## 2019-05-03 DIAGNOSIS — E78.2 COMBINED HYPERLIPIDEMIA ASSOCIATED WITH TYPE 2 DIABETES MELLITUS: ICD-10-CM

## 2019-05-03 DIAGNOSIS — Z78.0 POSTMENOPAUSAL: ICD-10-CM

## 2019-05-03 DIAGNOSIS — N18.6 ESRD ON DIALYSIS: ICD-10-CM

## 2019-05-03 DIAGNOSIS — D64.9 ANEMIA, UNSPECIFIED TYPE: Primary | ICD-10-CM

## 2019-05-03 DIAGNOSIS — Z99.2 ESRD ON DIALYSIS: ICD-10-CM

## 2019-05-03 DIAGNOSIS — E11.22 DIABETES MELLITUS WITH ESRD (END-STAGE RENAL DISEASE): ICD-10-CM

## 2019-05-03 DIAGNOSIS — E11.69 COMBINED HYPERLIPIDEMIA ASSOCIATED WITH TYPE 2 DIABETES MELLITUS: ICD-10-CM

## 2019-05-03 PROCEDURE — 99999 PR PBB SHADOW E&M-EST. PATIENT-LVL IV: ICD-10-PCS | Mod: PBBFAC,,, | Performed by: FAMILY MEDICINE

## 2019-05-03 PROCEDURE — 3044F HG A1C LEVEL LT 7.0%: CPT | Mod: CPTII,S$GLB,, | Performed by: FAMILY MEDICINE

## 2019-05-03 PROCEDURE — 99214 PR OFFICE/OUTPT VISIT, EST, LEVL IV, 30-39 MIN: ICD-10-PCS | Mod: S$GLB,,, | Performed by: FAMILY MEDICINE

## 2019-05-03 PROCEDURE — 1101F PR PT FALLS ASSESS DOC 0-1 FALLS W/OUT INJ PAST YR: ICD-10-PCS | Mod: CPTII,S$GLB,, | Performed by: FAMILY MEDICINE

## 2019-05-03 PROCEDURE — 99499 RISK ADDL DX/OHS AUDIT: ICD-10-PCS | Mod: S$GLB,,, | Performed by: FAMILY MEDICINE

## 2019-05-03 PROCEDURE — 99999 PR PBB SHADOW E&M-EST. PATIENT-LVL IV: CPT | Mod: PBBFAC,,, | Performed by: FAMILY MEDICINE

## 2019-05-03 PROCEDURE — 3044F PR MOST RECENT HEMOGLOBIN A1C LEVEL <7.0%: ICD-10-PCS | Mod: CPTII,S$GLB,, | Performed by: FAMILY MEDICINE

## 2019-05-03 PROCEDURE — 99499 UNLISTED E&M SERVICE: CPT | Mod: S$GLB,,, | Performed by: FAMILY MEDICINE

## 2019-05-03 PROCEDURE — 1101F PT FALLS ASSESS-DOCD LE1/YR: CPT | Mod: CPTII,S$GLB,, | Performed by: FAMILY MEDICINE

## 2019-05-03 PROCEDURE — 2024F 7 FLD RTA PHOTO EVC RTNOPTHY: CPT | Mod: S$GLB,,, | Performed by: FAMILY MEDICINE

## 2019-05-03 PROCEDURE — 2024F PR 7 FIELD PHOTOS WITH INTERP/ REVIEW: ICD-10-PCS | Mod: S$GLB,,, | Performed by: FAMILY MEDICINE

## 2019-05-03 PROCEDURE — 99214 OFFICE O/P EST MOD 30 MIN: CPT | Mod: S$GLB,,, | Performed by: FAMILY MEDICINE

## 2019-05-03 RX ORDER — MONTELUKAST SODIUM 10 MG/1
10 TABLET ORAL NIGHTLY
Qty: 30 TABLET | Refills: 2 | Status: CANCELLED | OUTPATIENT
Start: 2019-05-03

## 2019-05-03 RX ORDER — ATORVASTATIN CALCIUM 10 MG/1
10 TABLET, FILM COATED ORAL DAILY
Qty: 90 TABLET | Refills: 1 | Status: CANCELLED | OUTPATIENT
Start: 2019-05-03

## 2019-05-03 RX ORDER — ALBUTEROL SULFATE 0.83 MG/ML
2.5 SOLUTION RESPIRATORY (INHALATION)
Status: CANCELLED | OUTPATIENT
Start: 2019-05-03

## 2019-05-03 RX ORDER — AMIODARONE HYDROCHLORIDE 200 MG/1
200 TABLET ORAL DAILY
Qty: 90 TABLET | Refills: 3 | Status: CANCELLED | OUTPATIENT
Start: 2019-05-03

## 2019-05-06 ENCOUNTER — TELEPHONE (OUTPATIENT)
Dept: PAIN MEDICINE | Facility: CLINIC | Age: 74
End: 2019-05-06

## 2019-05-06 ENCOUNTER — OFFICE VISIT (OUTPATIENT)
Dept: CARDIOLOGY | Facility: CLINIC | Age: 74
End: 2019-05-06
Payer: MEDICARE

## 2019-05-06 VITALS
SYSTOLIC BLOOD PRESSURE: 128 MMHG | WEIGHT: 123.44 LBS | HEIGHT: 64 IN | BODY MASS INDEX: 21.07 KG/M2 | HEART RATE: 105 BPM | DIASTOLIC BLOOD PRESSURE: 73 MMHG

## 2019-05-06 DIAGNOSIS — I48.0 PAROXYSMAL ATRIAL FIBRILLATION: Chronic | ICD-10-CM

## 2019-05-06 DIAGNOSIS — I10 BENIGN ESSENTIAL HYPERTENSION: Chronic | ICD-10-CM

## 2019-05-06 DIAGNOSIS — E78.2 COMBINED HYPERLIPIDEMIA ASSOCIATED WITH TYPE 2 DIABETES MELLITUS: Chronic | ICD-10-CM

## 2019-05-06 DIAGNOSIS — Z99.2 ESRD ON DIALYSIS: Chronic | ICD-10-CM

## 2019-05-06 DIAGNOSIS — E11.69 COMBINED HYPERLIPIDEMIA ASSOCIATED WITH TYPE 2 DIABETES MELLITUS: Chronic | ICD-10-CM

## 2019-05-06 DIAGNOSIS — Z86.73 HISTORY OF CVA (CEREBROVASCULAR ACCIDENT): Primary | Chronic | ICD-10-CM

## 2019-05-06 DIAGNOSIS — I34.0 NON-RHEUMATIC MITRAL REGURGITATION: ICD-10-CM

## 2019-05-06 DIAGNOSIS — N18.6 ESRD ON DIALYSIS: Chronic | ICD-10-CM

## 2019-05-06 DIAGNOSIS — I10 HYPERTENSION: ICD-10-CM

## 2019-05-06 DIAGNOSIS — J43.9 PULMONARY EMPHYSEMA, UNSPECIFIED EMPHYSEMA TYPE: Chronic | ICD-10-CM

## 2019-05-06 PROCEDURE — 99214 PR OFFICE/OUTPT VISIT, EST, LEVL IV, 30-39 MIN: ICD-10-PCS | Mod: S$GLB,,, | Performed by: INTERNAL MEDICINE

## 2019-05-06 PROCEDURE — 93000 EKG 12-LEAD: ICD-10-PCS | Mod: S$GLB,,, | Performed by: INTERNAL MEDICINE

## 2019-05-06 PROCEDURE — 2024F 7 FLD RTA PHOTO EVC RTNOPTHY: CPT | Mod: S$GLB,,, | Performed by: INTERNAL MEDICINE

## 2019-05-06 PROCEDURE — 1101F PR PT FALLS ASSESS DOC 0-1 FALLS W/OUT INJ PAST YR: ICD-10-PCS | Mod: CPTII,S$GLB,, | Performed by: INTERNAL MEDICINE

## 2019-05-06 PROCEDURE — 99999 PR PBB SHADOW E&M-EST. PATIENT-LVL III: ICD-10-PCS | Mod: PBBFAC,,, | Performed by: INTERNAL MEDICINE

## 2019-05-06 PROCEDURE — 3044F PR MOST RECENT HEMOGLOBIN A1C LEVEL <7.0%: ICD-10-PCS | Mod: CPTII,S$GLB,, | Performed by: INTERNAL MEDICINE

## 2019-05-06 PROCEDURE — 2024F PR 7 FIELD PHOTOS WITH INTERP/ REVIEW: ICD-10-PCS | Mod: S$GLB,,, | Performed by: INTERNAL MEDICINE

## 2019-05-06 PROCEDURE — 3044F HG A1C LEVEL LT 7.0%: CPT | Mod: CPTII,S$GLB,, | Performed by: INTERNAL MEDICINE

## 2019-05-06 PROCEDURE — 99214 OFFICE O/P EST MOD 30 MIN: CPT | Mod: S$GLB,,, | Performed by: INTERNAL MEDICINE

## 2019-05-06 PROCEDURE — 1101F PT FALLS ASSESS-DOCD LE1/YR: CPT | Mod: CPTII,S$GLB,, | Performed by: INTERNAL MEDICINE

## 2019-05-06 PROCEDURE — 93000 ELECTROCARDIOGRAM COMPLETE: CPT | Mod: S$GLB,,, | Performed by: INTERNAL MEDICINE

## 2019-05-06 PROCEDURE — 99999 PR PBB SHADOW E&M-EST. PATIENT-LVL III: CPT | Mod: PBBFAC,,, | Performed by: INTERNAL MEDICINE

## 2019-05-06 RX ORDER — ATORVASTATIN CALCIUM 20 MG/1
20 TABLET, FILM COATED ORAL DAILY
COMMUNITY
End: 2019-08-12

## 2019-05-06 RX ORDER — METOPROLOL SUCCINATE 25 MG/1
25 TABLET, EXTENDED RELEASE ORAL DAILY
Qty: 90 TABLET | Refills: 3 | Status: SHIPPED | OUTPATIENT
Start: 2019-05-06 | End: 2019-08-12

## 2019-05-06 RX ORDER — CALCIUM ACETATE 667 MG/1
667 CAPSULE ORAL
COMMUNITY

## 2019-05-06 RX ORDER — AMIODARONE HYDROCHLORIDE 200 MG/1
200 TABLET ORAL 2 TIMES DAILY
Qty: 180 TABLET | Refills: 3 | Status: SHIPPED | OUTPATIENT
Start: 2019-05-06 | End: 2020-03-27 | Stop reason: SDUPTHER

## 2019-05-06 RX ORDER — AMIODARONE HYDROCHLORIDE 200 MG/1
200 TABLET ORAL DAILY
COMMUNITY
End: 2019-05-06 | Stop reason: SDUPTHER

## 2019-05-06 NOTE — PROGRESS NOTES
Routine Office Visit    Patient Name: Eli Vaz    : 1945  MRN: [id number]    Subjective:  Eli is a 73 y.o. female who presents today for:   Chief Complaint   Patient presents with    Hypertension    Follow-up     73-year-old female with multiple comorbidities as per problem list comes in for follow-up from the hospital after receiving a blood transfusion for severe anemia following one of her dialysis treatments.  The patient is here with her daughter, who is her caretaker.  Since being home the patient states that she is feeling quite well.  She reports that she is eating well, and is sleeping well.  Her only concern is when she would be able to get rid of the portable oxygen.  She has been on this oxygen for a few months now.     Past Medical History  Past Medical History:   Diagnosis Date    Arthritis     Atrial fibrillation     COPD (chronic obstructive pulmonary disease)     Dialysis patient     Encounter for blood transfusion     ESRD (end stage renal disease)        Past Surgical History  Past Surgical History:   Procedure Laterality Date    AV FISTULA PLACEMENT      TRANSESOPHAGEAL ECHOCARDIOGRAM WITH POSSIBLE CARDIOVERSION (ISAMAR W/ POSS CARDIOVERSION) N/A 3/21/2019    Performed by Elgin Garcia [facility] at [facility] CATH LAB    TUBAL LIGATION          Family History  Family History   Problem Relation Age of Onset    Heart attack Sister     Heart attack Sister     Heart attack Mother        Social History  Social History     Socioeconomic History    Marital status:      Spouse name: Not on file    Number of children: Not on file    Years of education: Not on file    Highest education level: Not on file   Occupational History    Not on file   Social Needs    Financial resource strain: Not on file    Food insecurity:     Worry: Not on file     Inability: Not on file    Transportation needs:     Medical: Not on file     Non-medical: Not on file   Tobacco Use    Smoking status:  Former Smoker     Start date: 1/12/1991    Smokeless tobacco: Never Used    Tobacco comment: quit in 1991   Substance and Sexual Activity    Alcohol use: No    Drug use: No    Sexual activity: Not on file   Lifestyle    Physical activity:     Days per week: Not on file     Minutes per session: Not on file    Stress: Not on file   Relationships    Social connections:     Talks on phone: Not on file     Gets together: Not on file     Attends Sikh service: Not on file     Active member of club or organization: Not on file     Attends meetings of clubs or organizations: Not on file     Relationship status: Not on file   Other Topics Concern    Not on file   Social History Narrative    Not on file       Current Medications  Current Outpatient Medications on File Prior to Visit   Medication Sig Dispense Refill    albuterol (PROVENTIL) 2.5 mg /3 mL (0.083 %) nebulizer solution Inhale 2.5 mg into the lungs.       No current facility-administered medications on file prior to visit.        Allergies   Review of patient's allergies indicates:  No Known Allergies    Review of Systems   Constitutional: Negative for chills, diaphoresis and fever.   HENT: Negative for congestion and rhinorrhea.    Respiratory: Negative for shortness of breath and wheezing.    Cardiovascular: Negative for chest pain and palpitations.   Gastrointestinal: Negative for abdominal pain, blood in stool, constipation, diarrhea and nausea.   Skin: Negative for rash.   Neurological: Negative for dizziness, tremors, syncope and headaches.   Hematological: Negative for adenopathy.         /60 (BP Location: Right arm, Patient Position: Sitting, BP Method: Medium (Manual))   Pulse (!) 56   Temp 98.2 °F (36.8 °C) (Oral)   Resp 16   Wt 56.2 kg (123 lb 14.4 oz)   SpO2 96%   BMI 21.27 kg/m²     Physical Exam   Constitutional: She is cooperative. No distress. Nasal cannula in place.   HENT:   Head: Normocephalic and atraumatic.   Right  Ear: External ear normal.   Left Ear: External ear normal.   Mouth/Throat: Oropharynx is clear and moist.   Eyes: Pupils are equal, round, and reactive to light. EOM are normal.   Neck: Normal range of motion. Neck supple. No tracheal deviation present.   Cardiovascular: S1 normal, S2 normal and intact distal pulses. Bradycardia present.   Pulmonary/Chest: Effort normal and breath sounds normal. No respiratory distress. She has no wheezes. She has no rales.   Abdominal: Soft. Bowel sounds are normal. She exhibits no mass. There is no tenderness. There is no guarding.   Lymphadenopathy:     She has no cervical adenopathy.   Neurological: She is alert.   Psychiatric: Her speech is normal and behavior is normal. Her mood appears not anxious. Cognition and memory are normal. She does not exhibit a depressed mood.         Assessment/Plan:  Eli was seen today for hypertension and follow-up.    Diagnoses and all orders for this visit:    Anemia, unspecified type  Labs from the hospital were reviewed and the hemoglobin was much better on discharge. Patient shows no signs of a worsening anemia and reports no bleeding..    Chronic respiratory failure with hypoxia  -     Ambulatory referral to Pulmonology  Patient referred to Pulmonary for evaluation.    Atrial fibrillation, unspecified type  Rate controlled.    Diabetes mellitus with ESRD (end-stage renal disease)  Patient reports good sugar control.    Essential hypertension  Blood pressure controlled.    Chronic obstructive pulmonary disease, unspecified COPD type  -     Ambulatory referral to Pulmonology    Combined hyperlipidemia associated with type 2 diabetes mellitus  As above    ESRD on dialysis  Management as per Nephrology    Postmenopausal  -     DXA Bone Density Spine And Hip; Future  Discussed with patient multiple risk factors for osteoporosis, and need for bone density testing.  Patient agrees to have this done.      For some reason, the patient's medications  were deleted in the system.  The patient's daughter agrees to write down all the patient's medications and will bring them in at the next visit.          This office note has been dictated.  This dictation has been generated using M-Modal Fluency Direct dictation; some phonetic errors may occur.

## 2019-05-06 NOTE — PROGRESS NOTES
Subjective:    Patient ID:  Eli Vaz is a 73 y.o. female who presents for follow-up of Follow-up      HPI     PAF on amiodarone and eliquis - s/p ISAMAR/CV 3/21/19 and CV 4/2/19,  ESRD, MR - mod-severe, diastolic CHF, HTN, DM, COPD, CVA     Admitted 2/3/19  Eli Vaz is a 73 y.o. AAF with PMHx  including end-stage renal disease (TTS), diabetes mellitus, COPD.  Per patient she was awakened in the early morning with acute onset shortness of breath plus mild midsternal diffuse to left and right-sided chest pain.  This felt like pressure.  She reports she took 2 aspirin at home and presented to the [facility] emergency room, where she was administered 2 nitroglycerin sublingual tab that did not relieve her chest pain. She reports she received morphine after that that improved but did not return completely relieve her chest pain. Patient denies headache, blurry vision, history of long trips, recent trauma, changes to bowel functions.  Important to mention is that patient recently was seen by Dr. raymond him but lower on 02/01/2019 and diagnosed with upper respiratory infection and prescribed antibiotic started on 02/01/2019.     Hamill the ED her D-dimer was found to be elevated and therefore emergency room physicians requested observation placement to rule out pulmonary embolus.  Patient has end-stage renal and therefore not a candidate for CTA with contrast.  Will obtain a stat V/Q perfusion scan, and trend troponins which have been negative thus for.  Her vitals are grossly stable blood pressure 121/55 and sat 95%.  She does exhibit low-grade temp of 99° and nonproductive cough     Patient ruled out ACS with serial negative troponins.  Discussed case with Cardiology initially due to her atypical presentation no need to consult.  She had chest pressure diffuse across her chest that was nonradiating to the face or shoulders unrelieved by nitroglycerin or morphine.  Patient presented with a nonproductive  intermittent cough.  Noted in patient's records she had doctor appointment with another MD on 02/01/2019 was ordered doxycycline for upper respiratory infection.  Noted to have a low-grade fever 99 that was nonsustained.  She was set for discharged after V/Q stand scan showed low probability; she denies leg or calf pain numbness or weakness in any extremity, recent long trips or trauma.  Her symptoms thought to likely be related to upper respiratory infection although viral due to comorbidities will continue her doxycycline and treatment as ordered by Dr. Nelson in the clinic on the 1st, she can follow up with the primary care doctor in the clinic when she completes her antibiotics.  Will add short course of steroids.  Vital stable discharged to home in stable condition.     At the time of discharge the patient complained of chest pain again. She was kept overnight and NST performed today. The NST that showed a mild infarct defect in the lateral apical all, however, was negative for myocardial ischemia. LVEF 72%. Close follow up with cardiology in clinic. Appointment arranged by CM. Stable for discharge.     Echo 3/20/19  · Normal left ventricular systolic function. The estimated ejection fraction is 55%  · Concentric left ventricular remodeling.  · Indeterminate left ventricular diastolic function.  · Normal right ventricular systolic function.  · Moderate left atrial enlargement.  · Mild right atrial enlargement.  · Moderate-to-severe mitral regurgitation.  · Mild to moderate tricuspid regurgitation.  · The estimated PA systolic pressure is 65 mm Hg  · Pulmonary hypertension present.  · Small pericardial effusion. No tamponade     Stress test 2/4/19  · There is a mild, infarct defect(s) in the lateral apical wall(s),  · An ejection fraction of 72 % at rest  · The EKG portion of this study is negative for myocardial ischemia.      2/13/19 CP has resolved  Mild stable SINCLAIR  May need surgical evaluation for MVR if MR  worsens  May have upcoming shoulder surgery - cleared at moderate cardiac risk  OV 3 months     Admitted 4/1/19  Eli Vaz is a 73 y.o. female that (in part)  has a past medical history of Arthritis, Atrial fibrillation, COPD (chronic obstructive pulmonary disease), Diabetes mellitus type II, Dialysis patient, Encounter for blood transfusion, and ESRD (end stage renal disease).  has a past surgical history that includes Tubal ligation and AV fistula placement. Presents to [facility] complaining of recurrent shortness of breath.  She was admitted for similar complaints on March 19, 2019, approximately 2 weeks ago.  And just prior to that she was admitted week for atrial fibrillation as well as respiratory infection.  Reports compliance with home medications.  She is awaiting arrival of home oxygen.  She reports compliance with dialysis.       Ms. Vaz was admitted with AFib with rapid ventricular response along with volume over load state due to her end-stage renal disease and acute on chronic CHF exacerbation.  Cardiology and Nephrology were consulted. She was started on amiodarone infusion and underwent urgent dialysis with correction of volume status.  Cardiology performed a DC cardioversion on April 2, 2019 that was successful with returning the patient to normal sinus rhythm.  She was continued on amiodarone infusion until after completion of the infusion protocol and then amiodarone was converted to p.o. Patient was observed overnight in the ICU.  Patient remained in normal sinus rhythm and respiratory status normalized.  Anticoagulation continued with Eliquis. Patient was seen by PT and OT, and she was resumed on home health upon discharge.  Patient was arranged for follow-up with her primary care physician and Cardiology, and she will need outpatient referral to EP for consideration of ablation.  Patient to resume her normal scheduled dialysis on TTS at [facility].       4/10/19 Feels better since discharge  Less SOB - on home O2  EKG NSR NSSTT changes  Staying in NSR so far. Decrease amiodarone 200 qd  Will refer to EP if PAF returns  Discussed the possible need for MVR/Maze in the future  OV 1 month with EKG    EKG A-flutter 2:1 at 115  Said her HR had been in the 60s until yesterday  SOB at rest - on home O2        Review of Systems   Constitution: Negative for decreased appetite.   HENT: Negative for ear discharge.    Eyes: Negative for blurred vision.   Respiratory: Negative for hemoptysis.    Endocrine: Negative for polyphagia.   Hematologic/Lymphatic: Negative for adenopathy.   Skin: Negative for color change.   Musculoskeletal: Negative for joint swelling.   Genitourinary: Negative for bladder incontinence.   Neurological: Negative for brief paralysis.   Psychiatric/Behavioral: Negative for hallucinations.   Allergic/Immunologic: Negative for hives.        Objective:    Physical Exam   Constitutional: She is oriented to person, place, and time. She appears well-developed and well-nourished.   HENT:   Head: Normocephalic and atraumatic.   Eyes: Pupils are equal, round, and reactive to light. Conjunctivae are normal.   Neck: Normal range of motion. Neck supple.   Cardiovascular: Normal rate and intact distal pulses.   Murmur heard.  High-pitched blowing holosystolic murmur is present with a grade of 2/6 at the apex.  Pulmonary/Chest: Effort normal and breath sounds normal.   Abdominal: Soft. Bowel sounds are normal.   Musculoskeletal: Normal range of motion.   Neurological: She is alert and oriented to person, place, and time.   Skin: Skin is warm and dry.         Assessment:       1. History of CVA (cerebrovascular accident)    2. Pulmonary emphysema, unspecified emphysema type    3. Combined hyperlipidemia associated with type 2 diabetes mellitus    4. Paroxysmal atrial fibrillation    5. Benign essential hypertension    6. Non-rheumatic mitral regurgitation     7. ESRD on dialysis         Plan:       Refractory A-flutter - will increase amiodarone 200 bid and add toprol XL 25 qd  Refer to EP to see if she is an ablation candidate  Not excited about possible MVR  OV here 2 weeks with repeat EKG

## 2019-05-08 PROBLEM — H25.13 NUCLEAR SCLEROSIS, BILATERAL: Status: ACTIVE | Noted: 2019-05-08

## 2019-05-10 ENCOUNTER — OFFICE VISIT (OUTPATIENT)
Dept: ORTHOPEDICS | Facility: CLINIC | Age: 74
End: 2019-05-10
Payer: MEDICARE

## 2019-05-10 VITALS
BODY MASS INDEX: 21.07 KG/M2 | HEIGHT: 64 IN | WEIGHT: 123.44 LBS | DIASTOLIC BLOOD PRESSURE: 68 MMHG | SYSTOLIC BLOOD PRESSURE: 130 MMHG | HEART RATE: 58 BPM

## 2019-05-10 DIAGNOSIS — M25.561 ACUTE PAIN OF RIGHT KNEE: Primary | ICD-10-CM

## 2019-05-10 DIAGNOSIS — M17.11 PRIMARY OSTEOARTHRITIS OF RIGHT KNEE: ICD-10-CM

## 2019-05-10 PROCEDURE — 99213 PR OFFICE/OUTPT VISIT, EST, LEVL III, 20-29 MIN: ICD-10-PCS | Mod: 25,S$GLB,, | Performed by: ORTHOPAEDIC SURGERY

## 2019-05-10 PROCEDURE — 1101F PR PT FALLS ASSESS DOC 0-1 FALLS W/OUT INJ PAST YR: ICD-10-PCS | Mod: CPTII,S$GLB,, | Performed by: ORTHOPAEDIC SURGERY

## 2019-05-10 PROCEDURE — 20610 DRAIN/INJ JOINT/BURSA W/O US: CPT | Mod: RT,S$GLB,, | Performed by: ORTHOPAEDIC SURGERY

## 2019-05-10 PROCEDURE — 99999 PR PBB SHADOW E&M-EST. PATIENT-LVL III: CPT | Mod: PBBFAC,,, | Performed by: ORTHOPAEDIC SURGERY

## 2019-05-10 PROCEDURE — 99999 PR PBB SHADOW E&M-EST. PATIENT-LVL III: ICD-10-PCS | Mod: PBBFAC,,, | Performed by: ORTHOPAEDIC SURGERY

## 2019-05-10 PROCEDURE — 1101F PT FALLS ASSESS-DOCD LE1/YR: CPT | Mod: CPTII,S$GLB,, | Performed by: ORTHOPAEDIC SURGERY

## 2019-05-10 PROCEDURE — 20610 LARGE JOINT ASPIRATION/INJECTION: R KNEE: ICD-10-PCS | Mod: RT,S$GLB,, | Performed by: ORTHOPAEDIC SURGERY

## 2019-05-10 PROCEDURE — 99213 OFFICE O/P EST LOW 20 MIN: CPT | Mod: 25,S$GLB,, | Performed by: ORTHOPAEDIC SURGERY

## 2019-05-10 RX ORDER — TRIAMCINOLONE ACETONIDE 40 MG/ML
40 INJECTION, SUSPENSION INTRA-ARTICULAR; INTRAMUSCULAR
Status: DISCONTINUED | OUTPATIENT
Start: 2019-05-10 | End: 2019-05-10 | Stop reason: HOSPADM

## 2019-05-10 RX ADMIN — TRIAMCINOLONE ACETONIDE 40 MG: 40 INJECTION, SUSPENSION INTRA-ARTICULAR; INTRAMUSCULAR at 11:05

## 2019-05-10 NOTE — PROGRESS NOTES
Follow up visit    History of Present Illness:   Eli comes to the office for follow up evaluation of left shoulder pain and right knee pain.  Right knee has bothered him in the past and she has gotten injections by an outside orthopedic group over 1 year ago that did relieve her pain.  The knee does not swell but does give out on her and cause pain.  Her left shoulder is still bothersome to her and causing a significant amount of pain as well but she is able to use a walker to walk with the knee pain  She was recently admitted several times for A flutter She is being followed by cardiology for this  She also has an oxygen machine with her today that is new since her last visit with me    ROS:  As above    Physical Examination:    NAD  Localizes pain deep in knee  No swelling, effusion, or erythema   Active ROM: 0 - 130  No varus/valgus deformity   Tender to palpation over medial joint line  and lateral joint line   poor quadricep muscle tone and bulk; no atrophy compared to contralateral extremity   normal (0 - 2 mm) Anterior drawer   normal (0 - 2 mm) posterior drawer   negative valgus instability   negative varus instability   Normal patellar tracking   ltsi s/s/sp/dp/t   + ehl/fhl/ta/gs  2 + DP      Radiographic imaging:  3 views right knee:  Advanced tricompartmental OA with subchondral sclerosis, joint space narrowing, osteophyte formation and loose bodies.     Assessment/Plan:  1. Left shoulder arthritis  2. Right knee arthritis    We discussed the etiology of persistent pain and further treatment options.  1. Injection to the right knee, please see procedure note for more details  2. She has an appoint with Dr. Barton in a week or so.  I encouraged her to ask about the cool RFA to see if this would be an option for her  3. Return for follow up visit:  As needed    All questions were answered in detail. The patient  verbalized the understanding of the treatment plan and is in full agreement with the  treatment plan.

## 2019-05-10 NOTE — PROCEDURES
Large Joint Aspiration/Injection: R knee  Date/Time: 5/10/2019 11:55 AM  Performed by: Erica Zurita MD  Authorized by: Erica Zurita MD     Consent Done?:  Yes (Verbal)  Indications:  Pain  Timeout: Prior to procedure the correct patient, procedure, and site was verified      Location:  Knee  Site:  R knee  Prep: Patient was prepped and draped in usual sterile fashion    Needle size:  22 G  Medications:  40 mg triamcinolone acetonide 40 mg/mL  Patient tolerance:  Patient tolerated the procedure well with no immediate complications

## 2019-05-13 ENCOUNTER — OFFICE VISIT (OUTPATIENT)
Dept: PAIN MEDICINE | Facility: CLINIC | Age: 74
End: 2019-05-13
Payer: MEDICARE

## 2019-05-13 VITALS
HEIGHT: 64 IN | BODY MASS INDEX: 21 KG/M2 | DIASTOLIC BLOOD PRESSURE: 70 MMHG | WEIGHT: 123 LBS | RESPIRATION RATE: 18 BRPM | HEART RATE: 120 BPM | SYSTOLIC BLOOD PRESSURE: 148 MMHG | OXYGEN SATURATION: 100 %

## 2019-05-13 DIAGNOSIS — M19.012 ARTHRITIS OF LEFT SHOULDER REGION: ICD-10-CM

## 2019-05-13 DIAGNOSIS — M25.512 CHRONIC LEFT SHOULDER PAIN: Primary | ICD-10-CM

## 2019-05-13 DIAGNOSIS — G89.29 CHRONIC LEFT SHOULDER PAIN: Primary | ICD-10-CM

## 2019-05-13 PROCEDURE — 99214 PR OFFICE/OUTPT VISIT, EST, LEVL IV, 30-39 MIN: ICD-10-PCS | Mod: S$GLB,,, | Performed by: PAIN MEDICINE

## 2019-05-13 PROCEDURE — 99999 PR PBB SHADOW E&M-EST. PATIENT-LVL III: CPT | Mod: PBBFAC,,, | Performed by: PAIN MEDICINE

## 2019-05-13 PROCEDURE — 1101F PT FALLS ASSESS-DOCD LE1/YR: CPT | Mod: CPTII,S$GLB,, | Performed by: PAIN MEDICINE

## 2019-05-13 PROCEDURE — 99214 OFFICE O/P EST MOD 30 MIN: CPT | Mod: S$GLB,,, | Performed by: PAIN MEDICINE

## 2019-05-13 PROCEDURE — 99999 PR PBB SHADOW E&M-EST. PATIENT-LVL III: ICD-10-PCS | Mod: PBBFAC,,, | Performed by: PAIN MEDICINE

## 2019-05-13 PROCEDURE — 1101F PR PT FALLS ASSESS DOC 0-1 FALLS W/OUT INJ PAST YR: ICD-10-PCS | Mod: CPTII,S$GLB,, | Performed by: PAIN MEDICINE

## 2019-05-13 RX ORDER — HYDROCODONE BITARTRATE AND ACETAMINOPHEN 7.5; 325 MG/1; MG/1
1 TABLET ORAL EVERY 8 HOURS PRN
Qty: 90 TABLET | Refills: 0 | Status: SHIPPED | OUTPATIENT
Start: 2019-05-13 | End: 2019-06-12

## 2019-05-13 RX ORDER — HYDROCODONE BITARTRATE AND ACETAMINOPHEN 7.5; 325 MG/1; MG/1
1 TABLET ORAL EVERY 8 HOURS PRN
Qty: 90 TABLET | Refills: 0 | Status: SHIPPED | OUTPATIENT
Start: 2019-06-12 | End: 2019-07-12

## 2019-05-13 NOTE — PROGRESS NOTES
Subjective:     Patient ID: Eli Vaz is a 73 y.o. female    Chief Complaint: Follow-up (3 month f/u)      Referred by: No ref. provider found      HPI:    Interval History (5/13/19):  She returns today for follow up.  She reports that her left shoulder pain is unchanged in quality location since last encounter.  She has been evaluated by Orthopedic surgery and no surgical options are being pursued at this time. She states that Norco 7.5-325 mg q.8 hours has been helpful for her pain. She denies any adverse effects with this medication.      Interval History (2/11/19):  She returns today for follow up.  She reports that Norco 7.5-325 half pill q.4 hours has been helpful for the left shoulder pain. Patient states that this provides about 50% relief of her pain.  She no longer has pain while at rest and does note slightly improved range of motion of her left shoulder with minimal to no pain. She still has significantly limited range of motion and function of her left upper extremity as result.  She has discussed potential arthroplasty with Dr. Zurita.  She plans to discuss this option with the rest of her treatment team to see if it is something she wants to proceed with.  She denies any adverse effects from taking her pain medication.  Specifically she denies any sedation or constipation.  She does not feels the higher doses are needed at this time as she is worried about adverse effects and feels as though her current dosage provides adequate relief.      Initial Encounter (1/21/19):  Eli Vaz is a 73 y.o. female who presents today with chronic left shoulder pain. Patient was referred by Dr. Zurita.  She has known left shoulder arthritis and rotator cuff pathology.  Limited surgical and interventional options given medical comorbidities patient has end-stage renal disease on dialysis.  She has failed shoulder injections including Synvisc.  She states that her left shoulder pain is very severe and limits her  activities.  She wants somebody to cut it off.    This pain is described in detail below.    Physical Therapy:  Unlikely to be of benefit    Non-pharmacologic Treatment:  Nothing helps         · TENS?  No    Pain Medications:         · Currently taking:  [place] 7.5-325 q8 hours p.r.n.    · Has tried in the past:  Tramadol, Percocet, cannot take NSAIDs due to renal disease    · Has not tried: NSAIDs, Muscle relaxants, TCAs, SNRIs, anticonvulsants, topical creams    Blood thinners:  Eliquis    Interventional Therapies:  Previous shoulder injections including Synvisc.    Relevant Surgeries:  None    Affecting sleep?  Yes    Affecting daily activities? yes    Depressive symptoms? yes          · SI/HI? No    Work status: Unemployed    Pain Scores:    Best:       5/10  Worst:     10/10  Usually:   8/10  Today:    5/10    Review of Systems   Constitutional: Negative for activity change, appetite change, chills, fatigue, fever and unexpected weight change.   HENT: Negative for hearing loss.    Eyes: Negative for visual disturbance.   Respiratory: Negative for chest tightness and shortness of breath.    Cardiovascular: Negative for chest pain.   Gastrointestinal: Negative for abdominal pain, constipation, diarrhea, nausea and vomiting.   Genitourinary: Negative for difficulty urinating.   Musculoskeletal: Positive for arthralgias and myalgias. Negative for back pain, gait problem and neck pain.   Skin: Negative for rash.   Neurological: Positive for weakness. Negative for dizziness, light-headedness, numbness and headaches.   Psychiatric/Behavioral: Positive for sleep disturbance. Negative for hallucinations and suicidal ideas. The patient is not nervous/anxious.        Past Medical History:   Diagnosis Date    Arthritis     Atrial fibrillation     COPD (chronic obstructive pulmonary disease)     Dialysis patient     Encounter for blood transfusion     ESRD (end stage renal disease)        Past Surgical History:    Procedure Laterality Date    AV FISTULA PLACEMENT      TRANSESOPHAGEAL ECHOCARDIOGRAM WITH POSSIBLE CARDIOVERSION (ISAMAR W/ POSS CARDIOVERSION) N/A 3/21/2019    Performed by Elgin [facility] at [facility] C[facility] LAB    TUBAL LIGATION         Social History     Socioeconomic History    Marital status:      Spouse name: Not on file    Number of children: Not on file    Years of education: Not on file    Highest education level: Not on file   Occupational History    Not on file   Social Needs    Financial resource strain: Not on file    Food insecurity:     Worry: Not on file     Inability: Not on file    Transportation needs:     Medical: Not on file     Non-medical: Not on file   Tobacco Use    Smoking status: Former Smoker     Start date: 1/12/1991    Smokeless tobacco: Never Used    Tobacco comment: quit in 1991   Substance and Sexual Activity    Alcohol use: No    Drug use: No    Sexual activity: Not on file   Lifestyle    Physical activity:     Days per week: Not on file     Minutes per session: Not on file    Stress: Not on file   Relationships    Social connections:     Talks on phone: Not on file     Gets together: Not on file     Attends Congregation service: Not on file     Active member of club or organization: Not on file     Attends meetings of clubs or organizations: Not on file     Relationship status: Not on file   Other Topics Concern    Not on file   Social History Narrative    Not on file       Review of patient's allergies indicates:  No Known Allergies    Current Outpatient Medications on File Prior to Visit   Medication Sig Dispense Refill    albuterol (PROVENTIL) 2.5 mg /3 mL (0.083 %) nebulizer solution Inhale 2.5 mg into the lungs.      amiodarone (PACERONE) 200 MG Tab Take 1 tablet (200 mg total) by mouth 2 (two) times daily. 180 tablet 3    apixaban (ELIQUIS) 2.5 mg Tab Take 2.5 mg by mouth 2 (two) times daily.      atorvastatin (LIPITOR) 20 MG tablet Take 20 mg by  "mouth once daily.      B complex w-C no.20/folic acid (RENAL CAPS ORAL) Take by mouth.      calcium acetate (PHOSLO) 667 mg capsule Take 667 mg by mouth 3 (three) times daily with meals.      metoprolol succinate (TOPROL-XL) 25 MG 24 hr tablet Take 1 tablet (25 mg total) by mouth once daily. 90 tablet 3    montelukast sodium (SINGULAIR ORAL) Take by mouth.       No current facility-administered medications on file prior to visit.        Objective:      BP (!) 148/70   Pulse (!) 120   Resp 18   Ht 5' 4" (1.626 m)   Wt 55.8 kg (123 lb)   SpO2 100%   BMI 21.11 kg/m²     Exam:  GEN:  Well developed, well nourished.  No acute distress.   HEENT:  No trauma.  Mucous membranes moist.  Nares patent bilaterally.  PSYCH: Normal affect. Thought content appropriate.  CHEST:  Breathing symmetric.  No audible wheezing.  ABD: Soft, non-distended.  SKIN:  Warm, pink, dry.  No rash on exposed areas.    EXT:  No cyanosis, clubbing, or edema.  No color change or changes in nail or hair growth.  NEURO/MUSCULOSKELETAL:  Fully alert, oriented, and appropriate. Speech normal merrill. No cranial nerve deficits.   Gait:  Normal.  No focal motor deficits.     Left shoulder active range of motion limited    Imaging:  Narrative     EXAMINATION:  XR SHOULDER COMPLETE 2 OR MORE VIEWS LEFT    CLINICAL HISTORY:  Pain in left shoulder    TECHNIQUE:  Two or three views of the left shoulder were performed.    COMPARISON:  None    FINDINGS:  The bones are intact.  There is no evidence for acute fracture or bone destruction.  There are advanced degenerative changes of the left glenohumeral joint with marked joint space narrowing with subchondral sclerosis and osteophyte formation.  There is cortical irregularity at the articular surfaces of the glenohumeral joint.  Multiple vascular stents project over the left upper arm and left subclavian region with multiple surgical clips also present.  Atherosclerotic calcification is present within the " thoracic aorta as well as within the carotid arteries.   Impression       Advanced degenerative changes of the left glenohumeral joint.    No evidence for acute fracture, bone destruction, or dislocation.    Surgical changes with multiple vascular stents.    Extensive atherosclerosis.      Electronically signed by: Tone Pereyra MD  Date: 09/18/2018  Time: 07:58         Assessment:       Encounter Diagnoses   Name Primary?    Chronic left shoulder pain Yes    Arthritis of left shoulder region          Plan:       Eli was seen today for follow-up.    Diagnoses and all orders for this visit:    Chronic left shoulder pain  -     HYDROcodone-acetaminophen (NORCO) 7.5-325 mg per tablet; Take 1 tablet by mouth every 8 (eight) hours as needed for Pain.  -     HYDROcodone-acetaminophen (NORCO) 7.5-325 mg per tablet; Take 1 tablet by mouth every 8 (eight) hours as needed for Pain.    Arthritis of left shoulder region  -     HYDROcodone-acetaminophen (NORCO) 7.5-325 mg per tablet; Take 1 tablet by mouth every 8 (eight) hours as needed for Pain.  -     HYDROcodone-acetaminophen (NORCO) 7.5-325 mg per tablet; Take 1 tablet by mouth every 8 (eight) hours as needed for Pain.        Eli Vaz is a 73 y.o. female with chronic left shoulder pain secondary to rotator cuff and glenohumeral pathology.  Not a candidate for left shoulder arthroplasty.     1.  Continue Norco 7.5-325 q8 hours p.r.n..  two1-month prescriptions were provided today.  This is providing adequate relief without adverse effects.  This medication doses very unlikely to lead to any significant adverse effects.  2.  Reason prescription monitoring program was reviewed today and no inconsistencies were noted.  3.  Schedule for left axillary, suprascapular, lateral pectoral articular branch blocks under fluoroscopy.  If diagnostic can proceed with RFA.  Will need to get clearance to hold anticoagulation prior to perform these procedures.  4.  Return to clinic 2  weeks after procedures to discuss results.

## 2019-05-13 NOTE — H&P (VIEW-ONLY)
Subjective:     Patient ID: Eli Vaz is a 73 y.o. female    Chief Complaint: Follow-up (3 month f/u)      Referred by: No ref. provider found      HPI:    Interval History (5/13/19):  She returns today for follow up.  She reports that her left shoulder pain is unchanged in quality location since last encounter.  She has been evaluated by Orthopedic surgery and no surgical options are being pursued at this time. She states that Norco 7.5-325 mg q.8 hours has been helpful for her pain. She denies any adverse effects with this medication.      Interval History (2/11/19):  She returns today for follow up.  She reports that Norco 7.5-325 half pill q.4 hours has been helpful for the left shoulder pain. Patient states that this provides about 50% relief of her pain.  She no longer has pain while at rest and does note slightly improved range of motion of her left shoulder with minimal to no pain. She still has significantly limited range of motion and function of her left upper extremity as result.  She has discussed potential arthroplasty with Dr. Zurita.  She plans to discuss this option with the rest of her treatment team to see if it is something she wants to proceed with.  She denies any adverse effects from taking her pain medication.  Specifically she denies any sedation or constipation.  She does not feels the higher doses are needed at this time as she is worried about adverse effects and feels as though her current dosage provides adequate relief.      Initial Encounter (1/21/19):  Eli Vaz is a 73 y.o. female who presents today with chronic left shoulder pain. Patient was referred by Dr. Zurita.  She has known left shoulder arthritis and rotator cuff pathology.  Limited surgical and interventional options given medical comorbidities patient has end-stage renal disease on dialysis.  She has failed shoulder injections including Synvisc.  She states that her left shoulder pain is very severe and limits her  activities.  She wants somebody to cut it off.    This pain is described in detail below.    Physical Therapy:  Unlikely to be of benefit    Non-pharmacologic Treatment:  Nothing helps         · TENS?  No    Pain Medications:         · Currently taking:  [place] 7.5-325 q8 hours p.r.n.    · Has tried in the past:  Tramadol, Percocet, cannot take NSAIDs due to renal disease    · Has not tried: NSAIDs, Muscle relaxants, TCAs, SNRIs, anticonvulsants, topical creams    Blood thinners:  Eliquis    Interventional Therapies:  Previous shoulder injections including Synvisc.    Relevant Surgeries:  None    Affecting sleep?  Yes    Affecting daily activities? yes    Depressive symptoms? yes          · SI/HI? No    Work status: Unemployed    Pain Scores:    Best:       5/10  Worst:     10/10  Usually:   8/10  Today:    5/10    Review of Systems   Constitutional: Negative for activity change, appetite change, chills, fatigue, fever and unexpected weight change.   HENT: Negative for hearing loss.    Eyes: Negative for visual disturbance.   Respiratory: Negative for chest tightness and shortness of breath.    Cardiovascular: Negative for chest pain.   Gastrointestinal: Negative for abdominal pain, constipation, diarrhea, nausea and vomiting.   Genitourinary: Negative for difficulty urinating.   Musculoskeletal: Positive for arthralgias and myalgias. Negative for back pain, gait problem and neck pain.   Skin: Negative for rash.   Neurological: Positive for weakness. Negative for dizziness, light-headedness, numbness and headaches.   Psychiatric/Behavioral: Positive for sleep disturbance. Negative for hallucinations and suicidal ideas. The patient is not nervous/anxious.        Past Medical History:   Diagnosis Date    Arthritis     Atrial fibrillation     COPD (chronic obstructive pulmonary disease)     Dialysis patient     Encounter for blood transfusion     ESRD (end stage renal disease)        Past Surgical History:    Procedure Laterality Date    AV FISTULA PLACEMENT      TRANSESOPHAGEAL ECHOCARDIOGRAM WITH POSSIBLE CARDIOVERSION (ISAMAR W/ POSS CARDIOVERSION) N/A 3/21/2019    Performed by [facility] at [facility] CATH LAB    TUBAL LIGATION         Social History     Socioeconomic History    Marital status:      Spouse name: Not on file    Number of children: Not on file    Years of education: Not on file    Highest education level: Not on file   Occupational History    Not on file   Social Needs    Financial resource strain: Not on file    Food insecurity:     Worry: Not on file     Inability: Not on file    Transportation needs:     Medical: Not on file     Non-medical: Not on file   Tobacco Use    Smoking status: Former Smoker     Start date: 1/12/1991    Smokeless tobacco: Never Used    Tobacco comment: quit in 1991   Substance and Sexual Activity    Alcohol use: No    Drug use: No    Sexual activity: Not on file   Lifestyle    Physical activity:     Days per week: Not on file     Minutes per session: Not on file    Stress: Not on file   Relationships    Social connections:     Talks on phone: Not on file     Gets together: Not on file     Attends Jainism service: Not on file     Active member of club or organization: Not on file     Attends meetings of clubs or organizations: Not on file     Relationship status: Not on file   Other Topics Concern    Not on file   Social History Narrative    Not on file       Review of patient's allergies indicates:  No Known Allergies    Current Outpatient Medications on File Prior to Visit   Medication Sig Dispense Refill    albuterol (PROVENTIL) 2.5 mg /3 mL (0.083 %) nebulizer solution Inhale 2.5 mg into the lungs.      amiodarone (PACERONE) 200 MG Tab Take 1 tablet (200 mg total) by mouth 2 (two) times daily. 180 tablet 3    apixaban (ELIQUIS) 2.5 mg Tab Take 2.5 mg by mouth 2 (two) times daily.      atorvastatin (LIPITOR) 20 MG tablet Take 20 mg by  "mouth once daily.      B complex w-C no.20/folic acid (RENAL CAPS ORAL) Take by mouth.      calcium acetate (PHOSLO) 667 mg capsule Take 667 mg by mouth 3 (three) times daily with meals.      metoprolol succinate (TOPROL-XL) 25 MG 24 hr tablet Take 1 tablet (25 mg total) by mouth once daily. 90 tablet 3    montelukast sodium (SINGULAIR ORAL) Take by mouth.       No current facility-administered medications on file prior to visit.        Objective:      BP (!) 148/70   Pulse (!) 120   Resp 18   Ht 5' 4" (1.626 m)   Wt 55.8 kg (123 lb)   SpO2 100%   BMI 21.11 kg/m²     Exam:  GEN:  Well developed, well nourished.  No acute distress.   HEENT:  No trauma.  Mucous membranes moist.  Nares patent bilaterally.  PSYCH: Normal affect. Thought content appropriate.  CHEST:  Breathing symmetric.  No audible wheezing.  ABD: Soft, non-distended.  SKIN:  Warm, pink, dry.  No rash on exposed areas.    EXT:  No cyanosis, clubbing, or edema.  No color change or changes in nail or hair growth.  NEURO/MUSCULOSKELETAL:  Fully alert, oriented, and appropriate. Speech normal merrill. No cranial nerve deficits.   Gait:  Normal.  No focal motor deficits.     Left shoulder active range of motion limited    Imaging:  Narrative     EXAMINATION:  XR SHOULDER COMPLETE 2 OR MORE VIEWS LEFT    CLINICAL HISTORY:  Pain in left shoulder    TECHNIQUE:  Two or three views of the left shoulder were performed.    COMPARISON:  None    FINDINGS:  The bones are intact.  There is no evidence for acute fracture or bone destruction.  There are advanced degenerative changes of the left glenohumeral joint with marked joint space narrowing with subchondral sclerosis and osteophyte formation.  There is cortical irregularity at the articular surfaces of the glenohumeral joint.  Multiple vascular stents project over the left upper arm and left subclavian region with multiple surgical clips also present.  Atherosclerotic calcification is present within the " thoracic aorta as well as within the carotid arteries.   Impression       Advanced degenerative changes of the left glenohumeral joint.    No evidence for acute fracture, bone destruction, or dislocation.    Surgical changes with multiple vascular stents.    Extensive atherosclerosis.      Electronically signed by: Tone Pereyra MD  Date: 09/18/2018  Time: 07:58         Assessment:       Encounter Diagnoses   Name Primary?    Chronic left shoulder pain Yes    Arthritis of left shoulder region          Plan:       Eli was seen today for follow-up.    Diagnoses and all orders for this visit:    Chronic left shoulder pain  -     HYDROcodone-acetaminophen (NORCO) 7.5-325 mg per tablet; Take 1 tablet by mouth every 8 (eight) hours as needed for Pain.  -     HYDROcodone-acetaminophen (NORCO) 7.5-325 mg per tablet; Take 1 tablet by mouth every 8 (eight) hours as needed for Pain.    Arthritis of left shoulder region  -     HYDROcodone-acetaminophen (NORCO) 7.5-325 mg per tablet; Take 1 tablet by mouth every 8 (eight) hours as needed for Pain.  -     HYDROcodone-acetaminophen (NORCO) 7.5-325 mg per tablet; Take 1 tablet by mouth every 8 (eight) hours as needed for Pain.        Eli Vaz is a 73 y.o. female with chronic left shoulder pain secondary to rotator cuff and glenohumeral pathology.  Not a candidate for left shoulder arthroplasty.     1.  Continue Norco 7.5-325 q8 hours p.r.n..  two1-month prescriptions were provided today.  This is providing adequate relief without adverse effects.  This medication doses very unlikely to lead to any significant adverse effects.  2.  Reason prescription monitoring program was reviewed today and no inconsistencies were noted.  3.  Schedule for left axillary, suprascapular, lateral pectoral articular branch blocks under fluoroscopy.  If diagnostic can proceed with RFA.  Will need to get clearance to hold anticoagulation prior to perform these procedures.  4.  Return to clinic 2  weeks after procedures to discuss results.

## 2019-05-14 ENCOUNTER — TELEPHONE (OUTPATIENT)
Dept: CARDIOLOGY | Facility: HOSPITAL | Age: 74
End: 2019-05-14

## 2019-05-14 NOTE — TELEPHONE ENCOUNTER
----- Message from Kathia Bañuelos MA sent at 5/14/2019  8:03 AM CDT -----  Regarding: FW: Eliquis Clearance      ----- Message -----  From: Marya Javed RN  Sent: 5/13/2019   3:59 PM  To: [facility] Staff  Subject: Eliquis Clearance                                Dr. Garcia,    Mrs. Vaz is scheduled to have Left Axillay, Suprascapular and Lateral Pectoral Nerve blocks on 05/29/2019.  Dr. Barton is requesting that the patient stop taking Eliquis three days prior to this procedure.  If the blocks are effective, the patient will then be scheduled for a Radiofrequency Ablation of the Nerves.  The medication would need to be held for that procedure as well.  Please indicate whether or not she is able to stop taking the medication listed above.  Feel free to contact the clinic with any questions or concerns you may have.      Thank you in advance for your time and expertise,    ANDREAS Garcia, RN

## 2019-05-14 NOTE — PROGRESS NOTES
Ms. Vaz will be scheduled for left suprascapular, axillary, and lateral pectoral nerve blocks on 05/29/2019.  Reviewed the pre-procedure instructions listed below with her- copy also provided.  Instructed patient to notify clinic if she begins feeling ill, runs a fever, is prescribed antibiotics, and/or has any outpatient procedures within the two weeks leading up to this procedure.  Instructed patient to report to [facility], 2nd Floor Endoscopy Registration Desk.  All questions answered- patient verbalized understanding.  Request to hold Bethanie was sent to Dr. Garcia.    Day of Procedure  - Ensure you have obtained an arrival time from the Pain Management Staff  o Procedure Area will call 1-3 days in advance with your arrival time.  Please check any voicemails received.  o If you arrive past your scheduled procedure time, you may be asked to reschedule your procedure.  - Ensure you have a  with you to remain present throughout your procedure for your safety.  o If you arrive without a responsible adult to stay with you and drive you home, you may be asked to reschedule your procedure.  - Take all of your prescribed medications (exceptions noted below) with a small amount of water  - This is NOT a fasting procedure, you may have a light meal before coming.  - Wear comfortable clothing (loose fitting pants).  - You may wear glasses, dentures, contact lenses, and/or hearing aids. Please remove all jewelry and metal hairpins.  - Notify the nurse during the intake process if you are allergic to any medications, if you are diabetic, or if you are not feeling well  - Contact the Pain Management Clinic with the following:  o A fever greater than 100° (degrees)   o Feel ill, have any type of infection, or are taking antibiotics now or within the two (2) weeks leading up to this procedure  o Have any outpatient procedures within the two (2) weeks leading up to this procedure (colonoscopy, major  dental work, etc.)    IF you are taking blood thinners: Only upon receiving clearance and notification from the Pain Management Department  7 Days Prior to Your Procedure:  - Stop taking Plavix/Clopridogrel, Effient/Prasugel, ASA  5 Days Prior to Your Procedure:  - Stop taking Coumadin/Warfarin.  An INR may be drawn prior to your procedure.  If labs are required, you will need to arrive earlier than your scheduled arrival time.  - Stop taking Pradaxa/Dabigatra,  Brilinta/ Ticagrelor  3 Days Prior to Your Procedure:  - Stop taking Xarelto/Rivaroxaban, Eliquis/Apixaban, Aggrenox/Dipyridamole, Reopro/Abciximab

## 2019-05-20 DIAGNOSIS — I48.92 ATRIAL FLUTTER, UNSPECIFIED TYPE: Primary | ICD-10-CM

## 2019-05-23 ENCOUNTER — TELEPHONE (OUTPATIENT)
Dept: FAMILY MEDICINE | Facility: CLINIC | Age: 74
End: 2019-05-23

## 2019-05-23 ENCOUNTER — TELEPHONE (OUTPATIENT)
Dept: CARDIOLOGY | Facility: CLINIC | Age: 74
End: 2019-05-23

## 2019-05-23 NOTE — TELEPHONE ENCOUNTER
----- Message from Florence Mcqueen sent at 5/23/2019  8:27 AM CDT -----  Contact: Rex(daughter) [phone]  Type: Patient Call Back    Who called:Rex (daughter of patient)    What is the request in detail:Rex, daughter of the patient, is requesting a call back from the staff. She reports that the patient has a dental appointment today for 3. She may have to have teeth pulled. She wants to know if it is ok, the patient has AFIB    Can the clinic reply by [facility]US[facility]?no    Would the patient rather a call back or a response via [facility]? Call back    Best call back number:[phone]

## 2019-05-23 NOTE — TELEPHONE ENCOUNTER
----- Message from Mira Green sent at 5/23/2019 12:21 PM CDT -----  Contact: pt's daughter albina [phone]  Type: Patient Call Back    Who called:pt's daughter albina [phone]    What is the request in detail:daughter would like to know if it is ok for her mother to go to dentist today.she had afib. Call albina    Can the clinic reply by [facility]?    Would the patient rather a call back or a response via [facility]? Call back    Best call back number:pt's daughter albina [phone]    Additional Information:     WDL

## 2019-05-23 NOTE — TELEPHONE ENCOUNTER
Please adivsePatients daughter wants to know if it is okay for her mom to have dental procedure because of her hear condition and I informed her that I will forward message to PCP & Cardiologist.

## 2019-05-23 NOTE — TELEPHONE ENCOUNTER
I called and left a voicemail for the pt to call the office. Per Dr. Moody as long as pt v/s are stable it is ok to see the dentist.

## 2019-05-23 NOTE — TELEPHONE ENCOUNTER
----- Message from Mili Elliott sent at 5/23/2019  1:04 PM CDT -----  Contact: Self   Pt returning call. [phone]

## 2019-05-24 ENCOUNTER — TELEPHONE (OUTPATIENT)
Dept: PAIN MEDICINE | Facility: CLINIC | Age: 74
End: 2019-05-24

## 2019-05-24 NOTE — TELEPHONE ENCOUNTER
Message left including a review of the pre-procedure instructions, and arrival time of 0800.  Also, informed Ms. Vaz that she is to begin holding Eliquis starting Sunday 5/26/19.  Asked that she call the clinic to confirm information- phone number included.

## 2019-05-29 ENCOUNTER — HOSPITAL ENCOUNTER (OUTPATIENT)
Facility: HOSPITAL | Age: 74
Discharge: HOME | End: 2019-05-29
Attending: PAIN MEDICINE | Admitting: PAIN MEDICINE
Payer: MEDICARE

## 2019-05-29 ENCOUNTER — HOSPITAL ENCOUNTER (OUTPATIENT)
Dept: RADIOLOGY | Facility: HOSPITAL | Age: 74
Discharge: HOME | End: 2019-05-29
Attending: PAIN MEDICINE | Admitting: PAIN MEDICINE
Payer: MEDICARE

## 2019-05-29 VITALS
SYSTOLIC BLOOD PRESSURE: 131 MMHG | RESPIRATION RATE: 20 BRPM | BODY MASS INDEX: 22.88 KG/M2 | TEMPERATURE: 98 F | HEIGHT: 64 IN | HEART RATE: 87 BPM | OXYGEN SATURATION: 98 % | DIASTOLIC BLOOD PRESSURE: 60 MMHG | WEIGHT: 134 LBS

## 2019-05-29 DIAGNOSIS — M19.012 ARTHRITIS OF LEFT SHOULDER REGION: ICD-10-CM

## 2019-05-29 DIAGNOSIS — M19.012 ARTHRITIS OF LEFT SHOULDER REGION: Primary | ICD-10-CM

## 2019-05-29 DIAGNOSIS — M25.512 LEFT SHOULDER PAIN: ICD-10-CM

## 2019-05-29 PROCEDURE — 64418 PR NERVE BLOCK INJ, ANES/STEROID, SUPRASCAPULAR: ICD-10-PCS | Mod: LT,,, | Performed by: PAIN MEDICINE

## 2019-05-29 PROCEDURE — 64450 PR NERVE BLOCK INJ, ANES/STEROID, OTHER PERIPHERAL: ICD-10-PCS | Mod: 59,LT,, | Performed by: PAIN MEDICINE

## 2019-05-29 PROCEDURE — 64417 NJX AA&/STRD AX NERVE IMG: CPT | Mod: 59,LT,, | Performed by: PAIN MEDICINE

## 2019-05-29 PROCEDURE — 64418 NJX AA&/STRD SPRSCAP NRV: CPT

## 2019-05-29 PROCEDURE — 64417 PR NERVE BLOCK INJ, ANES/STEROID, AXILLARY, INCL IMAG GUIDANCE: ICD-10-PCS | Mod: 59,LT,, | Performed by: PAIN MEDICINE

## 2019-05-29 PROCEDURE — 64450 NJX AA&/STRD OTHER PN/BRANCH: CPT | Performed by: PAIN MEDICINE

## 2019-05-29 PROCEDURE — 64417 NJX AA&/STRD AX NERVE IMG: CPT

## 2019-05-29 PROCEDURE — 25000003 PHARM REV CODE 250: Performed by: PAIN MEDICINE

## 2019-05-29 PROCEDURE — 64418 NJX AA&/STRD SPRSCAP NRV: CPT | Mod: LT,,, | Performed by: PAIN MEDICINE

## 2019-05-29 PROCEDURE — 64450 NJX AA&/STRD OTHER PN/BRANCH: CPT | Mod: 59,LT,, | Performed by: PAIN MEDICINE

## 2019-05-29 RX ORDER — LIDOCAINE HYDROCHLORIDE 20 MG/ML
10 INJECTION, SOLUTION INFILTRATION; PERINEURAL ONCE
Status: COMPLETED | OUTPATIENT
Start: 2019-05-29 | End: 2019-05-29

## 2019-05-29 RX ORDER — LIDOCAINE HYDROCHLORIDE 10 MG/ML
1 INJECTION, SOLUTION EPIDURAL; INFILTRATION; INTRACAUDAL; PERINEURAL ONCE
Status: COMPLETED | OUTPATIENT
Start: 2019-05-29 | End: 2019-05-29

## 2019-05-29 RX ORDER — BUPIVACAINE HYDROCHLORIDE 2.5 MG/ML
INJECTION, SOLUTION EPIDURAL; INFILTRATION; INTRACAUDAL
Status: DISCONTINUED
Start: 2019-05-29 | End: 2019-05-29 | Stop reason: WASHOUT

## 2019-05-29 RX ORDER — LIDOCAINE HYDROCHLORIDE 10 MG/ML
INJECTION, SOLUTION EPIDURAL; INFILTRATION; INTRACAUDAL; PERINEURAL
Status: DISCONTINUED
Start: 2019-05-29 | End: 2019-05-29 | Stop reason: HOSPADM

## 2019-05-29 RX ORDER — LIDOCAINE HYDROCHLORIDE 20 MG/ML
INJECTION, SOLUTION INFILTRATION; PERINEURAL
Status: DISCONTINUED
Start: 2019-05-29 | End: 2019-05-29 | Stop reason: HOSPADM

## 2019-05-29 RX ORDER — BUPIVACAINE HYDROCHLORIDE 2.5 MG/ML
10 INJECTION, SOLUTION EPIDURAL; INFILTRATION; INTRACAUDAL ONCE
Status: DISCONTINUED | OUTPATIENT
Start: 2019-05-29 | End: 2019-05-29

## 2019-05-29 NOTE — OP NOTE
Suprascapular, axillary and lateral pectoral articular branch block    Time-out taken to identify patient and procedure side prior to starting the procedure.   I attest that I have reviewed the patient's home medications prior to the procedure and no contraindication have been identified. I re-evaluated the patient after the patient was positioned for the procedure in the procedure room immediately before the procedural time-out. The vital signs are current and represent the current state of the patient which has not significantly changed since the preprocedure assessment.          Date of Service: 5/29/19    PCP: Charles Moody    Referring Physician:                                                           PROCEDURE:  Left suprascapular, axillary and lateral pectoral articular branch block    REASON FOR PROCEDURE: Glenohumeral arthritis, left [M19.012]  Chronic left shoulder pain [M25.512, G89.29]    PHYSICIAN: Isrrael Barton MD  ASSISTANTS: None    MEDICATIONS INJECTED:  preservative free Xylocaine 1%  1.0 ml at each level.    LOCAL ANESTHETIC USED:   Xylocaine 2% 9ml with Sodium Bicarbonate 1ml.  3ml per site.    SEDATION MEDICATIONS: None    ESTIMATED BLOOD LOSS:  None.    COMPLICATIONS:  None.    TECHNIQUE:   Laying in prone position, the patient was prepped and draped in the usual sterile fashion using ChloraPrep and fenestrated drape.  The Left glenohumeral joint was determined under fluoroscopic guidance.  Local anesthetic was given by going down to the hub of the 27-gauge 1.25in needle and raising a wheel.  A 22-gauge 3.5inch needle was introduced to the anatomic local of the target sites for the suprascapular and axillary nerves utilizing live fluoroscopy. Medication was then injected slowly.The patient was then repositioned in the supine position and was  prepped and draped in the usual sterile fashion using ChloraPrep and fenestrated drape. The Left coracoid process was visualized under fluoroscopic  guidance. A 22-gauge 3.5inch needle was introduced to the anatomic local of the target site for the lateral pectoralis nerve utilizing live fluoroscopy. The medication was then injected slowly after negative aspiration. The patient tolerated the procedure well.         The patient was monitored after the procedure.  Patient was given post procedure and discharge instructions to follow at home.  We will see the patient back in two weeks or the patient may call to inform of status. The patient was discharged in a stable condition

## 2019-05-29 NOTE — DISCHARGE SUMMARY
Discharge Diagnosis:Chronic left shoulder pain [M25.512, G89.29]  Condition on Discharge: Stable.  Diet on Discharge: Same as before.  Activity: as per instruction sheet.  Discharge to: Home with a responsible adult.  Follow up: as per Discharge instructions

## 2019-05-29 NOTE — NURSING
Pre procedure completed. Patient as a left arm av shunt with positive bruits and thrill. Skin warm and intact. Patient states unable to lift her arm. Hx of A.Fib. HR at .

## 2019-05-29 NOTE — DISCHARGE INSTRUCTIONS
Fall Prevention  Millions of people fall every year and injure themselves. You may have had anesthesia or sedation which may increase your risk of falling. You may have health issues that put you at an increased risk of falling.     Here are ways to reduce your risk of falling.  ·   · Make your home safe by keeping walkways clear of objects you may trip over.  · Use non-slip pads under rugs. Do not use area rugs or small throw rugs.  · Use non-slip mats in bathtubs and showers.  · Install handrails and lights on staircases.  · Do not walk in poorly lit areas.  · Do not stand on chairs or wobbly ladders.  · Use caution when reaching overhead or looking upward. This position can cause a loss of balance.  · Be sure your shoes fit properly, have non-slip bottoms and are in good condition.   · Wear shoes both inside and out. Avoid going barefoot or wearing slippers.  · Be cautious when going up and down stairs, curbs, and when walking on uneven sidewalks.  · If your balance is poor, consider using a cane or walker.  · If your fall was related to alcohol use, stop or limit alcohol intake.   · If your fall was related to use of sleeping medicines, talk to your doctor about this. You may need to reduce your dosage at bedtime if you awaken during the night to go to the bathroom.    · To reduce the need for nighttime bathroom trips:  ¨ Avoid drinking fluids for several hours before going to bed  ¨ Empty your bladder before going to bed  ¨ Men can keep a urinal at the bedside  · Stay as active as you can. Balance, flexibility, strength, and endurance all come from exercise. They all play a role in preventing falls. Ask your healthcare provider which types of activity are right for you.  · Get your vision checked on a regular basis.  · If you have pets, know where they are before you stand up or walk so you don't trip over them.  Use night lights.    1. DIET:   You may resume your normal diet today.   2. BATHING:   You may  shower with luke warm water.  3. DRESSING:   You may remove your bandage today.   4. ACTIVITY LEVEL:   You may resume your normal activities today. This procedure was a test. Do all of the normal activities that you would do, even if it causes you pain.     Keep a diary of your pain score.     5. MEDICATIONS:   You may resume your normal medications today.     6. SPECIAL INSTRUCTIONS:   No heat to the injection site for 24 hrs including, bath or shower, heating pad, moist heat, or hot tubs.    Use ice pack to injection site for any pain or discomfort.  Apply ice packs for 20 minute intervals as needed.   If you have received any sedatives by mouth today you may not drive for 12 hours.    If you have received any sedation through your IV, you may not drive for 24 hrs.     PLEASE CALL YOUR DOCTOR IF:  1. Redness or swelling around the injection site.  2. Fever of 101 degrees  3. Drainage (pus) from the injection site.  4. For any continuous bleeding (some dried blood over the incision is normal.)    FOR EMERGENCIES:   If any unusual problems or difficulties occur during clinic hours, call [phone] or [phone].     Home Care Instructions Pain Management:    1. DIET:   You may resume your normal diet today.   2. BATHING:   You may shower with luke warm water.  3. DRESSING:   You may remove your bandage today.   4. ACTIVITY LEVEL:   You may resume your normal activities 24 hrs after your procedure.  5. MEDICATIONS:   You may resume your normal medications today.   6. SPECIAL INSTRUCTIONS:   No heat to the injection site for 24 hrs including, bath or shower, heating pad, moist heat, or hot tubs.    Use ice pack to injection site for any pain or discomfort.  Apply ice packs for 20 minute intervals as needed.   If you have received any sedatives by mouth today you may not drive for 12 hours.    If you have received any sedation through your IV, you may not drive for 24 hrs.     PLEASE CALL YOUR DOCTOR IF:  1. Redness or  swelling around the injection site.  2. Fever of 101 degrees  3. Drainage (pus) from the injection site.  4. For any continuous bleeding (some dried blood over the incision is normal.)    FOR EMERGENCIES:   If any unusual problems or difficulties occur during clinic hours, call [phone] or [phone].   ·

## 2019-05-30 ENCOUNTER — TELEPHONE (OUTPATIENT)
Dept: ELECTROPHYSIOLOGY | Facility: CLINIC | Age: 74
End: 2019-05-30

## 2019-05-30 ENCOUNTER — TELEPHONE (OUTPATIENT)
Dept: PAIN MEDICINE | Facility: CLINIC | Age: 74
End: 2019-05-30

## 2019-05-30 DIAGNOSIS — G89.29 CHRONIC LEFT SHOULDER PAIN: Primary | ICD-10-CM

## 2019-05-30 DIAGNOSIS — M25.512 CHRONIC LEFT SHOULDER PAIN: Primary | ICD-10-CM

## 2019-05-30 NOTE — TELEPHONE ENCOUNTER
Patient's daughter reported that patient had increase mobility of left shoulder, and ~80% reduction in pain.  Dr. Barton updated- okay to schedule left RFA as discussed.     Ms. Vaz will be scheduled for Left Suprascapular, Axillary, and Lateral Pectoral Nerve RFA on 06/19/2019.  Reviewed the pre-procedure instructions listed below with her daughter-  copy will also be sent via [employer] and mailed to the address listed on file.  Instructed patient to notify clinic if she begins feeling ill, runs a fever, is prescribed antibiotics, and/or has any outpatient procedures within the two weeks leading up to this procedure.  Instructed patient to report to [facility], 2nd Floor Endoscopy Registration Desk.  All questions answered- patient verbalized understanding.  Clearance previously received to hold Eliquis.     Day of Procedure  - Ensure you have obtained an arrival time from the Pain Management Staff  o Procedure Area will call 1-3 days in advance with your arrival time.  Please check any voicemails received.  o If you arrive past your scheduled procedure time, you may be asked to reschedule your procedure.  - Ensure you have a  with you to remain present throughout your procedure for your safety.  o If you arrive without a responsible adult to stay with you and drive you home, you may be asked to reschedule your procedure.  - Take all of your prescribed medications (exceptions noted below) with a small amount of water  - This IS a fasting procedure: Please do not eat for six (6) hours before your scheduled arrival time.  o You may have clear liquids for up to three (3) hours before your arrival time.    - Clear liquids include: water, fruit juices without pulp, clear tea, and black coffee (no sugar and/or creamer/milk)  - Wear comfortable clothing (loose fitting pants).  - You may wear glasses, dentures, contact lenses, and/or hearing aids. Please remove all jewelry and metal  hairpins.  - Notify the nurse during the intake process if you are allergic to any medications, if you are diabetic, or if you are not feeling well  - Contact the Pain Management Clinic with the following:  o A fever greater than 100° (degrees)   o Feel ill, have any type of infection, or are taking antibiotics now or within the two (2) weeks leading up to this procedure  o Have any outpatient procedures within the two (2) weeks leading up to this procedure (colonoscopy, major dental work, etc.)    IF you are taking blood thinners: Only upon receiving clearance and notification from the Pain Management Department  7 Days Prior to Your Procedure:  - Stop taking Plavix/Clopridogrel, Effient/Prasugel, ASA  5 Days Prior to Your Procedure:  - Stop taking Coumadin/Warfarin.  An INR may be drawn prior to your procedure.  If labs are required, you will need to arrive earlier than your scheduled arrival time.  - Stop taking Pradaxa/Dabigatra,  Brilinta/ Ticagrelor  3 Days Prior to Your Procedure:  - Stop taking Xarelto/Rivaroxaban, Eliquis/Apixaban, Aggrenox/Dipyridamole, Reopro/Abciximab

## 2019-05-30 NOTE — TELEPHONE ENCOUNTER
Called patient regarding appointment with Dr malone on 06/05 spoke with patients daughter Veronica she stated patient has not seen anyone outside of [facility] and she does not have a device,

## 2019-05-30 NOTE — TELEPHONE ENCOUNTER
Discussed the nerve block vs. RFA with patient.  All questions answered.  Procedure scheduled on 6/19/19.

## 2019-06-04 PROBLEM — I48.3 TYPICAL ATRIAL FLUTTER: Status: ACTIVE | Noted: 2019-06-04

## 2019-06-17 ENCOUNTER — TELEPHONE (OUTPATIENT)
Dept: PAIN MEDICINE | Facility: CLINIC | Age: 74
End: 2019-06-17

## 2019-06-17 NOTE — TELEPHONE ENCOUNTER
Message left asking patient or daughter to contact clinic to confirm date of dental work- phone number included.

## 2019-06-18 ENCOUNTER — TELEPHONE (OUTPATIENT)
Dept: CARDIOLOGY | Facility: HOSPITAL | Age: 74
End: 2019-06-18

## 2019-06-18 NOTE — TELEPHONE ENCOUNTER
Left voicemail need to reschedule appointment from June 26th, Dr. Tesfaye will not be in office.  Please call back we can see you today.

## 2019-06-19 ENCOUNTER — HOSPITAL ENCOUNTER (OUTPATIENT)
Facility: HOSPITAL | Age: 74
Discharge: HOME | End: 2019-06-19
Attending: PAIN MEDICINE | Admitting: PAIN MEDICINE
Payer: MEDICARE

## 2019-06-19 VITALS
TEMPERATURE: 98 F | DIASTOLIC BLOOD PRESSURE: 65 MMHG | HEART RATE: 100 BPM | OXYGEN SATURATION: 98 % | RESPIRATION RATE: 20 BRPM | SYSTOLIC BLOOD PRESSURE: 131 MMHG

## 2019-06-19 DIAGNOSIS — M25.512 LEFT SHOULDER PAIN, UNSPECIFIED CHRONICITY: ICD-10-CM

## 2019-06-19 DIAGNOSIS — M25.512 LEFT SHOULDER PAIN: ICD-10-CM

## 2019-06-19 DIAGNOSIS — M19.012 ARTHRITIS OF LEFT SHOULDER REGION: Primary | ICD-10-CM

## 2019-06-19 PROCEDURE — 64640 PR DESTRUCT BY NEURO AGENT; OTHER PERIPH NERVE: ICD-10-PCS | Mod: LT,,, | Performed by: PAIN MEDICINE

## 2019-06-19 PROCEDURE — 64640 INJECTION TREATMENT OF NERVE: CPT | Mod: LT,,, | Performed by: PAIN MEDICINE

## 2019-06-19 PROCEDURE — 99152 PR MOD CONSCIOUS SEDATION, SAME PHYS, 5+ YRS, FIRST 15 MIN: ICD-10-PCS | Mod: ,,, | Performed by: PAIN MEDICINE

## 2019-06-19 PROCEDURE — 63600175 PHARM REV CODE 636 W HCPCS: Performed by: PAIN MEDICINE

## 2019-06-19 PROCEDURE — 99152 MOD SED SAME PHYS/QHP 5/>YRS: CPT | Mod: ,,, | Performed by: PAIN MEDICINE

## 2019-06-19 PROCEDURE — 64640 INJECTION TREATMENT OF NERVE: CPT | Performed by: PAIN MEDICINE

## 2019-06-19 PROCEDURE — 64999 UNLISTED PX NERVOUS SYSTEM: CPT | Mod: LT

## 2019-06-19 PROCEDURE — 25000003 PHARM REV CODE 250: Performed by: PAIN MEDICINE

## 2019-06-19 RX ORDER — TRIAMCINOLONE ACETONIDE 40 MG/ML
40 INJECTION, SUSPENSION INTRA-ARTICULAR; INTRAMUSCULAR ONCE
Status: COMPLETED | OUTPATIENT
Start: 2019-06-19 | End: 2019-06-19

## 2019-06-19 RX ORDER — TRIAMCINOLONE ACETONIDE 40 MG/ML
INJECTION, SUSPENSION INTRA-ARTICULAR; INTRAMUSCULAR
Status: DISCONTINUED
Start: 2019-06-19 | End: 2019-06-19 | Stop reason: HOSPADM

## 2019-06-19 RX ORDER — MIDAZOLAM HYDROCHLORIDE 1 MG/ML
INJECTION INTRAMUSCULAR; INTRAVENOUS
Status: DISCONTINUED
Start: 2019-06-19 | End: 2019-06-19 | Stop reason: HOSPADM

## 2019-06-19 RX ORDER — LIDOCAINE HYDROCHLORIDE 20 MG/ML
10 INJECTION, SOLUTION INFILTRATION; PERINEURAL ONCE
Status: COMPLETED | OUTPATIENT
Start: 2019-06-19 | End: 2019-06-19

## 2019-06-19 RX ORDER — SODIUM CHLORIDE 9 MG/ML
INJECTION, SOLUTION INTRAVENOUS CONTINUOUS
Status: DISCONTINUED | OUTPATIENT
Start: 2019-06-19 | End: 2019-06-19 | Stop reason: HOSPADM

## 2019-06-19 RX ORDER — BUPIVACAINE HYDROCHLORIDE 2.5 MG/ML
INJECTION, SOLUTION EPIDURAL; INFILTRATION; INTRACAUDAL
Status: DISCONTINUED
Start: 2019-06-19 | End: 2019-06-19 | Stop reason: HOSPADM

## 2019-06-19 RX ORDER — BUPIVACAINE HYDROCHLORIDE 2.5 MG/ML
10 INJECTION, SOLUTION EPIDURAL; INFILTRATION; INTRACAUDAL ONCE
Status: COMPLETED | OUTPATIENT
Start: 2019-06-19 | End: 2019-06-19

## 2019-06-19 RX ORDER — FENTANYL CITRATE 50 UG/ML
INJECTION, SOLUTION INTRAMUSCULAR; INTRAVENOUS
Status: DISCONTINUED
Start: 2019-06-19 | End: 2019-06-19 | Stop reason: HOSPADM

## 2019-06-19 RX ORDER — FENTANYL CITRATE 50 UG/ML
100 INJECTION, SOLUTION INTRAMUSCULAR; INTRAVENOUS
Status: DISCONTINUED | OUTPATIENT
Start: 2019-06-19 | End: 2019-06-19 | Stop reason: HOSPADM

## 2019-06-19 RX ORDER — MIDAZOLAM HYDROCHLORIDE 1 MG/ML
5 INJECTION INTRAMUSCULAR; INTRAVENOUS
Status: DISCONTINUED | OUTPATIENT
Start: 2019-06-19 | End: 2019-06-19 | Stop reason: HOSPADM

## 2019-06-19 RX ORDER — LIDOCAINE HYDROCHLORIDE 20 MG/ML
INJECTION, SOLUTION INFILTRATION; PERINEURAL
Status: DISCONTINUED
Start: 2019-06-19 | End: 2019-06-19 | Stop reason: HOSPADM

## 2019-06-19 NOTE — OP NOTE
Date: 6/19/19     Procedure:Cooled Radiofreqiency Ablation:  Articular branches of the left suprascapular and axillary nerves     Pre-op diagnosis:  Chronic left shoulder pain   Post-op diagnosis:  Chronic left shoulder pain     Physician: Dr. Isrrael Barton      Assistant: none     Anesthestia: local/IV sedation:  Versed 1 mg and fentanyl 50 mcg IV.  Conscious sedation provided by MD and monitored by RN.   (See nurse documentation and case log for sedation time)     All medications, allergies, and relevant histories were reviewed. No recent antibiotics or infections.  A time-out was taken to verify the correct patient, procedure, laterality, and appropriate medications/allergies.        The procedure risks, benefits, and possible complications were discussed with the patient including nerve damage, infection, spinal headache, and paresis. NIBP, pulse rate, and O2 per nasal cannula at 3L/min. were monitored throughout the procedure.       Patient was placed in the prone position. Skin was prepped with CHG and draped.  The left glenohumeral joint was visualized under fluoroscopy. Entry sites were marked over the skin and Xylocaine 2% was used to anesthetize the skin and subcutaneous tissues. A 17-gauge 50 mm  Cooled RF needle with 2 mm active tip was introduced coaxially, and the needle was placed target sites for the axillary and suprascapular articular branches.  Placement was confirmed with a fluoroscopic view.       No motor stimulation was elicited at any level at 2V with a frequency of 2Hz. After negative aspiration, 1cc of 2% lidocaine was injected at each level. Thermal RF was then conducted at each level at 80 degrees for 2:30 minutes. 1.0 cc of a mixture of 0.25 % bupivacaine and 20 mg Kenalog was injected at each level.         Patient tolerated the procedure well and there were no complications.     Future Management:   If helpful, can repeat as needed.

## 2019-06-19 NOTE — DISCHARGE INSTRUCTIONS

## 2019-06-19 NOTE — H&P
Subjective:     Patient ID: Eli Vaz is a 73 y.o. female    Chief Complaint: Left shoulder pain      Referred by: Isrrael Barton Jr*      HPI:    Eli Vaz is a 73 y.o. female who presents today for left suprascapular and axillary articular branch RFA. She denies any changes in the quality or location of her pain. This pain is described in detail below.      Review of Systems   Constitutional: Negative for activity change, appetite change, chills, fatigue, fever and unexpected weight change.   HENT: Negative for hearing loss.    Eyes: Negative for visual disturbance.   Respiratory: Negative for chest tightness and shortness of breath.    Cardiovascular: Negative for chest pain.   Gastrointestinal: Negative for abdominal pain, constipation, diarrhea, nausea and vomiting.   Genitourinary: Negative for difficulty urinating.   Musculoskeletal: Positive for arthralgias, myalgias, neck pain and neck stiffness. Negative for back pain and gait problem.   Skin: Negative for rash.   Neurological: Negative for dizziness, weakness, light-headedness, numbness and headaches.   Psychiatric/Behavioral: Positive for sleep disturbance. Negative for hallucinations and suicidal ideas. The patient is not nervous/anxious.        Past Medical History:   Diagnosis Date    Arthritis     Atrial fibrillation     COPD (chronic obstructive pulmonary disease)     Dialysis patient     Encounter for blood transfusion     ESRD (end stage renal disease)        Past Surgical History:   Procedure Laterality Date    AV FISTULA PLACEMENT      Axillary, Suprascapular, lateral pectoral nerve blocks Left 5/29/2019    Performed by Isrrael Braton Jr., MD at [facility] ENDO    TRANSESOPHAGEAL ECHOCARDIOGRAM WITH POSSIBLE CARDIOVERSION (ISAMAR W/ POSS CARDIOVERSION) N/A 3/21/2019    Performed by Elgin Garcia MD at [facility] CATH LAB    TUBAL LIGATION         Social History     Socioeconomic History    Marital status:      Spouse name:  Not on file    Number of children: Not on file    Years of education: Not on file    Highest education level: Not on file   Occupational History    Not on file   Social Needs    Financial resource strain: Not on file    Food insecurity:     Worry: Not on file     Inability: Not on file    Transportation needs:     Medical: Not on file     Non-medical: Not on file   Tobacco Use    Smoking status: Former Smoker     Start date: 1/12/1991    Smokeless tobacco: Never Used    Tobacco comment: quit in 1991   Substance and Sexual Activity    Alcohol use: No    Drug use: No    Sexual activity: Not on file   Lifestyle    Physical activity:     Days per week: Not on file     Minutes per session: Not on file    Stress: Not on file   Relationships    Social connections:     Talks on phone: Not on file     Gets together: Not on file     Attends Restorationism service: Not on file     Active member of club or organization: Not on file     Attends meetings of clubs or organizations: Not on file     Relationship status: Not on file   Other Topics Concern    Not on file   Social History Narrative    Not on file       Review of patient's allergies indicates:  No Known Allergies    No current facility-administered medications on file prior to encounter.      Current Outpatient Medications on File Prior to Encounter   Medication Sig Dispense Refill    albuterol (PROVENTIL) 2.5 mg /3 mL (0.083 %) nebulizer solution Inhale 2.5 mg into the lungs.      amiodarone (PACERONE) 200 MG Tab Take 1 tablet (200 mg total) by mouth 2 (two) times daily. 180 tablet 3    apixaban (ELIQUIS) 2.5 mg Tab Take 2.5 mg by mouth 2 (two) times daily.      atorvastatin (LIPITOR) 20 MG tablet Take 20 mg by mouth once daily.      B complex w-C no.20/folic acid (RENAL CAPS ORAL) Take by mouth.      calcium acetate (PHOSLO) 667 mg capsule Take 667 mg by mouth 3 (three) times daily with meals.      HYDROcodone-acetaminophen (NORCO) 7.5-325 mg per  tablet Take 1 tablet by mouth every 8 (eight) hours as needed for Pain. 90 tablet 0    metoprolol succinate (TOPROL-XL) 25 MG 24 hr tablet Take 1 tablet (25 mg total) by mouth once daily. 90 tablet 3    montelukast sodium (SINGULAIR ORAL) Take by mouth.         Objective:      /60   Pulse 109   Temp 98.6 °F (37 °C)   Resp 20   SpO2 100%     Exam:  AAOx3 NAD  Mood and affect normal  Memory and language intact  RRR  CTAB        Assessment:       Left shoulder pain      Plan:         Eli Vaz is a 73 y.o. female with chronic left shoulder pain.    Proceed with RFA as planned.

## 2019-06-20 ENCOUNTER — TELEPHONE (OUTPATIENT)
Dept: PAIN MEDICINE | Facility: CLINIC | Age: 74
End: 2019-06-20

## 2019-06-20 NOTE — TELEPHONE ENCOUNTER
Message left informing patient that a 2 wk S/P left shoulder RFA appt was scheduled on 7/8/19 at 2:45pm- encouraged patient to call clinic if this did not work, or change the appt via [facility].  Appointment letter will be mailed also.

## 2019-07-01 DIAGNOSIS — J44.9 CHRONIC OBSTRUCTIVE PULMONARY DISEASE, UNSPECIFIED COPD TYPE: ICD-10-CM

## 2019-07-01 RX ORDER — MONTELUKAST SODIUM 10 MG/1
TABLET ORAL
Qty: 30 TABLET | Refills: 2 | Status: SHIPPED | OUTPATIENT
Start: 2019-07-01 | End: 2020-03-04 | Stop reason: SDUPTHER

## 2019-07-03 ENCOUNTER — PATIENT OUTREACH (OUTPATIENT)
Dept: ADMINISTRATIVE | Facility: OTHER | Age: 74
End: 2019-07-03

## 2019-07-08 ENCOUNTER — OFFICE VISIT (OUTPATIENT)
Dept: PAIN MEDICINE | Facility: CLINIC | Age: 74
End: 2019-07-08
Payer: MEDICARE

## 2019-07-08 VITALS
WEIGHT: 129.38 LBS | SYSTOLIC BLOOD PRESSURE: 138 MMHG | HEART RATE: 97 BPM | BODY MASS INDEX: 22.09 KG/M2 | OXYGEN SATURATION: 100 % | RESPIRATION RATE: 18 BRPM | DIASTOLIC BLOOD PRESSURE: 72 MMHG | HEIGHT: 64 IN

## 2019-07-08 DIAGNOSIS — M19.012 ARTHRITIS OF LEFT SHOULDER REGION: ICD-10-CM

## 2019-07-08 DIAGNOSIS — M25.512 CHRONIC LEFT SHOULDER PAIN: Primary | ICD-10-CM

## 2019-07-08 DIAGNOSIS — G89.29 CHRONIC LEFT SHOULDER PAIN: Primary | ICD-10-CM

## 2019-07-08 PROCEDURE — 99999 PR PBB SHADOW E&M-EST. PATIENT-LVL III: CPT | Mod: PBBFAC,,, | Performed by: PAIN MEDICINE

## 2019-07-08 PROCEDURE — 99214 OFFICE O/P EST MOD 30 MIN: CPT | Mod: S$GLB,,, | Performed by: PAIN MEDICINE

## 2019-07-08 PROCEDURE — 1101F PT FALLS ASSESS-DOCD LE1/YR: CPT | Mod: CPTII,S$GLB,, | Performed by: PAIN MEDICINE

## 2019-07-08 PROCEDURE — 99214 PR OFFICE/OUTPT VISIT, EST, LEVL IV, 30-39 MIN: ICD-10-PCS | Mod: S$GLB,,, | Performed by: PAIN MEDICINE

## 2019-07-08 PROCEDURE — 99999 PR PBB SHADOW E&M-EST. PATIENT-LVL III: ICD-10-PCS | Mod: PBBFAC,,, | Performed by: PAIN MEDICINE

## 2019-07-08 PROCEDURE — 1101F PR PT FALLS ASSESS DOC 0-1 FALLS W/OUT INJ PAST YR: ICD-10-PCS | Mod: CPTII,S$GLB,, | Performed by: PAIN MEDICINE

## 2019-07-08 RX ORDER — HYDROCODONE BITARTRATE AND ACETAMINOPHEN 5; 325 MG/1; MG/1
1 TABLET ORAL EVERY 12 HOURS PRN
Qty: 60 TABLET | Refills: 0 | Status: SHIPPED | OUTPATIENT
Start: 2019-07-18 | End: 2019-08-15 | Stop reason: SDUPTHER

## 2019-07-08 NOTE — PROGRESS NOTES
Subjective:     Patient ID: Eli Vaz is a 73 y.o. female    Chief Complaint: Follow-up (S/P Left shoulder RFA 6.19)      Referred by: No ref. provider found      HPI:    Interval History (7/8/19):  She returns today for follow up.  She reports that left shoulder radiofrequency ablation has been helpful for the left shoulder pain. She reports about 70% relief of her pain.  She also reports some minimally improved range of motion.  She still taking Norco 7.5-325 mg b.i.d. p.r.n..      Interval History (5/13/19):  She returns today for follow up.  She reports that her left shoulder pain is unchanged in quality location since last encounter.  She has been evaluated by Orthopedic surgery and no surgical options are being pursued at this time. She states that Norco 7.5-325 mg q.8 hours has been helpful for her pain. She denies any adverse effects with this medication.      Interval History (2/11/19):  She returns today for follow up.  She reports that Norco 7.5-325 half pill q.4 hours has been helpful for the left shoulder pain. Patient states that this provides about 50% relief of her pain.  She no longer has pain while at rest and does note slightly improved range of motion of her left shoulder with minimal to no pain. She still has significantly limited range of motion and function of her left upper extremity as result.  She has discussed potential arthroplasty with Dr. Zurita.  She plans to discuss this option with the rest of her treatment team to see if it is something she wants to proceed with.  She denies any adverse effects from taking her pain medication.  Specifically she denies any sedation or constipation.  She does not feels the higher doses are needed at this time as she is worried about adverse effects and feels as though her current dosage provides adequate relief.      Initial Encounter (1/21/19):  Eli Vaz is a 73 y.o. female who presents today with chronic left shoulder pain. Patient was  referred by Dr. Zurita.  She has known left shoulder arthritis and rotator cuff pathology.  Limited surgical and interventional options given medical comorbidities patient has end-stage renal disease on dialysis.  She has failed shoulder injections including Synvisc.  She states that her left shoulder pain is very severe and limits her activities.  She wants somebody to cut it off.    This pain is described in detail below.    Physical Therapy:  Unlikely to be of benefit    Non-pharmacologic Treatment:  Nothing helps         · TENS?  No    Pain Medications:         · Currently taking:  [place] 7.5-325 q8 hours p.r.n. (usually takes this twice a day)    · Has tried in the past:  Tramadol, Percocet, cannot take NSAIDs due to renal disease    · Has not tried: NSAIDs, Muscle relaxants, TCAs, SNRIs, anticonvulsants, topical creams    Blood thinners:  Eliquis    Interventional Therapies:  Previous shoulder injections including Synvisc.  6/19/19 - left shoulder radiofrequency ablation - 70% relief    Relevant Surgeries:  None    Affecting sleep?  Yes    Affecting daily activities? yes    Depressive symptoms? yes          · SI/HI? No    Work status: Unemployed    Pain Scores:    Best:       5/10  Worst:     8/10  Usually:   5/10  Today:    5/10    Review of Systems   Constitutional: Negative for activity change, appetite change, chills, fatigue, fever and unexpected weight change.   HENT: Negative for hearing loss.    Eyes: Negative for visual disturbance.   Respiratory: Negative for chest tightness and shortness of breath.    Cardiovascular: Negative for chest pain.   Gastrointestinal: Negative for abdominal pain, constipation, diarrhea, nausea and vomiting.   Genitourinary: Negative for difficulty urinating.   Musculoskeletal: Positive for arthralgias and myalgias. Negative for back pain, gait problem and neck pain.   Skin: Negative for rash.   Neurological: Positive for weakness. Negative for dizziness, light-headedness,  numbness and headaches.   Psychiatric/Behavioral: Positive for sleep disturbance. Negative for hallucinations and suicidal ideas. The patient is not nervous/anxious.        Past Medical History:   Diagnosis Date    Arthritis     Atrial fibrillation     COPD (chronic obstructive pulmonary disease)     Dialysis patient     Encounter for blood transfusion     ESRD (end stage renal disease)        Past Surgical History:   Procedure Laterality Date    AV FISTULA PLACEMENT      Axillary, Suprascapular, lateral pectoral nerve blocks Left 5/29/2019    [facility] ENDO    Suprascapular, Axillary, and Lateral Pectoral Nerve Radiofrequency Ablations Left 6/19/2019    Performed by [facility] ENDO    TRANSESOPHAGEAL ECHOCARDIOGRAM WITH POSSIBLE CARDIOVERSION (ISAMAR W/ POSS CARDIOVERSION) N/A 3/21/2019    Performed by Elgin Garcia MD at [facility] CATH LAB    TUBAL LIGATION         Social History     Socioeconomic History    Marital status:      Spouse name: Not on file    Number of children: Not on file    Years of education: Not on file    Highest education level: Not on file   Occupational History    Not on file   Social Needs    Financial resource strain: Not on file    Food insecurity:     Worry: Not on file     Inability: Not on file    Transportation needs:     Medical: Not on file     Non-medical: Not on file   Tobacco Use    Smoking status: Former Smoker     Start date: 1/12/1991    Smokeless tobacco: Never Used    Tobacco comment: quit in 1991   Substance and Sexual Activity    Alcohol use: No    Drug use: No    Sexual activity: Not on file   Lifestyle    Physical activity:     Days per week: Not on file     Minutes per session: Not on file    Stress: Not on file   Relationships    Social connections:     Talks on phone: Not on file     Gets together: Not on file     Attends Muslim service: Not on file     Active member of  "club or organization: Not on file     Attends meetings of clubs or organizations: Not on file     Relationship status: Not on file   Other Topics Concern    Not on file   Social History Narrative    Not on file       Review of patient's allergies indicates:  No Known Allergies    Current Outpatient Medications on File Prior to Visit   Medication Sig Dispense Refill    albuterol (PROVENTIL) 2.5 mg /3 mL (0.083 %) nebulizer solution Inhale 2.5 mg into the lungs.      amiodarone (PACERONE) 200 MG Tab Take 1 tablet (200 mg total) by mouth 2 (two) times daily. 180 tablet 3    apixaban (ELIQUIS) 2.5 mg Tab Take 2.5 mg by mouth 2 (two) times daily.      atorvastatin (LIPITOR) 20 MG tablet Take 20 mg by mouth once daily.      B complex w-C no.20/folic acid (RENAL CAPS ORAL) Take by mouth.      calcium acetate (PHOSLO) 667 mg capsule Take 667 mg by mouth 3 (three) times daily with meals.      HYDROcodone-acetaminophen (NORCO) 7.5-325 mg per tablet Take 1 tablet by mouth every 8 (eight) hours as needed for Pain. 90 tablet 0    metoprolol succinate (TOPROL-XL) 25 MG 24 hr tablet Take 1 tablet (25 mg total) by mouth once daily. 90 tablet 3    montelukast (SINGULAIR) 10 mg tablet TAKE 1 TABLET BY MOUTH EVERY DAY IN THE EVENING 30 tablet 2     No current facility-administered medications on file prior to visit.        Objective:      /72   Pulse 97   Resp 18   Ht 5' 4" (1.626 m)   Wt 58.7 kg (129 lb 6.4 oz)   SpO2 100%   BMI 22.21 kg/m²     Exam:  GEN:  Well developed, well nourished.  No acute distress.   HEENT:  No trauma.  Mucous membranes moist.  Nares patent bilaterally.  PSYCH: Normal affect. Thought content appropriate.  CHEST:  Breathing symmetric.  No audible wheezing.  ABD: Soft, non-distended.  SKIN:  Warm, pink, dry.  No rash on exposed areas.    EXT:  No cyanosis, clubbing, or edema.  No color change or changes in nail or hair growth.  NEURO/MUSCULOSKELETAL:  Fully alert, oriented, and " appropriate. Speech normal merrill. No cranial nerve deficits.   Gait:  Normal.  No focal motor deficits.         Imaging:  Narrative     EXAMINATION:  XR SHOULDER COMPLETE 2 OR MORE VIEWS LEFT    CLINICAL HISTORY:  Pain in left shoulder    TECHNIQUE:  Two or three views of the left shoulder were performed.    COMPARISON:  None    FINDINGS:  The bones are intact.  There is no evidence for acute fracture or bone destruction.  There are advanced degenerative changes of the left glenohumeral joint with marked joint space narrowing with subchondral sclerosis and osteophyte formation.  There is cortical irregularity at the articular surfaces of the glenohumeral joint.  Multiple vascular stents project over the left upper arm and left subclavian region with multiple surgical clips also present.  Atherosclerotic calcification is present within the thoracic aorta as well as within the carotid arteries.   Impression       Advanced degenerative changes of the left glenohumeral joint.    No evidence for acute fracture, bone destruction, or dislocation.    Surgical changes with multiple vascular stents.    Extensive atherosclerosis.      Electronically signed by: Tone Pereyra MD  Date: 09/18/2018  Time: 07:58         Assessment:       Encounter Diagnoses   Name Primary?    Chronic left shoulder pain Yes    Arthritis of left shoulder region          Plan:       Eli was seen today for follow-up.    Diagnoses and all orders for this visit:    Chronic left shoulder pain  -     HYDROcodone-acetaminophen (NORCO) 5-325 mg per tablet; Take 1 tablet by mouth every 12 (twelve) hours as needed for Pain.    Arthritis of left shoulder region  -     HYDROcodone-acetaminophen (NORCO) 5-325 mg per tablet; Take 1 tablet by mouth every 12 (twelve) hours as needed for Pain.        Eli Vaz is a 73 y.o. female with chronic left shoulder pain secondary to rotator cuff and glenohumeral pathology.  Not a candidate for left shoulder  arthroplasty.  Very good improvement following left shoulder radiofrequency ablation.  Still utilizing pain medication    1.  Willing to discussion regarding the potential adverse effects of chronic opioid medications.  We discussed that it may be appropriate to continue the use of opioid pain medications, but that we should use the lowest dose/potency possible.  Patient expressed understanding.  2.  Decrease Norco to 5-325 mg q.12 hours p.r.n..  30 day supply of 60 pills provided today.  3.  Prescription monitoring port was reviewed today.  No inconsistencies were noted.  4.  Return to clinic in 4 weeks.  That time we will discuss efficacy of Norco 5-325 mg q.12 hours p.r.n..  We may consider discontinue this medication or switching to a less potent opioid pain medicine if possible.

## 2019-07-22 ENCOUNTER — OFFICE VISIT (OUTPATIENT)
Dept: FAMILY MEDICINE | Facility: CLINIC | Age: 74
End: 2019-07-22
Payer: MEDICARE

## 2019-07-22 VITALS
RESPIRATION RATE: 19 BRPM | TEMPERATURE: 98 F | SYSTOLIC BLOOD PRESSURE: 121 MMHG | OXYGEN SATURATION: 99 % | DIASTOLIC BLOOD PRESSURE: 84 MMHG | BODY MASS INDEX: 21.46 KG/M2 | WEIGHT: 125.69 LBS | HEIGHT: 64 IN | HEART RATE: 109 BPM

## 2019-07-22 DIAGNOSIS — R06.02 SHORTNESS OF BREATH: Primary | ICD-10-CM

## 2019-07-22 PROCEDURE — 1101F PR PT FALLS ASSESS DOC 0-1 FALLS W/OUT INJ PAST YR: ICD-10-PCS | Mod: CPTII,S$GLB,, | Performed by: FAMILY MEDICINE

## 2019-07-22 PROCEDURE — 93005 EKG 12-LEAD: ICD-10-PCS | Mod: S$GLB,,, | Performed by: FAMILY MEDICINE

## 2019-07-22 PROCEDURE — 99999 PR PBB SHADOW E&M-EST. PATIENT-LVL IV: CPT | Mod: PBBFAC,,, | Performed by: FAMILY MEDICINE

## 2019-07-22 PROCEDURE — 99214 PR OFFICE/OUTPT VISIT, EST, LEVL IV, 30-39 MIN: ICD-10-PCS | Mod: S$GLB,,, | Performed by: FAMILY MEDICINE

## 2019-07-22 PROCEDURE — 99214 OFFICE O/P EST MOD 30 MIN: CPT | Mod: S$GLB,,, | Performed by: FAMILY MEDICINE

## 2019-07-22 PROCEDURE — 1101F PT FALLS ASSESS-DOCD LE1/YR: CPT | Mod: CPTII,S$GLB,, | Performed by: FAMILY MEDICINE

## 2019-07-22 PROCEDURE — 99999 PR PBB SHADOW E&M-EST. PATIENT-LVL IV: ICD-10-PCS | Mod: PBBFAC,,, | Performed by: FAMILY MEDICINE

## 2019-07-22 PROCEDURE — 93005 ELECTROCARDIOGRAM TRACING: CPT | Mod: S$GLB,,, | Performed by: FAMILY MEDICINE

## 2019-07-22 PROCEDURE — 93010 EKG 12-LEAD: ICD-10-PCS | Mod: S$GLB,,, | Performed by: INTERNAL MEDICINE

## 2019-07-22 PROCEDURE — 93010 ELECTROCARDIOGRAM REPORT: CPT | Mod: S$GLB,,, | Performed by: INTERNAL MEDICINE

## 2019-07-24 ENCOUNTER — TELEPHONE (OUTPATIENT)
Dept: FAMILY MEDICINE | Facility: CLINIC | Age: 74
End: 2019-07-24

## 2019-07-24 NOTE — TELEPHONE ENCOUNTER
Called and spoke with patient's daughter.  Daughter reports that since the visit, the patient is using her oxygen and is feeling much better.  No longer short of breath.  I informed her of x-ray results and importance keeping scheduled pulmonary visit.  Daughter states that she will take her mother to the pulmonary visit

## 2019-07-29 NOTE — PROGRESS NOTES
Routine Office Visit    Patient Name: Eli Vaz    : 1945  MRN: [id number]    Subjective:  Eli is a 73 y.o. female who presents today for:   Chief Complaint   Patient presents with    Shortness of Breath     73-year-old female with multiple comorbidities comes in for worsening shortness of breath for the last week.  According to her she stop using her oxygen when the symptoms started.  She states she did this because she wants to come off oxygen.  She reports no fevers or chills.  She reports no chest pain, palpitations, or racing heart.  She denies blood in her urine or stools.    Past Medical History  Past Medical History:   Diagnosis Date    Arthritis     Atrial fibrillation     COPD (chronic obstructive pulmonary disease)     Dialysis patient     Encounter for blood transfusion     ESRD (end stage renal disease)        Past Surgical History  Past Surgical History:   Procedure Laterality Date    AV FISTULA PLACEMENT      Axillary, Suprascapular, lateral pectoral nerve blocks Left 2019    Performed [facility] Barton Jr[facility] at [facility] ENDO    Cardioversion or Defibrillation N/A 2019    Performed by Rakesh Briggs MD at [facility] CATH LAB    Suprascapular, Axillary, and Lateral Pectoral Nerve Radiofrequency Ablations Left 2019    Performed by Isrrael Barton Jr[facility] at [facility] ENDO    TRANSESOPHAGEAL ECHOCARDIOGRAM WITH POSSIBLE CARDIOVERSION (ISAMAR W/ POSS CARDIOVERSION) N/A 3/21/2019    Performed by Elgin Garcia MD at [facility] CATH LAB    TUBAL LIGATION          Family History  Family History   Problem Relation Age of Onset    Heart attack Sister     Heart attack Sister     Heart attack Mother        Social History  Social History     Socioeconomic History    Marital status:      Spouse name: Not on file    Number of children: Not on file    Years of education: Not on file    Highest education level: Not on file   Occupational History    Not on file   Social  Needs    Financial resource strain: Not on file    Food insecurity:     Worry: Not on file     Inability: Not on file    Transportation needs:     Medical: Not on file     Non-medical: Not on file   Tobacco Use    Smoking status: Former Smoker     Start date: 1/12/1991    Smokeless tobacco: Never Used    Tobacco comment: quit in 1991   Substance and Sexual Activity    Alcohol use: No    Drug use: No    Sexual activity: Not on file   Lifestyle    Physical activity:     Days per week: Not on file     Minutes per session: Not on file    Stress: Not on file   Relationships    Social connections:     Talks on phone: Not on file     Gets together: Not on file     Attends Episcopalian service: Not on file     Active member of club or organization: Not on file     Attends meetings of clubs or organizations: Not on file     Relationship status: Not on file   Other Topics Concern    Not on file   Social History Narrative    Not on file       Current Medications  Current Outpatient Medications on File Prior to Visit   Medication Sig Dispense Refill    albuterol (PROVENTIL) 2.5 mg /3 mL (0.083 %) nebulizer solution Inhale 2.5 mg into the lungs.      amiodarone (PACERONE) 200 MG Tab Take 1 tablet (200 mg total) by mouth 2 (two) times daily. 180 tablet 3    apixaban (ELIQUIS) 2.5 mg Tab Take 2.5 mg by mouth 2 (two) times daily.      atorvastatin (LIPITOR) 20 MG tablet Take 20 mg by mouth once daily.      B complex w-C no.20/folic acid (RENAL CAPS ORAL) Take by mouth.      calcium acetate (PHOSLO) 667 mg capsule Take 667 mg by mouth 3 (three) times daily with meals.      HYDROcodone-acetaminophen (NORCO) 5-325 mg per tablet Take 1 tablet by mouth every 12 (twelve) hours as needed for Pain. 60 tablet 0    metoprolol succinate (TOPROL-XL) 25 MG 24 hr tablet Take 1 tablet (25 mg total) by mouth once daily. 90 tablet 3    montelukast (SINGULAIR) 10 mg tablet TAKE 1 TABLET BY MOUTH EVERY DAY IN THE EVENING 30 tablet  "2     No current facility-administered medications on file prior to visit.        Allergies   Review of patient's allergies indicates:  No Known Allergies    Review of Systems   Constitutional: Negative for chills, fever and unexpected weight change.   Respiratory: Positive for shortness of breath. Negative for cough, chest tightness and wheezing.    Cardiovascular: Negative for chest pain and palpitations.   Gastrointestinal: Negative for abdominal pain, blood in stool, constipation and diarrhea.   Genitourinary: Negative for dysuria.     /84 (BP Location: Right arm, Patient Position: Sitting, BP Method: Small (Automatic))   Pulse 109   Temp 98.1 °F (36.7 °C) (Oral)   Resp 19   Ht 5' 4" (1.626 m)   Wt 57 kg (125 lb 10.6 oz)   SpO2 99%   BMI 21.57 kg/m²     Physical Exam   Constitutional: She appears well-developed. No distress.   HENT:   Head: Normocephalic and atraumatic.   Right Ear: External ear normal.   Left Ear: External ear normal.   Mouth/Throat: Oropharynx is clear and moist.   Eyes: Pupils are equal, round, and reactive to light. EOM are normal.   Neck: Normal range of motion. Neck supple. No thyromegaly present.   Cardiovascular: S1 normal and S2 normal. Tachycardia present.   Pulmonary/Chest: No tachypnea. No respiratory distress. She has no wheezes. She has no rhonchi.   Abdominal: Soft. Bowel sounds are normal.         Assessment/Plan:  Eli was seen today for shortness of breath.    Diagnoses and all orders for this visit:    Shortness of breath  -     IN OFFICE EKG 12-LEAD (to Muse)  -     X-Ray Chest PA And Lateral; Future  -     Ambulatory referral to Pulmonology  -     CBC auto differential; Future  -     Comprehensive metabolic panel; Future     Worsening shortness of breath.   Discussed with patient that she should restart using her oxygen and wait until she sees Pulmonary to discuss further.  Referral to pulmonary place.  An EKG was done in the office to rule out any changes " since her last one a, and a chest x-ray was also ordered.  Will call her with the results.          -Charles Moody Jr., MD, AAHIVS          This office note has been dictated.  This dictation has been generated using M-Modal Fluency Direct dictation; some phonetic errors may occur.

## 2019-08-02 ENCOUNTER — PATIENT OUTREACH (OUTPATIENT)
Dept: ADMINISTRATIVE | Facility: OTHER | Age: 74
End: 2019-08-02

## 2019-08-05 ENCOUNTER — OFFICE VISIT (OUTPATIENT)
Dept: PAIN MEDICINE | Facility: CLINIC | Age: 74
End: 2019-08-05
Payer: MEDICARE

## 2019-08-05 VITALS
SYSTOLIC BLOOD PRESSURE: 123 MMHG | OXYGEN SATURATION: 100 % | HEIGHT: 64 IN | BODY MASS INDEX: 22.28 KG/M2 | HEART RATE: 98 BPM | WEIGHT: 130.5 LBS | DIASTOLIC BLOOD PRESSURE: 60 MMHG

## 2019-08-05 DIAGNOSIS — G89.29 CHRONIC LEFT SHOULDER PAIN: Primary | ICD-10-CM

## 2019-08-05 DIAGNOSIS — M25.512 CHRONIC LEFT SHOULDER PAIN: Primary | ICD-10-CM

## 2019-08-05 DIAGNOSIS — M19.012 ARTHRITIS OF LEFT SHOULDER REGION: ICD-10-CM

## 2019-08-05 PROCEDURE — 99999 PR PBB SHADOW E&M-EST. PATIENT-LVL III: ICD-10-PCS | Mod: PBBFAC,,, | Performed by: PAIN MEDICINE

## 2019-08-05 PROCEDURE — 1101F PR PT FALLS ASSESS DOC 0-1 FALLS W/OUT INJ PAST YR: ICD-10-PCS | Mod: CPTII,S$GLB,, | Performed by: PAIN MEDICINE

## 2019-08-05 PROCEDURE — 99499 RISK ADDL DX/OHS AUDIT: ICD-10-PCS | Mod: S$GLB,,, | Performed by: PAIN MEDICINE

## 2019-08-05 PROCEDURE — 99214 PR OFFICE/OUTPT VISIT, EST, LEVL IV, 30-39 MIN: ICD-10-PCS | Mod: S$GLB,,, | Performed by: PAIN MEDICINE

## 2019-08-05 PROCEDURE — 99999 PR PBB SHADOW E&M-EST. PATIENT-LVL III: CPT | Mod: PBBFAC,,, | Performed by: PAIN MEDICINE

## 2019-08-05 PROCEDURE — 99214 OFFICE O/P EST MOD 30 MIN: CPT | Mod: S$GLB,,, | Performed by: PAIN MEDICINE

## 2019-08-05 PROCEDURE — 99499 UNLISTED E&M SERVICE: CPT | Mod: S$GLB,,, | Performed by: PAIN MEDICINE

## 2019-08-05 PROCEDURE — 1101F PT FALLS ASSESS-DOCD LE1/YR: CPT | Mod: CPTII,S$GLB,, | Performed by: PAIN MEDICINE

## 2019-08-05 RX ORDER — ASCORBIC ACID, THIAMINE, RIBOFLAVIN, NIACINAMIDE, PYRIDOXINE, FOLIC ACID, COBALAMIN, BIOTIN, PANTOTHENIC ACID 100; 1.5; 1.7; 20; 10; 1; 6; 300; 1 MG/1; MG/1; MG/1; MG/1; MG/1; MG/1; UG/1; UG/1; MG/1
TABLET, COATED ORAL
Refills: 0 | COMMUNITY
Start: 2019-04-30 | End: 2019-11-22

## 2019-08-05 RX ORDER — PAROXETINE 30 MG/1
TABLET, FILM COATED ORAL
Refills: 3 | COMMUNITY
Start: 2019-05-02 | End: 2020-01-01

## 2019-08-05 NOTE — PROGRESS NOTES
Subjective:     Patient ID: Eli Vaz is a 73 y.o. female    Chief Complaint: Follow-up      Referred by: No ref. provider found      HPI:    Interval History (8/5/19):  She returns today for follow up.  She reports that she continues to get at least 75% relief of her left shoulder pain following left shoulder RFA.  She states that is still quite painful to perform certain ADLs including getting dressed.  She has not yet filled the prescription for [place] provided at last visit.  She feels as though she may require this medicine to help with certain activities throughout her day.      Interval History (7/8/19):  She returns today for follow up.  She reports that left shoulder radiofrequency ablation has been helpful for the left shoulder pain. She reports about 70% relief of her pain.  She also reports some minimally improved range of motion.  She still taking Norco 7.5-325 mg b.i.d. p.r.n..      Interval History (5/13/19):  She returns today for follow up.  She reports that her left shoulder pain is unchanged in quality location since last encounter.  She has been evaluated by Orthopedic surgery and no surgical options are being pursued at this time. She states that Norco 7.5-325 mg q.8 hours has been helpful for her pain. She denies any adverse effects with this medication.      Interval History (2/11/19):  She returns today for follow up.  She reports that Norco 7.5-325 half pill q.4 hours has been helpful for the left shoulder pain. Patient states that this provides about 50% relief of her pain.  She no longer has pain while at rest and does note slightly improved range of motion of her left shoulder with minimal to no pain. She still has significantly limited range of motion and function of her left upper extremity as result.  She has discussed potential arthroplasty with Dr. Zurita.  She plans to discuss this option with the rest of her treatment team to see if it is something she wants to proceed with.  She  denies any adverse effects from taking her pain medication.  Specifically she denies any sedation or constipation.  She does not feels the higher doses are needed at this time as she is worried about adverse effects and feels as though her current dosage provides adequate relief.      Initial Encounter (1/21/19):  Eli Vaz is a 73 y.o. female who presents today with chronic left shoulder pain. Patient was referred by Dr. Zurita.  She has known left shoulder arthritis and rotator cuff pathology.  Limited surgical and interventional options given medical comorbidities patient has end-stage renal disease on dialysis.  She has failed shoulder injections including Synvisc.  She states that her left shoulder pain is very severe and limits her activities.  She wants somebody to cut it off.    This pain is described in detail below.    Physical Therapy:  Unlikely to be of benefit    Non-pharmacologic Treatment:  Nothing helps         · TENS?  No    Pain Medications:         · Currently taking:  Nothing    · Has tried in the past:  Tramadol, Percocet, cannot take NSAIDs due to renal disease, Norco    · Has not tried: NSAIDs, Muscle relaxants, TCAs, SNRIs, anticonvulsants, topical creams    Blood thinners:  Eliquis    Interventional Therapies:  Previous shoulder injections including Synvisc.  6/19/19 - left shoulder radiofrequency ablation - 70% relief    Relevant Surgeries:  None    Affecting sleep?  Yes    Affecting daily activities? yes    Depressive symptoms? yes          · SI/HI? No    Work status: Unemployed    Pain Scores:    Best:       5/10  Worst:     8/10  Usually:   5/10  Today:    6/10    Review of Systems   Constitutional: Negative for activity change, appetite change, chills, fatigue, fever and unexpected weight change.   HENT: Negative for hearing loss.    Eyes: Negative for visual disturbance.   Respiratory: Negative for chest tightness and shortness of breath.    Cardiovascular: Negative for chest  pain.   Gastrointestinal: Negative for abdominal pain, constipation, diarrhea, nausea and vomiting.   Genitourinary: Negative for difficulty urinating.   Musculoskeletal: Positive for arthralgias and myalgias. Negative for back pain, gait problem and neck pain.   Skin: Negative for rash.   Neurological: Positive for weakness. Negative for dizziness, light-headedness, numbness and headaches.   Psychiatric/Behavioral: Positive for sleep disturbance. Negative for hallucinations and suicidal ideas. The patient is not nervous/anxious.        Past Medical History:   Diagnosis Date    Arthritis     Atrial fibrillation     COPD (chronic obstructive pulmonary disease)     Dialysis patient     Encounter for blood transfusion     ESRD (end stage renal disease)        Past Surgical History:   Procedure Laterality Date    AV FISTULA PLACEMENT      Axillary, Suprascapular, lateral pectoral nerve blocks Left 5/29/2019    Per[facility]rael Barton Jr., [facility] ENDO    Cardioversion or Defibrillation N/A 4/2/2019    Per[facility] Rakesh Briggs MD [facility] CATH LAB    Suprascapular, Axillary, and Lateral Pectoral Nerve Radiofrequency Ablations Left 6/19/2019    Per[facility] by Isrrael Barton Jr[facility] ENDO    TRANSESOPHAGEAL ECHOCARDIOGRAM WITH POSSIBLE CARDIOVERSION (ISAMAR W/ POSS CARDIOVERSION) N/A 3/21/2019    Performed by Elgin Garcia MD at [facility] CATH LAB    TUBAL LIGATION         Social History     Socioeconomic History    Marital status:      Spouse name: Not on file    Number of children: Not on file    Years of education: Not on file    Highest education level: Not on file   Occupational History    Not on file   Social Needs    Financial resource strain: Not on file    Food insecurity:     Worry: Not on file     Inability: Not on file    Transportation needs:     Medical: Not on file     Non-medical: Not on file   Tobacco Use    Smoking status: Former Smoker     Start date:  1/12/1991    Smokeless tobacco: Never Used    Tobacco comment: quit in 1991   Substance and Sexual Activity    Alcohol use: No    Drug use: No    Sexual activity: Not on file   Lifestyle    Physical activity:     Days per week: Not on file     Minutes per session: Not on file    Stress: Not on file   Relationships    Social connections:     Talks on phone: Not on file     Gets together: Not on file     Attends Jain service: Not on file     Active member of club or organization: Not on file     Attends meetings of clubs or organizations: Not on file     Relationship status: Not on file   Other Topics Concern    Not on file   Social History Narrative    Not on file       Review of patient's allergies indicates:  No Known Allergies    Current Outpatient Medications on File Prior to Visit   Medication Sig Dispense Refill    albuterol (PROVENTIL) 2.5 mg /3 mL (0.083 %) nebulizer solution Inhale 2.5 mg into the lungs.      amiodarone (PACERONE) 200 MG Tab Take 1 tablet (200 mg total) by mouth 2 (two) times daily. 180 tablet 3    apixaban (ELIQUIS) 2.5 mg Tab Take 2.5 mg by mouth 2 (two) times daily.      atorvastatin (LIPITOR) 20 MG tablet Take 20 mg by mouth once daily.      B complex w-C no.20/folic acid (RENAL CAPS ORAL) Take by mouth.      calcium acetate (PHOSLO) 667 mg capsule Take 667 mg by mouth 3 (three) times daily with meals.      DIALYVITE 100-1 mg Tab TAKE 1 TABLET BY MOUTH EVERY DAY WITH DINNER  0    HYDROcodone-acetaminophen (NORCO) 5-325 mg per tablet Take 1 tablet by mouth every 12 (twelve) hours as needed for Pain. 60 tablet 0    metoprolol succinate (TOPROL-XL) 25 MG 24 hr tablet Take 1 tablet (25 mg total) by mouth once daily. 90 tablet 3    montelukast (SINGULAIR) 10 mg tablet TAKE 1 TABLET BY MOUTH EVERY DAY IN THE EVENING 30 tablet 2    paroxetine (PAXIL) 30 MG tablet 1 TABLET IN THE MORNING ONCE A DAY ORALLY 90  3     No current facility-administered medications on file  "prior to visit.        Objective:      /60   Pulse 98   Ht 5' 4" (1.626 m)   Wt 59.2 kg (130 lb 8 oz)   SpO2 100%   BMI 22.40 kg/m²     Exam:  GEN:  Well developed, well nourished.  No acute distress.   HEENT:  No trauma.  Mucous membranes moist.  Nares patent bilaterally.  PSYCH: Normal affect. Thought content appropriate.  CHEST:  Breathing symmetric.  No audible wheezing.  ABD: Soft, non-distended.  SKIN:  Warm, pink, dry.  No rash on exposed areas.    EXT:  No cyanosis, clubbing, or edema.  No color change or changes in nail or hair growth.  NEURO/MUSCULOSKELETAL:  Fully alert, oriented, and appropriate. Speech normal merrill. No cranial nerve deficits.   Gait:  Normal.  No focal motor deficits.         Imaging:  Narrative     EXAMINATION:  XR SHOULDER COMPLETE 2 OR MORE VIEWS LEFT    CLINICAL HISTORY:  Pain in left shoulder    TECHNIQUE:  Two or three views of the left shoulder were performed.    COMPARISON:  None    FINDINGS:  The bones are intact.  There is no evidence for acute fracture or bone destruction.  There are advanced degenerative changes of the left glenohumeral joint with marked joint space narrowing with subchondral sclerosis and osteophyte formation.  There is cortical irregularity at the articular surfaces of the glenohumeral joint.  Multiple vascular stents project over the left upper arm and left subclavian region with multiple surgical clips also present.  Atherosclerotic calcification is present within the thoracic aorta as well as within the carotid arteries.   Impression       Advanced degenerative changes of the left glenohumeral joint.    No evidence for acute fracture, bone destruction, or dislocation.    Surgical changes with multiple vascular stents.    Extensive atherosclerosis.      Electronically signed by: Tone Pereyra MD  Date: 09/18/2018  Time: 07:58         Assessment:       Encounter Diagnoses   Name Primary?    Chronic left shoulder pain Yes    Arthritis of " left shoulder region          Plan:       Eli was seen today for follow-up.    Diagnoses and all orders for this visit:    Chronic left shoulder pain    Arthritis of left shoulder region        Eli Vaz is a 73 y.o. female with chronic left shoulder pain secondary to rotator cuff and glenohumeral pathology.  Not a candidate for left shoulder arthroplasty.  Very good improvement following left shoulder radiofrequency ablation.  Still utilizing pain medication to help with ADLs and other functional activity.    1.  Willing to discussion regarding the potential adverse effects of chronic opioid medications.  We discussed that it may be appropriate to continue the use of opioid pain medications, but that we should use the lowest dose/potency possible.  Patient expressed understanding.  2.  I advised patient to fill prescription for Norco 5-325 mg q.12 hours p.r.n..  3.  Prescription monitoring port was reviewed today.  No inconsistencies were noted.  4.  Return to clinic p.r.n..  She will call before her current prescription of pain medication runs out to discuss efficacy.  It providing adequate relief without significant adverse effects we may consider continuing this medication chronically peer

## 2019-08-09 PROBLEM — I48.19 PERSISTENT ATRIAL FIBRILLATION: Status: ACTIVE | Noted: 2019-03-19

## 2019-08-09 PROBLEM — I48.92 ATRIAL FLUTTER: Status: ACTIVE | Noted: 2019-06-04

## 2019-08-09 NOTE — PROGRESS NOTES
Subjective:    Patient ID:  Eli Vaz is a 73 y.o. female who presents for evaluation of Atrial Flutter      HPI 72 yo female with atrial fibrillation, atrial flutter, ESRD on HD (Tue Thu Sat), COPD on home oxygen, CVA, DM, Htn, Mitral regurgitation.  Primary cardiologist is Dr. Garcia.  Presented 3/19 with atrial fibrillation with RVR and CHF.   Echo 3/20/19 EF 55% moderate to severe MR DARINEL 56.  ISAMAR/DCCV 3/21/19 moderate to severe MR.  Developed recurrence of atrial flutter.  Placed on amiodarone, underwent DCCV 4/1/19.  At follow-up was back in atrial flutter.  Notes dyspnea on exertion c/w NYHA Class III CHF.  Denies palpitations, syncope.  Currently on Eliquis 2.5 mg BID.  ECG today reveals atrial flutter.  Surgery for mitral valve is being considered.      Review of Systems   Constitution: Negative. Negative for malaise/fatigue.   Cardiovascular: Positive for dyspnea on exertion. Negative for chest pain, irregular heartbeat, leg swelling, near-syncope, orthopnea, palpitations, paroxysmal nocturnal dyspnea and syncope.   Respiratory: Positive for shortness of breath. Negative for cough.    Neurological: Negative for dizziness, light-headedness and weakness.   All other systems reviewed and are negative.       Objective:    Physical Exam   Constitutional: She is oriented to person, place, and time. She appears well-developed and well-nourished.   Eyes: Conjunctivae are normal. No scleral icterus.   Neck: No JVD present. No tracheal deviation present.   Cardiovascular: Normal rate and regular rhythm. PMI is not displaced.   Pulmonary/Chest: Effort normal and breath sounds normal. No respiratory distress.   Abdominal: Soft. There is no hepatosplenomegaly. There is no tenderness.   Musculoskeletal: She exhibits no edema or tenderness.   Neurological: She is alert and oriented to person, place, and time.   Skin: Skin is warm and dry. No rash noted.   Psychiatric: She has a normal mood and affect. Her behavior is  normal.         Assessment:       1. Persistent atrial fibrillation    2. Non-rheumatic mitral regurgitation    3. Atrial flutter, unspecified type    4. Benign essential hypertension    5. Pulmonary emphysema, unspecified emphysema type    6. Chronic respiratory failure with hypoxia    7. History of CVA (cerebrovascular accident)    8. Anxiety    9. ESRD on dialysis    10. Diabetes mellitus with ESRD (end-stage renal disease)         Plan:           Atrial fibrillation and atrial flutter.  Has had atrial flutter since initiation of amiodarone.  Also has moderate to severe MR.  Unclear how much the atrial arrhythmias are contributing to her MR.  Recommend:  RFA for atrial flutter first.  This is less risk than PVI.   If maintains nsr for 2 months >> repeat assessment of mitral valve.  Risks and benefits discussed, she would like to proceed.  ISAMAR day of procedure.  Hold Eliquis day prior to procedure.

## 2019-08-09 NOTE — H&P (VIEW-ONLY)
Subjective:    Patient ID:  Eli Vaz is a 73 y.o. female who presents for evaluation of Atrial Flutter      HPI 74 yo female with atrial fibrillation, atrial flutter, ESRD on HD (Tue Thu Sat), COPD on home oxygen, CVA, DM, Htn, Mitral regurgitation.  Primary cardiologist is Dr. Garcia.  Presented 3/19 with atrial fibrillation with RVR and CHF.   Echo 3/20/19 EF 55% moderate to severe MR DARINEL 56.  ISAMAR/DCCV 3/21/19 moderate to severe MR.  Developed recurrence of atrial flutter.  Placed on amiodarone, underwent DCCV 4/1/19.  At follow-up was back in atrial flutter.  Notes dyspnea on exertion c/w NYHA Class III CHF.  Denies palpitations, syncope.  Currently on Eliquis 2.5 mg BID.  ECG today reveals atrial flutter.  Surgery for mitral valve is being considered.      Review of Systems   Constitution: Negative. Negative for malaise/fatigue.   Cardiovascular: Positive for dyspnea on exertion. Negative for chest pain, irregular heartbeat, leg swelling, near-syncope, orthopnea, palpitations, paroxysmal nocturnal dyspnea and syncope.   Respiratory: Positive for shortness of breath. Negative for cough.    Neurological: Negative for dizziness, light-headedness and weakness.   All other systems reviewed and are negative.       Objective:    Physical Exam   Constitutional: She is oriented to person, place, and time. She appears well-developed and well-nourished.   Eyes: Conjunctivae are normal. No scleral icterus.   Neck: No JVD present. No tracheal deviation present.   Cardiovascular: Normal rate and regular rhythm. PMI is not displaced.   Pulmonary/Chest: Effort normal and breath sounds normal. No respiratory distress.   Abdominal: Soft. There is no hepatosplenomegaly. There is no tenderness.   Musculoskeletal: She exhibits no edema or tenderness.   Neurological: She is alert and oriented to person, place, and time.   Skin: Skin is warm and dry. No rash noted.   Psychiatric: She has a normal mood and affect. Her behavior is  normal.         Assessment:       1. Persistent atrial fibrillation    2. Non-rheumatic mitral regurgitation    3. Atrial flutter, unspecified type    4. Benign essential hypertension    5. Pulmonary emphysema, unspecified emphysema type    6. Chronic respiratory failure with hypoxia    7. History of CVA (cerebrovascular accident)    8. Anxiety    9. ESRD on dialysis    10. Diabetes mellitus with ESRD (end-stage renal disease)         Plan:           Atrial fibrillation and atrial flutter.  Has had atrial flutter since initiation of amiodarone.  Also has moderate to severe MR.  Unclear how much the atrial arrhythmias are contributing to her MR.  Recommend:  RFA for atrial flutter first.  This is less risk than PVI.   If maintains nsr for 2 months >> repeat assessment of mitral valve.  Risks and benefits discussed, she would like to proceed.  ISAMAR day of procedure.  Hold Eliquis day prior to procedure.

## 2019-08-10 ENCOUNTER — PATIENT OUTREACH (OUTPATIENT)
Dept: ADMINISTRATIVE | Facility: OTHER | Age: 74
End: 2019-08-10

## 2019-08-12 ENCOUNTER — INITIAL CONSULT (OUTPATIENT)
Dept: ELECTROPHYSIOLOGY | Facility: CLINIC | Age: 74
End: 2019-08-12
Payer: MEDICARE

## 2019-08-12 ENCOUNTER — TELEPHONE (OUTPATIENT)
Dept: ELECTROPHYSIOLOGY | Facility: CLINIC | Age: 74
End: 2019-08-12

## 2019-08-12 VITALS
HEIGHT: 63 IN | DIASTOLIC BLOOD PRESSURE: 67 MMHG | BODY MASS INDEX: 23.09 KG/M2 | SYSTOLIC BLOOD PRESSURE: 118 MMHG | WEIGHT: 130.31 LBS | HEART RATE: 102 BPM

## 2019-08-12 DIAGNOSIS — I48.92 ATRIAL FLUTTER, UNSPECIFIED TYPE: ICD-10-CM

## 2019-08-12 DIAGNOSIS — I34.0 NON-RHEUMATIC MITRAL REGURGITATION: ICD-10-CM

## 2019-08-12 DIAGNOSIS — F41.9 ANXIETY: Chronic | ICD-10-CM

## 2019-08-12 DIAGNOSIS — N18.6 ESRD ON DIALYSIS: Chronic | ICD-10-CM

## 2019-08-12 DIAGNOSIS — N18.6 DIABETES MELLITUS WITH ESRD (END-STAGE RENAL DISEASE): Chronic | ICD-10-CM

## 2019-08-12 DIAGNOSIS — J96.11 CHRONIC RESPIRATORY FAILURE WITH HYPOXIA: Chronic | ICD-10-CM

## 2019-08-12 DIAGNOSIS — J43.9 PULMONARY EMPHYSEMA, UNSPECIFIED EMPHYSEMA TYPE: Chronic | ICD-10-CM

## 2019-08-12 DIAGNOSIS — E11.22 DIABETES MELLITUS WITH ESRD (END-STAGE RENAL DISEASE): Chronic | ICD-10-CM

## 2019-08-12 DIAGNOSIS — I10 BENIGN ESSENTIAL HYPERTENSION: Chronic | ICD-10-CM

## 2019-08-12 DIAGNOSIS — I48.19 PERSISTENT ATRIAL FIBRILLATION: Primary | ICD-10-CM

## 2019-08-12 DIAGNOSIS — Z86.73 HISTORY OF CVA (CEREBROVASCULAR ACCIDENT): Chronic | ICD-10-CM

## 2019-08-12 DIAGNOSIS — Z99.2 ESRD ON DIALYSIS: Chronic | ICD-10-CM

## 2019-08-12 PROCEDURE — 1101F PT FALLS ASSESS-DOCD LE1/YR: CPT | Mod: CPTII,S$GLB,, | Performed by: INTERNAL MEDICINE

## 2019-08-12 PROCEDURE — 99205 OFFICE O/P NEW HI 60 MIN: CPT | Mod: S$GLB,,, | Performed by: INTERNAL MEDICINE

## 2019-08-12 PROCEDURE — 99205 PR OFFICE/OUTPT VISIT, NEW, LEVL V, 60-74 MIN: ICD-10-PCS | Mod: S$GLB,,, | Performed by: INTERNAL MEDICINE

## 2019-08-12 PROCEDURE — 2024F 7 FLD RTA PHOTO EVC RTNOPTHY: CPT | Mod: S$GLB,,, | Performed by: INTERNAL MEDICINE

## 2019-08-12 PROCEDURE — 2024F PR 7 FIELD PHOTOS WITH INTERP/ REVIEW: ICD-10-PCS | Mod: S$GLB,,, | Performed by: INTERNAL MEDICINE

## 2019-08-12 PROCEDURE — 93005 RHYTHM STRIP: ICD-10-PCS | Mod: S$GLB,,, | Performed by: INTERNAL MEDICINE

## 2019-08-12 PROCEDURE — 93010 RHYTHM STRIP: ICD-10-PCS | Mod: S$GLB,,, | Performed by: INTERNAL MEDICINE

## 2019-08-12 PROCEDURE — 3044F HG A1C LEVEL LT 7.0%: CPT | Mod: CPTII,S$GLB,, | Performed by: INTERNAL MEDICINE

## 2019-08-12 PROCEDURE — 93005 ELECTROCARDIOGRAM TRACING: CPT | Mod: S$GLB,,, | Performed by: INTERNAL MEDICINE

## 2019-08-12 PROCEDURE — 99999 PR PBB SHADOW E&M-EST. PATIENT-LVL III: CPT | Mod: PBBFAC,,, | Performed by: INTERNAL MEDICINE

## 2019-08-12 PROCEDURE — 3044F PR MOST RECENT HEMOGLOBIN A1C LEVEL <7.0%: ICD-10-PCS | Mod: CPTII,S$GLB,, | Performed by: INTERNAL MEDICINE

## 2019-08-12 PROCEDURE — 99499 RISK ADDL DX/OHS AUDIT: ICD-10-PCS | Mod: S$GLB,,, | Performed by: INTERNAL MEDICINE

## 2019-08-12 PROCEDURE — 99499 UNLISTED E&M SERVICE: CPT | Mod: S$GLB,,, | Performed by: INTERNAL MEDICINE

## 2019-08-12 PROCEDURE — 1101F PR PT FALLS ASSESS DOC 0-1 FALLS W/OUT INJ PAST YR: ICD-10-PCS | Mod: CPTII,S$GLB,, | Performed by: INTERNAL MEDICINE

## 2019-08-12 PROCEDURE — 99999 PR PBB SHADOW E&M-EST. PATIENT-LVL III: ICD-10-PCS | Mod: PBBFAC,,, | Performed by: INTERNAL MEDICINE

## 2019-08-12 PROCEDURE — 93010 ELECTROCARDIOGRAM REPORT: CPT | Mod: S$GLB,,, | Performed by: INTERNAL MEDICINE

## 2019-08-12 RX ORDER — ALBUTEROL SULFATE 90 UG/1
AEROSOL, METERED RESPIRATORY (INHALATION)
Refills: 3 | COMMUNITY
Start: 2019-08-03 | End: 2020-01-27 | Stop reason: SDUPTHER

## 2019-08-12 RX ORDER — MIRTAZAPINE 7.5 MG/1
TABLET, FILM COATED ORAL
Refills: 3 | COMMUNITY
Start: 2019-08-06 | End: 2021-01-01

## 2019-08-12 NOTE — LETTER
August 12, 2019      Elgin Garcia MD  [address] [facility] [address]  [address]  [place] [address]           [facility] - Arrhythmia  [address]  [facility] [address]  Phone: [phone]  Fax: [phone]          Patient: Eli Vaz   MR Number: [id number]   YOB: 1945   Date of Visit: 8/12/2019       Dear Dr. Elgin Garcia:    Thank you for referring Eli Vaz to me for evaluation. Attached you will find relevant portions of my assessment and plan of care.    If you have questions, please do not hesitate to call me. I look forward to following Eli Vaz along with you.    Sincerely,    Hernandez Tesfaye MD    Enclosure  CC:  No Recipients    If you would like to receive this communication electronically, please contact [email or web address] or [phone] to request more information on [facility] Link access.    For providers and/or their staff who would like to refer a patient to [facility], please contact us through our one-stop-shop provider referral line, [facility], at [phone].    If you feel you have received this communication in error or would no longer like to receive these types of communications, please e-mail [email or web address]

## 2019-08-12 NOTE — TELEPHONE ENCOUNTER
Returned call to patient's daughter. Pt is needing a AFL ablation and has significant mitral valve regurgitation. Explained procedure to the daughter. She voiced understanding and stated she was familiar with the procedure.  She thanked me for calling.         ----- Message from Rebecca Ponce MA sent at 8/12/2019 10:40 AM CDT -----  Contact: Ms Ms Johnson/  daughter  [phone]  Spoke with Elizabeth she wants you to call her to explain what the procedure is how its done and why her mom needs it.   ----- Message -----  From: Hortencia Pereira  Sent: 8/12/2019  10:21 AM  To: Mera Ng Staff    Type:  Ms Johnson states would like  to speak with nurse.  Have questions regarding patient doctor visit today    Name of Caller:Ms Johnson  When is the first available appointment?  Symptoms:  Best Call Back Number:[phone]  Additional Information:

## 2019-08-15 ENCOUNTER — TELEPHONE (OUTPATIENT)
Dept: FAMILY MEDICINE | Facility: CLINIC | Age: 74
End: 2019-08-15

## 2019-08-15 DIAGNOSIS — G89.29 CHRONIC LEFT SHOULDER PAIN: ICD-10-CM

## 2019-08-15 DIAGNOSIS — M25.512 CHRONIC LEFT SHOULDER PAIN: ICD-10-CM

## 2019-08-15 DIAGNOSIS — M19.012 ARTHRITIS OF LEFT SHOULDER REGION: ICD-10-CM

## 2019-08-15 RX ORDER — HYDROCODONE BITARTRATE AND ACETAMINOPHEN 5; 325 MG/1; MG/1
1 TABLET ORAL EVERY 12 HOURS PRN
Qty: 60 TABLET | Refills: 0 | Status: SHIPPED | OUTPATIENT
Start: 2019-08-15 | End: 2019-09-14

## 2019-08-15 NOTE — TELEPHONE ENCOUNTER
Called pt daughter and informed her that medication was sent to pharmacy . Wait at least 30 minutes or pharmacy confirmation prior to getting

## 2019-08-15 NOTE — TELEPHONE ENCOUNTER
----- Message from Gia Moreno sent at 8/14/2019  3:51 PM CDT -----  Contact: DaughterAnnika Allen  Type: Patient Call Back    Who called: Zaina Allen    What is the request in detail: Daughter of the pt is requesting a call back in regards to the pt HYDROcodone-acetaminophen (NORCO) 5-325 mg per tablet. Daughter states that the was supposed to get a prescription for (NORCO) but never received it.      Can the clinic reply by [facility]? No     Would the patient rather a call back or a response via [facility]? Call back     Best call back number: [phone]    Additional Information:

## 2019-08-17 ENCOUNTER — PATIENT MESSAGE (OUTPATIENT)
Dept: ELECTROPHYSIOLOGY | Facility: CLINIC | Age: 74
End: 2019-08-17

## 2019-08-17 DIAGNOSIS — I48.92 ATRIAL FLUTTER, UNSPECIFIED TYPE: Primary | ICD-10-CM

## 2019-08-20 ENCOUNTER — TELEPHONE (OUTPATIENT)
Dept: ELECTROPHYSIOLOGY | Facility: CLINIC | Age: 74
End: 2019-08-20

## 2019-08-20 NOTE — TELEPHONE ENCOUNTER
Spoke to Elizabeth Hawk (pt's daughter)    CONFIRMED procedure arrival time of 9am on 8/21  Reiterated instructions including:  -Directions to check in desk  -NPO after midnight night prior to procedure  -High importance of HOLDING Eliquis X 1 day prior ( pt states that she did take dose 8/20 am. Dr. Tesfaye made aware)  -Confirmed compliance of Eliquis  -Pre-procedure LABS 7/22/19  -Confirmed no recent fever, bleeding, infection or skin rash in the past 30 days  -Do not wear mascara day of procedure    She verbalizes understanding of above and appreciates call.

## 2019-08-21 ENCOUNTER — HOSPITAL ENCOUNTER (OUTPATIENT)
Facility: HOSPITAL | Age: 74
Discharge: HOME | End: 2019-08-21
Attending: INTERNAL MEDICINE | Admitting: INTERNAL MEDICINE
Payer: MEDICARE

## 2019-08-21 VITALS
RESPIRATION RATE: 18 BRPM | BODY MASS INDEX: 21.27 KG/M2 | HEART RATE: 107 BPM | WEIGHT: 124.56 LBS | SYSTOLIC BLOOD PRESSURE: 152 MMHG | DIASTOLIC BLOOD PRESSURE: 63 MMHG | HEIGHT: 64 IN

## 2019-08-21 DIAGNOSIS — I48.92 ATRIAL FLUTTER: Primary | ICD-10-CM

## 2019-08-21 DIAGNOSIS — I48.92 ATRIAL FLUTTER, UNSPECIFIED TYPE: ICD-10-CM

## 2019-08-21 PROCEDURE — 93005 ELECTROCARDIOGRAM TRACING: CPT

## 2019-08-21 PROCEDURE — 93010 EKG 12-LEAD: ICD-10-PCS | Mod: ,,, | Performed by: INTERNAL MEDICINE

## 2019-08-21 PROCEDURE — 93010 ELECTROCARDIOGRAM REPORT: CPT | Mod: ,,, | Performed by: INTERNAL MEDICINE

## 2019-08-21 RX ORDER — HYDROMORPHONE HYDROCHLORIDE 1 MG/ML
0.2 INJECTION, SOLUTION INTRAMUSCULAR; INTRAVENOUS; SUBCUTANEOUS EVERY 5 MIN PRN
Status: CANCELLED | OUTPATIENT
Start: 2019-08-21

## 2019-08-21 RX ORDER — DIPHENHYDRAMINE HYDROCHLORIDE 50 MG/ML
25 INJECTION INTRAMUSCULAR; INTRAVENOUS EVERY 6 HOURS PRN
Status: CANCELLED | OUTPATIENT
Start: 2019-08-21

## 2019-08-21 RX ORDER — SODIUM CHLORIDE 0.9 % (FLUSH) 0.9 %
5 SYRINGE (ML) INJECTION
Status: DISCONTINUED | OUTPATIENT
Start: 2019-08-21 | End: 2021-01-01

## 2019-08-21 RX ORDER — FENTANYL CITRATE 50 UG/ML
25 INJECTION, SOLUTION INTRAMUSCULAR; INTRAVENOUS EVERY 5 MIN PRN
Status: CANCELLED | OUTPATIENT
Start: 2019-08-21

## 2019-08-21 RX ORDER — SODIUM CHLORIDE 9 MG/ML
INJECTION, SOLUTION INTRAVENOUS CONTINUOUS
Status: DISCONTINUED | OUTPATIENT
Start: 2019-08-21 | End: 2021-01-01

## 2019-08-21 NOTE — HPI
73 year old female here for ISAMAR prior to RFA ablation for AFL, she also has a history of AF, CVA, HFpEF, severe MR, ESRD on HD, COPD on home O2, DM and HTN.     ISAMAR 3/21/19    Conclusion     · Normal left ventricular systolic function. The estimated ejection fraction is 55%  · No thrombus seen  · Severe left atrial enlargement.  · Moderate right atrial enlargement.  · Moderate-to-severe mitral regurgitation.  · Moderate tricuspid regurgitation.  · At this time, a 200 J synchronous cardioversion was successfully performed with return to sinus rhythm .

## 2019-08-21 NOTE — PROGRESS NOTES
Procedure canceled. Patient discharged. Clinic will call with follow-up appointments. No changes made to medications. Patient ambulated off unit with family.

## 2019-08-21 NOTE — INTERVAL H&P NOTE
The patient has been examined and the H&P has been reviewed:    PE:   General: Well developed, well nourished, on NC 2 Liters   HEENT: Head is normocephalic, atraumatic;  Lungs: Clear to auscultation bilaterally.  No wheezing. Normal respiratory effort.  Cardiovascular:  Irregular irregular,   Systolic murmur   Extremities: No LE edema. L sided AVF incision site CDI, left sided arm swelling.   Abdomen: Abdomen is soft, non-tender non-distended with normal bowel sounds.  Skin: Skin color, texture, turgor normal. No rashes.  Musculoskeletal: normal range of motion   Neurologic: Normal strength and tone. No focal numbness or weakness.   Psychiatric: normal mood and affect.  behavior is normal. Alert and oriented X 3.  Labs reviewed         I concur with the findings and changes have occurred since H&P was written.  Patient presented to short stay unit 8/21/19, denies any fevers, bleeding, rash.     On admission Patient with left sided arm edema at fistula site.     Noted on admission, reports has been closely following up her vascular surgeon on  this past week, pt states L AVF nonfunctioning, and surgeons did intervention this past Thursday  And Monday > fistulogram, angioplasty?  > and patient underwent permacath placement > and HD via permacath for now. Pt states has to hold her Eliquis for both days, and last dose was on  Sunday. Pt states she has a follow up with Vascular surgeon in 2 weeks.   Given Left arm swelling, clotted AVF, and multiple recent  interventions requiring holding of ELiquis. The ablation for today will be cancelled, given increased risk of stroke post ablation and the need to be on un interrupted OAC post procedure. Pt to closely follow up with vascular Surgeon at , and we will reschedule Ablation at a later time.  Pt to resume Eliquis.  EP clinic will coordinate with patient and family regarding rescheduling upcoming procedure and pre op instructions.   Discharge plans/instructions  discussed with patient and her sister  At bedside  who verbalized understanding  and agreement of plans of care. No further questions or concerns  voiced at this time.     NEIL Washington-BC  Cardiology Electrophysiology  NP   [facility]    STAFF MD Hernandez Tesfaye               Active Hospital Problems    Diagnosis  POA    Atrial flutter [I48.92]  Yes      Resolved Hospital Problems   No resolved problems to display.

## 2019-08-21 NOTE — DISCHARGE SUMMARY
[facility]  Cardiac Electrophysiology  Discharge Summary      Patient Name: Eli Vaz  MRN: [id number]  Admission Date: 8/21/2019  Hospital Length of Stay: 0 days  Discharge Date and Time:  08/21/2019 11:33 AM  Attending Physician: Hernandez Tesfaye MD    Discharging Provider: Linda Cortes NP  Primary Care Physician: Charles Moody Jr, MD    HPI:     73 year old female with atrial fibrillation, atrial flutter, ESRD on HD (Tue Thu Sat), COPD on home oxygen, CVA, DM, Htn, Mitral regurgitation.  Primary cardiologist is Dr. Garcia.  Presented 3/19 with atrial fibrillation with RVR and CHF.   Echo 3/20/19 EF 55% moderate to severe MR DARINEL 56.  ISAMAR/DCCV 3/21/19 moderate to severe MR.  Developed recurrence of atrial flutter.  Placed on amiodarone, underwent DCCV 4/1/19.  At follow-up was back in atrial flutter.  Notes dyspnea on exertion c/w NYHA Class III CHF.  Denies palpitations, syncope.  Currently on Eliquis 2.5 mg BID    On last EP clinic visit with MD Tesfaye on 8/12/19 >  Recommended for atrial flutter RFA. Patient elected to proceed for planned procedure.       Patient presented to short stay unit 8/21/19, denies any fevers, bleeding, rash.     On admission Patient with left sided arm edema at fistula site.     Noted on admission, reports has been closely following up her vascular surgeon on [facility] this past week, pt states L AVF nonfunctioning, and vascular  surgeons did intervention this past Thursday 8/15/19,   And Monday 8/19/19 > fistulogram, angioplasty?  > and patient underwent permacath placement on 8/19/19 for HD access  > and HD via permacath for now. Pt states has to hold her Eliquis for both days, and last dose was on  Sunday > pt did not take her Eliquis yesterday . Pt states she has a follow up with Vascular surgeon in 2 weeks for re-assessment.  Given Left arm swelling, clotted AVF, and multiple recent  interventions requiring holding of ELiquis. The  Atrial flutter  ablation for today will be cancelled, given increased risk of stroke post ablation and the need to be on un interrupted OAC post procedure. Pt to closely follow up with vascular Surgeon at , and we will reschedule Ablation at a later time.  Pt to resume Eliquis.  EP clinic will coordinate with patient and family regarding rescheduling upcoming procedure and pre op instructions.   Discharge plans/instructions discussed with patient and her sister  At bedside  who verbalized understanding  and agreement of plans of care. No further questions or concerns  voiced at this time.         Final Active Diagnoses:    Diagnosis Date Noted POA    PRINCIPAL PROBLEM:  Atrial flutter [I48.92] 06/04/2019 Yes      Problems Resolved During this Admission:       Discharged Condition: good    Disposition: Home or Self Care    Follow Up:  Follow-up Information     Hernandez Tesfaye MD.    Specialties:  Electrophysiology, Cardiology  Why:  EP clinic will coordinate with patient regarding rescheduling procedure   Contact information:  [address]  [facility] [address]  [phone]                 Patient Instructions:      Diet renal     Notify your health care provider if you experience any of the following:  temperature >100.4     Notify your health care provider if you experience any of the following:  persistent nausea and vomiting or diarrhea     Notify your health care provider if you experience any of the following:  severe uncontrolled pain     Notify your health care provider if you experience any of the following:  redness, tenderness, or signs of infection (pain, swelling, redness, odor or green/yellow discharge around incision site)     Notify your health care provider if you experience any of the following:  difficulty breathing or increased cough     Notify your health care provider if you experience any of the following:  severe persistent headache     Notify your health care provider if you experience any of the  following:  worsening rash     Notify your health care provider if you experience any of the following:  persistent dizziness, light-headedness, or visual disturbances     Notify your health care provider if you experience any of the following:  increased confusion or weakness     Notify your health care provider if you experience any of the following:   Order Comments: For any concerning medical symptoms     Medications:  Reconciled Home Medications:      Medication List      CONTINUE taking these medications    * albuterol 2.5 mg /3 mL (0.083 %) nebulizer solution  Commonly known as:  PROVENTIL  Inhale 2.5 mg into the lungs.     * albuterol 90 mcg/actuation inhaler  Commonly known as:  PROVENTIL/VENTOLIN HFA  INHALE 2 PUFFS INTO LUNGS EVERY 4 HOURS AS NEEDED FOR SHORTNESS OF BREATH OR WHEEZING. RESCUE     amiodarone 200 MG Tab  Commonly known as:  PACERONE  Take 1 tablet (200 mg total) by mouth 2 (two) times daily.     calcium acetate 667 mg capsule  Commonly known as:  PHOSLO  Take 667 mg by mouth 3 (three) times daily with meals.     DIALYVITE 100-1 mg Tab  Generic drug:  B complex-vitamin C-folic acid  TAKE 1 TABLET BY MOUTH EVERY DAY WITH DINNER     ELIQUIS 2.5 mg Tab  Generic drug:  apixaban  Take 2.5 mg by mouth 2 (two) times daily.     HYDROcodone-acetaminophen 5-325 mg per tablet  Commonly known as:  NORCO  Take 1 tablet by mouth every 12 (twelve) hours as needed for Pain.     mirtazapine 7.5 MG Tab  Commonly known as:  REMERON  TAKE 1 TABLET BY MOUTH AT BEDTIME AS NEEDED FOR SLEEP 90     montelukast 10 mg tablet  Commonly known as:  SINGULAIR  TAKE 1 TABLET BY MOUTH EVERY DAY IN THE EVENING     paroxetine 30 MG tablet  Commonly known as:  PAXIL  1 TABLET IN THE MORNING ONCE A DAY ORALLY 90     RENAL CAPS ORAL  Take by mouth.         * This list has 2 medication(s) that are the same as other medications prescribed for you. Read the directions carefully, and ask your doctor or other care provider to review  them with you.                Time spent on the discharge of patient: 20  minutes    Patient seen and evaluated by STAFF Mera Cortes NP  Cardiac Electrophysiology  [facility]

## 2019-08-23 DIAGNOSIS — Z12.11 COLON CANCER SCREENING: Primary | ICD-10-CM

## 2019-08-29 ENCOUNTER — HOSPITAL ENCOUNTER (EMERGENCY)
Facility: HOSPITAL | Age: 74
Discharge: HOME | End: 2019-08-29
Attending: EMERGENCY MEDICINE
Payer: MEDICARE

## 2019-08-29 VITALS
HEART RATE: 106 BPM | DIASTOLIC BLOOD PRESSURE: 58 MMHG | BODY MASS INDEX: 22.88 KG/M2 | WEIGHT: 134 LBS | TEMPERATURE: 98 F | HEIGHT: 64 IN | SYSTOLIC BLOOD PRESSURE: 113 MMHG | OXYGEN SATURATION: 100 % | RESPIRATION RATE: 18 BRPM

## 2019-08-29 DIAGNOSIS — N18.6 ESRD (END STAGE RENAL DISEASE): Primary | ICD-10-CM

## 2019-08-29 DIAGNOSIS — R06.02 SOB (SHORTNESS OF BREATH): ICD-10-CM

## 2019-08-29 LAB
ALBUMIN SERPL BCP-MCNC: 3.1 G/DL (ref 3.5–5.2)
ALP SERPL-CCNC: 84 U/L (ref 55–135)
ALT SERPL W/O P-5'-P-CCNC: <5 U/L (ref 10–44)
ANION GAP SERPL CALC-SCNC: 12 MMOL/L (ref 8–16)
AST SERPL-CCNC: 19 U/L (ref 10–40)
BASOPHILS # BLD AUTO: 0 K/UL (ref 0–0.2)
BASOPHILS NFR BLD: 0 % (ref 0–1.9)
BILIRUB SERPL-MCNC: 0.4 MG/DL (ref 0.1–1)
BNP SERPL-MCNC: 1182 PG/ML (ref 0–99)
BUN SERPL-MCNC: 13 MG/DL (ref 8–23)
CALCIUM SERPL-MCNC: 8.1 MG/DL (ref 8.7–10.5)
CHLORIDE SERPL-SCNC: 101 MMOL/L (ref 95–110)
CO2 SERPL-SCNC: 28 MMOL/L (ref 23–29)
CREAT SERPL-MCNC: 3.6 MG/DL (ref 0.5–1.4)
DIFFERENTIAL METHOD: ABNORMAL
EOSINOPHIL # BLD AUTO: 0.1 K/UL (ref 0–0.5)
EOSINOPHIL NFR BLD: 2.4 % (ref 0–8)
ERYTHROCYTE [DISTWIDTH] IN BLOOD BY AUTOMATED COUNT: 17.6 % (ref 11.5–14.5)
EST. GFR  (AFRICAN AMERICAN): 14 ML/MIN/1.73 M^2
EST. GFR  (NON AFRICAN AMERICAN): 12 ML/MIN/1.73 M^2
GLUCOSE SERPL-MCNC: 86 MG/DL (ref 70–110)
HCT VFR BLD AUTO: 30 % (ref 37–48.5)
HGB BLD-MCNC: 8.9 G/DL (ref 12–16)
LYMPHOCYTES # BLD AUTO: 0.8 K/UL (ref 1–4.8)
LYMPHOCYTES NFR BLD: 19.9 % (ref 18–48)
MAGNESIUM SERPL-MCNC: 1.7 MG/DL (ref 1.6–2.6)
MCH RBC QN AUTO: 30 PG (ref 27–31)
MCHC RBC AUTO-ENTMCNC: 29.7 G/DL (ref 32–36)
MCV RBC AUTO: 101 FL (ref 82–98)
MONOCYTES # BLD AUTO: 0.6 K/UL (ref 0.3–1)
MONOCYTES NFR BLD: 16 % (ref 4–15)
NEUTROPHILS # BLD AUTO: 2.4 K/UL (ref 1.8–7.7)
NEUTROPHILS NFR BLD: 61.7 % (ref 38–73)
PLATELET # BLD AUTO: 213 K/UL (ref 150–350)
PMV BLD AUTO: 9.5 FL (ref 9.2–12.9)
POTASSIUM SERPL-SCNC: 3.5 MMOL/L (ref 3.5–5.1)
PROT SERPL-MCNC: 7.4 G/DL (ref 6–8.4)
RBC # BLD AUTO: 2.97 M/UL (ref 4–5.4)
SODIUM SERPL-SCNC: 141 MMOL/L (ref 136–145)
TROPONIN I SERPL DL<=0.01 NG/ML-MCNC: 0.03 NG/ML (ref 0–0.03)
WBC # BLD AUTO: 3.81 K/UL (ref 3.9–12.7)

## 2019-08-29 PROCEDURE — 84484 ASSAY OF TROPONIN QUANT: CPT

## 2019-08-29 PROCEDURE — 85025 COMPLETE CBC W/AUTO DIFF WBC: CPT

## 2019-08-29 PROCEDURE — 80053 COMPREHEN METABOLIC PANEL: CPT

## 2019-08-29 PROCEDURE — 93010 ELECTROCARDIOGRAM REPORT: CPT | Mod: ,,, | Performed by: INTERNAL MEDICINE

## 2019-08-29 PROCEDURE — 83880 ASSAY OF NATRIURETIC PEPTIDE: CPT

## 2019-08-29 PROCEDURE — 93010 EKG 12-LEAD: ICD-10-PCS | Mod: ,,, | Performed by: INTERNAL MEDICINE

## 2019-08-29 PROCEDURE — 83735 ASSAY OF MAGNESIUM: CPT

## 2019-08-29 PROCEDURE — 99285 EMERGENCY DEPT VISIT HI MDM: CPT | Mod: 25

## 2019-08-29 PROCEDURE — 25000003 PHARM REV CODE 250: Performed by: EMERGENCY MEDICINE

## 2019-08-29 PROCEDURE — 93005 ELECTROCARDIOGRAM TRACING: CPT

## 2019-08-29 RX ORDER — ASPIRIN 325 MG
325 TABLET, DELAYED RELEASE (ENTERIC COATED) ORAL
Status: COMPLETED | OUTPATIENT
Start: 2019-08-29 | End: 2019-08-29

## 2019-08-29 RX ADMIN — ASPIRIN 325 MG: 325 TABLET, DELAYED RELEASE ORAL at 10:08

## 2019-08-29 NOTE — ED TRIAGE NOTES
"73 y.o. Female presents to the ED with chief complaint of shortness of breath. Pt states "durign dialysis, I felt short of breath and could not finish my dialysis session." Pt denies chest pain. PT currently on 2L O2 at 99%. Pt resting in bed in NAD. Side rails up x2. Daughter at bed side.   "

## 2019-08-29 NOTE — ED PROVIDER NOTES
Encounter Date: 8/29/2019    SCRIBE #1 NOTE: I, Jerry Osorio, am scribing for, and in the presence of,  Kelton Roman MD. I have scribed the following portions of the note - Other sections scribed: HPI, ROS, PE.       History     Chief Complaint   Patient presents with    Shortness of Breath     pt states she did about 1.5 hrs of dialysis and became sob, h/o afib     CC: Shortness of Breath    HPI: This 73 y.o. Female with a fib, COPD, L port cath, dialysis pt, encounter for blood transfusion and ESRD presents to the ED for an evaluation of SOB which began today while getting dialysis. Pt reports she was 1.5 hours into dialysis before she asked to be taken off. She is still SOB now but it's not as bad. Pt is usually on dialysis for 3 hours and 15 mins. 2 liters of fluid is usually taken off through dialysis. She is unsure of how much fluid was taken off today. Pt woke up this morning feeling normal. She complies with O2 @ 2L at home PRN. Pt was scheduled for L arm stent removal surgery this morning at 10am at [facility]. She does not produce urine. Pt denies fever or pain. No other complaints.    The history is provided by the patient. No  was used.     Review of patient's allergies indicates:  No Known Allergies  Past Medical History:   Diagnosis Date    Arthritis     Atrial fibrillation     COPD (chronic obstructive pulmonary disease)     Dialysis patient     Encounter for blood transfusion     ESRD (end stage renal disease)      Past Surgical History:   Procedure Laterality Date    AV FISTULA PLACEMENT      Axillary, Suprascapular, lateral pectoral nerve blocks Left 5/29/2019    Performed by Isrrael Barton Jr., MD at [facility] ENDO    Cardioversion or Defibrillation N/A 4/2/2019    Performed by Rakesh Briggs MD at [facility] CATH LAB    Suprascapular, Axillary, and Lateral Pectoral Nerve Radiofrequency Ablations Left 6/19/2019    Performed by Isrrael Barton Jr., MD at [facility] ENDO     TRANSESOPHAGEAL ECHOCARDIOGRAM WITH POSSIBLE CARDIOVERSION (ISAMAR W/ POSS CARDIOVERSION) N/A 3/21/2019    Performed by Elgin Garcia [facility] at [facility] CATH LAB    TUBAL LIGATION       Family History   Problem Relation Age of Onset    Heart attack Sister     Heart attack Sister     Heart attack Mother      Social History     Tobacco Use    Smoking status: Former Smoker     Start date: 1/12/1991    Smokeless tobacco: Never Used    Tobacco comment: quit in 1991   Substance Use Topics    Alcohol use: No    Drug use: No     Review of Systems   Constitutional: Negative for chills and fever.   HENT: Negative for congestion, rhinorrhea and sore throat.    Eyes: Negative for pain.   Respiratory: Positive for shortness of breath.    Cardiovascular: Negative for chest pain.   Gastrointestinal: Negative for abdominal pain, diarrhea, nausea and vomiting.   Genitourinary: Negative for dysuria and hematuria.   Musculoskeletal: Negative for back pain and neck pain.   Skin: Negative for rash.   Neurological: Negative for dizziness, syncope and light-headedness.   Psychiatric/Behavioral: The patient is not nervous/anxious.        Physical Exam     Initial Vitals   BP Pulse Resp Temp SpO2   08/29/19 0906 08/29/19 0906 08/29/19 0906 08/29/19 0906 08/29/19 0941   (!) 125/59 108 20 98.2 °F (36.8 °C) 98 %      MAP       --                Physical Exam    Nursing note and vitals reviewed.  Constitutional: She is not diaphoretic. No distress.   HENT:   Head: Normocephalic and atraumatic.   Mouth/Throat: Oropharynx is clear and moist.   Eyes: EOM are normal. Pupils are equal, round, and reactive to light. No scleral icterus.   Neck: Normal range of motion. Neck supple. No JVD present.   Cardiovascular: Normal rate, regular rhythm and intact distal pulses.   Pulmonary/Chest: Breath sounds normal. No stridor. No respiratory distress.   Abdominal: Soft. Bowel sounds are normal. She exhibits no distension. There is no tenderness.    Musculoskeletal: Normal range of motion. She exhibits no edema or tenderness.   Chronic swelling to the left upper extremity with no change.   Neurological: She is alert and oriented to person, place, and time. She has normal strength. No cranial nerve deficit or sensory deficit.   Skin: Skin is warm and dry.   Psychiatric: She has a normal mood and affect.         ED Course   Procedures  Labs Reviewed   B-TYPE NATRIURETIC PEPTIDE - Abnormal; Notable for the following components:       Result Value    BNP 1,182 (*)     All other components within normal limits   CBC W/ AUTO DIFFERENTIAL - Abnormal; Notable for the following components:    WBC 3.81 (*)     RBC 2.97 (*)     Hemoglobin 8.9 (*)     Hematocrit 30.0 (*)     Mean Corpuscular Volume 101 (*)     Mean Corpuscular Hemoglobin Conc 29.7 (*)     RDW 17.6 (*)     Lymph # 0.8 (*)     Mono% 16.0 (*)     All other components within normal limits   COMPREHENSIVE METABOLIC PANEL - Abnormal; Notable for the following components:    Creatinine 3.6 (*)     Calcium 8.1 (*)     Albumin 3.1 (*)     ALT <5 (*)     eGFR if  14 (*)     eGFR if non  12 (*)     All other components within normal limits   TROPONIN I - Abnormal; Notable for the following components:    Troponin I 0.027 (*)     All other components within normal limits   MAGNESIUM     EKG Readings: (Independently Interpreted)   ekg done at 9:10 am snowing atrial flutter with 2:1 av conduction with rad and lafb. No st elevation. Flat t waves.  QTC of 468 .  Abnormal EKG but compared to previous and unchanged.       Imaging Results          X-Ray Chest AP Portable (Final result)  Result time 08/29/19 10:36:15    Final result by Huseyin Viera MD (08/29/19 10:36:15)                 Impression:      See above      Electronically signed by: Huseyin Viera MD  Date:    08/29/2019  Time:    10:36             Narrative:    EXAMINATION:  XR CHEST AP PORTABLE    CLINICAL  HISTORY:  sob;    TECHNIQUE:  Single frontal view of the chest was performed.    COMPARISON:  No 04/02/2019 ne    FINDINGS:  Central venous catheter in the SVC.  Vascular stents in the left axilla as before.  Mild cardiomegaly.  No significant airspace consolidation identified.  The costophrenic angles are slightly blunted laterally.                                 Medical Decision Making:   Initial Assessment:   73-year-old female coming in secondary to shortness of breath. I think this likely secondary to fluid shifts and potentially symptomatic anemia is blood was being pulled out through the dialysis.  Patient is feeling better now.  Patient was then placed on oxygen her baseline 2 L which actually admitted for better.  Will get basic labs make sure no major electrolyte abnormalities and orders major symptomatic anemia and or cardiac causes.    [email or web address]    Also considered but less likely:     PE:  No unilateral leg swelling. HPI and exam less likely.  Pneumonia: chest xray negative. No fever. No cough and lungs non consistent with pna  Tamponade: unlikely due to chest xray and ekg  STEMI: No STEMI on ekg  Dissection: equal pulses bilaterally and no ripping chest pain to the back  Esophageal rupture: no dysphagia or vomiting and chest xray negative for mediastinal air  Arrhythmia: no arrhythmia on ekg  CHF: no fluid overload on Cxr and physical exam  Pneumothorax: bilateral breath sounds and no signs of pneumothorax on chest xray     Labs showed slightly elevated troponin but actually lower than her baseline significantly.  Chest x-ray does show anything major acute issues.  Patient her baseline anemia.  No major electrolyte abnormalities.  EKG is abnormal but unchanged.  Patient is feeling better while in the emergency department.  100% on her baseline 2 L. Patient follow up outpatient. I discussed with the patient the diagnosis, treatment plan, indications for  return to the emergency department, and for expected follow-up. The patient verbalized an understanding. The patient is asked if there are any questions or concerns. We discuss the case, until all issues are addressed to the patients satisfaction. Patient understands and is agreeable to the plan.   Kelton Roman          Clinical Tests:   Lab Tests: Ordered and Reviewed  Radiological Study: Reviewed and Ordered  Medical Tests: Ordered and Reviewed            Scribe Attestation:   Scribe #1: I performed the above scribed service and the documentation accurately describes the services I performed. I attest to the accuracy of the note.    Attending Attestation:           Physician Attestation for Scribe:  Physician Attestation Statement for Scribe #1: I, Kelton Roman MD, reviewed documentation, as scribed by Jerry Osorio in my presence, and it is both accurate and complete.                    Clinical Impression:       ICD-10-CM ICD-9-CM   1. ESRD (end stage renal disease) N18.6 585.6   2. SOB (shortness of breath) R06.02 786.05                                Kelton Roman MD  08/29/19 1046

## 2019-10-25 ENCOUNTER — PATIENT OUTREACH (OUTPATIENT)
Dept: ADMINISTRATIVE | Facility: OTHER | Age: 74
End: 2019-10-25

## 2019-10-25 DIAGNOSIS — N18.6 DIABETES MELLITUS WITH ESRD (END-STAGE RENAL DISEASE): Primary | Chronic | ICD-10-CM

## 2019-10-25 DIAGNOSIS — E11.22 DIABETES MELLITUS WITH ESRD (END-STAGE RENAL DISEASE): Primary | Chronic | ICD-10-CM

## 2019-10-28 DIAGNOSIS — J44.9 CHRONIC OBSTRUCTIVE PULMONARY DISEASE, UNSPECIFIED COPD TYPE: ICD-10-CM

## 2019-10-28 RX ORDER — ALBUTEROL SULFATE 90 UG/1
AEROSOL, METERED RESPIRATORY (INHALATION)
Qty: 36 INHALER | Refills: 3 | Status: SHIPPED | OUTPATIENT
Start: 2019-10-28 | End: 2020-01-27 | Stop reason: SDUPTHER

## 2019-11-08 ENCOUNTER — PATIENT OUTREACH (OUTPATIENT)
Dept: ADMINISTRATIVE | Facility: OTHER | Age: 74
End: 2019-11-08

## 2019-11-20 ENCOUNTER — PATIENT OUTREACH (OUTPATIENT)
Dept: ADMINISTRATIVE | Facility: OTHER | Age: 74
End: 2019-11-20

## 2019-11-22 ENCOUNTER — LAB VISIT (OUTPATIENT)
Dept: LAB | Facility: HOSPITAL | Age: 74
End: 2019-11-22
Attending: PAIN MEDICINE
Payer: MEDICARE

## 2019-11-22 ENCOUNTER — OFFICE VISIT (OUTPATIENT)
Dept: PAIN MEDICINE | Facility: CLINIC | Age: 74
End: 2019-11-22
Payer: MEDICARE

## 2019-11-22 VITALS
HEIGHT: 64 IN | SYSTOLIC BLOOD PRESSURE: 110 MMHG | DIASTOLIC BLOOD PRESSURE: 55 MMHG | WEIGHT: 130.31 LBS | HEART RATE: 103 BPM | BODY MASS INDEX: 22.25 KG/M2

## 2019-11-22 DIAGNOSIS — G89.29 CHRONIC LEFT SHOULDER PAIN: Primary | ICD-10-CM

## 2019-11-22 DIAGNOSIS — M25.512 CHRONIC LEFT SHOULDER PAIN: Primary | ICD-10-CM

## 2019-11-22 DIAGNOSIS — M19.012 ARTHRITIS OF LEFT SHOULDER REGION: ICD-10-CM

## 2019-11-22 DIAGNOSIS — G89.29 CHRONIC LEFT SHOULDER PAIN: ICD-10-CM

## 2019-11-22 DIAGNOSIS — M25.512 CHRONIC LEFT SHOULDER PAIN: ICD-10-CM

## 2019-11-22 PROCEDURE — 36415 COLL VENOUS BLD VENIPUNCTURE: CPT | Mod: PN

## 2019-11-22 PROCEDURE — 99214 PR OFFICE/OUTPT VISIT, EST, LEVL IV, 30-39 MIN: ICD-10-PCS | Mod: S$GLB,,, | Performed by: PAIN MEDICINE

## 2019-11-22 PROCEDURE — 1101F PT FALLS ASSESS-DOCD LE1/YR: CPT | Mod: CPTII,S$GLB,, | Performed by: PAIN MEDICINE

## 2019-11-22 PROCEDURE — 99999 PR PBB SHADOW E&M-EST. PATIENT-LVL III: CPT | Mod: PBBFAC,,, | Performed by: PAIN MEDICINE

## 2019-11-22 PROCEDURE — 99214 OFFICE O/P EST MOD 30 MIN: CPT | Mod: S$GLB,,, | Performed by: PAIN MEDICINE

## 2019-11-22 PROCEDURE — 80307 DRUG TEST PRSMV CHEM ANLYZR: CPT

## 2019-11-22 PROCEDURE — 1159F PR MEDICATION LIST DOCUMENTED IN MEDICAL RECORD: ICD-10-PCS | Mod: S$GLB,,, | Performed by: PAIN MEDICINE

## 2019-11-22 PROCEDURE — 1159F MED LIST DOCD IN RCRD: CPT | Mod: S$GLB,,, | Performed by: PAIN MEDICINE

## 2019-11-22 PROCEDURE — 1125F PR PAIN SEVERITY QUANTIFIED, PAIN PRESENT: ICD-10-PCS | Mod: S$GLB,,, | Performed by: PAIN MEDICINE

## 2019-11-22 PROCEDURE — 1101F PR PT FALLS ASSESS DOC 0-1 FALLS W/OUT INJ PAST YR: ICD-10-PCS | Mod: CPTII,S$GLB,, | Performed by: PAIN MEDICINE

## 2019-11-22 PROCEDURE — 1125F AMNT PAIN NOTED PAIN PRSNT: CPT | Mod: S$GLB,,, | Performed by: PAIN MEDICINE

## 2019-11-22 PROCEDURE — 99999 PR PBB SHADOW E&M-EST. PATIENT-LVL III: ICD-10-PCS | Mod: PBBFAC,,, | Performed by: PAIN MEDICINE

## 2019-11-22 RX ORDER — SEVELAMER CARBONATE 800 MG/1
TABLET, FILM COATED ORAL
Refills: 3 | COMMUNITY
Start: 2019-09-03

## 2019-11-22 RX ORDER — FLUTICASONE PROPIONATE 50 MCG
1 SPRAY, SUSPENSION (ML) NASAL
COMMUNITY
Start: 2016-05-18 | End: 2020-08-11 | Stop reason: SDUPTHER

## 2019-11-22 RX ORDER — ATORVASTATIN CALCIUM 10 MG/1
10 TABLET, FILM COATED ORAL DAILY
Refills: 1 | COMMUNITY
Start: 2019-09-04 | End: 2019-11-27 | Stop reason: SDUPTHER

## 2019-11-22 RX ORDER — GENTAMICIN SULFATE 10 MG/ML
INJECTION, SOLUTION, CONCENTRATE INTRAVENOUS
COMMUNITY
End: 2019-11-22

## 2019-11-22 RX ORDER — HYDROCODONE BITARTRATE AND ACETAMINOPHEN 5; 325 MG/1; MG/1
1 TABLET ORAL
Refills: 0 | COMMUNITY
Start: 2019-10-17 | End: 2019-11-25 | Stop reason: SDUPTHER

## 2019-11-22 RX ORDER — HYDRALAZINE HYDROCHLORIDE 50 MG/1
TABLET, FILM COATED ORAL
COMMUNITY
Start: 2015-06-16 | End: 2019-11-22

## 2019-11-22 NOTE — PROGRESS NOTES
Subjective:     Patient ID: Eli Vaz is a 74 y.o. female    Chief Complaint: Shoulder Pain and Knee Pain      Referred by: No ref. provider found      HPI:    Interval History (11/22/19):  She returns today for follow up.  She reports that she is no longer getting any relief from left shoulder radiofrequency ablation.  She denies any changes in the quality or location of her pain but does state that is more bothersome and limits her ADLs more than previously.  Patient states that she has taken Norco 5-325 mg b.i.d. p.r.n..  This medication greatly improved her pain and function.  She denies any adverse effects from this medication.  She no longer has any more this medication.      Interval History (8/5/19):  She returns today for follow up.  She reports that she continues to get at least 75% relief of her left shoulder pain following left shoulder RFA.  She states that is still quite painful to perform certain ADLs including getting dressed.  She has not yet filled the prescription for [place] provided at last visit.  She feels as though she may require this medicine to help with certain activities throughout her day.      Interval History (7/8/19):  She returns today for follow up.  She reports that left shoulder radiofrequency ablation has been helpful for the left shoulder pain. She reports about 70% relief of her pain.  She also reports some minimally improved range of motion.  She still taking Norco 7.5-325 mg b.i.d. p.r.n..      Interval History (5/13/19):  She returns today for follow up.  She reports that her left shoulder pain is unchanged in quality location since last encounter.  She has been evaluated by Orthopedic surgery and no surgical options are being pursued at this time. She states that Norco 7.5-325 mg q.8 hours has been helpful for her pain. She denies any adverse effects with this medication.      Interval History (2/11/19):  She returns today for follow up.  She reports that Norco 7.5-325  half pill q.4 hours has been helpful for the left shoulder pain. Patient states that this provides about 50% relief of her pain.  She no longer has pain while at rest and does note slightly improved range of motion of her left shoulder with minimal to no pain. She still has significantly limited range of motion and function of her left upper extremity as result.  She has discussed potential arthroplasty with Dr. Zurita.  She plans to discuss this option with the rest of her treatment team to see if it is something she wants to proceed with.  She denies any adverse effects from taking her pain medication.  Specifically she denies any sedation or constipation.  She does not feels the higher doses are needed at this time as she is worried about adverse effects and feels as though her current dosage provides adequate relief.      Initial Encounter (1/21/19):  Eli Vaz is a 74 y.o. female who presents today with chronic left shoulder pain. Patient was referred by Dr. Zurita.  She has known left shoulder arthritis and rotator cuff pathology.  Limited surgical and interventional options given medical comorbidities patient has end-stage renal disease on dialysis.  She has failed shoulder injections including Synvisc.  She states that her left shoulder pain is very severe and limits her activities.  She wants somebody to cut it off.    This pain is described in detail below.    Physical Therapy:  Unlikely to be of benefit    Non-pharmacologic Treatment:  Nothing helps         · TENS?  No    Pain Medications:         · Currently taking:  Nothing    · Has tried in the past:  Tramadol, Percocet, cannot take NSAIDs due to renal disease, Norco    · Has not tried: NSAIDs, Muscle relaxants, TCAs, SNRIs, anticonvulsants, topical creams    Blood thinners:  Eliquis    Interventional Therapies:    Previous shoulder injections including Synvisc.  6/19/19 - left shoulder radiofrequency ablation - 70% relief    Relevant Surgeries:   None    Affecting sleep?  Yes    Affecting daily activities? yes    Depressive symptoms? yes          · SI/HI? No    Work status: Unemployed    Pain Scores:    Best:       5/10  Worst:     8/10  Usually:   5/10  Today:    6/10    Review of Systems   Constitutional: Negative for activity change, appetite change, chills, fatigue, fever and unexpected weight change.   HENT: Negative for hearing loss.    Eyes: Negative for visual disturbance.   Respiratory: Negative for chest tightness and shortness of breath.    Cardiovascular: Negative for chest pain.   Gastrointestinal: Negative for abdominal pain, constipation, diarrhea, nausea and vomiting.   Genitourinary: Negative for difficulty urinating.   Musculoskeletal: Positive for arthralgias and myalgias. Negative for back pain, gait problem and neck pain.   Skin: Negative for rash.   Neurological: Positive for weakness. Negative for dizziness, light-headedness, numbness and headaches.   Psychiatric/Behavioral: Positive for sleep disturbance. Negative for hallucinations and suicidal ideas. The patient is not nervous/anxious.        Past Medical History:   Diagnosis Date    Arthritis     Atrial fibrillation     COPD (chronic obstructive pulmonary disease)     Dialysis patient     Encounter for blood transfusion     ESRD (end stage renal disease)        Past Surgical History:   Procedure Laterality Date    AV FISTULA PLACEMENT      EPIDURAL STEROID INJECTION Left 5/29/2019    Procedure: Axillary, Suprascapular, lateral pectoral nerve blocks;  Surgeon: Isrrael Barton Jr., MD;  Location: [facility] ENDO;  Service: Pain Management;  Laterality: Left;  Left Axillary, Suprascapular, Lateral Pectoral Nerve Blocks    22000    Arrive @ 0800; Eliquis; No DM; Confirm date with Armando    EPIDURAL STEROID INJECTION Left 6/19/2019    Procedure: Suprascapular, Axillary, and Lateral Pectoral Nerve Radiofrequency Ablations;  Surgeon: Isrrael Barton Jr., MD;  Location: [facility] ENDO;   Service: Pain Management;  Laterality: Left;  Left Suprascapular, Axillary, & Lateral Pectoral Nerve Radiofrequency Ablations    35491    Arrive @ 1145; IV Sedation- Fasting; Eliquis; No DM; 17-50-2; Rep?    TREATMENT OF CARDIAC ARRHYTHMIA N/A 4/2/2019    Procedure: Cardioversion or Defibrillation;  Surgeon: Rakesh Briggs MD;  Location: [facility] CATH LAB;  Service: Cardiology;  Laterality: N/A;    TUBAL LIGATION         Social History     Socioeconomic History    Marital status:      Spouse name: Not on file    Number of children: Not on file    Years of education: Not on file    Highest education level: Not on file   Occupational History    Not on file   Social Needs    Financial resource strain: Not on file    Food insecurity:     Worry: Not on file     Inability: Not on file    Transportation needs:     Medical: Not on file     Non-medical: Not on file   Tobacco Use    Smoking status: Former Smoker     Start date: 1/12/1991    Smokeless tobacco: Never Used    Tobacco comment: quit in 1991   Substance and Sexual Activity    Alcohol use: No    Drug use: No    Sexual activity: Not on file   Lifestyle    Physical activity:     Days per week: Not on file     Minutes per session: Not on file    Stress: Not on file   Relationships    Social connections:     Talks on phone: Not on file     Gets together: Not on file     Attends [employer] service: Not on file     Active member of club or organization: Not on file     Attends meetings of clubs or organizations: Not on file     Relationship status: Not on file   Other Topics Concern    Not on file   Social History Narrative    Not on file       Review of patient's allergies indicates:  No Known Allergies    Current Outpatient Medications on File Prior to Visit   Medication Sig Dispense Refill    albuterol (PROVENTIL) 2.5 mg /3 mL (0.083 %) nebulizer solution Inhale 2.5 mg into the lungs.      albuterol (PROVENTIL/VENTOLIN HFA) 90 mcg/actuation  inhaler INHALE 2 PUFFS INTO LUNGS EVERY 4 HOURS AS NEEDED FOR SHORTNESS OF BREATH OR WHEEZING. RESCUE  3    albuterol (PROVENTIL/VENTOLIN HFA) 90 mcg/actuation inhaler INHALE 2 PUFFS INTO LUNGS EVERY 4 HOURS AS NEEDED FOR SHORTNESS OF BREATH OR WHEEZING. RESCUE 36 Inhaler 3    amiodarone (PACERONE) 200 MG Tab Take 1 tablet (200 mg total) by mouth 2 (two) times daily. 180 tablet 3    apixaban (ELIQUIS) 2.5 mg Tab Take 2.5 mg by mouth 2 (two) times daily.      atorvastatin (LIPITOR) 10 MG tablet Take 10 mg by mouth once daily.  1    B complex w-C no.20/folic acid (RENAL CAPS ORAL) Take by mouth.      calcium acetate (PHOSLO) 667 mg capsule Take 667 mg by mouth 3 (three) times daily with meals.      fluticasone propionate (FLONASE) 50 mcg/actuation nasal spray 1 spray by Nasal route.      HYDROcodone-acetaminophen (NORCO) 5-325 mg per tablet Take 1 tablet by mouth every 4 to 6 hours as needed.  0    ipratropium-albuterol (COMBIVENT RESPIMAT)  mcg/actuation inhaler INHALE 1 PUFF INTO THE LUNGS DAILY.      mirtazapine (REMERON) 7.5 MG Tab TAKE 1 TABLET BY MOUTH AT BEDTIME AS NEEDED FOR SLEEP 90  3    montelukast (SINGULAIR) 10 mg tablet TAKE 1 TABLET BY MOUTH EVERY DAY IN THE EVENING 30 tablet 2    paroxetine (PAXIL) 30 MG tablet 1 TABLET IN THE MORNING ONCE A DAY ORALLY 90  3    sevelamer carbonate (RENVELA) 800 mg Tab TAKE 3 TABLETS BY MOUTH WITH EACH MEAL AND 1 TABLET WITH EACH SNACK  3    vit B,C-iron fum-FA-D3-zinc ox 8 mg iron-800 mcg-1,000 unit Tab Take by mouth.      [DISCONTINUED] DIALYVITE 100-1 mg Tab TAKE 1 TABLET BY MOUTH EVERY DAY WITH DINNER  0    [DISCONTINUED] gentamicin sulfate, PF, 60 mg/6 mL Soln Inject into the vein.      [DISCONTINUED] hydrALAZINE (APRESOLINE) 50 MG tablet TAKE 1 TABLET BY MOUTH DAILY.       Current Facility-Administered Medications on File Prior to Visit   Medication Dose Route Frequency Provider Last Rate Last Dose    0.9%  NaCl infusion   Intravenous  "Continuous Alaina Chaudhari NP        sodium chloride 0.9% flush 5 mL  5 mL Intravenous PRN Alaina Chaudhari NP           Objective:      BP (!) 110/55   Pulse 103   Ht 5' 4" (1.626 m)   Wt 59.1 kg (130 lb 4.8 oz)   BMI 22.37 kg/m²     Exam:  GEN:  Well developed, well nourished.  No acute distress.   HEENT:  No trauma.  Mucous membranes moist.  Nares patent bilaterally.  PSYCH: Normal affect. Thought content appropriate.  CHEST:  Breathing symmetric.  No audible wheezing.  ABD: Soft, non-distended.  SKIN:  Warm, pink, dry.  No rash on exposed areas.    EXT:  No cyanosis, clubbing, or edema.  No color change or changes in nail or hair growth.  NEURO/MUSCULOSKELETAL:  Fully alert, oriented, and appropriate. Speech normal merrill. No cranial nerve deficits.   Gait:  Normal.  No focal motor deficits.         Imaging:  Narrative     EXAMINATION:  XR SHOULDER COMPLETE 2 OR MORE VIEWS LEFT    CLINICAL HISTORY:  Pain in left shoulder    TECHNIQUE:  Two or three views of the left shoulder were performed.    COMPARISON:  None    FINDINGS:  The bones are intact.  There is no evidence for acute fracture or bone destruction.  There are advanced degenerative changes of the left glenohumeral joint with marked joint space narrowing with subchondral sclerosis and osteophyte formation.  There is cortical irregularity at the articular surfaces of the glenohumeral joint.  Multiple vascular stents project over the left upper arm and left subclavian region with multiple surgical clips also present.  Atherosclerotic calcification is present within the thoracic aorta as well as within the carotid arteries.   Impression       Advanced degenerative changes of the left glenohumeral joint.    No evidence for acute fracture, bone destruction, or dislocation.    Surgical changes with multiple vascular stents.    Extensive atherosclerosis.      Electronically signed by: Tone Pereyra MD  Date: 09/18/2018  Time: 07:58 "         Assessment:       Encounter Diagnoses   Name Primary?    Chronic left shoulder pain Yes    Arthritis of left shoulder region          Plan:       Eli was seen today for shoulder pain and knee pain.    Diagnoses and all orders for this visit:    Chronic left shoulder pain  -     DRUGS OF ABUSE SCREEN, BLOOD; Future    Arthritis of left shoulder region  -     DRUGS OF ABUSE SCREEN, BLOOD; Future        Eli Vaz is a 74 y.o. female with chronic left shoulder pain secondary to rotator cuff and glenohumeral pathology.  Not a candidate for left shoulder arthroplasty.  Very good improvement following left shoulder radiofrequency ablation but relief did not last very long.    1.  We had a lengthy discussion regarding the potential adverse effects of chronic opioid medications.  We discussed that it may be appropriate to continue the use of opioid pain medications, but that we should use the lowest dose/potency possible.  Patient expressed understanding.  2.  Serum drug screen today.  3.  Prescription monitoring port was reviewed today.  No inconsistencies were noted.  4.  Pain contract signed today.  5.  If no inconsistencies noted on serum drug screen, I will prescribe Norco 5-325 mg b.i.d. p.r.n. 60 pills per month.  6.  Return to clinic in 3 months.

## 2019-11-25 ENCOUNTER — TELEPHONE (OUTPATIENT)
Dept: PAIN MEDICINE | Facility: CLINIC | Age: 74
End: 2019-11-25

## 2019-11-25 DIAGNOSIS — G89.29 CHRONIC LEFT SHOULDER PAIN: ICD-10-CM

## 2019-11-25 DIAGNOSIS — M25.512 CHRONIC LEFT SHOULDER PAIN: ICD-10-CM

## 2019-11-25 DIAGNOSIS — M19.012 ARTHRITIS OF LEFT SHOULDER REGION: Primary | ICD-10-CM

## 2019-11-25 RX ORDER — HYDROCODONE BITARTRATE AND ACETAMINOPHEN 5; 325 MG/1; MG/1
1 TABLET ORAL EVERY 12 HOURS PRN
Qty: 60 TABLET | Refills: 0 | Status: SHIPPED | OUTPATIENT
Start: 2020-01-24 | End: 2020-01-08 | Stop reason: SDUPTHER

## 2019-11-25 RX ORDER — HYDROCODONE BITARTRATE AND ACETAMINOPHEN 5; 325 MG/1; MG/1
1 TABLET ORAL EVERY 12 HOURS PRN
Qty: 60 TABLET | Refills: 0 | Status: SHIPPED | OUTPATIENT
Start: 2019-12-25 | End: 2020-01-08

## 2019-11-25 RX ORDER — HYDROCODONE BITARTRATE AND ACETAMINOPHEN 5; 325 MG/1; MG/1
1 TABLET ORAL EVERY 12 HOURS PRN
Qty: 60 TABLET | Refills: 0 | Status: SHIPPED | OUTPATIENT
Start: 2019-11-25 | End: 2019-12-25

## 2019-11-25 NOTE — TELEPHONE ENCOUNTER
----- Message from Isrrael Barton Jr., MD sent at 11/25/2019  7:02 AM CST -----  Please notify patient that I have received her drug screen results.  No inconsistencies were noted. I sent prescriptions for Norco 5-325 mg q.12 hours as needed.  She can get these filled as early as today.  Thank you.

## 2019-11-29 RX ORDER — ATORVASTATIN CALCIUM 10 MG/1
10 TABLET, FILM COATED ORAL DAILY
Qty: 90 TABLET | Refills: 1 | Status: SHIPPED | OUTPATIENT
Start: 2019-11-29 | End: 2020-04-13 | Stop reason: SDUPTHER

## 2019-12-12 ENCOUNTER — PATIENT OUTREACH (OUTPATIENT)
Dept: ADMINISTRATIVE | Facility: OTHER | Age: 74
End: 2019-12-12

## 2019-12-13 ENCOUNTER — OFFICE VISIT (OUTPATIENT)
Dept: CARDIOLOGY | Facility: CLINIC | Age: 74
End: 2019-12-13
Payer: MEDICARE

## 2019-12-13 VITALS
SYSTOLIC BLOOD PRESSURE: 126 MMHG | BODY MASS INDEX: 22.2 KG/M2 | HEIGHT: 64 IN | DIASTOLIC BLOOD PRESSURE: 75 MMHG | WEIGHT: 130.06 LBS | HEART RATE: 104 BPM

## 2019-12-13 DIAGNOSIS — N18.6 ESRD ON DIALYSIS: Chronic | ICD-10-CM

## 2019-12-13 DIAGNOSIS — E11.69 COMBINED HYPERLIPIDEMIA ASSOCIATED WITH TYPE 2 DIABETES MELLITUS: Chronic | ICD-10-CM

## 2019-12-13 DIAGNOSIS — I48.19 PERSISTENT ATRIAL FIBRILLATION: ICD-10-CM

## 2019-12-13 DIAGNOSIS — I10 HYPERTENSION: ICD-10-CM

## 2019-12-13 DIAGNOSIS — E78.2 COMBINED HYPERLIPIDEMIA ASSOCIATED WITH TYPE 2 DIABETES MELLITUS: Chronic | ICD-10-CM

## 2019-12-13 DIAGNOSIS — J43.1 PANLOBULAR EMPHYSEMA: Chronic | ICD-10-CM

## 2019-12-13 DIAGNOSIS — Z86.73 HISTORY OF CVA (CEREBROVASCULAR ACCIDENT): Primary | Chronic | ICD-10-CM

## 2019-12-13 DIAGNOSIS — E11.59 HYPERTENSION ASSOCIATED WITH DIABETES: Chronic | ICD-10-CM

## 2019-12-13 DIAGNOSIS — I34.0 NON-RHEUMATIC MITRAL REGURGITATION: ICD-10-CM

## 2019-12-13 DIAGNOSIS — Z99.2 ESRD ON DIALYSIS: Chronic | ICD-10-CM

## 2019-12-13 DIAGNOSIS — I48.3 TYPICAL ATRIAL FLUTTER: ICD-10-CM

## 2019-12-13 DIAGNOSIS — I15.2 HYPERTENSION ASSOCIATED WITH DIABETES: Chronic | ICD-10-CM

## 2019-12-13 DIAGNOSIS — I51.89 DIASTOLIC DYSFUNCTION: Chronic | ICD-10-CM

## 2019-12-13 PROCEDURE — 99214 OFFICE O/P EST MOD 30 MIN: CPT | Mod: S$GLB,,, | Performed by: INTERNAL MEDICINE

## 2019-12-13 PROCEDURE — 1126F PR PAIN SEVERITY QUANTIFIED, NO PAIN PRESENT: ICD-10-PCS | Mod: S$GLB,,, | Performed by: INTERNAL MEDICINE

## 2019-12-13 PROCEDURE — 1126F AMNT PAIN NOTED NONE PRSNT: CPT | Mod: S$GLB,,, | Performed by: INTERNAL MEDICINE

## 2019-12-13 PROCEDURE — 1101F PR PT FALLS ASSESS DOC 0-1 FALLS W/OUT INJ PAST YR: ICD-10-PCS | Mod: CPTII,S$GLB,, | Performed by: INTERNAL MEDICINE

## 2019-12-13 PROCEDURE — 99999 PR PBB SHADOW E&M-EST. PATIENT-LVL V: CPT | Mod: PBBFAC,,, | Performed by: INTERNAL MEDICINE

## 2019-12-13 PROCEDURE — 99999 PR PBB SHADOW E&M-EST. PATIENT-LVL V: ICD-10-PCS | Mod: PBBFAC,,, | Performed by: INTERNAL MEDICINE

## 2019-12-13 PROCEDURE — 1159F PR MEDICATION LIST DOCUMENTED IN MEDICAL RECORD: ICD-10-PCS | Mod: S$GLB,,, | Performed by: INTERNAL MEDICINE

## 2019-12-13 PROCEDURE — 93000 ELECTROCARDIOGRAM COMPLETE: CPT | Mod: S$GLB,,, | Performed by: INTERNAL MEDICINE

## 2019-12-13 PROCEDURE — 3044F PR MOST RECENT HEMOGLOBIN A1C LEVEL <7.0%: ICD-10-PCS | Mod: CPTII,S$GLB,, | Performed by: INTERNAL MEDICINE

## 2019-12-13 PROCEDURE — 3044F HG A1C LEVEL LT 7.0%: CPT | Mod: CPTII,S$GLB,, | Performed by: INTERNAL MEDICINE

## 2019-12-13 PROCEDURE — 1101F PT FALLS ASSESS-DOCD LE1/YR: CPT | Mod: CPTII,S$GLB,, | Performed by: INTERNAL MEDICINE

## 2019-12-13 PROCEDURE — 99214 PR OFFICE/OUTPT VISIT, EST, LEVL IV, 30-39 MIN: ICD-10-PCS | Mod: S$GLB,,, | Performed by: INTERNAL MEDICINE

## 2019-12-13 PROCEDURE — 99499 RISK ADDL DX/OHS AUDIT: ICD-10-PCS | Mod: S$GLB,,, | Performed by: INTERNAL MEDICINE

## 2019-12-13 PROCEDURE — 99499 UNLISTED E&M SERVICE: CPT | Mod: S$GLB,,, | Performed by: INTERNAL MEDICINE

## 2019-12-13 PROCEDURE — 1159F MED LIST DOCD IN RCRD: CPT | Mod: S$GLB,,, | Performed by: INTERNAL MEDICINE

## 2019-12-13 PROCEDURE — 93000 EKG 12-LEAD: ICD-10-PCS | Mod: S$GLB,,, | Performed by: INTERNAL MEDICINE

## 2019-12-13 NOTE — PROGRESS NOTES
Subjective:    Patient ID:  Eli Vaz is a 74 y.o. female who presents for follow-up of Hypertension      HPI     PAF on amiodarone and eliquis - s/p ISAMAR/CV 3/21/19 and CV 4/2/19,  ESRD, MR - mod-severe, diastolic CHF, HTN, DM, COPD, CVA      Echo 3/20/19  · Normal left ventricular systolic function. The estimated ejection fraction is 55%  · Concentric left ventricular remodeling.  · Indeterminate left ventricular diastolic function.  · Normal right ventricular systolic function.  · Moderate left atrial enlargement.  · Mild right atrial enlargement.  · Moderate-to-severe mitral regurgitation.  · Mild to moderate tricuspid regurgitation.  · The estimated PA systolic pressure is 65 mm Hg  · Pulmonary hypertension present.  · Small pericardial effusion. No tamponade     Stress test 2/4/19  · There is a mild, infarct defect(s) in the lateral apical wall(s),  · An ejection fraction of 72 % at rest  · The EKG portion of this study is negative for myocardial ischemia.      2/13/19 CP has resolved  Mild stable SINCLAIR  May need surgical evaluation for MVR if MR worsens  May have upcoming shoulder surgery - cleared at moderate cardiac risk  OV 3 months     4/10/19 Feels better since discharge  Less SOB - on home O2  EKG NSR NSSTT changes  Staying in NSR so far. Decrease amiodarone 200 qd  Will refer to EP if PAF returns  Discussed the possible need for MVR/Maze in the future  OV 1 month with EKG     5/6/19 EKG A-flutter 2:1 at 115  Said her HR had been in the 60s until yesterday  SOB at rest - on home O2   Refractory A-flutter - will increase amiodarone 200 bid and add toprol XL 25 qd  Refer to EP to see if she is an ablation candidate  Not excited about possible MVR  OV here 2 weeks with repeat EKG    12/13/19 Was scheduled for A-flutter ablation 8/21/19 but this was canceled due to being off eliquis for AV fistula procedure. Never followed up with EP afterwards  Mild SINCLAIR, denies CP  EKG A-flutter 104    Review of Systems    Constitution: Negative for decreased appetite.   HENT: Negative for ear discharge.    Eyes: Negative for blurred vision.   Respiratory: Negative for hemoptysis.    Endocrine: Negative for polyphagia.   Hematologic/Lymphatic: Negative for adenopathy.   Skin: Negative for color change.   Musculoskeletal: Negative for joint swelling.   Genitourinary: Negative for bladder incontinence.   Neurological: Negative for brief paralysis.   Psychiatric/Behavioral: Negative for hallucinations.   Allergic/Immunologic: Negative for hives.        Objective:    Physical Exam   Constitutional: She is oriented to person, place, and time. She appears well-developed and well-nourished.   HENT:   Head: Normocephalic and atraumatic.   Eyes: Pupils are equal, round, and reactive to light. Conjunctivae are normal.   Neck: Normal range of motion. Neck supple.   Cardiovascular: Normal rate and intact distal pulses.   Murmur heard.  High-pitched blowing holosystolic murmur is present with a grade of 2/6 at the apex.  Pulmonary/Chest: Effort normal and breath sounds normal.   Abdominal: Soft. Bowel sounds are normal.   Musculoskeletal: Normal range of motion.   Neurological: She is alert and oriented to person, place, and time.   Skin: Skin is warm and dry.         Assessment:       1. History of CVA (cerebrovascular accident)    2. Panlobular emphysema    3. Hypertension associated with diabetes    4. Combined hyperlipidemia associated with type 2 diabetes mellitus    5. Diastolic dysfunction    6. Non-rheumatic mitral regurgitation    7. Persistent atrial fibrillation    8. Typical atrial flutter    9. ESRD on dialysis         Plan:       Echo to look at MR and EF  Refer back to EP for possible A-flutter ablation  OV here 1 month

## 2019-12-20 ENCOUNTER — HOSPITAL ENCOUNTER (OUTPATIENT)
Dept: CARDIOLOGY | Facility: HOSPITAL | Age: 74
Discharge: HOME | End: 2019-12-20
Attending: INTERNAL MEDICINE
Payer: MEDICARE

## 2019-12-20 DIAGNOSIS — E11.59 HYPERTENSION ASSOCIATED WITH DIABETES: ICD-10-CM

## 2019-12-20 DIAGNOSIS — J43.1 PANLOBULAR EMPHYSEMA: ICD-10-CM

## 2019-12-20 DIAGNOSIS — I51.89 DIASTOLIC DYSFUNCTION: ICD-10-CM

## 2019-12-20 DIAGNOSIS — I15.2 HYPERTENSION ASSOCIATED WITH DIABETES: ICD-10-CM

## 2019-12-20 DIAGNOSIS — Z99.2 ESRD ON DIALYSIS: ICD-10-CM

## 2019-12-20 DIAGNOSIS — E78.2 COMBINED HYPERLIPIDEMIA ASSOCIATED WITH TYPE 2 DIABETES MELLITUS: ICD-10-CM

## 2019-12-20 DIAGNOSIS — I34.0 NON-RHEUMATIC MITRAL REGURGITATION: ICD-10-CM

## 2019-12-20 DIAGNOSIS — N18.6 ESRD ON DIALYSIS: ICD-10-CM

## 2019-12-20 DIAGNOSIS — I48.19 PERSISTENT ATRIAL FIBRILLATION: ICD-10-CM

## 2019-12-20 DIAGNOSIS — Z86.73 HISTORY OF CVA (CEREBROVASCULAR ACCIDENT): ICD-10-CM

## 2019-12-20 DIAGNOSIS — E11.69 COMBINED HYPERLIPIDEMIA ASSOCIATED WITH TYPE 2 DIABETES MELLITUS: ICD-10-CM

## 2019-12-20 DIAGNOSIS — I48.3 TYPICAL ATRIAL FLUTTER: ICD-10-CM

## 2019-12-20 LAB
AORTIC ROOT ANNULUS: 3.59 CM
AORTIC VALVE CUSP SEPERATION: 1.64 CM
ASCENDING AORTA: 3.11 CM
AV INDEX (PROSTH): 0.57
AV MEAN GRADIENT: 5 MMHG
AV PEAK GRADIENT: 10 MMHG
AV VALVE AREA: 1.39 CM2
AV VELOCITY RATIO: 0.65
CV ECHO LV RWT: 0.86 CM
DOP CALC AO PEAK VEL: 1.57 M/S
DOP CALC AO VTI: 26.38 CM
DOP CALC LVOT AREA: 2.5 CM2
DOP CALC LVOT DIAMETER: 1.77 CM
DOP CALC LVOT PEAK VEL: 1.02 M/S
DOP CALC LVOT STROKE VOLUME: 36.79 CM3
DOP CALCLVOT PEAK VEL VTI: 14.96 CM
E WAVE DECELERATION TIME: 201.91 MSEC
E/A RATIO: 1.57
ECHO LV POSTERIOR WALL: 1.76 CM (ref 0.6–1.1)
FRACTIONAL SHORTENING: 34 % (ref 28–44)
INTERVENTRICULAR SEPTUM: 1.91 CM (ref 0.6–1.1)
IVRT: 0.05 MSEC
LA MAJOR: 6.24 CM
LA MINOR: 6.32 CM
LA WIDTH: 3.23 CM
LEFT ATRIUM SIZE: 5.15 CM
LEFT ATRIUM VOLUME: 88.79 CM3
LEFT INTERNAL DIMENSION IN SYSTOLE: 2.7 CM (ref 2.1–4)
LEFT VENTRICLE DIASTOLIC VOLUME: 73.63 ML
LEFT VENTRICLE SYSTOLIC VOLUME: 27.06 ML
LEFT VENTRICULAR INTERNAL DIMENSION IN DIASTOLE: 4.09 CM (ref 3.5–6)
LEFT VENTRICULAR MASS: 332.46 G
MV PEAK A VEL: 1.25 M/S
MV PEAK E VEL: 1.96 M/S
PISA TR MAX VEL: 4.74 M/S
PULM VEIN S/D RATIO: 0.89
PV PEAK D VEL: 0.7 M/S
PV PEAK S VEL: 0.62 M/S
PV PEAK VELOCITY: 0.85 CM/S
RA MAJOR: 5.99 CM
RA PRESSURE: 15 MMHG
RA WIDTH: 3.18 CM
RIGHT VENTRICULAR END-DIASTOLIC DIMENSION: 3.28 CM
RV TISSUE DOPPLER FREE WALL SYSTOLIC VELOCITY 1 (APICAL 4 CHAMBER VIEW): 5.05 CM/S
SINUS: 3.11 CM
STJ: 2.37 CM
TR MAX PG: 90 MMHG
TRICUSPID ANNULAR PLANE SYSTOLIC EXCURSION: 2.03 CM
TV REST PULMONARY ARTERY PRESSURE: 105 MMHG

## 2019-12-20 PROCEDURE — 93306 TTE W/DOPPLER COMPLETE: CPT

## 2019-12-20 PROCEDURE — 93306 TTE W/DOPPLER COMPLETE: CPT | Mod: 26,,, | Performed by: INTERNAL MEDICINE

## 2019-12-20 PROCEDURE — 93306 ECHO (CUPID ONLY): ICD-10-PCS | Mod: 26,,, | Performed by: INTERNAL MEDICINE

## 2020-01-01 ENCOUNTER — TELEPHONE (OUTPATIENT)
Dept: ELECTROPHYSIOLOGY | Facility: CLINIC | Age: 75
End: 2020-01-01

## 2020-01-01 ENCOUNTER — CLINICAL SUPPORT (OUTPATIENT)
Dept: CARDIOLOGY | Facility: HOSPITAL | Age: 75
End: 2020-01-01
Attending: INTERNAL MEDICINE
Payer: MEDICARE

## 2020-01-01 ENCOUNTER — OFFICE VISIT (OUTPATIENT)
Dept: FAMILY MEDICINE | Facility: CLINIC | Age: 75
End: 2020-01-01
Payer: MEDICARE

## 2020-01-01 ENCOUNTER — LAB VISIT (OUTPATIENT)
Dept: LAB | Facility: HOSPITAL | Age: 75
End: 2020-01-01
Attending: FAMILY MEDICINE
Payer: MEDICARE

## 2020-01-01 ENCOUNTER — TELEPHONE (OUTPATIENT)
Dept: PAIN MEDICINE | Facility: CLINIC | Age: 75
End: 2020-01-01

## 2020-01-01 ENCOUNTER — PATIENT OUTREACH (OUTPATIENT)
Dept: ADMINISTRATIVE | Facility: OTHER | Age: 75
End: 2020-01-01

## 2020-01-01 ENCOUNTER — TELEPHONE (OUTPATIENT)
Dept: FAMILY MEDICINE | Facility: CLINIC | Age: 75
End: 2020-01-01

## 2020-01-01 ENCOUNTER — LAB VISIT (OUTPATIENT)
Dept: LAB | Facility: HOSPITAL | Age: 75
End: 2020-01-01
Attending: PAIN MEDICINE
Payer: MEDICARE

## 2020-01-01 ENCOUNTER — CLINICAL SUPPORT (OUTPATIENT)
Dept: CARDIOLOGY | Facility: HOSPITAL | Age: 75
End: 2020-01-01
Attending: NURSE PRACTITIONER
Payer: MEDICARE

## 2020-01-01 ENCOUNTER — OFFICE VISIT (OUTPATIENT)
Dept: ENDOCRINOLOGY | Facility: CLINIC | Age: 75
End: 2020-01-01
Attending: INTERNAL MEDICINE
Payer: MEDICARE

## 2020-01-01 ENCOUNTER — TELEPHONE (OUTPATIENT)
Dept: ENDOCRINOLOGY | Facility: CLINIC | Age: 75
End: 2020-01-01

## 2020-01-01 ENCOUNTER — OFFICE VISIT (OUTPATIENT)
Dept: PAIN MEDICINE | Facility: CLINIC | Age: 75
End: 2020-01-01
Payer: MEDICARE

## 2020-01-01 VITALS
SYSTOLIC BLOOD PRESSURE: 132 MMHG | TEMPERATURE: 98 F | DIASTOLIC BLOOD PRESSURE: 73 MMHG | WEIGHT: 128.31 LBS | HEART RATE: 70 BPM | OXYGEN SATURATION: 99 % | BODY MASS INDEX: 22.73 KG/M2 | HEIGHT: 63 IN | RESPIRATION RATE: 17 BRPM

## 2020-01-01 VITALS
HEIGHT: 63 IN | WEIGHT: 130.94 LBS | DIASTOLIC BLOOD PRESSURE: 74 MMHG | BODY MASS INDEX: 23.2 KG/M2 | HEART RATE: 67 BPM | SYSTOLIC BLOOD PRESSURE: 157 MMHG

## 2020-01-01 VITALS
BODY MASS INDEX: 22.93 KG/M2 | DIASTOLIC BLOOD PRESSURE: 67 MMHG | OXYGEN SATURATION: 97 % | HEART RATE: 65 BPM | TEMPERATURE: 98 F | HEIGHT: 63 IN | WEIGHT: 129.44 LBS | RESPIRATION RATE: 18 BRPM | SYSTOLIC BLOOD PRESSURE: 147 MMHG

## 2020-01-01 VITALS
DIASTOLIC BLOOD PRESSURE: 60 MMHG | BODY MASS INDEX: 23.21 KG/M2 | SYSTOLIC BLOOD PRESSURE: 144 MMHG | WEIGHT: 131 LBS | HEART RATE: 65 BPM | TEMPERATURE: 98 F

## 2020-01-01 DIAGNOSIS — Z99.2 ESRD ON DIALYSIS: ICD-10-CM

## 2020-01-01 DIAGNOSIS — E03.2 HYPOTHYROIDISM DUE TO AMIODARONE: Primary | ICD-10-CM

## 2020-01-01 DIAGNOSIS — M25.512 CHRONIC LEFT SHOULDER PAIN: ICD-10-CM

## 2020-01-01 DIAGNOSIS — G89.29 CHRONIC LEFT SHOULDER PAIN: Primary | ICD-10-CM

## 2020-01-01 DIAGNOSIS — B35.3 TINEA PEDIS OF BOTH FEET: ICD-10-CM

## 2020-01-01 DIAGNOSIS — N18.6 DIABETES MELLITUS WITH ESRD (END-STAGE RENAL DISEASE): Chronic | ICD-10-CM

## 2020-01-01 DIAGNOSIS — I49.8 OTHER SPECIFIED CARDIAC ARRHYTHMIAS: Primary | ICD-10-CM

## 2020-01-01 DIAGNOSIS — M19.012 ARTHRITIS OF LEFT SHOULDER REGION: ICD-10-CM

## 2020-01-01 DIAGNOSIS — E83.9 CHRONIC KIDNEY DISEASE-MINERAL AND BONE DISORDER: ICD-10-CM

## 2020-01-01 DIAGNOSIS — N18.6 ESRD ON DIALYSIS: ICD-10-CM

## 2020-01-01 DIAGNOSIS — J32.9 RHINOSINUSITIS: ICD-10-CM

## 2020-01-01 DIAGNOSIS — E11.22 DIABETES MELLITUS WITH ESRD (END-STAGE RENAL DISEASE): Chronic | ICD-10-CM

## 2020-01-01 DIAGNOSIS — E55.9 VITAMIN D DEFICIENCY: ICD-10-CM

## 2020-01-01 DIAGNOSIS — N18.6 DIABETES MELLITUS WITH ESRD (END-STAGE RENAL DISEASE): ICD-10-CM

## 2020-01-01 DIAGNOSIS — Z99.2 ESRD ON DIALYSIS: Chronic | ICD-10-CM

## 2020-01-01 DIAGNOSIS — I48.19 PERSISTENT ATRIAL FIBRILLATION: ICD-10-CM

## 2020-01-01 DIAGNOSIS — E07.9 THYROID DISORDER: ICD-10-CM

## 2020-01-01 DIAGNOSIS — N25.81 SECONDARY HYPERPARATHYROIDISM: ICD-10-CM

## 2020-01-01 DIAGNOSIS — E03.9 ACQUIRED HYPOTHYROIDISM: Primary | ICD-10-CM

## 2020-01-01 DIAGNOSIS — R09.82 POSTNASAL DRIP: ICD-10-CM

## 2020-01-01 DIAGNOSIS — J44.9 CHRONIC OBSTRUCTIVE PULMONARY DISEASE, UNSPECIFIED COPD TYPE: Primary | ICD-10-CM

## 2020-01-01 DIAGNOSIS — E03.9 ACQUIRED HYPOTHYROIDISM: ICD-10-CM

## 2020-01-01 DIAGNOSIS — L85.3 XEROSIS CUTIS: ICD-10-CM

## 2020-01-01 DIAGNOSIS — N18.9 CHRONIC KIDNEY DISEASE-MINERAL AND BONE DISORDER: ICD-10-CM

## 2020-01-01 DIAGNOSIS — T46.2X1A HYPOTHYROIDISM DUE TO AMIODARONE: Primary | ICD-10-CM

## 2020-01-01 DIAGNOSIS — G89.29 CHRONIC LEFT SHOULDER PAIN: ICD-10-CM

## 2020-01-01 DIAGNOSIS — M89.9 CHRONIC KIDNEY DISEASE-MINERAL AND BONE DISORDER: ICD-10-CM

## 2020-01-01 DIAGNOSIS — M25.512 CHRONIC LEFT SHOULDER PAIN: Primary | ICD-10-CM

## 2020-01-01 DIAGNOSIS — N18.6 ESRD ON DIALYSIS: Chronic | ICD-10-CM

## 2020-01-01 DIAGNOSIS — E11.22 DIABETES MELLITUS WITH ESRD (END-STAGE RENAL DISEASE): ICD-10-CM

## 2020-01-01 DIAGNOSIS — I48.91 ATRIAL FIBRILLATION, UNSPECIFIED TYPE: ICD-10-CM

## 2020-01-01 LAB
25(OH)D3+25(OH)D2 SERPL-MCNC: 9 NG/ML (ref 30–96)
ALBUMIN SERPL BCP-MCNC: 3.9 G/DL (ref 3.5–5.2)
ALP SERPL-CCNC: 78 U/L (ref 55–135)
ALT SERPL W/O P-5'-P-CCNC: 5 U/L (ref 10–44)
AMPHETAMINES SERPL QL: NEGATIVE
ANION GAP SERPL CALC-SCNC: 8 MMOL/L (ref 8–16)
AST SERPL-CCNC: 15 U/L (ref 10–40)
BARBITURATES SERPL QL SCN: NEGATIVE
BASOPHILS # BLD AUTO: 0.3 K/UL (ref 0–0.2)
BASOPHILS NFR BLD: 3.1 % (ref 0–1.9)
BENZODIAZ SERPL QL SCN: NEGATIVE
BILIRUB SERPL-MCNC: 0.6 MG/DL (ref 0.1–1)
BUN SERPL-MCNC: 26 MG/DL (ref 8–23)
BZE SERPL QL: NEGATIVE
CALCIUM SERPL-MCNC: 11 MG/DL (ref 8.7–10.5)
CARBOXYTHC SERPL QL SCN: NEGATIVE
CHLORIDE SERPL-SCNC: 105 MMOL/L (ref 95–110)
CHOLEST SERPL-MCNC: 165 MG/DL (ref 120–199)
CHOLEST/HDLC SERPL: 1.8 {RATIO} (ref 2–5)
CO2 SERPL-SCNC: 28 MMOL/L (ref 23–29)
CREAT SERPL-MCNC: 5.4 MG/DL (ref 0.5–1.4)
DIFFERENTIAL METHOD: ABNORMAL
EOSINOPHIL # BLD AUTO: 1.4 K/UL (ref 0–0.5)
EOSINOPHIL NFR BLD: 14.8 % (ref 0–8)
ERYTHROCYTE [DISTWIDTH] IN BLOOD BY AUTOMATED COUNT: 18.3 % (ref 11.5–14.5)
EST. GFR  (AFRICAN AMERICAN): 8 ML/MIN/1.73 M^2
EST. GFR  (NON AFRICAN AMERICAN): 7 ML/MIN/1.73 M^2
ESTIMATED AVG GLUCOSE: 94 MG/DL (ref 68–131)
ETHANOL SERPL QL SCN: NEGATIVE
GLUCOSE SERPL-MCNC: 69 MG/DL (ref 70–110)
HBA1C MFR BLD HPLC: 4.9 % (ref 4–5.6)
HCT VFR BLD AUTO: 39.7 % (ref 37–48.5)
HDLC SERPL-MCNC: 92 MG/DL (ref 40–75)
HDLC SERPL: 55.8 % (ref 20–50)
HGB BLD-MCNC: 11.1 G/DL (ref 12–16)
IMM GRANULOCYTES # BLD AUTO: 0.28 K/UL (ref 0–0.04)
IMM GRANULOCYTES NFR BLD AUTO: 2.9 % (ref 0–0.5)
LDLC SERPL CALC-MCNC: 63.2 MG/DL (ref 63–159)
LYMPHOCYTES # BLD AUTO: 1.1 K/UL (ref 1–4.8)
LYMPHOCYTES NFR BLD: 10.9 % (ref 18–48)
MCH RBC QN AUTO: 26.7 PG (ref 27–31)
MCHC RBC AUTO-ENTMCNC: 28 G/DL (ref 32–36)
MCV RBC AUTO: 96 FL (ref 82–98)
METHADONE SERPL QL SCN: NEGATIVE
MONOCYTES # BLD AUTO: 0.4 K/UL (ref 0.3–1)
MONOCYTES NFR BLD: 3.6 % (ref 4–15)
NEUTROPHILS # BLD AUTO: 6.2 K/UL (ref 1.8–7.7)
NEUTROPHILS NFR BLD: 64.7 % (ref 38–73)
NONHDLC SERPL-MCNC: 73 MG/DL
NRBC BLD-RTO: 0 /100 WBC
OPIATES SERPL QL SCN: NEGATIVE
PCP SERPL QL SCN: NEGATIVE
PLATELET # BLD AUTO: 369 K/UL (ref 150–350)
PMV BLD AUTO: 11.1 FL (ref 9.2–12.9)
POTASSIUM SERPL-SCNC: 5.9 MMOL/L (ref 3.5–5.1)
PROPOXYPH SERPL QL: NEGATIVE
PROT SERPL-MCNC: 7.2 G/DL (ref 6–8.4)
PTH-INTACT SERPL-MCNC: 347.2 PG/ML (ref 9–77)
RBC # BLD AUTO: 4.15 M/UL (ref 4–5.4)
SODIUM SERPL-SCNC: 141 MMOL/L (ref 136–145)
T4 FREE SERPL-MCNC: 0.89 NG/DL (ref 0.71–1.51)
THYROPEROXIDASE AB SERPL-ACNC: <1 IU/ML
THYROPEROXIDASE IGG SERPL-ACNC: <6 IU/ML
TRIGL SERPL-MCNC: 49 MG/DL (ref 30–150)
TSH SERPL DL<=0.005 MIU/L-ACNC: 19.95 UIU/ML (ref 0.4–4)
TSI SER-ACNC: <89 % BASELINE
WBC # BLD AUTO: 9.62 K/UL (ref 3.9–12.7)

## 2020-01-01 PROCEDURE — 0296T CV CARDIAC MONITOR - 3-14 DAY ADULT (CUPID ONLY): CPT | Mod: ,,, | Performed by: INTERNAL MEDICINE

## 2020-01-01 PROCEDURE — 1125F AMNT PAIN NOTED PAIN PRSNT: CPT | Mod: S$GLB,,, | Performed by: HOSPITALIST

## 2020-01-01 PROCEDURE — 1159F MED LIST DOCD IN RCRD: CPT | Mod: S$GLB,,, | Performed by: PAIN MEDICINE

## 2020-01-01 PROCEDURE — 1159F PR MEDICATION LIST DOCUMENTED IN MEDICAL RECORD: ICD-10-PCS | Mod: S$GLB,,, | Performed by: PAIN MEDICINE

## 2020-01-01 PROCEDURE — 3044F HG A1C LEVEL LT 7.0%: CPT | Mod: CPTII,S$GLB,, | Performed by: HOSPITALIST

## 2020-01-01 PROCEDURE — 3008F PR BODY MASS INDEX (BMI) DOCUMENTED: ICD-10-PCS | Mod: CPTII,S$GLB,, | Performed by: HOSPITALIST

## 2020-01-01 PROCEDURE — 84443 ASSAY THYROID STIM HORMONE: CPT

## 2020-01-01 PROCEDURE — 36415 COLL VENOUS BLD VENIPUNCTURE: CPT | Mod: PN

## 2020-01-01 PROCEDURE — 99214 OFFICE O/P EST MOD 30 MIN: CPT | Mod: S$GLB,,, | Performed by: FAMILY MEDICINE

## 2020-01-01 PROCEDURE — 3044F PR MOST RECENT HEMOGLOBIN A1C LEVEL <7.0%: ICD-10-PCS | Mod: CPTII,S$GLB,, | Performed by: FAMILY MEDICINE

## 2020-01-01 PROCEDURE — 0298T CV CARDIAC MONITOR - 3-14 DAY ADULT (CUPID ONLY): CPT | Mod: ,,, | Performed by: INTERNAL MEDICINE

## 2020-01-01 PROCEDURE — 3008F BODY MASS INDEX DOCD: CPT | Mod: CPTII,S$GLB,, | Performed by: FAMILY MEDICINE

## 2020-01-01 PROCEDURE — 1159F MED LIST DOCD IN RCRD: CPT | Mod: S$GLB,,, | Performed by: FAMILY MEDICINE

## 2020-01-01 PROCEDURE — 3008F PR BODY MASS INDEX (BMI) DOCUMENTED: ICD-10-PCS | Mod: CPTII,S$GLB,, | Performed by: FAMILY MEDICINE

## 2020-01-01 PROCEDURE — 1101F PT FALLS ASSESS-DOCD LE1/YR: CPT | Mod: CPTII,S$GLB,, | Performed by: FAMILY MEDICINE

## 2020-01-01 PROCEDURE — 3008F PR BODY MASS INDEX (BMI) DOCUMENTED: ICD-10-PCS | Mod: CPTII,S$GLB,, | Performed by: PAIN MEDICINE

## 2020-01-01 PROCEDURE — 80061 LIPID PANEL: CPT

## 2020-01-01 PROCEDURE — 99999 PR PBB SHADOW E&M-EST. PATIENT-LVL V: CPT | Mod: PBBFAC,,, | Performed by: HOSPITALIST

## 2020-01-01 PROCEDURE — 3008F BODY MASS INDEX DOCD: CPT | Mod: CPTII,S$GLB,, | Performed by: PAIN MEDICINE

## 2020-01-01 PROCEDURE — 1126F AMNT PAIN NOTED NONE PRSNT: CPT | Mod: S$GLB,,, | Performed by: FAMILY MEDICINE

## 2020-01-01 PROCEDURE — 1125F PR PAIN SEVERITY QUANTIFIED, PAIN PRESENT: ICD-10-PCS | Mod: S$GLB,,, | Performed by: PAIN MEDICINE

## 2020-01-01 PROCEDURE — 99214 PR OFFICE/OUTPT VISIT, EST, LEVL IV, 30-39 MIN: ICD-10-PCS | Mod: S$GLB,,, | Performed by: FAMILY MEDICINE

## 2020-01-01 PROCEDURE — 99999 PR PBB SHADOW E&M-EST. PATIENT-LVL IV: CPT | Mod: PBBFAC,,, | Performed by: PAIN MEDICINE

## 2020-01-01 PROCEDURE — 86376 MICROSOMAL ANTIBODY EACH: CPT

## 2020-01-01 PROCEDURE — 3288F PR FALLS RISK ASSESSMENT DOCUMENTED: ICD-10-PCS | Mod: CPTII,S$GLB,, | Performed by: FAMILY MEDICINE

## 2020-01-01 PROCEDURE — 3288F FALL RISK ASSESSMENT DOCD: CPT | Mod: CPTII,S$GLB,, | Performed by: PAIN MEDICINE

## 2020-01-01 PROCEDURE — 3044F HG A1C LEVEL LT 7.0%: CPT | Mod: CPTII,S$GLB,, | Performed by: FAMILY MEDICINE

## 2020-01-01 PROCEDURE — 83970 ASSAY OF PARATHORMONE: CPT

## 2020-01-01 PROCEDURE — 3288F FALL RISK ASSESSMENT DOCD: CPT | Mod: CPTII,S$GLB,, | Performed by: FAMILY MEDICINE

## 2020-01-01 PROCEDURE — 1159F PR MEDICATION LIST DOCUMENTED IN MEDICAL RECORD: ICD-10-PCS | Mod: S$GLB,,, | Performed by: FAMILY MEDICINE

## 2020-01-01 PROCEDURE — 1101F PR PT FALLS ASSESS DOC 0-1 FALLS W/OUT INJ PAST YR: ICD-10-PCS | Mod: CPTII,S$GLB,, | Performed by: FAMILY MEDICINE

## 2020-01-01 PROCEDURE — 1101F PR PT FALLS ASSESS DOC 0-1 FALLS W/OUT INJ PAST YR: ICD-10-PCS | Mod: CPTII,S$GLB,, | Performed by: PAIN MEDICINE

## 2020-01-01 PROCEDURE — 82306 VITAMIN D 25 HYDROXY: CPT

## 2020-01-01 PROCEDURE — 83036 HEMOGLOBIN GLYCOSYLATED A1C: CPT

## 2020-01-01 PROCEDURE — 80053 COMPREHEN METABOLIC PANEL: CPT

## 2020-01-01 PROCEDURE — 85025 COMPLETE CBC W/AUTO DIFF WBC: CPT

## 2020-01-01 PROCEDURE — 1159F MED LIST DOCD IN RCRD: CPT | Mod: S$GLB,,, | Performed by: HOSPITALIST

## 2020-01-01 PROCEDURE — 0298T CV CARDIAC MONITOR - 3-14 DAY ADULT (CUPID ONLY): ICD-10-PCS | Mod: ,,, | Performed by: INTERNAL MEDICINE

## 2020-01-01 PROCEDURE — 3008F BODY MASS INDEX DOCD: CPT | Mod: CPTII,S$GLB,, | Performed by: HOSPITALIST

## 2020-01-01 PROCEDURE — 99214 OFFICE O/P EST MOD 30 MIN: CPT | Mod: S$GLB,,, | Performed by: PAIN MEDICINE

## 2020-01-01 PROCEDURE — 99999 PR PBB SHADOW E&M-EST. PATIENT-LVL IV: CPT | Mod: PBBFAC,,, | Performed by: FAMILY MEDICINE

## 2020-01-01 PROCEDURE — 1101F PT FALLS ASSESS-DOCD LE1/YR: CPT | Mod: CPTII,S$GLB,, | Performed by: PAIN MEDICINE

## 2020-01-01 PROCEDURE — 99499 UNLISTED E&M SERVICE: CPT | Mod: S$GLB,,, | Performed by: PAIN MEDICINE

## 2020-01-01 PROCEDURE — 3044F PR MOST RECENT HEMOGLOBIN A1C LEVEL <7.0%: ICD-10-PCS | Mod: CPTII,S$GLB,, | Performed by: HOSPITALIST

## 2020-01-01 PROCEDURE — 0296T CV CARDIAC MONITOR - 3-14 DAY ADULT (CUPID ONLY): ICD-10-PCS | Mod: ,,, | Performed by: INTERNAL MEDICINE

## 2020-01-01 PROCEDURE — 99999 PR PBB SHADOW E&M-EST. PATIENT-LVL IV: ICD-10-PCS | Mod: PBBFAC,,, | Performed by: PAIN MEDICINE

## 2020-01-01 PROCEDURE — 99204 OFFICE O/P NEW MOD 45 MIN: CPT | Mod: S$GLB,,, | Performed by: HOSPITALIST

## 2020-01-01 PROCEDURE — 84439 ASSAY OF FREE THYROXINE: CPT

## 2020-01-01 PROCEDURE — 99214 PR OFFICE/OUTPT VISIT, EST, LEVL IV, 30-39 MIN: ICD-10-PCS | Mod: S$GLB,,, | Performed by: PAIN MEDICINE

## 2020-01-01 PROCEDURE — 99999 PR PBB SHADOW E&M-EST. PATIENT-LVL IV: ICD-10-PCS | Mod: PBBFAC,,, | Performed by: FAMILY MEDICINE

## 2020-01-01 PROCEDURE — 99999 PR PBB SHADOW E&M-EST. PATIENT-LVL V: ICD-10-PCS | Mod: PBBFAC,,, | Performed by: HOSPITALIST

## 2020-01-01 PROCEDURE — 99204 PR OFFICE/OUTPT VISIT, NEW, LEVL IV, 45-59 MIN: ICD-10-PCS | Mod: S$GLB,,, | Performed by: HOSPITALIST

## 2020-01-01 PROCEDURE — 80307 DRUG TEST PRSMV CHEM ANLYZR: CPT

## 2020-01-01 PROCEDURE — 99999 PR PBB SHADOW E&M-EST. PATIENT-LVL V: CPT | Mod: PBBFAC,,, | Performed by: FAMILY MEDICINE

## 2020-01-01 PROCEDURE — 1126F PR PAIN SEVERITY QUANTIFIED, NO PAIN PRESENT: ICD-10-PCS | Mod: S$GLB,,, | Performed by: FAMILY MEDICINE

## 2020-01-01 PROCEDURE — 1159F PR MEDICATION LIST DOCUMENTED IN MEDICAL RECORD: ICD-10-PCS | Mod: S$GLB,,, | Performed by: HOSPITALIST

## 2020-01-01 PROCEDURE — 1125F PR PAIN SEVERITY QUANTIFIED, PAIN PRESENT: ICD-10-PCS | Mod: S$GLB,,, | Performed by: HOSPITALIST

## 2020-01-01 PROCEDURE — 99499 RISK ADDL DX/OHS AUDIT: ICD-10-PCS | Mod: S$GLB,,, | Performed by: PAIN MEDICINE

## 2020-01-01 PROCEDURE — 3288F PR FALLS RISK ASSESSMENT DOCUMENTED: ICD-10-PCS | Mod: CPTII,S$GLB,, | Performed by: PAIN MEDICINE

## 2020-01-01 PROCEDURE — 1125F AMNT PAIN NOTED PAIN PRSNT: CPT | Mod: S$GLB,,, | Performed by: PAIN MEDICINE

## 2020-01-01 PROCEDURE — 99999 PR PBB SHADOW E&M-EST. PATIENT-LVL V: ICD-10-PCS | Mod: PBBFAC,,, | Performed by: FAMILY MEDICINE

## 2020-01-01 RX ORDER — KETOCONAZOLE 20 MG/G
CREAM TOPICAL DAILY
Qty: 1 TUBE | Refills: 3 | Status: SHIPPED | OUTPATIENT
Start: 2020-01-01

## 2020-01-01 RX ORDER — VIT C/E/ZN/COPPR/LUTEIN/ZEAXAN 250MG-90MG
2000 CAPSULE ORAL DAILY
Qty: 60 CAPSULE | Refills: 11 | Status: SHIPPED | OUTPATIENT
Start: 2020-01-01

## 2020-01-01 RX ORDER — MONTELUKAST SODIUM 10 MG/1
10 TABLET ORAL NIGHTLY
Qty: 90 TABLET | Refills: 3 | Status: SHIPPED | OUTPATIENT
Start: 2020-01-01 | End: 2021-01-01

## 2020-01-01 RX ORDER — UMECLIDINIUM BROMIDE AND VILANTEROL TRIFENATATE 62.5; 25 UG/1; UG/1
1 POWDER RESPIRATORY (INHALATION) DAILY
COMMUNITY
Start: 2020-01-01 | End: 2021-01-01

## 2020-01-01 RX ORDER — ALBUTEROL SULFATE 90 UG/1
AEROSOL, METERED RESPIRATORY (INHALATION)
Qty: 18 G | Refills: 5 | Status: SHIPPED | OUTPATIENT
Start: 2020-01-01 | End: 2021-01-01

## 2020-01-01 RX ORDER — HYDROCODONE BITARTRATE AND ACETAMINOPHEN 5; 325 MG/1; MG/1
1 TABLET ORAL EVERY 12 HOURS PRN
Qty: 60 TABLET | Refills: 0 | Status: SHIPPED | OUTPATIENT
Start: 2021-01-01 | End: 2021-01-01 | Stop reason: SDUPTHER

## 2020-01-01 RX ORDER — FLUTICASONE PROPIONATE 50 MCG
SPRAY, SUSPENSION (ML) NASAL
Qty: 16 ML | Refills: 1 | Status: SHIPPED | OUTPATIENT
Start: 2020-01-01 | End: 2020-01-01

## 2020-01-01 RX ORDER — HYDROCODONE BITARTRATE AND ACETAMINOPHEN 5; 325 MG/1; MG/1
1 TABLET ORAL EVERY 12 HOURS PRN
Qty: 60 TABLET | Refills: 0 | Status: SHIPPED | OUTPATIENT
Start: 2020-01-01 | End: 2021-01-01

## 2020-01-01 RX ORDER — LEVOTHYROXINE SODIUM 50 UG/1
50 TABLET ORAL
Qty: 30 TABLET | Refills: 11 | Status: SHIPPED | OUTPATIENT
Start: 2020-01-01 | End: 2020-01-01

## 2020-01-01 RX ORDER — LEVOTHYROXINE SODIUM 50 UG/1
50 TABLET ORAL
Qty: 30 TABLET | Refills: 11 | Status: SHIPPED | OUTPATIENT
Start: 2020-01-01 | End: 2021-01-01 | Stop reason: SDUPTHER

## 2020-01-01 RX ORDER — AZELASTINE 1 MG/ML
1 SPRAY, METERED NASAL 2 TIMES DAILY
Qty: 30 ML | Refills: 5 | Status: SHIPPED | OUTPATIENT
Start: 2020-01-01 | End: 2020-01-01

## 2020-01-01 RX ORDER — SODIUM CHLORIDE FOR INHALATION 0.9 %
VIAL, NEBULIZER (ML) INHALATION
Qty: 90 ML | Refills: 12 | Status: SHIPPED | OUTPATIENT
Start: 2020-01-01

## 2020-01-02 ENCOUNTER — HOSPITAL ENCOUNTER (INPATIENT)
Facility: HOSPITAL | Age: 75
LOS: 2 days | Discharge: HOME HEALTH | DRG: 640 | End: 2020-01-04
Attending: EMERGENCY MEDICINE | Admitting: EMERGENCY MEDICINE
Payer: MEDICARE

## 2020-01-02 DIAGNOSIS — J81.0 ACUTE PULMONARY EDEMA: ICD-10-CM

## 2020-01-02 DIAGNOSIS — R07.9 CHEST PAIN: ICD-10-CM

## 2020-01-02 DIAGNOSIS — E87.5 HYPERKALEMIA: Primary | ICD-10-CM

## 2020-01-02 DIAGNOSIS — Z99.2 ESRD ON DIALYSIS: Chronic | ICD-10-CM

## 2020-01-02 DIAGNOSIS — R53.1 WEAKNESS: ICD-10-CM

## 2020-01-02 DIAGNOSIS — N18.6 ESRD ON DIALYSIS: Chronic | ICD-10-CM

## 2020-01-02 LAB
ALBUMIN SERPL BCP-MCNC: 4.1 G/DL (ref 3.5–5.2)
ALP SERPL-CCNC: 94 U/L (ref 55–135)
ALT SERPL W/O P-5'-P-CCNC: 15 U/L (ref 10–44)
ANION GAP SERPL CALC-SCNC: 13 MMOL/L (ref 8–16)
ANION GAP SERPL CALC-SCNC: 14 MMOL/L (ref 8–16)
AST SERPL-CCNC: 22 U/L (ref 10–40)
BASOPHILS # BLD AUTO: 0.03 K/UL (ref 0–0.2)
BASOPHILS NFR BLD: 0.7 % (ref 0–1.9)
BILIRUB SERPL-MCNC: 0.7 MG/DL (ref 0.1–1)
BUN SERPL-MCNC: 53 MG/DL (ref 6–30)
BUN SERPL-MCNC: 54 MG/DL (ref 8–23)
CALCIUM SERPL-MCNC: 10.3 MG/DL (ref 8.7–10.5)
CHLORIDE SERPL-SCNC: 101 MMOL/L (ref 95–110)
CHLORIDE SERPL-SCNC: 103 MMOL/L (ref 95–110)
CO2 SERPL-SCNC: 23 MMOL/L (ref 23–29)
CREAT SERPL-MCNC: 8.7 MG/DL (ref 0.5–1.4)
CREAT SERPL-MCNC: 9.2 MG/DL (ref 0.5–1.4)
DIFFERENTIAL METHOD: ABNORMAL
EOSINOPHIL # BLD AUTO: 0 K/UL (ref 0–0.5)
EOSINOPHIL NFR BLD: 0.5 % (ref 0–8)
ERYTHROCYTE [DISTWIDTH] IN BLOOD BY AUTOMATED COUNT: 15.7 % (ref 11.5–14.5)
EST. GFR  (AFRICAN AMERICAN): 5 ML/MIN/1.73 M^2
EST. GFR  (NON AFRICAN AMERICAN): 4 ML/MIN/1.73 M^2
GLUCOSE SERPL-MCNC: 69 MG/DL (ref 70–110)
GLUCOSE SERPL-MCNC: 72 MG/DL (ref 70–110)
HCT VFR BLD AUTO: 39.9 % (ref 37–48.5)
HCT VFR BLD CALC: 39 %PCV (ref 36–54)
HGB BLD-MCNC: 11.8 G/DL (ref 12–16)
IMM GRANULOCYTES # BLD AUTO: 0.01 K/UL (ref 0–0.04)
IMM GRANULOCYTES NFR BLD AUTO: 0.2 % (ref 0–0.5)
LYMPHOCYTES # BLD AUTO: 0.8 K/UL (ref 1–4.8)
LYMPHOCYTES NFR BLD: 18.3 % (ref 18–48)
MAGNESIUM SERPL-MCNC: 2.2 MG/DL (ref 1.6–2.6)
MCH RBC QN AUTO: 30 PG (ref 27–31)
MCHC RBC AUTO-ENTMCNC: 29.6 G/DL (ref 32–36)
MCV RBC AUTO: 102 FL (ref 82–98)
MONOCYTES # BLD AUTO: 0.5 K/UL (ref 0.3–1)
MONOCYTES NFR BLD: 12.5 % (ref 4–15)
NEUTROPHILS # BLD AUTO: 2.8 K/UL (ref 1.8–7.7)
NEUTROPHILS NFR BLD: 67.8 % (ref 38–73)
NRBC BLD-RTO: 0 /100 WBC
PLATELET # BLD AUTO: 176 K/UL (ref 150–350)
PMV BLD AUTO: 10.2 FL (ref 9.2–12.9)
POC IONIZED CALCIUM: 1.14 MMOL/L (ref 1.06–1.42)
POC TCO2 (MEASURED): 27 MMOL/L (ref 23–29)
POTASSIUM BLD-SCNC: 8.2 MMOL/L (ref 3.5–5.1)
POTASSIUM SERPL-SCNC: 8.4 MMOL/L (ref 3.5–5.1)
PROT SERPL-MCNC: 8 G/DL (ref 6–8.4)
RBC # BLD AUTO: 3.93 M/UL (ref 4–5.4)
SAMPLE: ABNORMAL
SODIUM BLD-SCNC: 134 MMOL/L (ref 136–145)
SODIUM SERPL-SCNC: 138 MMOL/L (ref 136–145)
TROPONIN I SERPL DL<=0.01 NG/ML-MCNC: 0.06 NG/ML (ref 0–0.03)
WBC # BLD AUTO: 4.16 K/UL (ref 3.9–12.7)

## 2020-01-02 PROCEDURE — 82803 BLOOD GASES ANY COMBINATION: CPT

## 2020-01-02 PROCEDURE — 25000003 PHARM REV CODE 250: Performed by: EMERGENCY MEDICINE

## 2020-01-02 PROCEDURE — 84132 ASSAY OF SERUM POTASSIUM: CPT

## 2020-01-02 PROCEDURE — 80053 COMPREHEN METABOLIC PANEL: CPT

## 2020-01-02 PROCEDURE — 96375 TX/PRO/DX INJ NEW DRUG ADDON: CPT

## 2020-01-02 PROCEDURE — 20000000 HC ICU ROOM

## 2020-01-02 PROCEDURE — 82565 ASSAY OF CREATININE: CPT

## 2020-01-02 PROCEDURE — 99900035 HC TECH TIME PER 15 MIN (STAT)

## 2020-01-02 PROCEDURE — 84484 ASSAY OF TROPONIN QUANT: CPT

## 2020-01-02 PROCEDURE — 99285 EMERGENCY DEPT VISIT HI MDM: CPT | Mod: 25

## 2020-01-02 PROCEDURE — 25000003 PHARM REV CODE 250: Performed by: INTERNAL MEDICINE

## 2020-01-02 PROCEDURE — 27000221 HC OXYGEN, UP TO 24 HOURS

## 2020-01-02 PROCEDURE — 93005 ELECTROCARDIOGRAM TRACING: CPT

## 2020-01-02 PROCEDURE — 85025 COMPLETE CBC W/AUTO DIFF WBC: CPT

## 2020-01-02 PROCEDURE — 36415 COLL VENOUS BLD VENIPUNCTURE: CPT

## 2020-01-02 PROCEDURE — 36600 WITHDRAWAL OF ARTERIAL BLOOD: CPT

## 2020-01-02 PROCEDURE — 93010 EKG 12-LEAD: ICD-10-PCS | Mod: ,,, | Performed by: INTERNAL MEDICINE

## 2020-01-02 PROCEDURE — 83735 ASSAY OF MAGNESIUM: CPT

## 2020-01-02 PROCEDURE — 93010 ELECTROCARDIOGRAM REPORT: CPT | Mod: ,,, | Performed by: INTERNAL MEDICINE

## 2020-01-02 PROCEDURE — 96365 THER/PROPH/DIAG IV INF INIT: CPT

## 2020-01-02 PROCEDURE — 94761 N-INVAS EAR/PLS OXIMETRY MLT: CPT

## 2020-01-02 PROCEDURE — 82330 ASSAY OF CALCIUM: CPT

## 2020-01-02 PROCEDURE — 25000242 PHARM REV CODE 250 ALT 637 W/ HCPCS: Performed by: EMERGENCY MEDICINE

## 2020-01-02 PROCEDURE — 84295 ASSAY OF SERUM SODIUM: CPT

## 2020-01-02 PROCEDURE — 63600175 PHARM REV CODE 636 W HCPCS: Performed by: EMERGENCY MEDICINE

## 2020-01-02 PROCEDURE — 85014 HEMATOCRIT: CPT

## 2020-01-02 PROCEDURE — 94640 AIRWAY INHALATION TREATMENT: CPT

## 2020-01-02 PROCEDURE — 80100014 HC HEMODIALYSIS 1:1

## 2020-01-02 RX ORDER — ALBUTEROL SULFATE 2.5 MG/.5ML
15 SOLUTION RESPIRATORY (INHALATION)
Status: COMPLETED | OUTPATIENT
Start: 2020-01-02 | End: 2020-01-02

## 2020-01-02 RX ORDER — SODIUM CHLORIDE 9 MG/ML
INJECTION, SOLUTION INTRAVENOUS
Status: DISCONTINUED | OUTPATIENT
Start: 2020-01-02 | End: 2020-01-04 | Stop reason: HOSPADM

## 2020-01-02 RX ORDER — SODIUM CHLORIDE 0.9 % (FLUSH) 0.9 %
10 SYRINGE (ML) INJECTION
Status: DISCONTINUED | OUTPATIENT
Start: 2020-01-02 | End: 2020-01-04 | Stop reason: HOSPADM

## 2020-01-02 RX ORDER — SODIUM CHLORIDE 9 MG/ML
INJECTION, SOLUTION INTRAVENOUS ONCE
Status: DISCONTINUED | OUTPATIENT
Start: 2020-01-02 | End: 2020-01-04 | Stop reason: HOSPADM

## 2020-01-02 RX ORDER — INDOMETHACIN 25 MG/1
50 CAPSULE ORAL
Status: COMPLETED | OUTPATIENT
Start: 2020-01-02 | End: 2020-01-02

## 2020-01-02 RX ORDER — ATORVASTATIN CALCIUM 10 MG/1
10 TABLET, FILM COATED ORAL DAILY
Status: DISCONTINUED | OUTPATIENT
Start: 2020-01-03 | End: 2020-01-04 | Stop reason: HOSPADM

## 2020-01-02 RX ORDER — AMIODARONE HYDROCHLORIDE 200 MG/1
200 TABLET ORAL 2 TIMES DAILY
Status: DISCONTINUED | OUTPATIENT
Start: 2020-01-02 | End: 2020-01-04 | Stop reason: HOSPADM

## 2020-01-02 RX ORDER — IPRATROPIUM BROMIDE AND ALBUTEROL SULFATE 2.5; .5 MG/3ML; MG/3ML
3 SOLUTION RESPIRATORY (INHALATION)
Status: COMPLETED | OUTPATIENT
Start: 2020-01-02 | End: 2020-01-02

## 2020-01-02 RX ORDER — MUPIROCIN 20 MG/G
OINTMENT TOPICAL 2 TIMES DAILY
Status: DISCONTINUED | OUTPATIENT
Start: 2020-01-02 | End: 2020-01-04 | Stop reason: HOSPADM

## 2020-01-02 RX ORDER — ACETAMINOPHEN 325 MG/1
325 TABLET ORAL EVERY 6 HOURS PRN
Status: DISCONTINUED | OUTPATIENT
Start: 2020-01-02 | End: 2020-01-04 | Stop reason: HOSPADM

## 2020-01-02 RX ADMIN — ALBUTEROL SULFATE 15 MG: 2.5 SOLUTION RESPIRATORY (INHALATION) at 06:01

## 2020-01-02 RX ADMIN — APIXABAN 2.5 MG: 2.5 TABLET, FILM COATED ORAL at 09:01

## 2020-01-02 RX ADMIN — INSULIN HUMAN 4 UNITS: 100 INJECTION, SOLUTION PARENTERAL at 07:01

## 2020-01-02 RX ADMIN — SODIUM BICARBONATE 50 MEQ: 84 INJECTION, SOLUTION INTRAVENOUS at 07:01

## 2020-01-02 RX ADMIN — ACETAMINOPHEN 325 MG: 325 TABLET ORAL at 10:01

## 2020-01-02 RX ADMIN — IPRATROPIUM BROMIDE AND ALBUTEROL SULFATE 3 ML: .5; 3 SOLUTION RESPIRATORY (INHALATION) at 05:01

## 2020-01-02 RX ADMIN — MUPIROCIN: 20 OINTMENT TOPICAL at 09:01

## 2020-01-02 RX ADMIN — DEXTROSE MONOHYDRATE 25 G: 25 INJECTION, SOLUTION INTRAVENOUS at 07:01

## 2020-01-02 RX ADMIN — AMIODARONE HYDROCHLORIDE 200 MG: 200 TABLET ORAL at 09:01

## 2020-01-02 RX ADMIN — CALCIUM GLUCONATE 1 G: 98 INJECTION, SOLUTION INTRAVENOUS at 07:01

## 2020-01-02 RX ADMIN — IPRATROPIUM BROMIDE AND ALBUTEROL SULFATE 3 ML: .5; 3 SOLUTION RESPIRATORY (INHALATION) at 06:01

## 2020-01-02 NOTE — ED NOTES
Pt to ED c/o weakness to jaime legs today. Hx dilaysis Tue/Thurs/Sat, received dialysis Tuesday, none today due to holiday schedule. Pt reports chronic SOB r/t COPD. On home o2 2L NC.

## 2020-01-03 PROBLEM — E87.5 HYPERKALEMIA: Status: ACTIVE | Noted: 2020-01-03

## 2020-01-03 PROBLEM — E87.5 HYPERKALEMIA: Status: RESOLVED | Noted: 2020-01-02 | Resolved: 2020-01-03

## 2020-01-03 PROBLEM — R53.1 WEAKNESS: Status: ACTIVE | Noted: 2020-01-03

## 2020-01-03 LAB
ALBUMIN SERPL BCP-MCNC: 3.6 G/DL (ref 3.5–5.2)
ALP SERPL-CCNC: 81 U/L (ref 55–135)
ALT SERPL W/O P-5'-P-CCNC: 10 U/L (ref 10–44)
ANION GAP SERPL CALC-SCNC: 14 MMOL/L (ref 8–16)
AST SERPL-CCNC: 20 U/L (ref 10–40)
BASOPHILS # BLD AUTO: 0.03 K/UL (ref 0–0.2)
BASOPHILS NFR BLD: 0.8 % (ref 0–1.9)
BILIRUB SERPL-MCNC: 0.7 MG/DL (ref 0.1–1)
BUN SERPL-MCNC: 21 MG/DL (ref 8–23)
CALCIUM SERPL-MCNC: 9.6 MG/DL (ref 8.7–10.5)
CHLORIDE SERPL-SCNC: 99 MMOL/L (ref 95–110)
CO2 SERPL-SCNC: 22 MMOL/L (ref 23–29)
CREAT SERPL-MCNC: 4.7 MG/DL (ref 0.5–1.4)
DIFFERENTIAL METHOD: ABNORMAL
EOSINOPHIL # BLD AUTO: 0.1 K/UL (ref 0–0.5)
EOSINOPHIL NFR BLD: 1.5 % (ref 0–8)
ERYTHROCYTE [DISTWIDTH] IN BLOOD BY AUTOMATED COUNT: 15.6 % (ref 11.5–14.5)
EST. GFR  (AFRICAN AMERICAN): 10 ML/MIN/1.73 M^2
EST. GFR  (NON AFRICAN AMERICAN): 9 ML/MIN/1.73 M^2
GLUCOSE SERPL-MCNC: 53 MG/DL (ref 70–110)
HCT VFR BLD AUTO: 32.9 % (ref 37–48.5)
HGB BLD-MCNC: 9.7 G/DL (ref 12–16)
IMM GRANULOCYTES # BLD AUTO: 0.02 K/UL (ref 0–0.04)
IMM GRANULOCYTES NFR BLD AUTO: 0.5 % (ref 0–0.5)
LYMPHOCYTES # BLD AUTO: 1 K/UL (ref 1–4.8)
LYMPHOCYTES NFR BLD: 24.2 % (ref 18–48)
MAGNESIUM SERPL-MCNC: 1.9 MG/DL (ref 1.6–2.6)
MCH RBC QN AUTO: 29.8 PG (ref 27–31)
MCHC RBC AUTO-ENTMCNC: 29.5 G/DL (ref 32–36)
MCV RBC AUTO: 101 FL (ref 82–98)
MONOCYTES # BLD AUTO: 0.7 K/UL (ref 0.3–1)
MONOCYTES NFR BLD: 16.8 % (ref 4–15)
NEUTROPHILS # BLD AUTO: 2.2 K/UL (ref 1.8–7.7)
NEUTROPHILS NFR BLD: 56.2 % (ref 38–73)
NRBC BLD-RTO: 0 /100 WBC
PHOSPHATE SERPL-MCNC: 4.2 MG/DL (ref 2.7–4.5)
PLATELET # BLD AUTO: 144 K/UL (ref 150–350)
PMV BLD AUTO: 10.7 FL (ref 9.2–12.9)
POTASSIUM SERPL-SCNC: 5 MMOL/L (ref 3.5–5.1)
PROT SERPL-MCNC: 7.1 G/DL (ref 6–8.4)
RBC # BLD AUTO: 3.25 M/UL (ref 4–5.4)
SODIUM SERPL-SCNC: 135 MMOL/L (ref 136–145)
TROPONIN I SERPL DL<=0.01 NG/ML-MCNC: 0.12 NG/ML (ref 0–0.03)
TROPONIN I SERPL DL<=0.01 NG/ML-MCNC: 0.12 NG/ML (ref 0–0.03)
TROPONIN I SERPL DL<=0.01 NG/ML-MCNC: 0.14 NG/ML (ref 0–0.03)
WBC # BLD AUTO: 3.93 K/UL (ref 3.9–12.7)

## 2020-01-03 PROCEDURE — 93010 EKG 12-LEAD: ICD-10-PCS | Mod: ,,, | Performed by: INTERNAL MEDICINE

## 2020-01-03 PROCEDURE — 25000003 PHARM REV CODE 250: Performed by: HOSPITALIST

## 2020-01-03 PROCEDURE — 93010 ELECTROCARDIOGRAM REPORT: CPT | Mod: ,,, | Performed by: INTERNAL MEDICINE

## 2020-01-03 PROCEDURE — 85025 COMPLETE CBC W/AUTO DIFF WBC: CPT

## 2020-01-03 PROCEDURE — 94640 AIRWAY INHALATION TREATMENT: CPT

## 2020-01-03 PROCEDURE — 25000242 PHARM REV CODE 250 ALT 637 W/ HCPCS: Performed by: HOSPITALIST

## 2020-01-03 PROCEDURE — 25000003 PHARM REV CODE 250: Performed by: EMERGENCY MEDICINE

## 2020-01-03 PROCEDURE — 25000003 PHARM REV CODE 250: Performed by: INTERNAL MEDICINE

## 2020-01-03 PROCEDURE — 63600175 PHARM REV CODE 636 W HCPCS: Performed by: HOSPITALIST

## 2020-01-03 PROCEDURE — 93005 ELECTROCARDIOGRAM TRACING: CPT

## 2020-01-03 PROCEDURE — 80053 COMPREHEN METABOLIC PANEL: CPT

## 2020-01-03 PROCEDURE — 84484 ASSAY OF TROPONIN QUANT: CPT | Mod: 91

## 2020-01-03 PROCEDURE — 84100 ASSAY OF PHOSPHORUS: CPT

## 2020-01-03 PROCEDURE — 36415 COLL VENOUS BLD VENIPUNCTURE: CPT

## 2020-01-03 PROCEDURE — 83735 ASSAY OF MAGNESIUM: CPT

## 2020-01-03 PROCEDURE — 21400001 HC TELEMETRY ROOM

## 2020-01-03 PROCEDURE — 94761 N-INVAS EAR/PLS OXIMETRY MLT: CPT

## 2020-01-03 RX ORDER — MORPHINE SULFATE 10 MG/ML
2 INJECTION INTRAMUSCULAR; INTRAVENOUS; SUBCUTANEOUS EVERY 4 HOURS PRN
Status: DISCONTINUED | OUTPATIENT
Start: 2020-01-03 | End: 2020-01-04 | Stop reason: HOSPADM

## 2020-01-03 RX ORDER — SEVELAMER CARBONATE 800 MG/1
800 TABLET, FILM COATED ORAL
Status: DISCONTINUED | OUTPATIENT
Start: 2020-01-03 | End: 2020-01-04 | Stop reason: HOSPADM

## 2020-01-03 RX ORDER — LEVALBUTEROL 1.25 MG/.5ML
1.25 SOLUTION, CONCENTRATE RESPIRATORY (INHALATION) EVERY 8 HOURS
Status: DISCONTINUED | OUTPATIENT
Start: 2020-01-03 | End: 2020-01-04 | Stop reason: HOSPADM

## 2020-01-03 RX ORDER — NITROGLYCERIN 0.4 MG/1
0.4 TABLET SUBLINGUAL EVERY 5 MIN PRN
Status: DISCONTINUED | OUTPATIENT
Start: 2020-01-03 | End: 2020-01-04 | Stop reason: HOSPADM

## 2020-01-03 RX ORDER — SODIUM CHLORIDE 9 MG/ML
INJECTION, SOLUTION INTRAVENOUS ONCE
Status: DISCONTINUED | OUTPATIENT
Start: 2020-01-03 | End: 2020-01-04 | Stop reason: HOSPADM

## 2020-01-03 RX ORDER — MORPHINE SULFATE 10 MG/ML
5 INJECTION INTRAMUSCULAR; INTRAVENOUS; SUBCUTANEOUS EVERY 4 HOURS PRN
Status: DISCONTINUED | OUTPATIENT
Start: 2020-01-03 | End: 2020-01-03

## 2020-01-03 RX ORDER — SODIUM CHLORIDE 9 MG/ML
INJECTION, SOLUTION INTRAVENOUS
Status: DISCONTINUED | OUTPATIENT
Start: 2020-01-03 | End: 2020-01-04 | Stop reason: HOSPADM

## 2020-01-03 RX ORDER — MIRTAZAPINE 7.5 MG/1
7.5 TABLET, FILM COATED ORAL NIGHTLY
Status: DISCONTINUED | OUTPATIENT
Start: 2020-01-03 | End: 2020-01-04 | Stop reason: HOSPADM

## 2020-01-03 RX ORDER — PAROXETINE HYDROCHLORIDE 20 MG/1
20 TABLET, FILM COATED ORAL DAILY
Status: DISCONTINUED | OUTPATIENT
Start: 2020-01-03 | End: 2020-01-03

## 2020-01-03 RX ADMIN — MUPIROCIN: 20 OINTMENT TOPICAL at 08:01

## 2020-01-03 RX ADMIN — SEVELAMER CARBONATE 800 MG: 800 TABLET, FILM COATED ORAL at 01:01

## 2020-01-03 RX ADMIN — AMIODARONE HYDROCHLORIDE 200 MG: 200 TABLET ORAL at 09:01

## 2020-01-03 RX ADMIN — MUPIROCIN: 20 OINTMENT TOPICAL at 09:01

## 2020-01-03 RX ADMIN — ATORVASTATIN CALCIUM 10 MG: 10 TABLET, FILM COATED ORAL at 08:01

## 2020-01-03 RX ADMIN — NITROGLYCERIN 0.4 MG: 0.4 TABLET SUBLINGUAL at 06:01

## 2020-01-03 RX ADMIN — APIXABAN 2.5 MG: 2.5 TABLET, FILM COATED ORAL at 09:01

## 2020-01-03 RX ADMIN — MIRTAZAPINE 7.5 MG: 7.5 TABLET ORAL at 09:01

## 2020-01-03 RX ADMIN — APIXABAN 2.5 MG: 2.5 TABLET, FILM COATED ORAL at 08:01

## 2020-01-03 RX ADMIN — LEVALBUTEROL HYDROCHLORIDE 1.25 MG: 1.25 SOLUTION, CONCENTRATE RESPIRATORY (INHALATION) at 11:01

## 2020-01-03 RX ADMIN — SEVELAMER CARBONATE 800 MG: 800 TABLET, FILM COATED ORAL at 05:01

## 2020-01-03 RX ADMIN — AMIODARONE HYDROCHLORIDE 200 MG: 200 TABLET ORAL at 08:01

## 2020-01-03 NOTE — ED PROVIDER NOTES
Encounter Date: 1/2/2020    SCRIBE #1 NOTE: I, Melissa Houston, am scribing for, and in the presence of,  Kelton Roman MD. I have scribed the following portions of the note - Other sections scribed: HPI, ROS, PE.       History     Chief Complaint   Patient presents with    Extremity Weakness     pt reports weakness to BLE starting today. Denies any numbness, tingling, CP or worsening SOB.      This is a 74 y.o female who has COPD, ESRD-HD, Atrial fibrillation presents to the ED for an evaluation for bilateral lower extremity weakness that began this morning.  She reports she notices the weakness to her lower extremities is worse with standing.  She states she has a walker at home; however, she does not use the walker.  She denies fever, chills, nausea, emesis, diarrhea, abdominal pain, chest pain, or any other associated symptoms.  She reports the use of 2L home O2.  No prior tx.  No alleviating factors.    The history is provided by the patient.     Review of patient's allergies indicates:  No Known Allergies  Past Medical History:   Diagnosis Date    Arthritis     Atrial fibrillation     COPD (chronic obstructive pulmonary disease)     Dialysis patient     Encounter for blood transfusion     ESRD (end stage renal disease)      Past Surgical History:   Procedure Laterality Date    AV FISTULA PLACEMENT      EPIDURAL STEROID INJECTION Left 5/29/2019    Procedure: Axillary, Suprascapular, lateral pectoral nerve blocks;  Surgeon: Isrrael Barton Jr., MD;  Location: [facility] ENDO;  Service: Pain Management;  Laterality: Left;  Left Axillary, Suprascapular, Lateral Pectoral Nerve Blocks    75161    Arrive @ 0800; Eliquis; No DM; Confirm date with Armando    EPIDURAL STEROID INJECTION Left 6/19/2019    Procedure: Suprascapular, Axillary, and Lateral Pectoral Nerve Radiofrequency Ablations;  Surgeon: Isrrael Barton Jr., MD;  Location: [facility] ENDO;  Service: Pain Management;  Laterality: Left;  Left Suprascapular,  Axillary, & Lateral Pectoral Nerve Radiofrequency Ablations    79366    Arrive @ 1145; IV Sedation- Fasting; Eliquis; No DM; 17-50-2; Rep?    TREATMENT OF CARDIAC ARRHYTHMIA N/A 4/2/2019    Procedure: Cardioversion or Defibrillation;  Surgeon: Rakesh Briggs MD;  Location: [facility] CATH LAB;  Service: Cardiology;  Laterality: N/A;    TUBAL LIGATION       Family History   Problem Relation Age of Onset    Heart attack Sister     Heart attack Sister     Heart attack Mother      Social History     Tobacco Use    Smoking status: Former Smoker     Start date: 1/12/1991    Smokeless tobacco: Never Used    Tobacco comment: quit in 1991   Substance Use Topics    Alcohol use: No    Drug use: No     Review of Systems   Constitutional: Negative for chills and fever.   HENT: Negative for congestion, ear pain, rhinorrhea and sore throat.    Eyes: Negative for pain and visual disturbance.   Respiratory: Negative for cough and shortness of breath.    Cardiovascular: Negative for chest pain.   Gastrointestinal: Negative for abdominal pain, diarrhea, nausea and vomiting.   Genitourinary: Negative for dysuria.   Musculoskeletal: Negative for back pain and neck pain.   Skin: Negative for rash.   Neurological: Positive for weakness. Negative for headaches.       Physical Exam     Initial Vitals [01/02/20 1609]   BP Pulse Resp Temp SpO2   134/61 65 20 98.5 °F (36.9 °C) 100 %      MAP       --         Physical Exam    Nursing note and vitals reviewed.  Constitutional: She appears well-developed and well-nourished.  Non-toxic appearance.   HENT:   Head: Normocephalic and atraumatic.   Mouth/Throat: Oropharynx is clear and moist and mucous membranes are normal.   Eyes: Conjunctivae and EOM are normal. Pupils are equal, round, and reactive to light. Right conjunctiva is not injected. Left conjunctiva is not injected. No scleral icterus.   Neck: Normal range of motion and full passive range of motion without pain. Neck supple.    Cardiovascular: Normal rate, regular rhythm, S1 normal, S2 normal, normal heart sounds and normal pulses. Exam reveals no gallop and no friction rub.    No murmur heard.  Pulses:       Radial pulses are 2+ on the right side, and 2+ on the left side.   Pulmonary/Chest: Effort normal. No respiratory distress. She has decreased breath sounds.   Abdominal: Soft. She exhibits no distension. There is no tenderness.   Musculoskeletal: Normal range of motion. She exhibits no edema.   Patient has an AV fistula to the right upper extremity with a palpable thrill. No lower extremity edema or cyanosis.    Neurological: She is alert and oriented to person, place, and time. No cranial nerve deficit. Gait normal. GCS score is 15. GCS eye subscore is 4. GCS verbal subscore is 5. GCS motor subscore is 6.   A&Ox4, normal speech.   Skin: Skin is warm. No ecchymosis and no rash noted.   Psychiatric: She has a normal mood and affect. Thought content normal.         ED Course   Procedures  Labs Reviewed   CBC W/ AUTO DIFFERENTIAL - Abnormal; Notable for the following components:       Result Value    RBC 3.93 (*)     Hemoglobin 11.8 (*)     Mean Corpuscular Volume 102 (*)     Mean Corpuscular Hemoglobin Conc 29.6 (*)     RDW 15.7 (*)     Lymph # 0.8 (*)     All other components within normal limits   COMPREHENSIVE METABOLIC PANEL - Abnormal; Notable for the following components:    Potassium 8.4 (*)     Glucose 69 (*)     BUN, Bld 54 (*)     Creatinine 8.7 (*)     eGFR if  5 (*)     eGFR if non  4 (*)     All other components within normal limits    Narrative:     K   critical result(s) called and verbal readback obtained from YURIY MART. by LMElsie 01/02/2020 18:11   ISTAT PROCEDURE - Abnormal; Notable for the following components:    POC BUN 53 (*)     POC Creatinine 9.2 (*)     POC Sodium 134 (*)     POC Potassium 8.2 (*)     All other components within normal limits   MAGNESIUM   ISTAT CHEM8     EKG  "Readings: (Independently Interpreted)   EKG done at 5:09 p.m. showing sinus bradycardia with sinus arrhythmia first-degree able move much with a right bundle branch block.  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  QRS is 144 which is wider than previous with QTC of 478.  No ST elevation.  Compared to previous and abnormal.       Imaging Results          X-Ray Chest AP Portable (Final result)  Result time 01/02/20 17:55:08    Final result by Karan Villalobos MD (01/02/20 17:55:08)                 Impression:      Stable chronic findings.  No acute abnormality or detrimental change when compared with 08/29/2019.      Electronically signed by: Karan Villalobos MD  Date:    01/02/2020  Time:    17:55             Narrative:    EXAMINATION:  XR CHEST AP PORTABLE    CLINICAL HISTORY:  Provided history is "weakness;  ".    TECHNIQUE:  One view of the chest.    COMPARISON:  08/29/2019.    FINDINGS:  Cardiac wires overlie the chest.  Lung volumes are low.  Cardiomediastinal silhouette is enlarged but stable.  Atherosclerotic calcifications overlie the aortic arch.  No large focal consolidation or detrimental change in lung aeration when compared with the prior study.  Surgical clips overlie the left axillary region as well as subclavian and left upper extremity vascular stents.                                 Medical Decision Making:   Initial Assessment:   74-year-old female presenting today secondary to feeling weakness. Patient states her last dialysis was Tuesday.  This would be a now normal since that was the holiday time.  So unsure exactly when she got last dialysis.  Patient is adamant that I was on Tuesday.  She was post get dialysis today but she did not go due to feeling weak and did not feel strong enough for dialysis.  EKG shows like changes from previous and patient was found to be markedly hyperkalemic.  Patient was given insulin, glucose, albuterol, calcium, bicarb.  I spoke with Dr. Arthur and we will do emergent " dialysis.  Mild pulmonary edema on the chest x-ray.  I spoke to the hospitalist team and admitted the patient to the ICU for further workup and management.  In from dialysis team that she will be placed in the ICU.  Patient was updated.    Please put in 35 minutes of critical care due to patient having a high risk of cardiac failure.   Separate from teaching and exclusive of procedure and ekg time  Includes:  Time at bedside  Time reviewing test results  Time discussing case with staff  Time documenting the medical record  Time spent with family members  Time spent with consults  Management    Clinical Tests:   Lab Tests: Ordered and Reviewed  Radiological Study: Reviewed and Ordered  Medical Tests: Ordered and Reviewed            Scribe Attestation:   Scribe #1: I performed the above scribed service and the documentation accurately describes the services I performed. I attest to the accuracy of the note.                          Clinical Impression:       ICD-10-CM ICD-9-CM   1. Hyperkalemia E87.5 276.7   2. Weakness R53.1 780.79   3. Acute pulmonary edema J81.0 518.4                I, Kelton Roman, personally performed the services described in this documentation. All medical record entries made by the scribe were at my direction and in my presence. I have reviewed the chart and agree that the record reflects my personal performance and is accurate and complete.             Kelton Roman MD  01/02/20 1922

## 2020-01-03 NOTE — NURSING
Report called to [facility] for room #311. Transport requested for transfer. JOHN ELISE.

## 2020-01-03 NOTE — CARE UPDATE
[facility]  ICU Multidisciplinary Bedside Rounds   SUMMARY     Date: 1/3/2020    Prehospitalization: Home  Admit Date / LOS : 1/2/2020/ 1 days    Diagnosis: Hyperkalemia    Consults:        Active: Nephro and Pulm CC       Needed: N/A     Code Status: Full Code   Advanced Directive: Not Received    LDA: AVF and PIV       Central Lines/Site/Justification:Patient Does Not Have Central Line       Urinary Cath/Order/Justification:Patient Does Not Have Urinary Catheter    Vasopressors/Infusions:        GOALS: Volume/ Hemodynamic: N/A                     RASS: N/A    CAM ICU: N/A  Pain Management: IV       Pain Controlled: yes     Rhythm: NSR    Respiratory Device: Nasal Cannula                  Most Recent SBT/ SAT: N/A          VTE Prophylaxis: Pharm  Mobility: Bedrest  Stress Ulcer Prophylaxis: No    Dietary: PO  Tolerance: yes  /  Advancement: @ goal    Isolation: No active isolations    Restraints: No    Significant Dates:  Post Op Date: N/A  Rescue Date: N/A  Imaging/ Diagnostics: N/A    Noteworthy Labs:  K+ 5.0, HGB 9.7, HCT 32.9, , CREAT 4.7    CBC/Anemia Labs: Coags:    Recent Labs   Lab 01/02/20  1710 01/02/20  1840 01/03/20  0253   WBC 4.16  --  3.93   HGB 11.8*  --  9.7*   HCT 39.9 39 32.9*     --  144*   *  --  101*   RDW 15.7*  --  15.6*    No results for input(s): PT, INR, APTT in the last 168 hours.     Chemistries:   Recent Labs   Lab 01/02/20  1710 01/03/20  0253    135*   K 8.4* 5.0    99   CO2 23 22*   BUN 54* 21   CREATININE 8.7* 4.7*   CALCIUM 10.3 9.6   PROT 8.0 7.1   BILITOT 0.7 0.7   ALKPHOS 94 81   ALT 15 10   AST 22 20   MG 2.2 1.9   PHOS  --  4.2        Cardiac Enzymes: Ejection Fractions:    Recent Labs     01/02/20  1710   TROPONINI 0.064*    Nuc Rest EF   Date Value Ref Range Status   02/04/2019 72  Final        POCT Glucose: HbA1c:    No results for input(s): POCTGLUCOSE in the last 168 hours. Hemoglobin A1C   Date Value Ref Range Status    04/02/2019 5.0 4.0 - 5.6 % Final     Comment:     ADA Screening Guidelines:  5.7-6.4%  Consistent with prediabetes  >or=6.5%  Consistent with diabetes  High levels of fetal hemoglobin interfere with the HbA1C  assay. Heterozygous hemoglobin variants (HbS, HgC, etc)do  not significantly interfere with this assay.   However, presence of multiple variants may affect accuracy.     03/06/2019 5.3 4.0 - 5.6 % Final     Comment:     ADA Screening Guidelines:  5.7-6.4%  Consistent with prediabetes  >or=6.5%  Consistent with diabetes  High levels of fetal hemoglobin interfere with the HbA1C  assay. Heterozygous hemoglobin variants (HbS, HgC, etc)do  not significantly interfere with this assay.   However, presence of multiple variants may affect accuracy.     09/28/2018 4.6 4.0 - 5.6 % Final     Comment:     ADA Screening Guidelines:  5.7-6.4%  Consistent with prediabetes  >or=6.5%  Consistent with diabetes  High levels of fetal hemoglobin interfere with the HbA1C  assay. Heterozygous hemoglobin variants (HbS, HgC, etc)do  not significantly interfere with this assay.   However, presence of multiple variants may affect accuracy.          Needs from Care Team: none     ICU LOS 8h  Level of Care: OK to Transfer

## 2020-01-03 NOTE — NURSING TRANSFER
Nursing Transfer Note      1/3/2020     Transfer From: ICU to room 311B    Transfer via bed    Transfer with 2 liters O2 via NC, cardiac monitoring    Transported by ICU nurse    Medicines sent: N/A    Chart send with patient: Yes    Patient reassessed at: 1/3/2020 1120    Upon arrival to floor: cardiac monitor applied, patient oriented to room, call bell in reach, bed in lowest position, and nonskid socks in place.

## 2020-01-03 NOTE — CONSULTS
Critical Care consulted for hyperkalemia. Patient received dialysis and K now down to 5.0. Please re consult if any additional assistance needed.     Shantal Medrano MD

## 2020-01-03 NOTE — NURSING
Arrived to ICU via stretcher from ED w/ O2 2 L NC in place. Dialysis nurse at bedside ready to initiate dialysis. Patient without c/o CP or SOB.

## 2020-01-03 NOTE — H&P
[facility] Hospital Medicine  History & Physical    Patient Name: Eli Vaz  MRN: [id number]  Admission Date: 01/02/2020  Attending Physician: Isrrael Apodaca MD, MPH      PCP:     Charles Moody Jr, MD    CC:     Chief Complaint   Patient presents with    Extremity Weakness     pt reports weakness to BLE starting today. Denies any numbness, tingling, CP or worsening SOB.        HISTORY OF PRESENT ILLNESS:     Eli Vaz is a 74 y.o. female that (in part)  has a past medical history of Arthritis, Atrial fibrillation, COPD (chronic obstructive pulmonary disease), Dialysis patient, Encounter for blood transfusion, and ESRD (end stage renal disease).  has a past surgical history that includes Tubal ligation; AV fistula placement; Epidural steroid injection (Left, 5/29/2019); Epidural steroid injection (Left, 6/19/2019); and Treatment of cardiac arrhythmia (N/A, 4/2/2019). Presents to [facility] Emergency Department complaining of extremity weakness and shortness of breath.  Worsened with exertion/ambulation.  Inability to lie flat.  Patient reportedly went to dialysis on Tuesday.  She denies going to dialysis today due to her lower extremity weakness and inability to ambulate.  Reports compliance with home medication regimen and diet.  Denies chest pain denies fever or chills.  No palpitations, syncope, or collapse.  No focal neurologic deficit other than the bilateral lower extremity weakness.  Patient was recently hospitalized and treated for atrial fibrillation rapid ventricular response.  She underwent cardioversion.  She has been managed successfully with oral medications as an outpatient and has remained in normal sinus rhythm.    In the emergency department routine laboratory studies revealed evidence of hyperkalemia.  This was repeated with point of care testing which confirmed the 8.4 potassium level.  Chest x-ray was positive for pulmonary edema. Patient was  given insulin, glucose, albuterol, calcium, bicarb.   ED physician spoke with Dr. Arthur and agreed that we will do emergent dialysis.      Hospital medicine has been asked to admit to inpatient in the ICU for further evaluation and treatment of acute hyperkalemia and pulmonary edema.       REVIEW OF SYSTEMS:     -- Constitutional: No fever or chills.  -- Eyes: No visual changes, diplopia, pain, tearing, blind spots, or discharge.   -- Ears, nose, mouth, throat, and face: No congestion, sore throat, epistaxis, d/c, bleeding gums, neck stiffness masses, or dental issues.  -- Respiratory:  Positive for shortness of breath.  No cough, hemoptysis, stridor, wheezing, or night sweats.  -- Cardiovascular:  Positive for dyspnea with exertion and shortness of breath at rest.  No chest pain, syncope, PND, edema, cyanosis, or palpitations.   -- Gastrointestinal: No vomiting, abdominal pain, hematemesis, melena, dyspepsia, or change in bowel habits.  -- Genitourinary: No hematuria.  No vaginal discharge..  -- Integument/breast: No rash, pruritis, pigmentation changes, dryness, or changes in hair  -- Hematologic/lymphatic: No easy bruising or lymphadenopathy.   -- Musculoskeletal:  As above in the HPI.  -- Neurological:  Ataxia secondary to weakness.  No seizures, headaches, incoordination, paraesthesias, , vertigo, or tremors.  -- Behavioral/Psych: No auditory or visual hallucinations, depression, or suicidal/homicidal ideations.  -- Endocrine: No heat or cold intolerance, polydipsia, or unintentional weight gain / loss.  -- Allergy/Immunologic: No recurrent infections or adverse reaction to food, insects, or difficulty breathing.      PAST MEDICAL / SURGICAL HISTORY:     Past Medical History:   Diagnosis Date    Arthritis     Atrial fibrillation     COPD (chronic obstructive pulmonary disease)     Dialysis patient     Encounter for blood transfusion     ESRD (end stage renal disease)      Past Surgical History:   Procedure  Laterality Date    AV FISTULA PLACEMENT      EPIDURAL STEROID INJECTION Left 5/29/2019    Procedure: Axillary, Suprascapular, lateral pectoral nerve blocks;  Surgeon: Isrrael Barton Jr., MD;  Location: [facility] ENDO;  Service: Pain Management;  Laterality: Left;  Left Axillary, Suprascapular, Lateral Pectoral Nerve Blocks    [id number]    Arrive @ 0800; Eliquis; No DM; Confirm date with Armando    EPIDURAL STEROID INJECTION Left 6/19/2019    Procedure: Suprascapular, Axillary, and Lateral Pectoral Nerve Radiofrequency Ablations;  Surgeon: Isrrael Barton Jr., MD;  Location: [facility] ENDO;  Service: Pain Management;  Laterality: Left;  Left Suprascapular, Axillary, & Lateral Pectoral Nerve Radiofrequency Ablations    [id number]    Arrive @ 1145; IV Sedation- Fasting; Eliquis; No DM; 17-50-2; Rep?    TREATMENT OF CARDIAC ARRHYTHMIA N/A 4/2/2019    Procedure: Cardioversion or Defibrillation;  Surgeon: Rakesh Briggs MD;  Location: [facility] CATH LAB;  Service: Cardiology;  Laterality: N/A;    TUBAL LIGATION           FAMILY HISTORY:     Family History   Problem Relation Age of Onset    Heart attack Sister     Heart attack Sister     Heart attack Mother          SOCIAL HISTORY:     Social History     Socioeconomic History    Marital status:      Spouse name: Not on file    Number of children: Not on file    Years of education: Not on file    Highest education level: Not on file   Occupational History    Not on file   Social Needs    Financial resource strain: Not on file    Food insecurity:     Worry: Not on file     Inability: Not on file    Transportation needs:     Medical: Not on file     Non-medical: Not on file   Tobacco Use    Smoking status: Former Smoker     Start date: 1/12/1991    Smokeless tobacco: Never Used    Tobacco comment: quit in 1991   Substance and Sexual Activity    Alcohol use: No    Drug use: No    Sexual activity: Not on file   Lifestyle    Physical activity:     Days per week:  Not on file     Minutes per session: Not on file    Stress: Not on file   Relationships    Social connections:     Talks on phone: Not on file     Gets together: Not on file     Attends Oriental orthodox service: Not on file     Active member of club or organization: Not on file     Attends meetings of clubs or organizations: Not on file     Relationship status: Not on file   Other Topics Concern    Not on file   Social History Narrative    Not on file         ALLERGIES:       Review of patient's allergies indicates:  No Known Allergies      HEALTH SCREENING:     Influenza vaccine not up-to-date for this season.  Prevnar 13 pneumonia vaccine =  evidence of previous vaccination found in the medical record      HOME MEDICATIONS:     Prior to Admission medications    Medication Sig Start Date End Date Taking? Authorizing Provider   albuterol (PROVENTIL) 2.5 mg /3 mL (0.083 %) nebulizer solution Inhale 2.5 mg into the lungs. 7/9/18  Yes Historical Provider, MD   albuterol (PROVENTIL/VENTOLIN HFA) 90 mcg/actuation inhaler INHALE 2 PUFFS INTO LUNGS EVERY 4 HOURS AS NEEDED FOR SHORTNESS OF BREATH OR WHEEZING. RESCUE 8/3/19  Yes Historical Provider, MD   albuterol (PROVENTIL/VENTOLIN HFA) 90 mcg/actuation inhaler INHALE 2 PUFFS INTO LUNGS EVERY 4 HOURS AS NEEDED FOR SHORTNESS OF BREATH OR WHEEZING. RESCUE 10/28/19  Yes Charles Moody Jr., MD   amiodarone (PACERONE) 200 MG Tab Take 1 tablet (200 mg total) by mouth 2 (two) times daily. 5/6/19  Yes Elgin Garcia MD   apixaban (ELIQUIS) 2.5 mg Tab Take 2.5 mg by mouth 2 (two) times daily.   Yes Historical Provider, MD   atorvastatin (LIPITOR) 10 MG tablet Take 1 tablet (10 mg total) by mouth once daily. 11/29/19  Yes Charles Moody Jr., MD   B complex w-C no.20/folic acid (RENAL CAPS ORAL) Take by mouth.   Yes Historical Provider, MD   calcium acetate (PHOSLO) 667 mg capsule Take 667 mg by mouth 3 (three) times daily with meals.   Yes Historical Provider, MD   fluticasone  propionate (FLONASE) 50 mcg/actuation nasal spray 1 spray by Nasal route. 5/18/16  Yes Historical Provider, MD   ipratropium-albuterol (COMBIVENT RESPIMAT)  mcg/actuation inhaler INHALE 1 PUFF INTO THE LUNGS DAILY. 7/7/15  Yes Historical Provider, MD   mirtazapine (REMERON) 7.5 MG Tab TAKE 1 TABLET BY MOUTH AT BEDTIME AS NEEDED FOR SLEEP 90 8/6/19  Yes Historical Provider, MD   montelukast (SINGULAIR) 10 mg tablet TAKE 1 TABLET BY MOUTH EVERY DAY IN THE EVENING 7/1/19  Yes Charles Moody Jr., MD   paroxetine (PAXIL) 30 MG tablet 1 TABLET IN THE MORNING ONCE A DAY ORALLY 90 5/2/19  Yes Historical Provider, MD   sevelamer carbonate (RENVELA) 800 mg Tab TAKE 3 TABLETS BY MOUTH WITH EACH MEAL AND 1 TABLET WITH EACH SNACK 9/3/19  Yes Historical Provider, MD   vit B,C-iron fum-FA-D3-zinc ox 8 mg iron-800 mcg-1,000 unit Tab Take by mouth.   Yes Historical Provider, MD   HYDROcodone-acetaminophen (NORCO) 5-325 mg per tablet Take 1 tablet by mouth every 12 (twelve) hours as needed for Pain. 1/24/20 2/23/20  Isrrael Barton Jr., MD   HYDROcodone-acetaminophen (NORCO) 5-325 mg per tablet Take 1 tablet by mouth every 12 (twelve) hours as needed for Pain. 12/25/19 1/24/20  Isrrael Barton Jr., MD          HOSPITAL MEDICATIONS:     Scheduled Meds:    sodium chloride 0.9%   Intravenous Once    amiodarone  200 mg Oral BID    apixaban  2.5 mg Oral BID    [START ON 1/3/2020] atorvastatin  10 mg Oral Daily    mupirocin   Nasal BID     Continuous Infusions:   PRN Meds: sodium chloride 0.9%, sodium chloride 0.9%      PHYSICAL EXAM:     Wt Readings from Last 1 Encounters:   01/02/20 2045 64 kg (141 lb 1.5 oz)   01/02/20 1609 59 kg (130 lb)     Body mass index is 24.22 kg/m².  Vitals:    01/02/20 2033 01/02/20 2041 01/02/20 2045 01/02/20 2100   BP:  109/74 132/72 98/64   BP Location:  Left arm Left arm    Patient Position:  Sitting Lying    Pulse:  82 80 81   Resp: (!) 21 18 16 (!) 29   Temp:  98.4 °F (36.9 °C) 98.2 °F  "(36.8 °C)    TempSrc:  Oral Oral    SpO2: 100% 99% 100% 100%   Weight:   64 kg (141 lb 1.5 oz)    Height:   5' 4" (1.626 m)           -- General appearance: well developed. appears stated age   -- Head: normocephalic, atraumatic   -- Eyes: conjunctivae clear. Extraocular muscles intact  -- Nose: Nares normal. Septum midline.   -- Mouth/Throat: lips, mucosa, and tongue normal. no throat erythema.   -- Neck: + JVD. Otherwise neck is supple, symmetrical, trachea midline, and thyroid not grossly enlarged, appears symmetric  -- Lungs: clear to auscultation bilaterally. normal respiratory effort. No use of accessory muscles.   -- Chest wall: no tenderness. equal bilateral chest rise   -- Heart: regular rate and regular rhythm. S1, S2 normal.  no click, rub or gallop   -- Abdomen: soft, non-tender, non-distended, non-tympanic; bowel sounds normal; no masses  -- Extremities:  Right upper extremity AV fistula.  no cyanosis, clubbing or edema.   -- Pulses: 2+ and symmetric   -- Skin:  Turgor normal. Color normal. Texture normal. No rashes or lesions.   -- Neurologic: Normal strength and tone. No focal numbness or weakness. CNII-XII intact. [place] coma scale: eyes open spontaneously-4, oriented & converses-5, obeys commands-6.      LABORATORY STUDIES:     Recent Results (from the past 36 hour(s))   CBC auto differential    Collection Time: 01/02/20  5:10 PM   Result Value Ref Range    WBC 4.16 3.90 - 12.70 K/uL    RBC 3.93 (L) 4.00 - 5.40 M/uL    Hemoglobin 11.8 (L) 12.0 - 16.0 g/dL    Hematocrit 39.9 37.0 - 48.5 %    Mean Corpuscular Volume 102 (H) 82 - 98 fL    Mean Corpuscular Hemoglobin 30.0 27.0 - 31.0 pg    Mean Corpuscular Hemoglobin Conc 29.6 (L) 32.0 - 36.0 g/dL    RDW 15.7 (H) 11.5 - 14.5 %    Platelets 176 150 - 350 K/uL    MPV 10.2 9.2 - 12.9 fL    Immature Granulocytes 0.2 0.0 - 0.5 %    Gran # (ANC) 2.8 1.8 - 7.7 K/uL    Immature Grans (Abs) 0.01 0.00 - 0.04 K/uL    Lymph # 0.8 (L) 1.0 - 4.8 K/uL    Mono # 0.5 " 0.3 - 1.0 K/uL    Eos # 0.0 0.0 - 0.5 K/uL    Baso # 0.03 0.00 - 0.20 K/uL    nRBC 0 0 /100 WBC    Gran% 67.8 38.0 - 73.0 %    Lymph% 18.3 18.0 - 48.0 %    Mono% 12.5 4.0 - 15.0 %    Eosinophil% 0.5 0.0 - 8.0 %    Basophil% 0.7 0.0 - 1.9 %    Differential Method Automated    Comprehensive metabolic panel    Collection Time: 01/02/20  5:10 PM   Result Value Ref Range    Sodium 138 136 - 145 mmol/L    Potassium 8.4 (HH) 3.5 - 5.1 mmol/L    Chloride 101 95 - 110 mmol/L    CO2 23 23 - 29 mmol/L    Glucose 69 (L) 70 - 110 mg/dL    BUN, Bld 54 (H) 8 - 23 mg/dL    Creatinine 8.7 (H) 0.5 - 1.4 mg/dL    Calcium 10.3 8.7 - 10.5 mg/dL    Total Protein 8.0 6.0 - 8.4 g/dL    Albumin 4.1 3.5 - 5.2 g/dL    Total Bilirubin 0.7 0.1 - 1.0 mg/dL    Alkaline Phosphatase 94 55 - 135 U/L    AST 22 10 - 40 U/L    ALT 15 10 - 44 U/L    Anion Gap 14 8 - 16 mmol/L    eGFR if African American 5 (A) >60 mL/min/1.73 m^2    eGFR if non African American 4 (A) >60 mL/min/1.73 m^2   Magnesium    Collection Time: 01/02/20  5:10 PM   Result Value Ref Range    Magnesium 2.2 1.6 - 2.6 mg/dL   ISTAT PROCEDURE    Collection Time: 01/02/20  6:40 PM   Result Value Ref Range    POC Glucose 72 70 - 110 mg/dL    POC BUN 53 (H) 6 - 30 mg/dL    POC Creatinine 9.2 (H) 0.5 - 1.4 mg/dL    POC Sodium 134 (L) 136 - 145 mmol/L    POC Potassium 8.2 (HH) 3.5 - 5.1 mmol/L    POC Chloride 103 95 - 110 mmol/L    POC TCO2 (MEASURED) 27 23 - 29 mmol/L    POC Anion Gap 13 8 - 16 mmol/L    POC Ionized Calcium 1.14 1.06 - 1.42 mmol/L    POC Hematocrit 39 36 - 54 %PCV    Sample VENOUS        Lab Results   Component Value Date    INR 1.2 04/24/2019    INR 1.5 (H) 03/19/2019    INR 1.1 01/09/2018     Lab Results   Component Value Date    HGBA1C 5.0 04/02/2019     No results for input(s): POCTGLUCOSE in the last 72 hours.        IMAGING:     Imaging Results          X-Ray Chest AP Portable (Final result)  Result time 01/02/20 17:55:08    Final result by Karan Villalobos MD  "(01/02/20 17:55:08)                 Impression:      Stable chronic findings.  No acute abnormality or detrimental change when compared with 08/29/2019.      Electronically signed by: Karan Villalobos MD  Date:    01/02/2020  Time:    17:55             Narrative:    EXAMINATION:  XR CHEST AP PORTABLE    CLINICAL HISTORY:  Provided history is "weakness;  ".    TECHNIQUE:  One view of the chest.    COMPARISON:  08/29/2019.    FINDINGS:  Cardiac wires overlie the chest.  Lung volumes are low.  Cardiomediastinal silhouette is enlarged but stable.  Atherosclerotic calcifications overlie the aortic arch.  No large focal consolidation or detrimental change in lung aeration when compared with the prior study.  Surgical clips overlie the left axillary region as well as subclavian and left upper extremity vascular stents.                                  CONSULTS:     IP CONSULT TO NEPHROLOGY  IP CONSULT TO CRITICAL CARE MEDICINE       ASSESSMENT & PLAN:     Primary Diagnosis:  Hyperkalemia    Active Hospital Problems    Diagnosis  POA    *Hyperkalemia [E87.5]  Yes     Priority: 1 - High    ESRD on dialysis [N18.6, Z99.2]  Not Applicable     Priority: 2      Chronic     Dialyzes at [facility] in [place].  Followed by Dr. Arthur      Chronic respiratory failure with hypoxia [J96.11]  Yes     Chronic    Diabetes mellitus with ESRD (end-stage renal disease) [E11.22, N18.6]  Yes     Chronic    Combined hyperlipidemia associated with type 2 diabetes mellitus [E11.69, E78.2]  Yes     Chronic    Anxiety [F41.9]  Yes     Chronic    Hypertension associated with diabetes [E11.59, I10]  Yes     Chronic    History of CVA (cerebrovascular accident) [Z86.73]  Not Applicable     Chronic    COPD (chronic obstructive pulmonary disease) [J44.9]  Yes     Chronic    Diastolic dysfunction [I51.89]  Yes     Chronic      Resolved Hospital Problems   No resolved problems to display.       Hyperkalemia  · Markedly elevated at 8.4; confirmed with " repeat POC testing at 8.2  · Questionable compliance with dialysis.  · evidence of EKG changes with widened QRS but no significantly peaked Ts  · Monitor with serial labs  · Monitor on telemetry  · Prefer to give patiromer, however, this is not on formulary.  Will continue Kayexalate ordered in the ED.  · Insulin, D50, calcium gluconate, sodium bicarbonate, and albuterol given  · Dr. Arthur with Nephrology consulted and will undergo emergent hemodialysis tonight    End-stage renal disease on dialysis  · Acute hyperkalemia and pulmonary edema as noted above  · Will need emergent dialysis while inpatient  · Nephrology consulted    Stable COPD  · No acute issues  · Continue with home medication regimen  · Nebulizer treatments (albuterol/atrovent)  · Titrate O2 sats between 88 to 93%.  No supplemental 02 for 02 saturation greater than 93% due to V/Q mismatch  · Consider initiating regimen of Breo, Advair 500/50mg, Spiriva 18mcg, and/or Daliresp 500mcg at or before discharge.  May also defer to outpatient pulmonology after formal pulmonary function testing.  · Mucolytics as needed  · Outpatient referral to pulmonology for further optimization  · Tobacco cessation counseling    Diabetes Mellitus Type 2  · Blood glucose is  in acceptable range at this time  · Maintain w/ subcutaneous insulin management order set  · Hold oral diabetic meds  · ADA 1800 kcal diet  · BG goal while in patient is <180mg/dL  · HgA1c = Pending    Cerebral vascular disease  · No evidence of acute stroke at this time  · Maintain adequate blood pressure control  · Heart healthy diet  · Aspirin  · Statin    Hypertension  · Goal while inpatient is a systolic blood pressure less than 160mmHg  · BP in acceptable range at this time  · Continue current home regimen with hold parameters  · PRN antihypertensives available    Hyperlipidemia  · Lipid panel - as an outpatient  · Cardiac diet  · Continue statin          VTE Risk Mitigation (From admission, onward)          Ordered     apixaban tablet 2.5 mg  2 times daily      01/02/20 2052     IP VTE HIGH RISK PATIENT  Once      01/02/20 2052     Place sequential compression device  Until discontinued      01/02/20 2052     Place ROS hose  Until discontinued      01/02/20 2052                  Adult PRN medications available   DVT prophylaxis given       DISPOSITION:     Will admit to the Hospital Medicine service for further evaluation and treatment.    Chart reviewed and updated where applicable.    High Risk Conditions:  Patient has a condition that poses threat to life and bodily function:  Severe hyperkalemia secondary to end-stage renal disease.  Acute pulmonary edema secondary to volume overload associated with ESRD      ===============================================================    Isrrael Apodaca MD, MPH  Department of Hospital Medicine   [facility]  [phone] pg  (7pm - 6am)      Critical care was given for Eli Vaz who has a condition that poses threat to life and bodily function, including:  Severe Hyperkalemia secondary to ESRD; hypoxemia secondary to volume overload associated with ESRD     Critical care was time spent personally by me on the following activities: development of treatment plan with patient or surrogate and bedside caregivers, discussions with consultants, evaluation of patient's response to treatment, examination of patient, ordering and performing treatments and interventions, ordering and review of laboratory studies, ordering and review of radiographic studies, pulse oximetry, re-evaluation of patient's condition. This critical care time did not overlap with that of any other provider or involve time for any procedures.    Reviewed: previous chart and vitals  Reviewed previous: labs, x-ray, EKG, 2D echocardiogram  Interpretation: labs, x-ray, EKG    Total Critical Care time: 45 minutes. This excludes time spent performing separately reportable procedures and  services.  Counseling/Conference Time: 15 minutes        This note is dictated using [employer] voice recognition software.  There are word recognition mistakes that are occasionally missed on review.

## 2020-01-03 NOTE — PLAN OF CARE
Problem: Infection  Goal: Infection Symptom Resolution  Outcome: Ongoing, Progressing  Intervention: Prevent or Manage Infection  Flowsheets (Taken 1/3/2020 7633)  Fever Reduction/Comfort Measures: lightweight bedding; lightweight clothing  Infection Management: aseptic technique maintained

## 2020-01-03 NOTE — PROGRESS NOTES
Date of Admission:1/2/2020    SUBJECTIVE: notes breathing better    Current Facility-Administered Medications   Medication    0.9%  NaCl infusion    0.9%  NaCl infusion    acetaminophen tablet 325 mg    amiodarone tablet 200 mg    apixaban tablet 2.5 mg    atorvastatin tablet 10 mg    ipratropium-albuterol inhaler 1 puff    mirtazapine tablet 7.5 mg    morphine injection 2 mg    mupirocin 2 % ointment    nitroGLYCERIN SL tablet 0.4 mg    sevelamer carbonate tablet 800 mg    sodium chloride 0.9% flush 10 mL     Facility-Administered Medications Ordered in Other Encounters   Medication    0.9%  NaCl infusion    sodium chloride 0.9% flush 5 mL       Wt Readings from Last 3 Encounters:   01/02/20 64 kg (141 lb 1.5 oz)   12/13/19 59 kg (130 lb 1.1 oz)   11/22/19 59.1 kg (130 lb 4.8 oz)     Temp Readings from Last 3 Encounters:   01/03/20 98.1 °F (36.7 °C) (Oral)   08/29/19 98.1 °F (36.7 °C) (Oral)   07/22/19 98.1 °F (36.7 °C) (Oral)     BP Readings from Last 3 Encounters:   01/03/20 (!) 122/56   12/13/19 126/75   11/22/19 (!) 110/55     Pulse Readings from Last 3 Encounters:   01/03/20 69   12/13/19 104   11/22/19 103       Intake/Output Summary (Last 24 hours) at 1/3/2020 1443  Last data filed at 1/3/2020 1320  Gross per 24 hour   Intake 700 ml   Output 2500 ml   Net -1800 ml       PE:  GEN:wd female in nad  HEENT:ncat,eomi,mm  CVS:s1s2 regular  PULM:ctab  ABD:+bs,soft,nd  EXT:no leg edema, avf of arm  NEURO:awake    Recent Labs   Lab 01/03/20  0253   GLU 53*   *   K 5.0   CL 99   CO2 22*   BUN 21   CREATININE 4.7*   CALCIUM 9.6   MG 1.9       Lab Results   Component Value Date     (H) 12/14/2010    CALCIUM 9.6 01/03/2020    PHOS 4.2 01/03/2020       Recent Labs   Lab 01/03/20  0253   WBC 3.93   RBC 3.25*   HGB 9.7*   HCT 32.9*   *   *   MCH 29.8   MCHC 29.5*         A/P:  1.esrd. Cont hd TTS. Hd in am.  2.hyperkalemia. Better. ?eating.  3.anemia 2nd to esrd. Epo with  hd.  4.htn. uf with hd.  5.afib. Rate ok.  6.2nd hyperpar. Phos ok.

## 2020-01-03 NOTE — SUBJECTIVE & OBJECTIVE
Interval History: pt has no new complaints but really concern about her LE weakness    Review of Systems   Constitutional: Positive for activity change.   HENT: Negative.    Eyes: Negative.    Respiratory: Positive for shortness of breath.    Cardiovascular: Negative.    Gastrointestinal: Negative.    Endocrine: Negative.    Genitourinary: Negative.    Musculoskeletal: Positive for gait problem.   Neurological: Positive for weakness.   Psychiatric/Behavioral: Negative.      Objective:     Vital Signs (Most Recent):  Temp: 98.2 °F (36.8 °C) (01/03/20 0705)  Pulse: 70 (01/03/20 1000)  Resp: (!) 22 (01/03/20 1000)  BP: 129/61 (01/03/20 1000)  SpO2: 97 % (01/03/20 1000) Vital Signs (24h Range):  Temp:  [98.1 °F (36.7 °C)-98.5 °F (36.9 °C)] 98.2 °F (36.8 °C)  Pulse:  [53-84] 70  Resp:  [9-29] 22  SpO2:  [91 %-100 %] 97 %  BP: ()/(42-98) 129/61     Weight: 64 kg (141 lb 1.5 oz)  Body mass index is 24.22 kg/m².    Intake/Output Summary (Last 24 hours) at 1/3/2020 1040  Last data filed at 1/3/2020 0007  Gross per 24 hour   Intake 600 ml   Output 2500 ml   Net -1900 ml      Physical Exam   Constitutional: She is oriented to person, place, and time. She appears well-developed and well-nourished. No distress.   HENT:   Head: Normocephalic and atraumatic.   Eyes: Pupils are equal, round, and reactive to light. EOM are normal.   Neck: Normal range of motion. Neck supple. No JVD present.   Cardiovascular: Normal rate, regular rhythm and normal heart sounds.   Pulmonary/Chest: Effort normal and breath sounds normal. No respiratory distress.   Abdominal: Soft. Bowel sounds are normal. She exhibits no distension. There is no tenderness.   Musculoskeletal: Normal range of motion. She exhibits no edema.   Neurological: She is alert and oriented to person, place, and time.   Skin: Capillary refill takes less than 2 seconds.   Psychiatric: She has a normal mood and affect. Her behavior is normal.       Significant Labs:   BMP:    Recent Labs   Lab 01/03/20  0253   GLU 53*   *   K 5.0   CL 99   CO2 22*   BUN 21   CREATININE 4.7*   CALCIUM 9.6   MG 1.9     CBC:   Recent Labs   Lab 01/02/20  1710 01/02/20  1840 01/03/20  0253   WBC 4.16  --  3.93   HGB 11.8*  --  9.7*   HCT 39.9 39 32.9*     --  144*       Significant Imaging: I have reviewed and interpreted all pertinent imaging results/findings within the past 24 hours.

## 2020-01-03 NOTE — ASSESSMENT & PLAN NOTE
Echo 12/20/2019  ·   · Concentric left ventricular hypertrophy.  · Normal left ventricular systolic function. The estimated ejection fraction is 55%  · Normal LV diastolic function.  · Normal right ventricular systolic function.  · Mild left atrial enlargement.  · Mild-to-moderate aortic regurgitation.  · Moderate mitral regurgitation.  · Moderate tricuspid regurgitation.  · Severe pulmonary hypertension present.  · Elevated central venous pressure (15 mm Hg).  · The estimated PA systolic pressure is 105 mm Hg    Follow up cardiology as scheduled

## 2020-01-03 NOTE — HPI
Eli Vaz is a 74 y.o. female that (in part)  has a past medical history of Arthritis, Atrial fibrillation, COPD (chronic obstructive pulmonary disease), Dialysis patient, Encounter for blood transfusion, and ESRD (end stage renal disease).  has a past surgical history that includes Tubal ligation; AV fistula placement; Epidural steroid injection (Left, 5/29/2019); Epidural steroid injection (Left, 6/19/2019); and Treatment of cardiac arrhythmia (N/A, 4/2/2019). Presents to [facility] Emergency Department complaining of extremity weakness and shortness of breath.  Worsened with exertion/ambulation.  Inability to lie flat.  Patient reportedly went to dialysis on Tuesday.  She denies going to dialysis today due to her lower extremity weakness and inability to ambulate.  Reports compliance with home medication regimen and diet.  Denies chest pain denies fever or chills.  No palpitations, syncope, or collapse.  No focal neurologic deficit other than the bilateral lower extremity weakness.  Patient was recently hospitalized and treated for atrial fibrillation rapid ventricular response.  She underwent cardioversion.  She has been managed successfully with oral medications as an outpatient and has remained in normal sinus rhythm.    In the emergency department routine laboratory studies revealed evidence of hyperkalemia.  This was repeated with point of care testing which confirmed the 8.4 potassium level.  Chest x-ray was positive for pulmonary edema. Patient was given insulin, glucose, albuterol, calcium, bicarb.   ED physician spoke with Dr. Arthur and agreed that we will do emergent dialysis.      Hospital medicine has been asked to admit to inpatient in the ICU for further evaluation and treatment of acute hyperkalemia and pulmonary edema.

## 2020-01-03 NOTE — HOSPITAL COURSE
Pt admitted to ICU for hyperkalemia (K 8).  Underwent emergent HD. Potassium down to 5.  Pt is a TTS HD patient. Also recent Dx afib and now rate controlled and on oral anticoagulation. Pt concern about severe weakness and falling at home. Consulted PT/OT. Stable for transfer to floor. Patient was transferred to the floor on 1/3/20. Patient underwent dialysis again on 1/4/20. The patient stated to me that she missed dialysis because she was not feeling well.  She was requesting discharge to home.   She will be sent home today with PT/OT H/H. Activity as tolerated. Diet- regular as tolerated. Follow up with PCP in one week

## 2020-01-03 NOTE — PROGRESS NOTES
[facility] Hospital Medicine  Progress Note    Patient Name: Eli Vaz  MRN: [id number]  Patient Class: IP- Inpatient   Admission Date: 1/2/2020  Length of Stay: 1 days  Attending Physician: Susan Araujo MD  Primary Care Provider: Charles Moody Jr, MD        Subjective:     Principal Problem:Hyperkalemia        HPI:  Eli Vaz is a 74 y.o. female that (in part)  has a past medical history of Arthritis, Atrial fibrillation, COPD (chronic obstructive pulmonary disease), Dialysis patient, Encounter for blood transfusion, and ESRD (end stage renal disease).  has a past surgical history that includes Tubal ligation; AV fistula placement; Epidural steroid injection (Left, 5/29/2019); Epidural steroid injection (Left, 6/19/2019); and Treatment of cardiac arrhythmia (N/A, 4/2/2019). Presents to [facility] Emergency Department complaining of extremity weakness and shortness of breath.  Worsened with exertion/ambulation.  Inability to lie flat.  Patient reportedly went to dialysis on Tuesday.  She denies going to dialysis today due to her lower extremity weakness and inability to ambulate.  Reports compliance with home medication regimen and diet.  Denies chest pain denies fever or chills.  No palpitations, syncope, or collapse.  No focal neurologic deficit other than the bilateral lower extremity weakness.  Patient was recently hospitalized and treated for atrial fibrillation rapid ventricular response.  She underwent cardioversion.  She has been managed successfully with oral medications as an outpatient and has remained in normal sinus rhythm.    In the emergency department routine laboratory studies revealed evidence of hyperkalemia.  This was repeated with point of care testing which confirmed the 8.4 potassium level.  Chest x-ray was positive for pulmonary edema. Patient was given insulin, glucose, albuterol, calcium, bicarb.   ED physician spoke with Dr. Arthur and  agreed that we will do emergent dialysis.      Hospital medicine has been asked to admit to inpatient in the ICU for further evaluation and treatment of acute hyperkalemia and pulmonary edema.     Overview/Hospital Course:  Pt admitted to ICU for hyperkalemia (K 8).  Underwent emergent HD. Potassium down to 5.  Pt is a TTS HD patient. Also recent Dx afib and now rate controlled and on oral anticoagulation. Pt concern about severe weakness and falling at home. Consulted PT/OT. Stable for transfer to floor.     Interval History: pt has no new complaints but really concern about her LE weakness    Review of Systems   Constitutional: Positive for activity change.   HENT: Negative.    Eyes: Negative.    Respiratory: Positive for shortness of breath.    Cardiovascular: Negative.    Gastrointestinal: Negative.    Endocrine: Negative.    Genitourinary: Negative.    Musculoskeletal: Positive for gait problem.   Neurological: Positive for weakness.   Psychiatric/Behavioral: Negative.      Objective:     Vital Signs (Most Recent):  Temp: 98.2 °F (36.8 °C) (01/03/20 0705)  Pulse: 70 (01/03/20 1000)  Resp: (!) 22 (01/03/20 1000)  BP: 129/61 (01/03/20 1000)  SpO2: 97 % (01/03/20 1000) Vital Signs (24h Range):  Temp:  [98.1 °F (36.7 °C)-98.5 °F (36.9 °C)] 98.2 °F (36.8 °C)  Pulse:  [53-84] 70  Resp:  [9-29] 22  SpO2:  [91 %-100 %] 97 %  BP: ()/(42-98) 129/61     Weight: 64 kg (141 lb 1.5 oz)  Body mass index is 24.22 kg/m².    Intake/Output Summary (Last 24 hours) at 1/3/2020 1040  Last data filed at 1/3/2020 0007  Gross per 24 hour   Intake 600 ml   Output 2500 ml   Net -1900 ml      Physical Exam   Constitutional: She is oriented to person, place, and time. She appears well-developed and well-nourished. No distress.   HENT:   Head: Normocephalic and atraumatic.   Eyes: Pupils are equal, round, and reactive to light. EOM are normal.   Neck: Normal range of motion. Neck supple. No JVD present.   Cardiovascular: Normal rate,  regular rhythm and normal heart sounds.   Pulmonary/Chest: Effort normal and breath sounds normal. No respiratory distress.   Abdominal: Soft. Bowel sounds are normal. She exhibits no distension. There is no tenderness.   Musculoskeletal: Normal range of motion. She exhibits no edema.   Neurological: She is alert and oriented to person, place, and time.   Skin: Capillary refill takes less than 2 seconds.   Psychiatric: She has a normal mood and affect. Her behavior is normal.       Significant Labs:   BMP:   Recent Labs   Lab 01/03/20  0253   GLU 53*   *   K 5.0   CL 99   CO2 22*   BUN 21   CREATININE 4.7*   CALCIUM 9.6   MG 1.9     CBC:   Recent Labs   Lab 01/02/20  1710 01/02/20  1840 01/03/20  0253   WBC 4.16  --  3.93   HGB 11.8*  --  9.7*   HCT 39.9 39 32.9*     --  144*       Significant Imaging: I have reviewed and interpreted all pertinent imaging results/findings within the past 24 hours.      Assessment/Plan:      Hyperkalemia  Resolved  With HD  Monitor   Nephrology consulted       Weakness  PT/Ot consulted  On oral anticoagulation - fall risk       Atrial flutter  Rate controlled on amiodarone   apixaban continued       Chronic respiratory failure with hypoxia  Improved with HD  Back to baseline  Continue HD for volume control        Secondary hyperparathyroidism  Resume phos binders with meals       Anxiety  No acute issues  Resume home meds       Combined hyperlipidemia associated with type 2 diabetes mellitus    Resume statin     Diabetes mellitus with ESRD (end-stage renal disease)  A1c 5.0% - not on home meds  SSI   Diabetic diet  Drop n blood glucose - monitor closely     Hypertension associated with diabetes    Controlled  Resume home meds     History of CVA (cerebrovascular accident)  No acute issues  PT/OT consulted for BLE weakness         ESRD on dialysis  Nephrology consulted       Diastolic dysfunction  Echo 12/20/2019  ·   · Concentric left ventricular hypertrophy.  · Normal  left ventricular systolic function. The estimated ejection fraction is 55%  · Normal LV diastolic function.  · Normal right ventricular systolic function.  · Mild left atrial enlargement.  · Mild-to-moderate aortic regurgitation.  · Moderate mitral regurgitation.  · Moderate tricuspid regurgitation.  · Severe pulmonary hypertension present.  · Elevated central venous pressure (15 mm Hg).  · The estimated PA systolic pressure is 105 mm Hg    Follow up cardiology as scheduled       COPD (chronic obstructive pulmonary disease)  No acute issues  Inhaler and nebs PRN          VTE Risk Mitigation (From admission, onward)         Ordered     apixaban tablet 2.5 mg  2 times daily      01/02/20 2052     IP VTE HIGH RISK PATIENT  Once      01/02/20 2052     Place sequential compression device  Until discontinued      01/02/20 2052     Place ROS hose  Until discontinued      01/02/20 2052                Critical care time spent on the evaluation and treatment of severe organ dysfunction, review of pertinent labs and imaging studies, discussions with consulting providers and discussions with patient/family: 40 minutes.      Susan Araujo MD  Department of Hospital Medicine   [facility]

## 2020-01-03 NOTE — PLAN OF CARE
Admitted to ICU via ED on last PM for hyperkalemia, 8.4. Emergent dialysis performed w/ 2 liters off. Repeat potassium this AM 5.0. NSR on monitor. Right upper arm AV graft w/ positive thrill and bruit. Remains free of falls and injuries.

## 2020-01-03 NOTE — PLAN OF CARE
01/03/20 1657   Discharge Assessment   Assessment Type Discharge Planning Assessment       SW attempted to contact pt daughter Veronica at [phone].

## 2020-01-03 NOTE — EICU
eICU Note : New Admit :notified by the [facility]: Hyperkalemia K 8.4    Brief HPI: 75 y/o F who has COPD ,ESRD-HD  74-year-old female with history of atrial fibrillation, arthritis, COPD and end-stage renal disease presented with generalized weakness both lower extremities, on BMP the potassium was found to be 8.4 and creatinine 8.7.  Patient is a chronic hemodialysis patient will need to be dialysed.    Vital Signs :  Vitals:    01/02/20 2300   BP: 134/61   Pulse: 77   Resp: 18   Temp: 98.1          Camera Assessment :Patient lying in bed, sleeping      Data:WBC 4.16, hemoglobin 11.8, hematocrit 39.9, platelets 176  Sodium 138, potassium 8.4, chloride 101, CO2 23, anion gap 14, BUN 54, creatinine 8.7, glucose 69, calcium 10.3, total protein 8.0, albumin 4.1, AST 22, ALT 15  CXR, stable chronic findings      Impression and recommendations:  1.Hyperkalemia : Treated with Hyperkalemia Protocol , will dialysis ASAP  2.ESRD : On HD , pulmonary edema as noted, Creat 8.7  3. COPD : ON Bronchodilators, Steroids.On Breo and Advair   4.DM2 :On SQ Insulin.  5.CVA: Old Stroke  6.Hypertension:PRN Hypertensives   4.PUD, DVT prophylaxis : SQH and PPI         Yesica Capellan M.D  eICU Physician

## 2020-01-03 NOTE — EICU
Camryn Note :    Called by the [facility]:    Problem:Creat 8.7, c/o of pain    Pertinent History and labs reviewed :  Problem List:  2020-01: Hyperkalemia  Nonintractable headache  Acute encephalopathy  Vertigo      Treatment /Intervention given:TYlenol 325 q6 PRN        Yesica Cowan Physician

## 2020-01-03 NOTE — NURSING
Dr. Araujo notified that pt refused paxil and daughter states that she no longer takes that medications. Orders to discontinue the paxil were given.     Dr. Araujo notified that I received in report that the pt had reported CP while in ICU and EKG and troponin was ordered but I did not see a consult for cardiology. Dr. Araujo states that CP and elevated troponin was seen as pt's baseline and no need to consult cardiology.

## 2020-01-04 VITALS
BODY MASS INDEX: 23.45 KG/M2 | OXYGEN SATURATION: 96 % | DIASTOLIC BLOOD PRESSURE: 87 MMHG | TEMPERATURE: 98 F | RESPIRATION RATE: 18 BRPM | HEIGHT: 64 IN | HEART RATE: 82 BPM | WEIGHT: 137.38 LBS | SYSTOLIC BLOOD PRESSURE: 144 MMHG

## 2020-01-04 LAB
ALBUMIN SERPL BCP-MCNC: 3.7 G/DL (ref 3.5–5.2)
ALP SERPL-CCNC: 84 U/L (ref 55–135)
ALT SERPL W/O P-5'-P-CCNC: 11 U/L (ref 10–44)
ANION GAP SERPL CALC-SCNC: 16 MMOL/L (ref 8–16)
AST SERPL-CCNC: 23 U/L (ref 10–40)
BASOPHILS # BLD AUTO: 0.03 K/UL (ref 0–0.2)
BASOPHILS NFR BLD: 0.9 % (ref 0–1.9)
BILIRUB SERPL-MCNC: 0.6 MG/DL (ref 0.1–1)
BUN SERPL-MCNC: 40 MG/DL (ref 8–23)
CALCIUM SERPL-MCNC: 9.5 MG/DL (ref 8.7–10.5)
CHLORIDE SERPL-SCNC: 99 MMOL/L (ref 95–110)
CO2 SERPL-SCNC: 19 MMOL/L (ref 23–29)
CREAT SERPL-MCNC: 6.9 MG/DL (ref 0.5–1.4)
DIFFERENTIAL METHOD: ABNORMAL
EOSINOPHIL # BLD AUTO: 0.1 K/UL (ref 0–0.5)
EOSINOPHIL NFR BLD: 2.5 % (ref 0–8)
ERYTHROCYTE [DISTWIDTH] IN BLOOD BY AUTOMATED COUNT: 15.3 % (ref 11.5–14.5)
EST. GFR  (AFRICAN AMERICAN): 6 ML/MIN/1.73 M^2
EST. GFR  (NON AFRICAN AMERICAN): 5 ML/MIN/1.73 M^2
GLUCOSE SERPL-MCNC: 65 MG/DL (ref 70–110)
HCT VFR BLD AUTO: 34.7 % (ref 37–48.5)
HGB BLD-MCNC: 10.2 G/DL (ref 12–16)
IMM GRANULOCYTES # BLD AUTO: 0.01 K/UL (ref 0–0.04)
IMM GRANULOCYTES NFR BLD AUTO: 0.3 % (ref 0–0.5)
LYMPHOCYTES # BLD AUTO: 0.6 K/UL (ref 1–4.8)
LYMPHOCYTES NFR BLD: 19 % (ref 18–48)
MAGNESIUM SERPL-MCNC: 2.2 MG/DL (ref 1.6–2.6)
MCH RBC QN AUTO: 29.8 PG (ref 27–31)
MCHC RBC AUTO-ENTMCNC: 29.4 G/DL (ref 32–36)
MCV RBC AUTO: 102 FL (ref 82–98)
MONOCYTES # BLD AUTO: 0.7 K/UL (ref 0.3–1)
MONOCYTES NFR BLD: 20.6 % (ref 4–15)
NEUTROPHILS # BLD AUTO: 1.8 K/UL (ref 1.8–7.7)
NEUTROPHILS NFR BLD: 56.7 % (ref 38–73)
NRBC BLD-RTO: 0 /100 WBC
PHOSPHATE SERPL-MCNC: 6.4 MG/DL (ref 2.7–4.5)
PLATELET # BLD AUTO: 144 K/UL (ref 150–350)
PMV BLD AUTO: 10.4 FL (ref 9.2–12.9)
POTASSIUM SERPL-SCNC: 4.8 MMOL/L (ref 3.5–5.1)
POTASSIUM SERPL-SCNC: 6.4 MMOL/L (ref 3.5–5.1)
PROT SERPL-MCNC: 7.1 G/DL (ref 6–8.4)
RBC # BLD AUTO: 3.42 M/UL (ref 4–5.4)
SODIUM SERPL-SCNC: 134 MMOL/L (ref 136–145)
TROPONIN I SERPL DL<=0.01 NG/ML-MCNC: 0.11 NG/ML (ref 0–0.03)
TROPONIN I SERPL DL<=0.01 NG/ML-MCNC: 0.12 NG/ML (ref 0–0.03)
WBC # BLD AUTO: 3.21 K/UL (ref 3.9–12.7)

## 2020-01-04 PROCEDURE — 25000242 PHARM REV CODE 250 ALT 637 W/ HCPCS: Performed by: HOSPITALIST

## 2020-01-04 PROCEDURE — 83735 ASSAY OF MAGNESIUM: CPT

## 2020-01-04 PROCEDURE — 94761 N-INVAS EAR/PLS OXIMETRY MLT: CPT

## 2020-01-04 PROCEDURE — 36415 COLL VENOUS BLD VENIPUNCTURE: CPT

## 2020-01-04 PROCEDURE — 84132 ASSAY OF SERUM POTASSIUM: CPT

## 2020-01-04 PROCEDURE — 97535 SELF CARE MNGMENT TRAINING: CPT

## 2020-01-04 PROCEDURE — 25000003 PHARM REV CODE 250: Performed by: HOSPITALIST

## 2020-01-04 PROCEDURE — 94640 AIRWAY INHALATION TREATMENT: CPT

## 2020-01-04 PROCEDURE — 84484 ASSAY OF TROPONIN QUANT: CPT

## 2020-01-04 PROCEDURE — 80053 COMPREHEN METABOLIC PANEL: CPT

## 2020-01-04 PROCEDURE — 63600175 PHARM REV CODE 636 W HCPCS: Performed by: INTERNAL MEDICINE

## 2020-01-04 PROCEDURE — 84100 ASSAY OF PHOSPHORUS: CPT

## 2020-01-04 PROCEDURE — 80100016 HC MAINTENANCE HEMODIALYSIS

## 2020-01-04 PROCEDURE — 97165 OT EVAL LOW COMPLEX 30 MIN: CPT

## 2020-01-04 PROCEDURE — 25000003 PHARM REV CODE 250: Performed by: INTERNAL MEDICINE

## 2020-01-04 PROCEDURE — 85025 COMPLETE CBC W/AUTO DIFF WBC: CPT

## 2020-01-04 RX ADMIN — SEVELAMER CARBONATE 800 MG: 800 TABLET, FILM COATED ORAL at 01:01

## 2020-01-04 RX ADMIN — AMIODARONE HYDROCHLORIDE 200 MG: 200 TABLET ORAL at 01:01

## 2020-01-04 RX ADMIN — LEVALBUTEROL HYDROCHLORIDE 1.25 MG: 1.25 SOLUTION, CONCENTRATE RESPIRATORY (INHALATION) at 07:01

## 2020-01-04 RX ADMIN — NEPHROCAP 1 CAPSULE: 1 CAP ORAL at 01:01

## 2020-01-04 RX ADMIN — APIXABAN 2.5 MG: 2.5 TABLET, FILM COATED ORAL at 01:01

## 2020-01-04 RX ADMIN — ATORVASTATIN CALCIUM 10 MG: 10 TABLET, FILM COATED ORAL at 01:01

## 2020-01-04 RX ADMIN — MUPIROCIN: 20 OINTMENT TOPICAL at 07:01

## 2020-01-04 RX ADMIN — EPOETIN ALFA-EPBX 6000 UNITS: 10000 INJECTION, SOLUTION INTRAVENOUS; SUBCUTANEOUS at 01:01

## 2020-01-04 NOTE — PLAN OF CARE
01/04/20 1409   Discharge Assessment   Assessment Type Discharge Planning Assessment   Confirmed/corrected address and phone number on facesheet? Yes   Assessment information obtained from? Patient   Communicated expected length of stay with patient/caregiver yes   Prior to hospitilization cognitive status: Alert/Oriented   Prior to hospitalization functional status: Independent;Assistive Equipment  (Walker; O2; wheelchair; nebulizer)   Current cognitive status: Alert/Oriented   Current Functional Status: Independent;Assistive Equipment  (Walker; O2; wheelchair; nebulizer)   Facility Arrived From: Home   Lives With spouse   Able to Return to Prior Arrangements yes   Is patient able to care for self after discharge? Yes   Who are your caregiver(s) and their phone number(s)? Kai-spouse: [phone]   Patient's perception of discharge disposition home or selfcare;home health  ([facility])   Readmission Within the Last 30 Days no previous admission in last 30 days   Patient currently being followed by outpatient case management? No   Patient currently receives any other outside agency services? No   Equipment Currently Used at Home oxygen;nebulizer;wheelchair;walker, rolling  (Nebulizer is broken; working with [facility] to repair or replace)   Do you have any problems affording any of your prescribed medications? No   Is the patient taking medications as prescribed? yes   Does the patient have transportation home? Yes   Transportation Anticipated family or friend will provide;car, drives self   Does the patient receive services at the [facility]? No   Discharge Plan A Home;Home with family   Discharge Plan B Other  (TBD)   DME Needed Upon Discharge  other (see comments)  (Nebulizer needs repair or replace)   Patient/Family in Agreement with Plan yes   SW Role explained to patient; two patient identifiers recognized; SW contact information placed on Communication board. Discussed patient managing health care at  home; determined who would be helping patient at home with recovery: Kai-spouse helps at home    PCP: Charles Moody Jr, MD prefers afternoon appointments    Extended Emergency Contact Information  Primary Emergency Contact: Veronica Hawk [place]  Mobile Phone: [phone]  Relation: Daughter  Secondary Emergency Contact: Kai Vaz   [place]  Mobile Phone: [phone]  Relation: Spouse     [id number] [place] - [address]  [address] [address]  Phone: [phone] Fax: [phone]    Payor: [employer] MANAGED MEDICARE / Plan: [employer] CHOICES 65 / Product Type: Medicare Advantage /

## 2020-01-04 NOTE — NURSING
Pt tolerated 4 hours of HD today, 2L removed, 72.6L processed. NADN. Report given to COREY Escamilla RN.

## 2020-01-04 NOTE — PT/OT/SLP PROGRESS
Occupational Therapy      Patient Name:  Eli Vaz   MRN:  [id number]    Patient not seen today secondary to Dialysis, Unavailable (Comment). Will follow-up as able.    Sangeeta Benton OT  1/4/2020

## 2020-01-04 NOTE — PROGRESS NOTES
[facility] Hospital Medicine  Progress Note    Patient Name: Eli Vaz  MRN: [id number]  Patient Class: IP- Inpatient   Admission Date: 1/2/2020  Length of Stay: 2 days  Attending Physician: Jerry Urena MD  Primary Care Provider: Charles Moody Jr, MD        Subjective:     Principal Problem:Hyperkalemia        HPI:  Eli Vaz is a 74 y.o. female that (in part)  has a past medical history of Arthritis, Atrial fibrillation, COPD (chronic obstructive pulmonary disease), Dialysis patient, Encounter for blood transfusion, and ESRD (end stage renal disease).  has a past surgical history that includes Tubal ligation; AV fistula placement; Epidural steroid injection (Left, 5/29/2019); Epidural steroid injection (Left, 6/19/2019); and Treatment of cardiac arrhythmia (N/A, 4/2/2019). Presents to [facility] Emergency Department complaining of extremity weakness and shortness of breath.  Worsened with exertion/ambulation.  Inability to lie flat.  Patient reportedly went to dialysis on Tuesday.  She denies going to dialysis today due to her lower extremity weakness and inability to ambulate.  Reports compliance with home medication regimen and diet.  Denies chest pain denies fever or chills.  No palpitations, syncope, or collapse.  No focal neurologic deficit other than the bilateral lower extremity weakness.  Patient was recently hospitalized and treated for atrial fibrillation rapid ventricular response.  She underwent cardioversion.  She has been managed successfully with oral medications as an outpatient and has remained in normal sinus rhythm.    In the emergency department routine laboratory studies revealed evidence of hyperkalemia.  This was repeated with point of care testing which confirmed the 8.4 potassium level.  Chest x-ray was positive for pulmonary edema. Patient was given insulin, glucose, albuterol, calcium, bicarb.   ED physician spoke with Dr. Arthur and  agreed that we will do emergent dialysis.      Hospital medicine has been asked to admit to inpatient in the ICU for further evaluation and treatment of acute hyperkalemia and pulmonary edema.     Overview/Hospital Course:  Pt admitted to ICU for hyperkalemia (K 8).  Underwent emergent HD. Potassium down to 5.  Pt is a TTS HD patient. Also recent Dx afib and now rate controlled and on oral anticoagulation. Pt concern about severe weakness and falling at home. Consulted PT/OT. Stable for transfer to floor. Patient was transferred to the floor on 1/3/20. Patient underwent dialysis again on 1/4/20. PT/OT were consulted.     Interval History: No complaints     Review of Systems   Constitutional: Negative for activity change.   HENT: Negative for congestion and rhinorrhea.    Respiratory: Negative for chest tightness and shortness of breath.    Cardiovascular: Negative for chest pain.   Gastrointestinal: Negative for abdominal pain.   Musculoskeletal: Negative for arthralgias.   Psychiatric/Behavioral: Negative for agitation.     Objective:     Vital Signs (Most Recent):  Temp: 97.8 °F (36.6 °C) (01/04/20 0801)  Pulse: 66 (01/04/20 1100)  Resp: 18 (01/04/20 0801)  BP: 138/67 (01/04/20 1100)  SpO2: (!) 94 % (01/04/20 0740) Vital Signs (24h Range):  Temp:  [97.8 °F (36.6 °C)-98.3 °F (36.8 °C)] 97.8 °F (36.6 °C)  Pulse:  [60-83] 66  Resp:  [16-19] 18  SpO2:  [94 %-100 %] 94 %  BP: (116-138)/(54-67) 138/67     Weight: 62.3 kg (137 lb 5.6 oz)  Body mass index is 23.58 kg/m².    Intake/Output Summary (Last 24 hours) at 1/4/2020 1250  Last data filed at 1/4/2020 0000  Gross per 24 hour   Intake 220 ml   Output --   Net 220 ml      Physical Exam   Constitutional: She is oriented to person, place, and time. She appears well-developed and well-nourished.   HENT:   Head: Normocephalic and atraumatic.   Neurological: She is alert and oriented to person, place, and time.   Skin: Skin is warm.   Psychiatric: She has a normal mood and  affect. Her behavior is normal.   Nursing note and vitals reviewed.      Significant Labs:   CBC:   Recent Labs   Lab 01/02/20  1710 01/02/20  1840 01/03/20  0253 01/04/20  0546   WBC 4.16  --  3.93 3.21*   HGB 11.8*  --  9.7* 10.2*   HCT 39.9 39 32.9* 34.7*     --  144* 144*     CMP:   Recent Labs   Lab 01/02/20  1710 01/03/20  0253 01/04/20  0546    135* 134*   K 8.4* 5.0 6.4*    99 99   CO2 23 22* 19*   GLU 69* 53* 65*   BUN 54* 21 40*   CREATININE 8.7* 4.7* 6.9*   CALCIUM 10.3 9.6 9.5   PROT 8.0 7.1 7.1   ALBUMIN 4.1 3.6 3.7   BILITOT 0.7 0.7 0.6   ALKPHOS 94 81 84   AST 22 20 23   ALT 15 10 11   ANIONGAP 14 14 16   EGFRNONAA 4* 9* 5*       Significant Imaging:       Assessment/Plan:      Hyperkalemia  Resolved  With HD  Monitor   Nephrology consulted       Weakness  PT/Ot consulted  On oral anticoagulation - fall risk       Atrial flutter  Rate controlled on amiodarone   apixaban continued       Chronic respiratory failure with hypoxia  Improved with HD  Back to baseline  Continue HD for volume control        Secondary hyperparathyroidism  Resume phos binders with meals       Anxiety  No acute issues  Resume home meds       Combined hyperlipidemia associated with type 2 diabetes mellitus    Resume statin     Diabetes mellitus with ESRD (end-stage renal disease)  A1c 5.0% - not on home meds  SSI   Diabetic diet  Drop n blood glucose - monitor closely     Hypertension associated with diabetes    Controlled  Resume home meds     History of CVA (cerebrovascular accident)  No acute issues  PT/OT consulted for BLE weakness         ESRD on dialysis  Nephrology consulted   T, TH, Sat       Diastolic dysfunction  Echo 12/20/2019  ·   · Concentric left ventricular hypertrophy.  · Normal left ventricular systolic function. The estimated ejection fraction is 55%  · Normal LV diastolic function.  · Normal right ventricular systolic function.  · Mild left atrial enlargement.  · Mild-to-moderate aortic  regurgitation.  · Moderate mitral regurgitation.  · Moderate tricuspid regurgitation.  · Severe pulmonary hypertension present.  · Elevated central venous pressure (15 mm Hg).  · The estimated PA systolic pressure is 105 mm Hg    Follow up cardiology as scheduled       COPD (chronic obstructive pulmonary disease)  No acute issues  Inhaler and nebs PRN          VTE Risk Mitigation (From admission, onward)         Ordered     apixaban tablet 2.5 mg  2 times daily      01/02/20 2052     IP VTE HIGH RISK PATIENT  Once      01/02/20 2052     Place sequential compression device  Until discontinued      01/02/20 2052     Place ROS hose  Until discontinued      01/02/20 2052              Dialysis today. Will check K later.   PT/OT eval pending     Addendum 1:30pm  Patient would like to go home.  Will order PT/OT and patient will be discharged to later.  She noted she missed dialysis because she did not feel well. We had a discussion about this     Jerry Man MD  Department of Hospital Medicine   [facility]

## 2020-01-04 NOTE — PT/OT/SLP PROGRESS
Physical Therapy      Patient Name:  Eli Vaz   MRN:  [id number]    Patient not seen today for PT eval secondary to Dialysis. Will follow-up tomorrow.    Estephania Guerra, PT

## 2020-01-04 NOTE — PT/OT/SLP EVAL
Occupational Therapy   Evaluation/Treatment and Discharge Note    Name: Eli Vaz  MRN: [id number]  Admitting Diagnosis:  Hyperkalemia      Recommendations:     Discharge Recommendations: home health OT, home(with family assistance)  Discharge Equipment Recommendations:  none  Barriers to discharge:  None    Assessment:     Eli Vaz is a 74 y.o. female with a medical diagnosis of Hyperkalemia.   The patient participated in OT eval and received education re: Home safety and fall prevention. Thee patient states she is ready for D/C to home and has DME and assistance as available at home.  The patient is agreeable to OT/PT evaluation at home.            Plan:     During this hospitalization, patient does not require further acute OT services.  Please re-consult if situation changes.    · Plan of Care Reviewed with: patient, spouse    Subjective     Chief Complaint: anxious for D/C  Patient/Family Comments/goals: return home today with HH services    Occupational Profile:  Living Environment: The patient lives with her spouse and children in a SS house with no concerns.  Previous level of function: The patient is mod I with bathing and dressing. The patient sits on a shower chair in the walk in shower to bathe. The patient amb with use of a rollator in the community.  Roles and Routines: The patient wears oxygen at night and prn during the day. The patient drives occasionally.   Equipment Used at home:  wheelchair, rollator, bedside commode, oxygen  Assistance upon Discharge: spouse and children    Pain/Comfort:  · Pain Rating 1: 0/10    Patients cultural, spiritual, Spiritism conflicts given the current situation: no    Objective:     Communicated with: Sammi red prior to session.  Patient found seated on the EOB with telemetry upon OT entry to room.    General Precautions: Standard, fall   Orthopedic Precautions:N/A   Braces: N/A     Occupational Performance:    Bed Mobility:    · Patient completed Supine to  Sit with modified independence and supervision    Functional Mobility/Transfers:  · Patient completed Sit <> Stand Transfer with modified independence and supervision  with  no assistive device   · Patient completed Toilet Transfer Step Transfer technique with modified independence and supervision with  no AD  · Functional Mobility: The patient amb without AD witth (S) to the bathroom, sink and chair. The patient was able to manage the bathroom door.    Activities of Daily Living:  · Grooming: modified independence and supervision to stand at the sink to wash her hands  · Upper Body Dressing: contact guard assistance to don hospital gown and don t shirt  · Lower Body Dressing: modified independence and supervision to don slippers    Cognitive/Visual Perceptual:  Cognitive/Psychosocial Skills:     -       Oriented to: Person, Place, Time and Situation   -       Follows Commands/attention:Follows multistep  commands  -       Communication:  WFL  -       Memory: No Deficits noted  -       Safety awareness/insight to disability: intact   -       Mood/Affect/Coping skills/emotional control: Appropriate to situation    Physical Exam:  Balance: -       good  Postural examination/scapula alignment:    -       No postural abnormalities identified  Skin integrity: Visible skin intact  Edema:  None noted  Upper Extremity Range of Motion:     -       Right Upper Extremity: WFL  -       Left Upper Extremity: WFL  Upper Extremity Strength:    -       Right Upper Extremity: WFL  -       Left Upper Extremity: WFL    AMPAC 6 Click ADL:  AMPAC Total Score: 24    Treatment & Education:  The patient participated in OT eval and full body dressing in preparation for D/C to home. The patient was educated re: D/C recomendations for HH PT/OT and home safety/fall prevention. The patient was given a handout for Home safety/Fall Prevention at home.  The patient verbalized understanding and handout was placed in the General acute hospital  folder.  Education:    Patient left up in chair with all lines intact, call button in reach, nurse, Sammi notified and spouse present    GOALS:   Multidisciplinary Problems     Occupational Therapy Goals     Not on file          Multidisciplinary Problems (Resolved)        Problem: Occupational Therapy Goal    Goal Priority Disciplines Outcome Interventions   Occupational Therapy Goal   (Resolved)     OT, PT/OT Met                    History:     Past Medical History:   Diagnosis Date    Arthritis     Atrial fibrillation     COPD (chronic obstructive pulmonary disease)     Dialysis patient     Encounter for blood transfusion     ESRD (end stage renal disease)        Past Surgical History:   Procedure Laterality Date    AV FISTULA PLACEMENT      EPIDURAL STEROID INJECTION Left 5/29/2019    Procedure: Axillary, Suprascapular, lateral pectoral nerve blocks;  Surgeon: Isrrael Barton Jr., MD;  Location: [facility] ENDO;  Service: Pain Management;  Laterality: Left;  Left Axillary, Suprascapular, Lateral Pectoral Nerve Blocks    02835    Arrive @ 0800; Eliquis; No DM; Confirm date with Armando    EPIDURAL STEROID INJECTION Left 6/19/2019    Procedure: Suprascapular, Axillary, and Lateral Pectoral Nerve Radiofrequency Ablations;  Surgeon: Isrrael Barton Jr., MD;  Location: [facility] ENDO;  Service: Pain Management;  Laterality: Left;  Left Suprascapular, Axillary, & Lateral Pectoral Nerve Radiofrequency Ablations    81551    Arrive @ 1145; IV Sedation- Fasting; Eliquis; No DM; 17-50-2; Rep?    TREATMENT OF CARDIAC ARRHYTHMIA N/A 4/2/2019    Procedure: Cardioversion or Defibrillation;  Surgeon: Rakesh Briggs MD;  Location: [facility] CATH LAB;  Service: Cardiology;  Laterality: N/A;    TUBAL LIGATION         Time Tracking:     OT Date of Treatment: 01/04/20  OT Start Time: 1341  OT Stop Time: 1419  OT Total Time (min): 38 min    Billable Minutes:Evaluation 15  Self Care/Home Management 23  Total Time 38    Janice  Serenity, OT  1/4/2020

## 2020-01-04 NOTE — PROGRESS NOTES
C/o feeling of SOB. NC in place and O2 sat 100%. Denies feeling anxious. Explains that she was diagnosed with asthma and would normally take a puff of her albuterol inhaler if she were home. Notified Dr. Apodaca of situation and he decided to do Xopenex resp tx Q 8 hr with 1st dose now since she was recently a-fib w/ RVR. Orders initiated immediately. Post treatment verbalizes that the SOB has gone away. VS stable and NC remains in place @ 2 lpm.

## 2020-01-04 NOTE — PROGRESS NOTES
Patient will discharge with home health services; PHN patient; contact Janes and faxed patient information and plan of care to Janes for review and to set up services.    Janes reported that [facility] accepted patient for  services; patient also indicated that she was having problems with her nebulizer at home; hose split that comes out of nebulizer; Janes requested a new nebulizer order to be sent to her; she will process this with her supervisor to get the provider to come out to repair or replace the unit. Nurse placed chat requesting order; awaiting order to process.     Rosie with call and requested that nebulizer order indicate that [facility] company repair or replace nebulizer for patient after checking; patient received nebulizer in 2017 and is not able to get a new one yet. Rosie requested that we provide some O2 tubing to patient to use with nebulizer until [facility] can come out to repair/replace

## 2020-01-04 NOTE — PLAN OF CARE
01/04/20 1415   Final Note   Assessment Type Final Discharge Note   Anticipated Discharge Disposition Home-Health  ([facility] )   What phone number can be called within the next 1-3 days to see how you are doing after discharge?   ([phone])   Hospital Follow Up  Appt(s) scheduled? Yes   Discharge plans and expectations educations in teach back method with documentation complete? Yes   [facility] Referral Info   Post Acute Recommendation Home-care   Referral Type Home Health   Facility Name [facility] [place], [place]

## 2020-01-04 NOTE — PROGRESS NOTES
WRITTEN DISCHARGE INSTRUCTIONS  Follow-up Information     Charles Moody Jr, MD. Go on 1/22/2020.    Specialty:  Family Medicine  Why:  Call Monday to schedule an earlier appointment with Dr. Moody   Contact information:  [address] [facility] [address] [phone]             [facility].    Why:  Home Health: SN; PT; OT; Aide  Contact information:  [address] [id number] [phone]

## 2020-01-04 NOTE — PLAN OF CARE
Problem: Occupational Therapy Goal  Goal: Occupational Therapy Goal  Outcome: Met   The patient participated in OT eval and received education re: Home safety and fall prevention. Thee patient states she is ready for D/C to home and has DME and assistance as available at home.  The patient is agreeable to OT/PT evaluation at home.

## 2020-01-04 NOTE — DISCHARGE SUMMARY
[facility] Hospital Medicine  Discharge Summary      Patient Name: Eli Vaz  MRN: [id number]  Admission Date: 1/2/2020  Hospital Length of Stay: 2 days  Discharge Date and Time:  01/04/2020 1:35 PM  Attending Physician: Jerry Urena MD   Discharging Provider: Jerry Urena MD  Primary Care Provider: Charles Moody Jr, MD      HPI:   Eli Vaz is a 74 y.o. female that (in part)  has a past medical history of Arthritis, Atrial fibrillation, COPD (chronic obstructive pulmonary disease), Dialysis patient, Encounter for blood transfusion, and ESRD (end stage renal disease).  has a past surgical history that includes Tubal ligation; AV fistula placement; Epidural steroid injection (Left, 5/29/2019); Epidural steroid injection (Left, 6/19/2019); and Treatment of cardiac arrhythmia (N/A, 4/2/2019). Presents to [facility] Emergency Department complaining of extremity weakness and shortness of breath.  Worsened with exertion/ambulation.  Inability to lie flat.  Patient reportedly went to dialysis on Tuesday.  She denies going to dialysis today due to her lower extremity weakness and inability to ambulate.  Reports compliance with home medication regimen and diet.  Denies chest pain denies fever or chills.  No palpitations, syncope, or collapse.  No focal neurologic deficit other than the bilateral lower extremity weakness.  Patient was recently hospitalized and treated for atrial fibrillation rapid ventricular response.  She underwent cardioversion.  She has been managed successfully with oral medications as an outpatient and has remained in normal sinus rhythm.    In the emergency department routine laboratory studies revealed evidence of hyperkalemia.  This was repeated with point of care testing which confirmed the 8.4 potassium level.  Chest x-ray was positive for pulmonary edema. Patient was given insulin, glucose, albuterol, calcium, bicarb.   ED physician  spoke with Dr. Arthur and agreed that we will do emergent dialysis.      Hospital medicine has been asked to admit to inpatient in the ICU for further evaluation and treatment of acute hyperkalemia and pulmonary edema.     * No surgery found *      Hospital Course:   Pt admitted to ICU for hyperkalemia (K 8).  Underwent emergent HD. Potassium down to 5.  Pt is a TTS HD patient. Also recent Dx afib and now rate controlled and on oral anticoagulation. Pt concern about severe weakness and falling at home. Consulted PT/OT. Stable for transfer to floor. Patient was transferred to the floor on 1/3/20. Patient underwent dialysis again on 1/4/20. The patient stated to me that she missed dialysis because she was not feeling well.  She was requesting discharge to home.   She will be sent home today with PT/OT H/H. Activity as tolerated. Diet- regular as tolerated. Follow up with PCP in one week      Consults:   Consults (From admission, onward)        Status Ordering Provider     Inpatient consult to Critical Care Medicine  Once     Provider:  Shantal Medrano MD    Completed DARRELL VELASQUEZ     Nephrology  Once     Provider:  Maki Arthur MD    Acknowledged RACHEL MONTES          No new Assessment & Plan notes have been filed under this hospital service since the last note was generated.  Service: Hospital Medicine    Final Active Diagnoses:    Diagnosis Date Noted POA    Weakness [R53.1] 01/03/2020 Yes    Hyperkalemia [E87.5] 01/03/2020 Yes    Atrial flutter [I48.92] 06/04/2019 Yes    Chronic respiratory failure with hypoxia [J96.11] 03/24/2019 Yes     Chronic    Secondary hyperparathyroidism [N25.81] 10/11/2017 Yes    Diabetes mellitus with ESRD (end-stage renal disease) [E11.22, N18.6] 12/01/2015 Yes     Chronic    Combined hyperlipidemia associated with type 2 diabetes mellitus [E11.69, E78.2] 12/01/2015 Yes     Chronic    Anxiety [F41.9] 12/01/2015 Yes     Chronic    Hypertension associated with diabetes  [E11.59, I10] 10/23/2015 Yes     Chronic    History of CVA (cerebrovascular accident) [Z86.73] 03/13/2014 Not Applicable     Chronic    ESRD on dialysis [N18.6, Z99.2] 12/20/2013 Not Applicable     Chronic    COPD (chronic obstructive pulmonary disease) [J44.9] 11/20/2013 Yes     Chronic    Diastolic dysfunction [I51.89] 11/20/2013 Yes     Chronic      Problems Resolved During this Admission:    Diagnosis Date Noted Date Resolved POA    PRINCIPAL PROBLEM:  Hyperkalemia [E87.5] 01/02/2020 01/03/2020 Yes       Discharged Condition: good    Disposition:  home with H/H     Follow Up:  Follow-up Information     Charles Moody Jr, MD In 1 week.    Specialty:  Family Medicine  Contact information:  [address] [place] [address]  [place] [address] [phone]                 Patient Instructions:   No discharge procedures on file.    Significant Diagnostic Studies:     Pending Diagnostic Studies:     Procedure Component Value Units Date/Time    EKG 12-lead [[id number]] Collected:  01/03/20 0617    Order Status:  Sent Lab Status:  In process Updated:  01/03/20 0950    Narrative:       Test Reason : R07.9,    Vent. Rate : 074 BPM     Atrial Rate : 074 BPM     P-R Int : 192 ms          QRS Dur : 102 ms      QT Int : 444 ms       P-R-T Axes : 062 220 001 degrees     QTc Int : 492 ms    Normal sinus rhythm  Possible Left atrial enlargement  Right superior axis deviation  Possible Inferior infarct (cited on or before 21-AUG-2019)  Abnormal ECG  When compared with ECG of 02-JAN-2020 17:09,  Significant changes have occurred    Referred By: [email or web address]   SELF           Confirmed By:          Medications:  Reconciled Home Medications:      Medication List      CONTINUE taking these medications    * albuterol 2.5 mg /3 mL (0.083 %) nebulizer solution  Commonly known as:  PROVENTIL  Inhale 2.5 mg into the lungs.     * albuterol 90 mcg/actuation inhaler  Commonly known as:  PROVENTIL/VENTOLIN HFA  INHALE 2 PUFFS INTO LUNGS EVERY 4 HOURS AS NEEDED  FOR SHORTNESS OF BREATH OR WHEEZING. RESCUE     * albuterol 90 mcg/actuation inhaler  Commonly known as:  PROVENTIL/VENTOLIN HFA  INHALE 2 PUFFS INTO LUNGS EVERY 4 HOURS AS NEEDED FOR SHORTNESS OF BREATH OR WHEEZING. RESCUE     amiodarone 200 MG Tab  Commonly known as:  PACERONE  Take 1 tablet (200 mg total) by mouth 2 (two) times daily.     atorvastatin 10 MG tablet  Commonly known as:  LIPITOR  Take 1 tablet (10 mg total) by mouth once daily.     calcium acetate 667 mg capsule  Commonly known as:  PHOSLO  Take 667 mg by mouth 3 (three) times daily with meals.     Combivent Respimat  mcg/actuation inhaler  Generic drug:  ipratropium-albuterol  INHALE 1 PUFF INTO THE LUNGS DAILY.     Eliquis 2.5 mg Tab  Generic drug:  apixaban  Take 2.5 mg by mouth 2 (two) times daily.     fluticasone propionate 50 mcg/actuation nasal spray  Commonly known as:  FLONASE  1 spray by Nasal route.     * HYDROcodone-acetaminophen 5-325 mg per tablet  Commonly known as:  NORCO  Take 1 tablet by mouth every 12 (twelve) hours as needed for Pain.     * HYDROcodone-acetaminophen 5-325 mg per tablet  Commonly known as:  NORCO  Take 1 tablet by mouth every 12 (twelve) hours as needed for Pain.  Start taking on:  January 24, 2020     mirtazapine 7.5 MG Tab  Commonly known as:  REMERON  TAKE 1 TABLET BY MOUTH AT BEDTIME AS NEEDED FOR SLEEP 90     montelukast 10 mg tablet  Commonly known as:  SINGULAIR  TAKE 1 TABLET BY MOUTH EVERY DAY IN THE EVENING     paroxetine 30 MG tablet  Commonly known as:  PAXIL  1 TABLET IN THE MORNING ONCE A DAY ORALLY 90     RENAL CAPS ORAL  Take by mouth.     sevelamer carbonate 800 mg Tab  Commonly known as:  RENVELA  TAKE 3 TABLETS BY MOUTH WITH EACH MEAL AND 1 TABLET WITH EACH SNACK     vit B,C-iron fum-FA-D3-zinc ox 8 mg iron-800 mcg-1,000 unit Tab  Take by mouth.         * This list has 5 medication(s) that are the same as other medications prescribed for you. Read the directions carefully, and ask your  doctor or other care provider to review them with you.                Indwelling Lines/Drains at time of discharge:   Lines/Drains/Airways     Central Venous Catheter Line                 Hemodialysis Catheter -- days          Drain                 Hemodialysis AV Fistula 03/06/19 2339 Left upper arm 303 days                Time spent on the discharge of patient:  > 30  minutes  Patient was seen and examined on the date of discharge and determined to be suitable for discharge.         Jerry Man MD  Department of Hospital Medicine  [facility]   Statement Selected

## 2020-01-04 NOTE — SUBJECTIVE & OBJECTIVE
Interval History: No complaints     Review of Systems   Constitutional: Negative for activity change.   HENT: Negative for congestion and rhinorrhea.    Respiratory: Negative for chest tightness and shortness of breath.    Cardiovascular: Negative for chest pain.   Gastrointestinal: Negative for abdominal pain.   Musculoskeletal: Negative for arthralgias.   Psychiatric/Behavioral: Negative for agitation.     Objective:     Vital Signs (Most Recent):  Temp: 97.8 °F (36.6 °C) (01/04/20 0801)  Pulse: 66 (01/04/20 1100)  Resp: 18 (01/04/20 0801)  BP: 138/67 (01/04/20 1100)  SpO2: (!) 94 % (01/04/20 0740) Vital Signs (24h Range):  Temp:  [97.8 °F (36.6 °C)-98.3 °F (36.8 °C)] 97.8 °F (36.6 °C)  Pulse:  [60-83] 66  Resp:  [16-19] 18  SpO2:  [94 %-100 %] 94 %  BP: (116-138)/(54-67) 138/67     Weight: 62.3 kg (137 lb 5.6 oz)  Body mass index is 23.58 kg/m².    Intake/Output Summary (Last 24 hours) at 1/4/2020 1250  Last data filed at 1/4/2020 0000  Gross per 24 hour   Intake 220 ml   Output --   Net 220 ml      Physical Exam   Constitutional: She is oriented to person, place, and time. She appears well-developed and well-nourished.   HENT:   Head: Normocephalic and atraumatic.   Neurological: She is alert and oriented to person, place, and time.   Skin: Skin is warm.   Psychiatric: She has a normal mood and affect. Her behavior is normal.   Nursing note and vitals reviewed.      Significant Labs:   CBC:   Recent Labs   Lab 01/02/20  1710 01/02/20  1840 01/03/20  0253 01/04/20  0546   WBC 4.16  --  3.93 3.21*   HGB 11.8*  --  9.7* 10.2*   HCT 39.9 39 32.9* 34.7*     --  144* 144*     CMP:   Recent Labs   Lab 01/02/20  1710 01/03/20  0253 01/04/20  0546    135* 134*   K 8.4* 5.0 6.4*    99 99   CO2 23 22* 19*   GLU 69* 53* 65*   BUN 54* 21 40*   CREATININE 8.7* 4.7* 6.9*   CALCIUM 10.3 9.6 9.5   PROT 8.0 7.1 7.1   ALBUMIN 4.1 3.6 3.7   BILITOT 0.7 0.7 0.6   ALKPHOS 94 81 84   AST 22 20 23   ALT 15 10 11    ANIONGAP 14 14 16   EGFRNONAA 4* 9* 5*       Significant Imaging:

## 2020-01-04 NOTE — PROGRESS NOTES
Date of Admission:1/2/2020    SUBJECTIVE: notes feeling better    Current Facility-Administered Medications   Medication    0.9%  NaCl infusion    0.9%  NaCl infusion    0.9%  NaCl infusion    0.9%  NaCl infusion    acetaminophen tablet 325 mg    amiodarone tablet 200 mg    apixaban tablet 2.5 mg    atorvastatin tablet 10 mg    epoetin cat-epbx injection 6,000 Units    levalbuterol nebulizer solution 1.25 mg    mirtazapine tablet 7.5 mg    morphine injection 2 mg    mupirocin 2 % ointment    nitroGLYCERIN SL tablet 0.4 mg    sevelamer carbonate tablet 800 mg    sodium chloride 0.9% flush 10 mL    vitamin renal formula (B-complex-vitamin c-folic acid) 1 mg per capsule 1 capsule     Facility-Administered Medications Ordered in Other Encounters   Medication    0.9%  NaCl infusion    sodium chloride 0.9% flush 5 mL       Wt Readings from Last 3 Encounters:   01/04/20 62.3 kg (137 lb 5.6 oz)   12/13/19 59 kg (130 lb 1.1 oz)   11/22/19 59.1 kg (130 lb 4.8 oz)     Temp Readings from Last 3 Encounters:   01/04/20 97.8 °F (36.6 °C) (Oral)   08/29/19 98.1 °F (36.7 °C) (Oral)   07/22/19 98.1 °F (36.7 °C) (Oral)     BP Readings from Last 3 Encounters:   01/04/20 138/67   12/13/19 126/75   11/22/19 (!) 110/55     Pulse Readings from Last 3 Encounters:   01/04/20 66   12/13/19 104   11/22/19 103       Intake/Output Summary (Last 24 hours) at 1/4/2020 1134  Last data filed at 1/4/2020 0000  Gross per 24 hour   Intake 220 ml   Output --   Net 220 ml       PE:  GEN:wd female in nad  HEENT:ncat,eomi,mm  CVS:enrike 2/6  PULM:ctab  ABD:+bs,soft,nd  EXT:no leg edema, avf of arm  NEURO:awake,alert    Recent Labs   Lab 01/04/20  0546   GLU 65*   *   K 6.4*   CL 99   CO2 19*   BUN 40*   CREATININE 6.9*   CALCIUM 9.5   MG 2.2       Lab Results   Component Value Date     (H) 12/14/2010    CALCIUM 9.5 01/04/2020    PHOS 6.4 (H) 01/04/2020       Recent Labs   Lab 01/04/20  0546   WBC 3.21*   RBC 3.42*   HGB 10.2*    HCT 34.7*   *   *   MCH 29.8   MCHC 29.4*         A/P:  1.esrd. Cont hd TTS. Hd today. Seen on hd.  2.hyperkalemia.2k bath.  Will ck arm outpt for angio.  3.anemia 2nd to esrd. Epo with hd.  4.htn. uf with hd.  5.afib. Rate ok. More sinus now.  6.2nd hyperpar. Phos ok.

## 2020-01-04 NOTE — PLAN OF CARE
[facility]    HOME HEALTH ORDERS  FACE TO FACE ENCOUNTER    Patient Name: Eli Vaz  YOB: 1945    PCP: Charles Moody Jr, MD   PCP Address: [place] [address]  PCP Phone Number: [phone]  PCP Fax: [phone]    Encounter Date: 01/04/2020    Admit to Home Health    Diagnoses: weakness   Active Hospital Problems    Diagnosis  POA    Weakness [R53.1]  Yes    Hyperkalemia [E87.5]  Yes    Atrial flutter [I48.92]  Yes    Chronic respiratory failure with hypoxia [J96.11]  Yes     Chronic    Secondary hyperparathyroidism [N25.81]  Yes    Diabetes mellitus with ESRD (end-stage renal disease) [E11.22, N18.6]  Yes     Chronic    Combined hyperlipidemia associated with type 2 diabetes mellitus [E11.69, E78.2]  Yes     Chronic    Anxiety [F41.9]  Yes     Chronic    Hypertension associated with diabetes [E11.59, I10]  Yes     Chronic    History of CVA (cerebrovascular accident) [Z86.73]  Not Applicable     Chronic    ESRD on dialysis [N18.6, Z99.2]  Not Applicable     Chronic     Dialyzes at [facility].  Followed by Dr. Arthur      COPD (chronic obstructive pulmonary disease) [J44.9]  Yes     Chronic    Diastolic dysfunction [I51.89]  Yes     Chronic      Resolved Hospital Problems    Diagnosis Date Resolved POA    *Hyperkalemia [E87.5] 01/03/2020 Yes     Priority: 1 - High       Future Appointments   Date Time Provider Department Center   1/15/2020 10:00 AM Hernandez Tesfaye MD [facility]   2/7/2020 10:45 AM Isrrael Barton Jr., MD [facility]     Follow-up Information     Charles Moody Jr, MD In 1 week.    Specialty:  Family Medicine  Contact information:  [place] [id number] [phone]                     I have seen and examined this patient face to face today. My clinical findings that support the need for the home health skilled services and home bound status are the following:  Weakness/numbness causing  balance and gait disturbance due to Weakness/Debility making it taxing to leave home.    Allergies:Review of patient's allergies indicates:  No Known Allergies    Diet: regular diet    Activities: activity as tolerated    Nursing:   SN to complete comprehensive assessment including routine vital signs. Instruct on disease process and s/s of complications to report to MD. Review/verify medication list sent home with the patient at time of discharge  and instruct patient/caregiver as needed. Frequency may be adjusted depending on start of care date.    Notify MD if SBP > 160 or < 90; DBP > 90 or < 50; HR > 120 or < 50; Temp > 101; Other:         CONSULTS:    Physical Therapy to evaluate and treat. Evaluate for home safety and equipment needs; Establish/upgrade home exercise program. Perform / instruct on therapeutic exercises, gait training, transfer training, and Range of Motion.  Occupational Therapy to evaluate and treat. Evaluate home environment for safety and equipment needs. Perform/Instruct on transfers, ADL training, ROM, and therapeutic exercises.  Aide to provide assistance with personal care, ADLs, and vital signs.      Medications: Review discharge medications with patient and family and provide education.      Current Discharge Medication List      CONTINUE these medications which have NOT CHANGED    Details   albuterol (PROVENTIL) 2.5 mg /3 mL (0.083 %) nebulizer solution Inhale 2.5 mg into the lungs.      !! albuterol (PROVENTIL/VENTOLIN HFA) 90 mcg/actuation inhaler INHALE 2 PUFFS INTO LUNGS EVERY 4 HOURS AS NEEDED FOR SHORTNESS OF BREATH OR WHEEZING. RESCUE  Refills: 3      !! albuterol (PROVENTIL/VENTOLIN HFA) 90 mcg/actuation inhaler INHALE 2 PUFFS INTO LUNGS EVERY 4 HOURS AS NEEDED FOR SHORTNESS OF BREATH OR WHEEZING. RESCUE  Qty: 36 Inhaler, Refills: 3    Associated Diagnoses: Chronic obstructive pulmonary disease, unspecified COPD type      amiodarone (PACERONE) 200 MG Tab Take 1 tablet (200 mg  total) by mouth 2 (two) times daily.  Qty: 180 tablet, Refills: 3      apixaban (ELIQUIS) 2.5 mg Tab Take 2.5 mg by mouth 2 (two) times daily.      atorvastatin (LIPITOR) 10 MG tablet Take 1 tablet (10 mg total) by mouth once daily.  Qty: 90 tablet, Refills: 1      B complex w-C no.20/folic acid (RENAL CAPS ORAL) Take by mouth.      calcium acetate (PHOSLO) 667 mg capsule Take 667 mg by mouth 3 (three) times daily with meals.      fluticasone propionate (FLONASE) 50 mcg/actuation nasal spray 1 spray by Nasal route.      ipratropium-albuterol (COMBIVENT RESPIMAT)  mcg/actuation inhaler INHALE 1 PUFF INTO THE LUNGS DAILY.      mirtazapine (REMERON) 7.5 MG Tab TAKE 1 TABLET BY MOUTH AT BEDTIME AS NEEDED FOR SLEEP 90  Refills: 3      montelukast (SINGULAIR) 10 mg tablet TAKE 1 TABLET BY MOUTH EVERY DAY IN THE EVENING  Qty: 30 tablet, Refills: 2    Associated Diagnoses: Chronic obstructive pulmonary disease, unspecified COPD type      sevelamer carbonate (RENVELA) 800 mg Tab TAKE 3 TABLETS BY MOUTH WITH EACH MEAL AND 1 TABLET WITH EACH SNACK  Refills: 3      vit B,C-iron fum-FA-D3-zinc ox 8 mg iron-800 mcg-1,000 unit Tab Take by mouth.      !! HYDROcodone-acetaminophen (NORCO) 5-325 mg per tablet Take 1 tablet by mouth every 12 (twelve) hours as needed for Pain.  Qty: 60 tablet, Refills: 0    Comments: Quantity prescribed more than 7 day supply? Yes, quantity medically necessary  Associated Diagnoses: Arthritis of left shoulder region; Chronic left shoulder pain      !! HYDROcodone-acetaminophen (NORCO) 5-325 mg per tablet Take 1 tablet by mouth every 12 (twelve) hours as needed for Pain.  Qty: 60 tablet, Refills: 0    Comments: Quantity prescribed more than 7 day supply? Yes, quantity medically necessary  Associated Diagnoses: Arthritis of left shoulder region; Chronic left shoulder pain      paroxetine (PAXIL) 30 MG tablet 1 TABLET IN THE MORNING ONCE A DAY ORALLY 90  Refills: 3       !! - Potential duplicate  medications found. Please discuss with provider.          I certify that this patient is confined to her home and needs intermittent skilled nursing care, physical therapy and occupational therapy.

## 2020-01-04 NOTE — NURSING
K+4.8. Discharged completed. patient given discharge instructions and medication record. Educated on contents. Educated the patient on the importance of not missing HD even if she feels tired.patient verbalized understanding of all instructions.Tele removed Sl removed. Patient off unit in  discharged to [facility].

## 2020-01-04 NOTE — PLAN OF CARE
01/04/20 1408   Post-Acute Status   Post-Acute Authorization Home Health/Hospice;Other  ([facility] accepted; working PHN to get nebulizer repaired or replaced for patient )   Home Health/Hospice Status Set-up Complete   Discharge Delays None known at this time

## 2020-01-05 PROCEDURE — G0180 PR HOME HEALTH MD CERTIFICATION: ICD-10-PCS | Mod: ,,, | Performed by: FAMILY MEDICINE

## 2020-01-05 PROCEDURE — G0180 MD CERTIFICATION HHA PATIENT: HCPCS | Mod: ,,, | Performed by: FAMILY MEDICINE

## 2020-01-07 ENCOUNTER — PATIENT OUTREACH (OUTPATIENT)
Dept: ADMINISTRATIVE | Facility: CLINIC | Age: 75
End: 2020-01-07

## 2020-01-07 NOTE — PATIENT INSTRUCTIONS
"  Discharge Instructions for Hyperkalemia  You have been diagnosed with hyperkalemia (a high level of potassium in the blood). Potassium is important to the function of the nerve and muscle cells, including the cells of the heart. But a high level of potassium in the blood can cause  serious problems such as abnormal heart rhythms and even heart attack.  Diet changes  · Eat less of these potassium-rich foods:  ¨ Bananas (avoid bananas completely)  ¨ Apricots, fresh or dried  ¨ Oranges and orange juice  ¨ Grapefruit juice  ¨ Tomatoes, tomato sauce, and tomato juice  ¨ Spinach  ¨ Green, leafy vegetables, including salad greens, kale, broccoli, chard, and collards  ¨ Melons (all kinds)  ¨ Peas  ¨ Beans  ¨ Potatoes  ¨ Sweet potatoes  ¨ Avocados and guacamole  ¨ Vegetable juice (homemade or store-bought) and vegetable juice cocktail  ¨ Fruit juices  ¨ Nuts, including pistachios, almonds, peanuts, hazelnuts, [place], cashew, mixed  ¨ "Lite" or reduced sodium salt  Other home care  · Tell your healthcare provider about all prescription and over-the-counter medicines you are taking. Certain medicines can increase potassium levels.  · Take all medicines exactly as directed.  · Have your potassium levels checked regularly.  · Keep all follow-up appointments. Your healthcare provider needs to monitor your condition closely.  · Learn to take your own pulse. If your pulse is less than 60 beats per minute or irregular, call your provider.  Follow-up  Make a follow-up appointment as directed by our staff.     When to Call Your healthcare provider  Call your provider right away if you have any of the following:  · Chest pain (call 911)  · Fainting (call 911)  · Shortness of breath (call 911 if severe)  · Slow, irregular heartbeat  · Fatigue  · Dizziness  · Lightheadedness  · Confusion     © 6050-0421 [employer]. [address] [place] [address], [place] [address]. All rights reserved. This information is not intended as a " substitute for professional medical care. Always follow your healthcare professional's instructions.

## 2020-01-07 NOTE — PROGRESS NOTES
C3 nurse attempted to contact patient. No answer. The following message was left for the patient to return the call:  Good morning,  I am a nurse calling on behalf of [facility] from the [facility]ination [facility].  This is a Transitional Care Call for Eli Vaz. When you have a moment please contact us at [phone] or [phone] Monday through Friday, between the hours of 8 am to 4 pm. We look forward to speaking with you. On behalf of [facility] have a nice day.    The patient has a scheduled HOSFU appointment with Charles Moody Jr, MD on 1/22 @ 8018. Message sent to Physician staff.

## 2020-01-08 DIAGNOSIS — M19.012 ARTHRITIS OF LEFT SHOULDER REGION: ICD-10-CM

## 2020-01-08 DIAGNOSIS — G89.29 CHRONIC LEFT SHOULDER PAIN: ICD-10-CM

## 2020-01-08 DIAGNOSIS — M25.512 CHRONIC LEFT SHOULDER PAIN: ICD-10-CM

## 2020-01-08 RX ORDER — HYDROCODONE BITARTRATE AND ACETAMINOPHEN 5; 325 MG/1; MG/1
1 TABLET ORAL EVERY 12 HOURS PRN
Qty: 60 TABLET | Refills: 0 | Status: CANCELLED | OUTPATIENT
Start: 2020-01-24 | End: 2020-02-23

## 2020-01-08 RX ORDER — HYDROCODONE BITARTRATE AND ACETAMINOPHEN 5; 325 MG/1; MG/1
1 TABLET ORAL EVERY 12 HOURS PRN
Qty: 60 TABLET | Refills: 0 | Status: SHIPPED | OUTPATIENT
Start: 2020-01-08 | End: 2020-02-07 | Stop reason: SDUPTHER

## 2020-01-08 NOTE — TELEPHONE ENCOUNTER
----- Message from Mira Green sent at 1/8/2020  1:17 PM CST -----  Contact: pt daughter akhil [phone]  Type: RX Refill Request    Who Called: pt mendez landaverde [phone]    Have you contacted your pharmacy:    Refill or New Rx:HYDROcodone-acetaminophen (NORCO) 5-325 mg per tablet     RX Name and Strength:    How is the patient currently taking it? (ex. 1XDay):    Is this a 30 day or 90 day RX:    Preferred Pharmacy with phone number:  [facility]/pharmacy #[id number] - [place] - [address]  [place] [address]  Phone: [phone] Fax: [phone]        Local or Mail Order:    Ordering Provider:    Would the patient rather a call back or a response via [facility]?     Best Call Back Number:    Additional Information:

## 2020-01-13 ENCOUNTER — PATIENT OUTREACH (OUTPATIENT)
Dept: ADMINISTRATIVE | Facility: OTHER | Age: 75
End: 2020-01-13

## 2020-01-16 NOTE — PROGRESS NOTES
Ms. Vaz is a patient of Dr. Tesfaye and was last seen in clinic 8/12/2019.      Subjective:   Patient ID:  Eli Vaz is a 74 y.o. female who presents for follow-up of Atrial Flutter  .     HPI:    Ms. Vaz is a 74 y.o. female with atrial fibrillation, atrial flutter, ESRD on HD (Tue Thu Sat), COPD on home oxygen, CVA, DM, HTN, Mitral regurgitation here for follow up.    Background:    Primary cardiologist is Dr. Garcia.  Presented 3/19 with atrial fibrillation with RVR and CHF.   Echo 3/20/19 EF 55% moderate to severe MR DARINEL 56.  ISAMAR/DCCV 3/21/19 moderate to severe MR.  Developed recurrence of atrial flutter.  Placed on amiodarone, underwent DCCV 4/1/19.  At follow-up was back in atrial flutter.  Notes dyspnea on exertion c/w NYHA Class III CHF.   Currently on Eliquis 2.5 mg BID.  ECG today reveals atrial flutter.  Surgery for mitral valve is being considered.    Atrial fibrillation and atrial flutter.  Has had atrial flutter since initiation of amiodarone.  Also has moderate to severe MR.  Unclear how much the atrial arrhythmias are contributing to her MR.  Recommend:  RFA for atrial flutter first.  This is less risk than PVI.   If maintains nsr for 2 months >> repeat assessment of mitral valve.  Risks and benefits discussed, she would like to proceed.  ISAMAR day of procedure.  Hold Eliquis day prior to procedure.    Update (01/17/2020):    8/21/2019:   On admission Patient with left sided arm edema at fistula site. Patient reports has been closely following up her vascular surgeon on [facility] this past week, pt states L AVF nonfunctioning, and vascular  surgeons did intervention Thursday 8/15/19,   And Monday 8/19/19 > fistulogram, angioplasty?  > and patient underwent permacath placement on 8/19/19 for HD access  > and HD via permacath for now. Pt states has to hold her Eliquis for both days, and last dose was on  Sunday > pt did not take her Eliquis day before. Given Left arm swelling, clotted  AVF, and multiple recent  interventions requiring holding of ELiquis. The atrial flutter ablation was cancelled, given increased risk of stroke post ablation and the need to be on un interrupted OAC post procedure. Pt to closely follow up with vascular Surgeon at , and we will reschedule Ablation at a later time.      She has a right upperarm fistula in place for 3 months. Permacath is out. Edema is resolved. She recently saw an interventional cardiologist at  for evaluation of mitral valve replacement, and it seems that they will not be proceeding imminently given the stability of her valve per recent echo. She feels well. Has not felt palpitations in some time. Denies CP, SINCLAIR, LH, syncope.    She had felt sluggish, and started taking both her amiodarone and her eliquis once daily. Felt better after this. LFTs WNL 1/4/20. TSH ok 4/2019. On HD.      I have personally reviewed the patient's EKG today, which shows sinus rhythm at 79bpm. [place] interval is 174. QRS is 102. QTc is 472.    Recent Cardiac Tests:    2D Echo (12/20/2019):  Conclusion   · Concentric left ventricular hypertrophy.  · Normal left ventricular systolic function. The estimated ejection fraction is 55%  · Normal LV diastolic function.  · Normal right ventricular systolic function.  · Mild left atrial enlargement.  · Mild-to-moderate aortic regurgitation.  · Moderate mitral regurgitation.  · Moderate tricuspid regurgitation.  · Severe pulmonary hypertension present.  · Elevated central venous pressure (15 mm Hg).  · The estimated PA systolic pressure is 105 mm Hg     Current Outpatient Medications   Medication Sig    albuterol (PROVENTIL) 2.5 mg /3 mL (0.083 %) nebulizer solution Inhale 2.5 mg into the lungs.    albuterol (PROVENTIL/VENTOLIN HFA) 90 mcg/actuation inhaler INHALE 2 PUFFS INTO LUNGS EVERY 4 HOURS AS NEEDED FOR SHORTNESS OF BREATH OR WHEEZING. RESCUE    amiodarone (PACERONE) 200 MG Tab Take 1 tablet (200 mg total) by mouth 2 (two)  times daily. Taking once daily.    apixaban (ELIQUIS) 2.5 mg Tab Take 2.5 mg by mouth 2 (two) times daily. Taking once daily.    atorvastatin (LIPITOR) 10 MG tablet Take 1 tablet (10 mg total) by mouth once daily.    B complex w-C no.20/folic acid (RENAL CAPS ORAL) Take by mouth.    calcium acetate (PHOSLO) 667 mg capsule Take 667 mg by mouth 3 (three) times daily with meals.    fluticasone propionate (FLONASE) 50 mcg/actuation nasal spray 1 spray by Nasal route.    HYDROcodone-acetaminophen (NORCO) 5-325 mg per tablet Take 1 tablet by mouth every 12 (twelve) hours as needed for Pain.    ipratropium-albuterol (COMBIVENT RESPIMAT)  mcg/actuation inhaler INHALE 1 PUFF INTO THE LUNGS DAILY.    mirtazapine (REMERON) 7.5 MG Tab TAKE 1 TABLET BY MOUTH AT BEDTIME AS NEEDED FOR SLEEP 90    montelukast (SINGULAIR) 10 mg tablet TAKE 1 TABLET BY MOUTH EVERY DAY IN THE EVENING    sevelamer carbonate (RENVELA) 800 mg Tab TAKE 3 TABLETS BY MOUTH WITH EACH MEAL AND 1 TABLET WITH EACH SNACK    vit B,C-iron fum-FA-D3-zinc ox 8 mg iron-800 mcg-1,000 unit Tab Take by mouth.    albuterol (PROVENTIL/VENTOLIN HFA) 90 mcg/actuation inhaler INHALE 2 PUFFS INTO LUNGS EVERY 4 HOURS AS NEEDED FOR SHORTNESS OF BREATH OR WHEEZING. RESCUE    paroxetine (PAXIL) 30 MG tablet 1 TABLET IN THE MORNING ONCE A DAY ORALLY 90     No current facility-administered medications for this visit.      Facility-Administered Medications Ordered in Other Visits   Medication    0.9%  NaCl infusion    sodium chloride 0.9% flush 5 mL       Review of Systems   Constitution: Negative for malaise/fatigue.   Cardiovascular: Negative for chest pain, dyspnea on exertion, irregular heartbeat, leg swelling and palpitations.   Respiratory: Negative for shortness of breath.    Hematologic/Lymphatic: Negative for bleeding problem.   Skin: Negative for rash.   Musculoskeletal: Negative for myalgias.   Gastrointestinal: Negative for hematemesis, hematochezia  "and nausea.   Genitourinary: Negative for hematuria.   Neurological: Negative for light-headedness.   Psychiatric/Behavioral: Negative for altered mental status.   Allergic/Immunologic: Negative for persistent infections.         Objective:          BP (!) 142/80   Pulse 79   Ht 5' 4" (1.626 m)   Wt 60.7 kg (133 lb 13.1 oz)   BMI 22.97 kg/m²     Physical Exam   Constitutional: She is oriented to person, place, and time. She appears well-developed and well-nourished.   HENT:   Head: Normocephalic.   Nose: Nose normal.   Eyes: Pupils are equal, round, and reactive to light.   Cardiovascular: Normal rate, regular rhythm, S1 normal and S2 normal.   Murmur heard.  Pulses:       Radial pulses are 2+ on the right side, and 2+ on the left side.   Pulmonary/Chest: Breath sounds normal. No respiratory distress.   Abdominal: Normal appearance.   Musculoskeletal: Normal range of motion. She exhibits no edema.   Neurological: She is alert and oriented to person, place, and time.   Skin: Skin is warm and dry. No erythema.   Psychiatric: She has a normal mood and affect. Her speech is normal and behavior is normal.   Nursing note and vitals reviewed.    Lab Results   Component Value Date     (L) 01/04/2020    K 4.8 01/04/2020    MG 2.2 01/04/2020    BUN 40 (H) 01/04/2020    CREATININE 6.9 (H) 01/04/2020    ALT 11 01/04/2020    AST 23 01/04/2020    HGB 10.2 (L) 01/04/2020    HCT 34.7 (L) 01/04/2020    HCT 39 01/02/2020    TSH 4.282 (H) 04/01/2019    LDLCALC 81.6 04/03/2019       Recent Labs   Lab 02/26/17  1508 01/09/18 2020 03/19/19  1852 04/24/19  1913   INR 1.1 1.1 1.5 H 1.2       Assessment:     1. Persistent atrial fibrillation    2. Hypertension associated with diabetes    3. Atrial flutter, unspecified type    4. Diabetes mellitus with ESRD (end-stage renal disease)    5. History of CVA (cerebrovascular accident)      Plan:     In summary, Ms. Vaz is a 74 y.o. female with atrial fibrillation, atrial flutter, " ESRD on HD (Tue Thu Sat), COPD on home oxygen, CVA, DM, HTN, Mitral regurgitation here for follow up.  She is here for follow up. AFL ablation postponed due to left arm swelling, clotted AVF, and multiple recent interventions requiring holding of Eliquis. Edema has resolved. She has a working right upperarm fistula. She was recently consulted for MV replacement but per patient this seems to be put on hold given improvement in echo (possibly due to patient remaining in SR) and stability of symptoms. Patient denies palpitations on amiodarone 200mg daily. Unfortunately she has also reduced her eliquis to once daily. I instructed her to take this twice daily. I also asked for her to contact her  cardiologist to ensure there are no plans for interventions and that she can continue her OAC without stopping after her ablation.     Restart eliquis 2.5mg BID.  Contact  cardiologist for clearance to proceed with ablation.  Schedule AFL RFA if possible. (2-3 weeks min)  ISAMAR day of procedure.  Hold Eliquis day prior to procedure.  RTC as scheduled following procedure.    *A copy of this note has been sent to Dr. Tesfaye*    Follow up as scheduled following procedure.    ------------------------------------------------------------------    VIN Schulte, NP-C  Cardiac Electrophysiology

## 2020-01-17 ENCOUNTER — OFFICE VISIT (OUTPATIENT)
Dept: ELECTROPHYSIOLOGY | Facility: CLINIC | Age: 75
End: 2020-01-17
Payer: MEDICARE

## 2020-01-17 ENCOUNTER — HOSPITAL ENCOUNTER (OUTPATIENT)
Dept: CARDIOLOGY | Facility: CLINIC | Age: 75
Discharge: HOME | End: 2020-01-17
Payer: MEDICARE

## 2020-01-17 VITALS
DIASTOLIC BLOOD PRESSURE: 80 MMHG | SYSTOLIC BLOOD PRESSURE: 142 MMHG | BODY MASS INDEX: 22.85 KG/M2 | HEIGHT: 64 IN | WEIGHT: 133.81 LBS | HEART RATE: 79 BPM

## 2020-01-17 DIAGNOSIS — E11.22 DIABETES MELLITUS WITH ESRD (END-STAGE RENAL DISEASE): Chronic | ICD-10-CM

## 2020-01-17 DIAGNOSIS — I48.92 ATRIAL FLUTTER, UNSPECIFIED TYPE: ICD-10-CM

## 2020-01-17 DIAGNOSIS — I48.19 PERSISTENT ATRIAL FIBRILLATION: Primary | ICD-10-CM

## 2020-01-17 DIAGNOSIS — I15.2 HYPERTENSION ASSOCIATED WITH DIABETES: Chronic | ICD-10-CM

## 2020-01-17 DIAGNOSIS — Z86.73 HISTORY OF CVA (CEREBROVASCULAR ACCIDENT): Chronic | ICD-10-CM

## 2020-01-17 DIAGNOSIS — N18.6 DIABETES MELLITUS WITH ESRD (END-STAGE RENAL DISEASE): Chronic | ICD-10-CM

## 2020-01-17 DIAGNOSIS — E11.59 HYPERTENSION ASSOCIATED WITH DIABETES: Chronic | ICD-10-CM

## 2020-01-17 PROCEDURE — 99499 UNLISTED E&M SERVICE: CPT | Mod: S$GLB,,, | Performed by: NURSE PRACTITIONER

## 2020-01-17 PROCEDURE — 93005 RHYTHM STRIP: ICD-10-PCS | Mod: S$GLB,,, | Performed by: INTERNAL MEDICINE

## 2020-01-17 PROCEDURE — 99214 PR OFFICE/OUTPT VISIT, EST, LEVL IV, 30-39 MIN: ICD-10-PCS | Mod: S$GLB,,, | Performed by: NURSE PRACTITIONER

## 2020-01-17 PROCEDURE — 99214 OFFICE O/P EST MOD 30 MIN: CPT | Mod: S$GLB,,, | Performed by: NURSE PRACTITIONER

## 2020-01-17 PROCEDURE — 99499 RISK ADDL DX/OHS AUDIT: ICD-10-PCS | Mod: S$GLB,,, | Performed by: NURSE PRACTITIONER

## 2020-01-17 PROCEDURE — 1101F PR PT FALLS ASSESS DOC 0-1 FALLS W/OUT INJ PAST YR: ICD-10-PCS | Mod: CPTII,S$GLB,, | Performed by: NURSE PRACTITIONER

## 2020-01-17 PROCEDURE — 3044F HG A1C LEVEL LT 7.0%: CPT | Mod: CPTII,S$GLB,, | Performed by: NURSE PRACTITIONER

## 2020-01-17 PROCEDURE — 1126F PR PAIN SEVERITY QUANTIFIED, NO PAIN PRESENT: ICD-10-PCS | Mod: S$GLB,,, | Performed by: NURSE PRACTITIONER

## 2020-01-17 PROCEDURE — 1126F AMNT PAIN NOTED NONE PRSNT: CPT | Mod: S$GLB,,, | Performed by: NURSE PRACTITIONER

## 2020-01-17 PROCEDURE — 99999 PR PBB SHADOW E&M-EST. PATIENT-LVL III: CPT | Mod: PBBFAC,,, | Performed by: NURSE PRACTITIONER

## 2020-01-17 PROCEDURE — 93005 ELECTROCARDIOGRAM TRACING: CPT | Mod: S$GLB,,, | Performed by: INTERNAL MEDICINE

## 2020-01-17 PROCEDURE — 3044F PR MOST RECENT HEMOGLOBIN A1C LEVEL <7.0%: ICD-10-PCS | Mod: CPTII,S$GLB,, | Performed by: NURSE PRACTITIONER

## 2020-01-17 PROCEDURE — 1159F PR MEDICATION LIST DOCUMENTED IN MEDICAL RECORD: ICD-10-PCS | Mod: S$GLB,,, | Performed by: NURSE PRACTITIONER

## 2020-01-17 PROCEDURE — 93010 ELECTROCARDIOGRAM REPORT: CPT | Mod: S$GLB,,, | Performed by: INTERNAL MEDICINE

## 2020-01-17 PROCEDURE — 1159F MED LIST DOCD IN RCRD: CPT | Mod: S$GLB,,, | Performed by: NURSE PRACTITIONER

## 2020-01-17 PROCEDURE — 99999 PR PBB SHADOW E&M-EST. PATIENT-LVL III: ICD-10-PCS | Mod: PBBFAC,,, | Performed by: NURSE PRACTITIONER

## 2020-01-17 PROCEDURE — 93010 RHYTHM STRIP: ICD-10-PCS | Mod: S$GLB,,, | Performed by: INTERNAL MEDICINE

## 2020-01-17 PROCEDURE — 1101F PT FALLS ASSESS-DOCD LE1/YR: CPT | Mod: CPTII,S$GLB,, | Performed by: NURSE PRACTITIONER

## 2020-01-17 NOTE — Clinical Note
Kevin Marques,This patient is having her afl ablation rescheduled (it got postponed due to holding her eliquis for various procedures before). Her issues have resolved but she was going to confirm with her outside cardiologist that no new interventions ware being planned in the 2 months after her ablation because she cannot hold her eliquis. Can you call and, if she is good to go, schedule her for her ablation? She should wait 2-3 weeks from now to get it because she had only been taking her eliquis once daily). ISAMAR day of. Everything else the same as before. Thanks!

## 2020-01-17 NOTE — PATIENT INSTRUCTIONS
Start taking eliquis twice daily.    Ask vascular medicine for clearance to proceed with atrial flutter ablation and ability for patient to not hold blood thinner for 2 months after procedure.

## 2020-01-17 NOTE — Clinical Note
She had reduced her eliquis to once daily and is restarting twice daily today. When is the soonest you would be ok with her proceeding with her AFL RFA? thx

## 2020-01-20 ENCOUNTER — EXTERNAL HOME HEALTH (OUTPATIENT)
Dept: HOME HEALTH SERVICES | Facility: HOSPITAL | Age: 75
End: 2020-01-20
Payer: MEDICARE

## 2020-01-22 ENCOUNTER — OFFICE VISIT (OUTPATIENT)
Dept: FAMILY MEDICINE | Facility: CLINIC | Age: 75
End: 2020-01-22
Payer: MEDICARE

## 2020-01-22 ENCOUNTER — TELEPHONE (OUTPATIENT)
Dept: HOME HEALTH SERVICES | Facility: HOSPITAL | Age: 75
End: 2020-01-22

## 2020-01-22 VITALS
DIASTOLIC BLOOD PRESSURE: 59 MMHG | SYSTOLIC BLOOD PRESSURE: 129 MMHG | BODY MASS INDEX: 23.22 KG/M2 | WEIGHT: 136 LBS | HEIGHT: 64 IN | HEART RATE: 80 BPM | TEMPERATURE: 99 F

## 2020-01-22 DIAGNOSIS — L84 CALLUS OF FOOT: ICD-10-CM

## 2020-01-22 DIAGNOSIS — N18.6 DIABETES MELLITUS WITH ESRD (END-STAGE RENAL DISEASE): Chronic | ICD-10-CM

## 2020-01-22 DIAGNOSIS — N18.6 ESRD ON DIALYSIS: ICD-10-CM

## 2020-01-22 DIAGNOSIS — I70.0 ATHEROSCLEROSIS OF AORTA: ICD-10-CM

## 2020-01-22 DIAGNOSIS — Z12.12 SCREENING FOR RECTAL CANCER: ICD-10-CM

## 2020-01-22 DIAGNOSIS — Z99.2 ESRD ON DIALYSIS: ICD-10-CM

## 2020-01-22 DIAGNOSIS — J44.9 CHRONIC OBSTRUCTIVE PULMONARY DISEASE, UNSPECIFIED COPD TYPE: ICD-10-CM

## 2020-01-22 DIAGNOSIS — I48.91 ATRIAL FIBRILLATION, UNSPECIFIED TYPE: ICD-10-CM

## 2020-01-22 DIAGNOSIS — N25.81 SECONDARY HYPERPARATHYROIDISM: ICD-10-CM

## 2020-01-22 DIAGNOSIS — E87.5 HYPERKALEMIA: Primary | ICD-10-CM

## 2020-01-22 DIAGNOSIS — Z12.11 SCREENING FOR COLON CANCER: ICD-10-CM

## 2020-01-22 DIAGNOSIS — E11.22 DIABETES MELLITUS WITH ESRD (END-STAGE RENAL DISEASE): Chronic | ICD-10-CM

## 2020-01-22 PROCEDURE — 99999 PR PBB SHADOW E&M-EST. PATIENT-LVL III: CPT | Mod: PBBFAC,,, | Performed by: FAMILY MEDICINE

## 2020-01-22 PROCEDURE — 99999 PR PBB SHADOW E&M-EST. PATIENT-LVL III: ICD-10-PCS | Mod: PBBFAC,,, | Performed by: FAMILY MEDICINE

## 2020-01-22 PROCEDURE — 3044F HG A1C LEVEL LT 7.0%: CPT | Mod: CPTII,S$GLB,, | Performed by: FAMILY MEDICINE

## 2020-01-22 PROCEDURE — 1101F PT FALLS ASSESS-DOCD LE1/YR: CPT | Mod: CPTII,S$GLB,, | Performed by: FAMILY MEDICINE

## 2020-01-22 PROCEDURE — 99499 UNLISTED E&M SERVICE: CPT | Mod: S$GLB,,, | Performed by: FAMILY MEDICINE

## 2020-01-22 PROCEDURE — 1101F PR PT FALLS ASSESS DOC 0-1 FALLS W/OUT INJ PAST YR: ICD-10-PCS | Mod: CPTII,S$GLB,, | Performed by: FAMILY MEDICINE

## 2020-01-22 PROCEDURE — 99214 OFFICE O/P EST MOD 30 MIN: CPT | Mod: S$GLB,,, | Performed by: FAMILY MEDICINE

## 2020-01-22 PROCEDURE — 99499 RISK ADDL DX/OHS AUDIT: ICD-10-PCS | Mod: S$GLB,,, | Performed by: FAMILY MEDICINE

## 2020-01-22 PROCEDURE — 99214 PR OFFICE/OUTPT VISIT, EST, LEVL IV, 30-39 MIN: ICD-10-PCS | Mod: S$GLB,,, | Performed by: FAMILY MEDICINE

## 2020-01-22 PROCEDURE — 3044F PR MOST RECENT HEMOGLOBIN A1C LEVEL <7.0%: ICD-10-PCS | Mod: CPTII,S$GLB,, | Performed by: FAMILY MEDICINE

## 2020-01-22 PROCEDURE — 1159F MED LIST DOCD IN RCRD: CPT | Mod: S$GLB,,, | Performed by: FAMILY MEDICINE

## 2020-01-22 PROCEDURE — 1159F PR MEDICATION LIST DOCUMENTED IN MEDICAL RECORD: ICD-10-PCS | Mod: S$GLB,,, | Performed by: FAMILY MEDICINE

## 2020-01-23 ENCOUNTER — TELEPHONE (OUTPATIENT)
Dept: ELECTROPHYSIOLOGY | Facility: CLINIC | Age: 75
End: 2020-01-23

## 2020-01-23 DIAGNOSIS — I49.9 CARDIAC ARRHYTHMIA, UNSPECIFIED CARDIAC ARRHYTHMIA TYPE: ICD-10-CM

## 2020-01-23 DIAGNOSIS — I48.3 TYPICAL ATRIAL FLUTTER: Primary | ICD-10-CM

## 2020-01-23 NOTE — TELEPHONE ENCOUNTER
----- Message from Miesha Temple NP sent at 1/20/2020  4:33 PM CST -----  Kevin Marques,  This patient is having her afl ablation rescheduled (it got postponed due to holding her eliquis for various procedures before). Her issues have resolved but she was going to confirm with her outside cardiologist that no new interventions ware being planned in the 2 months after her ablation because she cannot hold her eliquis. Can you call and, if she is good to go, schedule her for her ablation? She should wait 2-3 weeks from now to get it because she had only been taking her eliquis once daily). ISAMAR day of. Everything else the same as before. Thanks!

## 2020-01-23 NOTE — TELEPHONE ENCOUNTER
Spoke with patient. She states her daughter spoke with the cardiologist and patient has no other planned procedures. She is clear to proceed with AFl CTI. She remains on dialysis T, Th, Sat. Scheduled ablation for 2/21/2020. Advised that she will likely go home same day, so her dialysis schedule will not be affected. Will send all information through patient portal, as requested.

## 2020-01-23 NOTE — PROGRESS NOTES
Routine Office Visit    Patient Name: Eli Vaz    : 1945  MRN: [id number]    Subjective:  Eli is a 74 y.o. female who presents today for:   Chief Complaint   Patient presents with    Follow-up       74 year old female with DM, ESRD on dialysis, A-fib, arthritis, and COPD, is brought in by daughter for hospital follow up. She was admitted from 2020-2020 for hyperkalemia. The patient had apparently missed her regular dialysis as she was feeling tired, by the next day she felt off and was brought to the emergency room and had a potassium of >8. She admitted for management of this, and discharged once she resumed dialysis and potassium improved.  There has been no recent medication changes. The patient states that she feels back to her normal self and has no acute concerns      Past Medical History  Past Medical History:   Diagnosis Date    Arthritis     Atrial fibrillation     COPD (chronic obstructive pulmonary disease)     Dialysis patient     Encounter for blood transfusion     ESRD (end stage renal disease)        Past Surgical History  Past Surgical History:   Procedure Laterality Date    AV FISTULA PLACEMENT      EPIDURAL STEROID INJECTION Left 2019    Procedure: Axillary, Suprascapular, lateral pectoral nerve blocks;  Surgeon: Isrrael Barton Jr., MD;  Location: [facility] ENDO;  Service: Pain Management;  Laterality: Left;  Left Axillary, Suprascapular, Lateral Pectoral Nerve Blocks    [id number]    Arrive @ 0800; Eliquis; No DM; Confirm date with Armando    EPIDURAL STEROID INJECTION Left 2019    Procedure: Suprascapular, Axillary, and Lateral Pectoral Nerve Radiofrequency Ablations;  Surgeon: Isrrael Barton Jr., MD;  Location: [facility] ENDO;  Service: Pain Management;  Laterality: Left;  Left Suprascapular, Axillary, & Lateral Pectoral Nerve Radiofrequency Ablations    23196    Arrive @ 1145; IV Sedation- Fasting; Eliquis; No DM; 17-50-2; Rep?    TREATMENT OF CARDIAC ARRHYTHMIA  N/A 4/2/2019    Procedure: Cardioversion or Defibrillation;  Surgeon: Rakesh Briggs MD;  Location: [facility] [place] CATH LAB;  Service: Cardiology;  Laterality: N/A;    TUBAL LIGATION          Family History  Family History   Problem Relation Age of Onset    Heart attack Sister     Heart attack Sister     Heart attack Mother        Social History  Social History     Socioeconomic History    Marital status:      Spouse name: Not on file    Number of children: Not on file    Years of education: Not on file    Highest education level: Not on file   Occupational History    Not on file   Social Needs    Financial resource strain: Not on file    Food insecurity:     Worry: Not on file     Inability: Not on file    Transportation needs:     Medical: Not on file     Non-medical: Not on file   Tobacco Use    Smoking status: Former Smoker     Start date: 1/12/1991    Smokeless tobacco: Never Used    Tobacco comment: quit in 1991   Substance and Sexual Activity    Alcohol use: No    Drug use: No    Sexual activity: Not on file   Lifestyle    Physical activity:     Days per week: Not on file     Minutes per session: Not on file    Stress: Not on file   Relationships    Social connections:     Talks on phone: Not on file     Gets together: Not on file     Attends Restorationism service: Not on file     Active member of club or organization: Not on file     Attends meetings of clubs or organizations: Not on file     Relationship status: Not on file   Other Topics Concern    Not on file   Social History Narrative    Not on file       Current Medications  Current Outpatient Medications on File Prior to Visit   Medication Sig Dispense Refill    albuterol (PROVENTIL) 2.5 mg /3 mL (0.083 %) nebulizer solution Inhale 2.5 mg into the lungs.      albuterol (PROVENTIL/VENTOLIN HFA) 90 mcg/actuation inhaler INHALE 2 PUFFS INTO LUNGS EVERY 4 HOURS AS NEEDED FOR SHORTNESS OF BREATH OR WHEEZING. RESCUE  3    albuterol  (PROVENTIL/VENTOLIN HFA) 90 mcg/actuation inhaler INHALE 2 PUFFS INTO LUNGS EVERY 4 HOURS AS NEEDED FOR SHORTNESS OF BREATH OR WHEEZING. RESCUE 36 Inhaler 3    amiodarone (PACERONE) 200 MG Tab Take 1 tablet (200 mg total) by mouth 2 (two) times daily. 180 tablet 3    apixaban (ELIQUIS) 2.5 mg Tab Take 2.5 mg by mouth 2 (two) times daily.      atorvastatin (LIPITOR) 10 MG tablet Take 1 tablet (10 mg total) by mouth once daily. 90 tablet 1    B complex w-C no.20/folic acid (RENAL CAPS ORAL) Take by mouth.      calcium acetate (PHOSLO) 667 mg capsule Take 667 mg by mouth 3 (three) times daily with meals.      fluticasone propionate (FLONASE) 50 mcg/actuation nasal spray 1 spray by Nasal route.      HYDROcodone-acetaminophen (NORCO) 5-325 mg per tablet Take 1 tablet by mouth every 12 (twelve) hours as needed for Pain. 60 tablet 0    ipratropium-albuterol (COMBIVENT RESPIMAT)  mcg/actuation inhaler INHALE 1 PUFF INTO THE LUNGS DAILY.      mirtazapine (REMERON) 7.5 MG Tab TAKE 1 TABLET BY MOUTH AT BEDTIME AS NEEDED FOR SLEEP 90  3    montelukast (SINGULAIR) 10 mg tablet TAKE 1 TABLET BY MOUTH EVERY DAY IN THE EVENING 30 tablet 2    paroxetine (PAXIL) 30 MG tablet 1 TABLET IN THE MORNING ONCE A DAY ORALLY 90  3    sevelamer carbonate (RENVELA) 800 mg Tab TAKE 3 TABLETS BY MOUTH WITH EACH MEAL AND 1 TABLET WITH EACH SNACK  3    vit B,C-iron fum-FA-D3-zinc ox 8 mg iron-800 mcg-1,000 unit Tab Take by mouth.       Current Facility-Administered Medications on File Prior to Visit   Medication Dose Route Frequency Provider Last Rate Last Dose    0.9%  NaCl infusion   Intravenous Continuous Alaina Chaudhari NP        sodium chloride 0.9% flush 5 mL  5 mL Intravenous PRN Alaina Chaudhari NP           Allergies   Review of patient's allergies indicates:  No Known Allergies    Review of Systems   Constitutional: Negative for chills, diaphoresis and fever.   HENT: Negative for congestion and rhinorrhea.   "  Respiratory: Negative for shortness of breath and wheezing.    Cardiovascular: Negative for chest pain and palpitations.   Gastrointestinal: Negative for abdominal pain, blood in stool, constipation, diarrhea and nausea.   Skin: Negative for rash.   Neurological: Negative for dizziness, tremors, syncope and headaches.   Hematological: Negative for adenopathy.         BP (!) 129/59 (BP Location: Left arm, Patient Position: Sitting, BP Method: Small (Automatic))   Pulse 80   Temp 98.5 °F (36.9 °C) (Oral)   Ht 5' 4" (1.626 m)   Wt 61.7 kg (136 lb 0.4 oz)   BMI 23.35 kg/m²     Physical Exam   Constitutional: She is cooperative. No distress.   Sitting in wheelchair   HENT:   Head: Normocephalic and atraumatic.   Right Ear: External ear normal.   Left Ear: External ear normal.   Mouth/Throat: Oropharynx is clear and moist.   Eyes: Pupils are equal, round, and reactive to light. EOM are normal.   Neck: Normal range of motion. Neck supple. No tracheal deviation present.   Cardiovascular: Normal rate, regular rhythm, normal heart sounds and intact distal pulses.   Pulmonary/Chest: Effort normal and breath sounds normal. No respiratory distress. She has no wheezes. She has no rales.   Abdominal: Soft. Bowel sounds are normal. She exhibits no mass. There is no tenderness. There is no guarding.   Lymphadenopathy:     She has no cervical adenopathy.   Neurological: She is alert.   Psychiatric: Her speech is normal and behavior is normal. Her mood appears not anxious. Cognition and memory are normal. She does not exhibit a depressed mood.     Protective Sensation (w/ 10 gram monofilament):  Right: Decreased  Left: Decreased    Visual Inspection:  Callus -  Bilateral    Pedal Pulses:   Right: Present  Left: Present    Posterior tibialis:   Right:Present  Left: Present        Assessment/Plan:  Eli was seen today for follow-up.    Diagnoses and all orders for this visit:    Hyperkalemia  -     Comprehensive metabolic panel; " Future  -     Comprehensive metabolic panel  Improved.  Discussed importance of not missing dialysis    Atherosclerosis of aorta  -     Lipid panel; Future  -     Lipid panel  On statin    Atrial fibrillation, unspecified type  Rate controlled    Chronic obstructive pulmonary disease, unspecified COPD type  No acute concerns    ESRD on dialysis  -     Comprehensive metabolic panel; Future  -     Comprehensive metabolic panel  Management as per nephrology team    Secondary hyperparathyroidism  -     PTH, intact; Future  -     PTH, intact  Check PTH    Diabetes mellitus with ESRD (end-stage renal disease)  -     Comprehensive metabolic panel; Future  -     Hemoglobin A1c; Future  -     Ambulatory referral to Podiatry  -     Comprehensive metabolic panel  -     Hemoglobin A1c  Not checking sugars  Last A1c was well controlled.    Callus of foot  -     Ambulatory referral to Podiatry  Referral to podiatry    Screening for colon cancer  -     Cologuard Screening (Multitarget Stool DNA); Future  -     Cologuard Screening (Multitarget Stool DNA)  Patient prefers cologuard vs colonoscopy after she was shown collection process/  Cologuard order placed.    Screening for rectal cancer  -     Cologuard Screening (Multitarget Stool DNA); Future  -     Cologuard Screening (Multitarget Stool DNA)                -Charles Moody Jr., MD, AAHIVS          This office note has been dictated.  This dictation has been generated using M-Modal Fluency Direct dictation; some phonetic errors may occur.

## 2020-01-23 NOTE — PROGRESS NOTES
Patient, Eli Vaz (MRN #[id number]), presented with a recent Platelet count less than 150 K/uL consistent with the definition of thrombocytopenia (ICD10 - D69.6).    Platelets   Date Value Ref Range Status   01/04/2020 144 (L) 150 - 350 K/uL Final     The patient's thrombocytopenia was monitored, evaluated, addressed and/or treated. This addendum to the medical record is made on 01/22/2020.

## 2020-01-27 ENCOUNTER — TELEPHONE (OUTPATIENT)
Dept: HOME HEALTH SERVICES | Facility: HOSPITAL | Age: 75
End: 2020-01-27

## 2020-01-27 DIAGNOSIS — J44.9 CHRONIC OBSTRUCTIVE PULMONARY DISEASE, UNSPECIFIED COPD TYPE: ICD-10-CM

## 2020-01-27 RX ORDER — ALBUTEROL SULFATE 90 UG/1
AEROSOL, METERED RESPIRATORY (INHALATION)
Qty: 18 G | Refills: 5 | Status: SHIPPED | OUTPATIENT
Start: 2020-01-27 | End: 2020-01-30 | Stop reason: SDUPTHER

## 2020-01-30 DIAGNOSIS — J44.9 CHRONIC OBSTRUCTIVE PULMONARY DISEASE, UNSPECIFIED COPD TYPE: ICD-10-CM

## 2020-01-30 RX ORDER — ALBUTEROL SULFATE 90 UG/1
AEROSOL, METERED RESPIRATORY (INHALATION)
Qty: 18 G | Refills: 5 | Status: SHIPPED | OUTPATIENT
Start: 2020-01-30 | End: 2020-03-04 | Stop reason: SDUPTHER

## 2020-01-30 RX ORDER — ALBUTEROL SULFATE 0.83 MG/ML
2.5 SOLUTION RESPIRATORY (INHALATION) EVERY 4 HOURS
Qty: 1 BOX | Refills: 11 | Status: SHIPPED | OUTPATIENT
Start: 2020-01-30 | End: 2020-07-15 | Stop reason: SDUPTHER

## 2020-02-05 ENCOUNTER — PATIENT OUTREACH (OUTPATIENT)
Dept: ADMINISTRATIVE | Facility: OTHER | Age: 75
End: 2020-02-05

## 2020-02-06 ENCOUNTER — PATIENT MESSAGE (OUTPATIENT)
Dept: ELECTROPHYSIOLOGY | Facility: CLINIC | Age: 75
End: 2020-02-06

## 2020-02-07 ENCOUNTER — OFFICE VISIT (OUTPATIENT)
Dept: PAIN MEDICINE | Facility: CLINIC | Age: 75
End: 2020-02-07
Payer: MEDICARE

## 2020-02-07 VITALS
TEMPERATURE: 98 F | HEART RATE: 63 BPM | BODY MASS INDEX: 22.43 KG/M2 | SYSTOLIC BLOOD PRESSURE: 128 MMHG | HEIGHT: 64 IN | DIASTOLIC BLOOD PRESSURE: 67 MMHG | WEIGHT: 131.38 LBS

## 2020-02-07 DIAGNOSIS — G89.29 CHRONIC LEFT SHOULDER PAIN: ICD-10-CM

## 2020-02-07 DIAGNOSIS — M19.012 ARTHRITIS OF LEFT SHOULDER REGION: ICD-10-CM

## 2020-02-07 DIAGNOSIS — M25.512 CHRONIC LEFT SHOULDER PAIN: ICD-10-CM

## 2020-02-07 PROCEDURE — 99999 PR PBB SHADOW E&M-EST. PATIENT-LVL III: CPT | Mod: PBBFAC,,, | Performed by: PAIN MEDICINE

## 2020-02-07 PROCEDURE — 1101F PR PT FALLS ASSESS DOC 0-1 FALLS W/OUT INJ PAST YR: ICD-10-PCS | Mod: CPTII,S$GLB,, | Performed by: PAIN MEDICINE

## 2020-02-07 PROCEDURE — 1126F PR PAIN SEVERITY QUANTIFIED, NO PAIN PRESENT: ICD-10-PCS | Mod: S$GLB,,, | Performed by: PAIN MEDICINE

## 2020-02-07 PROCEDURE — 1159F MED LIST DOCD IN RCRD: CPT | Mod: S$GLB,,, | Performed by: PAIN MEDICINE

## 2020-02-07 PROCEDURE — 99214 OFFICE O/P EST MOD 30 MIN: CPT | Mod: S$GLB,,, | Performed by: PAIN MEDICINE

## 2020-02-07 PROCEDURE — 99999 PR PBB SHADOW E&M-EST. PATIENT-LVL III: ICD-10-PCS | Mod: PBBFAC,,, | Performed by: PAIN MEDICINE

## 2020-02-07 PROCEDURE — 1159F PR MEDICATION LIST DOCUMENTED IN MEDICAL RECORD: ICD-10-PCS | Mod: S$GLB,,, | Performed by: PAIN MEDICINE

## 2020-02-07 PROCEDURE — 1101F PT FALLS ASSESS-DOCD LE1/YR: CPT | Mod: CPTII,S$GLB,, | Performed by: PAIN MEDICINE

## 2020-02-07 PROCEDURE — 99214 PR OFFICE/OUTPT VISIT, EST, LEVL IV, 30-39 MIN: ICD-10-PCS | Mod: S$GLB,,, | Performed by: PAIN MEDICINE

## 2020-02-07 PROCEDURE — 1126F AMNT PAIN NOTED NONE PRSNT: CPT | Mod: S$GLB,,, | Performed by: PAIN MEDICINE

## 2020-02-07 PROCEDURE — 99499 RISK ADDL DX/OHS AUDIT: ICD-10-PCS | Mod: S$GLB,,, | Performed by: PAIN MEDICINE

## 2020-02-07 PROCEDURE — 99499 UNLISTED E&M SERVICE: CPT | Mod: S$GLB,,, | Performed by: PAIN MEDICINE

## 2020-02-07 RX ORDER — HYDROCODONE BITARTRATE AND ACETAMINOPHEN 5; 325 MG/1; MG/1
1 TABLET ORAL EVERY 12 HOURS PRN
Qty: 60 TABLET | Refills: 0 | Status: SHIPPED | OUTPATIENT
Start: 2020-04-10 | End: 2020-04-29

## 2020-02-07 RX ORDER — HYDROCODONE BITARTRATE AND ACETAMINOPHEN 5; 325 MG/1; MG/1
1 TABLET ORAL EVERY 12 HOURS PRN
Qty: 60 TABLET | Refills: 0 | Status: SHIPPED | OUTPATIENT
Start: 2020-03-11 | End: 2020-04-10

## 2020-02-07 RX ORDER — HYDROCODONE BITARTRATE AND ACETAMINOPHEN 5; 325 MG/1; MG/1
1 TABLET ORAL EVERY 12 HOURS PRN
Qty: 60 TABLET | Refills: 0 | Status: SHIPPED | OUTPATIENT
Start: 2020-02-10 | End: 2020-03-11

## 2020-02-07 NOTE — PROGRESS NOTES
Subjective:     Patient ID: Eli Vaz is a 74 y.o. female    Chief Complaint: Shoulder Pain      Referred by: No ref. provider found      HPI:    Interval History (2/7/20):  She returns today for follow up.  She reports that Norco 5-325 mg q.12 hours p.r.n. has been helpful for the left shoulder pain.  She denies any changes in the quality or location of her pain. She denies any new or worsening symptoms.  She denies any adverse effects from her pain medications. She feels this medication adequately controlled her pain and does not feel that any changes are needed at this time.      Interval History (11/22/19):  She returns today for follow up.  She reports that she is no longer getting any relief from left shoulder radiofrequency ablation.  She denies any changes in the quality or location of her pain but does state that is more bothersome and limits her ADLs more than previously.  Patient states that she has taken Norco 5-325 mg b.i.d. p.r.n..  This medication greatly improved her pain and function.  She denies any adverse effects from this medication.  She no longer has any more this medication.      Interval History (8/5/19):  She returns today for follow up.  She reports that she continues to get at least 75% relief of her left shoulder pain following left shoulder RFA.  She states that is still quite painful to perform certain ADLs including getting dressed.  She has not yet filled the prescription for [place] provided at last visit.  She feels as though she may require this medicine to help with certain activities throughout her day.      Interval History (7/8/19):  She returns today for follow up.  She reports that left shoulder radiofrequency ablation has been helpful for the left shoulder pain. She reports about 70% relief of her pain.  She also reports some minimally improved range of motion.  She still taking Norco 7.5-325 mg b.i.d. p.r.n..      Interval History (5/13/19):  She returns today for follow  up.  She reports that her left shoulder pain is unchanged in quality location since last encounter.  She has been evaluated by Orthopedic surgery and no surgical options are being pursued at this time. She states that Norco 7.5-325 mg q.8 hours has been helpful for her pain. She denies any adverse effects with this medication.      Interval History (2/11/19):  She returns today for follow up.  She reports that Norco 7.5-325 half pill q.4 hours has been helpful for the left shoulder pain. Patient states that this provides about 50% relief of her pain.  She no longer has pain while at rest and does note slightly improved range of motion of her left shoulder with minimal to no pain. She still has significantly limited range of motion and function of her left upper extremity as result.  She has discussed potential arthroplasty with Dr. Zurita.  She plans to discuss this option with the rest of her treatment team to see if it is something she wants to proceed with.  She denies any adverse effects from taking her pain medication.  Specifically she denies any sedation or constipation.  She does not feels the higher doses are needed at this time as she is worried about adverse effects and feels as though her current dosage provides adequate relief.      Initial Encounter (1/21/19):  Eli Vaz is a 74 y.o. female who presents today with chronic left shoulder pain. Patient was referred by Dr. Zurita.  She has known left shoulder arthritis and rotator cuff pathology.  Limited surgical and interventional options given medical comorbidities patient has end-stage renal disease on dialysis.  She has failed shoulder injections including Synvisc.  She states that her left shoulder pain is very severe and limits her activities.  She wants somebody to cut it off.    This pain is described in detail below.    Physical Therapy:  Unlikely to be of benefit    Non-pharmacologic Treatment:  Nothing helps         · TENS?  No    Pain  Medications:         · Currently taking:  Norco 5-325 mg q.12 hours p.r.n.    · Has tried in the past:  Tramadol, Percocet, cannot take NSAIDs due to renal disease, Norco    · Has not tried: NSAIDs, Muscle relaxants, TCAs, SNRIs, anticonvulsants, topical creams    Blood thinners:  Eliquis    Interventional Therapies:    Previous shoulder injections including Synvisc.  6/19/19 - left shoulder radiofrequency ablation - 70% relief    Relevant Surgeries:  None    Affecting sleep?  Yes    Affecting daily activities? yes    Depressive symptoms? yes          · SI/HI? No    Work status: Unemployed    Pain Scores:    Best:       5/10  Worst:     8/10  Usually:   5/10  Today:    0/10    Review of Systems   Constitutional: Negative for activity change, appetite change, chills, fatigue, fever and unexpected weight change.   HENT: Negative for hearing loss.    Eyes: Negative for visual disturbance.   Respiratory: Negative for chest tightness and shortness of breath.    Cardiovascular: Negative for chest pain.   Gastrointestinal: Negative for abdominal pain, constipation, diarrhea, nausea and vomiting.   Genitourinary: Negative for difficulty urinating.   Musculoskeletal: Positive for arthralgias and myalgias. Negative for back pain, gait problem and neck pain.   Skin: Negative for rash.   Neurological: Positive for weakness. Negative for dizziness, light-headedness, numbness and headaches.   Psychiatric/Behavioral: Positive for sleep disturbance. Negative for hallucinations and suicidal ideas. The patient is not nervous/anxious.        Past Medical History:   Diagnosis Date    Arthritis     Atrial fibrillation     COPD (chronic obstructive pulmonary disease)     Dialysis patient     Encounter for blood transfusion     ESRD (end stage renal disease)        Past Surgical History:   Procedure Laterality Date    AV FISTULA PLACEMENT      EPIDURAL STEROID INJECTION Left 5/29/2019    Procedure: Axillary, Suprascapular, lateral  pectoral nerve blocks;  Surgeon: Isrrael Barton Jr., MD;  Location: [facility] ENDO;  Service: Pain Management;  Laterality: Left;  Left Axillary, Suprascapular, Lateral Pectoral Nerve Blocks    01952    Arrive @ 0800; Eliquis; No DM; Confirm date with Armando    EPIDURAL STEROID INJECTION Left 6/19/2019    Procedure: Suprascapular, Axillary, and Lateral Pectoral Nerve Radiofrequency Ablations;  Surgeon: Isrrael Barton Jr., MD;  Location: [facility] ENDO;  Service: Pain Management;  Laterality: Left;  Left Suprascapular, Axillary, & Lateral Pectoral Nerve Radiofrequency Ablations    72155    Arrive @ 1145; IV Sedation- Fasting; Eliquis; No DM; 17-50-2; Rep?    TREATMENT OF CARDIAC ARRHYTHMIA N/A 4/2/2019    Procedure: Cardioversion or Defibrillation;  Surgeon: Rakesh Briggs MD;  Location: [facility] CATH LAB;  Service: Cardiology;  Laterality: N/A;    TUBAL LIGATION         Social History     Socioeconomic History    Marital status:      Spouse name: Not on file    Number of children: Not on file    Years of education: Not on file    Highest education level: Not on file   Occupational History    Not on file   Social Needs    Financial resource strain: Not on file    Food insecurity:     Worry: Not on file     Inability: Not on file    Transportation needs:     Medical: Not on file     Non-medical: Not on file   Tobacco Use    Smoking status: Former Smoker     Start date: 1/12/1991    Smokeless tobacco: Never Used    Tobacco comment: quit in 1991   Substance and Sexual Activity    Alcohol use: No    Drug use: No    Sexual activity: Not on file   Lifestyle    Physical activity:     Days per week: Not on file     Minutes per session: Not on file    Stress: Not on file   Relationships    Social connections:     Talks on phone: Not on file     Gets together: Not on file     Attends Hoahaoism service: Not on file     Active member of club or organization: Not on file     Attends meetings of clubs or  organizations: Not on file     Relationship status: Not on file   Other Topics Concern    Not on file   Social History Narrative    Not on file       Review of patient's allergies indicates:  No Known Allergies    Current Outpatient Medications on File Prior to Visit   Medication Sig Dispense Refill    albuterol (PROVENTIL) 2.5 mg /3 mL (0.083 %) nebulizer solution Take 3 mLs (2.5 mg total) by nebulization every 4 (four) hours. 1 Box 11    albuterol (PROVENTIL/VENTOLIN HFA) 90 mcg/actuation inhaler INHALE 2 PUFFS INTO LUNGS EVERY 4 HOURS AS NEEDED FOR SHORTNESS OF BREATH OR WHEEZING. RESCUE 18 g 5    amiodarone (PACERONE) 200 MG Tab Take 1 tablet (200 mg total) by mouth 2 (two) times daily. 180 tablet 3    apixaban (ELIQUIS) 2.5 mg Tab Take 2.5 mg by mouth 2 (two) times daily.      atorvastatin (LIPITOR) 10 MG tablet Take 1 tablet (10 mg total) by mouth once daily. 90 tablet 1    B complex w-C no.20/folic acid (RENAL CAPS ORAL) Take by mouth.      calcium acetate (PHOSLO) 667 mg capsule Take 667 mg by mouth 3 (three) times daily with meals.      fluticasone propionate (FLONASE) 50 mcg/actuation nasal spray 1 spray by Nasal route.      ipratropium-albuterol (COMBIVENT RESPIMAT)  mcg/actuation inhaler INHALE 1 PUFF INTO THE LUNGS DAILY.      mirtazapine (REMERON) 7.5 MG Tab TAKE 1 TABLET BY MOUTH AT BEDTIME AS NEEDED FOR SLEEP 90  3    montelukast (SINGULAIR) 10 mg tablet TAKE 1 TABLET BY MOUTH EVERY DAY IN THE EVENING 30 tablet 2    paroxetine (PAXIL) 30 MG tablet 1 TABLET IN THE MORNING ONCE A DAY ORALLY 90  3    sevelamer carbonate (RENVELA) 800 mg Tab TAKE 3 TABLETS BY MOUTH WITH EACH MEAL AND 1 TABLET WITH EACH SNACK  3    vit B,C-iron fum-FA-D3-zinc ox 8 mg iron-800 mcg-1,000 unit Tab Take by mouth.      [DISCONTINUED] HYDROcodone-acetaminophen (NORCO) 5-325 mg per tablet Take 1 tablet by mouth every 12 (twelve) hours as needed for Pain. 60 tablet 0     Current Facility-Administered  "Medications on File Prior to Visit   Medication Dose Route Frequency Provider Last Rate Last Dose    0.9%  NaCl infusion   Intravenous Continuous Alaina Chaudhari NP        sodium chloride 0.9% flush 5 mL  5 mL Intravenous PRN Alaina Chaudhari NP           Objective:      /67 (BP Location: Left arm, Patient Position: Sitting, BP Method: Small (Automatic))   Pulse 63   Temp 97.7 °F (36.5 °C) (Oral)   Ht 5' 4" (1.626 m)   Wt 59.6 kg (131 lb 6.4 oz)   BMI 22.55 kg/m²     Exam:  GEN:  Well developed, well nourished.  No acute distress.   HEENT:  No trauma.  Mucous membranes moist.  Nares patent bilaterally.  PSYCH: Normal affect. Thought content appropriate.  CHEST:  Breathing symmetric.  No audible wheezing.  ABD: Soft, non-distended.  SKIN:  Warm, pink, dry.  No rash on exposed areas.    EXT:  No cyanosis, clubbing, or edema.  No color change or changes in nail or hair growth.  NEURO/MUSCULOSKELETAL:  Fully alert, oriented, and appropriate. Speech normal merrill. No cranial nerve deficits.   Gait:  Normal.  No focal motor deficits.         Imaging:  Narrative     EXAMINATION:  XR SHOULDER COMPLETE 2 OR MORE VIEWS LEFT    CLINICAL HISTORY:  Pain in left shoulder    TECHNIQUE:  Two or three views of the left shoulder were performed.    COMPARISON:  None    FINDINGS:  The bones are intact.  There is no evidence for acute fracture or bone destruction.  There are advanced degenerative changes of the left glenohumeral joint with marked joint space narrowing with subchondral sclerosis and osteophyte formation.  There is cortical irregularity at the articular surfaces of the glenohumeral joint.  Multiple vascular stents project over the left upper arm and left subclavian region with multiple surgical clips also present.  Atherosclerotic calcification is present within the thoracic aorta as well as within the carotid arteries.   Impression       Advanced degenerative changes of the left glenohumeral " joint.    No evidence for acute fracture, bone destruction, or dislocation.    Surgical changes with multiple vascular stents.    Extensive atherosclerosis.      Electronically signed by: Tone Pereyra MD  Date: 09/18/2018  Time: 07:58         Assessment:       Encounter Diagnoses   Name Primary?    Arthritis of left shoulder region     Chronic left shoulder pain          Plan:       Eli was seen today for shoulder pain.    Diagnoses and all orders for this visit:    Arthritis of left shoulder region  -     HYDROcodone-acetaminophen (NORCO) 5-325 mg per tablet; Take 1 tablet by mouth every 12 (twelve) hours as needed for Pain.  -     HYDROcodone-acetaminophen (NORCO) 5-325 mg per tablet; Take 1 tablet by mouth every 12 (twelve) hours as needed for Pain.  -     HYDROcodone-acetaminophen (NORCO) 5-325 mg per tablet; Take 1 tablet by mouth every 12 (twelve) hours as needed for Pain.    Chronic left shoulder pain  -     HYDROcodone-acetaminophen (NORCO) 5-325 mg per tablet; Take 1 tablet by mouth every 12 (twelve) hours as needed for Pain.  -     HYDROcodone-acetaminophen (NORCO) 5-325 mg per tablet; Take 1 tablet by mouth every 12 (twelve) hours as needed for Pain.  -     HYDROcodone-acetaminophen (NORCO) 5-325 mg per tablet; Take 1 tablet by mouth every 12 (twelve) hours as needed for Pain.        Eli Vaz is a 74 y.o. female with chronic left shoulder pain secondary to rotator cuff and glenohumeral pathology.  Not a candidate for left shoulder arthroplasty.  Very good improvement following left shoulder radiofrequency ablation but relief did not last very long.    1.  We had a lengthy discussion regarding the potential adverse effects of chronic opioid medications.  We discussed that it may be appropriate to continue the use of opioid pain medications, but that we should use the lowest dose/potency possible.  Patient expressed understanding.  2.  Continue Norco 5-325 mg q.12 hours p.r.n..  3 1-month  prescriptions of 60 pills per month were provided today.  3.  Prescription monitoring port was reviewed today.  No inconsistencies were noted.  4.  Pain contract signed.  5.  Return to clinic in 3 months or sooner if needed.

## 2020-02-15 LAB
APTT PPP: 31 SEC (ref 22–34)
BASOPHILS # BLD AUTO: 28 CELLS/UL (ref 0–200)
BASOPHILS NFR BLD AUTO: 0.6 %
BUN SERPL-MCNC: 16 MG/DL (ref 7–25)
BUN/CREAT SERPL: 3 (CALC) (ref 6–22)
CALCIUM SERPL-MCNC: 10.1 MG/DL (ref 8.6–10.4)
CHLORIDE SERPL-SCNC: 99 MMOL/L (ref 98–110)
CO2 SERPL-SCNC: 34 MMOL/L (ref 20–32)
CREAT SERPL-MCNC: 5.48 MG/DL (ref 0.6–0.93)
EOSINOPHIL # BLD AUTO: 189 CELLS/UL (ref 15–500)
EOSINOPHIL NFR BLD AUTO: 4.1 %
ERYTHROCYTE [DISTWIDTH] IN BLOOD BY AUTOMATED COUNT: 15.6 % (ref 11–15)
GFRSERPLBLD MDRD-ARVRAT: 7 ML/MIN/1.73M2
GLUCOSE SERPL-MCNC: 91 MG/DL (ref 65–139)
HCT VFR BLD AUTO: 34.1 % (ref 35–45)
HGB BLD-MCNC: 10.8 G/DL (ref 11.7–15.5)
INR PPP: 1.1
LYMPHOCYTES # BLD AUTO: 685 CELLS/UL (ref 850–3900)
LYMPHOCYTES NFR BLD AUTO: 14.9 %
MCH RBC QN AUTO: 30.7 PG (ref 27–33)
MCHC RBC AUTO-ENTMCNC: 31.7 G/DL (ref 32–36)
MCV RBC AUTO: 96.9 FL (ref 80–100)
MONOCYTES # BLD AUTO: 745 CELLS/UL (ref 200–950)
MONOCYTES NFR BLD AUTO: 16.2 %
NEUTROPHILS # BLD AUTO: 2953 CELLS/UL (ref 1500–7800)
NEUTROPHILS NFR BLD AUTO: 64.2 %
PLATELET # BLD AUTO: 180 THOUSAND/UL (ref 140–400)
PMV BLD REES-ECKER: 11 FL (ref 7.5–12.5)
POTASSIUM SERPL-SCNC: 4.3 MMOL/L (ref 3.5–5.3)
PROTHROMBIN TIME: 11.6 SEC (ref 9–11.5)
RBC # BLD AUTO: 3.52 MILLION/UL (ref 3.8–5.1)
SODIUM SERPL-SCNC: 144 MMOL/L (ref 135–146)
WBC # BLD AUTO: 4.6 THOUSAND/UL (ref 3.8–10.8)

## 2020-02-17 ENCOUNTER — TELEPHONE (OUTPATIENT)
Dept: FAMILY MEDICINE | Facility: CLINIC | Age: 75
End: 2020-02-17

## 2020-02-17 RX ORDER — CODEINE PHOSPHATE AND GUAIFENESIN 10; 100 MG/5ML; MG/5ML
5 SOLUTION ORAL 3 TIMES DAILY PRN
Refills: 0 | OUTPATIENT
Start: 2020-02-17 | End: 2020-02-27

## 2020-02-17 NOTE — TELEPHONE ENCOUNTER
----- Message from Florence Mcqueen sent at 2/17/2020  2:32 PM CST -----  Contact: Eli [phone]  Type: RX Refill Request    Who Called: Eli     Refill or New Rx: refill     RX Name and Strength: promethazine     Is this a 30 day or 90 day RX: 30 day     Preferred Pharmacy with phone number: [facility] #[id number] - [place], [place] - [address]    Would the patient rather a call back or a response via [facility]? Call back     Best Call Back Number:[phone]

## 2020-02-17 NOTE — TELEPHONE ENCOUNTER
Patient requesting refill on cough syrup. Spoke with patient to explain this may not be refilled as it ws prescribed by another provider and she hasn't been seen

## 2020-02-19 ENCOUNTER — OFFICE VISIT (OUTPATIENT)
Dept: PODIATRY | Facility: CLINIC | Age: 75
End: 2020-02-19
Payer: MEDICARE

## 2020-02-19 ENCOUNTER — PATIENT OUTREACH (OUTPATIENT)
Dept: ADMINISTRATIVE | Facility: OTHER | Age: 75
End: 2020-02-19

## 2020-02-19 VITALS — WEIGHT: 131.38 LBS | BODY MASS INDEX: 22.43 KG/M2 | HEIGHT: 64 IN

## 2020-02-19 DIAGNOSIS — B35.3 TINEA PEDIS, UNSPECIFIED LATERALITY: ICD-10-CM

## 2020-02-19 DIAGNOSIS — E11.49 TYPE II DIABETES MELLITUS WITH NEUROLOGICAL MANIFESTATIONS: Primary | ICD-10-CM

## 2020-02-19 PROCEDURE — 1126F AMNT PAIN NOTED NONE PRSNT: CPT | Mod: S$GLB,,, | Performed by: PODIATRIST

## 2020-02-19 PROCEDURE — 99203 PR OFFICE/OUTPT VISIT, NEW, LEVL III, 30-44 MIN: ICD-10-PCS | Mod: S$GLB,,, | Performed by: PODIATRIST

## 2020-02-19 PROCEDURE — 1101F PT FALLS ASSESS-DOCD LE1/YR: CPT | Mod: CPTII,S$GLB,, | Performed by: PODIATRIST

## 2020-02-19 PROCEDURE — 99499 RISK ADDL DX/OHS AUDIT: ICD-10-PCS | Mod: S$GLB,,, | Performed by: PODIATRIST

## 2020-02-19 PROCEDURE — 1101F PR PT FALLS ASSESS DOC 0-1 FALLS W/OUT INJ PAST YR: ICD-10-PCS | Mod: CPTII,S$GLB,, | Performed by: PODIATRIST

## 2020-02-19 PROCEDURE — 1159F MED LIST DOCD IN RCRD: CPT | Mod: S$GLB,,, | Performed by: PODIATRIST

## 2020-02-19 PROCEDURE — 99203 OFFICE O/P NEW LOW 30 MIN: CPT | Mod: S$GLB,,, | Performed by: PODIATRIST

## 2020-02-19 PROCEDURE — 99999 PR PBB SHADOW E&M-EST. PATIENT-LVL III: ICD-10-PCS | Mod: PBBFAC,,, | Performed by: PODIATRIST

## 2020-02-19 PROCEDURE — 3044F PR MOST RECENT HEMOGLOBIN A1C LEVEL <7.0%: ICD-10-PCS | Mod: CPTII,S$GLB,, | Performed by: PODIATRIST

## 2020-02-19 PROCEDURE — 99999 PR PBB SHADOW E&M-EST. PATIENT-LVL III: CPT | Mod: PBBFAC,,, | Performed by: PODIATRIST

## 2020-02-19 PROCEDURE — 1126F PR PAIN SEVERITY QUANTIFIED, NO PAIN PRESENT: ICD-10-PCS | Mod: S$GLB,,, | Performed by: PODIATRIST

## 2020-02-19 PROCEDURE — 99499 UNLISTED E&M SERVICE: CPT | Mod: S$GLB,,, | Performed by: PODIATRIST

## 2020-02-19 PROCEDURE — 3044F HG A1C LEVEL LT 7.0%: CPT | Mod: CPTII,S$GLB,, | Performed by: PODIATRIST

## 2020-02-19 PROCEDURE — 1159F PR MEDICATION LIST DOCUMENTED IN MEDICAL RECORD: ICD-10-PCS | Mod: S$GLB,,, | Performed by: PODIATRIST

## 2020-02-19 RX ORDER — KETOCONAZOLE 20 MG/G
CREAM TOPICAL DAILY
Qty: 1 TUBE | Refills: 3 | Status: SHIPPED | OUTPATIENT
Start: 2020-02-19 | End: 2020-01-01 | Stop reason: SDUPTHER

## 2020-02-19 NOTE — LETTER
February 19, 2020      Charles Moody Jr., MD  [address]  [place] [address]           [facility]  [address] [place] [address]  Phone: [phone]          Patient: Eli Vaz   MR Number: [id number]   YOB: 1945   Date of Visit: 2/19/2020       Dear Dr. Charles Moody Jr.:    Thank you for referring Eli Vaz to me for evaluation. Attached you will find relevant portions of my assessment and plan of care.    If you have questions, please do not hesitate to call me. I look forward to following Eli Vaz along with you.    Sincerely,    Yoana Sim, GRAYSON    Enclosure  CC:  No Recipients    If you would like to receive this communication electronically, please contact [email or web address] or [phone] to request more information on [employer] access.    For providers and/or their staff who would like to refer a patient to [facility], please contact us through our one-stop-shop provider referral line, [facility], at [phone].    If you feel you have received this communication in error or would no longer like to receive these types of communications, please e-mail [email or web address]

## 2020-02-19 NOTE — PROGRESS NOTES
Subjective:      Patient ID: Eli Vaz is a 74 y.o. female.    Chief Complaint: Diabetes Mellitus (ov Dr Moody PCP 1/22/20); Nail Care; and Diabetic Foot Exam    Eli is a 74 y.o. female who presents to the clinic upon referral from Dr. Moody  for evaluation and treatment of diabetic feet. Eli has a past medical history of Arthritis, Atrial fibrillation, COPD (chronic obstructive pulmonary disease), Dialysis patient, Encounter for blood transfusion, and ESRD (end stage renal disease). Presents for diabetic foot risk assessment.     Date Last Seen by PCP: per above    Current shoe gear: Tennis shoes    Hemoglobin A1C   Date Value Ref Range Status   04/02/2019 5.0 4.0 - 5.6 % Final     Comment:     ADA Screening Guidelines:  5.7-6.4%  Consistent with prediabetes  >or=6.5%  Consistent with diabetes  High levels of fetal hemoglobin interfere with the HbA1C  assay. Heterozygous hemoglobin variants (HbS, HgC, etc)do  not significantly interfere with this assay.   However, presence of multiple variants may affect accuracy.     03/06/2019 5.3 4.0 - 5.6 % Final     Comment:     ADA Screening Guidelines:  5.7-6.4%  Consistent with prediabetes  >or=6.5%  Consistent with diabetes  High levels of fetal hemoglobin interfere with the HbA1C  assay. Heterozygous hemoglobin variants (HbS, HgC, etc)do  not significantly interfere with this assay.   However, presence of multiple variants may affect accuracy.     09/28/2018 4.6 4.0 - 5.6 % Final     Comment:     ADA Screening Guidelines:  5.7-6.4%  Consistent with prediabetes  >or=6.5%  Consistent with diabetes  High levels of fetal hemoglobin interfere with the HbA1C  assay. Heterozygous hemoglobin variants (HbS, HgC, etc)do  not significantly interfere with this assay.   However, presence of multiple variants may affect accuracy.             Review of Systems   Constitution: Negative for chills, diaphoresis and fever.   Cardiovascular: Negative for claudication, cyanosis, leg  swelling and syncope.   Respiratory: Negative for cough and shortness of breath.    Skin: Positive for color change, nail changes and suspicious lesions.   Musculoskeletal: Negative for falls, joint pain, muscle cramps and muscle weakness.   Gastrointestinal: Negative for diarrhea, nausea and vomiting.   Neurological: Negative for disturbances in coordination, numbness, paresthesias, sensory change, tremors and weakness.   Psychiatric/Behavioral: Negative for altered mental status.           Objective:      Physical Exam   Constitutional: She appears well-developed. She is cooperative.   Oriented to time, place, and person.   Cardiovascular:   DP and PT pulses are palpable bilaterally. 3 sec capillary refill time and toes and feet are warm to touch proximally .  There is  hair growth on the feet and toes b/l. There is no edema b/l. No spider veins or varicosities present b/l.      Musculoskeletal:   Equinus noted b/l ankles with < 10 deg DF noted. MMT 5/5 in DF/PF/Inv/Ev resistance with no reproduction of pain in any direction. Passive range of motion of ankle and pedal joints is painless b/l.     Feet:   Right Foot:   Skin Integrity: Negative for callus or dry skin.   Left Foot:   Skin Integrity: Negative for callus or dry skin.   Lymphadenopathy:   Negative lymphadenopathy bilateral popliteal fossa and tarsal tunnel.   Neurological: She is alert.   Light touch, proprioception, and sharp/dull sensation are all intact bilaterally. Protective threshold with the Chesterfield-Wienstein monofilament is diminished  bilaterally.    Skin:   No open lesions, lacerations or wounds noted.Interdigital spaces clean, dry and intact b/l. No erythema noted to b/l foot.  \  Toenails 1-5 bilaterally are elongated by 2-3 mm, thickened by 2-3 mm, discolored/yellowed, dystrophic, brittle with subungual debris.    Scaling dryness in a moccasin distribution is noted to the bilateral lower extremities        Psychiatric: She has a normal mood  and affect.             Assessment:       Encounter Diagnoses   Name Primary?    Type II diabetes mellitus with neurological manifestations Yes    Tinea pedis, unspecified laterality          Plan:       Eli was seen today for diabetes mellitus, nail care and diabetic foot exam.    Diagnoses and all orders for this visit:    Type II diabetes mellitus with neurological manifestations    Tinea pedis, unspecified laterality    Other orders  -     ketoconazole (NIZORAL) 2 % cream; Apply topically once daily.      I counseled the patient on her conditions, their implications and medical management.    - Shoe inspection. Diabetic Foot Education. Patient reminded of the importance of good nutrition and blood sugar control to help prevent podiatric complications of diabetes. Patient instructed on proper foot hygeine. We discussed wearing proper shoe gear, daily foot inspections, never walking without protective shoe gear, never putting sharp instruments to feet, routine podiatric nail visits every 12 months.   - With patient's permission, nails were aggressively reduced and debrided x 10 to their soft tissue attachment mechanically and with electric , removing all offending nail and debris. Patient relates relief following the procedure. He will continue to monitor the areas daily, inspect his feet, wear protective shoe gear when ambulatory, moisturizer to maintain skin integrity and follow in this office in approximately 12 months, sooner p.r.n.     Ketoconazole 2% topical cream prescribed for treatment of aforementioned tinea pedis. Patient will use this medication as directed in addition to thourougly drying between toes daily, and applying powder as needed

## 2020-02-20 ENCOUNTER — TELEPHONE (OUTPATIENT)
Dept: ELECTROPHYSIOLOGY | Facility: CLINIC | Age: 75
End: 2020-02-20

## 2020-02-20 NOTE — TELEPHONE ENCOUNTER
Spoke to patient's daughter    CONFIRMED procedure arrival time of 7am on 2/21/20 for AFl RFA  Reiterated instructions including:  -Directions to check in desk  -NPO after midnight night prior to procedure  -High importance of HOLDING Eliquis for one day prior to procedure. Daughter confirms that her last dose was yesterday evening (2/19/20)   -Confirmed compliance of Eliquis.  -Pre-procedure LABS reviewedl. H/H at baseline as is creatinine; she has ESRD and is on dialysis T, Th, Sat  -Confirmed no recent fever, bleeding, infection or skin rash in the past 30 days, per daughter. She states that there are no rashes, skin breaks, irritation to groins where MD will get femoral vein access  -Daughter is aware that patient will likely go home Same Day and dialysis schedule will not be affected.  -Do not wear mascara day of procedure       Patient's daughter verbalizes understanding of above and appreciates call.

## 2020-02-21 ENCOUNTER — ANESTHESIA (OUTPATIENT)
Dept: MEDSURG UNIT | Facility: HOSPITAL | Age: 75
End: 2020-02-21
Payer: MEDICARE

## 2020-02-21 ENCOUNTER — ANESTHESIA EVENT (OUTPATIENT)
Dept: MEDSURG UNIT | Facility: HOSPITAL | Age: 75
End: 2020-02-21
Payer: MEDICARE

## 2020-02-21 ENCOUNTER — HOSPITAL ENCOUNTER (OUTPATIENT)
Facility: HOSPITAL | Age: 75
Discharge: HOME | End: 2020-02-21
Attending: INTERNAL MEDICINE | Admitting: INTERNAL MEDICINE
Payer: MEDICARE

## 2020-02-21 DIAGNOSIS — I48.91 A-FIB: ICD-10-CM

## 2020-02-21 DIAGNOSIS — I48.3 TYPICAL ATRIAL FLUTTER: ICD-10-CM

## 2020-02-21 DIAGNOSIS — I48.92 ATRIAL FLUTTER: Primary | ICD-10-CM

## 2020-02-21 LAB
POCT GLUCOSE: 68 MG/DL (ref 70–110)
POCT GLUCOSE: 78 MG/DL (ref 70–110)
POCT GLUCOSE: 85 MG/DL (ref 70–110)

## 2020-02-21 PROCEDURE — 93621 COMP EP EVL L PAC&REC C SINS: CPT | Performed by: INTERNAL MEDICINE

## 2020-02-21 PROCEDURE — 27201423 OPTIME MED/SURG SUP & DEVICES STERILE SUPPLY: Performed by: INTERNAL MEDICINE

## 2020-02-21 PROCEDURE — D9220A PRA ANESTHESIA: Mod: CRNA,,, | Performed by: NURSE ANESTHETIST, CERTIFIED REGISTERED

## 2020-02-21 PROCEDURE — D9220A PRA ANESTHESIA: Mod: ANES,,, | Performed by: ANESTHESIOLOGY

## 2020-02-21 PROCEDURE — 63600175 PHARM REV CODE 636 W HCPCS: Performed by: INTERNAL MEDICINE

## 2020-02-21 PROCEDURE — 25000003 PHARM REV CODE 250: Performed by: INTERNAL MEDICINE

## 2020-02-21 PROCEDURE — C1730 CATH, EP, 19 OR FEW ELECT: HCPCS | Performed by: INTERNAL MEDICINE

## 2020-02-21 PROCEDURE — 82962 GLUCOSE BLOOD TEST: CPT | Mod: 91

## 2020-02-21 PROCEDURE — 93010 ELECTROCARDIOGRAM REPORT: CPT | Mod: ,,, | Performed by: INTERNAL MEDICINE

## 2020-02-21 PROCEDURE — 63600175 PHARM REV CODE 636 W HCPCS: Performed by: NURSE ANESTHETIST, CERTIFIED REGISTERED

## 2020-02-21 PROCEDURE — 93005 ELECTROCARDIOGRAM TRACING: CPT

## 2020-02-21 PROCEDURE — 93010 EKG 12-LEAD: ICD-10-PCS | Mod: ,,, | Performed by: INTERNAL MEDICINE

## 2020-02-21 PROCEDURE — C1894 INTRO/SHEATH, NON-LASER: HCPCS | Performed by: INTERNAL MEDICINE

## 2020-02-21 PROCEDURE — 93653 PR ELECTROPHYS EVAL, COMPREHEN, W/SUPRAVENT TACHYCARD TRMT: ICD-10-PCS | Mod: ,,, | Performed by: INTERNAL MEDICINE

## 2020-02-21 PROCEDURE — D9220A PRA ANESTHESIA: ICD-10-PCS | Mod: CRNA,,, | Performed by: NURSE ANESTHETIST, CERTIFIED REGISTERED

## 2020-02-21 PROCEDURE — 93005 ELECTROCARDIOGRAM TRACING: CPT | Mod: 59

## 2020-02-21 PROCEDURE — 93010 ELECTROCARDIOGRAM REPORT: CPT | Mod: 76,,, | Performed by: INTERNAL MEDICINE

## 2020-02-21 PROCEDURE — 37000009 HC ANESTHESIA EA ADD 15 MINS: Performed by: INTERNAL MEDICINE

## 2020-02-21 PROCEDURE — 37000008 HC ANESTHESIA 1ST 15 MINUTES: Performed by: INTERNAL MEDICINE

## 2020-02-21 PROCEDURE — 93621: ICD-10-PCS | Mod: 26,,, | Performed by: INTERNAL MEDICINE

## 2020-02-21 PROCEDURE — 93613 PR INTRACARD ELECTROPHYS 3-DIMENS MAPPING: ICD-10-PCS | Mod: ,,, | Performed by: INTERNAL MEDICINE

## 2020-02-21 PROCEDURE — 93621 COMP EP EVL L PAC&REC C SINS: CPT | Mod: 26,,, | Performed by: INTERNAL MEDICINE

## 2020-02-21 PROCEDURE — 93613 INTRACARDIAC EPHYS 3D MAPG: CPT | Performed by: INTERNAL MEDICINE

## 2020-02-21 PROCEDURE — 93653 COMPRE EP EVAL TX SVT: CPT | Mod: ,,, | Performed by: INTERNAL MEDICINE

## 2020-02-21 PROCEDURE — 93653 COMPRE EP EVAL TX SVT: CPT | Performed by: INTERNAL MEDICINE

## 2020-02-21 PROCEDURE — C1733 CATH, EP, OTHR THAN COOL-TIP: HCPCS | Performed by: INTERNAL MEDICINE

## 2020-02-21 PROCEDURE — 93613 INTRACARDIAC EPHYS 3D MAPG: CPT | Mod: ,,, | Performed by: INTERNAL MEDICINE

## 2020-02-21 PROCEDURE — 82962 GLUCOSE BLOOD TEST: CPT | Mod: 91 | Performed by: INTERNAL MEDICINE

## 2020-02-21 PROCEDURE — D9220A PRA ANESTHESIA: ICD-10-PCS | Mod: ANES,,, | Performed by: ANESTHESIOLOGY

## 2020-02-21 RX ORDER — DIPHENHYDRAMINE HYDROCHLORIDE 50 MG/ML
25 INJECTION INTRAMUSCULAR; INTRAVENOUS EVERY 6 HOURS PRN
Status: DISCONTINUED | OUTPATIENT
Start: 2020-02-21 | End: 2020-02-21 | Stop reason: HOSPADM

## 2020-02-21 RX ORDER — ACETAMINOPHEN 325 MG/1
650 TABLET ORAL ONCE
Status: COMPLETED | OUTPATIENT
Start: 2020-02-21 | End: 2020-02-21

## 2020-02-21 RX ORDER — PROPOFOL 10 MG/ML
VIAL (ML) INTRAVENOUS CONTINUOUS PRN
Status: DISCONTINUED | OUTPATIENT
Start: 2020-02-21 | End: 2020-02-21

## 2020-02-21 RX ORDER — ONDANSETRON 2 MG/ML
4 INJECTION INTRAMUSCULAR; INTRAVENOUS ONCE AS NEEDED
Status: DISCONTINUED | OUTPATIENT
Start: 2020-02-21 | End: 2020-02-21 | Stop reason: HOSPADM

## 2020-02-21 RX ORDER — PROPOFOL 10 MG/ML
VIAL (ML) INTRAVENOUS
Status: DISCONTINUED | OUTPATIENT
Start: 2020-02-21 | End: 2020-02-21

## 2020-02-21 RX ORDER — FENTANYL CITRATE 50 UG/ML
25 INJECTION, SOLUTION INTRAMUSCULAR; INTRAVENOUS EVERY 5 MIN PRN
Status: DISCONTINUED | OUTPATIENT
Start: 2020-02-21 | End: 2020-02-21 | Stop reason: HOSPADM

## 2020-02-21 RX ORDER — LIDOCAINE HYDROCHLORIDE 20 MG/ML
INJECTION, SOLUTION EPIDURAL; INFILTRATION; INTRACAUDAL; PERINEURAL
Status: DISCONTINUED | OUTPATIENT
Start: 2020-02-21 | End: 2020-02-21 | Stop reason: HOSPADM

## 2020-02-21 RX ORDER — FENTANYL CITRATE 50 UG/ML
INJECTION, SOLUTION INTRAMUSCULAR; INTRAVENOUS
Status: DISCONTINUED | OUTPATIENT
Start: 2020-02-21 | End: 2020-02-21

## 2020-02-21 RX ORDER — HYDROMORPHONE HYDROCHLORIDE 1 MG/ML
0.2 INJECTION, SOLUTION INTRAMUSCULAR; INTRAVENOUS; SUBCUTANEOUS EVERY 5 MIN PRN
Status: DISCONTINUED | OUTPATIENT
Start: 2020-02-21 | End: 2020-02-21 | Stop reason: HOSPADM

## 2020-02-21 RX ORDER — HEPARIN SODIUM 200 [USP'U]/100ML
INJECTION, SOLUTION INTRAVENOUS
Status: DISCONTINUED | OUTPATIENT
Start: 2020-02-21 | End: 2020-02-21 | Stop reason: HOSPADM

## 2020-02-21 RX ORDER — SODIUM CHLORIDE 0.9 % (FLUSH) 0.9 %
5 SYRINGE (ML) INJECTION
Status: DISCONTINUED | OUTPATIENT
Start: 2020-02-21 | End: 2021-01-01

## 2020-02-21 RX ORDER — SODIUM CHLORIDE 9 MG/ML
INJECTION, SOLUTION INTRAVENOUS CONTINUOUS
Status: DISCONTINUED | OUTPATIENT
Start: 2020-02-21 | End: 2021-01-01

## 2020-02-21 RX ORDER — LIDOCAINE HYDROCHLORIDE 20 MG/ML
INJECTION INTRAVENOUS
Status: DISCONTINUED | OUTPATIENT
Start: 2020-02-21 | End: 2020-02-21

## 2020-02-21 RX ADMIN — FENTANYL CITRATE 50 MCG: 50 INJECTION, SOLUTION INTRAMUSCULAR; INTRAVENOUS at 10:02

## 2020-02-21 RX ADMIN — ACETAMINOPHEN 650 MG: 325 TABLET ORAL at 02:02

## 2020-02-21 RX ADMIN — PROPOFOL 30 MCG/KG/MIN: 10 INJECTION, EMULSION INTRAVENOUS at 09:02

## 2020-02-21 RX ADMIN — PROPOFOL 30 MG: 10 INJECTION, EMULSION INTRAVENOUS at 09:02

## 2020-02-21 RX ADMIN — ACETAMINOPHEN 650 MG: 325 TABLET ORAL at 12:02

## 2020-02-21 RX ADMIN — FENTANYL CITRATE 50 MCG: 50 INJECTION, SOLUTION INTRAMUSCULAR; INTRAVENOUS at 09:02

## 2020-02-21 RX ADMIN — LIDOCAINE HYDROCHLORIDE 30 MG: 20 INJECTION, SOLUTION INTRAVENOUS at 09:02

## 2020-02-21 NOTE — H&P
Subjective:   Patient ID:  Eli Vaz is a 74 y.o. female who presents for A flutter ablation.     HPI:    Ms. Vaz is a 74 y.o. female with atrial fibrillation, atrial flutter, ESRD on HD (Tue Thu Sat), COPD on home oxygen, CVA, DM, HTN, Mitral regurgitation here for atrial flutter ablation.     Background:    Primary cardiologist is Dr. Garcia.  Presented 3/19 with atrial fibrillation with RVR and CHF. Echo 3/20/19 EF 55% moderate to severe MR DARINEL 56.ISAMAR/DCCV 3/21/19 moderate to severe MR.    Developed recurrence of atrial flutter. Placed on amiodarone, underwent DCCV 4/1/19. At follow-up was back in atrial flutter.  Currently on Eliquis 2.5 mg BID.      Update (02/21/2020):  She had felt sluggish, and started taking both her amiodarone and her eliquis once daily. Felt better after this. LFTs WNL 1/4/20. TSH ok 4/2019. On HD.      Her AFL ablation was postponed in the past due to left arm swelling, clotted AVF, and multiple recent interventions requiring holding of Eliquis.Edema has resolved. She has a working right upperarm fistula. She was recently consulted for MV replacement but per patient this seems to be put on hold given improvement in echo (possibly due to patient remaining in SR) and stability of symptoms.    I have personally reviewed the patient's EKG today, which shows sinus rhythm at 79bpm. RI interval is 174.   Recent Cardiac Tests:    2D Echo (12/20/2019):  Conclusion   · Concentric left ventricular hypertrophy.  · Normal left ventricular systolic function. The estimated ejection fraction is 55%  · Normal LV diastolic function.  · Normal right ventricular systolic function.  · Mild left atrial enlargement.  · Mild-to-moderate aortic regurgitation.  · Moderate mitral regurgitation.  · Moderate tricuspid regurgitation.  · Severe pulmonary hypertension present.  · Elevated central venous pressure (15 mm Hg).  · The estimated PA systolic pressure is 105 mm Hg  ·        Review of Systems    Constitution: Negative for malaise/fatigue.   Cardiovascular: Negative for chest pain, dyspnea on exertion, irregular heartbeat, leg swelling and palpitations.   Respiratory: Negative for shortness of breath.    Hematologic/Lymphatic: Negative for bleeding problem.   Skin: Negative for rash.   Musculoskeletal: Negative for myalgias.   Gastrointestinal: Negative for hematemesis, hematochezia and nausea.   Genitourinary: Negative for hematuria.   Neurological: Negative for light-headedness.   Psychiatric/Behavioral: Negative for altered mental status.   Allergic/Immunologic: Negative for persistent infections.     Current Facility-Administered Medications on File Prior to Encounter   Medication Dose Route Frequency Provider Last Rate Last Dose    0.9%  NaCl infusion   Intravenous Continuous Alaina Chaudhari NP        sodium chloride 0.9% flush 5 mL  5 mL Intravenous PRN Alaina Chaudhari NP         Current Outpatient Medications on File Prior to Encounter   Medication Sig Dispense Refill    amiodarone (PACERONE) 200 MG Tab Take 1 tablet (200 mg total) by mouth 2 (two) times daily. 180 tablet 3    apixaban (ELIQUIS) 2.5 mg Tab Take 2.5 mg by mouth 2 (two) times daily.      atorvastatin (LIPITOR) 10 MG tablet Take 1 tablet (10 mg total) by mouth once daily. 90 tablet 1    B complex w-C no.20/folic acid (RENAL CAPS ORAL) Take by mouth.      calcium acetate (PHOSLO) 667 mg capsule Take 667 mg by mouth 3 (three) times daily with meals.      fluticasone propionate (FLONASE) 50 mcg/actuation nasal spray 1 spray by Nasal route.      ipratropium-albuterol (COMBIVENT RESPIMAT)  mcg/actuation inhaler INHALE 1 PUFF INTO THE LUNGS DAILY.      mirtazapine (REMERON) 7.5 MG Tab TAKE 1 TABLET BY MOUTH AT BEDTIME AS NEEDED FOR SLEEP 90  3    montelukast (SINGULAIR) 10 mg tablet TAKE 1 TABLET BY MOUTH EVERY DAY IN THE EVENING 30 tablet 2    paroxetine (PAXIL) 30 MG tablet 1 TABLET IN THE MORNING ONCE A DAY  ORALLY 90  3    sevelamer carbonate (RENVELA) 800 mg Tab TAKE 3 TABLETS BY MOUTH WITH EACH MEAL AND 1 TABLET WITH EACH SNACK  3    vit B,C-iron fum-FA-D3-zinc ox 8 mg iron-800 mcg-1,000 unit Tab Take by mouth.       Objective:          There were no vitals taken for this visit.    Physical Exam   Constitutional: She is oriented to person, place, and time. She appears well-developed and well-nourished.   HENT:   Head: Normocephalic.   Nose: Nose normal.   Eyes: Pupils are equal, round, and reactive to light.   Cardiovascular: Normal rate, regular rhythm, S1 normal and S2 normal.   Murmur heard.  Pulses:       Radial pulses are 2+ on the right side, and 2+ on the left side.   Pulmonary/Chest: Breath sounds normal. No respiratory distress.   Abdominal: Normal appearance.   Musculoskeletal: Normal range of motion. She exhibits no edema.   Neurological: She is alert and oriented to person, place, and time.   Skin: Skin is warm and dry. No erythema.   Psychiatric: She has a normal mood and affect. Her speech is normal and behavior is normal.   Nursing note and vitals reviewed.    Lab Results   Component Value Date     02/14/2020    K 4.3 02/14/2020    MG 2.2 01/04/2020    BUN 16 02/14/2020    CREATININE 5.48 (H) 02/14/2020    ALT 11 01/04/2020    AST 23 01/04/2020    HGB 10.8 (L) 02/14/2020    HCT 34.1 (L) 02/14/2020    HCT 39 01/02/2020    TSH 4.282 (H) 04/01/2019    LDLCALC 81.6 04/03/2019       Recent Labs   Lab 01/09/18 2020 03/19/19  1852 04/24/19  1913 02/14/20  1050   INR 1.1 1.5 H 1.2 1.1       Assessment:     1. Typical atrial flutter    2. Atrial flutter    3. Atrial flutter      Plan:     In summary, Ms. Vaz is a 74 y.o. female with atrial fibrillation, atrial flutter, ESRD on HD (Tue Thu Sat), COPD on home oxygen, CVA, DM, HTN, Mitral regurgitation here for a Atrial flutter ablation.     On  amiodarone 200mg daily.  On  eliquis 2.5mg BID - last dose day before yesterday   ekg  - Sinus rhythm.

## 2020-02-21 NOTE — PLAN OF CARE
Admit assessment complete. Plan of care reviewed with pt. Dr. Harrison aware of no IV access due to patient having poor veins. Dr. Harrison also aware of patient's left shoulder arthritis. Dr Roe aware pt's last dose of eliquis was Wednesday evening. Report called to Nena in EP.

## 2020-02-21 NOTE — PROGRESS NOTES
Reported from anesthesia and pt stated that pt had DM II. Checked BG upon arrival and resulted at 68. Patient asymptomatic and A&Ox4.  Gave pt apple juice and BG now 85.

## 2020-02-21 NOTE — DISCHARGE SUMMARY
[facility] - Short Stay Cardiac Unit  Discharge Summary      Admit Date: 2/21/2020    Discharge Date and Time: 2/21/2020  4:01 PM    Attending Physician: Hernandez Tesfaye MD     Reason for Admission: flutter ablation     Procedures Performed: Procedure(s) (LRB):  Ablation, Atrial Flutter, Typical (N/A)    Hospital Course    Patient is s/p CTI ablation:  Tolerated procedure well. No acute complication noted.  No events in recovery on tele   Cont amiodarone and eliquis for h/o Afib    Access: right CFV, hemostasis manual pressure.  - site looks soft and NT   Overall feeling good.       Final Diagnoses:    Principal Problem: <principal problem not specified>   Secondary Diagnoses:   Active Hospital Problems    Diagnosis  POA    Atrial flutter [I48.92]  Yes      Resolved Hospital Problems   No resolved problems to display.       Discharged Condition: stable    Disposition: Home or Self Care    Follow Up/Patient Instructions:     Medications:  Reconciled Home Medications:      Medication List      CONTINUE taking these medications    * albuterol 2.5 mg /3 mL (0.083 %) nebulizer solution  Commonly known as:  PROVENTIL  Take 3 mLs (2.5 mg total) by nebulization every 4 (four) hours.     * albuterol 90 mcg/actuation inhaler  Commonly known as:  PROVENTIL/VENTOLIN HFA  INHALE 2 PUFFS INTO LUNGS EVERY 4 HOURS AS NEEDED FOR SHORTNESS OF BREATH OR WHEEZING. RESCUE     amiodarone 200 MG Tab  Commonly known as:  PACERONE  Take 1 tablet (200 mg total) by mouth 2 (two) times daily.     atorvastatin 10 MG tablet  Commonly known as:  LIPITOR  Take 1 tablet (10 mg total) by mouth once daily.     calcium acetate 667 mg capsule  Commonly known as:  PHOSLO  Take 667 mg by mouth 3 (three) times daily with meals.     Combivent Respimat  mcg/actuation inhaler  Generic drug:  ipratropium-albuterol  INHALE 1 PUFF INTO THE LUNGS DAILY.     Eliquis 2.5 mg Tab  Generic drug:  apixaban  Take 2.5 mg by mouth 2 (two) times daily.      fluticasone propionate 50 mcg/actuation nasal spray  Commonly known as:  FLONASE  1 spray by Nasal route.     * HYDROcodone-acetaminophen 5-325 mg per tablet  Commonly known as:  NORCO  Take 1 tablet by mouth every 12 (twelve) hours as needed for Pain.     * HYDROcodone-acetaminophen 5-325 mg per tablet  Commonly known as:  NORCO  Take 1 tablet by mouth every 12 (twelve) hours as needed for Pain.  Start taking on:  March 11, 2020     * HYDROcodone-acetaminophen 5-325 mg per tablet  Commonly known as:  NORCO  Take 1 tablet by mouth every 12 (twelve) hours as needed for Pain.  Start taking on:  April 10, 2020     ketoconazole 2 % cream  Commonly known as:  NIZORAL  Apply topically once daily.     mirtazapine 7.5 MG Tab  Commonly known as:  REMERON  TAKE 1 TABLET BY MOUTH AT BEDTIME AS NEEDED FOR SLEEP 90     montelukast 10 mg tablet  Commonly known as:  SINGULAIR  TAKE 1 TABLET BY MOUTH EVERY DAY IN THE EVENING     paroxetine 30 MG tablet  Commonly known as:  PAXIL  1 TABLET IN THE MORNING ONCE A DAY ORALLY 90     RENAL CAPS ORAL  Take by mouth.     sevelamer carbonate 800 mg Tab  Commonly known as:  RENVELA  TAKE 3 TABLETS BY MOUTH WITH EACH MEAL AND 1 TABLET WITH EACH SNACK     vit B,C-iron fum-FA-D3-zinc ox 8 mg iron-800 mcg-1,000 unit Tab  Take by mouth.         * This list has 5 medication(s) that are the same as other medications prescribed for you. Read the directions carefully, and ask your doctor or other care provider to review them with you.              Discharge Procedure Orders   Diet Cardiac     Other restrictions (specify):   Scheduling Instructions: No driving for 24 hrs and no lifting for 7 days     Notify your health care provider if you experience any of the following:  temperature >100.4     Notify your health care provider if you experience any of the following:  persistent nausea and vomiting or diarrhea     Notify your health care provider if you experience any of the following:  severe  uncontrolled pain     Notify your health care provider if you experience any of the following:  redness, tenderness, or signs of infection (pain, swelling, redness, odor or green/yellow discharge around incision site)     Notify your health care provider if you experience any of the following:  difficulty breathing or increased cough     Notify your health care provider if you experience any of the following:  severe persistent headache     Notify your health care provider if you experience any of the following:  worsening rash     Notify your health care provider if you experience any of the following:  persistent dizziness, light-headedness, or visual disturbances     Notify your health care provider if you experience any of the following:  increased confusion or weakness     Remove dressing in 24 hours     Follow-up Information     Hernandez Tesfaye MD In 6 weeks.    Specialties:  Electrophysiology, Cardiology  Why:  post CTI ablation   Contact information:  Tanya KILLIAN  [facility] [id number] [phone]

## 2020-02-21 NOTE — NURSING TRANSFER
Nursing Transfer Note      2/21/2020     Transfer To: SSCU 9    Transfer via stretcher    Transfer with cardiac monitoring    Transported by RN    Medicines sent: N/A    Chart send with patient: Yes    Notified: spouse    Patient reassessed at: see epic     Upon arrival to floor: patient stable, no signs of distress

## 2020-02-21 NOTE — TRANSFER OF CARE
"Anesthesia Transfer of Care Note    Patient: Eli Vaz    Procedure(s) Performed: Procedure(s) (LRB):  Ablation, Atrial Flutter, Typical (N/A)    Patient location: PACU    Anesthesia Type: general    Transport from OR: Transported from OR on 6-10 L/min O2 by face mask with adequate spontaneous ventilation    Post pain: adequate analgesia    Post assessment: no apparent anesthetic complications and tolerated procedure well    Post vital signs: stable    Level of consciousness: awake and alert    Nausea/Vomiting: no nausea/vomiting    Complications: none    Transfer of care protocol was followed      Last vitals:   Visit Vitals  BP (!) 184/86 (BP Location: Left arm, Patient Position: Lying)   Pulse 67   Temp 36.4 °C (97.5 °F) (Oral)   Resp 18   Ht 5' 3" (1.6 m)   Wt 61.2 kg (135 lb)   SpO2 96%   Breastfeeding? No   BMI 23.91 kg/m²     "

## 2020-02-21 NOTE — ANESTHESIA PREPROCEDURE EVALUATION
02/21/2020  Eli Vaz is a 74 y.o., female.  Patient Active Problem List   Diagnosis    COPD (chronic obstructive pulmonary disease)    Diastolic dysfunction    ESRD on dialysis    Allergic rhinitis    Loss of appetite    Memory loss    Anemia of chronic disease    History of CVA (cerebrovascular accident)    Hypertension associated with diabetes    Diabetes mellitus with ESRD (end-stage renal disease)    Combined hyperlipidemia associated with type 2 diabetes mellitus    Anxiety    Atherosclerosis of aorta    Secondary hyperparathyroidism    Retinal nerve fiber bundle defects    Chronic left shoulder pain    Non-rheumatic mitral regurgitation    Persistent atrial fibrillation    Benign essential hypertension    Chronic respiratory failure with hypoxia    Anemia    Nuclear sclerosis, bilateral    Degenerative arthritis of right knee    Arthritis of left shoulder region    Left shoulder pain    Atrial flutter    Weakness    Hyperkalemia         Anesthesia Evaluation         Review of Systems      Physical Exam  General:  Well nourished    Airway/Jaw/Neck:  Airway Findings: Mouth Opening: Normal Tongue: Normal  General Airway Assessment: Adult  Mallampati: II  Improves to II with phonation.  TM Distance: Normal, at least 6 cm      Dental:  Dental Findings:   Chest/Lungs:  Chest/Lungs Findings: Clear to auscultation     Heart/Vascular:  Heart Findings: Rate: Normal  Rhythm: Regular Rhythm  Sounds: Normal        Mental Status:  Mental Status Findings:  Cooperative, Alert and Oriented         Anesthesia Plan  Type of Anesthesia, risks & benefits discussed:  Anesthesia Type:  general  Patient's Preference: General  Intra-op Monitoring Plan: standard ASA monitors  Intra-op Monitoring Plan Comments: Standard ASA monitors.   Post Op Pain Control Plan: per primary service following  discharge from PACU  Post Op Pain Control Plan Comments: Per primary service.     Induction:   IV  Beta Blocker:  Patient is not currently on a Beta-Blocker (No further documentation required).       Informed Consent: Patient understands risks and agrees with Anesthesia plan.  Questions answered. Anesthesia consent signed with patient.  ASA Score: 4     Day of Surgery Review of History & Physical:    H&P update referred to the surgeon.     Anesthesia Plan Notes: Chart reviewed, patient interviewed and examined.  The plan for general anesthesia was explained.  Questions were answered and the consent was signed.  [facility] From Anesthesia Perspective.

## 2020-02-21 NOTE — ANESTHESIA POSTPROCEDURE EVALUATION
Anesthesia Post Evaluation    Patient: Eli Vaz    Procedure(s) Performed: Procedure(s) (LRB):  Ablation, Atrial Flutter, Typical (N/A)    Final Anesthesia Type: general    Patient location during evaluation: PACU  Patient participation: Yes- Able to Participate  Level of consciousness: awake and alert  Post-procedure vital signs: reviewed and stable  Pain management: adequate  Airway patency: patent    PONV status at discharge: No PONV  Anesthetic complications: no      Cardiovascular status: hemodynamically stable  Respiratory status: unassisted  Hydration status: euvolemic  Follow-up not needed.          Vitals Value Taken Time   /75 2/21/2020 12:55 PM   Temp 36.2 °C (97.1 °F) 2/21/2020 12:55 PM   Pulse 65 2/21/2020 12:55 PM   Resp 16 2/21/2020 12:55 PM   SpO2 94 % 2/21/2020 12:49 PM   Vitals shown include unvalidated device data.      No case tracking events are documented in the log.      Pain/Triston Score: Pain Rating Prior to Med Admin: 6 (2/21/2020 11:51 AM)  Triston Score: 10 (2/21/2020 12:30 PM)

## 2020-02-21 NOTE — PLAN OF CARE
Received report from Tierra NAPIER. Patient s/p RFA, AAOx3. VSS, c/o 5/10 left ribcage pain that has decreased since taking tylenol, resp even and unlabored. Gauze/tegaderm dressing to right groin is CDI. No active bleeding. No hematoma noted. Post procedure protocol reviewed with patient and patient's family. Understanding verbalized. Family members at bedside. Nurse call bell within reach. Will continue to monitor per post procedure protocol.

## 2020-02-21 NOTE — PROGRESS NOTES
Patient arrived to unit. VSS. See Epic for a complete assessment. Patient instructed on pain scale and patient verbalized understanding. Called for EKG and it was completed and placed in chart. Called patient's spouse and updated on patient location and condition with the permission of the patient.

## 2020-02-21 NOTE — PROGRESS NOTES
Bedrest protocol complete. Pt ambulated down alvarez with RN. Pt tolerated well. Right groin dressing is clean, dry, and intact with no active bleeding or hematoma.

## 2020-02-21 NOTE — BRIEF OP NOTE
Patient is s/p CTI ablation:  Tolerated procedure well. No acute complication noted.  Post op care per protocol.  Will monitor in recovery on tele   Cont amiodarone and eliquis for h/o Afib    Access: right CFV, hemostasis manual pressure.    Fu EKG  EP service to follow

## 2020-02-21 NOTE — PROGRESS NOTES
Dr. Roe aware pt received tylenol at 1200 and instructed RN to administer another 650mg PO tylenol now.

## 2020-02-22 VITALS
OXYGEN SATURATION: 96 % | RESPIRATION RATE: 16 BRPM | HEART RATE: 65 BPM | WEIGHT: 135 LBS | DIASTOLIC BLOOD PRESSURE: 75 MMHG | SYSTOLIC BLOOD PRESSURE: 174 MMHG | HEIGHT: 63 IN | TEMPERATURE: 97 F | BODY MASS INDEX: 23.92 KG/M2

## 2020-02-27 ENCOUNTER — TELEPHONE (OUTPATIENT)
Dept: ELECTROPHYSIOLOGY | Facility: CLINIC | Age: 75
End: 2020-02-27

## 2020-02-27 NOTE — TELEPHONE ENCOUNTER
Spoke with pt to schedule 6w f/u appt sp RFA.  EKG scheduled for 1 and appt with Laurel at 1:30 on 4/1/2020.  Will send appt reminder.   1. The severity of signs/symptoms.(See ED/admit documents)

## 2020-03-04 ENCOUNTER — OFFICE VISIT (OUTPATIENT)
Dept: FAMILY MEDICINE | Facility: CLINIC | Age: 75
End: 2020-03-04
Payer: MEDICARE

## 2020-03-04 VITALS
SYSTOLIC BLOOD PRESSURE: 128 MMHG | OXYGEN SATURATION: 98 % | RESPIRATION RATE: 18 BRPM | HEART RATE: 70 BPM | DIASTOLIC BLOOD PRESSURE: 60 MMHG | TEMPERATURE: 98 F | WEIGHT: 131.63 LBS | BODY MASS INDEX: 23.31 KG/M2

## 2020-03-04 DIAGNOSIS — J32.9 RHINOSINUSITIS: Primary | ICD-10-CM

## 2020-03-04 DIAGNOSIS — J44.9 CHRONIC OBSTRUCTIVE PULMONARY DISEASE, UNSPECIFIED COPD TYPE: ICD-10-CM

## 2020-03-04 DIAGNOSIS — N18.6 ESRD ON DIALYSIS: ICD-10-CM

## 2020-03-04 DIAGNOSIS — I48.91 ATRIAL FIBRILLATION, UNSPECIFIED TYPE: ICD-10-CM

## 2020-03-04 DIAGNOSIS — Z99.2 ESRD ON DIALYSIS: ICD-10-CM

## 2020-03-04 PROCEDURE — 1159F MED LIST DOCD IN RCRD: CPT | Mod: S$GLB,,, | Performed by: FAMILY MEDICINE

## 2020-03-04 PROCEDURE — 99999 PR PBB SHADOW E&M-EST. PATIENT-LVL III: CPT | Mod: PBBFAC,,, | Performed by: FAMILY MEDICINE

## 2020-03-04 PROCEDURE — 1101F PT FALLS ASSESS-DOCD LE1/YR: CPT | Mod: CPTII,S$GLB,, | Performed by: FAMILY MEDICINE

## 2020-03-04 PROCEDURE — 99999 PR PBB SHADOW E&M-EST. PATIENT-LVL III: ICD-10-PCS | Mod: PBBFAC,,, | Performed by: FAMILY MEDICINE

## 2020-03-04 PROCEDURE — 1159F PR MEDICATION LIST DOCUMENTED IN MEDICAL RECORD: ICD-10-PCS | Mod: S$GLB,,, | Performed by: FAMILY MEDICINE

## 2020-03-04 PROCEDURE — 1101F PR PT FALLS ASSESS DOC 0-1 FALLS W/OUT INJ PAST YR: ICD-10-PCS | Mod: CPTII,S$GLB,, | Performed by: FAMILY MEDICINE

## 2020-03-04 PROCEDURE — 99214 PR OFFICE/OUTPT VISIT, EST, LEVL IV, 30-39 MIN: ICD-10-PCS | Mod: S$GLB,,, | Performed by: FAMILY MEDICINE

## 2020-03-04 PROCEDURE — 99214 OFFICE O/P EST MOD 30 MIN: CPT | Mod: S$GLB,,, | Performed by: FAMILY MEDICINE

## 2020-03-04 RX ORDER — MONTELUKAST SODIUM 10 MG/1
10 TABLET ORAL NIGHTLY
Qty: 90 TABLET | Refills: 3 | Status: SHIPPED | OUTPATIENT
Start: 2020-03-04 | End: 2020-04-13 | Stop reason: SDUPTHER

## 2020-03-04 RX ORDER — BENZONATATE 100 MG/1
100 CAPSULE ORAL 3 TIMES DAILY PRN
Qty: 30 CAPSULE | Refills: 0 | Status: SHIPPED | OUTPATIENT
Start: 2020-03-04 | End: 2020-03-14

## 2020-03-04 RX ORDER — ALBUTEROL SULFATE 90 UG/1
AEROSOL, METERED RESPIRATORY (INHALATION)
Qty: 18 G | Refills: 5 | Status: SHIPPED | OUTPATIENT
Start: 2020-03-04 | End: 2020-07-16 | Stop reason: SDUPTHER

## 2020-03-04 RX ORDER — FLUTICASONE PROPIONATE 50 MCG
2 SPRAY, SUSPENSION (ML) NASAL DAILY
Qty: 16 G | Refills: 0 | Status: SHIPPED | OUTPATIENT
Start: 2020-03-04 | End: 2020-03-27

## 2020-03-04 RX ORDER — DOXYCYCLINE 100 MG/1
100 CAPSULE ORAL EVERY 12 HOURS
Qty: 14 CAPSULE | Refills: 0 | Status: SHIPPED | OUTPATIENT
Start: 2020-03-04 | End: 2020-03-11

## 2020-03-04 NOTE — PROGRESS NOTES
Subjective:       Patient ID: Eli Vaz is a 74 y.o. female.    Chief Complaint: Hospital Follow Up    HPI   74 year old female comes in for hospital follow up. She was admitted for an elective cardiac procedure- an ablation for atrial flutter. She reports having tolerated the procedure well. She reports that overall she feels fine from cardiac point of view. She does state that for the last few days she has been having sinus pain, thick nasal congestion, and postnasal drop. She denies chest pain, shortness of breath, or wheezing.  She reports needing a refill on Singulair and albuterol inhaler.     Review of Systems   Constitutional: Positive for activity change and fatigue. Negative for chills.   HENT: Positive for congestion, postnasal drip, rhinorrhea, sinus pressure, sinus pain, sneezing and sore throat. Negative for ear pain and nosebleeds.    Eyes: Positive for itching.   Respiratory: Negative for shortness of breath and wheezing.    Cardiovascular: Negative for chest pain and palpitations.   Gastrointestinal: Negative for abdominal pain.   Genitourinary: Negative for dysuria.   Skin: Negative for rash.   Neurological: Positive for headaches.       Objective:     /60 (BP Location: Left arm, Patient Position: Sitting, BP Method: Small (Automatic))   Pulse 70   Temp 98.4 °F (36.9 °C) (Oral)   Resp 18   Wt 59.7 kg (131 lb 9.8 oz)   SpO2 98%   BMI 23.31 kg/m²     Physical Exam   Constitutional: She is cooperative. No distress.   Sitting in wheelchair   HENT:   Head: Normocephalic and atraumatic.   Right Ear: External ear normal.   Left Ear: External ear normal.   Nose: Mucosal edema and rhinorrhea present. Right sinus exhibits maxillary sinus tenderness. Left sinus exhibits maxillary sinus tenderness.   Mouth/Throat: Oropharynx is clear and moist.   Eyes: Pupils are equal, round, and reactive to light. EOM are normal.   Neck: Normal range of motion. Neck supple. No tracheal deviation present.    Cardiovascular: Normal rate, regular rhythm, normal heart sounds and intact distal pulses.   Pulmonary/Chest: Effort normal and breath sounds normal. No respiratory distress. She has no wheezes. She has no rales.   Abdominal: Soft. Bowel sounds are normal. She exhibits no mass. There is no tenderness. There is no guarding.   Lymphadenopathy:     She has no cervical adenopathy.   Neurological: She is alert.   Psychiatric: Her speech is normal and behavior is normal. Her mood appears not anxious. Cognition and memory are normal. She does not exhibit a depressed mood.       Assessment:       1. Rhinosinusitis    2. Chronic obstructive pulmonary disease, unspecified COPD type    3. Atrial fibrillation, unspecified type    4. ESRD on dialysis        Plan:       Eli was seen today for hospital follow up.    Diagnoses and all orders for this visit:    Rhinosinusitis  -     montelukast (SINGULAIR) 10 mg tablet; Take 1 tablet (10 mg total) by mouth every evening.  -     albuterol (PROVENTIL/VENTOLIN HFA) 90 mcg/actuation inhaler; INHALE 2 PUFFS INTO LUNGS EVERY 4 HOURS AS NEEDED FOR SHORTNESS OF BREATH OR WHEEZING. RESCUE  -     doxycycline (VIBRAMYCIN) 100 MG Cap; Take 1 capsule (100 mg total) by mouth every 12 (twelve) hours. for 7 days  -     fluticasone propionate (FLONASE) 50 mcg/actuation nasal spray; 2 sprays (100 mcg total) by Each Nostril route once daily.  -     benzonatate (TESSALON) 100 MG capsule; Take 1 capsule (100 mg total) by mouth 3 (three) times daily as needed for Cough.  Advised patient to use an over the counter nasal saline every 1-2 hours.  Patient instructed on how to use the nasal saline.   Use prescribed nasal spray as instructed.  Given severity of symptoms will start on antibiotic regimen.  Advised using a probiotic or eating a yogurt every day to minimize GI side effects of antibiotics.    Chronic obstructive pulmonary disease, unspecified COPD type  -     montelukast (SINGULAIR) 10 mg  tablet; Take 1 tablet (10 mg total) by mouth every evening.  -     albuterol (PROVENTIL/VENTOLIN HFA) 90 mcg/actuation inhaler; INHALE 2 PUFFS INTO LUNGS EVERY 4 HOURS AS NEEDED FOR SHORTNESS OF BREATH OR WHEEZING. RESCUE  Continue current regimen    Atrial fibrillation, unspecified type  S/p ablation.  Rate and rhythm currently controlled.    ESRD on dialysis  Management as per nephro

## 2020-03-25 ENCOUNTER — TELEPHONE (OUTPATIENT)
Dept: ELECTROPHYSIOLOGY | Facility: CLINIC | Age: 75
End: 2020-03-25

## 2020-03-25 NOTE — TELEPHONE ENCOUNTER
Spoke with patient concerning cancelling appointments next week on 4/1/2020 with MILAD Temple. Patient is interested in a virtual visit with Dr. Tesfaye. This needs to be done in conjunction with her daughter Veronica, who has a smart phone. I was given verbal permission to call daughter and speak with her. The daughter will down load MyChart to her phone and verbalizes understanding that she will need to be with her mother during the virtual visit.

## 2020-03-27 DIAGNOSIS — J32.9 RHINOSINUSITIS: ICD-10-CM

## 2020-03-27 RX ORDER — AMIODARONE HYDROCHLORIDE 200 MG/1
200 TABLET ORAL 2 TIMES DAILY
Qty: 180 TABLET | Refills: 3 | Status: SHIPPED | OUTPATIENT
Start: 2020-03-27 | End: 2020-06-21

## 2020-03-27 RX ORDER — FLUTICASONE PROPIONATE 50 MCG
2 SPRAY, SUSPENSION (ML) NASAL DAILY
Qty: 16 ML | Refills: 0 | Status: SHIPPED | OUTPATIENT
Start: 2020-03-27 | End: 2020-04-20

## 2020-03-28 DIAGNOSIS — J32.9 RHINOSINUSITIS: ICD-10-CM

## 2020-03-30 RX ORDER — DOXYCYCLINE 100 MG/1
100 CAPSULE ORAL EVERY 12 HOURS
Qty: 14 CAPSULE | Refills: 0 | OUTPATIENT
Start: 2020-03-30 | End: 2020-04-06

## 2020-03-31 ENCOUNTER — PATIENT OUTREACH (OUTPATIENT)
Dept: ADMINISTRATIVE | Facility: HOSPITAL | Age: 75
End: 2020-03-31

## 2020-04-01 ENCOUNTER — TELEPHONE (OUTPATIENT)
Dept: ELECTROPHYSIOLOGY | Facility: CLINIC | Age: 75
End: 2020-04-01

## 2020-04-01 NOTE — TELEPHONE ENCOUNTER
Spoke with pt's daughter to schedule virtual visit.  She will complete the precheck questions and understands how to begin the visit.  The daughter states that she is familiar with virtual visits.  The pt will log into the visit 15 minutes before the scheduled time.

## 2020-04-06 ENCOUNTER — CLINICAL SUPPORT (OUTPATIENT)
Dept: CARDIOLOGY | Facility: CLINIC | Age: 75
End: 2020-04-06
Payer: MEDICARE

## 2020-04-06 DIAGNOSIS — I48.92 ATRIAL FLUTTER, UNSPECIFIED TYPE: ICD-10-CM

## 2020-04-06 PROCEDURE — 93010 ELECTROCARDIOGRAM REPORT: CPT | Mod: S$GLB,,, | Performed by: INTERNAL MEDICINE

## 2020-04-06 PROCEDURE — 93010 RHYTHM STRIP: ICD-10-PCS | Mod: S$GLB,,, | Performed by: INTERNAL MEDICINE

## 2020-04-06 PROCEDURE — 93005 ELECTROCARDIOGRAM TRACING: CPT | Mod: S$GLB,,, | Performed by: INTERNAL MEDICINE

## 2020-04-06 PROCEDURE — 93005 RHYTHM STRIP: ICD-10-PCS | Mod: S$GLB,,, | Performed by: INTERNAL MEDICINE

## 2020-04-07 ENCOUNTER — PATIENT MESSAGE (OUTPATIENT)
Dept: ELECTROPHYSIOLOGY | Facility: CLINIC | Age: 75
End: 2020-04-07

## 2020-04-07 ENCOUNTER — PATIENT OUTREACH (OUTPATIENT)
Dept: ADMINISTRATIVE | Facility: OTHER | Age: 75
End: 2020-04-07

## 2020-04-08 DIAGNOSIS — M19.012 ARTHRITIS OF LEFT SHOULDER REGION: ICD-10-CM

## 2020-04-08 DIAGNOSIS — G89.29 CHRONIC LEFT SHOULDER PAIN: ICD-10-CM

## 2020-04-08 DIAGNOSIS — M25.512 CHRONIC LEFT SHOULDER PAIN: ICD-10-CM

## 2020-04-08 RX ORDER — HYDROCODONE BITARTRATE AND ACETAMINOPHEN 5; 325 MG/1; MG/1
1 TABLET ORAL EVERY 12 HOURS PRN
Qty: 60 TABLET | Refills: 0 | Status: CANCELLED | OUTPATIENT
Start: 2020-04-08 | End: 2020-05-08

## 2020-04-10 ENCOUNTER — PATIENT MESSAGE (OUTPATIENT)
Dept: FAMILY MEDICINE | Facility: CLINIC | Age: 75
End: 2020-04-10

## 2020-04-13 ENCOUNTER — OFFICE VISIT (OUTPATIENT)
Dept: FAMILY MEDICINE | Facility: CLINIC | Age: 75
End: 2020-04-13
Payer: MEDICARE

## 2020-04-13 DIAGNOSIS — N18.6 DIABETES MELLITUS WITH ESRD (END-STAGE RENAL DISEASE): ICD-10-CM

## 2020-04-13 DIAGNOSIS — E11.22 DIABETES MELLITUS WITH ESRD (END-STAGE RENAL DISEASE): ICD-10-CM

## 2020-04-13 DIAGNOSIS — J32.9 RHINOSINUSITIS: Primary | ICD-10-CM

## 2020-04-13 DIAGNOSIS — J44.9 CHRONIC OBSTRUCTIVE PULMONARY DISEASE, UNSPECIFIED COPD TYPE: ICD-10-CM

## 2020-04-13 PROCEDURE — 1159F MED LIST DOCD IN RCRD: CPT | Mod: ,,, | Performed by: FAMILY MEDICINE

## 2020-04-13 PROCEDURE — 1101F PR PT FALLS ASSESS DOC 0-1 FALLS W/OUT INJ PAST YR: ICD-10-PCS | Mod: CPTII,,, | Performed by: FAMILY MEDICINE

## 2020-04-13 PROCEDURE — 99214 OFFICE O/P EST MOD 30 MIN: CPT | Mod: 95,,, | Performed by: FAMILY MEDICINE

## 2020-04-13 PROCEDURE — 99214 PR OFFICE/OUTPT VISIT, EST, LEVL IV, 30-39 MIN: ICD-10-PCS | Mod: 95,,, | Performed by: FAMILY MEDICINE

## 2020-04-13 PROCEDURE — 1101F PT FALLS ASSESS-DOCD LE1/YR: CPT | Mod: CPTII,,, | Performed by: FAMILY MEDICINE

## 2020-04-13 PROCEDURE — 1159F PR MEDICATION LIST DOCUMENTED IN MEDICAL RECORD: ICD-10-PCS | Mod: ,,, | Performed by: FAMILY MEDICINE

## 2020-04-13 RX ORDER — IPRATROPIUM BROMIDE 42 UG/1
2 SPRAY, METERED NASAL 4 TIMES DAILY
Qty: 15 ML | Refills: 0 | Status: SHIPPED | OUTPATIENT
Start: 2020-04-13 | End: 2020-01-01

## 2020-04-13 RX ORDER — MONTELUKAST SODIUM 10 MG/1
10 TABLET ORAL NIGHTLY
Qty: 90 TABLET | Refills: 3 | Status: SHIPPED | OUTPATIENT
Start: 2020-04-13 | End: 2020-01-01 | Stop reason: SDUPTHER

## 2020-04-13 RX ORDER — PREDNISONE 20 MG/1
20 TABLET ORAL DAILY
Qty: 3 TABLET | Refills: 0 | Status: SHIPPED | OUTPATIENT
Start: 2020-04-13 | End: 2020-04-16

## 2020-04-13 RX ORDER — ATORVASTATIN CALCIUM 10 MG/1
10 TABLET, FILM COATED ORAL DAILY
Qty: 90 TABLET | Refills: 1 | Status: SHIPPED | OUTPATIENT
Start: 2020-04-13 | End: 2020-01-01

## 2020-04-13 NOTE — PROGRESS NOTES
Subjective:       Patient ID: Eli Vaz is a 74 y.o. female.    Chief Complaint: Sinusitis    Sinusitis   This is a recurrent problem. The current episode started more than 1 month ago. The problem has been waxing and waning since onset. There has been no fever. Associated symptoms include coughing, sinus pressure and sneezing. Pertinent negatives include no chills, diaphoresis, ear pain, headaches, hoarse voice, shortness of breath or sore throat.   Cough   This is a recurrent problem. The current episode started 1 to 4 weeks ago. The problem has been unchanged. The problem occurs every few hours. The cough is productive of sputum. Associated symptoms include postnasal drip and rhinorrhea. Pertinent negatives include no chest pain, chills, ear congestion, ear pain, fever, headaches, heartburn, hemoptysis, myalgias, nasal congestion, rash, sore throat, shortness of breath, sweats, weight loss or wheezing. The symptoms are aggravated by fumes, pollens and other. She has tried OTC cough suppressant and a beta-agonist inhaler for the symptoms. The treatment provided moderate relief. Her past medical history is significant for asthma and COPD.     Review of Systems   Constitutional: Negative for chills, diaphoresis, fever and weight loss.   HENT: Positive for postnasal drip, rhinorrhea, sinus pressure and sneezing. Negative for ear pain, hoarse voice and sore throat.    Respiratory: Positive for cough. Negative for hemoptysis, shortness of breath and wheezing.    Cardiovascular: Negative for chest pain.   Gastrointestinal: Negative for heartburn.   Musculoskeletal: Negative for myalgias.   Skin: Negative for rash.   Neurological: Negative for headaches.       Objective:      Physical Exam   Constitutional: She appears well-developed. No distress.       Assessment:       1. Rhinosinusitis    2. Chronic obstructive pulmonary disease, unspecified COPD type    3. Diabetes mellitus with ESRD (end-stage renal disease)         Plan:       Eli was seen today for sinusitis.    Diagnoses and all orders for this visit:    Rhinosinusitis  -     montelukast (SINGULAIR) 10 mg tablet; Take 1 tablet (10 mg total) by mouth every evening.  -     ipratropium (ATROVENT) 42 mcg (0.06 %) nasal spray; 2 sprays by Nasal route 4 (four) times daily.  -     predniSONE (DELTASONE) 20 MG tablet; Take 1 tablet (20 mg total) by mouth once daily. for 3 days  Patient prescribed antibiotic previously, states she got diarrhea from it.  Informed patient that there seems to be no symptoms of a bacterial infection.   Advised continued Flonase, will add Atrovent nasal spray and a very short course of steroid. Discussed side effects of steroids.    Chronic obstructive pulmonary disease, unspecified COPD type  -     montelukast (SINGULAIR) 10 mg tablet; Take 1 tablet (10 mg total) by mouth every evening.  Continue singulair     Diabetes mellitus with ESRD (end-stage renal disease)  -     atorvastatin (LIPITOR) 10 MG tablet; Take 1 tablet (10 mg total) by mouth once daily.  Continue Lipitor  ESRD management by nephro

## 2020-04-15 ENCOUNTER — TELEPHONE (OUTPATIENT)
Dept: PAIN MEDICINE | Facility: CLINIC | Age: 75
End: 2020-04-15

## 2020-04-15 NOTE — TELEPHONE ENCOUNTER
Called pt but daughter Veronica answered who handles mother's apt's . Explained 5/1/2020 apt with Dr. Barton needs to be rescheduled . Offered the option of completing a virtual visit , daughter accept. Preferred 4/17/2020 at 11:45 a,m

## 2020-04-17 ENCOUNTER — OFFICE VISIT (OUTPATIENT)
Dept: PAIN MEDICINE | Facility: CLINIC | Age: 75
End: 2020-04-17
Payer: MEDICARE

## 2020-04-17 DIAGNOSIS — G89.29 CHRONIC LEFT SHOULDER PAIN: Primary | ICD-10-CM

## 2020-04-17 DIAGNOSIS — M19.012 ARTHRITIS OF LEFT SHOULDER REGION: ICD-10-CM

## 2020-04-17 DIAGNOSIS — M25.512 CHRONIC LEFT SHOULDER PAIN: Primary | ICD-10-CM

## 2020-04-17 PROCEDURE — 99214 OFFICE O/P EST MOD 30 MIN: CPT | Mod: 95,,, | Performed by: PAIN MEDICINE

## 2020-04-17 PROCEDURE — 1159F PR MEDICATION LIST DOCUMENTED IN MEDICAL RECORD: ICD-10-PCS | Mod: 95,,, | Performed by: PAIN MEDICINE

## 2020-04-17 PROCEDURE — 1101F PR PT FALLS ASSESS DOC 0-1 FALLS W/OUT INJ PAST YR: ICD-10-PCS | Mod: CPTII,95,, | Performed by: PAIN MEDICINE

## 2020-04-17 PROCEDURE — 1101F PT FALLS ASSESS-DOCD LE1/YR: CPT | Mod: CPTII,95,, | Performed by: PAIN MEDICINE

## 2020-04-17 PROCEDURE — 99214 PR OFFICE/OUTPT VISIT, EST, LEVL IV, 30-39 MIN: ICD-10-PCS | Mod: 95,,, | Performed by: PAIN MEDICINE

## 2020-04-17 PROCEDURE — 1159F MED LIST DOCD IN RCRD: CPT | Mod: 95,,, | Performed by: PAIN MEDICINE

## 2020-04-17 PROCEDURE — 99499 RISK ADDL DX/OHS AUDIT: ICD-10-PCS | Mod: 95,,, | Performed by: PAIN MEDICINE

## 2020-04-17 PROCEDURE — 99499 UNLISTED E&M SERVICE: CPT | Mod: 95,,, | Performed by: PAIN MEDICINE

## 2020-04-17 RX ORDER — HYDROCODONE BITARTRATE AND ACETAMINOPHEN 5; 325 MG/1; MG/1
1 TABLET ORAL EVERY 12 HOURS PRN
Qty: 60 TABLET | Refills: 0 | Status: SHIPPED | OUTPATIENT
Start: 2020-05-10 | End: 2020-04-29

## 2020-04-17 RX ORDER — HYDROCODONE BITARTRATE AND ACETAMINOPHEN 5; 325 MG/1; MG/1
1 TABLET ORAL EVERY 12 HOURS PRN
Qty: 60 TABLET | Refills: 0 | Status: SHIPPED | OUTPATIENT
Start: 2020-06-09 | End: 2020-04-29

## 2020-04-17 NOTE — PROGRESS NOTES
Subjective:     Patient ID: Eli Vaz is a 74 y.o. female    Chief Complaint: Follow-up      Referred by: No ref. provider found      HPI:    Interval History (4/17/20):    The patient location is: Home  The chief complaint leading to consultation is: follow up  Visit type: Virtual visit with synchronous audio and video  Total time spent with patient: 12 minutes  Each patient to whom he or she provides medical services by telemedicine is:  (1) informed of the relationship between the physician and patient and the respective role of any other health care provider with respect to management of the patient; and (2) notified that he or she may decline to receive medical services by telemedicine and may withdraw from such care at any time.      She returns today for follow up.  She reports that Norco 5-325mg q12h has been helpful for the left shoulder pain.  She denies any changes in the quality or location of her pain. She denies any new or worsening symptoms.  She denies any adverse effects from her pain medications. She feels this medication adequately controlled her pain and does not feel that any changes are needed at this time.        Interval History (2/7/20):  She returns today for follow up.  She reports that Norco 5-325 mg q.12 hours p.r.n. has been helpful for the left shoulder pain.  She denies any changes in the quality or location of her pain. She denies any new or worsening symptoms.  She denies any adverse effects from her pain medications. She feels this medication adequately controlled her pain and does not feel that any changes are needed at this time.      Interval History (11/22/19):  She returns today for follow up.  She reports that she is no longer getting any relief from left shoulder radiofrequency ablation.  She denies any changes in the quality or location of her pain but does state that is more bothersome and limits her ADLs more than previously.  Patient states that she has taken Norco  5-325 mg b.i.d. p.r.n..  This medication greatly improved her pain and function.  She denies any adverse effects from this medication.  She no longer has any more this medication.      Interval History (8/5/19):  She returns today for follow up.  She reports that she continues to get at least 75% relief of her left shoulder pain following left shoulder RFA.  She states that is still quite painful to perform certain ADLs including getting dressed.  She has not yet filled the prescription for [facility] provided at last visit.  She feels as though she may require this medicine to help with certain activities throughout her day.      Interval History (7/8/19):  She returns today for follow up.  She reports that left shoulder radiofrequency ablation has been helpful for the left shoulder pain. She reports about 70% relief of her pain.  She also reports some minimally improved range of motion.  She still taking Norco 7.5-325 mg b.i.d. p.r.n..      Interval History (5/13/19):  She returns today for follow up.  She reports that her left shoulder pain is unchanged in quality location since last encounter.  She has been evaluated by Orthopedic surgery and no surgical options are being pursued at this time. She states that Norco 7.5-325 mg q.8 hours has been helpful for her pain. She denies any adverse effects with this medication.      Interval History (2/11/19):  She returns today for follow up.  She reports that Norco 7.5-325 half pill q.4 hours has been helpful for the left shoulder pain. Patient states that this provides about 50% relief of her pain.  She no longer has pain while at rest and does note slightly improved range of motion of her left shoulder with minimal to no pain. She still has significantly limited range of motion and function of her left upper extremity as result.  She has discussed potential arthroplasty with Dr. Zurita.  She plans to discuss this option with the rest of her treatment team to see if it is  something she wants to proceed with.  She denies any adverse effects from taking her pain medication.  Specifically she denies any sedation or constipation.  She does not feels the higher doses are needed at this time as she is worried about adverse effects and feels as though her current dosage provides adequate relief.      Initial Encounter (1/21/19):  Eli Vaz is a 74 y.o. female who presents today with chronic left shoulder pain. Patient was referred by Dr. Zurita.  She has known left shoulder arthritis and rotator cuff pathology.  Limited surgical and interventional options given medical comorbidities patient has end-stage renal disease on dialysis.  She has failed shoulder injections including Synvisc.  She states that her left shoulder pain is very severe and limits her activities.  She wants somebody to cut it off.    This pain is described in detail below.    Physical Therapy:  Unlikely to be of benefit    Non-pharmacologic Treatment:  Nothing helps         · TENS?  No    Pain Medications:         · Currently taking:  Norco 5-325 mg q.12 hours p.r.n.    · Has tried in the past:  Tramadol, Percocet, cannot take NSAIDs due to renal disease, Norco    · Has not tried: NSAIDs, Muscle relaxants, TCAs, SNRIs, anticonvulsants, topical creams    Blood thinners:  Eliquis    Interventional Therapies:    Previous shoulder injections including Synvisc.  6/19/19 - left shoulder radiofrequency ablation - 70% relief    Relevant Surgeries:  None    Affecting sleep?  Yes    Affecting daily activities? yes    Depressive symptoms? yes          · SI/HI? No    Work status: Unemployed    Pain Scores:    Best:       5/10  Worst:     8/10  Usually:   5/10  Today:    0/10    Review of Systems   Constitutional: Negative for activity change, appetite change, chills, fatigue, fever and unexpected weight change.   HENT: Negative for hearing loss.    Eyes: Negative for visual disturbance.   Respiratory: Negative for chest  tightness and shortness of breath.    Cardiovascular: Negative for chest pain.   Gastrointestinal: Negative for abdominal pain, constipation, diarrhea, nausea and vomiting.   Genitourinary: Negative for difficulty urinating.   Musculoskeletal: Positive for arthralgias and myalgias. Negative for back pain, gait problem and neck pain.   Skin: Negative for rash.   Neurological: Positive for weakness. Negative for dizziness, light-headedness, numbness and headaches.   Psychiatric/Behavioral: Positive for sleep disturbance. Negative for hallucinations and suicidal ideas. The patient is not nervous/anxious.        Past Medical History:   Diagnosis Date    Arthritis     Atrial fibrillation     COPD (chronic obstructive pulmonary disease)     Dialysis patient     Encounter for blood transfusion     ESRD (end stage renal disease)        Past Surgical History:   Procedure Laterality Date    AV FISTULA PLACEMENT      EPIDURAL STEROID INJECTION Left 5/29/2019    Procedure: Axillary, Suprascapular, lateral pectoral nerve blocks;  Surgeon: Isrrael Barton Jr., MD;  Location: [facility] ENDO;  Service: Pain Management;  Laterality: Left;  Left Axillary, Suprascapular, Lateral Pectoral Nerve Blocks    66081    Arrive @ 0800; Eliquis; No DM; Confirm date with Armando    EPIDURAL STEROID INJECTION Left 6/19/2019    Procedure: Suprascapular, Axillary, and Lateral Pectoral Nerve Radiofrequency Ablations;  Surgeon: Isrrael Barton Jr., MD;  Location: [facility] ENDO;  Service: Pain Management;  Laterality: Left;  Left Suprascapular, Axillary, & Lateral Pectoral Nerve Radiofrequency Ablations    02474    Arrive @ 1145; IV Sedation- Fasting; Eliquis; No DM; 17-50-2; Rep?    TREATMENT OF CARDIAC ARRHYTHMIA N/A 4/2/2019    Procedure: Cardioversion or Defibrillation;  Surgeon: Rakesh Briggs MD;  Location: [facility] CATH LAB;  Service: Cardiology;  Laterality: N/A;    TUBAL LIGATION         Social History     Socioeconomic History     Marital status:      Spouse name: Not on file    Number of children: Not on file    Years of education: Not on file    Highest education level: Not on file   Occupational History    Not on file   Social Needs    Financial resource strain: Not on file    Food insecurity:     Worry: Not on file     Inability: Not on file    Transportation needs:     Medical: Not on file     Non-medical: Not on file   Tobacco Use    Smoking status: Former Smoker     Start date: 1/12/1991    Smokeless tobacco: Never Used    Tobacco comment: quit in 1991   Substance and Sexual Activity    Alcohol use: No    Drug use: No    Sexual activity: Not on file   Lifestyle    Physical activity:     Days per week: Not on file     Minutes per session: Not on file    Stress: Not on file   Relationships    Social connections:     Talks on phone: Not on file     Gets together: Not on file     Attends [employer] service: Not on file     Active member of club or organization: Not on file     Attends meetings of clubs or organizations: Not on file     Relationship status: Not on file   Other Topics Concern    Not on file   Social History Narrative    Not on file       Review of patient's allergies indicates:  No Known Allergies    Current Outpatient Medications on File Prior to Visit   Medication Sig Dispense Refill    albuterol (PROVENTIL) 2.5 mg /3 mL (0.083 %) nebulizer solution Take 3 mLs (2.5 mg total) by nebulization every 4 (four) hours. 1 Box 11    albuterol (PROVENTIL/VENTOLIN HFA) 90 mcg/actuation inhaler INHALE 2 PUFFS INTO LUNGS EVERY 4 HOURS AS NEEDED FOR SHORTNESS OF BREATH OR WHEEZING. RESCUE 18 g 5    amiodarone (PACERONE) 200 MG Tab Take 1 tablet (200 mg total) by mouth 2 (two) times daily. 180 tablet 3    apixaban (ELIQUIS) 2.5 mg Tab Take 1 tablet (2.5 mg total) by mouth 2 (two) times daily. 180 tablet 3    atorvastatin (LIPITOR) 10 MG tablet Take 1 tablet (10 mg total) by mouth once daily. 90 tablet 1    B  complex w-C no.20/folic acid (RENAL CAPS ORAL) Take by mouth.      calcium acetate (PHOSLO) 667 mg capsule Take 667 mg by mouth 3 (three) times daily with meals.      fluticasone propionate (FLONASE) 50 mcg/actuation nasal spray 1 spray by Nasal route.      fluticasone propionate (FLONASE) 50 mcg/actuation nasal spray 2 SPRAYS (100 MCG TOTAL) BY EACH NOSTRIL ROUTE ONCE DAILY. 16 mL 0    HYDROcodone-acetaminophen (NORCO) 5-325 mg per tablet Take 1 tablet by mouth every 12 (twelve) hours as needed for Pain. 60 tablet 0    ipratropium (ATROVENT) 42 mcg (0.06 %) nasal spray 2 sprays by Nasal route 4 (four) times daily. 15 mL 0    ipratropium-albuterol (COMBIVENT RESPIMAT)  mcg/actuation inhaler INHALE 1 PUFF INTO THE LUNGS DAILY.      ketoconazole (NIZORAL) 2 % cream Apply topically once daily. 1 Tube 3    mirtazapine (REMERON) 7.5 MG Tab TAKE 1 TABLET BY MOUTH AT BEDTIME AS NEEDED FOR SLEEP 90  3    montelukast (SINGULAIR) 10 mg tablet Take 1 tablet (10 mg total) by mouth every evening. 90 tablet 3    paroxetine (PAXIL) 30 MG tablet 1 TABLET IN THE MORNING ONCE A DAY ORALLY 90  3    [] predniSONE (DELTASONE) 20 MG tablet Take 1 tablet (20 mg total) by mouth once daily. for 3 days 3 tablet 0    sevelamer carbonate (RENVELA) 800 mg Tab TAKE 3 TABLETS BY MOUTH WITH EACH MEAL AND 1 TABLET WITH EACH SNACK  3    vit B,C-iron fum-FA-D3-zinc ox 8 mg iron-800 mcg-1,000 unit Tab Take by mouth.       Current Facility-Administered Medications on File Prior to Visit   Medication Dose Route Frequency Provider Last Rate Last Dose    0.9%  NaCl infusion   Intravenous Continuous Alaina Chaudhari NP        0.9%  NaCl infusion   Intravenous Continuous Alaina Chaudhari NP        sodium chloride 0.9% flush 5 mL  5 mL Intravenous PRN Alaina Chaudhari NP        sodium chloride 0.9% flush 5 mL  5 mL Intravenous PRN Alaina Chaudhari NP           Objective:      There were no vitals taken for  this visit.    Exam:  GEN:  Well developed, well nourished.  No acute distress.   HEENT:  No trauma.  Mucous membranes moist.  Nares patent bilaterally.  PSYCH: Normal affect. Thought content appropriate.  CHEST:  Breathing symmetric.  No audible wheezing.  SKIN:  Warm, pink, dry.  No rash on exposed areas.    NEURO/MUSCULOSKELETAL:  Fully alert, oriented, and appropriate. Speech normal merrill. No cranial nerve deficits.           Imaging:  Narrative     EXAMINATION:  XR SHOULDER COMPLETE 2 OR MORE VIEWS LEFT    CLINICAL HISTORY:  Pain in left shoulder    TECHNIQUE:  Two or three views of the left shoulder were performed.    COMPARISON:  None    FINDINGS:  The bones are intact.  There is no evidence for acute fracture or bone destruction.  There are advanced degenerative changes of the left glenohumeral joint with marked joint space narrowing with subchondral sclerosis and osteophyte formation.  There is cortical irregularity at the articular surfaces of the glenohumeral joint.  Multiple vascular stents project over the left upper arm and left subclavian region with multiple surgical clips also present.  Atherosclerotic calcification is present within the thoracic aorta as well as within the carotid arteries.   Impression       Advanced degenerative changes of the left glenohumeral joint.    No evidence for acute fracture, bone destruction, or dislocation.    Surgical changes with multiple vascular stents.    Extensive atherosclerosis.      Electronically signed by: Tone Pereyra MD  Date: 09/18/2018  Time: 07:58         Assessment:       Encounter Diagnoses   Name Primary?    Chronic left shoulder pain Yes    Arthritis of left shoulder region          Plan:       Eli was seen today for follow-up.    Diagnoses and all orders for this visit:    Chronic left shoulder pain  -     HYDROcodone-acetaminophen (NORCO) 5-325 mg per tablet; Take 1 tablet by mouth every 12 (twelve) hours as needed for Pain.  -      HYDROcodone-acetaminophen (NORCO) 5-325 mg per tablet; Take 1 tablet by mouth every 12 (twelve) hours as needed for Pain.    Arthritis of left shoulder region  -     HYDROcodone-acetaminophen (NORCO) 5-325 mg per tablet; Take 1 tablet by mouth every 12 (twelve) hours as needed for Pain.  -     HYDROcodone-acetaminophen (NORCO) 5-325 mg per tablet; Take 1 tablet by mouth every 12 (twelve) hours as needed for Pain.        Eli Vaz is a 74 y.o. female with chronic left shoulder pain secondary to rotator cuff and glenohumeral pathology.  Not a candidate for left shoulder arthroplasty.  Very good improvement following left shoulder radiofrequency ablation but relief did not last very long.    1.  We had a lengthy discussion regarding the potential adverse effects of chronic opioid medications.  We discussed that it may be appropriate to continue the use of opioid pain medications, but that we should use the lowest dose/potency possible.  Patient expressed understanding.  2.  Continue Norco 5-325 mg q.12 hours p.r.n..  2 additional 1-month prescriptions of 60 pills per month were provided today.  3.  Prescription monitoring port was reviewed today.  No inconsistencies were noted.  4.  Pain contract signed.  5.  Return to clinic in 3 months or sooner if needed.

## 2020-04-20 DIAGNOSIS — J32.9 RHINOSINUSITIS: ICD-10-CM

## 2020-04-20 RX ORDER — FLUTICASONE PROPIONATE 50 MCG
2 SPRAY, SUSPENSION (ML) NASAL DAILY
Qty: 16 ML | Refills: 0 | Status: SHIPPED | OUTPATIENT
Start: 2020-04-20 | End: 2020-05-14

## 2020-04-21 ENCOUNTER — PATIENT OUTREACH (OUTPATIENT)
Dept: ADMINISTRATIVE | Facility: OTHER | Age: 75
End: 2020-04-21

## 2020-04-21 NOTE — PROGRESS NOTES
Subjective:    Patient ID:  Eli Vaz is a 74 y.o. female who presents for follow-up of No chief complaint on file.      HPI 74 y.o. female with atrial fibrillation, atrial flutter, ESRD on HD (Tue Thu Sat), COPD on home oxygen, CVA, DM, HTN, Mitral regurgitation.    The patient location is: Home  The chief complaint leading to consultation is: atrial fibrillation  Visit type: audiovisual  Total time spent with patient: 12 minutes  Each patient to whom he or she provides medical services by telemedicine is:  (1) informed of the relationship between the physician and patient and the respective role of any other health care provider with respect to management of the patient; and (2) notified that he or she may decline to receive medical services by telemedicine and may withdraw from such care at any time.       Background:     Primary cardiologist is Dr. Garcia.  Presented 3/19 with atrial fibrillation with RVR and CHF.   Echo 3/20/19 EF 55% moderate to severe MR DARINEL 56.  ISAMAR/DCCV 3/21/19 moderate to severe MR.  Developed recurrence of atrial flutter.  Placed on amiodarone, underwent DCCV 4/1/19.  At follow-up was back in atrial flutter.  Notes dyspnea on exertion c/w NYHA Class III CHF.   Currently on Eliquis 2.5 mg BID.  ECG at visit revealed atrial flutter.  Surgery for mitral valve was being considered.  Has had atrial flutter since initiation of amiodarone.  Also has moderate to severe MR.  Unclear how much the atrial arrhythmias are contributing to her MR.    Update:  ISAMAR 12/20/19 moderate MR moderate TR Severe Pulm Htn.  RFA for atrial flutter 2/21/20.  Overall feeling well. Still notes significant dyspnea on exertion, but this is improved overall.  LFT's 1/20 OK.      Review of Systems   Constitution: Negative. Negative for fever and malaise/fatigue.   HENT: Negative for congestion and sore throat.    Cardiovascular: Positive for dyspnea on exertion. Negative for chest pain, irregular heartbeat, leg  swelling, near-syncope, orthopnea, palpitations, paroxysmal nocturnal dyspnea and syncope.   Respiratory: Positive for shortness of breath. Negative for cough.    Gastrointestinal: Negative for abdominal pain, constipation and diarrhea.   Neurological: Negative for dizziness, light-headedness and weakness.   Psychiatric/Behavioral: Negative for depression. The patient is not nervous/anxious.             Assessment:       1. Persistent atrial fibrillation    2. Atrial flutter, unspecified type    3. Benign essential hypertension    4. Combined hyperlipidemia associated with type 2 diabetes mellitus    5. Diastolic dysfunction    6. Non-rheumatic mitral regurgitation    7. ESRD on dialysis    8. Diabetes mellitus with ESRD (end-stage renal disease)         Plan:           Doing well overall.  At this time, we will continue amiodarone.  ZIo patch in 6 months.  FT4 and TSH now.  F/u upon completion.

## 2020-04-22 ENCOUNTER — TELEPHONE (OUTPATIENT)
Dept: PAIN MEDICINE | Facility: CLINIC | Age: 75
End: 2020-04-22

## 2020-04-22 ENCOUNTER — TELEPHONE (OUTPATIENT)
Dept: ELECTROPHYSIOLOGY | Facility: CLINIC | Age: 75
End: 2020-04-22

## 2020-04-22 ENCOUNTER — OFFICE VISIT (OUTPATIENT)
Dept: ELECTROPHYSIOLOGY | Facility: CLINIC | Age: 75
End: 2020-04-22
Payer: MEDICARE

## 2020-04-22 DIAGNOSIS — E11.69 COMBINED HYPERLIPIDEMIA ASSOCIATED WITH TYPE 2 DIABETES MELLITUS: Chronic | ICD-10-CM

## 2020-04-22 DIAGNOSIS — E78.2 COMBINED HYPERLIPIDEMIA ASSOCIATED WITH TYPE 2 DIABETES MELLITUS: Chronic | ICD-10-CM

## 2020-04-22 DIAGNOSIS — I48.19 PERSISTENT ATRIAL FIBRILLATION: Primary | ICD-10-CM

## 2020-04-22 DIAGNOSIS — I10 BENIGN ESSENTIAL HYPERTENSION: Chronic | ICD-10-CM

## 2020-04-22 DIAGNOSIS — I51.89 DIASTOLIC DYSFUNCTION: Chronic | ICD-10-CM

## 2020-04-22 DIAGNOSIS — N18.6 ESRD ON DIALYSIS: Chronic | ICD-10-CM

## 2020-04-22 DIAGNOSIS — E11.22 DIABETES MELLITUS WITH ESRD (END-STAGE RENAL DISEASE): Chronic | ICD-10-CM

## 2020-04-22 DIAGNOSIS — N18.6 DIABETES MELLITUS WITH ESRD (END-STAGE RENAL DISEASE): Chronic | ICD-10-CM

## 2020-04-22 DIAGNOSIS — I34.0 NON-RHEUMATIC MITRAL REGURGITATION: ICD-10-CM

## 2020-04-22 DIAGNOSIS — Z99.2 ESRD ON DIALYSIS: Chronic | ICD-10-CM

## 2020-04-22 DIAGNOSIS — I48.92 ATRIAL FLUTTER, UNSPECIFIED TYPE: ICD-10-CM

## 2020-04-22 PROCEDURE — 99214 PR OFFICE/OUTPT VISIT, EST, LEVL IV, 30-39 MIN: ICD-10-PCS | Mod: 95,,, | Performed by: INTERNAL MEDICINE

## 2020-04-22 PROCEDURE — 1101F PT FALLS ASSESS-DOCD LE1/YR: CPT | Mod: CPTII,95,, | Performed by: INTERNAL MEDICINE

## 2020-04-22 PROCEDURE — 1159F MED LIST DOCD IN RCRD: CPT | Mod: 95,,, | Performed by: INTERNAL MEDICINE

## 2020-04-22 PROCEDURE — 1101F PR PT FALLS ASSESS DOC 0-1 FALLS W/OUT INJ PAST YR: ICD-10-PCS | Mod: CPTII,95,, | Performed by: INTERNAL MEDICINE

## 2020-04-22 PROCEDURE — 1159F PR MEDICATION LIST DOCUMENTED IN MEDICAL RECORD: ICD-10-PCS | Mod: 95,,, | Performed by: INTERNAL MEDICINE

## 2020-04-22 PROCEDURE — 99214 OFFICE O/P EST MOD 30 MIN: CPT | Mod: 95,,, | Performed by: INTERNAL MEDICINE

## 2020-04-22 NOTE — TELEPHONE ENCOUNTER
Spoke with pt who informed me that her daughter will not be at her house to assist her with the virtual visit until 10, so we will keep her original appt time.

## 2020-04-22 NOTE — TELEPHONE ENCOUNTER
----- Message from Isrrael Barton Jr., MD sent at 4/17/2020 11:53 AM CDT -----  Please schedule f/u in the first week of June. Thanks.

## 2020-04-24 ENCOUNTER — TELEPHONE (OUTPATIENT)
Dept: ELECTROPHYSIOLOGY | Facility: CLINIC | Age: 75
End: 2020-04-24

## 2020-04-27 ENCOUNTER — PATIENT MESSAGE (OUTPATIENT)
Dept: ELECTROPHYSIOLOGY | Facility: CLINIC | Age: 75
End: 2020-04-27

## 2020-04-27 ENCOUNTER — TELEPHONE (OUTPATIENT)
Dept: PAIN MEDICINE | Facility: CLINIC | Age: 75
End: 2020-04-27

## 2020-04-27 ENCOUNTER — PATIENT MESSAGE (OUTPATIENT)
Dept: FAMILY MEDICINE | Facility: CLINIC | Age: 75
End: 2020-04-27

## 2020-04-27 NOTE — TELEPHONE ENCOUNTER
Called pharmacy in regards to medication being on backorder 7.5-10 . Dr. Barton declined an prefers pt select a different pharmacy that has prescribed dose . Contact pt daughter Veronica dorothy lvm to rt call .

## 2020-04-27 NOTE — TELEPHONE ENCOUNTER
----- Message from Kb Noel sent at 4/24/2020  3:41 PM CDT -----  Contact: Patient  Type: Patient Call Back    Who called: Rajni- daughter    What is the request in detail: Patient is unable to get medication due to backorder. Please advise    Can the clinic reply by [facility]? No    Would the patient rather a call back or a response via [facility]? Call    Best call back number: [phone] (home) [phone] (work)    Additional Information:   [id number] - [place] - [address]  [address] [place] [address]  Phone: [phone] Fax: [phone]

## 2020-04-29 ENCOUNTER — TELEPHONE (OUTPATIENT)
Dept: ELECTROPHYSIOLOGY | Facility: CLINIC | Age: 75
End: 2020-04-29

## 2020-04-29 ENCOUNTER — TELEPHONE (OUTPATIENT)
Dept: PAIN MEDICINE | Facility: CLINIC | Age: 75
End: 2020-04-29

## 2020-04-29 ENCOUNTER — LAB VISIT (OUTPATIENT)
Dept: LAB | Facility: HOSPITAL | Age: 75
End: 2020-04-29
Attending: INTERNAL MEDICINE
Payer: MEDICARE

## 2020-04-29 DIAGNOSIS — M25.512 CHRONIC LEFT SHOULDER PAIN: ICD-10-CM

## 2020-04-29 DIAGNOSIS — G89.29 CHRONIC LEFT SHOULDER PAIN: ICD-10-CM

## 2020-04-29 DIAGNOSIS — I48.19 PERSISTENT ATRIAL FIBRILLATION: ICD-10-CM

## 2020-04-29 DIAGNOSIS — M19.012 ARTHRITIS OF LEFT SHOULDER REGION: ICD-10-CM

## 2020-04-29 LAB
T4 FREE SERPL-MCNC: 1.06 NG/DL (ref 0.71–1.51)
TSH SERPL DL<=0.005 MIU/L-ACNC: 9.51 UIU/ML (ref 0.4–4)

## 2020-04-29 PROCEDURE — 84439 ASSAY OF FREE THYROXINE: CPT

## 2020-04-29 PROCEDURE — 84443 ASSAY THYROID STIM HORMONE: CPT

## 2020-04-29 PROCEDURE — 36415 COLL VENOUS BLD VENIPUNCTURE: CPT | Mod: PO

## 2020-04-29 RX ORDER — HYDROCODONE BITARTRATE AND ACETAMINOPHEN 5; 325 MG/1; MG/1
1 TABLET ORAL EVERY 12 HOURS PRN
Qty: 60 TABLET | Refills: 0 | Status: SHIPPED | OUTPATIENT
Start: 2020-05-29 | End: 2020-06-28

## 2020-04-29 RX ORDER — HYDROCODONE BITARTRATE AND ACETAMINOPHEN 5; 325 MG/1; MG/1
1 TABLET ORAL EVERY 12 HOURS PRN
Qty: 60 TABLET | Refills: 0 | Status: SHIPPED | OUTPATIENT
Start: 2020-04-29 | End: 2020-05-29

## 2020-04-29 NOTE — TELEPHONE ENCOUNTER
Called pt daughter and was able to schedule f/u apt the first week of June . Daughter would like mother's medication sent to [facility] on [address] due to back order back with [facility] .

## 2020-04-29 NOTE — TELEPHONE ENCOUNTER
----- Message from Hernandez Tesfaye MD sent at 4/29/2020  2:36 PM CDT -----  Please arrange for discussion with her PCP regarding elevated TSH

## 2020-04-30 ENCOUNTER — TELEPHONE (OUTPATIENT)
Dept: ELECTROPHYSIOLOGY | Facility: CLINIC | Age: 75
End: 2020-04-30

## 2020-04-30 ENCOUNTER — OFFICE VISIT (OUTPATIENT)
Dept: FAMILY MEDICINE | Facility: CLINIC | Age: 75
End: 2020-04-30
Payer: MEDICARE

## 2020-04-30 DIAGNOSIS — N18.6 ESRD ON DIALYSIS: ICD-10-CM

## 2020-04-30 DIAGNOSIS — Z99.2 ESRD ON DIALYSIS: ICD-10-CM

## 2020-04-30 DIAGNOSIS — E11.22 DIABETES MELLITUS WITH ESRD (END-STAGE RENAL DISEASE): ICD-10-CM

## 2020-04-30 DIAGNOSIS — I48.91 ATRIAL FIBRILLATION, UNSPECIFIED TYPE: ICD-10-CM

## 2020-04-30 DIAGNOSIS — E07.9 THYROID DISORDER: Primary | ICD-10-CM

## 2020-04-30 DIAGNOSIS — N18.6 DIABETES MELLITUS WITH ESRD (END-STAGE RENAL DISEASE): ICD-10-CM

## 2020-04-30 DIAGNOSIS — N25.81 SECONDARY HYPERPARATHYROIDISM: ICD-10-CM

## 2020-04-30 PROCEDURE — 1101F PR PT FALLS ASSESS DOC 0-1 FALLS W/OUT INJ PAST YR: ICD-10-PCS | Mod: CPTII,95,, | Performed by: FAMILY MEDICINE

## 2020-04-30 PROCEDURE — 1159F MED LIST DOCD IN RCRD: CPT | Mod: 95,,, | Performed by: FAMILY MEDICINE

## 2020-04-30 PROCEDURE — 99214 PR OFFICE/OUTPT VISIT, EST, LEVL IV, 30-39 MIN: ICD-10-PCS | Mod: 95,,, | Performed by: FAMILY MEDICINE

## 2020-04-30 PROCEDURE — 1159F PR MEDICATION LIST DOCUMENTED IN MEDICAL RECORD: ICD-10-PCS | Mod: 95,,, | Performed by: FAMILY MEDICINE

## 2020-04-30 PROCEDURE — 99214 OFFICE O/P EST MOD 30 MIN: CPT | Mod: 95,,, | Performed by: FAMILY MEDICINE

## 2020-04-30 PROCEDURE — 1101F PT FALLS ASSESS-DOCD LE1/YR: CPT | Mod: CPTII,95,, | Performed by: FAMILY MEDICINE

## 2020-04-30 NOTE — TELEPHONE ENCOUNTER
Discussed with Dr Tesfaye. He just wants to stay the course for now and continue to monitor. Relayed recommendations to patient's daughter and she will call me if anything changes.

## 2020-04-30 NOTE — PROGRESS NOTES
Subjective:       Patient ID: Eli Vaz is a 74 y.o. female.    The patient location is: [place]   The chief complaint leading to consultation is: Thyroid Lab abnormal  Visit type: audiovisual  Total time spent with patient: 15 minutes  Each patient to whom he or she provides medical services by telemedicine is:  (1) informed of the relationship between the physician and patient and the respective role of any other health care provider with respect to management of the patient; and (2) notified that he or she may decline to receive medical services by telemedicine and may withdraw from such care at any time.    Notes:   Chief Complaint: Thyroid Problem    HPI   74 year old female with ESRD, Afib, and HTN makes a virtual visit appointment to discuss abnormal thyroid labs. She does not take thyroid medication. She reports no previous problems with thyroid or family history of thyroid problems. She reports no thyroid radiation history. She is on amiodarone.  She reports feeling well. The daughter does report that the patient's pulse has been going down to the 50s after dialysis and then goes back up to normal.    Review of Systems   Constitutional: Negative for activity change, appetite change and fever.   Respiratory: Negative for shortness of breath and wheezing.    Cardiovascular: Negative for chest pain and palpitations.   Gastrointestinal: Negative for abdominal pain and blood in stool.   Genitourinary: Negative for dysuria and hematuria.       Objective:      Physical Exam   Constitutional: She is oriented to person, place, and time. She appears well-developed.   Pulmonary/Chest: Effort normal.   Neurological: She is alert and oriented to person, place, and time.       Assessment:       1. Thyroid disorder    2. ESRD on dialysis    3. Diabetes mellitus with ESRD (end-stage renal disease)    4. Atrial fibrillation, unspecified type    5. Secondary hyperparathyroidism        Plan:   Eli was seen today for  thyroid problem.    Diagnoses and all orders for this visit:    Thyroid disorder  -     TSH; Future  -     T4, free; Future  -     Thyroid peroxidase antibody; Future  Possibly from amiodarone.  Discussed this with patient  Will monitor labs.  T4 normal on labs recently  Check TSH and T4 along with antibodies in 6 weeks.    ESRD on dialysis  -     Comprehensive metabolic panel; Future  -     CBC auto differential; Future  -     PTH, intact; Future  -     Vitamin D; Future  Check ESRD labs in 4 weeks  Management by nephro    Diabetes mellitus with ESRD (end-stage renal disease)  -     Comprehensive metabolic panel; Future  -     CBC auto differential; Future  -     Lipid panel; Future  Continue to monitor sugars    Atrial fibrillation, unspecified type  -     Comprehensive metabolic panel; Future  Patient has called cardio to see if amiodarone needs to be adjussted    Secondary hyperparathyroidism  -     Comprehensive metabolic panel; Future  -     PTH, intact; Future  -     Vitamin D; Future

## 2020-04-30 NOTE — TELEPHONE ENCOUNTER
----- Message from Steph Bañuelos MA sent at 4/30/2020  2:30 PM CDT -----  Contact: Pt mendez Yung      ----- Message -----  From: Malcolm Fong  Sent: 4/30/2020  10:31 AM CDT  To: [facility] Staff    Pt daughter Dilshad call and said that the pt took dialysis Tuesday and today.    Both times her heart rate dropped.    The nurse there told her to call her cardiologist    Dilshad can be reached at [phone]

## 2020-04-30 NOTE — TELEPHONE ENCOUNTER
Spoke with patient's daughter. The dialysis center asked her to call and let us know that for the last two runs, the patient's heart rate has dropped into the 40's-50's.  · Confirmed that she is on amiodarone 200mg daily  · Confirmed that patient is unaware of this happening and appears to be asymptomatic  · Will discuss with Dr Tesfaye and call her back if there are any recommendations. He did order the Bardy patch in 6 months.

## 2020-05-14 DIAGNOSIS — J32.9 RHINOSINUSITIS: ICD-10-CM

## 2020-05-14 RX ORDER — FLUTICASONE PROPIONATE 50 MCG
SPRAY, SUSPENSION (ML) NASAL
Qty: 16 ML | Refills: 0 | Status: SHIPPED | OUTPATIENT
Start: 2020-05-14 | End: 2020-06-10

## 2020-05-21 ENCOUNTER — PATIENT MESSAGE (OUTPATIENT)
Dept: FAMILY MEDICINE | Facility: CLINIC | Age: 75
End: 2020-05-21

## 2020-05-27 ENCOUNTER — OFFICE VISIT (OUTPATIENT)
Dept: FAMILY MEDICINE | Facility: CLINIC | Age: 75
End: 2020-05-27
Payer: MEDICARE

## 2020-05-27 VITALS
TEMPERATURE: 98 F | DIASTOLIC BLOOD PRESSURE: 81 MMHG | WEIGHT: 130.06 LBS | OXYGEN SATURATION: 96 % | SYSTOLIC BLOOD PRESSURE: 171 MMHG | HEART RATE: 73 BPM | BODY MASS INDEX: 23.04 KG/M2

## 2020-05-27 DIAGNOSIS — J44.9 CHRONIC OBSTRUCTIVE PULMONARY DISEASE, UNSPECIFIED COPD TYPE: Primary | ICD-10-CM

## 2020-05-27 PROCEDURE — 99213 OFFICE O/P EST LOW 20 MIN: CPT | Mod: 25,S$GLB,, | Performed by: FAMILY MEDICINE

## 2020-05-27 PROCEDURE — 99213 PR OFFICE/OUTPT VISIT, EST, LEVL III, 20-29 MIN: ICD-10-PCS | Mod: 25,S$GLB,, | Performed by: FAMILY MEDICINE

## 2020-05-27 PROCEDURE — 99999 PR PBB SHADOW E&M-EST. PATIENT-LVL III: ICD-10-PCS | Mod: PBBFAC,,, | Performed by: FAMILY MEDICINE

## 2020-05-27 PROCEDURE — 1101F PT FALLS ASSESS-DOCD LE1/YR: CPT | Mod: CPTII,S$GLB,, | Performed by: FAMILY MEDICINE

## 2020-05-27 PROCEDURE — 1159F PR MEDICATION LIST DOCUMENTED IN MEDICAL RECORD: ICD-10-PCS | Mod: S$GLB,,, | Performed by: FAMILY MEDICINE

## 2020-05-27 PROCEDURE — 1101F PR PT FALLS ASSESS DOC 0-1 FALLS W/OUT INJ PAST YR: ICD-10-PCS | Mod: CPTII,S$GLB,, | Performed by: FAMILY MEDICINE

## 2020-05-27 PROCEDURE — 99999 PR PBB SHADOW E&M-EST. PATIENT-LVL III: CPT | Mod: PBBFAC,,, | Performed by: FAMILY MEDICINE

## 2020-05-27 PROCEDURE — 94640 PR INHAL RX, AIRWAY OBST/DX SPUTUM INDUCT: ICD-10-PCS | Mod: S$GLB,,, | Performed by: FAMILY MEDICINE

## 2020-05-27 PROCEDURE — 1159F MED LIST DOCD IN RCRD: CPT | Mod: S$GLB,,, | Performed by: FAMILY MEDICINE

## 2020-05-27 PROCEDURE — 94640 AIRWAY INHALATION TREATMENT: CPT | Mod: S$GLB,,, | Performed by: FAMILY MEDICINE

## 2020-05-27 RX ORDER — IPRATROPIUM BROMIDE AND ALBUTEROL SULFATE 2.5; .5 MG/3ML; MG/3ML
3 SOLUTION RESPIRATORY (INHALATION)
Status: COMPLETED | OUTPATIENT
Start: 2020-05-27 | End: 2020-05-27

## 2020-05-27 RX ORDER — IPRATROPIUM BROMIDE AND ALBUTEROL SULFATE 2.5; .5 MG/3ML; MG/3ML
3 SOLUTION RESPIRATORY (INHALATION) EVERY 4 HOURS PRN
Qty: 1 BOX | Refills: 1 | Status: SHIPPED | OUTPATIENT
Start: 2020-05-27 | End: 2020-01-01

## 2020-05-27 RX ORDER — CEFDINIR 300 MG/1
300 CAPSULE ORAL
Qty: 4 CAPSULE | Refills: 0 | Status: SHIPPED | OUTPATIENT
Start: 2020-05-27 | End: 2020-01-01

## 2020-05-27 RX ADMIN — IPRATROPIUM BROMIDE AND ALBUTEROL SULFATE 3 ML: 2.5; .5 SOLUTION RESPIRATORY (INHALATION) at 09:05

## 2020-05-29 ENCOUNTER — PATIENT OUTREACH (OUTPATIENT)
Dept: ADMINISTRATIVE | Facility: OTHER | Age: 75
End: 2020-05-29

## 2020-06-03 ENCOUNTER — TELEPHONE (OUTPATIENT)
Dept: FAMILY MEDICINE | Facility: CLINIC | Age: 75
End: 2020-06-03

## 2020-06-03 NOTE — TELEPHONE ENCOUNTER
Spoke to daughter told her she needs to listen to voicemail to verify who called because I did not see any notation of the phone call and was not sure who it was from and she exoressed understanding

## 2020-06-05 ENCOUNTER — PATIENT OUTREACH (OUTPATIENT)
Dept: ADMINISTRATIVE | Facility: OTHER | Age: 75
End: 2020-06-05

## 2020-06-05 DIAGNOSIS — E11.22 DIABETES MELLITUS WITH ESRD (END-STAGE RENAL DISEASE): Primary | Chronic | ICD-10-CM

## 2020-06-05 DIAGNOSIS — N18.6 DIABETES MELLITUS WITH ESRD (END-STAGE RENAL DISEASE): Primary | Chronic | ICD-10-CM

## 2020-06-06 NOTE — PROGRESS NOTES
Patient's chart was reviewed.   Requested updates within Care Everywhere.  Immunizations reconciled.    Health Maintenance was updated.    Placed a1c orders and linked to lab appt.

## 2020-06-08 ENCOUNTER — OFFICE VISIT (OUTPATIENT)
Dept: CARDIOLOGY | Facility: CLINIC | Age: 75
End: 2020-06-08
Payer: MEDICARE

## 2020-06-08 VITALS
WEIGHT: 130 LBS | DIASTOLIC BLOOD PRESSURE: 66 MMHG | BODY MASS INDEX: 23.04 KG/M2 | SYSTOLIC BLOOD PRESSURE: 152 MMHG | HEART RATE: 57 BPM | HEIGHT: 63 IN

## 2020-06-08 DIAGNOSIS — M25.561 RIGHT KNEE PAIN, UNSPECIFIED CHRONICITY: Primary | ICD-10-CM

## 2020-06-08 DIAGNOSIS — I51.89 DIASTOLIC DYSFUNCTION: Primary | Chronic | ICD-10-CM

## 2020-06-08 DIAGNOSIS — I15.2 HYPERTENSION ASSOCIATED WITH DIABETES: Chronic | ICD-10-CM

## 2020-06-08 DIAGNOSIS — N18.6 ESRD ON DIALYSIS: Chronic | ICD-10-CM

## 2020-06-08 DIAGNOSIS — I10 HYPERTENSION: ICD-10-CM

## 2020-06-08 DIAGNOSIS — E11.59 HYPERTENSION ASSOCIATED WITH DIABETES: Chronic | ICD-10-CM

## 2020-06-08 DIAGNOSIS — I48.19 PERSISTENT ATRIAL FIBRILLATION: ICD-10-CM

## 2020-06-08 DIAGNOSIS — J43.2 CENTRILOBULAR EMPHYSEMA: Chronic | ICD-10-CM

## 2020-06-08 DIAGNOSIS — Z99.2 ESRD ON DIALYSIS: Chronic | ICD-10-CM

## 2020-06-08 PROCEDURE — 1101F PT FALLS ASSESS-DOCD LE1/YR: CPT | Mod: CPTII,S$GLB,, | Performed by: INTERNAL MEDICINE

## 2020-06-08 PROCEDURE — 99999 PR PBB SHADOW E&M-EST. PATIENT-LVL IV: CPT | Mod: PBBFAC,,, | Performed by: INTERNAL MEDICINE

## 2020-06-08 PROCEDURE — 1159F MED LIST DOCD IN RCRD: CPT | Mod: S$GLB,,, | Performed by: INTERNAL MEDICINE

## 2020-06-08 PROCEDURE — 93000 EKG 12-LEAD: ICD-10-PCS | Mod: S$GLB,,, | Performed by: INTERNAL MEDICINE

## 2020-06-08 PROCEDURE — 99214 OFFICE O/P EST MOD 30 MIN: CPT | Mod: S$GLB,,, | Performed by: INTERNAL MEDICINE

## 2020-06-08 PROCEDURE — 1159F PR MEDICATION LIST DOCUMENTED IN MEDICAL RECORD: ICD-10-PCS | Mod: S$GLB,,, | Performed by: INTERNAL MEDICINE

## 2020-06-08 PROCEDURE — 99214 PR OFFICE/OUTPT VISIT, EST, LEVL IV, 30-39 MIN: ICD-10-PCS | Mod: S$GLB,,, | Performed by: INTERNAL MEDICINE

## 2020-06-08 PROCEDURE — 99999 PR PBB SHADOW E&M-EST. PATIENT-LVL IV: ICD-10-PCS | Mod: PBBFAC,,, | Performed by: INTERNAL MEDICINE

## 2020-06-08 PROCEDURE — 93000 ELECTROCARDIOGRAM COMPLETE: CPT | Mod: S$GLB,,, | Performed by: INTERNAL MEDICINE

## 2020-06-08 PROCEDURE — 1101F PR PT FALLS ASSESS DOC 0-1 FALLS W/OUT INJ PAST YR: ICD-10-PCS | Mod: CPTII,S$GLB,, | Performed by: INTERNAL MEDICINE

## 2020-06-08 NOTE — PROGRESS NOTES
Subjective:    Patient ID:  Eli Vaz is a 74 y.o. female who presents for follow-up of Congestive Heart Failure      HPI     PAF(flutter) on amiodarone and eliquis - A-flutter ablation 2/21/20 s/p ISAMAR/CV 3/21/19 and CV 4/2/19,  ESRD, MR - mod-severe, diastolic CHF, HTN, DM, COPD - on home O2, CVA, severe pulmonary HTN     Echo 12/20/19  · Concentric left ventricular hypertrophy.  · Normal left ventricular systolic function. The estimated ejection fraction is 55%  · Normal LV diastolic function.  · Normal right ventricular systolic function.  · Mild left atrial enlargement.  · Mild-to-moderate aortic regurgitation.  · Moderate mitral regurgitation.  · Moderate tricuspid regurgitation.  · Severe pulmonary hypertension present.  · Elevated central venous pressure (15 mm Hg).  · The estimated PA systolic pressure is 105 mm Hg     Stress test 2/4/19  · There is a mild, infarct defect(s) in the lateral apical wall(s),  · An ejection fraction of 72 % at rest  · The EKG portion of this study is negative for myocardial ischemia.      2/13/19 CP has resolved  Mild stable SINCLAIR  May need surgical evaluation for MVR if MR worsens  May have upcoming shoulder surgery - cleared at moderate cardiac risk  OV 3 months     4/10/19 Feels better since discharge  Less SOB - on home O2  EKG NSR NSSTT changes  Staying in NSR so far. Decrease amiodarone 200 qd  Will refer to EP if PAF returns  Discussed the possible need for MVR/Maze in the future  OV 1 month with EKG     5/6/19 EKG A-flutter 2:1 at 115  Said her HR had been in the 60s until yesterday  SOB at rest - on home O2   Refractory A-flutter - will increase amiodarone 200 bid and add toprol XL 25 qd  Refer to EP to see if she is an ablation candidate  Not excited about possible MVR  OV here 2 weeks with repeat EKG     12/13/19 Was scheduled for A-flutter ablation 8/21/19 but this was canceled due to being off eliquis for AV fistula procedure. Never followed up with EP  afterwards  Mild SINCLAIR, denies CP  EKG A-flutter 104  Echo to look at MR and EF  Refer back to EP for possible A-flutter ablation  OV here 1 month    6/8/20 Did well after A-flutter ablation  Stable SINCLAIR  EKG NSR NSSTT changes  Not interested in MVR      Review of Systems   Constitution: Negative for decreased appetite.   HENT: Negative for ear discharge.    Eyes: Negative for blurred vision.   Respiratory: Negative for hemoptysis.    Endocrine: Negative for polyphagia.   Hematologic/Lymphatic: Negative for adenopathy.   Skin: Negative for color change.   Musculoskeletal: Negative for joint swelling.   Genitourinary: Negative for bladder incontinence.   Neurological: Negative for brief paralysis.   Psychiatric/Behavioral: Negative for hallucinations.   Allergic/Immunologic: Negative for hives.        Objective:    Physical Exam   Constitutional: She is oriented to person, place, and time. She appears well-developed and well-nourished.   HENT:   Head: Normocephalic.   Nose: Nose normal.   Eyes: Pupils are equal, round, and reactive to light.   Cardiovascular: Normal rate, regular rhythm, S1 normal and S2 normal.   Murmur heard.  Pulses:       Radial pulses are 2+ on the right side, and 2+ on the left side.   Pulmonary/Chest: No respiratory distress. She has wheezes.   Abdominal: Normal appearance.   Musculoskeletal: Normal range of motion. She exhibits no edema.   Neurological: She is alert and oriented to person, place, and time.   Skin: Skin is warm and dry. No erythema.   Psychiatric: She has a normal mood and affect. Her speech is normal and behavior is normal.   Nursing note and vitals reviewed.        Assessment:       1. Diastolic dysfunction    2. Centrilobular emphysema    3. Hypertension associated with diabetes    4. Persistent atrial fibrillation    5. ESRD on dialysis         Plan:        Continue Rx  OV 3 months with repeat echo

## 2020-06-09 NOTE — PROGRESS NOTES
Subjective:       Patient ID: Eli Vaz is a 74 y.o. female.    Chief Complaint: Joint Swelling (right knee swollen) and Cough (persistant)    HPI   74 year old female comes in with her daughter for evaluation of several weeks of worsening cough and shortness of breath. She has a history of COPD. There is no fever or chills. She does report some chest tightness. She also reports some wheezing. No chest pain or palpitations. She did not want to go to the hospital. No leg swelling. No sick contacts.     Review of Systems   Constitutional: Negative for chills and fever.   HENT: Positive for postnasal drip and rhinorrhea. Negative for ear pain and sore throat.    Respiratory: Positive for cough, shortness of breath and wheezing.    Cardiovascular: Negative for chest pain.   Musculoskeletal: Negative for myalgias.   Skin: Negative for rash.   Neurological: Negative for headaches.       Objective:     BP (!) 171/81 (BP Location: Left arm, Patient Position: Sitting, BP Method: Small (Automatic))   Pulse 73   Temp 97.8 °F (36.6 °C) (Oral)   Wt 59 kg (130 lb 1.1 oz)   SpO2 96%   BMI 23.04 kg/m²     Physical Exam   Constitutional:  Non-toxic appearance. She appears ill.   HENT:   Right Ear: Tympanic membrane, external ear and ear canal normal.   Left Ear: Tympanic membrane, external ear and ear canal normal.   Nose: Rhinorrhea present.   Mouth/Throat: Posterior oropharyngeal erythema present. No oropharyngeal exudate. No tonsillar exudate.   Neck: Trachea normal and normal range of motion. Neck supple. No thyromegaly present.   Cardiovascular: Normal rate, regular rhythm, S1 normal and S2 normal.   Pulses:       Radial pulses are 2+ on the right side, and 2+ on the left side.   Pulmonary/Chest: No accessory muscle usage. No tachypnea. No respiratory distress. She has wheezes (scattered). She has rhonchi (scattered). She has no rales.   Abdominal: Soft. Bowel sounds are normal. There is no hepatosplenomegaly. There  is no tenderness.   Lymphadenopathy:     She has no cervical adenopathy.   Skin: Capillary refill takes less than 2 seconds.   Vitals reviewed.      Assessment:       1. Chronic obstructive pulmonary disease, unspecified COPD type        Plan:       Eli was seen today for joint swelling and cough.    Diagnoses and all orders for this visit:    Chronic obstructive pulmonary disease, unspecified COPD type  -     albuterol-ipratropium 2.5 mg-0.5 mg/3 mL nebulizer solution 3 mL  -     X-Ray Chest PA And Lateral; Future  -     cefdinir (OMNICEF) 300 MG capsule; Take 1 capsule (300 mg total) by mouth every 48 hours.  -     albuterol-ipratropium (DUO-NEB) 2.5 mg-0.5 mg/3 mL nebulizer solution; Take 3 mLs by nebulization every 4 (four) hours as needed for Wheezing. Rescue  -     NEBULIZER FOR HOME USE  -     NEBULIZER KIT (SUPPLIES) FOR HOME USE    Patient improved after nebulizer treatment  Xray did not show evidence of pneumonia  Will treat with course of Omnicef and home DuoNeb every 4 hours.  If not resolved within next week, patient to call office. If any worsening, patient to go to ED

## 2020-06-16 ENCOUNTER — TELEPHONE (OUTPATIENT)
Dept: FAMILY MEDICINE | Facility: CLINIC | Age: 75
End: 2020-06-16

## 2020-06-16 DIAGNOSIS — R06.02 SHORTNESS OF BREATH: Primary | ICD-10-CM

## 2020-06-16 NOTE — TELEPHONE ENCOUNTER
"----- Message from Mare Reveles sent at 6/16/2020 11:57 AM CDT -----  Contact: Veronica"daughter" [phone]  Type:  Patient Requesting Referral    Who Called: Veronica"daughter"    Referral to What Specialty: pulmonary    Reason for Referral: fluid on lungs    Does the patient want the referral with a specific physician?: no    [facility]    Would the patient rather a call back or a response via [facility]? Call back    Best Call Back Number: [phone]          "

## 2020-06-18 ENCOUNTER — PATIENT OUTREACH (OUTPATIENT)
Dept: ADMINISTRATIVE | Facility: OTHER | Age: 75
End: 2020-06-18

## 2020-06-19 ENCOUNTER — OFFICE VISIT (OUTPATIENT)
Dept: ORTHOPEDICS | Facility: CLINIC | Age: 75
End: 2020-06-19
Attending: ORTHOPAEDIC SURGERY
Payer: MEDICARE

## 2020-06-19 ENCOUNTER — OFFICE VISIT (OUTPATIENT)
Dept: PULMONOLOGY | Facility: CLINIC | Age: 75
End: 2020-06-19
Payer: MEDICARE

## 2020-06-19 ENCOUNTER — PATIENT OUTREACH (OUTPATIENT)
Dept: ADMINISTRATIVE | Facility: HOSPITAL | Age: 75
End: 2020-06-19

## 2020-06-19 VITALS
HEART RATE: 67 BPM | HEIGHT: 63 IN | WEIGHT: 130 LBS | BODY MASS INDEX: 23.04 KG/M2 | SYSTOLIC BLOOD PRESSURE: 154 MMHG | DIASTOLIC BLOOD PRESSURE: 73 MMHG | RESPIRATION RATE: 18 BRPM

## 2020-06-19 VITALS
BODY MASS INDEX: 23.38 KG/M2 | HEIGHT: 63 IN | DIASTOLIC BLOOD PRESSURE: 72 MMHG | WEIGHT: 131.94 LBS | TEMPERATURE: 97 F | SYSTOLIC BLOOD PRESSURE: 170 MMHG | HEART RATE: 70 BPM | RESPIRATION RATE: 14 BRPM | OXYGEN SATURATION: 96 %

## 2020-06-19 DIAGNOSIS — R06.09 DYSPNEA ON EXERTION: ICD-10-CM

## 2020-06-19 DIAGNOSIS — R06.02 SHORTNESS OF BREATH: ICD-10-CM

## 2020-06-19 DIAGNOSIS — J43.2 CENTRILOBULAR EMPHYSEMA: Chronic | ICD-10-CM

## 2020-06-19 DIAGNOSIS — M17.11 PRIMARY OSTEOARTHRITIS OF RIGHT KNEE: Primary | ICD-10-CM

## 2020-06-19 DIAGNOSIS — Z71.89 GOALS OF CARE, COUNSELING/DISCUSSION: ICD-10-CM

## 2020-06-19 DIAGNOSIS — I27.20 PULMONARY HTN: ICD-10-CM

## 2020-06-19 PROCEDURE — 99204 OFFICE O/P NEW MOD 45 MIN: CPT | Mod: S$GLB,,, | Performed by: INTERNAL MEDICINE

## 2020-06-19 PROCEDURE — 99213 PR OFFICE/OUTPT VISIT, EST, LEVL III, 20-29 MIN: ICD-10-PCS | Mod: S$GLB,,, | Performed by: ORTHOPAEDIC SURGERY

## 2020-06-19 PROCEDURE — 1101F PR PT FALLS ASSESS DOC 0-1 FALLS W/OUT INJ PAST YR: ICD-10-PCS | Mod: CPTII,S$GLB,, | Performed by: INTERNAL MEDICINE

## 2020-06-19 PROCEDURE — 99213 OFFICE O/P EST LOW 20 MIN: CPT | Mod: S$GLB,,, | Performed by: ORTHOPAEDIC SURGERY

## 2020-06-19 PROCEDURE — 1159F MED LIST DOCD IN RCRD: CPT | Mod: S$GLB,,, | Performed by: ORTHOPAEDIC SURGERY

## 2020-06-19 PROCEDURE — 99999 PR PBB SHADOW E&M-EST. PATIENT-LVL V: CPT | Mod: PBBFAC,,, | Performed by: INTERNAL MEDICINE

## 2020-06-19 PROCEDURE — 99999 PR PBB SHADOW E&M-EST. PATIENT-LVL IV: CPT | Mod: PBBFAC,,, | Performed by: ORTHOPAEDIC SURGERY

## 2020-06-19 PROCEDURE — 1101F PR PT FALLS ASSESS DOC 0-1 FALLS W/OUT INJ PAST YR: ICD-10-PCS | Mod: CPTII,S$GLB,, | Performed by: ORTHOPAEDIC SURGERY

## 2020-06-19 PROCEDURE — 1125F PR PAIN SEVERITY QUANTIFIED, PAIN PRESENT: ICD-10-PCS | Mod: S$GLB,,, | Performed by: INTERNAL MEDICINE

## 2020-06-19 PROCEDURE — 1159F PR MEDICATION LIST DOCUMENTED IN MEDICAL RECORD: ICD-10-PCS | Mod: S$GLB,,, | Performed by: ORTHOPAEDIC SURGERY

## 2020-06-19 PROCEDURE — 1126F PR PAIN SEVERITY QUANTIFIED, NO PAIN PRESENT: ICD-10-PCS | Mod: S$GLB,,, | Performed by: ORTHOPAEDIC SURGERY

## 2020-06-19 PROCEDURE — 99499 UNLISTED E&M SERVICE: CPT | Mod: S$GLB,,, | Performed by: INTERNAL MEDICINE

## 2020-06-19 PROCEDURE — 1101F PT FALLS ASSESS-DOCD LE1/YR: CPT | Mod: CPTII,S$GLB,, | Performed by: ORTHOPAEDIC SURGERY

## 2020-06-19 PROCEDURE — 99999 PR PBB SHADOW E&M-EST. PATIENT-LVL V: ICD-10-PCS | Mod: PBBFAC,,, | Performed by: INTERNAL MEDICINE

## 2020-06-19 PROCEDURE — 1125F AMNT PAIN NOTED PAIN PRSNT: CPT | Mod: S$GLB,,, | Performed by: INTERNAL MEDICINE

## 2020-06-19 PROCEDURE — 99204 PR OFFICE/OUTPT VISIT, NEW, LEVL IV, 45-59 MIN: ICD-10-PCS | Mod: S$GLB,,, | Performed by: INTERNAL MEDICINE

## 2020-06-19 PROCEDURE — 1126F AMNT PAIN NOTED NONE PRSNT: CPT | Mod: S$GLB,,, | Performed by: ORTHOPAEDIC SURGERY

## 2020-06-19 PROCEDURE — 99499 RISK ADDL DX/OHS AUDIT: ICD-10-PCS | Mod: S$GLB,,, | Performed by: INTERNAL MEDICINE

## 2020-06-19 PROCEDURE — 99999 PR PBB SHADOW E&M-EST. PATIENT-LVL IV: ICD-10-PCS | Mod: PBBFAC,,, | Performed by: ORTHOPAEDIC SURGERY

## 2020-06-19 PROCEDURE — 1159F MED LIST DOCD IN RCRD: CPT | Mod: S$GLB,,, | Performed by: INTERNAL MEDICINE

## 2020-06-19 PROCEDURE — 1159F PR MEDICATION LIST DOCUMENTED IN MEDICAL RECORD: ICD-10-PCS | Mod: S$GLB,,, | Performed by: INTERNAL MEDICINE

## 2020-06-19 PROCEDURE — 1101F PT FALLS ASSESS-DOCD LE1/YR: CPT | Mod: CPTII,S$GLB,, | Performed by: INTERNAL MEDICINE

## 2020-06-19 RX ORDER — UMECLIDINIUM BROMIDE AND VILANTEROL TRIFENATATE 62.5; 25 UG/1; UG/1
1 POWDER RESPIRATORY (INHALATION) DAILY
Qty: 1 EACH | Refills: 5 | Status: SHIPPED | OUTPATIENT
Start: 2020-06-19 | End: 2020-09-02

## 2020-06-19 NOTE — PROGRESS NOTES
Eli Vaz  was seen as a new patient at the request  Charles Moody Jr., MD for the evaluation of md/np.    CHIEF COMPLAINT:  Shortness of Breath      HISTORY OF PRESENT ILLNESS: Eli Vaz is a 74 y.o. female  has a past medical history of Arthritis, Atrial fibrillation, COPD (chronic obstructive pulmonary disease), Dialysis patient, Encounter for blood transfusion, and ESRD (end stage renal disease).  Patient smoked 1.5 ppd x 25 years.  Patient quit smoking around 2000.  Patient with chronic oliver x 1/2 block.  Patient with coughing and wheezing a/w dyspnea.  No fever/chills.  +3 pillows orthopnea.  No lower extremities edema.  No pnd.  Patient is home oxygen at night.  No chest pain.      Patient only is on prn albuterol neb or hfa.      PAST MEDICAL HISTORY:    Active Ambulatory Problems     Diagnosis Date Noted    COPD (chronic obstructive pulmonary disease) 11/20/2013    Diastolic dysfunction 11/20/2013    ESRD on dialysis 12/20/2013    Allergic rhinitis 01/14/2014    Loss of appetite 03/10/2014    Memory loss 03/10/2014    Anemia of chronic disease 03/11/2014    History of CVA (cerebrovascular accident) 03/13/2014    Hypertension associated with diabetes 10/23/2015    Diabetes mellitus with ESRD (end-stage renal disease) 12/01/2015    Combined hyperlipidemia associated with type 2 diabetes mellitus 12/01/2015    Anxiety 12/01/2015    Atherosclerosis of aorta 06/17/2016    Secondary hyperparathyroidism 10/11/2017    Retinal nerve fiber bundle defects 09/28/2018    Dyspnea on exertion 02/03/2019    Chronic left shoulder pain 02/11/2019    Non-rheumatic mitral regurgitation 03/06/2019    Persistent atrial fibrillation 03/19/2019    Benign essential hypertension 03/21/2019    Chronic respiratory failure with hypoxia 03/24/2019    Anemia 04/24/2019    Nuclear sclerosis, bilateral 05/08/2019    Degenerative arthritis of right knee 05/10/2019    Arthritis of left shoulder region  05/13/2019    Left shoulder pain 05/29/2019    Atrial flutter 06/04/2019    Weakness 01/03/2020    Hyperkalemia 01/03/2020    Pulmonary HTN 06/19/2020    Goals of care, counseling/discussion 06/19/2020     Resolved Ambulatory Problems     Diagnosis Date Noted    Difficulty walking 01/04/2016    Generalized weakness 01/04/2016    Nonintractable headache     Altered mental status 07/12/2017    Acute encephalopathy     Thrombocytopenia 10/11/2017    Leukopenia 10/11/2017    Vertigo     Left rotator cuff tear arthropathy 09/19/2018    Chest pain 02/03/2019    Atrial fibrillation with RVR 03/06/2019    Sepsis due to pneumonia 03/19/2019    Acute exacerbation of CHF (congestive heart failure) 03/20/2019    Bilateral pleural effusion 03/20/2019    Acute cardiac pulmonary edema 03/20/2019    Elevated troponin 03/20/2019    Elevated brain natriuretic peptide (BNP) level 03/20/2019    Sepsis 03/20/2019    Atrial fibrillation with RVR 03/20/2019    Diarrhea 03/24/2019    Atrial fibrillation with rapid ventricular response 04/01/2019    Hyperkalemia 01/02/2020     Past Medical History:   Diagnosis Date    Arthritis     Atrial fibrillation     Dialysis patient     Encounter for blood transfusion     ESRD (end stage renal disease)                 PAST SURGICAL HISTORY:    Past Surgical History:   Procedure Laterality Date    AV FISTULA PLACEMENT      EPIDURAL STEROID INJECTION Left 5/29/2019    Procedure: Axillary, Suprascapular, lateral pectoral nerve blocks;  Surgeon: Isrrael Barton Jr., MD;  Location: [facility] ENDO;  Service: Pain Management;  Laterality: Left;  Left Axillary, Suprascapular, Lateral Pectoral Nerve Blocks    59844    Arrive @ 0800; Eliquis; No DM; Confirm date with Armando    EPIDURAL STEROID INJECTION Left 6/19/2019    Procedure: Suprascapular, Axillary, and Lateral Pectoral Nerve Radiofrequency Ablations;  Surgeon: Isrrael Barton Jr., MD;  Location: [facility] ENDO;  Service:  Pain Management;  Laterality: Left;  Left Suprascapular, Axillary, & Lateral Pectoral Nerve Radiofrequency Ablations    30145    Arrive @ 1145; IV Sedation- Fasting; Eliquis; No DM; 17-50-2; Rep?    TREATMENT OF CARDIAC ARRHYTHMIA N/A 2019    Procedure: Cardioversion or Defibrillation;  Surgeon: Rakesh Briggs MD;  Location: [facility] CATH LAB;  Service: Cardiology;  Laterality: N/A;    TUBAL LIGATION           FAMILY HISTORY:                Family History   Problem Relation Age of Onset    Heart attack Sister     Heart attack Sister     Heart attack Mother        SOCIAL HISTORY:          Tobacco:   Social History     Tobacco Use   Smoking Status Former Smoker    Packs/day: 1.50    Years: 21.00    Pack years: 31.50    Start date: 1962    Quit date: 2000    Years since quittin.0   Smokeless Tobacco Never Used   Tobacco Comment    quit in      alcohol use:    Social History     Substance and Sexual Activity   Alcohol Use No    Frequency: Never    Drinks per session: Patient refused    Binge frequency: Never               Occupation:  Retire  at [employer]    ALLERGIES:  Review of patient's allergies indicates:  No Known Allergies    CURRENT MEDICATIONS:    Current Outpatient Medications   Medication Sig Dispense Refill    albuterol (PROVENTIL) 2.5 mg /3 mL (0.083 %) nebulizer solution Take 3 mLs (2.5 mg total) by nebulization every 4 (four) hours. 1 Box 11    albuterol (PROVENTIL/VENTOLIN HFA) 90 mcg/actuation inhaler INHALE 2 PUFFS INTO LUNGS EVERY 4 HOURS AS NEEDED FOR SHORTNESS OF BREATH OR WHEEZING. RESCUE 18 g 5    albuterol-ipratropium (DUO-NEB) 2.5 mg-0.5 mg/3 mL nebulizer solution Take 3 mLs by nebulization every 4 (four) hours as needed for Wheezing. Rescue 1 Box 1    amiodarone (PACERONE) 200 MG Tab Take 1 tablet (200 mg total) by mouth 2 (two) times daily. (Patient taking differently: Take 200 mg by mouth once daily. ) 180 tablet 3    apixaban (ELIQUIS) 2.5 mg Tab  Take 1 tablet (2.5 mg total) by mouth 2 (two) times daily. 180 tablet 3    atorvastatin (LIPITOR) 10 MG tablet Take 1 tablet (10 mg total) by mouth once daily. 90 tablet 1    B complex w-C no.20/folic acid (RENAL CAPS ORAL) Take by mouth.      calcium acetate (PHOSLO) 667 mg capsule Take 667 mg by mouth 3 (three) times daily with meals.      cefdinir (OMNICEF) 300 MG capsule Take 1 capsule (300 mg total) by mouth every 48 hours. 4 capsule 0    fluticasone propionate (FLONASE) 50 mcg/actuation nasal spray 1 spray by Nasal route.      fluticasone propionate (FLONASE) 50 mcg/actuation nasal spray INHALE 2 SPRAYS (100 MCG TOTAL) BY EACH NOSTRIL ROUTE ONCE DAILY. 16 mL 0    HYDROcodone-acetaminophen (NORCO) 5-325 mg per tablet Take 1 tablet by mouth every 12 (twelve) hours as needed for Pain. 60 tablet 0    ipratropium (ATROVENT) 42 mcg (0.06 %) nasal spray 2 sprays by Nasal route 4 (four) times daily. 15 mL 0    ipratropium-albuterol (COMBIVENT RESPIMAT)  mcg/actuation inhaler INHALE 1 PUFF INTO THE LUNGS DAILY.      ketoconazole (NIZORAL) 2 % cream Apply topically once daily. 1 Tube 3    mirtazapine (REMERON) 7.5 MG Tab TAKE 1 TABLET BY MOUTH AT BEDTIME AS NEEDED FOR SLEEP 90  3    montelukast (SINGULAIR) 10 mg tablet Take 1 tablet (10 mg total) by mouth every evening. 90 tablet 3    paroxetine (PAXIL) 30 MG tablet 1 TABLET IN THE MORNING ONCE A DAY ORALLY 90  3    sevelamer carbonate (RENVELA) 800 mg Tab TAKE 3 TABLETS BY MOUTH WITH EACH MEAL AND 1 TABLET WITH EACH SNACK  3    umeclidinium-vilanteroL (ANORO ELLIPTA) 62.5-25 mcg/actuation DsDv Inhale 1 puff into the lungs once daily. Controller 1 each 5    vit B,C-iron fum-FA-D3-zinc ox 8 mg iron-800 mcg-1,000 unit Tab Take by mouth.       No current facility-administered medications for this visit.      Facility-Administered Medications Ordered in Other Visits   Medication Dose Route Frequency Provider Last Rate Last Dose    0.9%  NaCl infusion   " Intravenous Continuous Alaina Chaudhari, NP        0.9%  NaCl infusion   Intravenous Continuous Alaina Chaudhari, NP        sodium chloride 0.9% flush 5 mL  5 mL Intravenous PRN Alaina Chaudhari NP        sodium chloride 0.9% flush 5 mL  5 mL Intravenous PRN Alaina Chaudhari, ILEANA                      REVIEW OF SYSTEMS:     Pulmonary related symptoms as per HPI.  Gen:  no weight loss, no fever, no night sweat  HEENT:  no visual changes, no sore throat, no hearing loss  CV:  Per hpi  GI:  no melena, no hematochezia, no diarhea, no constipation.  :  no dysuria, no hematuria, no hesistancy, no dribbling  Neuro:  no syncope, no vertigo, no tinitus  Psych:  No homocide or suicide ideation; no depression.  Anxious.    Endocrine:  No heat or cold intolerance.  Sleep:  No snoring; no witnessed apnea.  Sleep thru the night.  Feeling rested upon awake.    Otherwise, a balance of systems reviewed is negative.          PHYSICAL EXAM:  Vitals:    06/19/20 1410   BP: (!) 170/72   Pulse: 70   Resp: 14   Temp: 97.3 °F (36.3 °C)   SpO2: 96%   Weight: 59.8 kg (131 lb 15.1 oz)   Height: 5' 3" (1.6 m)   PainSc:   5   PainLoc: Knee     Body mass index is 23.37 kg/m².     GENERAL:  well develop; no apparent distress  HEENT:  no nasal congestion; no discharge noted; class 4 modified mallampatti.  +denture.     NECK:  supple; no palpable masses.  CARDIO: regular rate and rhythm  PULM:  clear to auscultation bilaterally; no intercostals retractions; no accessory muscle usage   ABDOMEN:  soft nontender/nondistended.  +bowel sound  EXTREMITIES no cce  NEURO:  CN II-XII intact.  5/5 motor in all extremities.  sensation grossly intact   to light touch.  PSYCH:  normal affect.  Alert and oriented x 4    LABS  Pulmonary Functions Testing Results(personally reviewed):  none  ABG (personally reviewed):  3/7/19 7.34/39/134/21 3 lpm nasal canula  CXR (personally reviewed):  Ratio of 56%; fev1 46%; fvc 58%; tlc 39%; dlco 23%  CT " CHEST(personally reviewed):  3/19/19 bilateral effusion with bilateral ggo     Echo 12/20/19  · Concentric left ventricular hypertrophy.  · Normal left ventricular systolic function. The estimated ejection fraction is 55%  · Normal LV diastolic function.  · Normal right ventricular systolic function.  · Mild left atrial enlargement.  · Mild-to-moderate aortic regurgitation.  · Moderate mitral regurgitation.  · Moderate tricuspid regurgitation.  · Severe pulmonary hypertension present.  · Elevated central venous pressure (15 mm Hg).  · The estimated PA systolic pressure is 105 mm Hg    ASSESSMENT/PLAN  Problem List Items Addressed This Visit     COPD (chronic obstructive pulmonary disease) (Chronic)    Overview     fev1 46%.           Current Assessment & Plan     recommend repeat pft.  Will start lama.  Patient declined pft due to covid swab requirement.  Already with home oxygen.           Relevant Medications    umeclidinium-vilanteroL (ANORO ELLIPTA) 62.5-25 mcg/actuation DsDv    Dyspnea on exertion    Overview     pulmonary htn + copd + afib.  Currently fluid optimized.  nsr s/p cardioversion.  cxr much improved when compared to 3/19.          Goals of care, counseling/discussion    Current Assessment & Plan     patient does not wish for intubation or cpr in the case of cardiopulmonary arrest.  Advise patient to make her wish known to children.   Encourage living will.  Goals of care brochure given to patient.           Pulmonary HTN    Overview     Group 2.  bp and fluid optimization per renal.             Other Visit Diagnoses     Shortness of breath              Pleural effusion - bilateral on ct/cxr 3/19.  No effusion most recent cxr.  Suspect volume is the culprit.      Patient will No follow-ups on file. with md/np.    CC: Send copy of this note to Charles Moody Jr., MD

## 2020-06-19 NOTE — PROGRESS NOTES
Follow up visit    History of Present Illness:    Eli comes to the office for follow up evaluation of bilateral knee swelling.  She experienced a flare of swelling and pain around [place] elbow this has resolved.  She is unable to take anti-inflammatories due to her history of ESRD on HD    ROS: unremarkable and no change since last visit    Physical Examination:    NAD  Right knee   Mild effusion  Active range of motion 0-130  Tender palpation medial joint line and lateral joint line    Radiographic imaging:  No new imaging    Assessment/Plan:  74 y.o. female  with Right knee arthritis, effusion    We discussed the etiology of persistent pain and further treatment options.  1. She is not interested in an aspiration or injection today  2. Tylenol p.r.n. pain  3. Return to clinic p.r.n.    All questions were answered in detail. The patient  verbalized the understanding of the treatment plan and is in full agreement with the treatment plan.

## 2020-06-19 NOTE — LETTER
June 19, 2020      Charles Moody Jr., MD  [address] [facility] [place] [address]           [facility]  [address]  [facility] [address]  Phone: [phone]  Fax: [phone]          Patient: Eli Vaz   MR Number: [id number]   YOB: 1945   Date of Visit: 6/19/2020       Dear Dr. Charles Moody Jr.:    Thank you for referring Eli Vaz to me for evaluation. Attached you will find relevant portions of my assessment and plan of care.    If you have questions, please do not hesitate to call me. I look forward to following Eli Vaz along with you.    Sincerely,    Akshat Aburto MD    Enclosure  CC:  No Recipients    If you would like to receive this communication electronically, please contact [email or web address] or [phone] to request more information on [facility] access.    For providers and/or their staff who would like to refer a patient to [facility], please contact us through our one-stop-shop provider referral line, [facility], at [phone].    If you feel you have received this communication in error or would no longer like to receive these types of communications, please e-mail [email or web address]

## 2020-06-19 NOTE — ASSESSMENT & PLAN NOTE
recommend repeat pft.  Will start lama.  Patient declined pft due to covid swab requirement.  Already with home oxygen.

## 2020-06-19 NOTE — ASSESSMENT & PLAN NOTE
patient does not wish for intubation or cpr in the case of cardiopulmonary arrest.  Advise patient to make her wish known to children.   Encourage living will.  Goals of care brochure given to patient.

## 2020-07-13 ENCOUNTER — TELEPHONE (OUTPATIENT)
Dept: FAMILY MEDICINE | Facility: CLINIC | Age: 75
End: 2020-07-13

## 2020-07-13 NOTE — TELEPHONE ENCOUNTER
----- Message from Mira Green sent at 7/13/2020 12:13 PM CDT -----  Regarding: pt's daughter albina [phone]  Type: Patient Call Back    Who called:pt    What is the request in detail:following up on form for handicapped sign for her car. Second call    Can the clinic reply by [facility]?    Would the patient rather a call back or a response via [facility]? call    Best call back number:pt's daughter albina [phone]    Additional Information:

## 2020-07-14 ENCOUNTER — TELEPHONE (OUTPATIENT)
Dept: FAMILY MEDICINE | Facility: CLINIC | Age: 75
End: 2020-07-14

## 2020-07-14 DIAGNOSIS — J32.9 RHINOSINUSITIS: ICD-10-CM

## 2020-07-14 RX ORDER — PREDNISONE 20 MG/1
20 TABLET ORAL DAILY
Qty: 3 TABLET | Refills: 0 | OUTPATIENT
Start: 2020-07-14 | End: 2020-07-17

## 2020-07-14 NOTE — TELEPHONE ENCOUNTER
Called pt's daughter Dilshad and she states that Prednisone was an automatic refill request sent from pharmacy and that pt no longer uses medication.

## 2020-07-15 ENCOUNTER — PATIENT OUTREACH (OUTPATIENT)
Dept: ADMINISTRATIVE | Facility: HOSPITAL | Age: 75
End: 2020-07-15

## 2020-07-15 DIAGNOSIS — R06.02 SHORTNESS OF BREATH: Primary | ICD-10-CM

## 2020-07-15 RX ORDER — ALBUTEROL SULFATE 0.83 MG/ML
2.5 SOLUTION RESPIRATORY (INHALATION) EVERY 4 HOURS
Qty: 1 BOX | Refills: 11 | Status: SHIPPED | OUTPATIENT
Start: 2020-07-15 | End: 2021-01-01 | Stop reason: SDUPTHER

## 2020-07-16 DIAGNOSIS — J44.9 CHRONIC OBSTRUCTIVE PULMONARY DISEASE, UNSPECIFIED COPD TYPE: ICD-10-CM

## 2020-07-16 DIAGNOSIS — J32.9 RHINOSINUSITIS: ICD-10-CM

## 2020-07-16 RX ORDER — ALBUTEROL SULFATE 90 UG/1
AEROSOL, METERED RESPIRATORY (INHALATION)
Qty: 18 G | Refills: 5 | Status: SHIPPED | OUTPATIENT
Start: 2020-07-16 | End: 2020-01-01 | Stop reason: SDUPTHER

## 2020-07-17 ENCOUNTER — PATIENT OUTREACH (OUTPATIENT)
Dept: ADMINISTRATIVE | Facility: HOSPITAL | Age: 75
End: 2020-07-17

## 2020-08-03 ENCOUNTER — TELEPHONE (OUTPATIENT)
Dept: FAMILY MEDICINE | Facility: CLINIC | Age: 75
End: 2020-08-03

## 2020-08-03 NOTE — TELEPHONE ENCOUNTER
"----- Message from Harmony Lazar sent at 8/3/2020  1:37 PM CDT -----  Regarding: Patient access  Name of Who is Calling:   Eli Vaz    What is the request in detail:  Patient called stating, "she's returning your call and would like you to please call again."      THANKS!    Reply by [facility]:  no      Call Back:  [phone]                                        "

## 2020-08-06 ENCOUNTER — PATIENT OUTREACH (OUTPATIENT)
Dept: ADMINISTRATIVE | Facility: OTHER | Age: 75
End: 2020-08-06

## 2020-08-11 RX ORDER — FLUTICASONE PROPIONATE 50 MCG
1 SPRAY, SUSPENSION (ML) NASAL DAILY PRN
Qty: 15.8 ML | Refills: 1 | Status: SHIPPED | OUTPATIENT
Start: 2020-08-11 | End: 2020-09-04

## 2020-08-17 ENCOUNTER — OFFICE VISIT (OUTPATIENT)
Dept: PAIN MEDICINE | Facility: CLINIC | Age: 75
End: 2020-08-17
Payer: MEDICARE

## 2020-08-17 VITALS
HEIGHT: 63 IN | OXYGEN SATURATION: 94 % | BODY MASS INDEX: 23.55 KG/M2 | TEMPERATURE: 99 F | DIASTOLIC BLOOD PRESSURE: 78 MMHG | HEART RATE: 73 BPM | WEIGHT: 132.94 LBS | SYSTOLIC BLOOD PRESSURE: 165 MMHG

## 2020-08-17 DIAGNOSIS — G89.29 CHRONIC LEFT SHOULDER PAIN: Primary | ICD-10-CM

## 2020-08-17 DIAGNOSIS — M19.012 ARTHRITIS OF LEFT SHOULDER REGION: ICD-10-CM

## 2020-08-17 DIAGNOSIS — M25.512 CHRONIC LEFT SHOULDER PAIN: Primary | ICD-10-CM

## 2020-08-17 PROCEDURE — 1159F MED LIST DOCD IN RCRD: CPT | Mod: S$GLB,,, | Performed by: PAIN MEDICINE

## 2020-08-17 PROCEDURE — 1125F AMNT PAIN NOTED PAIN PRSNT: CPT | Mod: S$GLB,,, | Performed by: PAIN MEDICINE

## 2020-08-17 PROCEDURE — 3008F PR BODY MASS INDEX (BMI) DOCUMENTED: ICD-10-PCS | Mod: CPTII,S$GLB,, | Performed by: PAIN MEDICINE

## 2020-08-17 PROCEDURE — 99214 OFFICE O/P EST MOD 30 MIN: CPT | Mod: S$GLB,,, | Performed by: PAIN MEDICINE

## 2020-08-17 PROCEDURE — 1101F PT FALLS ASSESS-DOCD LE1/YR: CPT | Mod: CPTII,S$GLB,, | Performed by: PAIN MEDICINE

## 2020-08-17 PROCEDURE — 99499 UNLISTED E&M SERVICE: CPT | Mod: S$GLB,,, | Performed by: PAIN MEDICINE

## 2020-08-17 PROCEDURE — 1159F PR MEDICATION LIST DOCUMENTED IN MEDICAL RECORD: ICD-10-PCS | Mod: S$GLB,,, | Performed by: PAIN MEDICINE

## 2020-08-17 PROCEDURE — 99999 PR PBB SHADOW E&M-EST. PATIENT-LVL V: CPT | Mod: PBBFAC,,, | Performed by: PAIN MEDICINE

## 2020-08-17 PROCEDURE — 1101F PR PT FALLS ASSESS DOC 0-1 FALLS W/OUT INJ PAST YR: ICD-10-PCS | Mod: CPTII,S$GLB,, | Performed by: PAIN MEDICINE

## 2020-08-17 PROCEDURE — 3008F BODY MASS INDEX DOCD: CPT | Mod: CPTII,S$GLB,, | Performed by: PAIN MEDICINE

## 2020-08-17 PROCEDURE — 99999 PR PBB SHADOW E&M-EST. PATIENT-LVL V: ICD-10-PCS | Mod: PBBFAC,,, | Performed by: PAIN MEDICINE

## 2020-08-17 PROCEDURE — 99214 PR OFFICE/OUTPT VISIT, EST, LEVL IV, 30-39 MIN: ICD-10-PCS | Mod: S$GLB,,, | Performed by: PAIN MEDICINE

## 2020-08-17 PROCEDURE — 99499 RISK ADDL DX/OHS AUDIT: ICD-10-PCS | Mod: S$GLB,,, | Performed by: PAIN MEDICINE

## 2020-08-17 PROCEDURE — 1125F PR PAIN SEVERITY QUANTIFIED, PAIN PRESENT: ICD-10-PCS | Mod: S$GLB,,, | Performed by: PAIN MEDICINE

## 2020-08-17 RX ORDER — FLUTICASONE PROPIONATE AND SALMETEROL 250; 50 UG/1; UG/1
1 POWDER RESPIRATORY (INHALATION) 2 TIMES DAILY
COMMUNITY
Start: 2020-06-16 | End: 2020-09-02

## 2020-08-17 RX ORDER — HYDROCODONE BITARTRATE AND ACETAMINOPHEN 5; 325 MG/1; MG/1
1 TABLET ORAL EVERY 12 HOURS PRN
Qty: 60 TABLET | Refills: 0 | Status: SHIPPED | OUTPATIENT
Start: 2020-08-17 | End: 2020-01-01

## 2020-08-17 RX ORDER — HYDROCODONE BITARTRATE AND ACETAMINOPHEN 5; 325 MG/1; MG/1
1 TABLET ORAL EVERY 12 HOURS PRN
Qty: 60 TABLET | Refills: 0 | Status: SHIPPED | OUTPATIENT
Start: 2020-01-01 | End: 2020-01-01

## 2020-08-17 NOTE — PROGRESS NOTES
Subjective:     Patient ID: Eli Vaz is a 74 y.o. female    Chief Complaint: Shoulder Pain      Referred by: No ref. provider found      HPI:    Interval History (8/17/20):  She returns today for follow up.  She reports that she continues to have significant left shoulder pain.  This is unchanged in quality location since last encounter.  She denies any new or worsening symptoms.  She has been taking Norco 5-325 mg q.12 hours p.r.n..  She has been running low on this medication and therefore has been taking half pills.  She denies any adverse effects of this medication.      Interval History (4/17/20):    The patient location is: Home  The chief complaint leading to consultation is: follow up  Visit type: Virtual visit with synchronous audio and video  Total time spent with patient: 12 minutes  Each patient to whom he or she provides medical services by telemedicine is:  (1) informed of the relationship between the physician and patient and the respective role of any other health care provider with respect to management of the patient; and (2) notified that he or she may decline to receive medical services by telemedicine and may withdraw from such care at any time.      She returns today for follow up.  She reports that Norco 5-325mg q12h has been helpful for the left shoulder pain.  She denies any changes in the quality or location of her pain. She denies any new or worsening symptoms.  She denies any adverse effects from her pain medications. She feels this medication adequately controlled her pain and does not feel that any changes are needed at this time.        Interval History (2/7/20):  She returns today for follow up.  She reports that Norco 5-325 mg q.12 hours p.r.n. has been helpful for the left shoulder pain.  She denies any changes in the quality or location of her pain. She denies any new or worsening symptoms.  She denies any adverse effects from her pain medications. She feels this medication  adequately controlled her pain and does not feel that any changes are needed at this time.      Interval History (11/22/19):  She returns today for follow up.  She reports that she is no longer getting any relief from left shoulder radiofrequency ablation.  She denies any changes in the quality or location of her pain but does state that is more bothersome and limits her ADLs more than previously.  Patient states that she has taken Norco 5-325 mg b.i.d. p.r.n..  This medication greatly improved her pain and function.  She denies any adverse effects from this medication.  She no longer has any more this medication.      Interval History (8/5/19):  She returns today for follow up.  She reports that she continues to get at least 75% relief of her left shoulder pain following left shoulder RFA.  She states that is still quite painful to perform certain ADLs including getting dressed.  She has not yet filled the prescription for [place] provided at last visit.  She feels as though she may require this medicine to help with certain activities throughout her day.      Interval History (7/8/19):  She returns today for follow up.  She reports that left shoulder radiofrequency ablation has been helpful for the left shoulder pain. She reports about 70% relief of her pain.  She also reports some minimally improved range of motion.  She still taking Norco 7.5-325 mg b.i.d. p.r.n..      Interval History (5/13/19):  She returns today for follow up.  She reports that her left shoulder pain is unchanged in quality location since last encounter.  She has been evaluated by Orthopedic surgery and no surgical options are being pursued at this time. She states that Norco 7.5-325 mg q.8 hours has been helpful for her pain. She denies any adverse effects with this medication.      Interval History (2/11/19):  She returns today for follow up.  She reports that Norco 7.5-325 half pill q.4 hours has been helpful for the left shoulder pain.  Patient states that this provides about 50% relief of her pain.  She no longer has pain while at rest and does note slightly improved range of motion of her left shoulder with minimal to no pain. She still has significantly limited range of motion and function of her left upper extremity as result.  She has discussed potential arthroplasty with Dr. Zurita.  She plans to discuss this option with the rest of her treatment team to see if it is something she wants to proceed with.  She denies any adverse effects from taking her pain medication.  Specifically she denies any sedation or constipation.  She does not feels the higher doses are needed at this time as she is worried about adverse effects and feels as though her current dosage provides adequate relief.      Initial Encounter (1/21/19):  Eli Vaz is a 74 y.o. female who presents today with chronic left shoulder pain. Patient was referred by Dr. Zurita.  She has known left shoulder arthritis and rotator cuff pathology.  Limited surgical and interventional options given medical comorbidities patient has end-stage renal disease on dialysis.  She has failed shoulder injections including Synvisc.  She states that her left shoulder pain is very severe and limits her activities.  She wants somebody to cut it off.    This pain is described in detail below.    Physical Therapy:  Unlikely to be of benefit    Non-pharmacologic Treatment:  Nothing helps         · TENS?  No    Pain Medications:         · Currently taking:  Norco 5-325 mg q.12 hours p.r.n.    · Has tried in the past:  Tramadol, Percocet, cannot take NSAIDs due to renal disease, Norco    · Has not tried: NSAIDs, Muscle relaxants, TCAs, SNRIs, anticonvulsants, topical creams    Blood thinners:  Eliquis    Interventional Therapies:    Previous shoulder injections including Synvisc.  6/19/19 - left shoulder radiofrequency ablation - 70% relief    Relevant Surgeries:  None    Affecting sleep?   Yes    Affecting daily activities? yes    Depressive symptoms? yes          · SI/HI? No    Work status: Unemployed    Pain Scores:    Best:       5/10  Worst:     8/10  Usually:   5/10  Today:    5/10    Review of Systems   Constitutional: Negative for activity change, appetite change, chills, fatigue, fever and unexpected weight change.   HENT: Negative for hearing loss.    Eyes: Negative for visual disturbance.   Respiratory: Negative for chest tightness and shortness of breath.    Cardiovascular: Negative for chest pain.   Gastrointestinal: Negative for abdominal pain, constipation, diarrhea, nausea and vomiting.   Genitourinary: Negative for difficulty urinating.   Musculoskeletal: Positive for arthralgias and myalgias. Negative for back pain, gait problem and neck pain.   Skin: Negative for rash.   Neurological: Positive for weakness. Negative for dizziness, light-headedness, numbness and headaches.   Psychiatric/Behavioral: Positive for sleep disturbance. Negative for hallucinations and suicidal ideas. The patient is not nervous/anxious.        Past Medical History:   Diagnosis Date    Arthritis     Atrial fibrillation     COPD (chronic obstructive pulmonary disease)     Dialysis patient     Encounter for blood transfusion     ESRD (end stage renal disease)        Past Surgical History:   Procedure Laterality Date    AV FISTULA PLACEMENT      EPIDURAL STEROID INJECTION Left 5/29/2019    Procedure: Axillary, Suprascapular, lateral pectoral nerve blocks;  Surgeon: Isrrael Barton Jr., MD;  Location: [facility] ENDO;  Service: Pain Management;  Laterality: Left;  Left Axillary, Suprascapular, Lateral Pectoral Nerve Blocks    12[address]    Arrive @ 0800; Eliquis; No DM; Confirm date with Armando    EPIDURAL STEROID INJECTION Left 6/19/2019    Procedure: Suprascapular, Axillary, and Lateral Pectoral Nerve Radiofrequency Ablations;  Surgeon: Isrrael Barton Jr., MD;  Location: [facility] ENDO;  Service: Pain  Management;  Laterality: Left;  Left Suprascapular, Axillary, & Lateral Pectoral Nerve Radiofrequency Ablations    26586    Arrive @ 1145; IV Sedation- Fasting; Eliquis; No DM; 17-50-2; Rep?    TREATMENT OF CARDIAC ARRHYTHMIA N/A 2019    Procedure: Cardioversion or Defibrillation;  Surgeon: Rakesh Briggs MD;  Location: [facility] CATH LAB;  Service: Cardiology;  Laterality: N/A;    TUBAL LIGATION         Social History     Socioeconomic History    Marital status:      Spouse name: Not on file    Number of children: Not on file    Years of education: Not on file    Highest education level: Not on file   Occupational History    Not on file   Social Needs    Financial resource strain: Not hard at all    Food insecurity     Worry: Never true     Inability: Never true    Transportation needs     Medical: No     Non-medical: No   Tobacco Use    Smoking status: Former Smoker     Packs/day: 1.50     Years: 21.00     Pack years: 31.50     Start date: 1962     Quit date: 2000     Years since quittin.1    Smokeless tobacco: Never Used    Tobacco comment: quit in    Substance and Sexual Activity    Alcohol use: No     Frequency: Never     Drinks per session: Patient refused     Binge frequency: Never    Drug use: No    Sexual activity: Not on file   Lifestyle    Physical activity     Days per week: Not on file     Minutes per session: Not on file    Stress: Not on file   Relationships    Social connections     Talks on phone: More than three times a week     Gets together: Once a week     Attends [employer] service: Not on file     Active member of club or organization: No     Attends meetings of clubs or organizations: Never     Relationship status:    Other Topics Concern    Not on file   Social History Narrative    Not on file       Review of patient's allergies indicates:  No Known Allergies    Current Outpatient Medications on File Prior to Visit   Medication Sig  Dispense Refill    albuterol (PROVENTIL) 2.5 mg /3 mL (0.083 %) nebulizer solution Take 3 mLs (2.5 mg total) by nebulization every 4 (four) hours. 1 Box 11    albuterol (PROVENTIL/VENTOLIN HFA) 90 mcg/actuation inhaler INHALE 2 PUFFS INTO LUNGS EVERY 4 HOURS AS NEEDED FOR SHORTNESS OF BREATH OR WHEEZING. RESCUE 18 g 5    albuterol-ipratropium (DUO-NEB) 2.5 mg-0.5 mg/3 mL nebulizer solution Take 3 mLs by nebulization every 4 (four) hours as needed for Wheezing. Rescue 1 Box 1    amiodarone (PACERONE) 200 MG Tab TAKE 1 TABLET BY MOUTH EVERY DAY 90 tablet 3    apixaban (ELIQUIS) 2.5 mg Tab Take 1 tablet (2.5 mg total) by mouth 2 (two) times daily. 180 tablet 3    atorvastatin (LIPITOR) 10 MG tablet Take 1 tablet (10 mg total) by mouth once daily. 90 tablet 1    B complex w-C no.20/folic acid (RENAL CAPS ORAL) Take by mouth.      calcium acetate (PHOSLO) 667 mg capsule Take 667 mg by mouth 3 (three) times daily with meals.      cefdinir (OMNICEF) 300 MG capsule Take 1 capsule (300 mg total) by mouth every 48 hours. 4 capsule 0    fluticasone propionate (FLONASE) 50 mcg/actuation nasal spray INHALE 2 SPRAYS (100 MCG TOTAL) BY EACH NOSTRIL ROUTE ONCE DAILY. 16 mL 0    fluticasone propionate (FLONASE) 50 mcg/actuation nasal spray 1 spray (50 mcg total) by Each Nostril route daily as needed for Rhinitis. 15.8 mL 1    ipratropium (ATROVENT) 42 mcg (0.06 %) nasal spray 2 sprays by Nasal route 4 (four) times daily. 15 mL 0    ipratropium-albuterol (COMBIVENT RESPIMAT)  mcg/actuation inhaler INHALE 1 PUFF INTO THE LUNGS DAILY.      ketoconazole (NIZORAL) 2 % cream Apply topically once daily. 1 Tube 3    mirtazapine (REMERON) 7.5 MG Tab TAKE 1 TABLET BY MOUTH AT BEDTIME AS NEEDED FOR SLEEP 90  3    montelukast (SINGULAIR) 10 mg tablet Take 1 tablet (10 mg total) by mouth every evening. 90 tablet 3    paroxetine (PAXIL) 30 MG tablet 1 TABLET IN THE MORNING ONCE A DAY ORALLY 90  3    sevelamer carbonate  "(RENVELA) 800 mg Tab TAKE 3 TABLETS BY MOUTH WITH EACH MEAL AND 1 TABLET WITH EACH SNACK  3    umeclidinium-vilanteroL (ANORO ELLIPTA) 62.5-25 mcg/actuation DsDv Inhale 1 puff into the lungs once daily. Controller 1 each 5    vit B,C-iron fum-FA-D3-zinc ox 8 mg iron-800 mcg-1,000 unit Tab Take by mouth.      WIXELA INHUB 250-50 mcg/dose diskus inhaler Inhale 1 puff into the lungs 2 (two) times daily.       Current Facility-Administered Medications on File Prior to Visit   Medication Dose Route Frequency Provider Last Rate Last Dose    0.9%  NaCl infusion   Intravenous Continuous Alaina Chaudhari, NP        0.9%  NaCl infusion   Intravenous Continuous Alaina VANITA Chaudhari, NP        sodium chloride 0.9% flush 5 mL  5 mL Intravenous PRN Alaina Chaudhari, NP        sodium chloride 0.9% flush 5 mL  5 mL Intravenous PRN Alaina Chaudhari, NP           Objective:      BP (!) 165/78 (BP Location: Left arm, Patient Position: Sitting, BP Method: Medium (Automatic))   Pulse 73   Temp 99.1 °F (37.3 °C) (Oral)   Ht 5' 3" (1.6 m)   Wt 60.3 kg (132 lb 15 oz)   SpO2 (!) 94%   BMI 23.55 kg/m²     Exam:  GEN:  Well developed, well nourished.  No acute distress.   HEENT:  No trauma.  Mucous membranes moist.  Nares patent bilaterally.  PSYCH: Normal affect. Thought content appropriate.  CHEST:  Breathing symmetric.  No audible wheezing.  ABD: Soft, non-distended.  SKIN:  Warm, pink, dry.  No rash on exposed areas.    EXT:  No cyanosis, clubbing, or edema.  No color change or changes in nail or hair growth.  NEURO/MUSCULOSKELETAL:  Fully alert, oriented, and appropriate. Speech normal merrill. No cranial nerve deficits.   Gait:  Antalgic.  No focal motor deficits.     Severely limited range of motion of left shoulder with pain            Imaging:  Narrative     EXAMINATION:  XR SHOULDER COMPLETE 2 OR MORE VIEWS LEFT    CLINICAL HISTORY:  Pain in left shoulder    TECHNIQUE:  Two or three views of the left " no weight-bearing restrictions shoulder were performed.    COMPARISON:  None    FINDINGS:  The bones are intact.  There is no evidence for acute fracture or bone destruction.  There are advanced degenerative changes of the left glenohumeral joint with marked joint space narrowing with subchondral sclerosis and osteophyte formation.  There is cortical irregularity at the articular surfaces of the glenohumeral joint.  Multiple vascular stents project over the left upper arm and left subclavian region with multiple surgical clips also present.  Atherosclerotic calcification is present within the thoracic aorta as well as within the carotid arteries.   Impression       Advanced degenerative changes of the left glenohumeral joint.    No evidence for acute fracture, bone destruction, or dislocation.    Surgical changes with multiple vascular stents.    Extensive atherosclerosis.      Electronically signed by: Tone Pereyra MD  Date: 09/18/2018  Time: 07:58         Assessment:       Encounter Diagnoses   Name Primary?    Chronic left shoulder pain Yes    Arthritis of left shoulder region          Plan:       Eli was seen today for shoulder pain.    Diagnoses and all orders for this visit:    Chronic left shoulder pain  -     HYDROcodone-acetaminophen (NORCO) 5-325 mg per tablet; Take 1 tablet by mouth every 12 (twelve) hours as needed for Pain.  -     HYDROcodone-acetaminophen (NORCO) 5-325 mg per tablet; Take 1 tablet by mouth every 12 (twelve) hours as needed for Pain.  -     HYDROcodone-acetaminophen (NORCO) 5-325 mg per tablet; Take 1 tablet by mouth every 12 (twelve) hours as needed for Pain.    Arthritis of left shoulder region  -     HYDROcodone-acetaminophen (NORCO) 5-325 mg per tablet; Take 1 tablet by mouth every 12 (twelve) hours as needed for Pain.  -     HYDROcodone-acetaminophen (NORCO) 5-325 mg per tablet; Take 1 tablet by mouth every 12 (twelve) hours as needed for Pain.  -     HYDROcodone-acetaminophen (NORCO) 5-325 mg per tablet;  Take 1 tablet by mouth every 12 (twelve) hours as needed for Pain.        Eli Vaz is a 74 y.o. female with chronic left shoulder pain secondary to rotator cuff and glenohumeral pathology.  Not a candidate for left shoulder arthroplasty.  Very good improvement following left shoulder radiofrequency ablation but relief did not last very long.    1.  We had a lengthy discussion regarding the potential adverse effects of chronic opioid medications.  We discussed that it may be appropriate to continue the use of opioid pain medications, but that we should use the lowest dose/potency possible.  Patient expressed understanding.  2.  Continue Norco 5-325 mg q.12 hours p.r.n..  2 additional 1-month prescriptions of 60 pills per month were provided today.  3.  Prescription monitoring port was reviewed today.  No inconsistencies were noted.  4.  Pain contract signed.  5.  Return to clinic in 3 months or sooner if needed.

## 2020-08-31 ENCOUNTER — TELEPHONE (OUTPATIENT)
Dept: ELECTROPHYSIOLOGY | Facility: CLINIC | Age: 75
End: 2020-08-31

## 2020-08-31 NOTE — TELEPHONE ENCOUNTER
Spoke with pt to schedule f/u appts with us in clinic.  Advised that Jossy will call her to schedule Zio appt  ----- Message from Barb Cano sent at 2020  2:30 PM CDT -----  Regardinm FU  Daughter is calling to get 6m FU schedule w/ZIO & labs, unable to schedule recall. Thanks    [phone] Johnson

## 2020-09-02 ENCOUNTER — OFFICE VISIT (OUTPATIENT)
Dept: PULMONOLOGY | Facility: CLINIC | Age: 75
End: 2020-09-02
Payer: MEDICARE

## 2020-09-02 ENCOUNTER — DOCUMENTATION ONLY (OUTPATIENT)
Dept: CARDIOLOGY | Facility: HOSPITAL | Age: 75
End: 2020-09-02

## 2020-09-02 VITALS
SYSTOLIC BLOOD PRESSURE: 164 MMHG | BODY MASS INDEX: 22.97 KG/M2 | DIASTOLIC BLOOD PRESSURE: 71 MMHG | TEMPERATURE: 98 F | WEIGHT: 129.63 LBS | HEIGHT: 63 IN | HEART RATE: 67 BPM

## 2020-09-02 DIAGNOSIS — J43.2 CENTRILOBULAR EMPHYSEMA: Chronic | ICD-10-CM

## 2020-09-02 DIAGNOSIS — Z71.89 GOALS OF CARE, COUNSELING/DISCUSSION: ICD-10-CM

## 2020-09-02 DIAGNOSIS — R06.09 DYSPNEA ON EXERTION: ICD-10-CM

## 2020-09-02 PROCEDURE — 99214 PR OFFICE/OUTPT VISIT, EST, LEVL IV, 30-39 MIN: ICD-10-PCS | Mod: S$GLB,,, | Performed by: INTERNAL MEDICINE

## 2020-09-02 PROCEDURE — 99999 PR PBB SHADOW E&M-EST. PATIENT-LVL IV: CPT | Mod: PBBFAC,,, | Performed by: INTERNAL MEDICINE

## 2020-09-02 PROCEDURE — 99499 RISK ADDL DX/OHS AUDIT: ICD-10-PCS | Mod: S$GLB,,, | Performed by: INTERNAL MEDICINE

## 2020-09-02 PROCEDURE — 3008F PR BODY MASS INDEX (BMI) DOCUMENTED: ICD-10-PCS | Mod: CPTII,S$GLB,, | Performed by: INTERNAL MEDICINE

## 2020-09-02 PROCEDURE — 1101F PR PT FALLS ASSESS DOC 0-1 FALLS W/OUT INJ PAST YR: ICD-10-PCS | Mod: CPTII,S$GLB,, | Performed by: INTERNAL MEDICINE

## 2020-09-02 PROCEDURE — 3008F BODY MASS INDEX DOCD: CPT | Mod: CPTII,S$GLB,, | Performed by: INTERNAL MEDICINE

## 2020-09-02 PROCEDURE — 1126F AMNT PAIN NOTED NONE PRSNT: CPT | Mod: S$GLB,,, | Performed by: INTERNAL MEDICINE

## 2020-09-02 PROCEDURE — 1159F MED LIST DOCD IN RCRD: CPT | Mod: S$GLB,,, | Performed by: INTERNAL MEDICINE

## 2020-09-02 PROCEDURE — 99499 UNLISTED E&M SERVICE: CPT | Mod: S$GLB,,, | Performed by: INTERNAL MEDICINE

## 2020-09-02 PROCEDURE — 99999 PR PBB SHADOW E&M-EST. PATIENT-LVL IV: ICD-10-PCS | Mod: PBBFAC,,, | Performed by: INTERNAL MEDICINE

## 2020-09-02 PROCEDURE — 1159F PR MEDICATION LIST DOCUMENTED IN MEDICAL RECORD: ICD-10-PCS | Mod: S$GLB,,, | Performed by: INTERNAL MEDICINE

## 2020-09-02 PROCEDURE — 1101F PT FALLS ASSESS-DOCD LE1/YR: CPT | Mod: CPTII,S$GLB,, | Performed by: INTERNAL MEDICINE

## 2020-09-02 PROCEDURE — 99214 OFFICE O/P EST MOD 30 MIN: CPT | Mod: S$GLB,,, | Performed by: INTERNAL MEDICINE

## 2020-09-02 PROCEDURE — 1126F PR PAIN SEVERITY QUANTIFIED, NO PAIN PRESENT: ICD-10-PCS | Mod: S$GLB,,, | Performed by: INTERNAL MEDICINE

## 2020-09-02 NOTE — ASSESSMENT & PLAN NOTE
Will switch from anora to trelegy.  Patient forgot to wear oxygen to clinic.  Advise atc oxygen.

## 2020-09-02 NOTE — PROGRESS NOTES
Eli Vaz  was seen as a follow up.    CHIEF COMPLAINT:  COPD      HISTORY OF PRESENT ILLNESS: Eli Vaz is a 74 y.o. female  has a past medical history of Arthritis, Atrial fibrillation, COPD (chronic obstructive pulmonary disease), Dialysis patient, Encounter for blood transfusion, and ESRD (end stage renal disease).  Our first encounter was 6/19/20Patient smoked 1.5 ppd x 25 years.  Patient quit smoking around 2000.  Patient with chronic oliver x 1/2 block.  Patient with coughing and wheezing a/w dyspnea.  No fever/chills.  +3 pillows orthopnea.  No lower extremities edema.  No pnd.  Patient is home oxygen at night.  No chest pain.      Patient only is on prn albuterol neb or hfa.  During last visit, patient was started anora.  Per patient dyspnea is about the same.  No fever/chill.      PAST MEDICAL HISTORY:    Active Ambulatory Problems     Diagnosis Date Noted    COPD (chronic obstructive pulmonary disease) 11/20/2013    Diastolic dysfunction 11/20/2013    ESRD on dialysis 12/20/2013    Allergic rhinitis 01/14/2014    Loss of appetite 03/10/2014    Memory loss 03/10/2014    Anemia of chronic disease 03/11/2014    History of CVA (cerebrovascular accident) 03/13/2014    Hypertension associated with diabetes 10/23/2015    Diabetes mellitus with ESRD (end-stage renal disease) 12/01/2015    Combined hyperlipidemia associated with type 2 diabetes mellitus 12/01/2015    Anxiety 12/01/2015    Atherosclerosis of aorta 06/17/2016    Secondary hyperparathyroidism 10/11/2017    Retinal nerve fiber bundle defects 09/28/2018    Dyspnea on exertion 02/03/2019    Chronic left shoulder pain 02/11/2019    Non-rheumatic mitral regurgitation 03/06/2019    Persistent atrial fibrillation 03/19/2019    Benign essential hypertension 03/21/2019    Chronic respiratory failure with hypoxia 03/24/2019    Anemia 04/24/2019    Nuclear sclerosis, bilateral 05/08/2019    Degenerative arthritis of right knee  05/10/2019    Arthritis of left shoulder region 05/13/2019    Left shoulder pain 05/29/2019    Atrial flutter 06/04/2019    Weakness 01/03/2020    Hyperkalemia 01/03/2020    Pulmonary HTN 06/19/2020    Goals of care, counseling/discussion 06/19/2020     Resolved Ambulatory Problems     Diagnosis Date Noted    Difficulty walking 01/04/2016    Generalized weakness 01/04/2016    Nonintractable headache     Altered mental status 07/12/2017    Acute encephalopathy     Thrombocytopenia 10/11/2017    Leukopenia 10/11/2017    Vertigo     Left rotator cuff tear arthropathy 09/19/2018    Chest pain 02/03/2019    Atrial fibrillation with RVR 03/06/2019    Sepsis due to pneumonia 03/19/2019    Acute exacerbation of CHF (congestive heart failure) 03/20/2019    Bilateral pleural effusion 03/20/2019    Acute cardiac pulmonary edema 03/20/2019    Elevated troponin 03/20/2019    Elevated brain natriuretic peptide (BNP) level 03/20/2019    Sepsis 03/20/2019    Atrial fibrillation with RVR 03/20/2019    Diarrhea 03/24/2019    Atrial fibrillation with rapid ventricular response 04/01/2019    Hyperkalemia 01/02/2020     Past Medical History:   Diagnosis Date    Arthritis     Atrial fibrillation     Dialysis patient     Encounter for blood transfusion     ESRD (end stage renal disease)                 PAST SURGICAL HISTORY:    Past Surgical History:   Procedure Laterality Date    AV FISTULA PLACEMENT      EPIDURAL STEROID INJECTION Left 5/29/2019    Procedure: Axillary, Suprascapular, lateral pectoral nerve blocks;  Surgeon: Isrrael Barton Jr., MD;  Location: [facility];  Service: Pain Management;  Laterality: Left;  Left Axillary, Suprascapular, Lateral Pectoral Nerve Blocks    92117    Arrive @ 0800; Eliquis; No DM; Confirm date with Armando    EPIDURAL STEROID INJECTION Left 6/19/2019    Procedure: Suprascapular, Axillary, and Lateral Pectoral Nerve Radiofrequency Ablations;  Surgeon: Isrrael HERNANDEZ  Mick Hazel MD;  Location: [facility] ENDO;  Service: Pain Management;  Laterality: Left;  Left Suprascapular, Axillary, & Lateral Pectoral Nerve Radiofrequency Ablations    07818    Arrive @ 1145; IV Sedation- Fasting; Eliquis; No DM; 17-50-2; Rep?    TREATMENT OF CARDIAC ARRHYTHMIA N/A 2019    Procedure: Cardioversion or Defibrillation;  Surgeon: Raeksh Briggs MD;  Location: [facility] CATH LAB;  Service: Cardiology;  Laterality: N/A;    TUBAL LIGATION           FAMILY HISTORY:                Family History   Problem Relation Age of Onset    Heart attack Sister     Heart attack Sister     Heart attack Mother        SOCIAL HISTORY:          Tobacco:   Social History     Tobacco Use   Smoking Status Former Smoker    Packs/day: 1.50    Years: 21.00    Pack years: 31.50    Start date: 1962    Quit date: 2000    Years since quittin.2   Smokeless Tobacco Never Used   Tobacco Comment    quit in      alcohol use:    Social History     Substance and Sexual Activity   Alcohol Use No    Frequency: Never    Drinks per session: Patient refused    Binge frequency: Never               Occupation:  Retire  at [employer]    ALLERGIES:  Review of patient's allergies indicates:  No Known Allergies    CURRENT MEDICATIONS:    Current Outpatient Medications   Medication Sig Dispense Refill    albuterol (PROVENTIL) 2.5 mg /3 mL (0.083 %) nebulizer solution Take 3 mLs (2.5 mg total) by nebulization every 4 (four) hours. 1 Box 11    albuterol (PROVENTIL/VENTOLIN HFA) 90 mcg/actuation inhaler INHALE 2 PUFFS INTO LUNGS EVERY 4 HOURS AS NEEDED FOR SHORTNESS OF BREATH OR WHEEZING. RESCUE 18 g 5    albuterol-ipratropium (DUO-NEB) 2.5 mg-0.5 mg/3 mL nebulizer solution Take 3 mLs by nebulization every 4 (four) hours as needed for Wheezing. Rescue 1 Box 1    amiodarone (PACERONE) 200 MG Tab TAKE 1 TABLET BY MOUTH EVERY DAY 90 tablet 3    apixaban (ELIQUIS) 2.5 mg Tab Take 1 tablet (2.5 mg total) by mouth 2  (two) times daily. 180 tablet 3    atorvastatin (LIPITOR) 10 MG tablet Take 1 tablet (10 mg total) by mouth once daily. 90 tablet 1    B complex w-C no.20/folic acid (RENAL CAPS ORAL) Take by mouth.      calcium acetate (PHOSLO) 667 mg capsule Take 667 mg by mouth 3 (three) times daily with meals.      cefdinir (OMNICEF) 300 MG capsule Take 1 capsule (300 mg total) by mouth every 48 hours. 4 capsule 0    fluticasone propionate (FLONASE) 50 mcg/actuation nasal spray INHALE 2 SPRAYS (100 MCG TOTAL) BY EACH NOSTRIL ROUTE ONCE DAILY. 16 mL 0    fluticasone propionate (FLONASE) 50 mcg/actuation nasal spray 1 spray (50 mcg total) by Each Nostril route daily as needed for Rhinitis. 15.8 mL 1    [START ON 10/16/2020] HYDROcodone-acetaminophen (NORCO) 5-325 mg per tablet Take 1 tablet by mouth every 12 (twelve) hours as needed for Pain. 60 tablet 0    [START ON 9/16/2020] HYDROcodone-acetaminophen (NORCO) 5-325 mg per tablet Take 1 tablet by mouth every 12 (twelve) hours as needed for Pain. 60 tablet 0    HYDROcodone-acetaminophen (NORCO) 5-325 mg per tablet Take 1 tablet by mouth every 12 (twelve) hours as needed for Pain. 60 tablet 0    ipratropium (ATROVENT) 42 mcg (0.06 %) nasal spray 2 sprays by Nasal route 4 (four) times daily. 15 mL 0    ipratropium-albuterol (COMBIVENT RESPIMAT)  mcg/actuation inhaler INHALE 1 PUFF INTO THE LUNGS DAILY.      ketoconazole (NIZORAL) 2 % cream Apply topically once daily. 1 Tube 3    mirtazapine (REMERON) 7.5 MG Tab TAKE 1 TABLET BY MOUTH AT BEDTIME AS NEEDED FOR SLEEP 90  3    montelukast (SINGULAIR) 10 mg tablet Take 1 tablet (10 mg total) by mouth every evening. 90 tablet 3    paroxetine (PAXIL) 30 MG tablet 1 TABLET IN THE MORNING ONCE A DAY ORALLY 90  3    sevelamer carbonate (RENVELA) 800 mg Tab TAKE 3 TABLETS BY MOUTH WITH EACH MEAL AND 1 TABLET WITH EACH SNACK  3    vit B,C-iron fum-FA-D3-zinc ox 8 mg iron-800 mcg-1,000 unit Tab Take by mouth.       "fluticasone-umeclidin-vilanter (TRELEGY ELLIPTA) 100-62.5-25 mcg DsDv Inhale 1 puff into the lungs once daily. 60 each 3     No current facility-administered medications for this visit.      Facility-Administered Medications Ordered in Other Visits   Medication Dose Route Frequency Provider Last Rate Last Dose    0.9%  NaCl infusion   Intravenous Continuous Alaina VANITA Chaudhari, NP        0.9%  NaCl infusion   Intravenous Continuous Alaina VANITA Chaudhari, NP        sodium chloride 0.9% flush 5 mL  5 mL Intravenous PRN Alaina VANITA Chaudhari, NP        sodium chloride 0.9% flush 5 mL  5 mL Intravenous PRN Alaina VANITA Chaudhari, NP                      REVIEW OF SYSTEMS:     Pulmonary related symptoms as per HPI.  Gen:  no weight loss, no fever, no night sweat  HEENT:  no visual changes, no sore throat, no hearing loss  CV:  Per hpi  GI:  no melena, no hematochezia, no diarhea, no constipation.  :  no dysuria, no hematuria, no hesistancy, no dribbling  Neuro:  no syncope, no vertigo, no tinitus  Psych:  No homocide or suicide ideation; no depression.  Anxious.    Endocrine:  No heat or cold intolerance.  Sleep:  No snoring; no witnessed apnea.  Sleep thru the night.  Feeling rested upon awake.    Otherwise, a balance of systems reviewed is negative.          PHYSICAL EXAM:  Vitals:    09/02/20 1338   BP: (!) 164/71   Pulse: 67   Temp: 97.5 °F (36.4 °C)   TempSrc: Temporal   Weight: 58.8 kg (129 lb 10.1 oz)   Height: 5' 3" (1.6 m)   PainSc: 0-No pain     Body mass index is 22.96 kg/m².     GENERAL:  well develop; no apparent distress  HEENT:  no nasal congestion; no discharge noted; class 4 modified mallampatti.  +denture.     NECK:  supple; no palpable masses.  CARDIO: regular rate and rhythm  PULM:  clear to auscultation bilaterally; no intercostals retractions; no accessory muscle usage   ABDOMEN:  soft nontender/nondistended.  +bowel sound  EXTREMITIES no cce  NEURO:  CN II-XII intact.  5/5 motor in all " extremities.  sensation grossly intact   to light touch.  PSYCH:  normal affect.  Alert and oriented x 4    LABS  Pulmonary Functions Testing Results(personally reviewed):  none  ABG (personally reviewed):  3/7/19 7.34/39/134/21 3 lpm nasal canula  CXR (personally reviewed):  Ratio of 56%; fev1 46%; fvc 58%; tlc 39%; dlco 23%  CT CHEST(personally reviewed):  3/19/19 bilateral effusion with bilateral ggo     Echo 12/20/19  · Concentric left ventricular hypertrophy.  · Normal left ventricular systolic function. The estimated ejection fraction is 55%  · Normal LV diastolic function.  · Normal right ventricular systolic function.  · Mild left atrial enlargement.  · Mild-to-moderate aortic regurgitation.  · Moderate mitral regurgitation.  · Moderate tricuspid regurgitation.  · Severe pulmonary hypertension present.  · Elevated central venous pressure (15 mm Hg).  · The estimated PA systolic pressure is 105 mm Hg    ASSESSMENT/PLAN  Problem List Items Addressed This Visit     COPD (chronic obstructive pulmonary disease) (Chronic)    Overview     fev1 46%.  Currently on anora.  desat 77% upon arrival but oxygen sat improved to 91% at rest.           Current Assessment & Plan     Will switch from anora to trelegy.  Patient forgot to wear oxygen to clinic.  Advise atc oxygen.           Relevant Medications    fluticasone-umeclidin-vilanter (TRELEGY ELLIPTA) 100-62.5-25 mcg DsDv    Dyspnea on exertion    Overview     pulmonary htn + copd + afib.  Currently fluid optimized.  nsr s/p cardioversion.  cxr much improved when compared to 3/19.          Current Assessment & Plan     Stable.           Goals of care, counseling/discussion    Overview     patient does not wish for intubation or cpr in the case of cardiopulmonary arrest.  Advise patient to make her wish known to children.   Encourage living will.  Goals of care brochure given to patient during last clinic appointment but patient has yet to complete.                  Pleural effusion - bilateral on ct/cxr 3/19.  No effusion most recent cxr.  Suspect volume is the culprit.      Patient will No follow-ups on file. with md/np.

## 2020-09-02 NOTE — PROGRESS NOTES
Contacted Shira with [employer] to verify benefit eligibility for . Ms.Shira informed me that the above patients Insurance is in network and covered at a 100%. Will contact Ms. Milind to inform of the above information and to assist with scheduling.

## 2020-09-04 ENCOUNTER — PATIENT OUTREACH (OUTPATIENT)
Dept: ADMINISTRATIVE | Facility: HOSPITAL | Age: 75
End: 2020-09-04

## 2020-09-09 ENCOUNTER — TELEPHONE (OUTPATIENT)
Dept: ELECTROPHYSIOLOGY | Facility: CLINIC | Age: 75
End: 2020-09-09

## 2020-09-09 NOTE — TELEPHONE ENCOUNTER
Please just reschedule 3-14 day holter  appt ----- Message from Barb Cano sent at 9/9/2020  2:39 PM CDT -----  Regarding: Holter  Daughter calling to reschedule missed appt. Thanks    [phone]

## 2020-09-10 ENCOUNTER — TELEPHONE (OUTPATIENT)
Dept: ELECTROPHYSIOLOGY | Facility: CLINIC | Age: 75
End: 2020-09-10

## 2020-09-10 DIAGNOSIS — I48.92 ATRIAL FLUTTER, UNSPECIFIED TYPE: Primary | ICD-10-CM

## 2020-09-10 NOTE — TELEPHONE ENCOUNTER
Pt is now rescheduled for Holter ----- Message from Miesha Temple NP sent at 9/10/2020  3:52 PM CDT -----  Regarding: RE: Call  done  ----- Message -----  From: Manny Angeles MA  Sent: 9/10/2020   3:28 PM CDT  To: Miesha Temple NP  Subject: FW: Call                                         Can I please get new 3-14 day Holter order put in , pt is ready to reschedule.   ----- Message -----  From: Barb Cano  Sent: 9/10/2020   3:25 PM CDT  To: [facility] Staff  Subject: Call                                             Daughter returning call to schedule Holter. Thanks     [phone]

## 2020-09-22 NOTE — TELEPHONE ENCOUNTER
Called pt and daughter to schedule 3 month f/u with Dr. Moody . Daughter answered and preferred 9/25/2020 labs after lunch . 9/28/2020 apt with Dr. Moody at 3 pm.

## 2020-10-11 NOTE — PROGRESS NOTES
"Subjective:       Patient ID: Eli Vaz is a 75 y.o. female.    Chief Complaint: Follow-up    Follow-up  Pertinent negatives include no abdominal pain, change in bowel habit, chest pain, fever, myalgias, sore throat, urinary symptoms or visual change.      75 year old female with diet controlled diabetes, ESRD on dialysis, and hyperparathyroidism comes in for follow up on abnormal labs. She reports she is doing well. States she still having a cough from a postnasal drip. No fever or chills. No loss of weight. No voice change, or difficulty swallowing.    Review of Systems   Constitutional: Negative for fever and unexpected weight change.   HENT: Negative for sore throat, trouble swallowing and voice change.    Respiratory: Negative for shortness of breath and wheezing.    Cardiovascular: Negative for chest pain, palpitations and leg swelling.   Gastrointestinal: Negative for abdominal pain, change in bowel habit and change in bowel habit.   Musculoskeletal: Negative for myalgias.         Objective:     /73 (BP Location: Right arm, Patient Position: Sitting, BP Method: Medium (Manual))   Pulse 70   Temp 97.9 °F (36.6 °C) (Oral)   Resp 17   Ht 5' 3" (1.6 m)   Wt 58.2 kg (128 lb 4.9 oz)   SpO2 99%   BMI 22.73 kg/m²     Physical Exam  HENT:      Head: Normocephalic.   Neck:      Musculoskeletal: Normal range of motion.   Cardiovascular:      Rate and Rhythm: Normal rate and regular rhythm.      Pulses: Normal pulses.   Pulmonary:      Effort: Pulmonary effort is normal. No respiratory distress.      Breath sounds: No wheezing or rales.   Abdominal:      General: Abdomen is flat. Bowel sounds are normal. There is no distension.      Tenderness: There is no abdominal tenderness. There is no guarding.   Neurological:      Mental Status: She is alert.         Office Visit on 09/28/2020   Component Date Value Ref Range Status    Thyroid Peroxidase Ab 10/01/2020 <1  <9 IU/mL Final    Thyroid Stimulating " Immunoglob 10/01/2020 <89  <140 % baseline Final    Comment:    Thyroid stimulating immunoglobulins (TSI) can engage the TSH  receptors resulting in hyperthyroidism in Graves' disease  patients. TSI levels can be useful in monitoring the  clinical outcome of Graves' disease as well as assessing the  potential for hyperthyroidism from maternal-fetal transfer.  TSI results greater than or equal to (>=) 140% of the  Reference Control are considered positive.        NOTE: A serum TSH level greater than 350 micro-International  Units/mL can interfere with the TSI bioassay and potentially  give false positive results.     Patients who are pregnant and are suspected of having  hyperthyroidism should have both a TSI and human Chorionic  Gonadotropin (hCG) tests measured. A serum hCG level greater  than 40,625 mIU/mL can interfere with the TSI bioassay and  may give false negative results. In these patients it is  recommended that a second TSI is obtained when the hCG  concentration falls below 40,625 mIU/mL (usually after  approximately 20-weeks gestati                           on).     Lab Visit on 09/25/2020   Component Date Value Ref Range Status    TSH 09/25/2020 19.946* 0.400 - 4.000 uIU/mL Final    Free T4 09/25/2020 0.89  0.71 - 1.51 ng/dL Final    Thyroperoxidase Antibodies 09/25/2020 <6.0  <6.0 IU/mL Final    Sodium 09/25/2020 141  136 - 145 mmol/L Final    Potassium 09/25/2020 5.9* 3.5 - 5.1 mmol/L Final    No visible hemolysis    Chloride 09/25/2020 105  95 - 110 mmol/L Final    CO2 09/25/2020 28  23 - 29 mmol/L Final    Glucose 09/25/2020 69* 70 - 110 mg/dL Final    BUN, Bld 09/25/2020 26* 8 - 23 mg/dL Final    Creatinine 09/25/2020 5.4* 0.5 - 1.4 mg/dL Final    Calcium 09/25/2020 11.0* 8.7 - 10.5 mg/dL Final    Total Protein 09/25/2020 7.2  6.0 - 8.4 g/dL Final    Albumin 09/25/2020 3.9  3.5 - 5.2 g/dL Final    Total Bilirubin 09/25/2020 0.6  0.1 - 1.0 mg/dL Final    Comment: For infants and  newborns, interpretation of results should be based  on gestational age, weight and in agreement with clinical  observations.  Premature Infant recommended reference ranges:  Up to 24 hours.............<8.0 mg/dL  Up to 48 hours............<12.0 mg/dL  3-5 days..................<15.0 mg/dL  6-29 days.................<15.0 mg/dL      Alkaline Phosphatase 09/25/2020 78  55 - 135 U/L Final    AST 09/25/2020 15  10 - 40 U/L Final    ALT 09/25/2020 5* 10 - 44 U/L Final    Anion Gap 09/25/2020 8  8 - 16 mmol/L Final    eGFR if African American 09/25/2020 8* >60 mL/min/1.73 m^2 Final    eGFR if non African American 09/25/2020 7* >60 mL/min/1.73 m^2 Final    Comment: Calculation used to obtain the estimated glomerular filtration  rate (eGFR) is the CKD-EPI equation.       WBC 09/25/2020 9.62  3.90 - 12.70 K/uL Final    RBC 09/25/2020 4.15  4.00 - 5.40 M/uL Final    Hemoglobin 09/25/2020 11.1* 12.0 - 16.0 g/dL Final    Hematocrit 09/25/2020 39.7  37.0 - 48.5 % Final    Mean Corpuscular Volume 09/25/2020 96  82 - 98 fL Final    Mean Corpuscular Hemoglobin 09/25/2020 26.7* 27.0 - 31.0 pg Final    Mean Corpuscular Hemoglobin Conc 09/25/2020 28.0* 32.0 - 36.0 g/dL Final    RDW 09/25/2020 18.3* 11.5 - 14.5 % Final    Platelets 09/25/2020 369* 150 - 350 K/uL Final    MPV 09/25/2020 11.1  9.2 - 12.9 fL Final    Immature Granulocytes 09/25/2020 2.9* 0.0 - 0.5 % Final    Gran # (ANC) 09/25/2020 6.2  1.8 - 7.7 K/uL Final    Immature Grans (Abs) 09/25/2020 0.28* 0.00 - 0.04 K/uL Final    Comment: Mild elevation in immature granulocytes is non specific and   can be seen in a variety of conditions including stress response,   acute inflammation, trauma and pregnancy. Correlation with other   laboratory and clinical findings is essential.      Lymph # 09/25/2020 1.1  1.0 - 4.8 K/uL Final    Mono # 09/25/2020 0.4  0.3 - 1.0 K/uL Final    Eos # 09/25/2020 1.4* 0.0 - 0.5 K/uL Final    Baso # 09/25/2020 0.30* 0.00 -  0.20 K/uL Final    nRBC 09/25/2020 0  0 /100 WBC Final    Gran% 09/25/2020 64.7  38.0 - 73.0 % Final    Lymph% 09/25/2020 10.9* 18.0 - 48.0 % Final    Mono% 09/25/2020 3.6* 4.0 - 15.0 % Final    Eosinophil% 09/25/2020 14.8* 0.0 - 8.0 % Final    Basophil% 09/25/2020 3.1* 0.0 - 1.9 % Final    Differential Method 09/25/2020 Automated   Final    PTH, Intact 09/25/2020 347.2* 9.0 - 77.0 pg/mL Final    Cholesterol 09/25/2020 165  120 - 199 mg/dL Final    Comment: The National Cholesterol Education Program (NCEP) has set the  following guidelines (reference ranges) for Cholesterol:  Optimal.....................<200 mg/dL  Borderline High.............200-239 mg/dL  High........................> or = 240 mg/dL      Triglycerides 09/25/2020 49  30 - 150 mg/dL Final    Comment: The National Cholesterol Education Program (NCEP) has set the  following guidelines (reference values) for triglycerides:  Normal......................<150 mg/dL  Borderline High.............150-199 mg/dL  High........................200-499 mg/dL      HDL 09/25/2020 92* 40 - 75 mg/dL Final    Comment: The National Cholesterol Education Program (NCEP) has set the  following guidelines (reference values) for HDL Cholesterol:  Low...............<40 mg/dL  Optimal...........>60 mg/dL      LDL Cholesterol 09/25/2020 63.2  63.0 - 159.0 mg/dL Final    Comment: The National Cholesterol Education Program (NCEP) has set the  following guidelines (reference values) for LDL Cholesterol:  Optimal.......................<130 mg/dL  Borderline High...............130-159 mg/dL  High..........................160-189 mg/dL  Very High.....................>190 mg/dL      Hdl/Cholesterol Ratio 09/25/2020 55.8* 20.0 - 50.0 % Final    Total Cholesterol/HDL Ratio 09/25/2020 1.8* 2.0 - 5.0 Final    Non-HDL Cholesterol 09/25/2020 73  mg/dL Final    Comment: Risk category and Non-HDL cholesterol goals:  Coronary heart disease (CHD)or equivalent (10-year risk of CHD  >20%):  Non-HDL cholesterol goal     <130 mg/dL  Two or more CHD risk factors and 10-year risk of CHD <= 20%:  Non-HDL cholesterol goal     <160 mg/dL  0 to 1 CHD risk factor:  Non-HDL cholesterol goal     <190 mg/dL      Vit D, 25-Hydroxy 09/25/2020 9* 30 - 96 ng/mL Final    Comment: Vitamin D deficiency.........<10 ng/mL                              Vitamin D insufficiency......10-29 ng/mL       Vitamin D sufficiency........> or equal to 30 ng/mL  Vitamin D toxicity............>100 ng/mL      Hemoglobin A1C 09/25/2020 4.9  4.0 - 5.6 % Final    Comment: ADA Screening Guidelines:  5.7-6.4%  Consistent with prediabetes  >or=6.5%  Consistent with diabetes  High levels of fetal hemoglobin interfere with the HbA1C  assay. Heterozygous hemoglobin variants (HbS, HgC, etc)do  not significantly interfere with this assay.   However, presence of multiple variants may affect accuracy.      Estimated Avg Glucose 09/25/2020 94  68 - 131 mg/dL Final       Assessment:       1. Acquired hypothyroidism    2. Postnasal drip    3. ESRD on dialysis    4. Diabetes mellitus with ESRD (end-stage renal disease)    5. Secondary hyperparathyroidism        Plan:       Eli was seen today for follow-up.    Diagnoses and all orders for this visit:    Acquired hypothyroidism  -     Cancel: Thyroid Peroxidase Antibody; Future  -     THYROTROPIN RECEPTOR ANTIBODY; Future  -     Cancel: THYROID STIMULATING IMMUNOGLOBULIN; Future  -     Thyroid Peroxidase Antibody; Future  -     THYROID STIMULATING IMMUNOGLOBULIN; Future  -     Thyroid Peroxidase Antibody  -     THYROID STIMULATING IMMUNOGLOBULIN  -     Ambulatory referral/consult to Endocrinology; Future  Will get further thyroid labs and have pateint see endocrinology.    Postnasal drip  -     azelastine (ASTELIN) 137 mcg (0.1 %) nasal spray; 1 spray (137 mcg total) by Nasal route 2 (two) times daily.  Trial of Astelin    ESRD on dialysis  Management as per nephrology    Diabetes mellitus with  ESRD (end-stage renal disease)  Continue diet control.    Secondary hyperparathyroidism  Management by nephro

## 2020-10-29 NOTE — ED PROVIDER NOTES
Encounter Date: 2/3/2019       History     Chief Complaint   Patient presents with    Chest Pain     pt c/o midsternal and left side chest wall pain starting approx 3 am, describes as pressure, pain 8 of 10, dose of two asprin approx 1 hr pta, reports sob with hx of copd, sob not releived by inhaler     73 y.o. Female with multiple medical problems including COPD, diabetes, ESRD (anuric, on hemodialysis Tuesday, Thursday, Saturday) and others presents emergency department complaining of acute midsternal, nonradiating chest pressure that woke her from sleep at 3:00 a.m..  She states pain is constant in nature.  She also reports shortness of breath with a history of asthma.  Patient states she has been using her inhaler throughout the day yesterday without improvement of shortness of breath.  She reports productive cough with clear sputum that began about a week ago.  Chest pain is 7/10 in severity      The history is provided by the patient.     Review of patient's allergies indicates:  No Known Allergies  Past Medical History:   Diagnosis Date    Arthritis     COPD (chronic obstructive pulmonary disease)     Diabetes mellitus type II     Dialysis patient     Encounter for blood transfusion     ESRD (end stage renal disease)      Past Surgical History:   Procedure Laterality Date    AV FISTULA PLACEMENT      TUBAL LIGATION       Family History   Problem Relation Age of Onset    Heart attack Sister     Heart attack Sister     Heart attack Mother      Social History     Tobacco Use    Smoking status: Former Smoker     Start date: 1/12/1991    Smokeless tobacco: Never Used   Substance Use Topics    Alcohol use: No    Drug use: No     Review of Systems   Constitutional: Negative for appetite change and fever.   HENT: Negative for congestion, postnasal drip and sore throat.    Respiratory: Positive for cough and shortness of breath.    Gastrointestinal: Negative for nausea and vomiting.   All other systems  NEW PATIENT VISIT     Currently is not taking any medications  Denies known Latex allergy or symptoms of Latex sensitivity   Tobacco history verified.    Verbal permission granted by patient to leave a detailed message with medical information at phone number listed in Epic. yes    If female, are you pregnant, trying to become pregnant, or breastfeeding? N/A    Pt is here today with Self    CHIEF COMPLAINT:   Chief Complaint   Patient presents with   • Office Visit   • Consultation     Cherry angioma        HISTORY OF PRESENT ILLNESS: The patient is a 46 year old  male seen today at the request of Dr. Hugo Washington MD for consultation and treatment.     Concerning areas: Lesions   Location:  R forehead and right cheek  Duration:  Cheek x 1 year, forehead many years  Symptoms:  no associated symptoms  Changes over time:  Cheek has grown  Treatments:  None       DERM-RELEVANT HISTORY:  History of skin cancer--Negative  Family history of skin cancer--No family history of skin cancer  History of tanning bed use- yes   Outdoor hobbies- In sun all the time (works on the water) with sunscreen  Occupation- [employer]   Lives with-- Alone      PAST MEDICAL HISTORY:  []  None  []  Melanoma    [x]  Asthma []  Skin Cancer  []  Eczema []  Keloids  [x]  Allergies []  Psoriasis  []  Vitiligo  []  Alopecia areata       REVIEW OF SYSTEMS:  Yes No  []  [x]  Other skin concerns, rash, or other changing lesions  []  [x]  Fevers, current, or recent illness  []  [x]  Easy bruising or bleeding    Patient Active Problem List    Diagnosis Date Noted   • Tendinitis of right hip flexor 10/22/2020     Priority: Low   • Fusion of lumbar spine 10/22/2020     Priority: Low   • Cherry angioma 10/22/2020     Priority: Low   • Objective tinnitus of both ears 10/22/2020     Priority: Low   • Hypogonadism in male 10/26/2017     Priority: Low   • Chronic neck pain 06/22/2017     Priority: Low   • Chronic bilateral low back pain without  reviewed and are negative.      Physical Exam     Initial Vitals [02/03/19 0525]   BP Pulse Resp Temp SpO2   (!) 197/77 85 16 98.7 °F (37.1 °C) 98 %      MAP       --         Physical Exam    Nursing note and vitals reviewed.  Constitutional: She appears well-developed and well-nourished. She is not diaphoretic. She is cooperative. She appears ill (Appears chronically ill). No distress.   HENT:   Head: Normocephalic and atraumatic.   Mouth/Throat: Uvula is midline. Mucous membranes are dry.   Eyes: Conjunctivae are normal. Pupils are equal, round, and reactive to light.   Neck: Normal range of motion. Neck supple. JVD present.   Cardiovascular: Normal rate and intact distal pulses.   Pulmonary/Chest: No accessory muscle usage or stridor. No tachypnea. No respiratory distress. She has decreased breath sounds in the left upper field, the left middle field and the left lower field. She has no wheezes. She has no rhonchi.   Abdominal: She exhibits no distension. There is no tenderness.   Musculoskeletal: Normal range of motion. She exhibits no edema or tenderness.   Neurological: She is alert and oriented to person, place, and time. She has normal strength. Gait normal. GCS eye subscore is 4. GCS verbal subscore is 5. GCS motor subscore is 6.   Skin: Skin is warm. Capillary refill takes less than 2 seconds.   Psychiatric: She has a normal mood and affect.         ED Course   Critical Care  Date/Time: 2/3/2019 9:00 AM  Performed by: Trey eHrnandez MD  Authorized by: Susan Araujo MD   Direct patient critical care time: 10 minutes  Ordering / reviewing critical care time: 10 minutes  Documentation critical care time: 15 minutes  Consulting other physicians critical care time: 10 minutes  Consult with family critical care time: 10 minutes  Total critical care time (exclusive of procedural time) : 55 minutes  Critical care was necessary to treat or prevent imminent or life-threatening deterioration of the following  sciatica 06/22/2017     Priority: Low       PAST MEDICAL HISTORY:   Past Medical History:   Diagnosis Date   • Back pain    • Neuromuscular disorder (CMS/HCC)    • Prostate enlargement    • Subjective tinnitus    • Testicular pain          FAMILY HISTORY:   No family history on file.     SOCIAL HISTORY:   Social History     Tobacco Use   • Smoking status: Former Smoker   • Smokeless tobacco: Former User   Substance Use Topics   • Alcohol use: Yes     Alcohol/week: 2.0 standard drinks     Types: 2 Cans of beer per week     Comment: 1-2 x week, 2-3 drinks   • Drug use: No        MEDICATIONS:   No current outpatient medications on file.     No current facility-administered medications for this visit.         ALLERGIES:   ALLERGIES:   Allergen Reactions   • No Known Allergies Other (See Comments)        PHYSICAL EXAM:   There were no vitals filed for this visit.  GENERAL:  Pleasant, alert, no acute distress    A limited exam of the scalp and face was performed today.   Findings include:   · Right frontal scalp with 0.3 cm vascular papule (non blanching)  · Right infraorbital 0.2 x 0.1 cm vascular papule (non blanching)    ASSESSMENT AND PLAN:     # Cherry angiomas  -No concerning features; continue to monitor.  -Discussed option for laser Tx in future, pt prefers no Tx at this time    Return to clinic: Return in about 6 months (around 5/5/2021) for F/U MONET, recheck right cheek lesion.  Contact office sooner if condition is worsening or with new concerns.    CC: Hugo Washington MD     On 11/5/2020, I Sonia Fritsche, MA scribed the services personally performed by Dr aSkshi Tao MD    I, Sakshi Tao MD, attest that the documentation recorded by the scribe accurately and completely reflects the service(s) I personally performed and the decisions made by me. I also reviewed and verified the scribe's note, which I edited as appropriate.                        conditions: cardiac failure and respiratory failure.  Critical care was time spent personally by me on the following activities: development of treatment plan with patient or surrogate, evaluation of patient's response to treatment, examination of patient, obtaining history from patient or surrogate, ordering and performing treatments and interventions, ordering and review of laboratory studies, ordering and review of radiographic studies and re-evaluation of patient's condition.  Subsequent provider of critical care: I assumed direction of critical care for this patient from another provider of my specialty.        Labs Reviewed   POCT B-TYPE NATRIURETIC PEPTIDE (BNP) - Abnormal; Notable for the following components:       Result Value    POC B-Type Natriuretic Peptide 706 (*)     All other components within normal limits   POCT CMP - Abnormal; Notable for the following components:    POC BUN 36 (*)     POC Chloride 95 (*)     POC Creatinine 7.9 (*)     POC Sodium 147 (*)     Protein 8.2 (*)     All other components within normal limits   POCT D DIMER - Abnormal; Notable for the following components:    POC D- (*)     All other components within normal limits   TROPONIN ISTAT   POCT CBC   POCT CMP   POCT B-TYPE NATRIURETIC PEPTIDE (BNP)   POCT TROPONIN   POCT PROTIME-INR   POCT D DIMER   ISTAT PROCEDURE     EKG Readings: (Independently Interpreted)   Initial Reading: No STEMI. Rhythm: Normal Sinus Rhythm. Heart Rate: 86. T Waves Flipped: III, V1, V2 and V3. Axis: Left Axis Deviation.       Imaging Results          X-Ray Chest PA And Lateral (Final result)  Result time 02/03/19 06:16:03    Final result by David Armstrong MD (02/03/19 06:16:03)                 Impression:      Stable chronic interstitial findings without evidence of new confluent airspace consolidation.      Electronically signed by: David Armstrong MD  Date:    02/03/2019  Time:    06:16             Narrative:    EXAMINATION:  XR CHEST PA  AND LATERAL    CLINICAL HISTORY:  Chest Pain;    TECHNIQUE:  PA and lateral views of the chest were performed.    COMPARISON:  Chest radiograph 02/01/2019, 01/09/2018    FINDINGS:  Cardiomediastinal silhouette is mildly enlarged, stable from prior examination.  There is prominent atherosclerotic calcification of the thoracic aorta.  The lungs again demonstrate coarse interstitial markings bilaterally, similar to prior examination.  There is stable slight blunting of the bilateral costophrenic angles.  No new large confluent airspace consolidation or pneumothorax identified.  Vascular stents project over the left axillary region and left upper extremity.  Visualized osseous structures demonstrate stable degenerative changes.                                 Medical Decision Making:   Initial Assessment:   73 y.o. Female with multiple medical problems including COPD, diabetes, ESRD (anuric, on hemodialysis Tuesday, Thursday, Saturday) and others presents emergency department complaining of acute midsternal, nonradiating chest pressure that woke her from sleep at 3:00 a.m..  She states pain is constant in nature.  She also reports shortness of breath with a history of asthma.  Patient states she has been using her inhaler throughout the day yesterday without improvement of shortness of breath.  She reports productive cough with clear sputum that began about a week ago.  Chest pain is 7/10 in severity    Clinical Tests:   Lab Tests: Ordered and Reviewed  Radiological Study: Ordered and Reviewed  ED Management:  CBC was within normal limits. CMP reveals elevated BUN and creatinine.  Troponin was 0.  BNP is 706.  D-dimer is 993.  Called for transfer at 8:30 a.m. secondary to need for further workup to rule out PE in this patient with sudden chest pain and shortness of breath. Patient was accepted by NP to Dr. Araujo's service at [facility].                      Clinical Impression:   The primary encounter diagnosis  was Chest pain, unspecified type. Diagnoses of Chest pain, Diabetes mellitus with ESRD (end-stage renal disease), Hypertension associated with diabetes, History of CVA (cerebrovascular accident), Chronic obstructive pulmonary disease, unspecified COPD type, and Shortness of breath were also pertinent to this visit.      Disposition:   Disposition: Transferred  Condition: Stable                        Trey Hernandez MD  02/04/19 0515

## 2020-10-30 NOTE — TELEPHONE ENCOUNTER
Called Ms. Vaz to remind her of her appt Monday morning with our NP. Could not leave a message. I called mobile and home numbers.

## 2020-11-01 NOTE — PROGRESS NOTES
Requested updates within Care Everywhere.  Patient's chart was reviewed for overdue LILLY topics.  Media reviewed for outside eye exam  Immunizations reconciled.

## 2020-11-02 NOTE — TELEPHONE ENCOUNTER
----- Message from Ani Thurman sent at 11/2/2020  1:42 PM CST -----  Regarding: Reschedule Appt  Contact: [phone]  Pt called in to reschedule dr appt and ekg. Im unable to reschedule the EKG. Pt can be reached at [phone]

## 2020-11-30 PROBLEM — M89.9 CHRONIC KIDNEY DISEASE-MINERAL AND BONE DISORDER: Status: ACTIVE | Noted: 2020-01-01

## 2020-11-30 PROBLEM — N18.9 CHRONIC KIDNEY DISEASE-MINERAL AND BONE DISORDER: Status: ACTIVE | Noted: 2020-01-01

## 2020-11-30 PROBLEM — E03.9 ACQUIRED HYPOTHYROIDISM: Status: ACTIVE | Noted: 2020-01-01

## 2020-11-30 PROBLEM — E83.9 CHRONIC KIDNEY DISEASE-MINERAL AND BONE DISORDER: Status: ACTIVE | Noted: 2020-01-01

## 2020-11-30 PROBLEM — E03.2 HYPOTHYROIDISM DUE TO AMIODARONE: Status: ACTIVE | Noted: 2020-01-01

## 2020-11-30 PROBLEM — T46.2X1A HYPOTHYROIDISM DUE TO AMIODARONE: Status: ACTIVE | Noted: 2020-01-01

## 2020-11-30 NOTE — PATIENT INSTRUCTIONS
Hypothyroidism       You have hypothyroidism. This means your thyroid gland is not making enough thyroid hormone. This hormone is vital to body growth and metabolism. If you dont make enough, many body processes slow down. This can cause symptoms throughout the body. Hypothyroidism can range from mild to severe. The most severe form is called myxedema.  There are a number of causes of hypothyroidism. A common cause is Hashimotos disease. This disease causes the bodys own immune system to attack the thyroid gland. When you have certain treatments, such as surgery to remove the thyroid gland, this can also cause hypothyroidism.  Symptoms of hypothyroidism can include:  · Fatigue  · Trouble concentrating or thinking clearly; forgetfulness  · Dry skin  · Hair loss  · Weight gain  · Low tolerance to cold  · Constipation  · Depression  · Personality changes  · Tingling or prickling of the hands or feet  · Heavy, absent, or irregular periods (women only)  Older adults may sometimes have other symptoms. These can include:  · Muscle aches and weakness  · Confusion  · Incontinence (unable to control urine or stool)  · Trouble moving around  · Falling  Treatment for hypothyroidism involves taking thyroid hormone pills daily. These pills replace the hormone your thyroid doesnt make. You will likely need to take a daily pill for the rest of your life. Tips for taking this medicine are given below.  Home care  Tips for taking your medicine  · Take your thyroid hormone pills as prescribed by your healthcare provider. This is most often 1 pill a day on an empty stomach. Use a pillbox labeled with the days of the week. This will help you remember to take your pill each day.  · Dont take products that contain iron and calcium or antacids within 4 hours of taking your thyroid hormone pills.  · Dont take other medicines with your thyroid hormone pill without checking with your provider first.  · Tell your provider if you have  any side effects from your medicines that bother you.  · Never change the dosage or stop taking your thyroid pills without talking to your provider first.  General care  · Always talk with your provider before trying other medicines or treatments for your thyroid problem.  · If you see other healthcare providers, be sure to let them know about your thyroid problem.  Follow-up care  See your healthcare provider for checkups as advised. You may need regular tests to check the level of thyroid hormone in your blood.  When to seek medical advice  Call your healthcare provider right away if any of these occur:  · New symptoms develop  · Symptoms return, continue, or worsen even after treatment  · Extreme fatigue  · Puffy hands, face, or feet  · Fast or irregular heartbeat  · Confusion  Call 911  Call 911 right away if any of these occur:  · Fainting  · Chest pain  · Shortness of breath or trouble breathing  Date Last Reviewed: 8/24/2015  © 0715-9254 [employer]. [address] [place] [address], [place] [address]. All rights reserved. This information is not intended as a substitute for professional medical care. Always follow your healthcare professional's instructions.

## 2020-11-30 NOTE — PROGRESS NOTES
Subjective:       Patient ID: Eli Vaz is a 75 y.o. female.    Chief Complaint: Sinus Problem    HPI   75 year old female with ESRD on dialysis, history of diabetes, currently diet controlled, hypothyroidism, but off medication for years, atrial fibrillation and diastolic dysfunction comes in with daughter for evaluation of recurring cough. This has been an issue for over a year. This current flair up started a few weeks ago. Chest chest pain , or palpitations. No fevers or chills. Does report thick nasal mucus and coughing thick phlegm. Has been to pulmonary for this and reports no change in cough. States that the cough is worse at night when she lays down. Does relatively well during the day.  Currently on Anoro and Albuterol.   Has an appointment at [facility], [place], operated by [facility] today for endo. Last TSH was up with normal T4. Has history of hypothyroidism. Is on amiodarone.   Also, patient requesting refill on foot fungus cream and referral to her podiatrist.     Review of Systems   Constitutional: Negative for fever and unexpected weight change.   HENT: Positive for nasal congestion and rhinorrhea. Negative for sore throat, trouble swallowing and voice change.    Respiratory: Positive for cough. Negative for shortness of breath and wheezing.    Cardiovascular: Negative for chest pain, palpitations and leg swelling.   Gastrointestinal: Negative for abdominal pain, change in bowel habit and change in bowel habit.   Musculoskeletal: Negative for myalgias.         Objective:      Physical Exam  HENT:      Head: Normocephalic.   Neck:      Musculoskeletal: Normal range of motion.   Cardiovascular:      Rate and Rhythm: Normal rate and regular rhythm.      Pulses: Normal pulses.      Heart sounds: Murmur present.   Pulmonary:      Effort: Pulmonary effort is normal. No respiratory distress.      Breath sounds: No wheezing or rales.   Abdominal:      General: Abdomen is flat. Bowel sounds are normal. There is no distension.       Tenderness: There is no abdominal tenderness. There is no guarding.   Neurological:      Mental Status: She is alert.         Assessment:       1. Chronic obstructive pulmonary disease, unspecified COPD type    2. Rhinosinusitis    3. Tinea pedis of both feet    4. Acquired hypothyroidism    5. Diabetes mellitus with ESRD (end-stage renal disease)    6. Xerosis cutis    7. ESRD on dialysis        Plan:       Eli was seen today for sinus problem.    Diagnoses and all orders for this visit:    Chronic obstructive pulmonary disease, unspecified COPD type  -     sodium chloride for inhalation (SODIUM CHLORIDE 0.9%) 0.9 % nebulizer solution; 1 vial inhaled via nebulizer Q4 PRN for cough  -     albuterol (PROVENTIL/VENTOLIN HFA) 90 mcg/actuation inhaler; INHALE 2 PUFFS INTO LUNGS EVERY 4 HOURS AS NEEDED FOR SHORTNESS OF BREATH OR WHEEZING. RESCUE  -     montelukast (SINGULAIR) 10 mg tablet; Take 1 tablet (10 mg total) by mouth every evening.  Discussed using nebulized saline to help loosen/thin mucus.  Continue Robitussin DM as needed.  Continue Anoro daily and ProAIR PRN    Rhinosinusitis  -     albuterol (PROVENTIL/VENTOLIN HFA) 90 mcg/actuation inhaler; INHALE 2 PUFFS INTO LUNGS EVERY 4 HOURS AS NEEDED FOR SHORTNESS OF BREATH OR WHEEZING. RESCUE  -     montelukast (SINGULAIR) 10 mg tablet; Take 1 tablet (10 mg total) by mouth every evening.  Singulair refilled.  States she has enough flonase.    Tinea pedis of both feet  -     ketoconazole (NIZORAL) 2 % cream; Apply topically once daily.  -     Ambulatory referral/consult to Podiatry; Future    Acquired hypothyroidism  Daughter requested to reschedule endo for [facility] if possible and this was done  Advised endo follow up given increasing TSH and on amiodarone.     Diabetes mellitus with ESRD (end-stage renal disease)  Continue diet control of sugars.    Xerosis cutis  Advised OTC Lac-Hydrin    ESRD on dialysis  Management by nephro

## 2020-11-30 NOTE — PROGRESS NOTES
Subjective:      Patient ID: Eli Vaz is a 75 y.o. female presented to Endocrinology clinic on 11/30/2020.    Chief Complaint:  Thyroid Problem      History of Present Illness: Eli Vaz is a 75 y.o. female with ESRD on dialysis TTS for 12 years, secondary hyperparathyroidism, diabetes type 2 diagnosed in 2010, AFib on amiodarone, recently diagnosed with hypothyroidism.  Who is here for evaluation and management of thyroid function.    Patient was started on amiodarone on 03/2019 for atrial fibrillation.  Currently taking 200 mg daily    Patient presents for evaluation of thyroid function, TSH elevation trending upward:  4.2> 9.5> most recent 19.9.  With detectable Free T4:  0.89  No family hx of thyroid problem  No neck swelling.     Current symptoms:   No   Yes  []    []   Weight gain  [x]    []   Fatigue  [x]    []   Constipation  [x]    []   Hair loss  []    [x]   Brittle nails  [x]    []   Mental fog  [x]    []   Cold intolerance      With regards to type 2 diabetes:   Ddx: about 10years  Not on any now  Not checking glucose    With regard to his secondary hyperparathyroidism  Due to long-term CKD/ESRD, no parathyroidectomy  Unclear if on vitamin-D supplementation (or analog vitamin-D at the dialysis unit)  Lab work in Care everywhere:  From dialysis unit, from June to October 2020      PTH level 666, 379, 531  Vitamin-D:  15.2  Calcium 9.8, 11.1  Phosphate 4.0, 4.2, 5.2    Patient denies falls, patient ambulates without any problem, does not use assist device like cane  Denies any fractures of any kind    Reviewed past surgical, medical, family, social history and updated as appropriate.    Review of Systems   Constitutional: Negative for activity change and unexpected weight change.   HENT: Negative for sore throat and voice change.    Eyes: Negative for visual disturbance.   Respiratory: Negative for shortness of breath.    Cardiovascular: Negative for chest pain.   Gastrointestinal: Negative for  abdominal pain, constipation, diarrhea, nausea and vomiting.   Genitourinary: Negative for urgency.   Musculoskeletal: Negative for arthralgias.   Skin: Negative for wound.   Neurological: Negative for headaches.   Psychiatric/Behavioral: Negative for confusion and sleep disturbance.       Objective:   BP (!) 144/60 (BP Location: Left arm, Patient Position: Sitting, BP Method: Large (Automatic))   Pulse 65   Temp 97.7 °F (36.5 °C) (Oral)   Wt 59.4 kg (131 lb)   BMI 23.21 kg/m²     Body mass index is 23.21 kg/m².    Physical Exam  Vitals signs and nursing note reviewed.   Constitutional:       General: She is not in acute distress.     Appearance: She is well-developed.   HENT:      Head: Normocephalic and atraumatic.      Right Ear: External ear normal.      Left Ear: External ear normal.      Nose: Nose normal.   Eyes:      General: No scleral icterus.     Conjunctiva/sclera: Conjunctivae normal.   Neck:      Musculoskeletal: Normal range of motion and neck supple. No neck rigidity.      Thyroid: No thyromegaly.      Trachea: No tracheal deviation.      Comments: Thyroid small on exam  Cardiovascular:      Rate and Rhythm: Normal rate.      Heart sounds: Normal heart sounds. No murmur.   Pulmonary:      Effort: Pulmonary effort is normal.      Breath sounds: Normal breath sounds.   Abdominal:      Palpations: Abdomen is soft. There is no mass.      Tenderness: There is no abdominal tenderness.      Comments:      Musculoskeletal: Normal range of motion.      Comments: Right upper arm AV fistula   Lymphadenopathy:      Cervical: No cervical adenopathy.   Skin:     General: Skin is warm.      Findings: No rash.   Neurological:      Mental Status: She is alert and oriented to person, place, and time.      Sensory: No sensory deficit.      Coordination: Coordination normal.   Psychiatric:         Judgment: Judgment normal.         Lab Review:   Lab Results   Component Value Date    HGBA1C 4.9 09/25/2020     Lab  Results   Component Value Date    TSH 19.946 (H) 09/25/2020    TSH 9.510 (H) 04/29/2020    TSH 4.282 (H) 04/01/2019    FREET4 0.89 09/25/2020    FREET4 1.06 04/29/2020    FREET4 1.07 04/01/2019    THYROPEROXID <6.0 09/25/2020       Assessment and Plan     Eli Vaz is a 75 y.o. female here for management of the follow endocrinology disorder: Diabetes type 2, secondary hyperparathyroidism, hypothyroidism secondary to amiodarone use    Hypothyroidism due to amiodarone  Pt has a known h/o hypothyroidism due to amiodarone use on 3/2019  Pathophysiology of hypothyroidism, the role of TSH, free T4, T3, were explained to patient  Last TSH:  19, with detectable Free T4  Clinically euthyroid  Given chronic medical diseases, start low-dose therapy to avoid AFib    Plan  - start patient on levothyroxine 50 mcg daily  - Avoid exogenous hyperthyroidism as this can accelerate bone loss and increase risk of CV complications.  - Advised to take LT4 on an empty stomach with water and to wait 30-45 minutes before eating or taking other medications   - Repeat TSH in 8 weeks      Secondary hyperparathyroidism  Patient with secondary hyperparathyroidism due to ESRD  Will check vitamin-D level, unclear if patient is on vitamin D analogs supplements at dialysis, patient will clarify with HD unit  Repeat lab work as above  Monitor PTH, phosphate, calcium    Diabetes mellitus with ESRD (end-stage renal disease)  Patient reports well-controlled diabetes on dialysis  A1c falsely low due to dialysis, anemia  Check fructosamine  Will discuss therapy if needed at next office visit    ESRD on dialysis  Patient dialyzes at [facility]  May have component of chronic kidney disease metabolic bone disorder  Will pursue DEXA in the future     RTC in 3 months to discuss lab work      Dex Guerra MD  Endocrinology- [facility]  11/30/2020      Disclaimer: This note has been generated using voice-recognition software. There may be  typographical errors that have been missed during proof-reading.

## 2020-11-30 NOTE — LETTER
November 30, 2020      Charles Moody Jr., MD  [address]  [place] [address]           [facility]  [address]  [place] [address]  Phone: [phone]  Fax: [phone]          Patient: Eli Vaz   MR Number: [id number]   YOB: 1945   Date of Visit: 11/30/2020       Dear Dr. Charles Moody Jr.:    Thank you for referring Eli Vaz to me for evaluation. Attached you will find relevant portions of my assessment and plan of care.    If you have questions, please do not hesitate to call me. I look forward to following Eli Vaz along with you.    Sincerely,    Dex Guerra MD    Enclosure  CC:  No Recipients    If you would like to receive this communication electronically, please contact [email or web address] or [phone] to request more information on [facility] Link access.    For providers and/or their staff who would like to refer a patient to [facility], please contact us through our one-stop-shop provider referral line, [facility], at [phone].    If you feel you have received this communication in error or would no longer like to receive these types of communications, please e-mail [email or web address]

## 2020-12-01 NOTE — ASSESSMENT & PLAN NOTE
Patient with secondary hyperparathyroidism due to ESRD  Will check vitamin-D level, unclear if patient is on vitamin D analogs supplements at dialysis, patient will clarify with HD unit  Repeat lab work as above  Monitor PTH, phosphate, calcium

## 2020-12-01 NOTE — ASSESSMENT & PLAN NOTE
Patient reports well-controlled diabetes on dialysis  A1c falsely low due to dialysis, anemia  Check fructosamine  Will discuss therapy if needed at next office visit

## 2020-12-01 NOTE — ASSESSMENT & PLAN NOTE
Patient dialyzes at [facility]  May have component of chronic kidney disease metabolic bone disorder  Will pursue DEXA in the future

## 2020-12-01 NOTE — ASSESSMENT & PLAN NOTE
Pt has a known h/o hypothyroidism due to amiodarone use on 3/2019  Pathophysiology of hypothyroidism, the role of TSH, free T4, T3, were explained to patient  Last TSH:  19, with detectable Free T4  Clinically euthyroid  Given chronic medical diseases, start low-dose therapy to avoid AFib    Plan  - start patient on levothyroxine 50 mcg daily  - Avoid exogenous hyperthyroidism as this can accelerate bone loss and increase risk of CV complications.  - Advised to take LT4 on an empty stomach with water and to wait 30-45 minutes before eating or taking other medications   - Repeat TSH in 8 weeks

## 2020-12-11 NOTE — PROGRESS NOTES
Patient requesting refill of Norco 5-325 mg q.12 hours p.r.n..  This medication was last filled in August of 2020.  She take this medication very sparingly.  Prescription monitoring report reviewed.  No inconsistencies noted.  It has been over 90 days since her last visit with me.  I will schedule her for a follow-up visit next week.  I have sent an additional 30 day supply of 60 pills to her pharmacy today.  If no issues, will provide an additional 2 months of medication and have patient follow up in 3 months.

## 2020-12-11 NOTE — TELEPHONE ENCOUNTER
----- Message from Mili Elliott sent at 12/11/2020 11:45 AM CST -----  Regarding: akhil - daughter  Type: Patient Call Back       What is the request in detail:  pt daughter calling to speak to a nurse regarding previous message pt daughter waiting on a call back.      Can the clinic reply by [facility]? No       Would the patient rather a call back or a response via [facility]? Call back       Best call back number: [phone] or [phone] (home)         Thank you.

## 2020-12-11 NOTE — TELEPHONE ENCOUNTER
Patient Name:  Lesley Harvey  MR#:  [id number]  : 1943      Patient Education Summary For 2019    During the visit on 2019, Lesley Harvey received patient-specific education and/or education materials from Zane Thomson regarding the following topic(s):    Date 2019   Time 10:23 AM   General       Pt Education Occurred Today? Yes     After Hours On-Call Info Yes   Site-Specific Instructions       Fatigue Yes     Hydration Yes     Activity Management Yes     Skin Care. Yes   Prevention Teaching       Fall/ Safety Yes   Individual Taught       Patient. Yes   Preferred Learning Method       Explanation Preferred. Yes   Teaching Method       Explanation. Yes   Ability to Learn       Receptive. Yes   Barriers to Learning       None Evident. Yes   Interventions to Barriers       Limit Content. Yes     Review/Repeat.  Yes   Outcome       Acceptable Level Knwldge/Perf Yes        Authenticated by Zane Thomson on 2019 at 11:39 AM Patient states she is out of the pain medication and is requesting a refill.  She is scheduled for a F/U on 12/21/2020.  Will send refill request to MD.

## 2020-12-11 NOTE — TELEPHONE ENCOUNTER
Spoke with pt's daughter to relay message below.  ----- Message from Rosalba Barreto RN sent at 12/9/2020  1:47 PM CST -----    ----- Message -----  From: Hernandez Tesfaye MD  Sent: 12/9/2020  12:06 PM CST  To: Rosalba Barreto RN    Monitor revealed no significant arrhythmias. Please relay to patient

## 2020-12-21 NOTE — PROGRESS NOTES
Subjective:     Patient ID: Eli Vaz is a 75 y.o. female    Chief Complaint: Follow-up      Referred by: No ref. provider found      HPI:    Interval History (12/21/20):  She returns today for follow up.  She reports that she continues to have significant left shoulder pain.  She denies any changes in the quality location this pain.  She denies any new or worsening symptoms.  She continues to take Norco 5-325 mg twice per day.  She denies any adverse effects of this medication and states that does provide adequate relief of her pain.      Interval History (8/17/20):  She returns today for follow up.  She reports that she continues to have significant left shoulder pain.  This is unchanged in quality location since last encounter.  She denies any new or worsening symptoms.  She has been taking Norco 5-325 mg q.12 hours p.r.n..  She has been running low on this medication and therefore has been taking half pills.  She denies any adverse effects of this medication.      Interval History (4/17/20):    The patient location is: Home  The chief complaint leading to consultation is: follow up  Visit type: Virtual visit with synchronous audio and video  Total time spent with patient: 12 minutes  Each patient to whom he or she provides medical services by telemedicine is:  (1) informed of the relationship between the physician and patient and the respective role of any other health care provider with respect to management of the patient; and (2) notified that he or she may decline to receive medical services by telemedicine and may withdraw from such care at any time.      She returns today for follow up.  She reports that Norco 5-325mg q12h has been helpful for the left shoulder pain.  She denies any changes in the quality or location of her pain. She denies any new or worsening symptoms.  She denies any adverse effects from her pain medications. She feels this medication adequately controlled her pain and does not  feel that any changes are needed at this time.        Interval History (2/7/20):  She returns today for follow up.  She reports that Norco 5-325 mg q.12 hours p.r.n. has been helpful for the left shoulder pain.  She denies any changes in the quality or location of her pain. She denies any new or worsening symptoms.  She denies any adverse effects from her pain medications. She feels this medication adequately controlled her pain and does not feel that any changes are needed at this time.      Interval History (11/22/19):  She returns today for follow up.  She reports that she is no longer getting any relief from left shoulder radiofrequency ablation.  She denies any changes in the quality or location of her pain but does state that is more bothersome and limits her ADLs more than previously.  Patient states that she has taken Norco 5-325 mg b.i.d. p.r.n..  This medication greatly improved her pain and function.  She denies any adverse effects from this medication.  She no longer has any more this medication.      Interval History (8/5/19):  She returns today for follow up.  She reports that she continues to get at least 75% relief of her left shoulder pain following left shoulder RFA.  She states that is still quite painful to perform certain ADLs including getting dressed.  She has not yet filled the prescription for Kulm provided at last visit.  She feels as though she may require this medicine to help with certain activities throughout her day.      Interval History (7/8/19):  She returns today for follow up.  She reports that left shoulder radiofrequency ablation has been helpful for the left shoulder pain. She reports about 70% relief of her pain.  She also reports some minimally improved range of motion.  She still taking Norco 7.5-325 mg b.i.d. p.r.n..      Interval History (5/13/19):  She returns today for follow up.  She reports that her left shoulder pain is unchanged in quality location since last  encounter.  She has been evaluated by Orthopedic surgery and no surgical options are being pursued at this time. She states that Norco 7.5-325 mg q.8 hours has been helpful for her pain. She denies any adverse effects with this medication.      Interval History (2/11/19):  She returns today for follow up.  She reports that Norco 7.5-325 half pill q.4 hours has been helpful for the left shoulder pain. Patient states that this provides about 50% relief of her pain.  She no longer has pain while at rest and does note slightly improved range of motion of her left shoulder with minimal to no pain. She still has significantly limited range of motion and function of her left upper extremity as result.  She has discussed potential arthroplasty with Dr. Zurita.  She plans to discuss this option with the rest of her treatment team to see if it is something she wants to proceed with.  She denies any adverse effects from taking her pain medication.  Specifically she denies any sedation or constipation.  She does not feels the higher doses are needed at this time as she is worried about adverse effects and feels as though her current dosage provides adequate relief.      Initial Encounter (1/21/19):  Eli Vaz is a 75 y.o. female who presents today with chronic left shoulder pain. Patient was referred by Dr. Zurita.  She has known left shoulder arthritis and rotator cuff pathology.  Limited surgical and interventional options given medical comorbidities patient has end-stage renal disease on dialysis.  She has failed shoulder injections including Synvisc.  She states that her left shoulder pain is very severe and limits her activities.  She wants somebody to cut it off.    This pain is described in detail below.    Physical Therapy:  Unlikely to be of benefit    Non-pharmacologic Treatment:  Nothing helps         · TENS?  No    Pain Medications:         · Currently taking:  Norco 5-325 mg q.12 hours p.r.n.    · Has tried  in the past:  Tramadol, Percocet, cannot take NSAIDs due to renal disease, Norco    · Has not tried: NSAIDs, Muscle relaxants, TCAs, SNRIs, anticonvulsants, topical creams    Blood thinners:  Eliquis    Interventional Therapies:    Previous shoulder injections including Synvisc.  6/19/19 - left shoulder radiofrequency ablation - 70% relief    Relevant Surgeries:  None    Affecting sleep?  Yes    Affecting daily activities? yes    Depressive symptoms? yes          · SI/HI? No    Work status: Unemployed    Pain Scores:    Best:       5/10  Worst:     8/10  Usually:   5/10  Today:    5/10    Review of Systems   Constitutional: Negative for activity change, appetite change, chills, fatigue, fever and unexpected weight change.   HENT: Negative for hearing loss.    Eyes: Negative for visual disturbance.   Respiratory: Negative for chest tightness and shortness of breath.    Cardiovascular: Negative for chest pain.   Gastrointestinal: Negative for abdominal pain, constipation, diarrhea, nausea and vomiting.   Genitourinary: Negative for difficulty urinating.   Musculoskeletal: Positive for arthralgias and myalgias. Negative for back pain, gait problem and neck pain.   Skin: Negative for rash.   Neurological: Positive for weakness. Negative for dizziness, light-headedness, numbness and headaches.   Psychiatric/Behavioral: Positive for sleep disturbance. Negative for hallucinations and suicidal ideas. The patient is not nervous/anxious.        Past Medical History:   Diagnosis Date    Arthritis     Atrial fibrillation     COPD (chronic obstructive pulmonary disease)     Dialysis patient     Encounter for blood transfusion     ESRD (end stage renal disease)        Past Surgical History:   Procedure Laterality Date    AV FISTULA PLACEMENT      EPIDURAL STEROID INJECTION Left 5/29/2019    Procedure: Axillary, Suprascapular, lateral pectoral nerve blocks;  Surgeon: Isrrael Barton Jr., MD;  Location: [facility];   Service: Pain Management;  Laterality: Left;  Left Axillary, Suprascapular, Lateral Pectoral Nerve Blocks    [id number]    Arrive @ 0800; Eliquis; No DM; Confirm date with Armando    EPIDURAL STEROID INJECTION Left 2019    Procedure: Suprascapular, Axillary, and Lateral Pectoral Nerve Radiofrequency Ablations;  Surgeon: Isrrael Barton Jr., MD;  Location: [facility] ENDO;  Service: Pain Management;  Laterality: Left;  Left Suprascapular, Axillary, & Lateral Pectoral Nerve Radiofrequency Ablations    86886    Arrive @ 1145; IV Sedation- Fasting; Eliquis; No DM; 17-50-2; Rep?    TREATMENT OF CARDIAC ARRHYTHMIA N/A 2019    Procedure: Cardioversion or Defibrillation;  Surgeon: Rakesh Briggs MD;  Location: [facility] CATH LAB;  Service: Cardiology;  Laterality: N/A;    TUBAL LIGATION         Social History     Socioeconomic History    Marital status:      Spouse name: Not on file    Number of children: Not on file    Years of education: Not on file    Highest education level: Not on file   Occupational History    Not on file   Social Needs    Financial resource strain: Not hard at all    Food insecurity     Worry: Never true     Inability: Never true    Transportation needs     Medical: No     Non-medical: No   Tobacco Use    Smoking status: Former Smoker     Packs/day: 1.50     Years: 21.00     Pack years: 31.50     Start date: 1962     Quit date: 2000     Years since quittin.5    Smokeless tobacco: Never Used    Tobacco comment: quit in    Substance and Sexual Activity    Alcohol use: No     Frequency: Never     Drinks per session: Patient refused     Binge frequency: Never    Drug use: No    Sexual activity: Not on file   Lifestyle    Physical activity     Days per week: Not on file     Minutes per session: Not on file    Stress: Not on file   Relationships    Social connections     Talks on phone: More than three times a week     Gets together: Once a week     Attends  Mandaen service: Not on file     Active member of club or organization: No     Attends meetings of clubs or organizations: Never     Relationship status:    Other Topics Concern    Not on file   Social History Narrative    Not on file       Review of patient's allergies indicates:  No Known Allergies    Current Outpatient Medications on File Prior to Visit   Medication Sig Dispense Refill    albuterol (PROVENTIL) 2.5 mg /3 mL (0.083 %) nebulizer solution Take 3 mLs (2.5 mg total) by nebulization every 4 (four) hours. 1 Box 11    albuterol (PROVENTIL/VENTOLIN HFA) 90 mcg/actuation inhaler INHALE 2 PUFFS INTO LUNGS EVERY 4 HOURS AS NEEDED FOR SHORTNESS OF BREATH OR WHEEZING. RESCUE 18 g 5    amiodarone (PACERONE) 200 MG Tab TAKE 1 TABLET BY MOUTH EVERY DAY 90 tablet 3    ANORO ELLIPTA 62.5-25 mcg/actuation DsDv Inhale 1 puff into the lungs once daily.      apixaban (ELIQUIS) 2.5 mg Tab Take 1 tablet (2.5 mg total) by mouth 2 (two) times daily. 180 tablet 3    atorvastatin (LIPITOR) 10 MG tablet TAKE 1 TABLET BY MOUTH EVERY DAY 90 tablet 1    B complex w-C no.20/folic acid (RENAL CAPS ORAL) Take by mouth.      calcium acetate (PHOSLO) 667 mg capsule Take 667 mg by mouth 3 (three) times daily with meals.      cholecalciferol, vitamin D3, (VITAMIN D3) 25 mcg (1,000 unit) capsule Take 2 capsules (2,000 Units total) by mouth once daily. 60 capsule 11    fluticasone propionate (FLONASE) 50 mcg/actuation nasal spray SPRAY 1 SPRAY (50 MCG TOTAL) IN EACH NOSTRIL DAILY AS NEEDED 48 mL 1    HYDROcodone-acetaminophen (NORCO) 5-325 mg per tablet Take 1 tablet by mouth every 12 (twelve) hours as needed for Pain. 60 tablet 0    ketoconazole (NIZORAL) 2 % cream Apply topically once daily. 1 Tube 3    levothyroxine (SYNTHROID) 50 MCG tablet Take 1 tablet (50 mcg total) by mouth before breakfast. Please take as a single dose, on an empty stomach with glass of water, one-half to one hour before breakfast. 30  "tablet 11    mirtazapine (REMERON) 7.5 MG Tab TAKE 1 TABLET BY MOUTH AT BEDTIME AS NEEDED FOR SLEEP 90  3    montelukast (SINGULAIR) 10 mg tablet Take 1 tablet (10 mg total) by mouth every evening. 90 tablet 3    sevelamer carbonate (RENVELA) 800 mg Tab TAKE 3 TABLETS BY MOUTH WITH EACH MEAL AND 1 TABLET WITH EACH SNACK  3    sodium chloride for inhalation (SODIUM CHLORIDE 0.9%) 0.9 % nebulizer solution 1 vial inhaled via nebulizer Q4 PRN for cough 90 mL 12    vit B,C-iron fum-FA-D3-zinc ox 8 mg iron-800 mcg-1,000 unit Tab Take by mouth.       Current Facility-Administered Medications on File Prior to Visit   Medication Dose Route Frequency Provider Last Rate Last Dose    0.9%  NaCl infusion   Intravenous Continuous Alaina Chaudhari, NP        0.9%  NaCl infusion   Intravenous Continuous Alaina Chaudhari, NP        sodium chloride 0.9% flush 5 mL  5 mL Intravenous PRN Alaina Chaudhari, NP        sodium chloride 0.9% flush 5 mL  5 mL Intravenous PRN Alaina Chaudhari, NP           Objective:      BP (!) 157/74 (BP Location: Left arm, Patient Position: Sitting, BP Method: Medium (Automatic))   Pulse 67   Ht 5' 3" (1.6 m)   Wt 59.4 kg (130 lb 15.3 oz)   BMI 23.20 kg/m²     Exam:  GEN:  Well developed, well nourished.  No acute distress.   HEENT:  No trauma.  Mucous membranes moist.  Nares patent bilaterally.  PSYCH: Normal affect. Thought content appropriate.  CHEST:  Breathing symmetric.  No audible wheezing.  ABD: Soft, non-distended.  SKIN:  Warm, pink, dry.  No rash on exposed areas.    EXT:  No cyanosis, clubbing, or edema.  No color change or changes in nail or hair growth.  NEURO/MUSCULOSKELETAL:  Fully alert, oriented, and appropriate. Speech normal merrill. No cranial nerve deficits.   Gait:  Antalgic.  No focal motor deficits.     Severely limited range of motion of left shoulder with pain            Imaging:  Narrative     EXAMINATION:  XR SHOULDER COMPLETE 2 OR MORE VIEWS " LEFT    CLINICAL HISTORY:  Pain in left shoulder    TECHNIQUE:  Two or three views of the left shoulder were performed.    COMPARISON:  None    FINDINGS:  The bones are intact.  There is no evidence for acute fracture or bone destruction.  There are advanced degenerative changes of the left glenohumeral joint with marked joint space narrowing with subchondral sclerosis and osteophyte formation.  There is cortical irregularity at the articular surfaces of the glenohumeral joint.  Multiple vascular stents project over the left upper arm and left subclavian region with multiple surgical clips also present.  Atherosclerotic calcification is present within the thoracic aorta as well as within the carotid arteries.   Impression       Advanced degenerative changes of the left glenohumeral joint.    No evidence for acute fracture, bone destruction, or dislocation.    Surgical changes with multiple vascular stents.    Extensive atherosclerosis.      Electronically signed by: Tone Pereyra MD  Date: 09/18/2018  Time: 07:58         Assessment:       Encounter Diagnoses   Name Primary?    Chronic left shoulder pain Yes    Arthritis of left shoulder region          Plan:       Eli was seen today for follow-up.    Diagnoses and all orders for this visit:    Chronic left shoulder pain  -     DRUGS OF ABUSE SCREEN, BLOOD; Future  -     HYDROcodone-acetaminophen (NORCO) 5-325 mg per tablet; Take 1 tablet by mouth every 12 (twelve) hours as needed for Pain.  -     HYDROcodone-acetaminophen (NORCO) 5-325 mg per tablet; Take 1 tablet by mouth every 12 (twelve) hours as needed for Pain.    Arthritis of left shoulder region  -     DRUGS OF ABUSE SCREEN, BLOOD; Future  -     HYDROcodone-acetaminophen (NORCO) 5-325 mg per tablet; Take 1 tablet by mouth every 12 (twelve) hours as needed for Pain.  -     HYDROcodone-acetaminophen (NORCO) 5-325 mg per tablet; Take 1 tablet by mouth every 12 (twelve) hours as needed for Pain.        Eli  NATHALY Vaz is a 75 y.o. female with chronic left shoulder pain secondary to rotator cuff and glenohumeral pathology.  Not a candidate for left shoulder arthroplasty.  Very good improvement following left shoulder radiofrequency ablation but relief did not last very long.    1.  We had a lengthy discussion regarding the potential adverse effects of chronic opioid medications.  We discussed that it may be appropriate to continue the use of opioid pain medications, but that we should use the lowest dose/potency possible.  Patient expressed understanding.  2.  Continue Norco 5-325 mg q.12 hours p.r.n..  2 additional 1-month prescriptions of 60 pills per month were provided today.  3.  Prescription monitoring port was reviewed today.  No inconsistencies were noted.  4.  Pain contract signed.  5.  Return to clinic in 3 months or sooner if needed.

## 2021-01-01 ENCOUNTER — OFFICE VISIT (OUTPATIENT)
Dept: PAIN MEDICINE | Facility: CLINIC | Age: 76
End: 2021-01-01
Payer: MEDICARE

## 2021-01-01 ENCOUNTER — PATIENT OUTREACH (OUTPATIENT)
Dept: ADMINISTRATIVE | Facility: OTHER | Age: 76
End: 2021-01-01

## 2021-01-01 ENCOUNTER — TELEPHONE (OUTPATIENT)
Dept: PULMONOLOGY | Facility: CLINIC | Age: 76
End: 2021-01-01

## 2021-01-01 ENCOUNTER — PATIENT MESSAGE (OUTPATIENT)
Dept: ORTHOPEDICS | Facility: CLINIC | Age: 76
End: 2021-01-01

## 2021-01-01 ENCOUNTER — PATIENT MESSAGE (OUTPATIENT)
Dept: FAMILY MEDICINE | Facility: CLINIC | Age: 76
End: 2021-01-01

## 2021-01-01 ENCOUNTER — IMMUNIZATION (OUTPATIENT)
Dept: INTERNAL MEDICINE | Facility: CLINIC | Age: 76
End: 2021-01-01
Payer: MEDICARE

## 2021-01-01 ENCOUNTER — OFFICE VISIT (OUTPATIENT)
Dept: PULMONOLOGY | Facility: CLINIC | Age: 76
End: 2021-01-01
Payer: MEDICARE

## 2021-01-01 ENCOUNTER — PATIENT MESSAGE (OUTPATIENT)
Dept: ADMINISTRATIVE | Facility: OTHER | Age: 76
End: 2021-01-01

## 2021-01-01 ENCOUNTER — TELEPHONE (OUTPATIENT)
Dept: HEMATOLOGY/ONCOLOGY | Facility: CLINIC | Age: 76
End: 2021-01-01

## 2021-01-01 ENCOUNTER — TELEPHONE (OUTPATIENT)
Dept: ELECTROPHYSIOLOGY | Facility: CLINIC | Age: 76
End: 2021-01-01

## 2021-01-01 ENCOUNTER — HOSPITAL ENCOUNTER (INPATIENT)
Facility: HOSPITAL | Age: 76
LOS: 2 days | DRG: 208 | End: 2021-09-14
Attending: EMERGENCY MEDICINE | Admitting: EMERGENCY MEDICINE
Payer: MEDICARE

## 2021-01-01 ENCOUNTER — OFFICE VISIT (OUTPATIENT)
Dept: FAMILY MEDICINE | Facility: CLINIC | Age: 76
End: 2021-01-01
Payer: MEDICARE

## 2021-01-01 ENCOUNTER — HOSPITAL ENCOUNTER (EMERGENCY)
Facility: HOSPITAL | Age: 76
Discharge: ANOTHER HOSPITAL | End: 2021-06-19
Attending: EMERGENCY MEDICINE
Payer: MEDICARE

## 2021-01-01 ENCOUNTER — HOSPITAL ENCOUNTER (EMERGENCY)
Facility: HOSPITAL | Age: 76
Discharge: HOME | End: 2021-06-22
Attending: EMERGENCY MEDICINE
Payer: MEDICARE

## 2021-01-01 ENCOUNTER — TELEPHONE (OUTPATIENT)
Dept: PAIN MEDICINE | Facility: CLINIC | Age: 76
End: 2021-01-01

## 2021-01-01 ENCOUNTER — OFFICE VISIT (OUTPATIENT)
Dept: ELECTROPHYSIOLOGY | Facility: CLINIC | Age: 76
End: 2021-01-01
Payer: MEDICARE

## 2021-01-01 ENCOUNTER — PATIENT MESSAGE (OUTPATIENT)
Dept: PULMONOLOGY | Facility: CLINIC | Age: 76
End: 2021-01-01

## 2021-01-01 ENCOUNTER — HOSPITAL ENCOUNTER (OUTPATIENT)
Dept: CARDIOLOGY | Facility: CLINIC | Age: 76
Discharge: HOME | End: 2021-01-22
Payer: MEDICARE

## 2021-01-01 ENCOUNTER — HOSPITAL ENCOUNTER (EMERGENCY)
Facility: HOSPITAL | Age: 76
Discharge: HOME | End: 2021-03-30
Attending: EMERGENCY MEDICINE
Payer: MEDICARE

## 2021-01-01 ENCOUNTER — HOSPITAL ENCOUNTER (OUTPATIENT)
Facility: HOSPITAL | Age: 76
Discharge: HOME | End: 2021-03-27
Attending: EMERGENCY MEDICINE | Admitting: HOSPITALIST
Payer: MEDICARE

## 2021-01-01 ENCOUNTER — OFFICE VISIT (OUTPATIENT)
Dept: ORTHOPEDICS | Facility: CLINIC | Age: 76
End: 2021-01-01
Attending: ORTHOPAEDIC SURGERY
Payer: MEDICARE

## 2021-01-01 ENCOUNTER — LAB VISIT (OUTPATIENT)
Dept: LAB | Facility: HOSPITAL | Age: 76
End: 2021-01-01
Attending: FAMILY MEDICINE
Payer: MEDICARE

## 2021-01-01 ENCOUNTER — OFFICE VISIT (OUTPATIENT)
Dept: ORTHOPEDICS | Facility: CLINIC | Age: 76
End: 2021-01-01
Payer: MEDICARE

## 2021-01-01 VITALS
HEART RATE: 75 BPM | DIASTOLIC BLOOD PRESSURE: 65 MMHG | BODY MASS INDEX: 22.02 KG/M2 | SYSTOLIC BLOOD PRESSURE: 139 MMHG | WEIGHT: 129 LBS | HEIGHT: 64 IN

## 2021-01-01 VITALS
HEART RATE: 68 BPM | SYSTOLIC BLOOD PRESSURE: 132 MMHG | HEIGHT: 64 IN | WEIGHT: 134.5 LBS | BODY MASS INDEX: 22.96 KG/M2 | RESPIRATION RATE: 18 BRPM | DIASTOLIC BLOOD PRESSURE: 60 MMHG | OXYGEN SATURATION: 97 %

## 2021-01-01 VITALS
WEIGHT: 134.69 LBS | SYSTOLIC BLOOD PRESSURE: 132 MMHG | HEIGHT: 64 IN | SYSTOLIC BLOOD PRESSURE: 144 MMHG | DIASTOLIC BLOOD PRESSURE: 59 MMHG | DIASTOLIC BLOOD PRESSURE: 63 MMHG | TEMPERATURE: 99 F | HEART RATE: 72 BPM | BODY MASS INDEX: 22.02 KG/M2 | WEIGHT: 129 LBS | OXYGEN SATURATION: 96 % | HEIGHT: 64 IN | BODY MASS INDEX: 22.99 KG/M2 | HEART RATE: 68 BPM

## 2021-01-01 VITALS
OXYGEN SATURATION: 32 % | BODY MASS INDEX: 23.45 KG/M2 | HEIGHT: 64 IN | DIASTOLIC BLOOD PRESSURE: 28 MMHG | WEIGHT: 137.38 LBS | TEMPERATURE: 97 F | SYSTOLIC BLOOD PRESSURE: 47 MMHG

## 2021-01-01 VITALS
HEART RATE: 94 BPM | DIASTOLIC BLOOD PRESSURE: 62 MMHG | BODY MASS INDEX: 23.55 KG/M2 | SYSTOLIC BLOOD PRESSURE: 140 MMHG | RESPIRATION RATE: 18 BRPM | WEIGHT: 132.94 LBS | HEIGHT: 63 IN

## 2021-01-01 VITALS
RESPIRATION RATE: 18 BRPM | DIASTOLIC BLOOD PRESSURE: 69 MMHG | WEIGHT: 134 LBS | TEMPERATURE: 99 F | HEART RATE: 68 BPM | OXYGEN SATURATION: 95 % | BODY MASS INDEX: 22.88 KG/M2 | SYSTOLIC BLOOD PRESSURE: 158 MMHG | HEIGHT: 64 IN

## 2021-01-01 VITALS
BODY MASS INDEX: 23.56 KG/M2 | DIASTOLIC BLOOD PRESSURE: 70 MMHG | HEIGHT: 64 IN | HEART RATE: 66 BPM | WEIGHT: 138 LBS | SYSTOLIC BLOOD PRESSURE: 138 MMHG

## 2021-01-01 VITALS
OXYGEN SATURATION: 100 % | DIASTOLIC BLOOD PRESSURE: 58 MMHG | BODY MASS INDEX: 22.88 KG/M2 | HEART RATE: 79 BPM | TEMPERATURE: 98 F | SYSTOLIC BLOOD PRESSURE: 125 MMHG | RESPIRATION RATE: 17 BRPM | HEIGHT: 64 IN | WEIGHT: 134 LBS

## 2021-01-01 VITALS
DIASTOLIC BLOOD PRESSURE: 68 MMHG | WEIGHT: 140.44 LBS | SYSTOLIC BLOOD PRESSURE: 128 MMHG | HEIGHT: 64 IN | OXYGEN SATURATION: 99 % | BODY MASS INDEX: 23.98 KG/M2 | HEART RATE: 73 BPM | RESPIRATION RATE: 16 BRPM

## 2021-01-01 VITALS
TEMPERATURE: 99 F | SYSTOLIC BLOOD PRESSURE: 147 MMHG | WEIGHT: 134 LBS | DIASTOLIC BLOOD PRESSURE: 74 MMHG | OXYGEN SATURATION: 97 % | HEART RATE: 75 BPM | RESPIRATION RATE: 20 BRPM | HEIGHT: 64 IN | BODY MASS INDEX: 22.88 KG/M2

## 2021-01-01 VITALS
WEIGHT: 134 LBS | SYSTOLIC BLOOD PRESSURE: 148 MMHG | HEIGHT: 64 IN | BODY MASS INDEX: 22.88 KG/M2 | HEART RATE: 75 BPM | DIASTOLIC BLOOD PRESSURE: 75 MMHG | OXYGEN SATURATION: 99 % | TEMPERATURE: 98 F | RESPIRATION RATE: 20 BRPM

## 2021-01-01 DIAGNOSIS — I10 BENIGN ESSENTIAL HYPERTENSION: Chronic | ICD-10-CM

## 2021-01-01 DIAGNOSIS — R07.9 CHEST PAIN: ICD-10-CM

## 2021-01-01 DIAGNOSIS — T82.590A MALFUNCTION OF ARTERIOVENOUS DIALYSIS FISTULA: ICD-10-CM

## 2021-01-01 DIAGNOSIS — Z99.2 ESRD ON DIALYSIS: Chronic | ICD-10-CM

## 2021-01-01 DIAGNOSIS — D63.8 ANEMIA OF CHRONIC DISEASE: Chronic | ICD-10-CM

## 2021-01-01 DIAGNOSIS — J96.11 CHRONIC RESPIRATORY FAILURE WITH HYPOXIA: Chronic | ICD-10-CM

## 2021-01-01 DIAGNOSIS — G89.29 CHRONIC LEFT SHOULDER PAIN: Primary | ICD-10-CM

## 2021-01-01 DIAGNOSIS — Z00.00 GENERAL MEDICAL EXAM: Primary | ICD-10-CM

## 2021-01-01 DIAGNOSIS — F41.9 ANXIETY: Chronic | ICD-10-CM

## 2021-01-01 DIAGNOSIS — N18.6 ESRD ON DIALYSIS: Chronic | ICD-10-CM

## 2021-01-01 DIAGNOSIS — R06.02 SOB (SHORTNESS OF BREATH): ICD-10-CM

## 2021-01-01 DIAGNOSIS — D72.829 LEUKOCYTOSIS, UNSPECIFIED TYPE: ICD-10-CM

## 2021-01-01 DIAGNOSIS — G47.00 INSOMNIA, UNSPECIFIED TYPE: ICD-10-CM

## 2021-01-01 DIAGNOSIS — M89.9 CHRONIC KIDNEY DISEASE-MINERAL AND BONE DISORDER: ICD-10-CM

## 2021-01-01 DIAGNOSIS — N25.81 SECONDARY HYPERPARATHYROIDISM: ICD-10-CM

## 2021-01-01 DIAGNOSIS — E11.22 DIABETES MELLITUS WITH ESRD (END-STAGE RENAL DISEASE): ICD-10-CM

## 2021-01-01 DIAGNOSIS — J43.2 CENTRILOBULAR EMPHYSEMA: Primary | ICD-10-CM

## 2021-01-01 DIAGNOSIS — J43.2 CENTRILOBULAR EMPHYSEMA: Chronic | ICD-10-CM

## 2021-01-01 DIAGNOSIS — J44.9 CHRONIC OBSTRUCTIVE PULMONARY DISEASE, UNSPECIFIED COPD TYPE: ICD-10-CM

## 2021-01-01 DIAGNOSIS — M79.89 LEG SWELLING: Primary | ICD-10-CM

## 2021-01-01 DIAGNOSIS — N18.6 ESRD ON DIALYSIS: ICD-10-CM

## 2021-01-01 DIAGNOSIS — Z12.31 ENCOUNTER FOR SCREENING MAMMOGRAM FOR MALIGNANT NEOPLASM OF BREAST: Primary | ICD-10-CM

## 2021-01-01 DIAGNOSIS — M19.012 ARTHRITIS OF LEFT SHOULDER REGION: ICD-10-CM

## 2021-01-01 DIAGNOSIS — Z99.2 ESRD ON DIALYSIS: ICD-10-CM

## 2021-01-01 DIAGNOSIS — M25.561 ACUTE PAIN OF RIGHT KNEE: Primary | ICD-10-CM

## 2021-01-01 DIAGNOSIS — R06.02 SHORTNESS OF BREATH: ICD-10-CM

## 2021-01-01 DIAGNOSIS — T46.2X1A HYPOTHYROIDISM DUE TO AMIODARONE: ICD-10-CM

## 2021-01-01 DIAGNOSIS — J32.9 RHINOSINUSITIS: ICD-10-CM

## 2021-01-01 DIAGNOSIS — G89.29 CHRONIC LEFT SHOULDER PAIN: ICD-10-CM

## 2021-01-01 DIAGNOSIS — R79.89 TROPONIN I ABOVE REFERENCE RANGE: ICD-10-CM

## 2021-01-01 DIAGNOSIS — E03.9 ACQUIRED HYPOTHYROIDISM: ICD-10-CM

## 2021-01-01 DIAGNOSIS — M25.40 EFFUSION INTO JOINT: ICD-10-CM

## 2021-01-01 DIAGNOSIS — I48.91 ATRIAL FIBRILLATION WITH RAPID VENTRICULAR RESPONSE: Primary | ICD-10-CM

## 2021-01-01 DIAGNOSIS — D75.839 THROMBOCYTOSIS: ICD-10-CM

## 2021-01-01 DIAGNOSIS — M19.012 ARTHRITIS OF LEFT SHOULDER REGION: Primary | ICD-10-CM

## 2021-01-01 DIAGNOSIS — E87.5 HYPERKALEMIA: ICD-10-CM

## 2021-01-01 DIAGNOSIS — E87.70 VOLUME OVERLOAD: Primary | ICD-10-CM

## 2021-01-01 DIAGNOSIS — N18.6 DIABETES MELLITUS WITH ESRD (END-STAGE RENAL DISEASE): ICD-10-CM

## 2021-01-01 DIAGNOSIS — Z71.89 GOALS OF CARE, COUNSELING/DISCUSSION: ICD-10-CM

## 2021-01-01 DIAGNOSIS — I51.89 DIASTOLIC DYSFUNCTION: Chronic | ICD-10-CM

## 2021-01-01 DIAGNOSIS — R09.02 HYPOXIA: ICD-10-CM

## 2021-01-01 DIAGNOSIS — E11.59 HYPERTENSION ASSOCIATED WITH DIABETES: ICD-10-CM

## 2021-01-01 DIAGNOSIS — M25.512 CHRONIC LEFT SHOULDER PAIN: ICD-10-CM

## 2021-01-01 DIAGNOSIS — R09.81 NASAL CONGESTION: ICD-10-CM

## 2021-01-01 DIAGNOSIS — I50.9 CONGESTIVE HEART FAILURE, UNSPECIFIED HF CHRONICITY, UNSPECIFIED HEART FAILURE TYPE: Primary | ICD-10-CM

## 2021-01-01 DIAGNOSIS — M25.562 LEFT KNEE PAIN, UNSPECIFIED CHRONICITY: Primary | ICD-10-CM

## 2021-01-01 DIAGNOSIS — J44.9 COPD (CHRONIC OBSTRUCTIVE PULMONARY DISEASE): Chronic | ICD-10-CM

## 2021-01-01 DIAGNOSIS — Z23 NEED FOR VACCINATION: Primary | ICD-10-CM

## 2021-01-01 DIAGNOSIS — I15.2 HYPERTENSION ASSOCIATED WITH DIABETES: ICD-10-CM

## 2021-01-01 DIAGNOSIS — R00.2 PALPITATIONS: ICD-10-CM

## 2021-01-01 DIAGNOSIS — E03.2 HYPOTHYROIDISM DUE TO AMIODARONE: ICD-10-CM

## 2021-01-01 DIAGNOSIS — Z86.73 HISTORY OF CVA (CEREBROVASCULAR ACCIDENT): Chronic | ICD-10-CM

## 2021-01-01 DIAGNOSIS — I48.19 PERSISTENT ATRIAL FIBRILLATION: Primary | ICD-10-CM

## 2021-01-01 DIAGNOSIS — R91.8 PULMONARY INFILTRATE: ICD-10-CM

## 2021-01-01 DIAGNOSIS — I49.9 ABNORMAL HEART RHYTHM: ICD-10-CM

## 2021-01-01 DIAGNOSIS — E87.5 HYPERKALEMIA: Primary | ICD-10-CM

## 2021-01-01 DIAGNOSIS — I48.19 PERSISTENT ATRIAL FIBRILLATION: Chronic | ICD-10-CM

## 2021-01-01 DIAGNOSIS — I49.8 OTHER SPECIFIED CARDIAC ARRHYTHMIAS: ICD-10-CM

## 2021-01-01 DIAGNOSIS — N18.6 DIABETES MELLITUS WITH ESRD (END-STAGE RENAL DISEASE): Primary | ICD-10-CM

## 2021-01-01 DIAGNOSIS — M25.512 CHRONIC LEFT SHOULDER PAIN: Primary | ICD-10-CM

## 2021-01-01 DIAGNOSIS — E83.9 CHRONIC KIDNEY DISEASE-MINERAL AND BONE DISORDER: ICD-10-CM

## 2021-01-01 DIAGNOSIS — N18.9 CHRONIC KIDNEY DISEASE-MINERAL AND BONE DISORDER: ICD-10-CM

## 2021-01-01 DIAGNOSIS — E11.22 DIABETES MELLITUS WITH ESRD (END-STAGE RENAL DISEASE): Primary | ICD-10-CM

## 2021-01-01 DIAGNOSIS — J20.9 ACUTE BRONCHITIS, UNSPECIFIED ORGANISM: ICD-10-CM

## 2021-01-01 DIAGNOSIS — R53.1 WEAKNESS: Primary | ICD-10-CM

## 2021-01-01 DIAGNOSIS — I27.20 PULMONARY HTN: ICD-10-CM

## 2021-01-01 DIAGNOSIS — M17.11 PRIMARY OSTEOARTHRITIS OF RIGHT KNEE: ICD-10-CM

## 2021-01-01 LAB
ALBUMIN SERPL BCP-MCNC: 3.3 G/DL (ref 3.5–5.2)
ALBUMIN SERPL BCP-MCNC: 3.5 G/DL (ref 3.5–5.2)
ALBUMIN SERPL BCP-MCNC: 3.5 G/DL (ref 3.5–5.2)
ALBUMIN SERPL BCP-MCNC: 3.6 G/DL (ref 3.5–5.2)
ALBUMIN SERPL BCP-MCNC: 3.7 G/DL (ref 3.5–5.2)
ALBUMIN SERPL BCP-MCNC: 3.7 G/DL (ref 3.5–5.2)
ALBUMIN SERPL BCP-MCNC: 4 G/DL (ref 3.5–5.2)
ALBUMIN SERPL BCP-MCNC: 4.1 G/DL (ref 3.5–5.2)
ALLENS TEST: ABNORMAL
ALP SERPL-CCNC: 109 U/L (ref 55–135)
ALP SERPL-CCNC: 116 U/L (ref 55–135)
ALP SERPL-CCNC: 131 U/L (ref 55–135)
ALP SERPL-CCNC: 141 U/L (ref 55–135)
ALP SERPL-CCNC: 151 U/L (ref 55–135)
ALP SERPL-CCNC: 159 U/L (ref 55–135)
ALP SERPL-CCNC: 168 U/L (ref 55–135)
ALP SERPL-CCNC: 176 U/L (ref 55–135)
ALT SERPL W/O P-5'-P-CCNC: 5 U/L (ref 10–44)
ALT SERPL W/O P-5'-P-CCNC: 5 U/L (ref 10–44)
ALT SERPL W/O P-5'-P-CCNC: 6 U/L (ref 10–44)
ALT SERPL W/O P-5'-P-CCNC: 7 U/L (ref 10–44)
ALT SERPL W/O P-5'-P-CCNC: 7 U/L (ref 10–44)
ALT SERPL W/O P-5'-P-CCNC: 8 U/L (ref 10–44)
ALT SERPL W/O P-5'-P-CCNC: 8 U/L (ref 10–44)
ALT SERPL W/O P-5'-P-CCNC: <5 U/L (ref 10–44)
ANION GAP SERPL CALC-SCNC: 12 MMOL/L (ref 8–16)
ANION GAP SERPL CALC-SCNC: 15 MMOL/L (ref 8–16)
ANION GAP SERPL CALC-SCNC: 16 MMOL/L (ref 8–16)
ANION GAP SERPL CALC-SCNC: 16 MMOL/L (ref 8–16)
ANION GAP SERPL CALC-SCNC: 17 MMOL/L (ref 8–16)
ANION GAP SERPL CALC-SCNC: 18 MMOL/L (ref 8–16)
ANION GAP SERPL CALC-SCNC: 19 MMOL/L (ref 8–16)
ANION GAP SERPL CALC-SCNC: 7 MMOL/L (ref 8–16)
ANISOCYTOSIS BLD QL SMEAR: SLIGHT
ANISOCYTOSIS BLD QL SMEAR: SLIGHT
AORTIC ROOT ANNULUS: 3.15 CM
AORTIC VALVE CUSP SEPERATION: 1.68 CM
APPEARANCE FLD: NORMAL
ASCENDING AORTA: 2.95 CM
AST SERPL-CCNC: 14 U/L (ref 10–40)
AST SERPL-CCNC: 14 U/L (ref 10–40)
AST SERPL-CCNC: 17 U/L (ref 10–40)
AST SERPL-CCNC: 18 U/L (ref 10–40)
AST SERPL-CCNC: 19 U/L (ref 10–40)
AST SERPL-CCNC: 20 U/L (ref 10–40)
AST SERPL-CCNC: 25 U/L (ref 10–40)
AST SERPL-CCNC: 34 U/L (ref 10–40)
AV INDEX (PROSTH): 0.6
AV MEAN GRADIENT: 11 MMHG
AV PEAK GRADIENT: 20 MMHG
AV VALVE AREA: 1.87 CM2
AV VELOCITY RATIO: 0.64
BACTERIA BLD CULT: NORMAL
BACTERIA BLD CULT: NORMAL
BASOPHILS # BLD AUTO: 0.28 K/UL (ref 0–0.2)
BASOPHILS # BLD AUTO: 0.36 K/UL (ref 0–0.2)
BASOPHILS # BLD AUTO: 0.5 K/UL (ref 0–0.2)
BASOPHILS # BLD AUTO: ABNORMAL K/UL (ref 0–0.2)
BASOPHILS NFR BLD: 0 % (ref 0–1.9)
BASOPHILS NFR BLD: 1 % (ref 0–1.9)
BASOPHILS NFR BLD: 2 % (ref 0–1.9)
BASOPHILS NFR BLD: 2.8 % (ref 0–1.9)
BASOPHILS NFR BLD: 3 % (ref 0–1.9)
BASOPHILS NFR BLD: 3.3 % (ref 0–1.9)
BILIRUB SERPL-MCNC: 0.5 MG/DL (ref 0.1–1)
BILIRUB SERPL-MCNC: 0.5 MG/DL (ref 0.1–1)
BILIRUB SERPL-MCNC: 0.6 MG/DL (ref 0.1–1)
BILIRUB SERPL-MCNC: 0.7 MG/DL (ref 0.1–1)
BILIRUB SERPL-MCNC: 0.7 MG/DL (ref 0.1–1)
BNP SERPL-MCNC: 2773 PG/ML (ref 0–99)
BNP SERPL-MCNC: 2903 PG/ML (ref 0–99)
BNP SERPL-MCNC: 3258 PG/ML (ref 0–99)
BNP SERPL-MCNC: 3784 PG/ML (ref 0–99)
BODY FLD TYPE: NORMAL
BODY FLD TYPE: NORMAL
BSA FOR ECHO PROCEDURE: 1.66 M2
BUN SERPL-MCNC: 20 MG/DL (ref 8–23)
BUN SERPL-MCNC: 21 MG/DL (ref 8–23)
BUN SERPL-MCNC: 23 MG/DL (ref 8–23)
BUN SERPL-MCNC: 29 MG/DL (ref 6–30)
BUN SERPL-MCNC: 29 MG/DL (ref 8–23)
BUN SERPL-MCNC: 30 MG/DL (ref 8–23)
BUN SERPL-MCNC: 34 MG/DL (ref 8–23)
BUN SERPL-MCNC: 59 MG/DL (ref 8–23)
CALCIUM SERPL-MCNC: 10 MG/DL (ref 8.7–10.5)
CALCIUM SERPL-MCNC: 7.7 MG/DL (ref 8.7–10.5)
CALCIUM SERPL-MCNC: 7.8 MG/DL (ref 8.7–10.5)
CALCIUM SERPL-MCNC: 7.9 MG/DL (ref 8.7–10.5)
CALCIUM SERPL-MCNC: 7.9 MG/DL (ref 8.7–10.5)
CALCIUM SERPL-MCNC: 8.1 MG/DL (ref 8.7–10.5)
CALCIUM SERPL-MCNC: 8.7 MG/DL (ref 8.7–10.5)
CALCIUM SERPL-MCNC: 8.9 MG/DL (ref 8.7–10.5)
CALCIUM SERPL-MCNC: 9.3 MG/DL (ref 8.7–10.5)
CHLORIDE SERPL-SCNC: 100 MMOL/L (ref 95–110)
CHLORIDE SERPL-SCNC: 101 MMOL/L (ref 95–110)
CHLORIDE SERPL-SCNC: 101 MMOL/L (ref 95–110)
CHLORIDE SERPL-SCNC: 105 MMOL/L (ref 95–110)
CHLORIDE SERPL-SCNC: 107 MMOL/L (ref 95–110)
CHLORIDE SERPL-SCNC: 108 MMOL/L (ref 95–110)
CHLORIDE SERPL-SCNC: 98 MMOL/L (ref 95–110)
CHLORIDE SERPL-SCNC: 99 MMOL/L (ref 95–110)
CHOLEST SERPL-MCNC: 158 MG/DL (ref 120–199)
CHOLEST/HDLC SERPL: 2.5 {RATIO} (ref 2–5)
CK MB SERPL-MCNC: 3.8 NG/ML (ref 0.1–6.5)
CK MB SERPL-RTO: 4 % (ref 0–5)
CK SERPL-CCNC: 94 U/L (ref 20–180)
CO2 SERPL-SCNC: 19 MMOL/L (ref 23–29)
CO2 SERPL-SCNC: 19 MMOL/L (ref 23–29)
CO2 SERPL-SCNC: 20 MMOL/L (ref 23–29)
CO2 SERPL-SCNC: 20 MMOL/L (ref 23–29)
CO2 SERPL-SCNC: 22 MMOL/L (ref 23–29)
CO2 SERPL-SCNC: 22 MMOL/L (ref 23–29)
CO2 SERPL-SCNC: 23 MMOL/L (ref 23–29)
CO2 SERPL-SCNC: 24 MMOL/L (ref 23–29)
CO2 SERPL-SCNC: 26 MMOL/L (ref 23–29)
COLOR FLD: NORMAL
CREAT SERPL-MCNC: 5 MG/DL (ref 0.5–1.4)
CREAT SERPL-MCNC: 5.4 MG/DL (ref 0.5–1.4)
CREAT SERPL-MCNC: 5.6 MG/DL (ref 0.5–1.4)
CREAT SERPL-MCNC: 5.8 MG/DL (ref 0.5–1.4)
CREAT SERPL-MCNC: 6.6 MG/DL (ref 0.5–1.4)
CREAT SERPL-MCNC: 7.5 MG/DL (ref 0.5–1.4)
CREAT SERPL-MCNC: 8.7 MG/DL (ref 0.5–1.4)
CREAT SERPL-MCNC: 9.1 MG/DL (ref 0.5–1.4)
CREAT SERPL-MCNC: 9.1 MG/DL (ref 0.5–1.4)
CREAT SERPL-MCNC: 9.2 MG/DL (ref 0.5–1.4)
CRP SERPL-MCNC: 56.2 MG/L (ref 0–8.2)
CRYSTALS FLD MICRO: NEGATIVE
CTP QC/QA: YES
CV ECHO LV RWT: 0.6 CM
D DIMER PPP IA.FEU-MCNC: 0.73 MG/L FEU
DELSYS: ABNORMAL
DIFFERENTIAL METHOD: ABNORMAL
DOP CALC AO PEAK VEL: 2.26 M/S
DOP CALC AO VTI: 50.3 CM
DOP CALC LVOT AREA: 3.1 CM2
DOP CALC LVOT DIAMETER: 1.99 CM
DOP CALC LVOT PEAK VEL: 1.45 M/S
DOP CALC LVOT STROKE VOLUME: 94.29 CM3
DOP CALCLVOT PEAK VEL VTI: 30.33 CM
E WAVE DECELERATION TIME: 195.15 MSEC
E/A RATIO: 1.81
E/E' RATIO: 25.85 M/S
ECHO LV POSTERIOR WALL: 1.27 CM (ref 0.6–1.1)
EOSINOPHIL # BLD AUTO: 0 K/UL (ref 0–0.5)
EOSINOPHIL # BLD AUTO: 0.1 K/UL (ref 0–0.5)
EOSINOPHIL # BLD AUTO: 0.2 K/UL (ref 0–0.5)
EOSINOPHIL # BLD AUTO: ABNORMAL K/UL (ref 0–0.5)
EOSINOPHIL NFR BLD: 0 % (ref 0–8)
EOSINOPHIL NFR BLD: 0.1 % (ref 0–8)
EOSINOPHIL NFR BLD: 1 % (ref 0–8)
EOSINOPHIL NFR BLD: 1.4 % (ref 0–8)
EOSINOPHIL NFR BLD: 1.7 % (ref 0–8)
EOSINOPHIL NFR BLD: 2 % (ref 0–8)
EOSINOPHIL NFR BLD: 3 % (ref 0–8)
EOSINOPHIL NFR BLD: 6 % (ref 0–8)
ERYTHROCYTE [DISTWIDTH] IN BLOOD BY AUTOMATED COUNT: 17 % (ref 11.5–14.5)
ERYTHROCYTE [DISTWIDTH] IN BLOOD BY AUTOMATED COUNT: 17.1 % (ref 11.5–14.5)
ERYTHROCYTE [DISTWIDTH] IN BLOOD BY AUTOMATED COUNT: 17.3 % (ref 11.5–14.5)
ERYTHROCYTE [DISTWIDTH] IN BLOOD BY AUTOMATED COUNT: 18 % (ref 11.5–14.5)
ERYTHROCYTE [DISTWIDTH] IN BLOOD BY AUTOMATED COUNT: 18.2 % (ref 11.5–14.5)
ERYTHROCYTE [DISTWIDTH] IN BLOOD BY AUTOMATED COUNT: 18.9 % (ref 11.5–14.5)
ERYTHROCYTE [DISTWIDTH] IN BLOOD BY AUTOMATED COUNT: 19.3 % (ref 11.5–14.5)
ERYTHROCYTE [DISTWIDTH] IN BLOOD BY AUTOMATED COUNT: 19.6 % (ref 11.5–14.5)
ERYTHROCYTE [SEDIMENTATION RATE] IN BLOOD BY WESTERGREN METHOD: 2 MM/HR (ref 0–20)
EST. GFR  (AFRICAN AMERICAN): 4 ML/MIN/1.73 M^2
EST. GFR  (AFRICAN AMERICAN): 5 ML/MIN/1.73 M^2
EST. GFR  (AFRICAN AMERICAN): 6 ML/MIN/1.73 M^2
EST. GFR  (AFRICAN AMERICAN): 7 ML/MIN/1.73 M^2
EST. GFR  (AFRICAN AMERICAN): 8 ML/MIN/1.73 M^2
EST. GFR  (NON AFRICAN AMERICAN): 4 ML/MIN/1.73 M^2
EST. GFR  (NON AFRICAN AMERICAN): 5 ML/MIN/1.73 M^2
EST. GFR  (NON AFRICAN AMERICAN): 6 ML/MIN/1.73 M^2
EST. GFR  (NON AFRICAN AMERICAN): 7 ML/MIN/1.73 M^2
ESTIMATED AVG GLUCOSE: 82 MG/DL (ref 68–131)
FERRITIN SERPL-MCNC: 1221 NG/ML (ref 20–300)
FERRITIN SERPL-MCNC: 1326 NG/ML (ref 20–300)
FIO2: 32
FLOW: 3
FOLATE SERPL-MCNC: 6.2 NG/ML (ref 4–24)
FRACTIONAL SHORTENING: 34 % (ref 28–44)
GIANT PLATELETS BLD QL SMEAR: PRESENT
GIANT PLATELETS BLD QL SMEAR: PRESENT
GLUCOSE SERPL-MCNC: 105 MG/DL (ref 70–110)
GLUCOSE SERPL-MCNC: 56 MG/DL (ref 70–110)
GLUCOSE SERPL-MCNC: 69 MG/DL (ref 70–110)
GLUCOSE SERPL-MCNC: 70 MG/DL (ref 70–110)
GLUCOSE SERPL-MCNC: 73 MG/DL (ref 70–110)
GLUCOSE SERPL-MCNC: 78 MG/DL (ref 70–110)
GLUCOSE SERPL-MCNC: 79 MG/DL (ref 70–110)
GLUCOSE SERPL-MCNC: 80 MG/DL (ref 70–110)
GLUCOSE SERPL-MCNC: 83 MG/DL (ref 70–110)
GLUCOSE SERPL-MCNC: 91 MG/DL (ref 70–110)
HAV IGM SERPL QL IA: NEGATIVE
HBA1C MFR BLD: 4.5 % (ref 4–5.6)
HBV CORE IGM SERPL QL IA: NEGATIVE
HBV SURFACE AB SER QL IA: POSITIVE
HBV SURFACE AB SERPL IA-ACNC: 160 MIU/ML
HBV SURFACE AG SERPL QL IA: NEGATIVE
HCO3 UR-SCNC: 25 MMOL/L (ref 24–28)
HCT VFR BLD AUTO: 30.2 % (ref 37–48.5)
HCT VFR BLD AUTO: 34.4 % (ref 37–48.5)
HCT VFR BLD AUTO: 36.2 % (ref 37–48.5)
HCT VFR BLD AUTO: 37.4 % (ref 37–48.5)
HCT VFR BLD AUTO: 38.1 % (ref 37–48.5)
HCT VFR BLD AUTO: 40.8 % (ref 37–48.5)
HCT VFR BLD AUTO: 42 % (ref 37–48.5)
HCT VFR BLD AUTO: 42.6 % (ref 37–48.5)
HCT VFR BLD CALC: 45 %PCV (ref 36–54)
HCV AB SERPL QL IA: NEGATIVE
HDLC SERPL-MCNC: 64 MG/DL (ref 40–75)
HDLC SERPL: 40.5 % (ref 20–50)
HGB BLD-MCNC: 10.3 G/DL (ref 12–16)
HGB BLD-MCNC: 10.7 G/DL (ref 12–16)
HGB BLD-MCNC: 11 G/DL (ref 12–16)
HGB BLD-MCNC: 11.4 G/DL (ref 12–16)
HGB BLD-MCNC: 11.8 G/DL (ref 12–16)
HGB BLD-MCNC: 11.8 G/DL (ref 12–16)
HGB BLD-MCNC: 11.9 G/DL (ref 12–16)
HGB BLD-MCNC: 9.2 G/DL (ref 12–16)
HYPOCHROMIA BLD QL SMEAR: ABNORMAL
IMM GRANULOCYTES # BLD AUTO: 0.29 K/UL (ref 0–0.04)
IMM GRANULOCYTES # BLD AUTO: 0.31 K/UL (ref 0–0.04)
IMM GRANULOCYTES # BLD AUTO: 3.24 K/UL (ref 0–0.04)
IMM GRANULOCYTES # BLD AUTO: ABNORMAL K/UL (ref 0–0.04)
IMM GRANULOCYTES NFR BLD AUTO: 13 % (ref 0–0.5)
IMM GRANULOCYTES NFR BLD AUTO: 2.8 % (ref 0–0.5)
IMM GRANULOCYTES NFR BLD AUTO: 2.9 % (ref 0–0.5)
IMM GRANULOCYTES NFR BLD AUTO: ABNORMAL % (ref 0–0.5)
INTERVENTRICULAR SEPTUM: 1.3 CM (ref 0.6–1.1)
IRON SERPL-MCNC: 38 UG/DL (ref 30–160)
IRON SERPL-MCNC: 79 UG/DL (ref 30–160)
IVRT: 133.21 MSEC
LA MAJOR: 6.93 CM
LA MINOR: 6.43 CM
LA WIDTH: 5.14 CM
LACTATE SERPL-SCNC: 1 MMOL/L (ref 0.5–2.2)
LDH SERPL L TO P-CCNC: 714 U/L (ref 110–260)
LDLC SERPL CALC-MCNC: 73.2 MG/DL (ref 63–159)
LEFT ATRIUM SIZE: 4.7 CM
LEFT ATRIUM VOLUME INDEX: 83 ML/M2
LEFT ATRIUM VOLUME: 136.98 CM3
LEFT INTERNAL DIMENSION IN SYSTOLE: 2.78 CM (ref 2.1–4)
LEFT VENTRICLE DIASTOLIC VOLUME INDEX: 48 ML/M2
LEFT VENTRICLE DIASTOLIC VOLUME: 79.2 ML
LEFT VENTRICLE MASS INDEX: 120 G/M2
LEFT VENTRICLE SYSTOLIC VOLUME INDEX: 17.5 ML/M2
LEFT VENTRICLE SYSTOLIC VOLUME: 28.94 ML
LEFT VENTRICULAR INTERNAL DIMENSION IN DIASTOLE: 4.21 CM (ref 3.5–6)
LEFT VENTRICULAR MASS: 197.82 G
LV LATERAL E/E' RATIO: 21 M/S
LV SEPTAL E/E' RATIO: 33.6 M/S
LYMPHOCYTES # BLD AUTO: 0.7 K/UL (ref 1–4.8)
LYMPHOCYTES # BLD AUTO: 0.9 K/UL (ref 1–4.8)
LYMPHOCYTES # BLD AUTO: 1.3 K/UL (ref 1–4.8)
LYMPHOCYTES # BLD AUTO: ABNORMAL K/UL (ref 1–4.8)
LYMPHOCYTES NFR BLD: 10 % (ref 18–48)
LYMPHOCYTES NFR BLD: 3 % (ref 18–48)
LYMPHOCYTES NFR BLD: 4 % (ref 18–48)
LYMPHOCYTES NFR BLD: 5.4 % (ref 18–48)
LYMPHOCYTES NFR BLD: 6 % (ref 18–48)
LYMPHOCYTES NFR BLD: 6.7 % (ref 18–48)
LYMPHOCYTES NFR BLD: 8.6 % (ref 18–48)
LYMPHOCYTES NFR BLD: 9 % (ref 18–48)
LYMPHOCYTES NFR FLD MANUAL: 6 %
MAGNESIUM SERPL-MCNC: 1.9 MG/DL (ref 1.6–2.6)
MCH RBC QN AUTO: 27.9 PG (ref 27–31)
MCH RBC QN AUTO: 28.2 PG (ref 27–31)
MCH RBC QN AUTO: 28.2 PG (ref 27–31)
MCH RBC QN AUTO: 28.3 PG (ref 27–31)
MCH RBC QN AUTO: 28.5 PG (ref 27–31)
MCH RBC QN AUTO: 28.8 PG (ref 27–31)
MCH RBC QN AUTO: 29 PG (ref 27–31)
MCH RBC QN AUTO: 29.4 PG (ref 27–31)
MCHC RBC AUTO-ENTMCNC: 27.7 G/DL (ref 32–36)
MCHC RBC AUTO-ENTMCNC: 28.1 G/DL (ref 32–36)
MCHC RBC AUTO-ENTMCNC: 28.3 G/DL (ref 32–36)
MCHC RBC AUTO-ENTMCNC: 28.9 G/DL (ref 32–36)
MCHC RBC AUTO-ENTMCNC: 29.9 G/DL (ref 32–36)
MCHC RBC AUTO-ENTMCNC: 30.4 G/DL (ref 32–36)
MCHC RBC AUTO-ENTMCNC: 30.5 G/DL (ref 32–36)
MCHC RBC AUTO-ENTMCNC: 30.5 G/DL (ref 32–36)
MCV RBC AUTO: 100 FL (ref 82–98)
MCV RBC AUTO: 102 FL (ref 82–98)
MCV RBC AUTO: 102 FL (ref 82–98)
MCV RBC AUTO: 105 FL (ref 82–98)
MCV RBC AUTO: 93 FL (ref 82–98)
MCV RBC AUTO: 93 FL (ref 82–98)
MCV RBC AUTO: 94 FL (ref 82–98)
MCV RBC AUTO: 94 FL (ref 82–98)
MESOTHL CELL NFR FLD MANUAL: 1 %
METAMYELOCYTES NFR BLD MANUAL: 1 %
METAMYELOCYTES NFR BLD MANUAL: 2 %
MODE: ABNORMAL
MONOCYTES # BLD AUTO: 0.3 K/UL (ref 0.3–1)
MONOCYTES # BLD AUTO: 0.4 K/UL (ref 0.3–1)
MONOCYTES # BLD AUTO: 1 K/UL (ref 0.3–1)
MONOCYTES # BLD AUTO: ABNORMAL K/UL (ref 0.3–1)
MONOCYTES NFR BLD: 0 % (ref 4–15)
MONOCYTES NFR BLD: 0 % (ref 4–15)
MONOCYTES NFR BLD: 1 % (ref 4–15)
MONOCYTES NFR BLD: 1 % (ref 4–15)
MONOCYTES NFR BLD: 2.9 % (ref 4–15)
MONOCYTES NFR BLD: 3 % (ref 4–15)
MONOCYTES NFR BLD: 4.2 % (ref 4–15)
MONOCYTES NFR BLD: 4.3 % (ref 4–15)
MONOS+MACROS NFR FLD MANUAL: 3 %
MV PEAK A VEL: 0.93 M/S
MV PEAK E VEL: 1.68 M/S
MV STENOSIS PRESSURE HALF TIME: 56.59 MS
MV VALVE AREA P 1/2 METHOD: 3.89 CM2
MYELOCYTES NFR BLD MANUAL: 1 %
MYELOCYTES NFR BLD MANUAL: 2 %
MYELOCYTES NFR BLD MANUAL: 7 %
NEUTROPHILS # BLD AUTO: 18.9 K/UL (ref 1.8–7.7)
NEUTROPHILS # BLD AUTO: 8.2 K/UL (ref 1.8–7.7)
NEUTROPHILS # BLD AUTO: 8.9 K/UL (ref 1.8–7.7)
NEUTROPHILS # BLD AUTO: ABNORMAL K/UL (ref 1.8–7.7)
NEUTROPHILS # BLD AUTO: ABNORMAL K/UL (ref 1.8–7.7)
NEUTROPHILS NFR BLD: 75.3 % (ref 38–73)
NEUTROPHILS NFR BLD: 80.7 % (ref 38–73)
NEUTROPHILS NFR BLD: 81.9 % (ref 38–73)
NEUTROPHILS NFR BLD: 82 % (ref 38–73)
NEUTROPHILS NFR BLD: 83 % (ref 38–73)
NEUTROPHILS NFR BLD: 84 % (ref 38–73)
NEUTROPHILS NFR BLD: 84 % (ref 38–73)
NEUTROPHILS NFR BLD: 87 % (ref 38–73)
NEUTROPHILS NFR FLD MANUAL: 90 %
NEUTS BAND NFR BLD MANUAL: 1 %
NEUTS BAND NFR BLD MANUAL: 1 %
NEUTS BAND NFR BLD MANUAL: 2 %
NEUTS BAND NFR BLD MANUAL: 4 %
NONHDLC SERPL-MCNC: 94 MG/DL
NRBC BLD-RTO: 0 /100 WBC
NRBC BLD-RTO: 1 /100 WBC
OVALOCYTES BLD QL SMEAR: ABNORMAL
PATH REV BLD -IMP: NORMAL
PATH REV BLD -IMP: NORMAL
PCO2 BLDA: 68.4 MMHG (ref 35–45)
PH SMN: 7.17 [PH] (ref 7.35–7.45)
PHOSPHATE SERPL-MCNC: 3.5 MG/DL (ref 2.7–4.5)
PISA TR MAX VEL: 4.3 M/S
PLATELET # BLD AUTO: 1026 K/UL (ref 150–450)
PLATELET # BLD AUTO: 1329 K/UL (ref 150–450)
PLATELET # BLD AUTO: 1470 K/UL (ref 150–450)
PLATELET # BLD AUTO: 581 K/UL (ref 150–350)
PLATELET # BLD AUTO: 729 K/UL (ref 150–350)
PLATELET # BLD AUTO: 737 K/UL (ref 150–450)
PLATELET # BLD AUTO: 751 K/UL (ref 150–450)
PLATELET # BLD AUTO: 910 K/UL (ref 150–450)
PLATELET BLD QL SMEAR: ABNORMAL
PMV BLD AUTO: 10.6 FL (ref 9.2–12.9)
PMV BLD AUTO: 10.6 FL (ref 9.2–12.9)
PMV BLD AUTO: 10.7 FL (ref 9.2–12.9)
PMV BLD AUTO: 10.8 FL (ref 9.2–12.9)
PMV BLD AUTO: 10.9 FL (ref 9.2–12.9)
PMV BLD AUTO: 11 FL (ref 9.2–12.9)
PMV BLD AUTO: 11 FL (ref 9.2–12.9)
PMV BLD AUTO: 11.6 FL (ref 9.2–12.9)
PO2 BLDA: 32 MMHG (ref 40–60)
POC BE: -5 MMOL/L
POC IONIZED CALCIUM: 1.25 MMOL/L (ref 1.06–1.42)
POC SATURATED O2: 45 % (ref 95–100)
POC TCO2 (MEASURED): 26 MMOL/L (ref 23–29)
POC TCO2: 27 MMOL/L (ref 24–29)
POCT GLUCOSE: 96 MG/DL (ref 70–110)
POIKILOCYTOSIS BLD QL SMEAR: SLIGHT
POTASSIUM BLD-SCNC: 5.3 MMOL/L (ref 3.5–5.1)
POTASSIUM SERPL-SCNC: 4.1 MMOL/L (ref 3.5–5.1)
POTASSIUM SERPL-SCNC: 4.7 MMOL/L (ref 3.5–5.1)
POTASSIUM SERPL-SCNC: 4.8 MMOL/L (ref 3.5–5.1)
POTASSIUM SERPL-SCNC: 5.5 MMOL/L (ref 3.5–5.1)
POTASSIUM SERPL-SCNC: 6.1 MMOL/L (ref 3.5–5.1)
POTASSIUM SERPL-SCNC: 6.4 MMOL/L (ref 3.5–5.1)
POTASSIUM SERPL-SCNC: 6.5 MMOL/L (ref 3.5–5.1)
PROCALCITONIN SERPL IA-MCNC: 1.02 NG/ML
PROCALCITONIN SERPL IA-MCNC: 1.08 NG/ML
PROCALCITONIN SERPL IA-MCNC: 1.52 NG/ML
PROT SERPL-MCNC: 7 G/DL (ref 6–8.4)
PROT SERPL-MCNC: 7.1 G/DL (ref 6–8.4)
PROT SERPL-MCNC: 7.4 G/DL (ref 6–8.4)
PROT SERPL-MCNC: 7.9 G/DL (ref 6–8.4)
PROT SERPL-MCNC: 8 G/DL (ref 6–8.4)
PROT SERPL-MCNC: 8 G/DL (ref 6–8.4)
PROT SERPL-MCNC: 8.4 G/DL (ref 6–8.4)
PROT SERPL-MCNC: 8.7 G/DL (ref 6–8.4)
PULM VEIN S/D RATIO: 1.34
PV PEAK D VEL: 0.61 M/S
PV PEAK S VEL: 0.82 M/S
PV PEAK VELOCITY: 0.97 CM/S
RA MAJOR: 6.42 CM
RA PRESSURE: 15 MMHG
RA WIDTH: 5.05 CM
RBC # BLD AUTO: 3.23 M/UL (ref 4–5.4)
RBC # BLD AUTO: 3.65 M/UL (ref 4–5.4)
RBC # BLD AUTO: 3.83 M/UL (ref 4–5.4)
RBC # BLD AUTO: 3.89 M/UL (ref 4–5.4)
RBC # BLD AUTO: 4.02 M/UL (ref 4–5.4)
RBC # BLD AUTO: 4.04 M/UL (ref 4–5.4)
RBC # BLD AUTO: 4.07 M/UL (ref 4–5.4)
RBC # BLD AUTO: 4.13 M/UL (ref 4–5.4)
RIGHT VENTRICULAR END-DIASTOLIC DIMENSION: 5.14 CM
RV TISSUE DOPPLER FREE WALL SYSTOLIC VELOCITY 1 (APICAL 4 CHAMBER VIEW): 10.99 CM/S
SAMPLE: ABNORMAL
SAMPLE: ABNORMAL
SARS-COV-2 RDRP RESP QL NAA+PROBE: NEGATIVE
SATURATED IRON: 16 % (ref 20–50)
SATURATED IRON: 35 % (ref 20–50)
SINUS: 3 CM
SITE: ABNORMAL
SODIUM BLD-SCNC: 134 MMOL/L (ref 136–145)
SODIUM SERPL-SCNC: 135 MMOL/L (ref 136–145)
SODIUM SERPL-SCNC: 137 MMOL/L (ref 136–145)
SODIUM SERPL-SCNC: 138 MMOL/L (ref 136–145)
SODIUM SERPL-SCNC: 140 MMOL/L (ref 136–145)
SODIUM SERPL-SCNC: 140 MMOL/L (ref 136–145)
SODIUM SERPL-SCNC: 146 MMOL/L (ref 136–145)
SP02: 100
STJ: 2.49 CM
STOMATOCYTES BLD QL SMEAR: PRESENT
T4 FREE SERPL-MCNC: 0.74 NG/DL (ref 0.71–1.51)
T4 FREE SERPL-MCNC: 0.74 NG/DL (ref 0.71–1.51)
T4 FREE SERPL-MCNC: 0.82 NG/DL (ref 0.71–1.51)
TARGETS BLD QL SMEAR: ABNORMAL
TDI LATERAL: 0.08 M/S
TDI SEPTAL: 0.05 M/S
TDI: 0.07 M/S
TOTAL IRON BINDING CAPACITY: 225 UG/DL (ref 250–450)
TOTAL IRON BINDING CAPACITY: 232 UG/DL (ref 250–450)
TR MAX PG: 74 MMHG
TRANSFERRIN SERPL-MCNC: 152 MG/DL (ref 200–375)
TRANSFERRIN SERPL-MCNC: 152 MG/DL (ref 200–375)
TRANSFERRIN SERPL-MCNC: 157 MG/DL (ref 200–375)
TRICUSPID ANNULAR PLANE SYSTOLIC EXCURSION: 1.46 CM
TRIGL SERPL-MCNC: 104 MG/DL (ref 30–150)
TROPONIN I SERPL DL<=0.01 NG/ML-MCNC: 0.04 NG/ML (ref 0–0.03)
TROPONIN I SERPL DL<=0.01 NG/ML-MCNC: 0.06 NG/ML (ref 0–0.03)
TROPONIN I SERPL DL<=0.01 NG/ML-MCNC: 0.06 NG/ML (ref 0–0.03)
TROPONIN I SERPL DL<=0.01 NG/ML-MCNC: 0.07 NG/ML (ref 0–0.03)
TROPONIN I SERPL DL<=0.01 NG/ML-MCNC: 0.07 NG/ML (ref 0–0.03)
TROPONIN I SERPL DL<=0.01 NG/ML-MCNC: 0.08 NG/ML (ref 0–0.03)
TSH RECEP AB SER-ACNC: <1.1 IU/L (ref 0–1.75)
TSH SERPL DL<=0.005 MIU/L-ACNC: 11.39 UIU/ML (ref 0.4–4)
TSH SERPL DL<=0.005 MIU/L-ACNC: 21.26 UIU/ML (ref 0.4–4)
TSH SERPL DL<=0.005 MIU/L-ACNC: 24.5 UIU/ML (ref 0.4–4)
TSH SERPL DL<=0.005 MIU/L-ACNC: 3.68 UIU/ML (ref 0.4–4)
TV REST PULMONARY ARTERY PRESSURE: 89 MMHG
VIT B12 SERPL-MCNC: 1611 PG/ML (ref 210–950)
WBC # BLD AUTO: 10 K/UL (ref 3.9–12.7)
WBC # BLD AUTO: 10.64 K/UL (ref 3.9–12.7)
WBC # BLD AUTO: 10.97 K/UL (ref 3.9–12.7)
WBC # BLD AUTO: 13.32 K/UL (ref 3.9–12.7)
WBC # BLD AUTO: 15.1 K/UL (ref 3.9–12.7)
WBC # BLD AUTO: 23.08 K/UL (ref 3.9–12.7)
WBC # BLD AUTO: 24.57 K/UL (ref 3.9–12.7)
WBC # BLD AUTO: 25.01 K/UL (ref 3.9–12.7)
WBC # FLD: 174 /CU MM
WBC TOXIC VACUOLES BLD QL SMEAR: PRESENT

## 2021-01-01 PROCEDURE — 99999 PR PBB SHADOW E&M-EST. PATIENT-LVL V: CPT | Mod: PBBFAC,,, | Performed by: ORTHOPAEDIC SURGERY

## 2021-01-01 PROCEDURE — 63600175 PHARM REV CODE 636 W HCPCS: Performed by: EMERGENCY MEDICINE

## 2021-01-01 PROCEDURE — 81219 CALR GENE COM VARIANTS: CPT | Performed by: INTERNAL MEDICINE

## 2021-01-01 PROCEDURE — 82728 ASSAY OF FERRITIN: CPT | Performed by: HOSPITALIST

## 2021-01-01 PROCEDURE — 3008F BODY MASS INDEX DOCD: CPT | Mod: CPTII,S$GLB,, | Performed by: NURSE PRACTITIONER

## 2021-01-01 PROCEDURE — 93010 EKG 12-LEAD: ICD-10-PCS | Mod: ,,, | Performed by: INTERNAL MEDICINE

## 2021-01-01 PROCEDURE — 85027 COMPLETE CBC AUTOMATED: CPT | Performed by: EMERGENCY MEDICINE

## 2021-01-01 PROCEDURE — 1159F PR MEDICATION LIST DOCUMENTED IN MEDICAL RECORD: ICD-10-PCS | Mod: CPTII,S$GLB,, | Performed by: FAMILY MEDICINE

## 2021-01-01 PROCEDURE — 99214 OFFICE O/P EST MOD 30 MIN: CPT | Mod: S$GLB,,, | Performed by: NURSE PRACTITIONER

## 2021-01-01 PROCEDURE — 93010 ELECTROCARDIOGRAM REPORT: CPT | Mod: ,,, | Performed by: INTERNAL MEDICINE

## 2021-01-01 PROCEDURE — 1159F PR MEDICATION LIST DOCUMENTED IN MEDICAL RECORD: ICD-10-PCS | Mod: S$GLB,,, | Performed by: INTERNAL MEDICINE

## 2021-01-01 PROCEDURE — 3044F PR MOST RECENT HEMOGLOBIN A1C LEVEL <7.0%: ICD-10-PCS | Mod: CPTII,S$GLB,, | Performed by: FAMILY MEDICINE

## 2021-01-01 PROCEDURE — 94640 AIRWAY INHALATION TREATMENT: CPT

## 2021-01-01 PROCEDURE — U0002 COVID-19 LAB TEST NON-CDC: HCPCS | Performed by: STUDENT IN AN ORGANIZED HEALTH CARE EDUCATION/TRAINING PROGRAM

## 2021-01-01 PROCEDURE — 99999 PR PBB SHADOW E&M-EST. PATIENT-LVL V: ICD-10-PCS | Mod: PBBFAC,,, | Performed by: ORTHOPAEDIC SURGERY

## 2021-01-01 PROCEDURE — 96376 TX/PRO/DX INJ SAME DRUG ADON: CPT | Mod: 59

## 2021-01-01 PROCEDURE — 99999 PR PBB SHADOW E&M-EST. PATIENT-LVL III: ICD-10-PCS | Mod: PBBFAC,,, | Performed by: FAMILY MEDICINE

## 2021-01-01 PROCEDURE — 82607 VITAMIN B-12: CPT | Performed by: HOSPITALIST

## 2021-01-01 PROCEDURE — 94761 N-INVAS EAR/PLS OXIMETRY MLT: CPT

## 2021-01-01 PROCEDURE — 85027 COMPLETE CBC AUTOMATED: CPT | Mod: 91 | Performed by: INTERNAL MEDICINE

## 2021-01-01 PROCEDURE — 91301 COVID-19, MRNA, LNP-S, PF, 100 MCG/0.5 ML DOSE VACCINE: ICD-10-PCS | Mod: S$GLB,,, | Performed by: FAMILY MEDICINE

## 2021-01-01 PROCEDURE — 99285 EMERGENCY DEPT VISIT HI MDM: CPT

## 2021-01-01 PROCEDURE — 36415 COLL VENOUS BLD VENIPUNCTURE: CPT | Performed by: INTERNAL MEDICINE

## 2021-01-01 PROCEDURE — 99999 PR PBB SHADOW E&M-EST. PATIENT-LVL IV: ICD-10-PCS | Mod: PBBFAC,,, | Performed by: PAIN MEDICINE

## 2021-01-01 PROCEDURE — 99285 EMERGENCY DEPT VISIT HI MDM: CPT | Mod: 25

## 2021-01-01 PROCEDURE — 84439 ASSAY OF FREE THYROXINE: CPT | Performed by: HOSPITALIST

## 2021-01-01 PROCEDURE — 96365 THER/PROPH/DIAG IV INF INIT: CPT

## 2021-01-01 PROCEDURE — 1160F RVW MEDS BY RX/DR IN RCRD: CPT | Mod: CPTII,95,, | Performed by: PAIN MEDICINE

## 2021-01-01 PROCEDURE — 80053 COMPREHEN METABOLIC PANEL: CPT | Performed by: EMERGENCY MEDICINE

## 2021-01-01 PROCEDURE — 84100 ASSAY OF PHOSPHORUS: CPT | Performed by: EMERGENCY MEDICINE

## 2021-01-01 PROCEDURE — 80053 COMPREHEN METABOLIC PANEL: CPT | Performed by: HOSPITALIST

## 2021-01-01 PROCEDURE — 85652 RBC SED RATE AUTOMATED: CPT | Performed by: INTERNAL MEDICINE

## 2021-01-01 PROCEDURE — 81170 ABL1 GENE: CPT | Performed by: INTERNAL MEDICINE

## 2021-01-01 PROCEDURE — 3288F FALL RISK ASSESSMENT DOCD: CPT | Mod: CPTII,S$GLB,, | Performed by: ORTHOPAEDIC SURGERY

## 2021-01-01 PROCEDURE — 25000003 PHARM REV CODE 250: Performed by: EMERGENCY MEDICINE

## 2021-01-01 PROCEDURE — 85014 HEMATOCRIT: CPT

## 2021-01-01 PROCEDURE — 1159F MED LIST DOCD IN RCRD: CPT | Mod: S$GLB,,, | Performed by: INTERNAL MEDICINE

## 2021-01-01 PROCEDURE — 85027 COMPLETE CBC AUTOMATED: CPT | Performed by: STUDENT IN AN ORGANIZED HEALTH CARE EDUCATION/TRAINING PROGRAM

## 2021-01-01 PROCEDURE — 3008F BODY MASS INDEX DOCD: CPT | Mod: CPTII,S$GLB,, | Performed by: PAIN MEDICINE

## 2021-01-01 PROCEDURE — 99212 PR OFFICE/OUTPT VISIT, EST, LEVL II, 10-19 MIN: ICD-10-PCS | Mod: S$GLB,,, | Performed by: ORTHOPAEDIC SURGERY

## 2021-01-01 PROCEDURE — 1125F AMNT PAIN NOTED PAIN PRSNT: CPT | Mod: S$GLB,,, | Performed by: ORTHOPAEDIC SURGERY

## 2021-01-01 PROCEDURE — 63600175 PHARM REV CODE 636 W HCPCS: Performed by: HOSPITALIST

## 2021-01-01 PROCEDURE — 85379 FIBRIN DEGRADATION QUANT: CPT | Performed by: EMERGENCY MEDICINE

## 2021-01-01 PROCEDURE — 84630 ASSAY OF ZINC: CPT | Performed by: INTERNAL MEDICINE

## 2021-01-01 PROCEDURE — 3288F PR FALLS RISK ASSESSMENT DOCUMENTED: ICD-10-PCS | Mod: CPTII,S$GLB,, | Performed by: ORTHOPAEDIC SURGERY

## 2021-01-01 PROCEDURE — 99499 RISK ADDL DX/OHS AUDIT: ICD-10-PCS | Mod: S$GLB,,, | Performed by: PAIN MEDICINE

## 2021-01-01 PROCEDURE — 99999 PR PBB SHADOW E&M-EST. PATIENT-LVL IV: ICD-10-PCS | Mod: PBBFAC,,, | Performed by: NURSE PRACTITIONER

## 2021-01-01 PROCEDURE — 99499 UNLISTED E&M SERVICE: CPT | Mod: S$GLB,,, | Performed by: FAMILY MEDICINE

## 2021-01-01 PROCEDURE — 85007 BL SMEAR W/DIFF WBC COUNT: CPT | Performed by: EMERGENCY MEDICINE

## 2021-01-01 PROCEDURE — G0378 HOSPITAL OBSERVATION PER HR: HCPCS

## 2021-01-01 PROCEDURE — 93005 ELECTROCARDIOGRAM TRACING: CPT | Mod: S$GLB,,, | Performed by: INTERNAL MEDICINE

## 2021-01-01 PROCEDURE — 1126F PR PAIN SEVERITY QUANTIFIED, NO PAIN PRESENT: ICD-10-PCS | Mod: S$GLB,,, | Performed by: NURSE PRACTITIONER

## 2021-01-01 PROCEDURE — 0011A COVID-19, MRNA, LNP-S, PF, 100 MCG/0.5 ML DOSE VACCINE: CPT | Mod: PBBFAC | Performed by: FAMILY MEDICINE

## 2021-01-01 PROCEDURE — 1159F MED LIST DOCD IN RCRD: CPT | Mod: S$GLB,,, | Performed by: PAIN MEDICINE

## 2021-01-01 PROCEDURE — 3008F BODY MASS INDEX DOCD: CPT | Mod: CPTII,S$GLB,, | Performed by: ORTHOPAEDIC SURGERY

## 2021-01-01 PROCEDURE — 85007 BL SMEAR W/DIFF WBC COUNT: CPT | Mod: 91 | Performed by: INTERNAL MEDICINE

## 2021-01-01 PROCEDURE — 99900035 HC TECH TIME PER 15 MIN (STAT)

## 2021-01-01 PROCEDURE — 83520 IMMUNOASSAY QUANT NOS NONAB: CPT

## 2021-01-01 PROCEDURE — 93005 ELECTROCARDIOGRAM TRACING: CPT

## 2021-01-01 PROCEDURE — 96375 TX/PRO/DX INJ NEW DRUG ADDON: CPT | Mod: 59

## 2021-01-01 PROCEDURE — 27000221 HC OXYGEN, UP TO 24 HOURS

## 2021-01-01 PROCEDURE — 99223 PR INITIAL HOSPITAL CARE,LEVL III: ICD-10-PCS | Mod: ,,, | Performed by: INTERNAL MEDICINE

## 2021-01-01 PROCEDURE — 0012A COVID-19, MRNA, LNP-S, PF, 100 MCG/0.5 ML DOSE VACCINE: CPT | Mod: CV19,S$GLB,, | Performed by: FAMILY MEDICINE

## 2021-01-01 PROCEDURE — 93005 RHYTHM STRIP: ICD-10-PCS | Mod: S$GLB,,, | Performed by: INTERNAL MEDICINE

## 2021-01-01 PROCEDURE — 1125F AMNT PAIN NOTED PAIN PRSNT: CPT | Mod: S$GLB,,, | Performed by: PAIN MEDICINE

## 2021-01-01 PROCEDURE — 99999 PR PBB SHADOW E&M-EST. PATIENT-LVL III: CPT | Mod: PBBFAC,,, | Performed by: FAMILY MEDICINE

## 2021-01-01 PROCEDURE — 86706 HEP B SURFACE ANTIBODY: CPT | Performed by: HOSPITALIST

## 2021-01-01 PROCEDURE — 84443 ASSAY THYROID STIM HORMONE: CPT | Performed by: HOSPITALIST

## 2021-01-01 PROCEDURE — 99999 PR PBB SHADOW E&M-EST. PATIENT-LVL IV: ICD-10-PCS | Mod: PBBFAC,,, | Performed by: INTERNAL MEDICINE

## 2021-01-01 PROCEDURE — 82330 ASSAY OF CALCIUM: CPT

## 2021-01-01 PROCEDURE — 99215 OFFICE O/P EST HI 40 MIN: CPT | Mod: S$GLB,,, | Performed by: FAMILY MEDICINE

## 2021-01-01 PROCEDURE — 1159F MED LIST DOCD IN RCRD: CPT | Mod: S$GLB,,, | Performed by: NURSE PRACTITIONER

## 2021-01-01 PROCEDURE — 3008F BODY MASS INDEX DOCD: CPT | Mod: CPTII,S$GLB,, | Performed by: INTERNAL MEDICINE

## 2021-01-01 PROCEDURE — 85060 BLOOD SMEAR INTERPRETATION: CPT | Mod: ,,, | Performed by: PATHOLOGY

## 2021-01-01 PROCEDURE — 99214 OFFICE O/P EST MOD 30 MIN: CPT | Mod: 95,,, | Performed by: PAIN MEDICINE

## 2021-01-01 PROCEDURE — 21400001 HC TELEMETRY ROOM

## 2021-01-01 PROCEDURE — 99214 OFFICE O/P EST MOD 30 MIN: CPT | Mod: S$GLB,,, | Performed by: PAIN MEDICINE

## 2021-01-01 PROCEDURE — 91301 COVID-19, MRNA, LNP-S, PF, 100 MCG/0.5 ML DOSE VACCINE: CPT | Mod: S$GLB,,, | Performed by: FAMILY MEDICINE

## 2021-01-01 PROCEDURE — 3078F PR MOST RECENT DIASTOLIC BLOOD PRESSURE < 80 MM HG: ICD-10-PCS | Mod: CPTII,S$GLB,, | Performed by: FAMILY MEDICINE

## 2021-01-01 PROCEDURE — U0002 COVID-19 LAB TEST NON-CDC: HCPCS | Performed by: EMERGENCY MEDICINE

## 2021-01-01 PROCEDURE — 84484 ASSAY OF TROPONIN QUANT: CPT | Performed by: EMERGENCY MEDICINE

## 2021-01-01 PROCEDURE — 3008F PR BODY MASS INDEX (BMI) DOCUMENTED: ICD-10-PCS | Mod: CPTII,S$GLB,, | Performed by: ORTHOPAEDIC SURGERY

## 2021-01-01 PROCEDURE — 81206 BCR/ABL1 GENE MAJOR BP: CPT | Performed by: INTERNAL MEDICINE

## 2021-01-01 PROCEDURE — 99232 PR SUBSEQUENT HOSPITAL CARE,LEVL II: ICD-10-PCS | Mod: ,,, | Performed by: FAMILY MEDICINE

## 2021-01-01 PROCEDURE — 1159F MED LIST DOCD IN RCRD: CPT | Mod: CPTII,95,, | Performed by: PAIN MEDICINE

## 2021-01-01 PROCEDURE — 3044F HG A1C LEVEL LT 7.0%: CPT | Mod: CPTII,S$GLB,, | Performed by: FAMILY MEDICINE

## 2021-01-01 PROCEDURE — 86140 C-REACTIVE PROTEIN: CPT | Performed by: INTERNAL MEDICINE

## 2021-01-01 PROCEDURE — 1101F PR PT FALLS ASSESS DOC 0-1 FALLS W/OUT INJ PAST YR: ICD-10-PCS | Mod: CPTII,S$GLB,, | Performed by: ORTHOPAEDIC SURGERY

## 2021-01-01 PROCEDURE — 85060 PATHOLOGIST REVIEW: ICD-10-PCS | Mod: ,,, | Performed by: PATHOLOGY

## 2021-01-01 PROCEDURE — 81208 BCR/ABL1 GENE OTHER BP: CPT | Mod: 91 | Performed by: INTERNAL MEDICINE

## 2021-01-01 PROCEDURE — 3044F PR MOST RECENT HEMOGLOBIN A1C LEVEL <7.0%: ICD-10-PCS | Mod: CPTII,95,, | Performed by: PAIN MEDICINE

## 2021-01-01 PROCEDURE — 27100107 HC POCKET PEAK FLOW METER

## 2021-01-01 PROCEDURE — 89051 BODY FLUID CELL COUNT: CPT | Performed by: EMERGENCY MEDICINE

## 2021-01-01 PROCEDURE — 93010 RHYTHM STRIP: ICD-10-PCS | Mod: S$GLB,,, | Performed by: INTERNAL MEDICINE

## 2021-01-01 PROCEDURE — 93010 ELECTROCARDIOGRAM REPORT: CPT | Mod: S$GLB,,, | Performed by: INTERNAL MEDICINE

## 2021-01-01 PROCEDURE — 83615 LACTATE (LD) (LDH) ENZYME: CPT | Performed by: INTERNAL MEDICINE

## 2021-01-01 PROCEDURE — 1159F PR MEDICATION LIST DOCUMENTED IN MEDICAL RECORD: ICD-10-PCS | Mod: S$GLB,,, | Performed by: PAIN MEDICINE

## 2021-01-01 PROCEDURE — 3044F HG A1C LEVEL LT 7.0%: CPT | Mod: CPTII,95,, | Performed by: PAIN MEDICINE

## 2021-01-01 PROCEDURE — 82553 CREATINE MB FRACTION: CPT | Performed by: EMERGENCY MEDICINE

## 2021-01-01 PROCEDURE — 25000003 PHARM REV CODE 250: Performed by: STUDENT IN AN ORGANIZED HEALTH CARE EDUCATION/TRAINING PROGRAM

## 2021-01-01 PROCEDURE — 25000242 PHARM REV CODE 250 ALT 637 W/ HCPCS: Performed by: FAMILY MEDICINE

## 2021-01-01 PROCEDURE — 25000242 PHARM REV CODE 250 ALT 637 W/ HCPCS: Performed by: EMERGENCY MEDICINE

## 2021-01-01 PROCEDURE — 99999 PR PBB SHADOW E&M-EST. PATIENT-LVL IV: ICD-10-PCS | Mod: PBBFAC,,, | Performed by: ORTHOPAEDIC SURGERY

## 2021-01-01 PROCEDURE — 81339 MPL GENE SEQ ALYS EXON 10: CPT | Performed by: INTERNAL MEDICINE

## 2021-01-01 PROCEDURE — 3074F SYST BP LT 130 MM HG: CPT | Mod: CPTII,S$GLB,, | Performed by: FAMILY MEDICINE

## 2021-01-01 PROCEDURE — 1126F AMNT PAIN NOTED NONE PRSNT: CPT | Mod: S$GLB,,, | Performed by: INTERNAL MEDICINE

## 2021-01-01 PROCEDURE — 84439 ASSAY OF FREE THYROXINE: CPT | Performed by: EMERGENCY MEDICINE

## 2021-01-01 PROCEDURE — 1160F PR REVIEW ALL MEDS BY PRESCRIBER/CLIN PHARMACIST DOCUMENTED: ICD-10-PCS | Mod: CPTII,95,, | Performed by: PAIN MEDICINE

## 2021-01-01 PROCEDURE — 99999 PR PBB SHADOW E&M-EST. PATIENT-LVL IV: CPT | Mod: PBBFAC,,, | Performed by: NURSE PRACTITIONER

## 2021-01-01 PROCEDURE — 99999 PR PBB SHADOW E&M-EST. PATIENT-LVL IV: CPT | Mod: PBBFAC,,, | Performed by: ORTHOPAEDIC SURGERY

## 2021-01-01 PROCEDURE — 63600175 PHARM REV CODE 636 W HCPCS: Performed by: STUDENT IN AN ORGANIZED HEALTH CARE EDUCATION/TRAINING PROGRAM

## 2021-01-01 PROCEDURE — 25000242 PHARM REV CODE 250 ALT 637 W/ HCPCS: Performed by: HOSPITALIST

## 2021-01-01 PROCEDURE — 31500 INSERT EMERGENCY AIRWAY: CPT

## 2021-01-01 PROCEDURE — 99212 OFFICE O/P EST SF 10 MIN: CPT | Mod: S$GLB,,, | Performed by: ORTHOPAEDIC SURGERY

## 2021-01-01 PROCEDURE — 0012A COVID-19, MRNA, LNP-S, PF, 100 MCG/0.5 ML DOSE VACCINE: ICD-10-PCS | Mod: CV19,S$GLB,, | Performed by: FAMILY MEDICINE

## 2021-01-01 PROCEDURE — 84439 ASSAY OF FREE THYROXINE: CPT

## 2021-01-01 PROCEDURE — 84443 ASSAY THYROID STIM HORMONE: CPT | Performed by: EMERGENCY MEDICINE

## 2021-01-01 PROCEDURE — 93010 EKG 12-LEAD: ICD-10-PCS | Mod: 76,,, | Performed by: INTERNAL MEDICINE

## 2021-01-01 PROCEDURE — 63600175 PHARM REV CODE 636 W HCPCS

## 2021-01-01 PROCEDURE — 99499 UNLISTED E&M SERVICE: CPT | Mod: S$GLB,,, | Performed by: PAIN MEDICINE

## 2021-01-01 PROCEDURE — 3008F PR BODY MASS INDEX (BMI) DOCUMENTED: ICD-10-PCS | Mod: CPTII,S$GLB,, | Performed by: FAMILY MEDICINE

## 2021-01-01 PROCEDURE — 3288F PR FALLS RISK ASSESSMENT DOCUMENTED: ICD-10-PCS | Mod: CPTII,S$GLB,, | Performed by: NURSE PRACTITIONER

## 2021-01-01 PROCEDURE — 1159F PR MEDICATION LIST DOCUMENTED IN MEDICAL RECORD: ICD-10-PCS | Mod: S$GLB,,, | Performed by: ORTHOPAEDIC SURGERY

## 2021-01-01 PROCEDURE — 84145 PROCALCITONIN (PCT): CPT | Performed by: EMERGENCY MEDICINE

## 2021-01-01 PROCEDURE — 87040 BLOOD CULTURE FOR BACTERIA: CPT | Performed by: EMERGENCY MEDICINE

## 2021-01-01 PROCEDURE — 3008F PR BODY MASS INDEX (BMI) DOCUMENTED: ICD-10-PCS | Mod: CPTII,S$GLB,, | Performed by: NURSE PRACTITIONER

## 2021-01-01 PROCEDURE — 81207 BCR/ABL1 GENE MINOR BP: CPT | Performed by: INTERNAL MEDICINE

## 2021-01-01 PROCEDURE — 85025 COMPLETE CBC W/AUTO DIFF WBC: CPT | Performed by: HOSPITALIST

## 2021-01-01 PROCEDURE — 83880 ASSAY OF NATRIURETIC PEPTIDE: CPT | Performed by: EMERGENCY MEDICINE

## 2021-01-01 PROCEDURE — 81207 BCR/ABL1 GENE MINOR BP: CPT | Mod: 91 | Performed by: INTERNAL MEDICINE

## 2021-01-01 PROCEDURE — 82803 BLOOD GASES ANY COMBINATION: CPT

## 2021-01-01 PROCEDURE — 3288F FALL RISK ASSESSMENT DOCD: CPT | Mod: CPTII,S$GLB,, | Performed by: INTERNAL MEDICINE

## 2021-01-01 PROCEDURE — 96375 TX/PRO/DX INJ NEW DRUG ADDON: CPT

## 2021-01-01 PROCEDURE — 94002 VENT MGMT INPAT INIT DAY: CPT

## 2021-01-01 PROCEDURE — 99499 RISK ADDL DX/OHS AUDIT: ICD-10-PCS | Mod: S$GLB,,, | Performed by: FAMILY MEDICINE

## 2021-01-01 PROCEDURE — 1101F PT FALLS ASSESS-DOCD LE1/YR: CPT | Mod: CPTII,S$GLB,, | Performed by: ORTHOPAEDIC SURGERY

## 2021-01-01 PROCEDURE — 36415 COLL VENOUS BLD VENIPUNCTURE: CPT | Performed by: EMERGENCY MEDICINE

## 2021-01-01 PROCEDURE — 81270 JAK2 GENE: CPT | Performed by: INTERNAL MEDICINE

## 2021-01-01 PROCEDURE — 87205 SMEAR GRAM STAIN: CPT | Performed by: EMERGENCY MEDICINE

## 2021-01-01 PROCEDURE — 1160F PR REVIEW ALL MEDS BY PRESCRIBER/CLIN PHARMACIST DOCUMENTED: ICD-10-PCS | Mod: CPTII,S$GLB,, | Performed by: FAMILY MEDICINE

## 2021-01-01 PROCEDURE — 1126F AMNT PAIN NOTED NONE PRSNT: CPT | Mod: CPTII,S$GLB,, | Performed by: FAMILY MEDICINE

## 2021-01-01 PROCEDURE — 80074 ACUTE HEPATITIS PANEL: CPT | Performed by: HOSPITALIST

## 2021-01-01 PROCEDURE — 99499 RISK ADDL DX/OHS AUDIT: ICD-10-PCS | Mod: S$GLB,,, | Performed by: NURSE PRACTITIONER

## 2021-01-01 PROCEDURE — 83036 HEMOGLOBIN GLYCOSYLATED A1C: CPT

## 2021-01-01 PROCEDURE — 1125F PR PAIN SEVERITY QUANTIFIED, PAIN PRESENT: ICD-10-PCS | Mod: S$GLB,,, | Performed by: PAIN MEDICINE

## 2021-01-01 PROCEDURE — 1126F PR PAIN SEVERITY QUANTIFIED, NO PAIN PRESENT: ICD-10-PCS | Mod: CPTII,S$GLB,, | Performed by: FAMILY MEDICINE

## 2021-01-01 PROCEDURE — 82746 ASSAY OF FOLIC ACID SERUM: CPT | Performed by: HOSPITALIST

## 2021-01-01 PROCEDURE — 84443 ASSAY THYROID STIM HORMONE: CPT

## 2021-01-01 PROCEDURE — 99499 UNLISTED E&M SERVICE: CPT | Mod: S$GLB,,, | Performed by: NURSE PRACTITIONER

## 2021-01-01 PROCEDURE — 99223 1ST HOSP IP/OBS HIGH 75: CPT | Mod: ,,, | Performed by: INTERNAL MEDICINE

## 2021-01-01 PROCEDURE — 99499 UNLISTED E&M SERVICE: CPT | Mod: 95,,, | Performed by: PAIN MEDICINE

## 2021-01-01 PROCEDURE — 84443 ASSAY THYROID STIM HORMONE: CPT | Performed by: INTERNAL MEDICINE

## 2021-01-01 PROCEDURE — 99214 PR OFFICE/OUTPT VISIT, EST, LEVL IV, 30-39 MIN: ICD-10-PCS | Mod: 95,,, | Performed by: PAIN MEDICINE

## 2021-01-01 PROCEDURE — 3008F PR BODY MASS INDEX (BMI) DOCUMENTED: ICD-10-PCS | Mod: CPTII,S$GLB,, | Performed by: PAIN MEDICINE

## 2021-01-01 PROCEDURE — 99999 PR PBB SHADOW E&M-EST. PATIENT-LVL IV: CPT | Mod: PBBFAC,,, | Performed by: PAIN MEDICINE

## 2021-01-01 PROCEDURE — 1126F AMNT PAIN NOTED NONE PRSNT: CPT | Mod: S$GLB,,, | Performed by: NURSE PRACTITIONER

## 2021-01-01 PROCEDURE — 36415 COLL VENOUS BLD VENIPUNCTURE: CPT | Mod: PN

## 2021-01-01 PROCEDURE — 83540 ASSAY OF IRON: CPT | Performed by: HOSPITALIST

## 2021-01-01 PROCEDURE — 84484 ASSAY OF TROPONIN QUANT: CPT | Mod: 91 | Performed by: HOSPITALIST

## 2021-01-01 PROCEDURE — 1101F PR PT FALLS ASSESS DOC 0-1 FALLS W/OUT INJ PAST YR: ICD-10-PCS | Mod: CPTII,S$GLB,, | Performed by: INTERNAL MEDICINE

## 2021-01-01 PROCEDURE — 82728 ASSAY OF FERRITIN: CPT | Performed by: INTERNAL MEDICINE

## 2021-01-01 PROCEDURE — 3078F DIAST BP <80 MM HG: CPT | Mod: CPTII,S$GLB,, | Performed by: FAMILY MEDICINE

## 2021-01-01 PROCEDURE — 80061 LIPID PANEL: CPT | Performed by: HOSPITALIST

## 2021-01-01 PROCEDURE — 99232 SBSQ HOSP IP/OBS MODERATE 35: CPT | Mod: ,,, | Performed by: FAMILY MEDICINE

## 2021-01-01 PROCEDURE — 1101F PT FALLS ASSESS-DOCD LE1/YR: CPT | Mod: CPTII,S$GLB,, | Performed by: NURSE PRACTITIONER

## 2021-01-01 PROCEDURE — 99499 RISK ADDL DX/OHS AUDIT: ICD-10-PCS | Mod: 95,,, | Performed by: PAIN MEDICINE

## 2021-01-01 PROCEDURE — 99214 PR OFFICE/OUTPT VISIT, EST, LEVL IV, 30-39 MIN: ICD-10-PCS | Mod: S$GLB,,, | Performed by: PAIN MEDICINE

## 2021-01-01 PROCEDURE — 85007 BL SMEAR W/DIFF WBC COUNT: CPT | Performed by: STUDENT IN AN ORGANIZED HEALTH CARE EDUCATION/TRAINING PROGRAM

## 2021-01-01 PROCEDURE — 99215 PR OFFICE/OUTPT VISIT, EST, LEVL V, 40-54 MIN: ICD-10-PCS | Mod: S$GLB,,, | Performed by: FAMILY MEDICINE

## 2021-01-01 PROCEDURE — 1160F RVW MEDS BY RX/DR IN RCRD: CPT | Mod: CPTII,S$GLB,, | Performed by: FAMILY MEDICINE

## 2021-01-01 PROCEDURE — 99214 PR OFFICE/OUTPT VISIT, EST, LEVL IV, 30-39 MIN: ICD-10-PCS | Mod: S$GLB,,, | Performed by: NURSE PRACTITIONER

## 2021-01-01 PROCEDURE — 3008F BODY MASS INDEX DOCD: CPT | Mod: CPTII,S$GLB,, | Performed by: FAMILY MEDICINE

## 2021-01-01 PROCEDURE — 1126F PR PAIN SEVERITY QUANTIFIED, NO PAIN PRESENT: ICD-10-PCS | Mod: S$GLB,,, | Performed by: INTERNAL MEDICINE

## 2021-01-01 PROCEDURE — 99214 OFFICE O/P EST MOD 30 MIN: CPT | Mod: S$GLB,,, | Performed by: INTERNAL MEDICINE

## 2021-01-01 PROCEDURE — 80100014 HC HEMODIALYSIS 1:1

## 2021-01-01 PROCEDURE — 80053 COMPREHEN METABOLIC PANEL: CPT

## 2021-01-01 PROCEDURE — 1125F PR PAIN SEVERITY QUANTIFIED, PAIN PRESENT: ICD-10-PCS | Mod: S$GLB,,, | Performed by: ORTHOPAEDIC SURGERY

## 2021-01-01 PROCEDURE — 36415 COLL VENOUS BLD VENIPUNCTURE: CPT | Performed by: HOSPITALIST

## 2021-01-01 PROCEDURE — 1159F PR MEDICATION LIST DOCUMENTED IN MEDICAL RECORD: ICD-10-PCS | Mod: S$GLB,,, | Performed by: NURSE PRACTITIONER

## 2021-01-01 PROCEDURE — 1159F PR MEDICATION LIST DOCUMENTED IN MEDICAL RECORD: ICD-10-PCS | Mod: CPTII,95,, | Performed by: PAIN MEDICINE

## 2021-01-01 PROCEDURE — 99214 PR OFFICE/OUTPT VISIT, EST, LEVL IV, 30-39 MIN: ICD-10-PCS | Mod: S$GLB,,, | Performed by: INTERNAL MEDICINE

## 2021-01-01 PROCEDURE — 99999 PR PBB SHADOW E&M-EST. PATIENT-LVL IV: CPT | Mod: PBBFAC,,, | Performed by: INTERNAL MEDICINE

## 2021-01-01 PROCEDURE — 96367 TX/PROPH/DG ADDL SEQ IV INF: CPT

## 2021-01-01 PROCEDURE — 80048 BASIC METABOLIC PNL TOTAL CA: CPT | Performed by: EMERGENCY MEDICINE

## 2021-01-01 PROCEDURE — 25000003 PHARM REV CODE 250: Performed by: INTERNAL MEDICINE

## 2021-01-01 PROCEDURE — 3008F PR BODY MASS INDEX (BMI) DOCUMENTED: ICD-10-PCS | Mod: CPTII,S$GLB,, | Performed by: INTERNAL MEDICINE

## 2021-01-01 PROCEDURE — 83735 ASSAY OF MAGNESIUM: CPT | Performed by: EMERGENCY MEDICINE

## 2021-01-01 PROCEDURE — 80053 COMPREHEN METABOLIC PANEL: CPT | Performed by: STUDENT IN AN ORGANIZED HEALTH CARE EDUCATION/TRAINING PROGRAM

## 2021-01-01 PROCEDURE — 82565 ASSAY OF CREATININE: CPT

## 2021-01-01 PROCEDURE — 1159F MED LIST DOCD IN RCRD: CPT | Mod: S$GLB,,, | Performed by: ORTHOPAEDIC SURGERY

## 2021-01-01 PROCEDURE — 83605 ASSAY OF LACTIC ACID: CPT | Performed by: EMERGENCY MEDICINE

## 2021-01-01 PROCEDURE — 25000003 PHARM REV CODE 250: Performed by: HOSPITALIST

## 2021-01-01 PROCEDURE — 85025 COMPLETE CBC W/AUTO DIFF WBC: CPT | Performed by: EMERGENCY MEDICINE

## 2021-01-01 PROCEDURE — 1101F PT FALLS ASSESS-DOCD LE1/YR: CPT | Mod: CPTII,S$GLB,, | Performed by: INTERNAL MEDICINE

## 2021-01-01 PROCEDURE — 3288F PR FALLS RISK ASSESSMENT DOCUMENTED: ICD-10-PCS | Mod: CPTII,S$GLB,, | Performed by: INTERNAL MEDICINE

## 2021-01-01 PROCEDURE — 3074F PR MOST RECENT SYSTOLIC BLOOD PRESSURE < 130 MM HG: ICD-10-PCS | Mod: CPTII,S$GLB,, | Performed by: FAMILY MEDICINE

## 2021-01-01 PROCEDURE — 84466 ASSAY OF TRANSFERRIN: CPT | Performed by: INTERNAL MEDICINE

## 2021-01-01 PROCEDURE — 1159F MED LIST DOCD IN RCRD: CPT | Mod: CPTII,S$GLB,, | Performed by: FAMILY MEDICINE

## 2021-01-01 PROCEDURE — 84132 ASSAY OF SERUM POTASSIUM: CPT

## 2021-01-01 PROCEDURE — 3288F FALL RISK ASSESSMENT DOCD: CPT | Mod: CPTII,S$GLB,, | Performed by: NURSE PRACTITIONER

## 2021-01-01 PROCEDURE — 82962 GLUCOSE BLOOD TEST: CPT

## 2021-01-01 PROCEDURE — 87070 CULTURE OTHR SPECIMN AEROBIC: CPT | Performed by: EMERGENCY MEDICINE

## 2021-01-01 PROCEDURE — G0257 UNSCHED DIALYSIS ESRD PT HOS: HCPCS

## 2021-01-01 PROCEDURE — 89060 EXAM SYNOVIAL FLUID CRYSTALS: CPT | Performed by: EMERGENCY MEDICINE

## 2021-01-01 PROCEDURE — 1101F PR PT FALLS ASSESS DOC 0-1 FALLS W/OUT INJ PAST YR: ICD-10-PCS | Mod: CPTII,S$GLB,, | Performed by: NURSE PRACTITIONER

## 2021-01-01 PROCEDURE — 96374 THER/PROPH/DIAG INJ IV PUSH: CPT

## 2021-01-01 RX ORDER — CALCIUM ACETATE 667 MG/1
667 CAPSULE ORAL
Status: DISCONTINUED | OUTPATIENT
Start: 2021-01-01 | End: 2021-01-01 | Stop reason: HOSPADM

## 2021-01-01 RX ORDER — VANCOMYCIN HCL IN 5 % DEXTROSE 1G/250ML
1000 PLASTIC BAG, INJECTION (ML) INTRAVENOUS
Status: COMPLETED | OUTPATIENT
Start: 2021-01-01 | End: 2021-01-01

## 2021-01-01 RX ORDER — LEVOTHYROXINE SODIUM 50 UG/1
50 TABLET ORAL
Status: DISCONTINUED | OUTPATIENT
Start: 2021-01-01 | End: 2021-01-01 | Stop reason: HOSPADM

## 2021-01-01 RX ORDER — SODIUM CHLORIDE 0.9 % (FLUSH) 0.9 %
10 SYRINGE (ML) INJECTION
Status: DISCONTINUED | OUTPATIENT
Start: 2021-01-01 | End: 2021-01-01 | Stop reason: HOSPADM

## 2021-01-01 RX ORDER — SODIUM CHLORIDE 9 MG/ML
INJECTION, SOLUTION INTRAVENOUS
Status: DISCONTINUED | OUTPATIENT
Start: 2021-01-01 | End: 2021-01-01 | Stop reason: HOSPADM

## 2021-01-01 RX ORDER — DILTIAZEM HYDROCHLORIDE 5 MG/ML
20 INJECTION INTRAVENOUS
Status: COMPLETED | OUTPATIENT
Start: 2021-01-01 | End: 2021-01-01

## 2021-01-01 RX ORDER — SEVELAMER CARBONATE 800 MG/1
2400 TABLET, FILM COATED ORAL
Status: DISCONTINUED | OUTPATIENT
Start: 2021-01-01 | End: 2021-01-01 | Stop reason: HOSPADM

## 2021-01-01 RX ORDER — ALBUTEROL SULFATE 90 UG/1
AEROSOL, METERED RESPIRATORY (INHALATION)
Qty: 18 G | Refills: 2 | Status: SHIPPED | OUTPATIENT
Start: 2021-01-01

## 2021-01-01 RX ORDER — PREDNISONE 20 MG/1
20 TABLET ORAL DAILY
Status: DISCONTINUED | OUTPATIENT
Start: 2021-01-01 | End: 2021-01-01 | Stop reason: HOSPADM

## 2021-01-01 RX ORDER — ACETAMINOPHEN 325 MG/1
650 TABLET ORAL EVERY 8 HOURS PRN
Status: DISCONTINUED | OUTPATIENT
Start: 2021-01-01 | End: 2021-01-01 | Stop reason: HOSPADM

## 2021-01-01 RX ORDER — ACETAMINOPHEN 325 MG/1
650 TABLET ORAL EVERY 6 HOURS PRN
Status: DISCONTINUED | OUTPATIENT
Start: 2021-01-01 | End: 2021-01-01 | Stop reason: HOSPADM

## 2021-01-01 RX ORDER — FENTANYL CITRATE 50 UG/ML
50 INJECTION, SOLUTION INTRAMUSCULAR; INTRAVENOUS
Status: COMPLETED | OUTPATIENT
Start: 2021-01-01 | End: 2021-01-01

## 2021-01-01 RX ORDER — IPRATROPIUM BROMIDE AND ALBUTEROL SULFATE 2.5; .5 MG/3ML; MG/3ML
3 SOLUTION RESPIRATORY (INHALATION)
Status: COMPLETED | OUTPATIENT
Start: 2021-01-01 | End: 2021-01-01

## 2021-01-01 RX ORDER — ATORVASTATIN CALCIUM 10 MG/1
10 TABLET, FILM COATED ORAL
COMMUNITY
Start: 2021-01-01

## 2021-01-01 RX ORDER — ALBUTEROL SULFATE 90 UG/1
AEROSOL, METERED RESPIRATORY (INHALATION)
Qty: 18 G | Refills: 2 | Status: SHIPPED | OUTPATIENT
Start: 2021-01-01 | End: 2021-01-01 | Stop reason: SDUPTHER

## 2021-01-01 RX ORDER — FLUTICASONE PROPIONATE 50 MCG
1 SPRAY, SUSPENSION (ML) NASAL DAILY
Status: DISCONTINUED | OUTPATIENT
Start: 2021-01-01 | End: 2021-01-01 | Stop reason: HOSPADM

## 2021-01-01 RX ORDER — SODIUM CHLORIDE 9 MG/ML
INJECTION, SOLUTION INTRAVENOUS ONCE
Status: DISCONTINUED | OUTPATIENT
Start: 2021-01-01 | End: 2021-01-01 | Stop reason: HOSPADM

## 2021-01-01 RX ORDER — ONDANSETRON 4 MG/1
4 TABLET, FILM COATED ORAL EVERY 6 HOURS PRN
Status: DISCONTINUED | OUTPATIENT
Start: 2021-01-01 | End: 2021-01-01 | Stop reason: HOSPADM

## 2021-01-01 RX ORDER — SODIUM BICARBONATE 1 MEQ/ML
SYRINGE (ML) INTRAVENOUS CODE/TRAUMA/SEDATION MEDICATION
Status: COMPLETED | OUTPATIENT
Start: 2021-01-01 | End: 2021-01-01

## 2021-01-01 RX ORDER — HYDROCODONE BITARTRATE AND ACETAMINOPHEN 5; 325 MG/1; MG/1
1 TABLET ORAL
Status: COMPLETED | OUTPATIENT
Start: 2021-01-01 | End: 2021-01-01

## 2021-01-01 RX ORDER — ALBUTEROL SULFATE 2.5 MG/.5ML
2.5 SOLUTION RESPIRATORY (INHALATION) EVERY 4 HOURS
Refills: 11 | Status: DISCONTINUED | OUTPATIENT
Start: 2021-01-01 | End: 2021-01-01 | Stop reason: HOSPADM

## 2021-01-01 RX ORDER — DEXAMETHASONE SODIUM PHOSPHATE 4 MG/ML
6 INJECTION, SOLUTION INTRA-ARTICULAR; INTRALESIONAL; INTRAMUSCULAR; INTRAVENOUS; SOFT TISSUE
Status: COMPLETED | OUTPATIENT
Start: 2021-01-01 | End: 2021-01-01

## 2021-01-01 RX ORDER — ATORVASTATIN CALCIUM 10 MG/1
10 TABLET, FILM COATED ORAL DAILY
Status: DISCONTINUED | OUTPATIENT
Start: 2021-01-01 | End: 2021-01-01 | Stop reason: HOSPADM

## 2021-01-01 RX ORDER — MUPIROCIN 20 MG/G
OINTMENT TOPICAL 2 TIMES DAILY
Status: DISCONTINUED | OUTPATIENT
Start: 2021-01-01 | End: 2021-01-01 | Stop reason: HOSPADM

## 2021-01-01 RX ORDER — SEVELAMER CARBONATE 800 MG/1
800 TABLET, FILM COATED ORAL
Status: DISCONTINUED | OUTPATIENT
Start: 2021-01-01 | End: 2021-01-01 | Stop reason: HOSPADM

## 2021-01-01 RX ORDER — ALBUTEROL SULFATE 90 UG/1
2 AEROSOL, METERED RESPIRATORY (INHALATION) EVERY 4 HOURS PRN
Status: DISCONTINUED | OUTPATIENT
Start: 2021-01-01 | End: 2021-01-01

## 2021-01-01 RX ORDER — EPINEPHRINE 0.1 MG/ML
INJECTION INTRAVENOUS
Status: COMPLETED
Start: 2021-01-01 | End: 2021-01-01

## 2021-01-01 RX ORDER — PREDNISONE 20 MG/1
40 TABLET ORAL DAILY
Qty: 10 TABLET | Refills: 0 | Status: SHIPPED | OUTPATIENT
Start: 2021-01-01 | End: 2021-01-01

## 2021-01-01 RX ORDER — EPINEPHRINE 0.1 MG/ML
INJECTION INTRAVENOUS CODE/TRAUMA/SEDATION MEDICATION
Status: COMPLETED | OUTPATIENT
Start: 2021-01-01 | End: 2021-01-01

## 2021-01-01 RX ORDER — TALC
6 POWDER (GRAM) TOPICAL NIGHTLY PRN
Status: DISCONTINUED | OUTPATIENT
Start: 2021-01-01 | End: 2021-01-01 | Stop reason: HOSPADM

## 2021-01-01 RX ORDER — HYDROCODONE BITARTRATE AND ACETAMINOPHEN 5; 325 MG/1; MG/1
1 TABLET ORAL EVERY 8 HOURS PRN
Qty: 90 TABLET | Refills: 0 | Status: SHIPPED | OUTPATIENT
Start: 2021-01-01 | End: 2021-01-01

## 2021-01-01 RX ORDER — MIRTAZAPINE 7.5 MG/1
7.5 TABLET, FILM COATED ORAL NIGHTLY
Qty: 90 TABLET | Refills: 3 | Status: SHIPPED | OUTPATIENT
Start: 2021-01-01

## 2021-01-01 RX ORDER — METOPROLOL SUCCINATE 25 MG/1
25 TABLET, EXTENDED RELEASE ORAL DAILY
Qty: 30 TABLET | Refills: 11 | Status: SHIPPED | OUTPATIENT
Start: 2021-01-01 | End: 2022-06-22

## 2021-01-01 RX ORDER — SODIUM ZIRCONIUM CYCLOSILICATE 10 G/10G
POWDER, FOR SUSPENSION ORAL
COMMUNITY
Start: 2021-01-01 | End: 2021-01-01

## 2021-01-01 RX ORDER — HYDROCODONE BITARTRATE AND ACETAMINOPHEN 5; 325 MG/1; MG/1
1 TABLET ORAL EVERY 8 HOURS PRN
Status: DISCONTINUED | OUTPATIENT
Start: 2021-01-01 | End: 2021-01-01 | Stop reason: HOSPADM

## 2021-01-01 RX ORDER — AMIODARONE HYDROCHLORIDE 200 MG/1
200 TABLET ORAL DAILY
COMMUNITY
Start: 2021-01-01 | End: 2021-01-01

## 2021-01-01 RX ORDER — LEVOTHYROXINE SODIUM 50 UG/1
50 TABLET ORAL
Qty: 30 TABLET | Refills: 11 | Status: SHIPPED | OUTPATIENT
Start: 2021-01-01 | End: 2022-02-01

## 2021-01-01 RX ORDER — SODIUM CHLORIDE 9 MG/ML
1000 INJECTION, SOLUTION INTRAVENOUS ONCE
Status: DISCONTINUED | OUTPATIENT
Start: 2021-01-01 | End: 2021-01-01 | Stop reason: HOSPADM

## 2021-01-01 RX ORDER — HYDROCODONE BITARTRATE AND ACETAMINOPHEN 5; 325 MG/1; MG/1
1 TABLET ORAL EVERY 8 HOURS PRN
Qty: 90 TABLET | Refills: 0 | Status: SHIPPED | OUTPATIENT
Start: 2021-01-01 | End: 2021-10-03

## 2021-01-01 RX ORDER — DOXYCYCLINE 100 MG/1
100 CAPSULE ORAL 2 TIMES DAILY
Qty: 14 CAPSULE | Refills: 0 | Status: SHIPPED | OUTPATIENT
Start: 2021-01-01 | End: 2021-01-01

## 2021-01-01 RX ORDER — DILTIAZEM HCL 1 MG/ML
5 INJECTION, SOLUTION INTRAVENOUS
Status: DISCONTINUED | OUTPATIENT
Start: 2021-01-01 | End: 2021-01-01 | Stop reason: HOSPADM

## 2021-01-01 RX ORDER — IPRATROPIUM BROMIDE AND ALBUTEROL SULFATE 2.5; .5 MG/3ML; MG/3ML
3 SOLUTION RESPIRATORY (INHALATION) EVERY 4 HOURS PRN
Status: DISCONTINUED | OUTPATIENT
Start: 2021-01-01 | End: 2021-01-01 | Stop reason: HOSPADM

## 2021-01-01 RX ORDER — AMIODARONE HYDROCHLORIDE 200 MG/1
200 TABLET ORAL DAILY
Status: DISCONTINUED | OUTPATIENT
Start: 2021-01-01 | End: 2021-01-01 | Stop reason: HOSPADM

## 2021-01-01 RX ORDER — MONTELUKAST SODIUM 10 MG/1
10 TABLET ORAL NIGHTLY
Status: DISCONTINUED | OUTPATIENT
Start: 2021-01-01 | End: 2021-01-01 | Stop reason: HOSPADM

## 2021-01-01 RX ORDER — FUROSEMIDE 10 MG/ML
80 INJECTION INTRAMUSCULAR; INTRAVENOUS
Status: COMPLETED | OUTPATIENT
Start: 2021-01-01 | End: 2021-01-01

## 2021-01-01 RX ORDER — METOPROLOL SUCCINATE 25 MG/1
25 TABLET, EXTENDED RELEASE ORAL DAILY
Status: DISCONTINUED | OUTPATIENT
Start: 2021-01-01 | End: 2021-01-01 | Stop reason: HOSPADM

## 2021-01-01 RX ORDER — SODIUM CHLORIDE 9 MG/ML
500 INJECTION, SOLUTION INTRAVENOUS ONCE
Status: DISCONTINUED | OUTPATIENT
Start: 2021-01-01 | End: 2021-01-01 | Stop reason: HOSPADM

## 2021-01-01 RX ORDER — FLUTICASONE FUROATE AND VILANTEROL 100; 25 UG/1; UG/1
1 POWDER RESPIRATORY (INHALATION) DAILY
Status: DISCONTINUED | OUTPATIENT
Start: 2021-01-01 | End: 2021-01-01 | Stop reason: HOSPADM

## 2021-01-01 RX ORDER — BUSPIRONE HYDROCHLORIDE 10 MG/1
10 TABLET ORAL 3 TIMES DAILY
COMMUNITY
End: 2021-01-01

## 2021-01-01 RX ORDER — LIDOCAINE HYDROCHLORIDE 10 MG/ML
1 INJECTION, SOLUTION EPIDURAL; INFILTRATION; INTRACAUDAL; PERINEURAL ONCE
Status: COMPLETED | OUTPATIENT
Start: 2021-01-01 | End: 2021-01-01

## 2021-01-01 RX ORDER — DOBUTAMINE HYDROCHLORIDE 400 MG/100ML
0-20 INJECTION INTRAVENOUS CONTINUOUS
Status: DISCONTINUED | OUTPATIENT
Start: 2021-01-01 | End: 2021-01-01 | Stop reason: HOSPADM

## 2021-01-01 RX ORDER — ALBUTEROL SULFATE 0.83 MG/ML
2.5 SOLUTION RESPIRATORY (INHALATION) EVERY 4 HOURS
Qty: 1 BOX | Refills: 11 | Status: SHIPPED | OUTPATIENT
Start: 2021-01-01

## 2021-01-01 RX ORDER — IPRATROPIUM BROMIDE AND ALBUTEROL SULFATE 2.5; .5 MG/3ML; MG/3ML
3 SOLUTION RESPIRATORY (INHALATION) ONCE
Status: COMPLETED | OUTPATIENT
Start: 2021-01-01 | End: 2021-01-01

## 2021-01-01 RX ORDER — CHOLECALCIFEROL (VITAMIN D3) 25 MCG
2000 TABLET ORAL DAILY
Status: DISCONTINUED | OUTPATIENT
Start: 2021-01-01 | End: 2021-01-01 | Stop reason: HOSPADM

## 2021-01-01 RX ORDER — HEPARIN SODIUM 5000 [USP'U]/ML
5000 INJECTION, SOLUTION INTRAVENOUS; SUBCUTANEOUS EVERY 8 HOURS
Status: DISCONTINUED | OUTPATIENT
Start: 2021-01-01 | End: 2021-01-01

## 2021-01-01 RX ORDER — FLUTICASONE FUROATE, UMECLIDINIUM BROMIDE AND VILANTEROL TRIFENATATE 100; 62.5; 25 UG/1; UG/1; UG/1
1 POWDER RESPIRATORY (INHALATION) DAILY
COMMUNITY
Start: 2021-01-01 | End: 2021-01-01

## 2021-01-01 RX ORDER — ONDANSETRON 2 MG/ML
4 INJECTION INTRAMUSCULAR; INTRAVENOUS
Status: COMPLETED | OUTPATIENT
Start: 2021-01-01 | End: 2021-01-01

## 2021-01-01 RX ORDER — ATORVASTATIN CALCIUM 10 MG/1
10 TABLET, FILM COATED ORAL NIGHTLY
Status: DISCONTINUED | OUTPATIENT
Start: 2021-01-01 | End: 2021-01-01 | Stop reason: HOSPADM

## 2021-01-01 RX ADMIN — NEPHROCAP 1 CAPSULE: 1 CAP ORAL at 08:03

## 2021-01-01 RX ADMIN — METHYLPREDNISOLONE SODIUM SUCCINATE 80 MG: 40 INJECTION, POWDER, FOR SOLUTION INTRAMUSCULAR; INTRAVENOUS at 04:03

## 2021-01-01 RX ADMIN — SEVELAMER CARBONATE 800 MG: 800 TABLET, FILM COATED ORAL at 05:09

## 2021-01-01 RX ADMIN — ALBUTEROL SULFATE 2.5 MG: 2.5 SOLUTION RESPIRATORY (INHALATION) at 12:09

## 2021-01-01 RX ADMIN — AMIODARONE HYDROCHLORIDE 200 MG: 200 TABLET ORAL at 09:09

## 2021-01-01 RX ADMIN — EPINEPHRINE 1 MG: 0.1 INJECTION, SOLUTION ENDOTRACHEAL; INTRACARDIAC; INTRAVENOUS at 02:09

## 2021-01-01 RX ADMIN — CALCIUM GLUCONATE 1 G: 98 INJECTION, SOLUTION INTRAVENOUS at 12:06

## 2021-01-01 RX ADMIN — ALBUTEROL SULFATE 2.5 MG: 2.5 SOLUTION RESPIRATORY (INHALATION) at 03:09

## 2021-01-01 RX ADMIN — CALCIUM ACETATE 667 MG: 667 CAPSULE ORAL at 05:09

## 2021-01-01 RX ADMIN — IPRATROPIUM BROMIDE AND ALBUTEROL SULFATE 3 ML: .5; 3 SOLUTION RESPIRATORY (INHALATION) at 07:03

## 2021-01-01 RX ADMIN — MELATONIN TAB 3 MG 6 MG: 3 TAB at 01:03

## 2021-01-01 RX ADMIN — DILTIAZEM HYDROCHLORIDE 5 MG: 5 INJECTION INTRAVENOUS at 10:06

## 2021-01-01 RX ADMIN — SEVELAMER CARBONATE 2400 MG: 800 TABLET, FILM COATED ORAL at 08:03

## 2021-01-01 RX ADMIN — IPRATROPIUM BROMIDE AND ALBUTEROL SULFATE 3 ML: .5; 3 SOLUTION RESPIRATORY (INHALATION) at 04:03

## 2021-01-01 RX ADMIN — SEVELAMER CARBONATE 2400 MG: 800 TABLET, FILM COATED ORAL at 11:03

## 2021-01-01 RX ADMIN — EPINEPHRINE 1 MG: 0.1 INJECTION, SOLUTION ENDOTRACHEAL; INTRACARDIAC; INTRAVENOUS at 03:09

## 2021-01-01 RX ADMIN — APIXABAN 2.5 MG: 2.5 TABLET, FILM COATED ORAL at 09:09

## 2021-01-01 RX ADMIN — CALCIUM ACETATE 667 MG: 667 CAPSULE ORAL at 09:09

## 2021-01-01 RX ADMIN — AZITHROMYCIN MONOHYDRATE 500 MG: 500 INJECTION, POWDER, LYOPHILIZED, FOR SOLUTION INTRAVENOUS at 09:09

## 2021-01-01 RX ADMIN — ATORVASTATIN CALCIUM 10 MG: 10 TABLET, FILM COATED ORAL at 09:09

## 2021-01-01 RX ADMIN — EPINEPHRINE 1 MG: 0.1 INJECTION, SOLUTION ENDOTRACHEAL; INTRACARDIAC; INTRAVENOUS at 05:09

## 2021-01-01 RX ADMIN — METHYLPREDNISOLONE SODIUM SUCCINATE 80 MG: 40 INJECTION, POWDER, FOR SOLUTION INTRAMUSCULAR; INTRAVENOUS at 11:03

## 2021-01-01 RX ADMIN — FLUTICASONE PROPIONATE 50 MCG: 50 SPRAY, METERED NASAL at 08:03

## 2021-01-01 RX ADMIN — METOPROLOL SUCCINATE 25 MG: 25 TABLET, EXTENDED RELEASE ORAL at 09:09

## 2021-01-01 RX ADMIN — ONDANSETRON 4 MG: 2 INJECTION INTRAMUSCULAR; INTRAVENOUS at 11:03

## 2021-01-01 RX ADMIN — IPRATROPIUM BROMIDE AND ALBUTEROL SULFATE 3 ML: .5; 3 SOLUTION RESPIRATORY (INHALATION) at 02:09

## 2021-01-01 RX ADMIN — DEXTROSE MONOHYDRATE 25 G: 25 INJECTION, SOLUTION INTRAVENOUS at 03:09

## 2021-01-01 RX ADMIN — CEFTRIAXONE 1 G: 1 INJECTION, SOLUTION INTRAVENOUS at 07:09

## 2021-01-01 RX ADMIN — CALCIUM GLUCONATE 1 G: 98 INJECTION, SOLUTION INTRAVENOUS at 11:03

## 2021-01-01 RX ADMIN — AZITHROMYCIN MONOHYDRATE 500 MG: 500 INJECTION, POWDER, LYOPHILIZED, FOR SOLUTION INTRAVENOUS at 09:03

## 2021-01-01 RX ADMIN — HYDROCODONE BITARTRATE AND ACETAMINOPHEN 1 TABLET: 5; 325 TABLET ORAL at 07:09

## 2021-01-01 RX ADMIN — DEXAMETHASONE SODIUM PHOSPHATE 6 MG: 4 INJECTION, SOLUTION INTRAMUSCULAR; INTRAVENOUS at 02:09

## 2021-01-01 RX ADMIN — SODIUM BICARBONATE 50 MEQ: 84 INJECTION, SOLUTION INTRAVENOUS at 02:09

## 2021-01-01 RX ADMIN — SODIUM ZIRCONIUM CYCLOSILICATE 10 G: 10 POWDER, FOR SUSPENSION ORAL at 11:03

## 2021-01-01 RX ADMIN — MONTELUKAST 10 MG: 10 TABLET, FILM COATED ORAL at 01:03

## 2021-01-01 RX ADMIN — FUROSEMIDE 80 MG: 10 INJECTION, SOLUTION INTRAVENOUS at 11:03

## 2021-01-01 RX ADMIN — APIXABAN 2.5 MG: 2.5 TABLET, FILM COATED ORAL at 08:03

## 2021-01-01 RX ADMIN — PREDNISONE 20 MG: 20 TABLET ORAL at 09:09

## 2021-01-01 RX ADMIN — ATORVASTATIN CALCIUM 10 MG: 10 TABLET, FILM COATED ORAL at 08:03

## 2021-01-01 RX ADMIN — ALBUTEROL SULFATE 2.5 MG: 2.5 SOLUTION RESPIRATORY (INHALATION) at 08:09

## 2021-01-01 RX ADMIN — ALBUTEROL SULFATE 2.5 MG: 2.5 SOLUTION RESPIRATORY (INHALATION) at 05:09

## 2021-01-01 RX ADMIN — FENTANYL CITRATE 50 MCG: 50 INJECTION INTRAMUSCULAR; INTRAVENOUS at 10:03

## 2021-01-01 RX ADMIN — CHOLECALCIFEROL TAB 25 MCG (1000 UNIT) 2000 UNITS: 25 TAB at 08:03

## 2021-01-01 RX ADMIN — FLUTICASONE FUROATE AND VILANTEROL TRIFENATATE 1 PUFF: 100; 25 POWDER RESPIRATORY (INHALATION) at 12:09

## 2021-01-01 RX ADMIN — IPRATROPIUM BROMIDE AND ALBUTEROL SULFATE 3 ML: .5; 3 SOLUTION RESPIRATORY (INHALATION) at 12:03

## 2021-01-01 RX ADMIN — VANCOMYCIN HYDROCHLORIDE 1000 MG: 1 INJECTION, POWDER, LYOPHILIZED, FOR SOLUTION INTRAVENOUS at 10:09

## 2021-01-01 RX ADMIN — IPRATROPIUM BROMIDE AND ALBUTEROL SULFATE 3 ML: .5; 3 SOLUTION RESPIRATORY (INHALATION) at 09:03

## 2021-01-01 RX ADMIN — LEVOTHYROXINE SODIUM 50 MCG: 50 TABLET ORAL at 05:03

## 2021-01-01 RX ADMIN — CEFTRIAXONE 1 G: 1 INJECTION, SOLUTION INTRAVENOUS at 09:03

## 2021-01-01 RX ADMIN — APIXABAN 2.5 MG: 2.5 TABLET, FILM COATED ORAL at 01:03

## 2021-01-01 RX ADMIN — EPINEPHRINE: 0.1 INJECTION, SOLUTION ENDOTRACHEAL; INTRACARDIAC; INTRAVENOUS at 02:09

## 2021-01-01 RX ADMIN — LEVOTHYROXINE SODIUM 50 MCG: 0.05 TABLET ORAL at 05:09

## 2021-01-01 RX ADMIN — LIDOCAINE HYDROCHLORIDE 10 MG: 10 INJECTION, SOLUTION EPIDURAL; INFILTRATION; INTRACAUDAL; PERINEURAL at 04:09

## 2021-01-01 RX ADMIN — SEVELAMER CARBONATE 800 MG: 800 TABLET, FILM COATED ORAL at 09:09

## 2021-01-01 RX ADMIN — MUPIROCIN: 20 OINTMENT TOPICAL at 09:09

## 2021-03-26 PROBLEM — E87.70 VOLUME OVERLOAD: Status: ACTIVE | Noted: 2021-01-01

## 2021-03-27 PROBLEM — D64.9 ANEMIA: Chronic | Status: RESOLVED | Noted: 2019-04-24 | Resolved: 2021-01-01

## 2021-03-27 PROBLEM — I48.19 PERSISTENT ATRIAL FIBRILLATION: Chronic | Status: ACTIVE | Noted: 2019-03-19

## 2021-03-27 PROBLEM — D64.9 ANEMIA: Chronic | Status: ACTIVE | Noted: 2019-04-24

## 2021-03-31 PROBLEM — R09.81 NASAL CONGESTION: Status: ACTIVE | Noted: 2021-01-01

## 2021-09-12 PROBLEM — M25.461 PAIN AND SWELLING OF KNEE, RIGHT: Status: ACTIVE | Noted: 2021-01-01

## 2021-09-12 PROBLEM — I10 BENIGN ESSENTIAL HYPERTENSION: Chronic | Status: RESOLVED | Noted: 2019-03-21 | Resolved: 2021-01-01

## 2021-09-12 PROBLEM — I50.9 CONGESTIVE HEART FAILURE: Status: ACTIVE | Noted: 2021-01-01

## 2021-09-12 PROBLEM — M25.561 PAIN AND SWELLING OF KNEE, RIGHT: Status: ACTIVE | Noted: 2021-01-01

## 2021-09-13 PROBLEM — D72.829 LEUKOCYTOSIS: Status: ACTIVE | Noted: 2021-01-01

## 2021-09-13 PROBLEM — D75.839 THROMBOCYTOSIS: Status: ACTIVE | Noted: 2021-01-01

## 2021-09-15 LAB
DIAGNOSTIC BCR/ABL 1 RESULT: NORMAL
NARRATIVE DIAGNOSTIC REPORT-IMP: NORMAL
SPECIMEN SOURCE: NORMAL

## 2021-09-16 LAB
BCR/ABL SPECIMEN TYPE (BLOOD): NORMAL
BCR/ABL1 KINASE DOMAIN MUT ANL BLD/T: NORMAL
PATH REPORT.FINAL DX SPEC: NORMAL

## 2021-09-17 LAB
BACTERIA FLD AEROBE CULT: NORMAL
DIAGNOSTIC BCR/ABL 1 RESULT: NORMAL
GRAM STN SPEC: NORMAL
NARRATIVE DIAGNOSTIC REPORT-IMP: NORMAL
SPECIMEN TYPE, BCR/ABL: NORMAL
ZINC SERPL-MCNC: 73 UG/DL (ref 60–130)

## 2021-09-22 LAB
MPNR  FINAL DIAGNOSIS: NORMAL
MPNR  SPECIMEN TYPE: NORMAL
MPNR RESULT: NORMAL

## 2021-11-02 DIAGNOSIS — E11.22 TYPE 2 DIABETES MELLITUS WITH DIABETIC CHRONIC KIDNEY DISEASE: ICD-10-CM

## 2021-11-02 RX ORDER — ATORVASTATIN CALCIUM 10 MG/1
TABLET, FILM COATED ORAL
Qty: 90 TABLET | Refills: 1 | OUTPATIENT
Start: 2021-11-02

## 2021-11-18 RX ORDER — ATORVASTATIN CALCIUM 10 MG/1
TABLET, FILM COATED ORAL
Qty: 90 TABLET | Refills: 1 | OUTPATIENT
Start: 2021-11-18

## 2021-12-02 RX ORDER — ATORVASTATIN CALCIUM 10 MG/1
TABLET, FILM COATED ORAL
Qty: 90 TABLET | Refills: 1 | OUTPATIENT
Start: 2021-12-02

## 2022-03-31 NOTE — PROGRESS NOTES
Routine Office Visit    Patient Name: Eli Vaz    : 1945  MRN: [id number]    Subjective:  Eli is a 72 y.o. female who presents today for:   Chief Complaint   Patient presents with    Establish Care       72-year-old female with diabetes, end-stage renal disease, on dialysis, COPD, hypertension, and thrombocytopenia, previously seen by me for sick visits, comes in to establish care with me as her primary care provider.  The patient attends dialysis at [facility] the [facility] on  and  for 3 hr and 15 min a session.  She reports no acute concerns.  She reports no problems with any of her medications.  She reports that she does not need any medication refills at this time.  The she states that she gets all her vaccinations done at [facility] and the [facility].    Past Medical History  Past Medical History:   Diagnosis Date    Arthritis     COPD (chronic obstructive pulmonary disease)     Diabetes mellitus type II     Dialysis patient     Encounter for blood transfusion     ESRD (end stage renal disease)        Past Surgical History  Past Surgical History:   Procedure Laterality Date    AV FISTULA PLACEMENT      TUBAL LIGATION          Family History  Family History   Problem Relation Age of Onset    Heart attack Sister     Heart attack Sister     Heart attack Mother        Social History  Social History     Socioeconomic History    Marital status:      Spouse name: Not on file    Number of children: Not on file    Years of education: Not on file    Highest education level: Not on file   Social Needs    Financial resource strain: Not on file    Food insecurity - worry: Not on file    Food insecurity - inability: Not on file    Transportation needs - medical: Not on file    Transportation needs - non-medical: Not on file   Occupational History    Not on file   Tobacco Use    Smoking status: Former Smoker     Start date: 1991    Smokeless tobacco: Never Used   Substance and Sexual  Activity    Alcohol use: No    Drug use: No    Sexual activity: Not on file   Other Topics Concern    Not on file   Social History Narrative    Not on file       Current Medications  Current Outpatient Medications on File Prior to Visit   Medication Sig Dispense Refill    albuterol (PROVENTIL) 2.5 mg /3 mL (0.083 %) nebulizer solution Take 3 mLs (2.5 mg total) by nebulization every 4 (four) hours as needed for Wheezing. Rescue 1 Box 0    albuterol 90 mcg/actuation inhaler Inhale 2 puffs into the lungs every 4 (four) hours as needed for Wheezing or Shortness of Breath (cough). Rescue 1 Inhaler 2    amLODIPine (NORVASC) 10 MG tablet Take 1 tablet (10 mg total) by mouth once daily. 90 tablet 1    atorvastatin (LIPITOR) 10 MG tablet Take 1 tablet (10 mg total) by mouth once daily. 90 tablet 1    busPIRone (BUSPAR) 10 MG tablet Take 10 mg by mouth 2 (two) times daily.  6    fluticasone (FLONASE) 50 mcg/actuation nasal spray 1 spray by Each Nare route 2 (two) times daily. 1 Bottle 1    FOLIC ACID/VITAMIN B COMP W-C (RENAL CAPS ORAL) Take by mouth Daily.      hydrALAZINE (APRESOLINE) 25 MG tablet Take 1 tablet (25 mg total) by mouth every 12 (twelve) hours. 180 tablet 0    hydrOXYzine pamoate (VISTARIL) 25 MG Cap Take 1 capsule (25 mg total) by mouth every 8 (eight) hours as needed (panic attack). 30 capsule 1    ipratropium-albuterol (COMBIVENT RESPIMAT)  mcg/actuation inhaler INHALE 2 PUFF(S) INTO THE LUNGS 3 TIMES A DAY 4 g 3    levocetirizine (XYZAL) 5 MG tablet Take 1 tablet (5 mg total) by mouth every evening. 30 tablet 2    meclizine (ANTIVERT) 25 mg tablet Take 1 tablet (25 mg total) by mouth 3 (three) times daily as needed. 20 tablet 0    mirtazapine (REMERON) 7.5 MG Tab Take 7.5 mg by mouth nightly.       montelukast (SINGULAIR) 10 mg tablet Take 1 tablet (10 mg total) by mouth every evening. 30 tablet 2    SENSIPAR 60 mg Tab Take 30 mg by mouth daily with breakfast.       sevelamer  "carbonate (RENVELA) 800 mg Tab Take 800 mg by mouth 3 (three) times daily with meals.      traZODone (DESYREL) 50 MG tablet Take 1 tablet (50 mg total) by mouth nightly as needed for Insomnia. 30 tablet 3     No current facility-administered medications on file prior to visit.        Allergies   Review of patient's allergies indicates:  No Known Allergies    Review of Systems   Constitutional: Negative for unexpected weight change.   HENT: Negative for ear pain and sore throat.    Eyes: Negative for visual disturbance.   Respiratory: Negative for shortness of breath.    Cardiovascular: Negative for chest pain.   Gastrointestinal: Negative for abdominal pain and blood in stool.   Endocrine: Negative for cold intolerance and heat intolerance.   Genitourinary: Negative for dysuria and frequency.   Skin: Negative for rash.   Neurological: Negative for weakness, numbness and headaches.   Hematological: Negative for adenopathy.   Psychiatric/Behavioral: Negative for suicidal ideas.         /70 (BP Location: Right arm, Patient Position: Sitting, BP Method: Medium (Manual))   Pulse 73   Temp 98.2 °F (36.8 °C) (Oral)   Resp 20   Ht 5' 3" (1.6 m)   Wt 62.7 kg (138 lb 3.7 oz)   SpO2 99%   BMI 24.49 kg/m²     PHYS    Assessment/Plan:  Eli was seen today for establish care.    Diagnoses and all orders for this visit:    Diabetes mellitus with ESRD (end-stage renal disease)  -     Hemoglobin A1c; Future  -     Comprehensive metabolic panel; Future  -     Lipid panel; Future  -     Diabetic Eye Screening Photo; Future  The patient is not on any medications, and is controlling her diabetes with diet.  Will check labs.    ESRD on dialysis  Management as per Nephrology.  Will get records from [facility]    Essential hypertension  -     Comprehensive metabolic panel; Future  Continue current regimen.    Thrombocytopenia  -     CBC auto differential; Future  Check CBC.    Anemia of chronic disease  Management as per " nephrologist.    Screen for colon cancer  -     Fecal Immunochemical Test (iFOBT); Future  Will screen with fecal immunochemical testing        This office note has been dictated.  This dictation has been generated using M-Modal Fluency Direct dictation; some phonetic errors may occur.      Yes

## 2022-07-18 NOTE — NURSING
Report given to KARTHIKEYAN Linder.    Retinal tear and detachment warning symptoms reviewed and patient instructed to call immediately if increasing floaters, flashes, or decreasing peripheral vision.

## 2022-07-30 NOTE — PT/OT/SLP EVAL
07/30/22 0431   Provider Notification   Provider Name/Title G3 Elizabeth   Method of Notification Electronic Page   Request Evaluate-Remote   Notification Reason Lab Results  (HGB: 7.1)   pt's hemoglobin is 7.1 and pt feel dizziness and low urine output.   Physical Therapy Evaluation    Patient Name:  Eli Vaz   MRN:  [id number]    Recommendations:     Discharge Recommendations:  home health PT(with family assistance)   Discharge Equipment Recommendations: commode, tub bench   Barriers to discharge: None    Assessment:     Eli Vaz is a 73 y.o. female admitted with a medical diagnosis of Shortness of breath.  She presents with the following impairments/functional limitations:  weakness, impaired endurance, impaired functional mobilty, gait instability, impaired balance, decreased lower extremity function, impaired cardiopulmonary response to activity.    Rehab Prognosis: Fair+; patient would benefit from acute skilled PT services to address these deficits and reach maximum level of function.    Recent Surgery: Procedure(s) (LRB):  TRANSESOPHAGEAL ECHOCARDIOGRAM WITH POSSIBLE CARDIOVERSION (ISAMAR W/ POSS CARDIOVERSION) (N/A) 3 Days Post-Op    Plan:     During this hospitalization, patient to be seen 5 x/week(M-F) to address the identified rehab impairments via gait training, therapeutic activities, therapeutic exercises and progress toward the following goals:    · Plan of Care Expires:  04/07/19    Subjective     Chief Complaint: Pt reported diarrhea (currently r/o C. Diff).   Patient/Family Comments/goals: to get well  Pain/Comfort:  · Pain Rating 1: 0/10    Living Environment:  Pt lives with spouse in a SS house with no concerns.   Prior to admission, patients level of function was mod I with ambulation using rollator PRN.  Pt was driving PTA.  Equipment used at home: rollator.  Upon discharge, patient will have assistance from spouse.    Objective:     Patient found right sidelying with oxygen, peripheral IV, telemetry upon PT entry to room.    General Precautions: Standard, fall, respiratory, diabetic(LUE HD access), HD TTS  Orthopedic Precautions:N/A   Braces: N/A     Exams:  · Cognitive Exam:  Patient was able to follow multiple commands.   · Gross Motor  Coordination:  WFL  · Postural Exam:  Patient presented with the following abnormalities:    · -       Rounded shoulders  · -       Forward head  · Skin Integrity/Edema:      · -       Skin integrity: Visible skin intact  · -       Edema: None noted BLE  · RLE ROM: WFL  · RLE Strength: WFL  · LLE ROM: WFL  · LLE Strength: WFL    Functional Mobility:  · Bed Mobility:     · Scooting: contact guard assistance  · Supine to Sit: contact guard assistance with HOB elevated   · Transfers:     · Sit to Stand:  contact guard assistance with no AD and rolling walker  · Bed to Chair: contact guard assistance with  rolling walker  using  Step Transfer  · Gait: Pt ambulated ~70 ft with CGA using RW and 2L O2 NC.  Pt easily fatigued, c/o SOB with activities.  PT attempted to obtain walking O2 stats, however unsuccessful.    · Balance: Pt with fair dynamic standing balance.       Therapeutic Activities and Exercises:  BLE seated therex 10 reps: hip flex, LAQ, and AP    AM-PAC 6 CLICK MOBILITY  Total Score:21     Patient left up in chair with all lines intact, call button in reach and spouse and nurse Faith present.    GOALS:   Multidisciplinary Problems     Physical Therapy Goals        Problem: Physical Therapy Goal    Goal Priority Disciplines Outcome Goal Variances Interventions   Physical Therapy Goal     PT, PT/OT      Description:  Goals to be met by: 19     Patient will increase functional independence with mobility by performin. Supine to sit with Modified [place]  2. Rolling to Left and Right with Modified [place]  3. Sit to stand transfer with Modified [place]  4. Bed to chair transfer with Modified [place]   5. Gait  x250 feet with Modified [place] using Rolling Walker   6. Lower extremity exercise program 3 sets x10 reps per handout, with independence                      History:     Past Medical History:   Diagnosis Date    Arthritis     Atrial fibrillation     COPD (chronic  obstructive pulmonary disease)     Diabetes mellitus type II     Dialysis patient     Encounter for blood transfusion     ESRD (end stage renal disease)        Past Surgical History:   Procedure Laterality Date    AV FISTULA PLACEMENT      TRANSESOPHAGEAL ECHOCARDIOGRAM WITH POSSIBLE CARDIOVERSION (ISAMAR W/ POSS CARDIOVERSION) N/A 3/21/2019    Performed by Elgin Garcia MD at [facility] CATH LAB    TUBAL LIGATION         Time Tracking:     PT Received On: 03/24/19  PT Start Time: 1236     PT Stop Time: 1254  PT Total Time (min): 18 min     Billable Minutes: Evaluation 18 min      Estephania Guerra, PT  03/24/2019

## 2023-07-08 NOTE — PT/OT/SLP DISCHARGE
Physical Therapy Discharge Summary    Name: Eli Vaz  MRN: [id number]   Principal Problem: Acute respiratory failure with hypoxia     Patient Discharged from acute Physical Therapy on 3/25/19.  Please refer to prior PT notes for functional status.     Assessment:     Patient appropriate for care in another setting.    Objective:     GOALS:   Multidisciplinary Problems     Physical Therapy Goals        Problem: Physical Therapy Goal    Goal Priority Disciplines Outcome Goal Variances Interventions   Physical Therapy Goal     PT, PT/OT Ongoing (interventions implemented as appropriate)     Description:  Goals to be met by: 19     Patient will increase functional independence with mobility by performin. Supine to sit with Modified [place]  2. Rolling to Left and Right with Modified [place]  3. Sit to stand transfer with Modified [place]  4. Bed to chair transfer with Modified [place]   5. Gait  x250 feet with Modified [place] using Rolling Walker   6. Lower extremity exercise program 3 sets x10 reps per handout, with independence                      Reasons for Discontinuation of Therapy Services  Transfer to alternate level of care.      Plan:     Patient Discharged to: Home with Home Health Service.    Estephania Guerra, PT  3/26/2019   yes

## 2024-03-18 NOTE — Clinical Note
Elbows and knees padded, heels floated, warming blanket, and safety strap applied.   PAST MEDICAL HISTORY:  No pertinent past medical history

## (undated) DEVICE — CATH TRICUSPID HALO XP 7FRX110

## (undated) DEVICE — INTRODUCER HEMOSTASIS 7.5F

## (undated) DEVICE — KIT PROBE COVER WITH GEL

## (undated) DEVICE — CATH BLAZER PRIME XP 7F

## (undated) DEVICE — ELECTRODE POLYHESIVEPRE-ATTACH

## (undated) DEVICE — PACK EP DRAPE

## (undated) DEVICE — PATCH ENSITE PRECISION NAVX SE

## (undated) DEVICE — CATH BIDIRECTIONAL DF CRV 7FR

## (undated) DEVICE — PAD DEFIB CADENCE ADULT R2

## (undated) DEVICE — INTRO 8.5FR 63CM SRO